# Patient Record
Sex: FEMALE | Race: BLACK OR AFRICAN AMERICAN | Employment: OTHER | URBAN - METROPOLITAN AREA
[De-identification: names, ages, dates, MRNs, and addresses within clinical notes are randomized per-mention and may not be internally consistent; named-entity substitution may affect disease eponyms.]

---

## 2017-02-18 PROBLEM — T78.2XXA ANAPHYLACTIC REACTION: Status: ACTIVE | Noted: 2017-02-18

## 2017-12-27 PROBLEM — R11.2 NAUSEA AND VOMITING: Status: ACTIVE | Noted: 2017-12-27

## 2018-03-26 ENCOUNTER — HOSPITAL ENCOUNTER (OUTPATIENT)
Age: 63
Discharge: HOME | End: 2018-03-28
Payer: COMMERCIAL

## 2018-03-26 PROCEDURE — 87088 URINE BACTERIA CULTURE: CPT

## 2018-03-29 LAB — URINE CULTURE, ROUTINE: NORMAL

## 2018-04-13 ENCOUNTER — HOSPITAL ENCOUNTER (OUTPATIENT)
Age: 63
Discharge: HOME | End: 2018-04-13
Payer: COMMERCIAL

## 2018-04-13 LAB
ALBUMIN SERPL-MCNC: 3.9 G/DL (ref 3.5–5.2)
ALP BLD-CCNC: 149 U/L (ref 35–104)
ALT SERPL-CCNC: 23 U/L (ref 0–32)
ANION GAP SERPL CALCULATED.3IONS-SCNC: 8 MMOL/L (ref 7–16)
AST SERPL-CCNC: 15 U/L (ref 0–31)
BILIRUB SERPL-MCNC: <0.2 MG/DL (ref 0–1.2)
BUN BLDV-MCNC: 22 MG/DL (ref 8–23)
CALCIUM SERPL-MCNC: 9.7 MG/DL (ref 8.6–10.2)
CHLORIDE BLD-SCNC: 102 MMOL/L (ref 98–107)
CO2: 28 MMOL/L (ref 22–29)
CORTISOL TOTAL: 10.37 MCG/DL (ref 2.68–18.4)
CREAT SERPL-MCNC: 0.7 MG/DL (ref 0.5–1)
GFR AFRICAN AMERICAN: >60
GFR NON-AFRICAN AMERICAN: >60 ML/MIN/1.73
GLUCOSE BLD-MCNC: 106 MG/DL (ref 74–109)
POTASSIUM SERPL-SCNC: 4 MMOL/L (ref 3.5–5)
SODIUM BLD-SCNC: 138 MMOL/L (ref 132–146)
TOTAL PROTEIN: 6.3 G/DL (ref 6.4–8.3)

## 2018-04-13 PROCEDURE — 82533 TOTAL CORTISOL: CPT

## 2018-04-13 PROCEDURE — 36415 COLL VENOUS BLD VENIPUNCTURE: CPT

## 2018-04-13 PROCEDURE — 80053 COMPREHEN METABOLIC PANEL: CPT

## 2018-04-17 ENCOUNTER — HOSPITAL ENCOUNTER (OUTPATIENT)
Dept: GENERAL RADIOLOGY | Age: 63
Discharge: HOME | End: 2018-04-19
Payer: COMMERCIAL

## 2018-04-17 ENCOUNTER — HOSPITAL ENCOUNTER (OUTPATIENT)
Age: 63
Discharge: HOME | End: 2018-04-17
Payer: COMMERCIAL

## 2018-04-17 DIAGNOSIS — Z12.31 VISIT FOR SCREENING MAMMOGRAM: ICD-10-CM

## 2018-04-17 PROCEDURE — 82024 ASSAY OF ACTH: CPT

## 2018-04-17 PROCEDURE — 77067 SCR MAMMO BI INCL CAD: CPT

## 2018-04-19 ENCOUNTER — APPOINTMENT (OUTPATIENT)
Dept: CT IMAGING | Age: 63
End: 2018-04-19
Payer: COMMERCIAL

## 2018-04-19 ENCOUNTER — APPOINTMENT (OUTPATIENT)
Dept: ULTRASOUND IMAGING | Age: 63
End: 2018-04-19
Payer: COMMERCIAL

## 2018-04-19 ENCOUNTER — HOSPITAL ENCOUNTER (EMERGENCY)
Age: 63
Discharge: HOME | End: 2018-04-19
Payer: COMMERCIAL

## 2018-04-19 VITALS
HEIGHT: 66 IN | RESPIRATION RATE: 16 BRPM | BODY MASS INDEX: 16.07 KG/M2 | HEART RATE: 84 BPM | WEIGHT: 100 LBS | SYSTOLIC BLOOD PRESSURE: 118 MMHG | OXYGEN SATURATION: 98 % | DIASTOLIC BLOOD PRESSURE: 63 MMHG | TEMPERATURE: 97.2 F

## 2018-04-19 DIAGNOSIS — N30.01 ACUTE CYSTITIS WITH HEMATURIA: Primary | ICD-10-CM

## 2018-04-19 DIAGNOSIS — R10.30 LOWER ABDOMINAL PAIN: ICD-10-CM

## 2018-04-19 DIAGNOSIS — N39.0 URINARY TRACT INFECTION WITHOUT HEMATURIA, SITE UNSPECIFIED: ICD-10-CM

## 2018-04-19 LAB
ADRENOCORTICOTROPIC HORMONE: 14 PG/ML (ref 6–58)
ALBUMIN SERPL-MCNC: 4.1 G/DL (ref 3.5–5.2)
ALP BLD-CCNC: 67 U/L (ref 35–104)
ALT SERPL-CCNC: 12 U/L (ref 0–32)
AMORPHOUS: PRESENT
ANION GAP SERPL CALCULATED.3IONS-SCNC: 12 MMOL/L (ref 7–16)
AST SERPL-CCNC: 14 U/L (ref 0–31)
BACTERIA: ABNORMAL /HPF
BILIRUB SERPL-MCNC: <0.2 MG/DL (ref 0–1.2)
BILIRUBIN URINE: NEGATIVE
BLOOD, URINE: ABNORMAL
BUN BLDV-MCNC: 37 MG/DL (ref 8–23)
CALCIUM SERPL-MCNC: 10.5 MG/DL (ref 8.6–10.2)
CHLORIDE BLD-SCNC: 105 MMOL/L (ref 98–107)
CLARITY: ABNORMAL
CO2: 26 MMOL/L (ref 22–29)
COLOR: YELLOW
CREAT SERPL-MCNC: 0.9 MG/DL (ref 0.5–1)
CRYSTALS, UA: ABNORMAL
GFR AFRICAN AMERICAN: >60
GFR NON-AFRICAN AMERICAN: >60 ML/MIN/1.73
GLUCOSE BLD-MCNC: 117 MG/DL (ref 74–109)
GLUCOSE URINE: NEGATIVE MG/DL
HCT VFR BLD CALC: 39 % (ref 34–48)
HEMOGLOBIN: 12 G/DL (ref 11.5–15.5)
KETONES, URINE: NEGATIVE MG/DL
LACTIC ACID: 1.5 MMOL/L (ref 0.5–2.2)
LEUKOCYTE ESTERASE, URINE: ABNORMAL
MCH RBC QN AUTO: 28.6 PG (ref 26–35)
MCHC RBC AUTO-ENTMCNC: 30.8 % (ref 32–34.5)
MCV RBC AUTO: 92.9 FL (ref 80–99.9)
MUCUS: PRESENT
NITRITE, URINE: NEGATIVE
PDW BLD-RTO: 15.1 FL (ref 11.5–15)
PH UA: >=9 (ref 5–9)
PLATELET # BLD: 252 E9/L (ref 130–450)
PMV BLD AUTO: 9.7 FL (ref 7–12)
POTASSIUM SERPL-SCNC: 4.4 MMOL/L (ref 3.5–5)
PROTEIN UA: >=300 MG/DL
RBC # BLD: 4.2 E12/L (ref 3.5–5.5)
RBC UA: ABNORMAL /HPF (ref 0–2)
SODIUM BLD-SCNC: 143 MMOL/L (ref 132–146)
SPECIFIC GRAVITY UA: 1.01 (ref 1–1.03)
TOTAL PROTEIN: 7 G/DL (ref 6.4–8.3)
UROBILINOGEN, URINE: 0.2 E.U./DL
WBC # BLD: 7.1 E9/L (ref 4.5–11.5)
WBC UA: ABNORMAL /HPF (ref 0–5)

## 2018-04-19 PROCEDURE — 87088 URINE BACTERIA CULTURE: CPT

## 2018-04-19 PROCEDURE — 96374 THER/PROPH/DIAG INJ IV PUSH: CPT

## 2018-04-19 PROCEDURE — 6360000002 HC RX W HCPCS: Performed by: PHYSICIAN ASSISTANT

## 2018-04-19 PROCEDURE — 99284 EMERGENCY DEPT VISIT MOD MDM: CPT

## 2018-04-19 PROCEDURE — 36415 COLL VENOUS BLD VENIPUNCTURE: CPT

## 2018-04-19 PROCEDURE — 2580000003 HC RX 258: Performed by: PHYSICIAN ASSISTANT

## 2018-04-19 PROCEDURE — 81001 URINALYSIS AUTO W/SCOPE: CPT

## 2018-04-19 PROCEDURE — 85027 COMPLETE CBC AUTOMATED: CPT

## 2018-04-19 PROCEDURE — 83605 ASSAY OF LACTIC ACID: CPT

## 2018-04-19 PROCEDURE — 80053 COMPREHEN METABOLIC PANEL: CPT

## 2018-04-19 PROCEDURE — 76830 TRANSVAGINAL US NON-OB: CPT

## 2018-04-19 PROCEDURE — 74176 CT ABD & PELVIS W/O CONTRAST: CPT

## 2018-04-19 RX ORDER — PHENAZOPYRIDINE HYDROCHLORIDE 100 MG/1
100 TABLET, FILM COATED ORAL 3 TIMES DAILY PRN
Qty: 9 TABLET | Refills: 0 | Status: SHIPPED | OUTPATIENT
Start: 2018-04-19 | End: 2018-04-22

## 2018-04-19 RX ORDER — ONDANSETRON 4 MG/1
4 TABLET, ORALLY DISINTEGRATING ORAL EVERY 8 HOURS PRN
Qty: 10 TABLET | Refills: 0 | Status: SHIPPED | OUTPATIENT
Start: 2018-04-19 | End: 2018-09-13 | Stop reason: ALTCHOICE

## 2018-04-19 RX ORDER — 0.9 % SODIUM CHLORIDE 0.9 %
1000 INTRAVENOUS SOLUTION INTRAVENOUS ONCE
Status: COMPLETED | OUTPATIENT
Start: 2018-04-19 | End: 2018-04-19

## 2018-04-19 RX ORDER — CEFDINIR 300 MG/1
300 CAPSULE ORAL 2 TIMES DAILY
Qty: 20 CAPSULE | Refills: 0 | Status: SHIPPED | OUTPATIENT
Start: 2018-04-19 | End: 2018-04-29

## 2018-04-19 RX ORDER — ONDANSETRON 2 MG/ML
4 INJECTION INTRAMUSCULAR; INTRAVENOUS ONCE
Status: COMPLETED | OUTPATIENT
Start: 2018-04-19 | End: 2018-04-19

## 2018-04-19 RX ADMIN — SODIUM CHLORIDE 1000 ML: 9 INJECTION, SOLUTION INTRAVENOUS at 14:13

## 2018-04-19 RX ADMIN — ONDANSETRON HYDROCHLORIDE 4 MG: 2 INJECTION, SOLUTION INTRAMUSCULAR; INTRAVENOUS at 14:12

## 2018-04-19 ASSESSMENT — PAIN DESCRIPTION - FREQUENCY: FREQUENCY: CONTINUOUS

## 2018-04-19 ASSESSMENT — PAIN SCALES - GENERAL
PAINLEVEL_OUTOF10: 8
PAINLEVEL_OUTOF10: 6

## 2018-04-19 ASSESSMENT — PAIN DESCRIPTION - LOCATION
LOCATION: PELVIS
LOCATION: PELVIS

## 2018-04-19 ASSESSMENT — PAIN DESCRIPTION - DESCRIPTORS
DESCRIPTORS: DISCOMFORT
DESCRIPTORS: DISCOMFORT

## 2018-04-19 ASSESSMENT — PAIN DESCRIPTION - PAIN TYPE: TYPE: ACUTE PAIN

## 2018-04-21 LAB — URINE CULTURE, ROUTINE: NORMAL

## 2018-05-13 ENCOUNTER — APPOINTMENT (OUTPATIENT)
Dept: CT IMAGING | Age: 63
DRG: 689 | End: 2018-05-13
Payer: COMMERCIAL

## 2018-05-13 ENCOUNTER — APPOINTMENT (OUTPATIENT)
Dept: GENERAL RADIOLOGY | Age: 63
DRG: 689 | End: 2018-05-13
Payer: COMMERCIAL

## 2018-05-13 ENCOUNTER — HOSPITAL ENCOUNTER (INPATIENT)
Age: 63
LOS: 5 days | Discharge: HOME HEALTH | DRG: 689 | End: 2018-05-18
Attending: EMERGENCY MEDICINE | Admitting: INTERNAL MEDICINE
Payer: COMMERCIAL

## 2018-05-13 DIAGNOSIS — N39.0 URINARY TRACT INFECTION WITHOUT HEMATURIA, SITE UNSPECIFIED: ICD-10-CM

## 2018-05-13 DIAGNOSIS — R41.82 ALTERED MENTAL STATUS, UNSPECIFIED ALTERED MENTAL STATUS TYPE: Primary | ICD-10-CM

## 2018-05-13 PROBLEM — G93.41 ACUTE METABOLIC ENCEPHALOPATHY: Status: ACTIVE | Noted: 2018-05-13

## 2018-05-13 PROBLEM — N30.01 ACUTE CYSTITIS WITH HEMATURIA: Status: ACTIVE | Noted: 2018-05-13

## 2018-05-13 LAB
ACETAMINOPHEN LEVEL: <5 MCG/ML (ref 10–30)
ALBUMIN SERPL-MCNC: 5 G/DL (ref 3.5–5.2)
ALP BLD-CCNC: 68 U/L (ref 35–104)
ALT SERPL-CCNC: 10 U/L (ref 0–32)
AMPHETAMINE SCREEN, URINE: NOT DETECTED
ANION GAP SERPL CALCULATED.3IONS-SCNC: 22 MMOL/L (ref 7–16)
AST SERPL-CCNC: 13 U/L (ref 0–31)
BACTERIA: ABNORMAL /HPF
BARBITURATE SCREEN URINE: NOT DETECTED
BASOPHILS ABSOLUTE: 0.02 E9/L (ref 0–0.2)
BASOPHILS RELATIVE PERCENT: 0.2 % (ref 0–2)
BENZODIAZEPINE SCREEN, URINE: NOT DETECTED
BILIRUB SERPL-MCNC: 0.3 MG/DL (ref 0–1.2)
BILIRUBIN URINE: NEGATIVE
BLOOD, URINE: ABNORMAL
BUN BLDV-MCNC: 37 MG/DL (ref 8–23)
CALCIUM SERPL-MCNC: 11.3 MG/DL (ref 8.6–10.2)
CANNABINOID SCREEN URINE: NOT DETECTED
CHLORIDE BLD-SCNC: 100 MMOL/L (ref 98–107)
CLARITY: CLEAR
CO2: 24 MMOL/L (ref 22–29)
COCAINE METABOLITE SCREEN URINE: NOT DETECTED
COLOR: YELLOW
CREAT SERPL-MCNC: 1.2 MG/DL (ref 0.5–1)
EKG ATRIAL RATE: 110 BPM
EKG P AXIS: 86 DEGREES
EKG P-R INTERVAL: 144 MS
EKG Q-T INTERVAL: 332 MS
EKG QRS DURATION: 64 MS
EKG QTC CALCULATION (BAZETT): 449 MS
EKG R AXIS: 66 DEGREES
EKG T AXIS: 86 DEGREES
EKG VENTRICULAR RATE: 110 BPM
EOSINOPHILS ABSOLUTE: 0.01 E9/L (ref 0.05–0.5)
EOSINOPHILS RELATIVE PERCENT: 0.1 % (ref 0–6)
EPITHELIAL CELLS, UA: ABNORMAL /HPF
ETHANOL: <10 MG/DL (ref 0–0.08)
GFR AFRICAN AMERICAN: 54
GFR NON-AFRICAN AMERICAN: 54 ML/MIN/1.73
GLUCOSE BLD-MCNC: 121 MG/DL (ref 74–109)
GLUCOSE URINE: NEGATIVE MG/DL
HCT VFR BLD CALC: 44.5 % (ref 34–48)
HEMOGLOBIN: 13.9 G/DL (ref 11.5–15.5)
IMMATURE GRANULOCYTES #: 0.03 E9/L
IMMATURE GRANULOCYTES %: 0.3 % (ref 0–5)
KETONES, URINE: ABNORMAL MG/DL
LACTIC ACID: 2.1 MMOL/L (ref 0.5–2.2)
LEUKOCYTE ESTERASE, URINE: ABNORMAL
LYMPHOCYTES ABSOLUTE: 0.84 E9/L (ref 1.5–4)
LYMPHOCYTES RELATIVE PERCENT: 9.5 % (ref 20–42)
MCH RBC QN AUTO: 28.7 PG (ref 26–35)
MCHC RBC AUTO-ENTMCNC: 31.2 % (ref 32–34.5)
MCV RBC AUTO: 91.9 FL (ref 80–99.9)
METHADONE SCREEN, URINE: NOT DETECTED
MONOCYTES ABSOLUTE: 0.44 E9/L (ref 0.1–0.95)
MONOCYTES RELATIVE PERCENT: 5 % (ref 2–12)
NEUTROPHILS ABSOLUTE: 7.51 E9/L (ref 1.8–7.3)
NEUTROPHILS RELATIVE PERCENT: 84.9 % (ref 43–80)
NITRITE, URINE: NEGATIVE
OPIATE SCREEN URINE: NOT DETECTED
PDW BLD-RTO: 13.8 FL (ref 11.5–15)
PH UA: 8.5 (ref 5–9)
PHENCYCLIDINE SCREEN URINE: NOT DETECTED
PLATELET # BLD: 278 E9/L (ref 130–450)
PMV BLD AUTO: 10 FL (ref 7–12)
POTASSIUM SERPL-SCNC: 5.1 MMOL/L (ref 3.5–5)
PROPOXYPHENE SCREEN: NOT DETECTED
PROTEIN UA: >=300 MG/DL
RBC # BLD: 4.84 E12/L (ref 3.5–5.5)
RBC UA: ABNORMAL /HPF (ref 0–2)
SALICYLATE, SERUM: <0.3 MG/DL (ref 0–30)
SODIUM BLD-SCNC: 146 MMOL/L (ref 132–146)
SPECIFIC GRAVITY UA: 1.01 (ref 1–1.03)
TOTAL PROTEIN: 8 G/DL (ref 6.4–8.3)
TRICYCLIC ANTIDEPRESSANTS SCREEN SERUM: NEGATIVE NG/ML
TROPONIN: <0.01 NG/ML (ref 0–0.03)
UROBILINOGEN, URINE: 0.2 E.U./DL
WBC # BLD: 8.9 E9/L (ref 4.5–11.5)
WBC UA: ABNORMAL /HPF (ref 0–5)

## 2018-05-13 PROCEDURE — 99285 EMERGENCY DEPT VISIT HI MDM: CPT

## 2018-05-13 PROCEDURE — 94760 N-INVAS EAR/PLS OXIMETRY 1: CPT

## 2018-05-13 PROCEDURE — 87040 BLOOD CULTURE FOR BACTERIA: CPT

## 2018-05-13 PROCEDURE — 6360000002 HC RX W HCPCS: Performed by: EMERGENCY MEDICINE

## 2018-05-13 PROCEDURE — 81001 URINALYSIS AUTO W/SCOPE: CPT

## 2018-05-13 PROCEDURE — 83605 ASSAY OF LACTIC ACID: CPT

## 2018-05-13 PROCEDURE — 96376 TX/PRO/DX INJ SAME DRUG ADON: CPT

## 2018-05-13 PROCEDURE — 71045 X-RAY EXAM CHEST 1 VIEW: CPT

## 2018-05-13 PROCEDURE — 2580000003 HC RX 258: Performed by: EMERGENCY MEDICINE

## 2018-05-13 PROCEDURE — 84484 ASSAY OF TROPONIN QUANT: CPT

## 2018-05-13 PROCEDURE — 36415 COLL VENOUS BLD VENIPUNCTURE: CPT

## 2018-05-13 PROCEDURE — 2060000000 HC ICU INTERMEDIATE R&B

## 2018-05-13 PROCEDURE — 80053 COMPREHEN METABOLIC PANEL: CPT

## 2018-05-13 PROCEDURE — 96374 THER/PROPH/DIAG INJ IV PUSH: CPT

## 2018-05-13 PROCEDURE — 80307 DRUG TEST PRSMV CHEM ANLYZR: CPT

## 2018-05-13 PROCEDURE — 85025 COMPLETE CBC W/AUTO DIFF WBC: CPT

## 2018-05-13 PROCEDURE — 70450 CT HEAD/BRAIN W/O DYE: CPT

## 2018-05-13 PROCEDURE — 93005 ELECTROCARDIOGRAM TRACING: CPT | Performed by: EMERGENCY MEDICINE

## 2018-05-13 RX ORDER — LORAZEPAM 2 MG/ML
2 INJECTION INTRAMUSCULAR ONCE
Status: COMPLETED | OUTPATIENT
Start: 2018-05-13 | End: 2018-05-13

## 2018-05-13 RX ORDER — 0.9 % SODIUM CHLORIDE 0.9 %
1000 INTRAVENOUS SOLUTION INTRAVENOUS ONCE
Status: COMPLETED | OUTPATIENT
Start: 2018-05-13 | End: 2018-05-13

## 2018-05-13 RX ORDER — LORAZEPAM 2 MG/ML
1 INJECTION INTRAMUSCULAR ONCE
Status: COMPLETED | OUTPATIENT
Start: 2018-05-13 | End: 2018-05-13

## 2018-05-13 RX ADMIN — CEFTRIAXONE SODIUM 1 G: 1 INJECTION, POWDER, FOR SOLUTION INTRAMUSCULAR; INTRAVENOUS at 21:39

## 2018-05-13 RX ADMIN — SODIUM CHLORIDE 1000 ML: 9 INJECTION, SOLUTION INTRAVENOUS at 21:39

## 2018-05-13 RX ADMIN — LORAZEPAM 1 MG: 2 INJECTION INTRAMUSCULAR; INTRAVENOUS at 21:39

## 2018-05-13 RX ADMIN — Medication 2 MG: at 19:55

## 2018-05-13 RX ADMIN — SODIUM CHLORIDE 1000 ML: 9 INJECTION, SOLUTION INTRAVENOUS at 18:35

## 2018-05-14 ENCOUNTER — APPOINTMENT (OUTPATIENT)
Dept: MRI IMAGING | Age: 63
DRG: 689 | End: 2018-05-14
Payer: COMMERCIAL

## 2018-05-14 PROBLEM — R41.82 ALTERED MENTAL STATE: Status: RESOLVED | Noted: 2018-05-13 | Resolved: 2018-05-14

## 2018-05-14 LAB
ANION GAP SERPL CALCULATED.3IONS-SCNC: 13 MMOL/L (ref 7–16)
BASOPHILS ABSOLUTE: 0.02 E9/L (ref 0–0.2)
BASOPHILS RELATIVE PERCENT: 0.3 % (ref 0–2)
BUN BLDV-MCNC: 30 MG/DL (ref 8–23)
CALCIUM IONIZED: 1.42 MMOL/L (ref 1.15–1.33)
CALCIUM SERPL-MCNC: 9.3 MG/DL (ref 8.6–10.2)
CHLORIDE BLD-SCNC: 108 MMOL/L (ref 98–107)
CO2: 22 MMOL/L (ref 22–29)
CREAT SERPL-MCNC: 0.7 MG/DL (ref 0.5–1)
EOSINOPHILS ABSOLUTE: 0.07 E9/L (ref 0.05–0.5)
EOSINOPHILS RELATIVE PERCENT: 1.1 % (ref 0–6)
GFR AFRICAN AMERICAN: >60
GFR NON-AFRICAN AMERICAN: >60 ML/MIN/1.73
GLUCOSE BLD-MCNC: 98 MG/DL (ref 74–109)
HCT VFR BLD CALC: 41 % (ref 34–48)
HEMOGLOBIN: 12.4 G/DL (ref 11.5–15.5)
IMMATURE GRANULOCYTES #: 0.01 E9/L
IMMATURE GRANULOCYTES %: 0.2 % (ref 0–5)
LYMPHOCYTES ABSOLUTE: 1.43 E9/L (ref 1.5–4)
LYMPHOCYTES RELATIVE PERCENT: 22.1 % (ref 20–42)
MCH RBC QN AUTO: 28.8 PG (ref 26–35)
MCHC RBC AUTO-ENTMCNC: 30.2 % (ref 32–34.5)
MCV RBC AUTO: 95.3 FL (ref 80–99.9)
MONOCYTES ABSOLUTE: 0.55 E9/L (ref 0.1–0.95)
MONOCYTES RELATIVE PERCENT: 8.5 % (ref 2–12)
NEUTROPHILS ABSOLUTE: 4.38 E9/L (ref 1.8–7.3)
NEUTROPHILS RELATIVE PERCENT: 67.8 % (ref 43–80)
PARATHYROID HORMONE INTACT: 64 PG/ML (ref 15–65)
PDW BLD-RTO: 13.9 FL (ref 11.5–15)
PLATELET # BLD: 232 E9/L (ref 130–450)
PMV BLD AUTO: 10.1 FL (ref 7–12)
POTASSIUM SERPL-SCNC: 4.1 MMOL/L (ref 3.5–5)
RBC # BLD: 4.3 E12/L (ref 3.5–5.5)
SODIUM BLD-SCNC: 143 MMOL/L (ref 132–146)
WBC # BLD: 6.5 E9/L (ref 4.5–11.5)

## 2018-05-14 PROCEDURE — 97535 SELF CARE MNGMENT TRAINING: CPT

## 2018-05-14 PROCEDURE — 6360000002 HC RX W HCPCS: Performed by: INTERNAL MEDICINE

## 2018-05-14 PROCEDURE — 36415 COLL VENOUS BLD VENIPUNCTURE: CPT

## 2018-05-14 PROCEDURE — 87088 URINE BACTERIA CULTURE: CPT

## 2018-05-14 PROCEDURE — 70551 MRI BRAIN STEM W/O DYE: CPT

## 2018-05-14 PROCEDURE — 82330 ASSAY OF CALCIUM: CPT

## 2018-05-14 PROCEDURE — 80048 BASIC METABOLIC PNL TOTAL CA: CPT

## 2018-05-14 PROCEDURE — G8988 SELF CARE GOAL STATUS: HCPCS

## 2018-05-14 PROCEDURE — 6370000000 HC RX 637 (ALT 250 FOR IP): Performed by: INTERNAL MEDICINE

## 2018-05-14 PROCEDURE — G8979 MOBILITY GOAL STATUS: HCPCS

## 2018-05-14 PROCEDURE — 97161 PT EVAL LOW COMPLEX 20 MIN: CPT

## 2018-05-14 PROCEDURE — 2580000003 HC RX 258: Performed by: INTERNAL MEDICINE

## 2018-05-14 PROCEDURE — G8987 SELF CARE CURRENT STATUS: HCPCS

## 2018-05-14 PROCEDURE — 83970 ASSAY OF PARATHORMONE: CPT

## 2018-05-14 PROCEDURE — 2060000000 HC ICU INTERMEDIATE R&B

## 2018-05-14 PROCEDURE — 85025 COMPLETE CBC W/AUTO DIFF WBC: CPT

## 2018-05-14 PROCEDURE — 97166 OT EVAL MOD COMPLEX 45 MIN: CPT

## 2018-05-14 PROCEDURE — 97110 THERAPEUTIC EXERCISES: CPT

## 2018-05-14 PROCEDURE — G8978 MOBILITY CURRENT STATUS: HCPCS

## 2018-05-14 RX ORDER — SODIUM CHLORIDE 0.9 % (FLUSH) 0.9 %
10 SYRINGE (ML) INJECTION PRN
Status: DISCONTINUED | OUTPATIENT
Start: 2018-05-14 | End: 2018-05-18 | Stop reason: HOSPADM

## 2018-05-14 RX ORDER — ONDANSETRON 2 MG/ML
4 INJECTION INTRAMUSCULAR; INTRAVENOUS EVERY 6 HOURS PRN
Status: DISCONTINUED | OUTPATIENT
Start: 2018-05-14 | End: 2018-05-18 | Stop reason: HOSPADM

## 2018-05-14 RX ORDER — ACETAMINOPHEN 325 MG/1
650 TABLET ORAL EVERY 4 HOURS PRN
Status: DISCONTINUED | OUTPATIENT
Start: 2018-05-14 | End: 2018-05-18 | Stop reason: HOSPADM

## 2018-05-14 RX ORDER — DOCUSATE SODIUM 100 MG/1
100 CAPSULE, LIQUID FILLED ORAL 2 TIMES DAILY PRN
Status: DISCONTINUED | OUTPATIENT
Start: 2018-05-14 | End: 2018-05-18 | Stop reason: HOSPADM

## 2018-05-14 RX ORDER — IPRATROPIUM BROMIDE AND ALBUTEROL SULFATE 2.5; .5 MG/3ML; MG/3ML
1 SOLUTION RESPIRATORY (INHALATION) EVERY 4 HOURS PRN
Status: DISCONTINUED | OUTPATIENT
Start: 2018-05-14 | End: 2018-05-18 | Stop reason: HOSPADM

## 2018-05-14 RX ORDER — SODIUM CHLORIDE 0.9 % (FLUSH) 0.9 %
10 SYRINGE (ML) INJECTION EVERY 12 HOURS SCHEDULED
Status: DISCONTINUED | OUTPATIENT
Start: 2018-05-14 | End: 2018-05-18 | Stop reason: HOSPADM

## 2018-05-14 RX ORDER — PETROLATUM 42 G/100G
OINTMENT TOPICAL 2 TIMES DAILY PRN
Status: DISCONTINUED | OUTPATIENT
Start: 2018-05-14 | End: 2018-05-18 | Stop reason: HOSPADM

## 2018-05-14 RX ORDER — FOLIC ACID 1 MG/1
1 TABLET ORAL DAILY
Status: DISCONTINUED | OUTPATIENT
Start: 2018-05-14 | End: 2018-05-18 | Stop reason: HOSPADM

## 2018-05-14 RX ORDER — LORAZEPAM 2 MG/ML
0.5 INJECTION INTRAMUSCULAR
Status: ACTIVE | OUTPATIENT
Start: 2018-05-14 | End: 2018-05-14

## 2018-05-14 RX ORDER — SODIUM CHLORIDE 9 MG/ML
INJECTION, SOLUTION INTRAVENOUS CONTINUOUS
Status: DISCONTINUED | OUTPATIENT
Start: 2018-05-14 | End: 2018-05-15

## 2018-05-14 RX ADMIN — FOLIC ACID 1 MG: 1 TABLET ORAL at 09:09

## 2018-05-14 RX ADMIN — Medication 10 ML: at 09:09

## 2018-05-14 RX ADMIN — WATER 1 G: 1 INJECTION INTRAMUSCULAR; INTRAVENOUS; SUBCUTANEOUS at 21:11

## 2018-05-14 RX ADMIN — SERTRALINE 50 MG: 50 TABLET, FILM COATED ORAL at 09:09

## 2018-05-14 RX ADMIN — ENOXAPARIN SODIUM 40 MG: 40 INJECTION SUBCUTANEOUS at 09:09

## 2018-05-14 RX ADMIN — SODIUM CHLORIDE: 9 INJECTION, SOLUTION INTRAVENOUS at 01:17

## 2018-05-14 RX ADMIN — Medication 10 ML: at 21:12

## 2018-05-14 RX ADMIN — ACETAMINOPHEN 650 MG: 325 TABLET, FILM COATED ORAL at 23:42

## 2018-05-14 ASSESSMENT — PAIN SCALES - GENERAL
PAINLEVEL_OUTOF10: 7
PAINLEVEL_OUTOF10: 0
PAINLEVEL_OUTOF10: 0

## 2018-05-15 LAB
ANION GAP SERPL CALCULATED.3IONS-SCNC: 12 MMOL/L (ref 7–16)
BASOPHILS ABSOLUTE: 0.02 E9/L (ref 0–0.2)
BASOPHILS RELATIVE PERCENT: 0.4 % (ref 0–2)
BUN BLDV-MCNC: 18 MG/DL (ref 8–23)
CALCIUM SERPL-MCNC: 8.4 MG/DL (ref 8.6–10.2)
CHLORIDE BLD-SCNC: 107 MMOL/L (ref 98–107)
CO2: 23 MMOL/L (ref 22–29)
CREAT SERPL-MCNC: 0.6 MG/DL (ref 0.5–1)
EOSINOPHILS ABSOLUTE: 0.12 E9/L (ref 0.05–0.5)
EOSINOPHILS RELATIVE PERCENT: 2.6 % (ref 0–6)
GFR AFRICAN AMERICAN: >60
GFR NON-AFRICAN AMERICAN: >60 ML/MIN/1.73
GLUCOSE BLD-MCNC: 98 MG/DL (ref 74–109)
HCT VFR BLD CALC: 30.8 % (ref 34–48)
HEMOGLOBIN: 9.8 G/DL (ref 11.5–15.5)
IMMATURE GRANULOCYTES #: 0.01 E9/L
IMMATURE GRANULOCYTES %: 0.2 % (ref 0–5)
LYMPHOCYTES ABSOLUTE: 1.77 E9/L (ref 1.5–4)
LYMPHOCYTES RELATIVE PERCENT: 38.3 % (ref 20–42)
MAGNESIUM: 1.8 MG/DL (ref 1.6–2.6)
MCH RBC QN AUTO: 29.3 PG (ref 26–35)
MCHC RBC AUTO-ENTMCNC: 31.8 % (ref 32–34.5)
MCV RBC AUTO: 91.9 FL (ref 80–99.9)
MONOCYTES ABSOLUTE: 0.38 E9/L (ref 0.1–0.95)
MONOCYTES RELATIVE PERCENT: 8.2 % (ref 2–12)
NEUTROPHILS ABSOLUTE: 2.32 E9/L (ref 1.8–7.3)
NEUTROPHILS RELATIVE PERCENT: 50.3 % (ref 43–80)
PDW BLD-RTO: 13.6 FL (ref 11.5–15)
PLATELET # BLD: 194 E9/L (ref 130–450)
PMV BLD AUTO: 10.3 FL (ref 7–12)
POTASSIUM SERPL-SCNC: 3.8 MMOL/L (ref 3.5–5)
RBC # BLD: 3.35 E12/L (ref 3.5–5.5)
SODIUM BLD-SCNC: 142 MMOL/L (ref 132–146)
WBC # BLD: 4.6 E9/L (ref 4.5–11.5)

## 2018-05-15 PROCEDURE — 80048 BASIC METABOLIC PNL TOTAL CA: CPT

## 2018-05-15 PROCEDURE — 6360000002 HC RX W HCPCS: Performed by: INTERNAL MEDICINE

## 2018-05-15 PROCEDURE — 94664 DEMO&/EVAL PT USE INHALER: CPT

## 2018-05-15 PROCEDURE — 2060000000 HC ICU INTERMEDIATE R&B

## 2018-05-15 PROCEDURE — 97110 THERAPEUTIC EXERCISES: CPT

## 2018-05-15 PROCEDURE — 6370000000 HC RX 637 (ALT 250 FOR IP): Performed by: INTERNAL MEDICINE

## 2018-05-15 PROCEDURE — 97530 THERAPEUTIC ACTIVITIES: CPT

## 2018-05-15 PROCEDURE — 36415 COLL VENOUS BLD VENIPUNCTURE: CPT

## 2018-05-15 PROCEDURE — 85025 COMPLETE CBC W/AUTO DIFF WBC: CPT

## 2018-05-15 PROCEDURE — 2580000003 HC RX 258: Performed by: INTERNAL MEDICINE

## 2018-05-15 PROCEDURE — 83735 ASSAY OF MAGNESIUM: CPT

## 2018-05-15 RX ADMIN — SERTRALINE 50 MG: 50 TABLET, FILM COATED ORAL at 08:37

## 2018-05-15 RX ADMIN — ACETAMINOPHEN 650 MG: 325 TABLET, FILM COATED ORAL at 08:37

## 2018-05-15 RX ADMIN — ENOXAPARIN SODIUM 40 MG: 40 INJECTION SUBCUTANEOUS at 08:37

## 2018-05-15 RX ADMIN — Medication 10 ML: at 22:38

## 2018-05-15 RX ADMIN — IPRATROPIUM BROMIDE AND ALBUTEROL SULFATE 1 AMPULE: 2.5; .5 SOLUTION RESPIRATORY (INHALATION) at 11:06

## 2018-05-15 RX ADMIN — FOLIC ACID 1 MG: 1 TABLET ORAL at 08:37

## 2018-05-15 RX ADMIN — WATER 1 G: 1 INJECTION INTRAMUSCULAR; INTRAVENOUS; SUBCUTANEOUS at 22:38

## 2018-05-15 RX ADMIN — ACETAMINOPHEN 650 MG: 325 TABLET, FILM COATED ORAL at 18:30

## 2018-05-15 RX ADMIN — Medication 10 ML: at 08:38

## 2018-05-15 ASSESSMENT — PAIN DESCRIPTION - ONSET
ONSET: GRADUAL
ONSET: ON-GOING
ONSET: GRADUAL

## 2018-05-15 ASSESSMENT — PAIN DESCRIPTION - FREQUENCY
FREQUENCY: CONTINUOUS
FREQUENCY: INTERMITTENT
FREQUENCY: INTERMITTENT

## 2018-05-15 ASSESSMENT — PAIN DESCRIPTION - LOCATION
LOCATION: BACK
LOCATION: ABDOMEN
LOCATION: ABDOMEN

## 2018-05-15 ASSESSMENT — PAIN DESCRIPTION - PAIN TYPE
TYPE: ACUTE PAIN

## 2018-05-15 ASSESSMENT — PAIN DESCRIPTION - DESCRIPTORS
DESCRIPTORS: ACHING;DISCOMFORT;SORE
DESCRIPTORS: ACHING;DISCOMFORT
DESCRIPTORS: CONSTANT;DISCOMFORT;SORE

## 2018-05-15 ASSESSMENT — PAIN SCALES - GENERAL
PAINLEVEL_OUTOF10: 0
PAINLEVEL_OUTOF10: 2
PAINLEVEL_OUTOF10: 8
PAINLEVEL_OUTOF10: 0
PAINLEVEL_OUTOF10: 4

## 2018-05-15 ASSESSMENT — PAIN DESCRIPTION - PROGRESSION
CLINICAL_PROGRESSION: NOT CHANGED
CLINICAL_PROGRESSION: GRADUALLY IMPROVING
CLINICAL_PROGRESSION: GRADUALLY IMPROVING

## 2018-05-15 ASSESSMENT — PAIN DESCRIPTION - ORIENTATION: ORIENTATION: LOWER

## 2018-05-16 LAB
ANION GAP SERPL CALCULATED.3IONS-SCNC: 9 MMOL/L (ref 7–16)
BASOPHILS ABSOLUTE: 0.02 E9/L (ref 0–0.2)
BASOPHILS RELATIVE PERCENT: 0.5 % (ref 0–2)
BUN BLDV-MCNC: 10 MG/DL (ref 8–23)
CALCIUM SERPL-MCNC: 8.9 MG/DL (ref 8.6–10.2)
CHLORIDE BLD-SCNC: 104 MMOL/L (ref 98–107)
CO2: 26 MMOL/L (ref 22–29)
CREAT SERPL-MCNC: 0.6 MG/DL (ref 0.5–1)
EOSINOPHILS ABSOLUTE: 0.15 E9/L (ref 0.05–0.5)
EOSINOPHILS RELATIVE PERCENT: 3.8 % (ref 0–6)
GFR AFRICAN AMERICAN: >60
GFR NON-AFRICAN AMERICAN: >60 ML/MIN/1.73
GLUCOSE BLD-MCNC: 88 MG/DL (ref 74–109)
HCT VFR BLD CALC: 32.7 % (ref 34–48)
HEMOGLOBIN: 10.3 G/DL (ref 11.5–15.5)
IMMATURE GRANULOCYTES #: 0.01 E9/L
IMMATURE GRANULOCYTES %: 0.3 % (ref 0–5)
LYMPHOCYTES ABSOLUTE: 1.14 E9/L (ref 1.5–4)
LYMPHOCYTES RELATIVE PERCENT: 28.8 % (ref 20–42)
MAGNESIUM: 1.9 MG/DL (ref 1.6–2.6)
MCH RBC QN AUTO: 28.8 PG (ref 26–35)
MCHC RBC AUTO-ENTMCNC: 31.5 % (ref 32–34.5)
MCV RBC AUTO: 91.3 FL (ref 80–99.9)
MONOCYTES ABSOLUTE: 0.29 E9/L (ref 0.1–0.95)
MONOCYTES RELATIVE PERCENT: 7.3 % (ref 2–12)
NEUTROPHILS ABSOLUTE: 2.35 E9/L (ref 1.8–7.3)
NEUTROPHILS RELATIVE PERCENT: 59.3 % (ref 43–80)
ORGANISM: ABNORMAL
PDW BLD-RTO: 13.3 FL (ref 11.5–15)
PLATELET # BLD: 192 E9/L (ref 130–450)
PMV BLD AUTO: 9.9 FL (ref 7–12)
POTASSIUM SERPL-SCNC: 4.1 MMOL/L (ref 3.5–5)
RBC # BLD: 3.58 E12/L (ref 3.5–5.5)
SODIUM BLD-SCNC: 139 MMOL/L (ref 132–146)
URINE CULTURE, ROUTINE: ABNORMAL
URINE CULTURE, ROUTINE: ABNORMAL
WBC # BLD: 4 E9/L (ref 4.5–11.5)

## 2018-05-16 PROCEDURE — 6370000000 HC RX 637 (ALT 250 FOR IP): Performed by: INTERNAL MEDICINE

## 2018-05-16 PROCEDURE — 94640 AIRWAY INHALATION TREATMENT: CPT

## 2018-05-16 PROCEDURE — 2060000000 HC ICU INTERMEDIATE R&B

## 2018-05-16 PROCEDURE — 2580000003 HC RX 258: Performed by: INTERNAL MEDICINE

## 2018-05-16 PROCEDURE — 6360000002 HC RX W HCPCS: Performed by: INTERNAL MEDICINE

## 2018-05-16 PROCEDURE — 85025 COMPLETE CBC W/AUTO DIFF WBC: CPT

## 2018-05-16 PROCEDURE — 80048 BASIC METABOLIC PNL TOTAL CA: CPT

## 2018-05-16 PROCEDURE — 83735 ASSAY OF MAGNESIUM: CPT

## 2018-05-16 PROCEDURE — 36415 COLL VENOUS BLD VENIPUNCTURE: CPT

## 2018-05-16 RX ORDER — DICYCLOMINE HYDROCHLORIDE 10 MG/1
10 CAPSULE ORAL 3 TIMES DAILY PRN
Status: DISCONTINUED | OUTPATIENT
Start: 2018-05-16 | End: 2018-05-18 | Stop reason: HOSPADM

## 2018-05-16 RX ADMIN — ACETAMINOPHEN 650 MG: 325 TABLET, FILM COATED ORAL at 10:40

## 2018-05-16 RX ADMIN — FOLIC ACID 1 MG: 1 TABLET ORAL at 08:12

## 2018-05-16 RX ADMIN — Medication 10 ML: at 08:12

## 2018-05-16 RX ADMIN — WATER 1 G: 1 INJECTION INTRAMUSCULAR; INTRAVENOUS; SUBCUTANEOUS at 20:43

## 2018-05-16 RX ADMIN — SERTRALINE 50 MG: 50 TABLET, FILM COATED ORAL at 08:12

## 2018-05-16 RX ADMIN — ENOXAPARIN SODIUM 40 MG: 40 INJECTION SUBCUTANEOUS at 08:12

## 2018-05-16 RX ADMIN — IPRATROPIUM BROMIDE AND ALBUTEROL SULFATE 1 AMPULE: 2.5; .5 SOLUTION RESPIRATORY (INHALATION) at 18:05

## 2018-05-16 RX ADMIN — Medication 10 ML: at 20:43

## 2018-05-16 RX ADMIN — DICYCLOMINE HYDROCHLORIDE 10 MG: 10 CAPSULE ORAL at 17:59

## 2018-05-16 ASSESSMENT — PAIN DESCRIPTION - PAIN TYPE: TYPE: ACUTE PAIN

## 2018-05-16 ASSESSMENT — PAIN DESCRIPTION - DESCRIPTORS: DESCRIPTORS: CRAMPING

## 2018-05-16 ASSESSMENT — PAIN SCALES - GENERAL
PAINLEVEL_OUTOF10: 0
PAINLEVEL_OUTOF10: 9
PAINLEVEL_OUTOF10: 0

## 2018-05-16 ASSESSMENT — PAIN DESCRIPTION - ORIENTATION: ORIENTATION: LOWER

## 2018-05-16 ASSESSMENT — PAIN DESCRIPTION - LOCATION: LOCATION: ABDOMEN

## 2018-05-16 ASSESSMENT — PAIN DESCRIPTION - FREQUENCY: FREQUENCY: CONTINUOUS

## 2018-05-17 PROCEDURE — 6360000002 HC RX W HCPCS: Performed by: INTERNAL MEDICINE

## 2018-05-17 PROCEDURE — 94640 AIRWAY INHALATION TREATMENT: CPT

## 2018-05-17 PROCEDURE — 6370000000 HC RX 637 (ALT 250 FOR IP): Performed by: INTERNAL MEDICINE

## 2018-05-17 PROCEDURE — 97535 SELF CARE MNGMENT TRAINING: CPT

## 2018-05-17 PROCEDURE — 2580000003 HC RX 258: Performed by: INTERNAL MEDICINE

## 2018-05-17 PROCEDURE — 97530 THERAPEUTIC ACTIVITIES: CPT

## 2018-05-17 PROCEDURE — 94760 N-INVAS EAR/PLS OXIMETRY 1: CPT

## 2018-05-17 PROCEDURE — 97112 NEUROMUSCULAR REEDUCATION: CPT

## 2018-05-17 PROCEDURE — 6370000000 HC RX 637 (ALT 250 FOR IP): Performed by: UROLOGY

## 2018-05-17 PROCEDURE — 2060000000 HC ICU INTERMEDIATE R&B

## 2018-05-17 PROCEDURE — 2700000000 HC OXYGEN THERAPY PER DAY

## 2018-05-17 RX ORDER — PHENAZOPYRIDINE HYDROCHLORIDE 100 MG/1
100 TABLET, FILM COATED ORAL
Status: DISCONTINUED | OUTPATIENT
Start: 2018-05-17 | End: 2018-05-18 | Stop reason: HOSPADM

## 2018-05-17 RX ADMIN — WATER 1 G: 1 INJECTION INTRAMUSCULAR; INTRAVENOUS; SUBCUTANEOUS at 22:15

## 2018-05-17 RX ADMIN — IPRATROPIUM BROMIDE AND ALBUTEROL SULFATE 1 AMPULE: 2.5; .5 SOLUTION RESPIRATORY (INHALATION) at 08:21

## 2018-05-17 RX ADMIN — Medication 10 ML: at 22:20

## 2018-05-17 RX ADMIN — DICYCLOMINE HYDROCHLORIDE 10 MG: 10 CAPSULE ORAL at 08:21

## 2018-05-17 RX ADMIN — ENOXAPARIN SODIUM 40 MG: 40 INJECTION SUBCUTANEOUS at 08:19

## 2018-05-17 RX ADMIN — Medication 10 ML: at 09:38

## 2018-05-17 RX ADMIN — FOLIC ACID 1 MG: 1 TABLET ORAL at 08:19

## 2018-05-17 RX ADMIN — IPRATROPIUM BROMIDE AND ALBUTEROL SULFATE 1 AMPULE: 2.5; .5 SOLUTION RESPIRATORY (INHALATION) at 21:00

## 2018-05-17 RX ADMIN — PHENAZOPYRIDINE HYDROCHLORIDE 100 MG: 100 TABLET ORAL at 17:49

## 2018-05-17 RX ADMIN — SERTRALINE 50 MG: 50 TABLET, FILM COATED ORAL at 08:19

## 2018-05-17 RX ADMIN — IPRATROPIUM BROMIDE AND ALBUTEROL SULFATE 1 AMPULE: 2.5; .5 SOLUTION RESPIRATORY (INHALATION) at 13:01

## 2018-05-17 ASSESSMENT — PAIN DESCRIPTION - FREQUENCY: FREQUENCY: INTERMITTENT

## 2018-05-17 ASSESSMENT — PAIN SCALES - GENERAL
PAINLEVEL_OUTOF10: 8
PAINLEVEL_OUTOF10: 0

## 2018-05-17 ASSESSMENT — PAIN DESCRIPTION - LOCATION: LOCATION: ABDOMEN

## 2018-05-17 ASSESSMENT — PAIN DESCRIPTION - PROGRESSION
CLINICAL_PROGRESSION: NOT CHANGED
CLINICAL_PROGRESSION: NOT CHANGED

## 2018-05-17 ASSESSMENT — PAIN DESCRIPTION - ORIENTATION: ORIENTATION: LOWER

## 2018-05-17 ASSESSMENT — PAIN DESCRIPTION - PAIN TYPE: TYPE: ACUTE PAIN

## 2018-05-17 ASSESSMENT — PAIN DESCRIPTION - DESCRIPTORS: DESCRIPTORS: CRAMPING

## 2018-05-17 ASSESSMENT — PAIN DESCRIPTION - ONSET: ONSET: ON-GOING

## 2018-05-18 VITALS
RESPIRATION RATE: 18 BRPM | HEART RATE: 76 BPM | DIASTOLIC BLOOD PRESSURE: 58 MMHG | HEIGHT: 66 IN | TEMPERATURE: 98.1 F | BODY MASS INDEX: 16.07 KG/M2 | WEIGHT: 100 LBS | OXYGEN SATURATION: 94 % | SYSTOLIC BLOOD PRESSURE: 122 MMHG

## 2018-05-18 PROCEDURE — 6370000000 HC RX 637 (ALT 250 FOR IP): Performed by: INTERNAL MEDICINE

## 2018-05-18 PROCEDURE — 6370000000 HC RX 637 (ALT 250 FOR IP): Performed by: UROLOGY

## 2018-05-18 PROCEDURE — 6360000002 HC RX W HCPCS: Performed by: INTERNAL MEDICINE

## 2018-05-18 PROCEDURE — 2580000003 HC RX 258: Performed by: INTERNAL MEDICINE

## 2018-05-18 PROCEDURE — 2700000000 HC OXYGEN THERAPY PER DAY

## 2018-05-18 RX ORDER — CEPHALEXIN 500 MG/1
500 CAPSULE ORAL 2 TIMES DAILY
Qty: 10 CAPSULE | Refills: 0 | Status: SHIPPED | OUTPATIENT
Start: 2018-05-18 | End: 2018-05-23

## 2018-05-18 RX ORDER — PHENAZOPYRIDINE HYDROCHLORIDE 100 MG/1
100 TABLET, FILM COATED ORAL 3 TIMES DAILY
Qty: 21 TABLET | Refills: 0 | Status: SHIPPED | OUTPATIENT
Start: 2018-05-18 | End: 2018-05-25

## 2018-05-18 RX ADMIN — PHENAZOPYRIDINE HYDROCHLORIDE 100 MG: 100 TABLET ORAL at 13:04

## 2018-05-18 RX ADMIN — PHENAZOPYRIDINE HYDROCHLORIDE 100 MG: 100 TABLET ORAL at 08:08

## 2018-05-18 RX ADMIN — SERTRALINE 50 MG: 50 TABLET, FILM COATED ORAL at 08:08

## 2018-05-18 RX ADMIN — ENOXAPARIN SODIUM 40 MG: 40 INJECTION SUBCUTANEOUS at 08:08

## 2018-05-18 RX ADMIN — FOLIC ACID 1 MG: 1 TABLET ORAL at 08:08

## 2018-05-18 RX ADMIN — Medication 10 ML: at 08:08

## 2018-05-18 ASSESSMENT — PAIN SCALES - GENERAL
PAINLEVEL_OUTOF10: 0
PAINLEVEL_OUTOF10: 0

## 2018-05-19 LAB
BLOOD CULTURE, ROUTINE: NORMAL
CULTURE, BLOOD 2: NORMAL

## 2018-06-12 ENCOUNTER — HOSPITAL ENCOUNTER (OUTPATIENT)
Dept: ULTRASOUND IMAGING | Age: 63
Discharge: HOME | End: 2018-06-14
Payer: COMMERCIAL

## 2018-06-12 DIAGNOSIS — C64.2 MALIGNANT NEOPLASM OF LEFT KIDNEY, EXCEPT RENAL PELVIS (HCC): ICD-10-CM

## 2018-06-12 PROCEDURE — 76770 US EXAM ABDO BACK WALL COMP: CPT

## 2018-09-05 ENCOUNTER — HOSPITAL ENCOUNTER (OUTPATIENT)
Age: 63
Discharge: HOME | End: 2018-09-05
Payer: COMMERCIAL

## 2018-09-05 LAB
ALBUMIN SERPL-MCNC: 4.3 G/DL (ref 3.5–5.2)
ALP BLD-CCNC: 66 U/L (ref 35–104)
ALT SERPL-CCNC: 12 U/L (ref 0–32)
ANION GAP SERPL CALCULATED.3IONS-SCNC: 13 MMOL/L (ref 7–16)
AST SERPL-CCNC: 16 U/L (ref 0–31)
BASOPHILS ABSOLUTE: 0.03 E9/L (ref 0–0.2)
BASOPHILS RELATIVE PERCENT: 0.5 % (ref 0–2)
BILIRUB SERPL-MCNC: <0.2 MG/DL (ref 0–1.2)
BUN BLDV-MCNC: 25 MG/DL (ref 8–23)
CALCIUM SERPL-MCNC: 10.2 MG/DL (ref 8.6–10.2)
CHLORIDE BLD-SCNC: 105 MMOL/L (ref 98–107)
CO2: 27 MMOL/L (ref 22–29)
CREAT SERPL-MCNC: 0.6 MG/DL (ref 0.5–1)
EOSINOPHILS ABSOLUTE: 0.19 E9/L (ref 0.05–0.5)
EOSINOPHILS RELATIVE PERCENT: 3.4 % (ref 0–6)
GFR AFRICAN AMERICAN: >60
GFR NON-AFRICAN AMERICAN: >60 ML/MIN/1.73
GLUCOSE BLD-MCNC: 79 MG/DL (ref 74–109)
HCT VFR BLD CALC: 35.1 % (ref 34–48)
HEMOGLOBIN: 10.8 G/DL (ref 11.5–15.5)
IMMATURE GRANULOCYTES #: 0 E9/L
IMMATURE GRANULOCYTES %: 0 % (ref 0–5)
LYMPHOCYTES ABSOLUTE: 1.41 E9/L (ref 1.5–4)
LYMPHOCYTES RELATIVE PERCENT: 25.5 % (ref 20–42)
MCH RBC QN AUTO: 28.4 PG (ref 26–35)
MCHC RBC AUTO-ENTMCNC: 30.8 % (ref 32–34.5)
MCV RBC AUTO: 92.4 FL (ref 80–99.9)
MONOCYTES ABSOLUTE: 0.37 E9/L (ref 0.1–0.95)
MONOCYTES RELATIVE PERCENT: 6.7 % (ref 2–12)
NEUTROPHILS ABSOLUTE: 3.54 E9/L (ref 1.8–7.3)
NEUTROPHILS RELATIVE PERCENT: 63.9 % (ref 43–80)
PDW BLD-RTO: 14.1 FL (ref 11.5–15)
PLATELET # BLD: 353 E9/L (ref 130–450)
PMV BLD AUTO: 8.8 FL (ref 7–12)
POTASSIUM SERPL-SCNC: 3.9 MMOL/L (ref 3.5–5)
RBC # BLD: 3.8 E12/L (ref 3.5–5.5)
SODIUM BLD-SCNC: 145 MMOL/L (ref 132–146)
TOTAL PROTEIN: 7 G/DL (ref 6.4–8.3)
WBC # BLD: 5.5 E9/L (ref 4.5–11.5)

## 2018-09-05 PROCEDURE — 85025 COMPLETE CBC W/AUTO DIFF WBC: CPT

## 2018-09-05 PROCEDURE — 80053 COMPREHEN METABOLIC PANEL: CPT

## 2018-09-05 PROCEDURE — 36415 COLL VENOUS BLD VENIPUNCTURE: CPT

## 2018-09-13 ENCOUNTER — HOSPITAL ENCOUNTER (INPATIENT)
Age: 63
LOS: 3 days | Discharge: HOME HEALTH | DRG: 690 | End: 2018-09-16
Attending: EMERGENCY MEDICINE | Admitting: INTERNAL MEDICINE
Payer: COMMERCIAL

## 2018-09-13 ENCOUNTER — APPOINTMENT (OUTPATIENT)
Dept: CT IMAGING | Age: 63
DRG: 690 | End: 2018-09-13
Payer: COMMERCIAL

## 2018-09-13 ENCOUNTER — APPOINTMENT (OUTPATIENT)
Dept: GENERAL RADIOLOGY | Age: 63
DRG: 690 | End: 2018-09-13
Payer: COMMERCIAL

## 2018-09-13 ENCOUNTER — ANESTHESIA EVENT (OUTPATIENT)
Dept: OPERATING ROOM | Age: 63
DRG: 690 | End: 2018-09-13
Payer: COMMERCIAL

## 2018-09-13 DIAGNOSIS — R31.0 GROSS HEMATURIA: Primary | ICD-10-CM

## 2018-09-13 DIAGNOSIS — R31.9 HEMATURIA, UNSPECIFIED TYPE: ICD-10-CM

## 2018-09-13 DIAGNOSIS — N31.9 NEUROGENIC BLADDER: Chronic | ICD-10-CM

## 2018-09-13 LAB
ABO/RH: NORMAL
ALBUMIN SERPL-MCNC: 3.9 G/DL (ref 3.5–5.2)
ALP BLD-CCNC: 68 U/L (ref 35–104)
ALT SERPL-CCNC: 12 U/L (ref 0–32)
ANION GAP SERPL CALCULATED.3IONS-SCNC: 12 MMOL/L (ref 7–16)
ANTIBODY SCREEN: NORMAL
AST SERPL-CCNC: 15 U/L (ref 0–31)
BACTERIA: ABNORMAL /HPF
BASOPHILS ABSOLUTE: 0.03 E9/L (ref 0–0.2)
BASOPHILS RELATIVE PERCENT: 0.4 % (ref 0–2)
BILIRUB SERPL-MCNC: <0.2 MG/DL (ref 0–1.2)
BILIRUBIN URINE: NEGATIVE
BLOOD, URINE: ABNORMAL
BUN BLDV-MCNC: 19 MG/DL (ref 8–23)
CALCIUM SERPL-MCNC: 10.3 MG/DL (ref 8.6–10.2)
CHLORIDE BLD-SCNC: 102 MMOL/L (ref 98–107)
CLARITY: ABNORMAL
CO2: 27 MMOL/L (ref 22–29)
COLOR: YELLOW
CREAT SERPL-MCNC: 0.6 MG/DL (ref 0.5–1)
EKG ATRIAL RATE: 86 BPM
EKG P AXIS: 84 DEGREES
EKG P-R INTERVAL: 174 MS
EKG Q-T INTERVAL: 378 MS
EKG QRS DURATION: 70 MS
EKG QTC CALCULATION (BAZETT): 452 MS
EKG R AXIS: 70 DEGREES
EKG T AXIS: 89 DEGREES
EKG VENTRICULAR RATE: 86 BPM
EOSINOPHILS ABSOLUTE: 0.06 E9/L (ref 0.05–0.5)
EOSINOPHILS RELATIVE PERCENT: 0.8 % (ref 0–6)
GFR AFRICAN AMERICAN: >60
GFR NON-AFRICAN AMERICAN: >60 ML/MIN/1.73
GLUCOSE BLD-MCNC: 111 MG/DL (ref 74–109)
GLUCOSE URINE: NEGATIVE MG/DL
HCT VFR BLD CALC: 26.3 % (ref 34–48)
HCT VFR BLD CALC: 29.1 % (ref 34–48)
HCT VFR BLD CALC: 33.5 % (ref 34–48)
HEMOGLOBIN: 10 G/DL (ref 11.5–15.5)
HEMOGLOBIN: 8.1 G/DL (ref 11.5–15.5)
HEMOGLOBIN: 8.9 G/DL (ref 11.5–15.5)
IMMATURE GRANULOCYTES #: 0.02 E9/L
IMMATURE GRANULOCYTES %: 0.3 % (ref 0–5)
KETONES, URINE: NEGATIVE MG/DL
LACTIC ACID: 1.5 MMOL/L (ref 0.5–2.2)
LEUKOCYTE ESTERASE, URINE: ABNORMAL
LYMPHOCYTES ABSOLUTE: 1.45 E9/L (ref 1.5–4)
LYMPHOCYTES RELATIVE PERCENT: 19.9 % (ref 20–42)
MCH RBC QN AUTO: 27.9 PG (ref 26–35)
MCHC RBC AUTO-ENTMCNC: 29.9 % (ref 32–34.5)
MCV RBC AUTO: 93.6 FL (ref 80–99.9)
MONOCYTES ABSOLUTE: 0.31 E9/L (ref 0.1–0.95)
MONOCYTES RELATIVE PERCENT: 4.2 % (ref 2–12)
NEUTROPHILS ABSOLUTE: 5.43 E9/L (ref 1.8–7.3)
NEUTROPHILS RELATIVE PERCENT: 74.4 % (ref 43–80)
NITRITE, URINE: NEGATIVE
PDW BLD-RTO: 13.8 FL (ref 11.5–15)
PH UA: 8.5 (ref 5–9)
PLATELET # BLD: 281 E9/L (ref 130–450)
PMV BLD AUTO: 9.4 FL (ref 7–12)
POTASSIUM SERPL-SCNC: 4.6 MMOL/L (ref 3.5–5)
PROTEIN UA: >=300 MG/DL
RBC # BLD: 3.58 E12/L (ref 3.5–5.5)
RBC UA: ABNORMAL /HPF (ref 0–2)
SODIUM BLD-SCNC: 141 MMOL/L (ref 132–146)
SPECIFIC GRAVITY UA: 1.02 (ref 1–1.03)
TOTAL PROTEIN: 6.7 G/DL (ref 6.4–8.3)
UROBILINOGEN, URINE: 0.2 E.U./DL
WBC # BLD: 7.3 E9/L (ref 4.5–11.5)
WBC UA: >20 /HPF (ref 0–5)

## 2018-09-13 PROCEDURE — 71045 X-RAY EXAM CHEST 1 VIEW: CPT

## 2018-09-13 PROCEDURE — 85018 HEMOGLOBIN: CPT

## 2018-09-13 PROCEDURE — 83605 ASSAY OF LACTIC ACID: CPT

## 2018-09-13 PROCEDURE — 85014 HEMATOCRIT: CPT

## 2018-09-13 PROCEDURE — 94640 AIRWAY INHALATION TREATMENT: CPT

## 2018-09-13 PROCEDURE — 2580000003 HC RX 258: Performed by: INTERNAL MEDICINE

## 2018-09-13 PROCEDURE — 85025 COMPLETE CBC W/AUTO DIFF WBC: CPT

## 2018-09-13 PROCEDURE — 1200000000 HC SEMI PRIVATE

## 2018-09-13 PROCEDURE — 6370000000 HC RX 637 (ALT 250 FOR IP): Performed by: INTERNAL MEDICINE

## 2018-09-13 PROCEDURE — 6360000002 HC RX W HCPCS: Performed by: UROLOGY

## 2018-09-13 PROCEDURE — 87186 SC STD MICRODIL/AGAR DIL: CPT

## 2018-09-13 PROCEDURE — 6360000002 HC RX W HCPCS: Performed by: INTERNAL MEDICINE

## 2018-09-13 PROCEDURE — 86850 RBC ANTIBODY SCREEN: CPT

## 2018-09-13 PROCEDURE — 99284 EMERGENCY DEPT VISIT MOD MDM: CPT

## 2018-09-13 PROCEDURE — 2580000003 HC RX 258: Performed by: NURSE PRACTITIONER

## 2018-09-13 PROCEDURE — 81001 URINALYSIS AUTO W/SCOPE: CPT

## 2018-09-13 PROCEDURE — 80053 COMPREHEN METABOLIC PANEL: CPT

## 2018-09-13 PROCEDURE — 87088 URINE BACTERIA CULTURE: CPT

## 2018-09-13 PROCEDURE — 96365 THER/PROPH/DIAG IV INF INIT: CPT

## 2018-09-13 PROCEDURE — 6360000002 HC RX W HCPCS

## 2018-09-13 PROCEDURE — 74176 CT ABD & PELVIS W/O CONTRAST: CPT

## 2018-09-13 PROCEDURE — 86900 BLOOD TYPING SEROLOGIC ABO: CPT

## 2018-09-13 PROCEDURE — 36415 COLL VENOUS BLD VENIPUNCTURE: CPT

## 2018-09-13 PROCEDURE — 96366 THER/PROPH/DIAG IV INF ADDON: CPT

## 2018-09-13 PROCEDURE — 87077 CULTURE AEROBIC IDENTIFY: CPT

## 2018-09-13 PROCEDURE — 94664 DEMO&/EVAL PT USE INHALER: CPT

## 2018-09-13 PROCEDURE — 86901 BLOOD TYPING SEROLOGIC RH(D): CPT

## 2018-09-13 PROCEDURE — 6360000002 HC RX W HCPCS: Performed by: NURSE PRACTITIONER

## 2018-09-13 PROCEDURE — 93005 ELECTROCARDIOGRAM TRACING: CPT | Performed by: EMERGENCY MEDICINE

## 2018-09-13 RX ORDER — SODIUM CHLORIDE 0.9 % (FLUSH) 0.9 %
10 SYRINGE (ML) INJECTION PRN
Status: DISCONTINUED | OUTPATIENT
Start: 2018-09-13 | End: 2018-09-14

## 2018-09-13 RX ORDER — ONDANSETRON 2 MG/ML
4 INJECTION INTRAMUSCULAR; INTRAVENOUS EVERY 6 HOURS PRN
Status: DISCONTINUED | OUTPATIENT
Start: 2018-09-13 | End: 2018-09-14

## 2018-09-13 RX ORDER — SODIUM CHLORIDE 9 MG/ML
INJECTION, SOLUTION INTRAVENOUS CONTINUOUS
Status: DISCONTINUED | OUTPATIENT
Start: 2018-09-13 | End: 2018-09-14

## 2018-09-13 RX ORDER — DIAZEPAM 5 MG/1
5 TABLET ORAL NIGHTLY PRN
Status: DISCONTINUED | OUTPATIENT
Start: 2018-09-13 | End: 2018-09-14

## 2018-09-13 RX ORDER — FENTANYL CITRATE 50 UG/ML
INJECTION, SOLUTION INTRAMUSCULAR; INTRAVENOUS
Status: COMPLETED
Start: 2018-09-13 | End: 2018-09-13

## 2018-09-13 RX ORDER — IPRATROPIUM BROMIDE AND ALBUTEROL SULFATE 2.5; .5 MG/3ML; MG/3ML
1 SOLUTION RESPIRATORY (INHALATION)
Status: DISCONTINUED | OUTPATIENT
Start: 2018-09-13 | End: 2018-09-14

## 2018-09-13 RX ORDER — ACETAMINOPHEN 325 MG/1
650 TABLET ORAL EVERY 4 HOURS PRN
Status: DISCONTINUED | OUTPATIENT
Start: 2018-09-13 | End: 2018-09-14

## 2018-09-13 RX ORDER — FENTANYL CITRATE 50 UG/ML
25 INJECTION, SOLUTION INTRAMUSCULAR; INTRAVENOUS ONCE
Status: COMPLETED | OUTPATIENT
Start: 2018-09-13 | End: 2018-09-13

## 2018-09-13 RX ORDER — FOLIC ACID 1 MG/1
1 TABLET ORAL DAILY
Status: DISCONTINUED | OUTPATIENT
Start: 2018-09-13 | End: 2018-09-14

## 2018-09-13 RX ORDER — 0.9 % SODIUM CHLORIDE 0.9 %
1000 INTRAVENOUS SOLUTION INTRAVENOUS ONCE
Status: COMPLETED | OUTPATIENT
Start: 2018-09-13 | End: 2018-09-13

## 2018-09-13 RX ORDER — SODIUM CHLORIDE 0.9 % (FLUSH) 0.9 %
10 SYRINGE (ML) INJECTION EVERY 12 HOURS SCHEDULED
Status: DISCONTINUED | OUTPATIENT
Start: 2018-09-13 | End: 2018-09-14

## 2018-09-13 RX ORDER — OLANZAPINE 5 MG/1
5 TABLET ORAL NIGHTLY
Status: DISCONTINUED | OUTPATIENT
Start: 2018-09-13 | End: 2018-09-14

## 2018-09-13 RX ORDER — DOCUSATE SODIUM 100 MG/1
100 CAPSULE, LIQUID FILLED ORAL 2 TIMES DAILY PRN
Status: DISCONTINUED | OUTPATIENT
Start: 2018-09-13 | End: 2018-09-14

## 2018-09-13 RX ORDER — DIAZEPAM 5 MG/1
5 TABLET ORAL NIGHTLY PRN
Status: ON HOLD | COMMUNITY
End: 2019-04-27 | Stop reason: HOSPADM

## 2018-09-13 RX ORDER — FORMOTEROL FUMARATE 20 UG/2ML
20 SOLUTION RESPIRATORY (INHALATION) EVERY 12 HOURS
Status: DISCONTINUED | OUTPATIENT
Start: 2018-09-13 | End: 2018-09-14

## 2018-09-13 RX ORDER — BUDESONIDE 0.5 MG/2ML
500 INHALANT ORAL 2 TIMES DAILY
Status: DISCONTINUED | OUTPATIENT
Start: 2018-09-13 | End: 2018-09-14

## 2018-09-13 RX ORDER — HYDROCODONE BITARTRATE AND ACETAMINOPHEN 5; 325 MG/1; MG/1
1 TABLET ORAL EVERY 4 HOURS PRN
Status: DISCONTINUED | OUTPATIENT
Start: 2018-09-13 | End: 2018-09-14

## 2018-09-13 RX ADMIN — OLANZAPINE 5 MG: 5 TABLET, FILM COATED ORAL at 20:41

## 2018-09-13 RX ADMIN — BUDESONIDE 500 MCG: 0.5 SUSPENSION RESPIRATORY (INHALATION) at 20:12

## 2018-09-13 RX ADMIN — IPRATROPIUM BROMIDE AND ALBUTEROL SULFATE 1 AMPULE: 2.5; .5 SOLUTION RESPIRATORY (INHALATION) at 14:36

## 2018-09-13 RX ADMIN — HYDROCODONE BITARTRATE AND ACETAMINOPHEN 1 TABLET: 5; 325 TABLET ORAL at 17:13

## 2018-09-13 RX ADMIN — CEFAZOLIN SODIUM 1 G: 1 SOLUTION INTRAVENOUS at 19:34

## 2018-09-13 RX ADMIN — FORMOTEROL FUMARATE DIHYDRATE 20 MCG: 20 SOLUTION RESPIRATORY (INHALATION) at 14:36

## 2018-09-13 RX ADMIN — SODIUM CHLORIDE: 9 INJECTION, SOLUTION INTRAVENOUS at 14:05

## 2018-09-13 RX ADMIN — FOLIC ACID 1 MG: 1 TABLET ORAL at 16:46

## 2018-09-13 RX ADMIN — CEFAZOLIN SODIUM 1 G: 1 SOLUTION INTRAVENOUS at 12:07

## 2018-09-13 RX ADMIN — SODIUM CHLORIDE: 9 INJECTION, SOLUTION INTRAVENOUS at 23:59

## 2018-09-13 RX ADMIN — SERTRALINE 50 MG: 50 TABLET, FILM COATED ORAL at 16:46

## 2018-09-13 RX ADMIN — SODIUM CHLORIDE 1000 ML: 9 INJECTION, SOLUTION INTRAVENOUS at 10:02

## 2018-09-13 RX ADMIN — FORMOTEROL FUMARATE DIHYDRATE 20 MCG: 20 SOLUTION RESPIRATORY (INHALATION) at 20:12

## 2018-09-13 RX ADMIN — FENTANYL CITRATE 25 MCG: 50 INJECTION INTRAMUSCULAR; INTRAVENOUS at 12:08

## 2018-09-13 RX ADMIN — BUDESONIDE 500 MCG: 0.5 SUSPENSION RESPIRATORY (INHALATION) at 14:35

## 2018-09-13 RX ADMIN — HYDROCODONE BITARTRATE AND ACETAMINOPHEN 1 TABLET: 5; 325 TABLET ORAL at 21:15

## 2018-09-13 RX ADMIN — FENTANYL CITRATE 25 MCG: 50 INJECTION, SOLUTION INTRAMUSCULAR; INTRAVENOUS at 12:08

## 2018-09-13 RX ADMIN — ONDANSETRON 4 MG: 2 SOLUTION INTRAMUSCULAR; INTRAVENOUS at 21:15

## 2018-09-13 ASSESSMENT — PAIN DESCRIPTION - PAIN TYPE
TYPE: ACUTE PAIN

## 2018-09-13 ASSESSMENT — PAIN DESCRIPTION - DESCRIPTORS
DESCRIPTORS: ACHING;BURNING;DISCOMFORT
DESCRIPTORS: ACHING;BURNING;STABBING
DESCRIPTORS: ACHING;DISCOMFORT;BURNING
DESCRIPTORS: SHARP

## 2018-09-13 ASSESSMENT — PAIN DESCRIPTION - LOCATION
LOCATION: PERINEUM
LOCATION: ABDOMEN

## 2018-09-13 ASSESSMENT — PAIN DESCRIPTION - ORIENTATION
ORIENTATION: MID;LOWER
ORIENTATION: MID;LOWER
ORIENTATION: LOWER
ORIENTATION: LOWER

## 2018-09-13 ASSESSMENT — PAIN DESCRIPTION - ONSET
ONSET: GRADUAL
ONSET: ON-GOING

## 2018-09-13 ASSESSMENT — PAIN SCALES - GENERAL
PAINLEVEL_OUTOF10: 8
PAINLEVEL_OUTOF10: 8
PAINLEVEL_OUTOF10: 10
PAINLEVEL_OUTOF10: 9
PAINLEVEL_OUTOF10: 9

## 2018-09-13 ASSESSMENT — PAIN DESCRIPTION - FREQUENCY
FREQUENCY: INTERMITTENT

## 2018-09-13 NOTE — CONSULTS
Inhale 1 puff into the lungs as needed (every 4-6 hours as needed)   alendronate (FOSAMAX) 70 MG tablet, Take 70 mg by mouth every 7 days. docusate sodium (COLACE) 100 MG capsule, Take 100 mg by mouth 2 times daily as needed for Constipation    Allergies:    Sulfa antibiotics    Social History:    reports that she has quit smoking. She smoked 0.00 packs per day. She has never used smokeless tobacco. She reports that she does not drink alcohol or use drugs. Lives alone  25  Sibs  15  Brothers and 9 sisters. 6 sibs     Family History:   Non-contributory to this Urological problem  family history is not on file. Review of Systems:  Respiratory: negative for cough and hemoptysis. Bronchospam.COPD  Cardiovascular: negative for chest pain and dyspnea  Gastrointestinal: negative for abdominal pain, diarrhea, nausea and vomiting  Derm: negative for rash and skin lesion(s)  Neurological: negative for seizures and tremors  Endocrine: negative for diabetic symptoms including polydipsia and polyuria  : As above in the HPI, otherwise negative  All other reviews are negative    Physical Exam:     Vitals:  BP (!) 112/52   Pulse 87   Temp 98.3 °F (36.8 °C) (Temporal)   Resp 16   Ht 5' 7\" (1.702 m)   Wt 102 lb (46.3 kg)   SpO2 95%   BMI 15.98 kg/m²     General:  Thin frail lookingHEENT:  Normocephalic, atraumatic. Pupils equal, round. No scleral icterus. No conjunctival injection. Normal lips, teeth, and gums. No nasal discharge. Neck:  Supple, no masses. Heart:  RRR  Lungs:  No audible wheezing. Respirations symmetric and non-labored. Increased A-P diameter  Abdomen:  soft, nontender, no masses, no organomegaly, no peritoneal signs  Extremities:  No clubbing, cyanosis, or edema  Skin:  Warm and dry, no open lesions or rashes  Neuro:  There are no motor or sensory deficits in the 4 quadrant extremities   Rectal: deferred  Genitalia:  Deferred to OR    Labs:     Recent Labs      18   0955   WBC  7.3

## 2018-09-13 NOTE — ANESTHESIA PRE PROCEDURE
Department of Anesthesiology  Preprocedure Note       Name:  Inda Hashimoto   Age:  70 y.o.  :  1955                                          MRN:  [id number]         Date:  2018      Surgeon: Clary Becker):  Glenda Sue,     Procedure: Procedure(s):  CYSTOSCOPY RETROGRADE PYELOGRAM, CLOT EVACATION , POSS. BLADDER BIOPSY ---DR Jackie Heard 2 :30    Medications prior to admission:   Prior to Admission medications    Medication Sig Start Date End Date Taking? Authorizing Provider   umeclidinium-vilanterol (ANORO ELLIPTA) 62.5-25 MCG/INH AEPB inhaler Inhale 1 puff into the lungs daily   Yes Historical Provider, MD   diazepam (VALIUM) 5 MG tablet Take 5 mg by mouth nightly as needed for Sleep. .   Yes Historical Provider, MD   vitamin D (ERGOCALCIFEROL) 56763 UNITS CAPS capsule Take 50,000 Units by mouth once a week Saturday   Yes Historical Provider, MD   Multiple Vitamins-Iron (DAILY-FEDERICO/IRON) TABS Take 1 tablet by mouth daily   Yes Historical Provider, MD   nicotine (NICODERM CQ) 21 MG/24HR Place 1 patch onto the skin every 24 hours   Yes Historical Provider, MD   diazepam (VALIUM) 10 MG tablet Take 10 mg by mouth daily. .   Yes Historical Provider, MD   folic acid (FOLVITE) 1 MG tablet Take 1 mg by mouth daily. Yes Historical Provider, MD   sertraline (ZOLOFT) 50 MG tablet Take 1 tablet by mouth daily. 14  Yes Kirill Padilla MD   OLANZapine (ZYPREXA) 5 MG tablet Take 1 tablet by mouth nightly. 14  Yes Kirill Padilla MD   albuterol (PROVENTIL HFA;VENTOLIN HFA) 108 (90 BASE) MCG/ACT inhaler Inhale 1 puff into the lungs as needed (every 4-6 hours as needed)    Yes Historical Provider, MD   alendronate (FOSAMAX) 70 MG tablet Take 70 mg by mouth every 7 days.      Yes Historical Provider, MD   docusate sodium (COLACE) 100 MG capsule Take 100 mg by mouth 2 times daily as needed for Constipation    Historical Provider, MD       Current medications:    Current Facility-Administered Medications 68 09/13/2018    AST 15 09/13/2018    ALT 12 09/13/2018       POC Tests: No results for input(s): POCGLU, POCNA, POCK, POCCL, POCBUN, POCHEMO, POCHCT in the last 72 hours. Coags:   Lab Results   Component Value Date    PROTIME 10.9 06/12/2014    INR 1.1 06/12/2014    APTT 33.0 06/12/2014       HCG (If Applicable): No results found for: PREGTESTUR, PREGSERUM, HCG, HCGQUANT     ABGs: No results found for: PHART, PO2ART, AYB4NOW, HAH6USO, BEART, B8NBVCCF     Type & Screen (If Applicable):  No results found for: LABABO, LABRH   5/13/18 kg  Ventricular Rate 110  BPM Final 05/13/2018  5:38 PM [facility]   Atrial Rate 110  BPM Final 05/13/2018  5:38 PM [facility]   P-R Interval 144  ms Final 05/13/2018  5:38 PM [facility]   QRS Duration 64  ms Final 05/13/2018  5:38 PM [facility]   Q-T Interval 332  ms Final 05/13/2018  5:38 PM [facility]   QTc Calculation (Bazett) 449  ms Final 05/13/2018  5:38 PM [facility]   P [place] 86  degrees Final 05/13/2018  5:38 PM [facility]   R [place] 66  degrees Final 05/13/2018  5:38 PM [facility]   T [place] 86  degrees Final 05/13/2018  5:38 PM [facility]   Testing Performed By     Lab - [address] Name Director Address Valid Date Range   360-[facility] MUSE Unknown Unknown 04/18/16 1121-Present   Narrative     Sinus tachycardia with premature supraventricular complexes  Biatrial enlargement  Abnormal ECG  When compared with ECG of 27-DEC-2017 11:53,  Significant changes have occurred  Confirmed by Alta Del Rio ([id number]) on 5/16/2018 1:06:53 PM     2/24/13 echo    Findings       Left Ventricle    Left ventricular size is grossly normal. Ejection fraction is visually    estimated at 55%. Questionable septal wall motion abnormality. Right Ventricle    The right ventricle was not clearly visualized. Normal right ventricular size    Right ventricular segmental wall motion is normal.       Left Atrium    Left atrium is of normal size. Right Atrium    Right Atrium is not clearly visualized.

## 2018-09-13 NOTE — ED PROVIDER NOTES
ED Attending  CC: No     Department of Emergency Medicine   ED  Provider Note  Admit Date/RoomTime: 9/13/2018  8:53 AM  ED Room: 19/19  Chief Complaint:   Hematuria (x 2 weeks)    History of Present Illness   Source of history provided by:  patient. History/Exam Limitations: none. Reggie Montes is a 70 y.o. old female who has a past medical history of:   Past Medical History:   Diagnosis Date    Asthma     COPD (chronic obstructive pulmonary disease) (HCC)     UTI (urinary tract infection)     presents to the emergency department by private vehicle, for in the lower abdomen abdominal pain, blood in urine, which occured 10 day(s) prior to arrival.  Since onset the symptoms have been worsening and moderate in severity. Symptoms are associated with anorexia. She has a history of recurrent UTI and hypotonic bladder and self catheterizes. She denies any fever or chills. No LMP recorded. Patient has had a hysterectomy. ROS   Pertinent positives and negatives are stated within HPI, all other systems reviewed and are negative. Past Surgical History:   Procedure Laterality Date    ECHO COMPL W DOP COLOR FLOW  2/24/2013         HYSTERECTOMY      KIDNEY SURGERY Left 06/16/2014    ABLATION PERFORMED UNDER CT SCAN   Social History:  reports that she has quit smoking. She smoked 0.00 packs per day. She has never used smokeless tobacco. She reports that she does not drink alcohol or use drugs. Family History: family history is not on file. Allergies: Sulfa antibiotics    Physical Exam           ED Triage Vitals [09/13/18 0851]   BP Temp Temp Source Pulse Resp SpO2 Height Weight   97/75 98.4 °F (36.9 °C) Temporal 112 17 95 % 5' 6\" (1.676 m) 102 lb (46.3 kg)      Oxygen Saturation Interpretation: Normal.    · Constitutional:  Alert, development consistent with age. · HEENT:  NC/NT. Airway patent. · Neck:  Normal ROM. Supple.   · Respiratory:  Lungs Clear to auscultation and breath sounds equal.  · CV: Regular rate and rhythm, normal heart sounds, without pathological murmurs, ectopy, gallops, or rubs. · GI:  General Appearance: normal.         Bowel sounds: normal bowel sounds. Distension:  None. Tenderness: mild tenderness is present in the lower abdomen. Liver: non-tender. Spleen:  non-tender. Pulsatile Mass: absent. Hernia:  no inguinal or femoral hernias noted. · : (chaperone present during examination). Mami Delgado RN        No blood in vagina. · Back: CVA Tenderness: No.  · Integument:  Normal turgor. Warm, dry, without visible rash, unless noted elsewhere. Lymphatics: No lymphangitis or adenopathy noted. · Neurological:  Oriented. Motor functions intact.     Lab / Imaging Results   (All laboratory and radiology results have been personally reviewed by myself)  Labs:  Results for orders placed or performed during the hospital encounter of 09/13/18   CBC Auto Differential   Result Value Ref Range    WBC 7.3 4.5 - 11.5 E9/L    RBC 3.58 3.50 - 5.50 E12/L    Hemoglobin 10.0 (L) 11.5 - 15.5 g/dL    Hematocrit 33.5 (L) 34.0 - 48.0 %    MCV 93.6 80.0 - 99.9 fL    MCH 27.9 26.0 - 35.0 pg    MCHC 29.9 (L) 32.0 - 34.5 %    RDW 13.8 11.5 - 15.0 fL    Platelets 945 883 - 374 E9/L    MPV 9.4 7.0 - 12.0 fL    Neutrophils % 74.4 43.0 - 80.0 %    Immature Granulocytes % 0.3 0.0 - 5.0 %    Lymphocytes % 19.9 (L) 20.0 - 42.0 %    Monocytes % 4.2 2.0 - 12.0 %    Eosinophils % 0.8 0.0 - 6.0 %    Basophils % 0.4 0.0 - 2.0 %    Neutrophils # 5.43 1.80 - 7.30 E9/L    Immature Granulocytes # 0.02 E9/L    Lymphocytes # 1.45 (L) 1.50 - 4.00 E9/L    Monocytes # 0.31 0.10 - 0.95 E9/L    Eosinophils # 0.06 0.05 - 0.50 E9/L    Basophils # 0.03 0.00 - 0.20 E9/L   Lactic Acid, Plasma   Result Value Ref Range    Lactic Acid 1.5 0.5 - 2.2 mmol/L   Urinalysis   Result Value Ref Range    Color, UA Yellow Straw/Yellow    Clarity, UA CLOUDY (A) Clear    Glucose, Ur Time: 1100 Patient's symptoms show no change. Consults:   IP CONSULT TO UROLOGY  IP CONSULT TO INTERNAL MEDICINE  IP CONSULT TO UROLOGY    Procedures:   none    Medical Decision Making: Patient is well appearing, afebrile. VS stable. Labs obtained, gross hematuria, otherwise reassuring. Ct abdomen pelvis obtained, indicative of massively distended bladder with questionable mass at the base, likely clots based on exam. Three-way Del Rio was inserted without difficulty and continuous bladder irrigation initiated. Patient was given 1 g of Ancef, case discussed with Dr. Desiree Kohli will provide consultation, and case discussed with Dr. Alexandra Staton who agrees to admit patient. Counseling: The emergency provider has spoken with the patient and discussed todays results, in addition to providing specific details for the plan of care and counseling regarding the diagnosis and prognosis. Questions are answered at this time and they are agreeable with the plan. Assessment      1. Gross hematuria      Plan   Disposition: Admit to med/surg floor  Patient condition is good    New Medications     New Prescriptions    No medications on file     Electronically signed by ANGELA Head CNP   DD: 9/13/18  **This report was transcribed using voice recognition software. Every effort was made to ensure accuracy; however, inadvertent computerized transcription errors may be present.   END OF ED PROVIDER NOTE      ANGELA Syed CNP  09/13/18 0496

## 2018-09-14 ENCOUNTER — ANESTHESIA (OUTPATIENT)
Dept: OPERATING ROOM | Age: 63
DRG: 690 | End: 2018-09-14
Payer: COMMERCIAL

## 2018-09-14 ENCOUNTER — APPOINTMENT (OUTPATIENT)
Dept: GENERAL RADIOLOGY | Age: 63
DRG: 690 | End: 2018-09-14
Payer: COMMERCIAL

## 2018-09-14 VITALS — OXYGEN SATURATION: 91 % | SYSTOLIC BLOOD PRESSURE: 100 MMHG | DIASTOLIC BLOOD PRESSURE: 61 MMHG

## 2018-09-14 LAB
BLOOD BANK DISPENSE STATUS: NORMAL
BLOOD BANK PRODUCT CODE: NORMAL
BPU ID: NORMAL
DESCRIPTION BLOOD BANK: NORMAL

## 2018-09-14 PROCEDURE — 6370000000 HC RX 637 (ALT 250 FOR IP): Performed by: INTERNAL MEDICINE

## 2018-09-14 PROCEDURE — 2580000003 HC RX 258: Performed by: INTERNAL MEDICINE

## 2018-09-14 PROCEDURE — 6360000002 HC RX W HCPCS: Performed by: UROLOGY

## 2018-09-14 PROCEDURE — 2709999900 HC NON-CHARGEABLE SUPPLY: Performed by: UROLOGY

## 2018-09-14 PROCEDURE — 3700000000 HC ANESTHESIA ATTENDED CARE: Performed by: UROLOGY

## 2018-09-14 PROCEDURE — 3600000002 HC SURGERY LEVEL 2 BASE: Performed by: UROLOGY

## 2018-09-14 PROCEDURE — 6360000002 HC RX W HCPCS: Performed by: INTERNAL MEDICINE

## 2018-09-14 PROCEDURE — 7100000001 HC PACU RECOVERY - ADDTL 15 MIN: Performed by: UROLOGY

## 2018-09-14 PROCEDURE — 3600000012 HC SURGERY LEVEL 2 ADDTL 15MIN: Performed by: UROLOGY

## 2018-09-14 PROCEDURE — 0TCB8ZZ EXTIRPATION OF MATTER FROM BLADDER, VIA NATURAL OR ARTIFICIAL OPENING ENDOSCOPIC: ICD-10-PCS | Performed by: UROLOGY

## 2018-09-14 PROCEDURE — 6360000002 HC RX W HCPCS: Performed by: NURSE ANESTHETIST, CERTIFIED REGISTERED

## 2018-09-14 PROCEDURE — 51700 IRRIGATION OF BLADDER: CPT

## 2018-09-14 PROCEDURE — P9016 RBC LEUKOCYTES REDUCED: HCPCS

## 2018-09-14 PROCEDURE — 7100000000 HC PACU RECOVERY - FIRST 15 MIN: Performed by: UROLOGY

## 2018-09-14 PROCEDURE — 3700000001 HC ADD 15 MINUTES (ANESTHESIA): Performed by: UROLOGY

## 2018-09-14 PROCEDURE — 36430 TRANSFUSION BLD/BLD COMPNT: CPT

## 2018-09-14 PROCEDURE — C1758 CATHETER, URETERAL: HCPCS | Performed by: UROLOGY

## 2018-09-14 PROCEDURE — 1200000000 HC SEMI PRIVATE

## 2018-09-14 PROCEDURE — 74420 UROGRAPHY RTRGR +-KUB: CPT

## 2018-09-14 PROCEDURE — 86923 COMPATIBILITY TEST ELECTRIC: CPT

## 2018-09-14 PROCEDURE — BT141ZZ FLUOROSCOPY OF KIDNEYS, URETERS AND BLADDER USING LOW OSMOLAR CONTRAST: ICD-10-PCS | Performed by: UROLOGY

## 2018-09-14 RX ORDER — ALBUTEROL SULFATE 90 UG/1
1 AEROSOL, METERED RESPIRATORY (INHALATION) PRN
Status: DISCONTINUED | OUTPATIENT
Start: 2018-09-14 | End: 2018-09-16 | Stop reason: HOSPADM

## 2018-09-14 RX ORDER — ACETAMINOPHEN 325 MG/1
650 TABLET ORAL EVERY 4 HOURS PRN
Status: DISCONTINUED | OUTPATIENT
Start: 2018-09-14 | End: 2018-09-16 | Stop reason: HOSPADM

## 2018-09-14 RX ORDER — MIDAZOLAM HYDROCHLORIDE 1 MG/ML
INJECTION INTRAMUSCULAR; INTRAVENOUS PRN
Status: DISCONTINUED | OUTPATIENT
Start: 2018-09-14 | End: 2018-09-14 | Stop reason: SDUPTHER

## 2018-09-14 RX ORDER — OLANZAPINE 5 MG/1
5 TABLET ORAL NIGHTLY
Status: DISCONTINUED | OUTPATIENT
Start: 2018-09-14 | End: 2018-09-16 | Stop reason: HOSPADM

## 2018-09-14 RX ORDER — NICOTINE 21 MG/24HR
1 PATCH, TRANSDERMAL 24 HOURS TRANSDERMAL EVERY 24 HOURS
Status: DISCONTINUED | OUTPATIENT
Start: 2018-09-14 | End: 2018-09-16 | Stop reason: HOSPADM

## 2018-09-14 RX ORDER — SODIUM CHLORIDE 9 MG/ML
INJECTION, SOLUTION INTRAVENOUS ONCE
Status: COMPLETED | OUTPATIENT
Start: 2018-09-14 | End: 2018-09-14

## 2018-09-14 RX ORDER — 0.9 % SODIUM CHLORIDE 0.9 %
250 INTRAVENOUS SOLUTION INTRAVENOUS ONCE
Status: COMPLETED | OUTPATIENT
Start: 2018-09-14 | End: 2018-09-14

## 2018-09-14 RX ORDER — HYDROCODONE BITARTRATE AND ACETAMINOPHEN 5; 325 MG/1; MG/1
1 TABLET ORAL EVERY 6 HOURS PRN
Status: DISCONTINUED | OUTPATIENT
Start: 2018-09-14 | End: 2018-09-16 | Stop reason: HOSPADM

## 2018-09-14 RX ORDER — MORPHINE SULFATE 2 MG/ML
1 INJECTION, SOLUTION INTRAMUSCULAR; INTRAVENOUS EVERY 4 HOURS PRN
Status: DISCONTINUED | OUTPATIENT
Start: 2018-09-14 | End: 2018-09-16 | Stop reason: RX

## 2018-09-14 RX ORDER — DIAZEPAM 5 MG/1
5 TABLET ORAL NIGHTLY PRN
Status: DISCONTINUED | OUTPATIENT
Start: 2018-09-14 | End: 2018-09-16 | Stop reason: HOSPADM

## 2018-09-14 RX ORDER — FENTANYL CITRATE 50 UG/ML
INJECTION, SOLUTION INTRAMUSCULAR; INTRAVENOUS PRN
Status: DISCONTINUED | OUTPATIENT
Start: 2018-09-14 | End: 2018-09-14 | Stop reason: SDUPTHER

## 2018-09-14 RX ORDER — ONDANSETRON 2 MG/ML
4 INJECTION INTRAMUSCULAR; INTRAVENOUS EVERY 6 HOURS PRN
Status: DISCONTINUED | OUTPATIENT
Start: 2018-09-14 | End: 2018-09-16 | Stop reason: HOSPADM

## 2018-09-14 RX ORDER — PROPOFOL 10 MG/ML
INJECTION, EMULSION INTRAVENOUS PRN
Status: DISCONTINUED | OUTPATIENT
Start: 2018-09-14 | End: 2018-09-14 | Stop reason: SDUPTHER

## 2018-09-14 RX ORDER — FOLIC ACID 1 MG/1
1 TABLET ORAL DAILY
Status: DISCONTINUED | OUTPATIENT
Start: 2018-09-14 | End: 2018-09-16 | Stop reason: HOSPADM

## 2018-09-14 RX ORDER — DOCUSATE SODIUM 100 MG/1
100 CAPSULE, LIQUID FILLED ORAL 2 TIMES DAILY PRN
Status: DISCONTINUED | OUTPATIENT
Start: 2018-09-14 | End: 2018-09-16 | Stop reason: HOSPADM

## 2018-09-14 RX ADMIN — SERTRALINE 50 MG: 50 TABLET, FILM COATED ORAL at 13:20

## 2018-09-14 RX ADMIN — HYDROCODONE BITARTRATE AND ACETAMINOPHEN 1 TABLET: 5; 325 TABLET ORAL at 14:42

## 2018-09-14 RX ADMIN — BUDESONIDE 500 MCG: 0.5 SUSPENSION RESPIRATORY (INHALATION) at 08:55

## 2018-09-14 RX ADMIN — FENTANYL CITRATE 50 MCG: 50 INJECTION, SOLUTION INTRAMUSCULAR; INTRAVENOUS at 07:06

## 2018-09-14 RX ADMIN — CEFAZOLIN SODIUM 1 G: 1 SOLUTION INTRAVENOUS at 04:23

## 2018-09-14 RX ADMIN — SODIUM CHLORIDE: 9 INJECTION, SOLUTION INTRAVENOUS at 00:27

## 2018-09-14 RX ADMIN — MIDAZOLAM HYDROCHLORIDE 2 MG: 1 INJECTION, SOLUTION INTRAMUSCULAR; INTRAVENOUS at 06:52

## 2018-09-14 RX ADMIN — PROPOFOL 70 MG: 10 INJECTION, EMULSION INTRAVENOUS at 06:57

## 2018-09-14 RX ADMIN — FENTANYL CITRATE 50 MCG: 50 INJECTION, SOLUTION INTRAMUSCULAR; INTRAVENOUS at 06:56

## 2018-09-14 RX ADMIN — IPRATROPIUM BROMIDE AND ALBUTEROL SULFATE 1 AMPULE: 2.5; .5 SOLUTION RESPIRATORY (INHALATION) at 08:56

## 2018-09-14 RX ADMIN — SODIUM CHLORIDE 250 ML: 9 INJECTION, SOLUTION INTRAVENOUS at 05:34

## 2018-09-14 RX ADMIN — FOLIC ACID 1 MG: 1 TABLET ORAL at 13:20

## 2018-09-14 RX ADMIN — CEFAZOLIN SODIUM 1 G: 1 SOLUTION INTRAVENOUS at 07:08

## 2018-09-14 RX ADMIN — ACETAMINOPHEN 650 MG: 325 TABLET, FILM COATED ORAL at 16:46

## 2018-09-14 RX ADMIN — OLANZAPINE 5 MG: 5 TABLET, FILM COATED ORAL at 20:22

## 2018-09-14 RX ADMIN — TIOTROPIUM BROMIDE 18 MCG: 18 CAPSULE ORAL; RESPIRATORY (INHALATION) at 14:44

## 2018-09-14 RX ADMIN — CEFEPIME HYDROCHLORIDE 1 G: 1 INJECTION, POWDER, FOR SOLUTION INTRAMUSCULAR; INTRAVENOUS at 19:52

## 2018-09-14 ASSESSMENT — PULMONARY FUNCTION TESTS
PIF_VALUE: 0
PIF_VALUE: 1
PIF_VALUE: 0

## 2018-09-14 ASSESSMENT — PAIN SCALES - GENERAL
PAINLEVEL_OUTOF10: 0
PAINLEVEL_OUTOF10: 3
PAINLEVEL_OUTOF10: 9
PAINLEVEL_OUTOF10: 0

## 2018-09-14 ASSESSMENT — PAIN DESCRIPTION - ONSET: ONSET: ON-GOING

## 2018-09-14 ASSESSMENT — PAIN DESCRIPTION - FREQUENCY: FREQUENCY: INTERMITTENT

## 2018-09-14 ASSESSMENT — PAIN DESCRIPTION - PAIN TYPE: TYPE: ACUTE PAIN

## 2018-09-14 ASSESSMENT — PAIN DESCRIPTION - ORIENTATION: ORIENTATION: MID;LOWER

## 2018-09-14 ASSESSMENT — PAIN DESCRIPTION - LOCATION: LOCATION: ABDOMEN

## 2018-09-14 ASSESSMENT — PAIN DESCRIPTION - PROGRESSION: CLINICAL_PROGRESSION: NOT CHANGED

## 2018-09-14 ASSESSMENT — PAIN DESCRIPTION - DESCRIPTORS: DESCRIPTORS: ACHING;CONSTANT;DISCOMFORT

## 2018-09-14 NOTE — ANESTHESIA POSTPROCEDURE EVALUATION
Department of Anesthesiology  Postprocedure Note    Patient: Corona Colon  MRN: [id number]  YOB: 1955  Date of evaluation: 9/14/2018  Time:  9:27 AM     Procedure Summary     Date:  09/14/18 Room / Location:  [facility] [place] OR 11 / [facility] OR    Anesthesia Start:  9649 Anesthesia Stop:  6814    Procedure:  CYSTOSCOPY RETROGRADE PYELOGRAM, CLOT EVACATION , POSS. BLADDER BIOPSY ---DR Humberto Persaud 2 :30 (N/A ) Diagnosis:  (.)    Surgeon:  Vaughn Sue DO Responsible Provider:  Phil Meadows DO    Anesthesia Type:  MAC ASA Status:  3          Anesthesia Type: MAC    Vielka Phase I: Vielka Score: 9    Vielka Phase II:      Last vitals: Reviewed and per EMR flowsheets.        Anesthesia Post Evaluation    Patient location during evaluation: PACU  Patient participation: complete - patient participated  Level of consciousness: awake and alert  Pain score: 2  Airway patency: patent  Nausea & Vomiting: no nausea and no vomiting  Complications: no  Cardiovascular status: blood pressure returned to baseline and hemodynamically stable  Respiratory status: acceptable  Hydration status: euvolemic

## 2018-09-14 NOTE — PROGRESS NOTES
Message left for Dr Vigil Courser perfect serve regarding bp still low. After bolus one hr bp=86/48. 2hrs after that it is now  Still only 87/42. Awaiting any further interventions. Pt is still sleeping easily aroused and without complaint.

## 2018-09-14 NOTE — PROGRESS NOTES
Orders fpr [previous perfect serve to Dr Danielle Nelson have come through. Will increase iv rate and give the one unit of blood when ATB is finished and blood is available. Surgery dept called to update, and will update Dr Mali Chan within the hour. Vs maintaining low.

## 2018-09-14 NOTE — PLAN OF CARE
Problem: Pain:  Goal: Pain level will decrease  Pain level will decrease   Outcome: Met This Shift    Goal: Control of acute pain  Control of acute pain   Outcome: Met This Shift      Problem: Falls - Risk of:  Goal: Will remain free from falls  Will remain free from falls   Outcome: Met This Shift

## 2018-09-14 NOTE — OP NOTE
position, and induced with monitored  anesthesia. Anesthesia monitored the head, neck, airway, IV access, and  vital signs throughout the course of the case. Status post induction, the  patient was placed in the dorsal lithotomy position. Underlying points of  pressure were padded. SCDs were applied. She was prepped and draped in  normal sterile fashion. The Del Rio catheter was removed. I started by  taking a 21-[place] Olympus scope with a 70-degree lens and inserted through  the meatus in atraumatic fashion. Clot evacuation occurred. I removed  about 250 mL of clot. Panendoscopic visualization of the bladder did not  demonstrate any masses, tumors, lesions, or defects. There was some  trabeculation. It did have a neurogenic appearance. I was able to  identify the right and left ureteral orifices. I did shoot bilateral  retrograde pyelograms under fluoroscopy. The distal, mid, and proximal  portions of both sides of ureters were devoid of any significant masses,  tumors, lesions, or defects. At this point, I did not see anything that  needed biopsy. I replaced a Simplastic three-way and placed approximately  30 mL in the balloon. Pelvic examination occurred, which did not  demonstrate any substantial deficits. At this point, the patient's bladder  was drained. She awoke from anesthesia without complication and brought to  PACU in stable condition. PLAN:  1. We are going to continue the Del Rio catheter. 2.  Transfusion per primary. 3.  No biopsy were taken. 4.  We will have to investigate the kidney surgery that she had in the  past.  5.  She does have a neurogenic bladder.         [address] Webb DO    D: 09/14/2018 7:33:44       T: 09/14/2018 9:30:56     [email or web address]  Job#: [id number]     Doc#: [id number]    CC:

## 2018-09-14 NOTE — PROGRESS NOTES
Dr Airam Willis served and informed of bp = 82/43, with NSS infusing now at 100/hr, and pt had norco last at 2115. Orders obtained. Reminded pt needs H/P for am OR at 0630.

## 2018-09-15 LAB
HCT VFR BLD CALC: 30.1 % (ref 34–48)
HCT VFR BLD CALC: 31.1 % (ref 34–48)
HCT VFR BLD CALC: 31.4 % (ref 34–48)
HEMOGLOBIN: 9.3 G/DL (ref 11.5–15.5)
HEMOGLOBIN: 9.4 G/DL (ref 11.5–15.5)
HEMOGLOBIN: 9.8 G/DL (ref 11.5–15.5)

## 2018-09-15 PROCEDURE — 1200000000 HC SEMI PRIVATE

## 2018-09-15 PROCEDURE — 2580000003 HC RX 258: Performed by: INTERNAL MEDICINE

## 2018-09-15 PROCEDURE — 51700 IRRIGATION OF BLADDER: CPT

## 2018-09-15 PROCEDURE — 6370000000 HC RX 637 (ALT 250 FOR IP): Performed by: INTERNAL MEDICINE

## 2018-09-15 PROCEDURE — 36415 COLL VENOUS BLD VENIPUNCTURE: CPT

## 2018-09-15 PROCEDURE — 85018 HEMOGLOBIN: CPT

## 2018-09-15 PROCEDURE — 85014 HEMATOCRIT: CPT

## 2018-09-15 PROCEDURE — 6360000002 HC RX W HCPCS: Performed by: INTERNAL MEDICINE

## 2018-09-15 PROCEDURE — 6370000000 HC RX 637 (ALT 250 FOR IP): Performed by: UROLOGY

## 2018-09-15 RX ORDER — CEPHALEXIN 500 MG/1
500 CAPSULE ORAL EVERY 8 HOURS SCHEDULED
Status: DISCONTINUED | OUTPATIENT
Start: 2018-09-15 | End: 2018-09-16 | Stop reason: HOSPADM

## 2018-09-15 RX ADMIN — CEPHALEXIN 500 MG: 500 CAPSULE ORAL at 21:12

## 2018-09-15 RX ADMIN — OLANZAPINE 5 MG: 5 TABLET, FILM COATED ORAL at 21:12

## 2018-09-15 RX ADMIN — TIOTROPIUM BROMIDE 18 MCG: 18 CAPSULE ORAL; RESPIRATORY (INHALATION) at 10:05

## 2018-09-15 RX ADMIN — SERTRALINE 50 MG: 50 TABLET, FILM COATED ORAL at 08:56

## 2018-09-15 RX ADMIN — CEPHALEXIN 500 MG: 500 CAPSULE ORAL at 14:23

## 2018-09-15 RX ADMIN — HYDROCODONE BITARTRATE AND ACETAMINOPHEN 1 TABLET: 5; 325 TABLET ORAL at 07:00

## 2018-09-15 RX ADMIN — FOLIC ACID 1 MG: 1 TABLET ORAL at 08:56

## 2018-09-15 RX ADMIN — HYDROCODONE BITARTRATE AND ACETAMINOPHEN 1 TABLET: 5; 325 TABLET ORAL at 01:00

## 2018-09-15 RX ADMIN — HYDROCODONE BITARTRATE AND ACETAMINOPHEN 1 TABLET: 5; 325 TABLET ORAL at 16:53

## 2018-09-15 RX ADMIN — CEFEPIME HYDROCHLORIDE 1 G: 1 INJECTION, POWDER, FOR SOLUTION INTRAMUSCULAR; INTRAVENOUS at 08:58

## 2018-09-15 ASSESSMENT — PAIN DESCRIPTION - FREQUENCY
FREQUENCY: INTERMITTENT
FREQUENCY: INTERMITTENT

## 2018-09-15 ASSESSMENT — PAIN DESCRIPTION - PAIN TYPE
TYPE: ACUTE PAIN
TYPE: ACUTE PAIN

## 2018-09-15 ASSESSMENT — PAIN DESCRIPTION - ORIENTATION
ORIENTATION: MID;LOWER
ORIENTATION: MID;LOWER

## 2018-09-15 ASSESSMENT — PAIN SCALES - GENERAL
PAINLEVEL_OUTOF10: 9
PAINLEVEL_OUTOF10: 8
PAINLEVEL_OUTOF10: 5
PAINLEVEL_OUTOF10: 6

## 2018-09-15 ASSESSMENT — PAIN DESCRIPTION - DESCRIPTORS
DESCRIPTORS: ACHING;DISCOMFORT
DESCRIPTORS: ACHING;DISCOMFORT;SORE

## 2018-09-15 ASSESSMENT — PAIN DESCRIPTION - ONSET: ONSET: ON-GOING

## 2018-09-15 ASSESSMENT — PAIN DESCRIPTION - LOCATION
LOCATION: ABDOMEN
LOCATION: ABDOMEN

## 2018-09-15 NOTE — PROGRESS NOTES
9/15/2018 10:52 AM  Service: Urology  Group: [facility] (Vik[facility])    Zahira Rossi  [id number]    Subjective:  Feels flat  Non ambulatory  No abd pain  afebrile    Review of Systems  Respiratory: negative  Cardiovascular: negative  Gastrointestinal: negative  Hematologic/lymphatic: negative  Musculoskeletal:negative  Neurological: negative  Endocrine: negative    Scheduled Meds:   cephALEXin  500 mg Oral 3 times per day    folic acid  1 mg Oral Daily    OLANZapine  5 mg Oral Nightly    sertraline  50 mg Oral Daily    nicotine  1 patch Transdermal Q24H    tiotropium  18 mcg Inhalation Daily    And    olodaterol  2 puff Inhalation Daily       Objective:  Vitals:    09/15/18 0915   BP: (!) 101/55   Pulse: 95   Resp: 16   Temp: 97.6 °F (36.4 °C)   SpO2: 99%         Allergies: Sulfa antibiotics    General Appearance: alert and oriented to person, place and time and in no acute distress  Skin: no rash or erythema  Head: normocephalic and atraumatic  Pulmonary/Chest: clear to auscultation bilaterally- no wheezes, rales or rhonchi, normal air movement, no respiratory distress and no chest wall tenderness  Abdomen: soft, non-tender, non-distended, normal bowel sounds, no masses or organomegaly and no inguinal adenopathy  Genitourinary: genitals normal without hernia or inguinal adenopathy  Extremities: no cyanosis, clubbing or edema and Marcio's sign negative bilaterally  Musculoskeletal: no swollen joints and no trigger point or muscular tenderness      Del Rio well positioned and draining clear urine.     Labs:     Recent Labs      09/13/18   1050   NA  141   K  4.6   CL  102   CO2  27   BUN  19   CREATININE  0.6   GLUCOSE  111*   CALCIUM  10.3*       Lab Results   Component Value Date    HGB 9.3 09/15/2018    HCT 30.1 09/15/2018         Assessment:  Ayesha Keane 70 y.o. female     Principal Problem:    Hematuria  Active Problems:    COPD (chronic obstructive pulmonary disease) (HCC)    MDD (major depressive disorder)    Neurogenic bladder  Resolved Problems:    * No resolved hospital problems.  *      Resolved clot retention  Most likely urinary tract infection or mechanical abrasion of the bladder as a cause of gross hematuria no sign of malignancy    Plan:    Stop the IV fluids and the CBI leave with a Del Rio  Oral antibiotics  Most likely discharge tomorrow and back to her intermittent self-catheterization 5 times a day which she has been doing compulsively    Sindy Pallas, MD   [facility]  Urology

## 2018-09-15 NOTE — PROGRESS NOTES
Subjective: The patient is awake and alert. No acute events through the night    Objective:    BP (!) 101/55   Pulse 95   Temp 97.6 °F (36.4 °C) (Temporal)   Resp 16   Ht 5' 7\" (1.702 m)   Wt 102 lb (46.3 kg)   SpO2 99%   BMI 15.98 kg/m²     In: 200 [P.O.:200]  Out: 3000     HEENT: NCAT,  PERRLA, No JVD  Heart:  RRR, no murmurs, gallops, or rubs. Lungs:  CTA bilaterally, no wheeze, rales or rhonchi  Abd: bowel sounds present, nontender, nondistended, no masses  Extrem:  No clubbing, cyanosis, or edema     Recent Labs      09/13/18   0955   09/13/18   2254  09/15/18   0028  09/15/18   0754   WBC  7.3   --    --    --    --    HGB  10.0*   < >  8.1*  9.4*  9.3*   HCT  33.5*   < >  26.3*  31.1*  30.1*   PLT  281   --    --    --    --     < > = values in this interval not displayed.        Recent Labs      09/13/18   1050   NA  141   K  4.6   CL  102   CO2  27   BUN  19   CREATININE  0.6   CALCIUM  10.3*       Assessment:    Patient Active Problem List   Diagnosis    COPD (chronic obstructive pulmonary disease) ([facility] [facility]    MDD (major depressive disorder)    Hematuria    Neurogenic bladder       Plan:    S/p Cysto/retrograde pyelograms/TURB on 9/14 am   H/o renal lesion ablation in 2014 - would give consideration to malignancy given weight loss   continuous irrigation stopped today   H/h Q 8 hrs   hgb stable at 9 without further drop - stop checks   H/o neurogenic bladder   No fevers chills or wbc elevation to consider sepsis or uti - on po cephlexin   urology following     Dc plan in am   DVT and GERD prophylaxis    All consultants notes reviewed    Radha Ron MD  12:20 PM  9/15/2018

## 2018-09-15 NOTE — PLAN OF CARE
Problem: Pain:  Goal: Control of acute pain  Control of acute pain   Outcome: Met This Shift      Problem: Falls - Risk of:  Goal: Will remain free from falls  Will remain free from falls   Outcome: Met This Shift    Goal: Absence of physical injury  Absence of physical injury   Outcome: Met This Shift      Comments: Pt denies any needs at this time. Instructed to utilize call light with any needs or concerns. Will continue to monitor.     Electronically signed by Cher South RN on 9/14/2018 at 9:10 PM

## 2018-09-16 ENCOUNTER — APPOINTMENT (OUTPATIENT)
Dept: GENERAL RADIOLOGY | Age: 63
DRG: 690 | End: 2018-09-16
Payer: COMMERCIAL

## 2018-09-16 VITALS
SYSTOLIC BLOOD PRESSURE: 95 MMHG | WEIGHT: 102 LBS | DIASTOLIC BLOOD PRESSURE: 54 MMHG | RESPIRATION RATE: 16 BRPM | TEMPERATURE: 97.7 F | HEIGHT: 67 IN | HEART RATE: 81 BPM | BODY MASS INDEX: 16.01 KG/M2 | OXYGEN SATURATION: 91 %

## 2018-09-16 PROCEDURE — 6370000000 HC RX 637 (ALT 250 FOR IP): Performed by: INTERNAL MEDICINE

## 2018-09-16 PROCEDURE — 97530 THERAPEUTIC ACTIVITIES: CPT

## 2018-09-16 PROCEDURE — 97165 OT EVAL LOW COMPLEX 30 MIN: CPT

## 2018-09-16 PROCEDURE — G8987 SELF CARE CURRENT STATUS: HCPCS

## 2018-09-16 PROCEDURE — 6370000000 HC RX 637 (ALT 250 FOR IP): Performed by: UROLOGY

## 2018-09-16 PROCEDURE — 2700000000 HC OXYGEN THERAPY PER DAY

## 2018-09-16 PROCEDURE — G8988 SELF CARE GOAL STATUS: HCPCS

## 2018-09-16 PROCEDURE — 6360000002 HC RX W HCPCS: Performed by: INTERNAL MEDICINE

## 2018-09-16 PROCEDURE — 74018 RADEX ABDOMEN 1 VIEW: CPT

## 2018-09-16 RX ORDER — HYDROCODONE BITARTRATE AND ACETAMINOPHEN 5; 325 MG/1; MG/1
1 TABLET ORAL EVERY 6 HOURS PRN
Qty: 30 TABLET | Refills: 0 | Status: SHIPPED | OUTPATIENT
Start: 2018-09-16 | End: 2018-09-23

## 2018-09-16 RX ORDER — CEPHALEXIN 500 MG/1
500 CAPSULE ORAL 3 TIMES DAILY
Qty: 30 CAPSULE | Refills: 0 | Status: SHIPPED | OUTPATIENT
Start: 2018-09-16 | End: 2018-09-26

## 2018-09-16 RX ORDER — MORPHINE SULFATE 2 MG/ML
2 INJECTION, SOLUTION INTRAMUSCULAR; INTRAVENOUS EVERY 4 HOURS PRN
Status: DISCONTINUED | OUTPATIENT
Start: 2018-09-16 | End: 2018-09-16

## 2018-09-16 RX ORDER — MORPHINE SULFATE 2 MG/ML
1 INJECTION, SOLUTION INTRAMUSCULAR; INTRAVENOUS EVERY 4 HOURS PRN
Status: DISCONTINUED | OUTPATIENT
Start: 2018-09-16 | End: 2018-09-16

## 2018-09-16 RX ADMIN — SERTRALINE 50 MG: 50 TABLET, FILM COATED ORAL at 08:53

## 2018-09-16 RX ADMIN — MORPHINE SULFATE 2 MG: 2 INJECTION, SOLUTION INTRAMUSCULAR; INTRAVENOUS at 13:50

## 2018-09-16 RX ADMIN — HYDROCODONE BITARTRATE AND ACETAMINOPHEN 1 TABLET: 5; 325 TABLET ORAL at 09:27

## 2018-09-16 RX ADMIN — CEPHALEXIN 500 MG: 500 CAPSULE ORAL at 13:47

## 2018-09-16 RX ADMIN — TIOTROPIUM BROMIDE 18 MCG: 18 CAPSULE ORAL; RESPIRATORY (INHALATION) at 08:54

## 2018-09-16 RX ADMIN — CEPHALEXIN 500 MG: 500 CAPSULE ORAL at 05:37

## 2018-09-16 RX ADMIN — FOLIC ACID 1 MG: 1 TABLET ORAL at 08:53

## 2018-09-16 RX ADMIN — DOCUSATE SODIUM 100 MG: 100 CAPSULE, LIQUID FILLED ORAL at 09:27

## 2018-09-16 ASSESSMENT — PAIN SCALES - GENERAL
PAINLEVEL_OUTOF10: 9
PAINLEVEL_OUTOF10: 9

## 2018-09-16 NOTE — PROGRESS NOTES
Left message with social work to speak to patient about discharge plans. Charge RN aware.   Will await call

## 2018-09-16 NOTE — PROGRESS NOTES
Stand by Assist    Scooting: Stand by Assist  Sit to Supine: NT    Functional Transfers Sit to stand: Minimal Assist   Stand pivot:  Minimal Assist        Functional Mobility Contact Guard Assist with FWW functional distance in hallway with steady gait    Sit balance: wfl  Stand balance:  Min A with no AD  Endurance/Activity tolerance:  fair                              Comments  OT evaluation completed. Upon arrival pt in bed. At end of session pt up in chair with all devices within reach, all lines and tubes intact. Call light and phone within reach. Treatment:  Pt educated on techniques to increase safety and independence with bed mobility, functional transfers and functional mobility. Functional mobility with FWW in hallway with fair dynamic standing balance. Pt encouraged to sit in the bedside chair to eat her lunch to increase her activity tolerance OOB.        Assessment of Current Deficits   Functional mobility [x]  ROM [] Strength [x]  Cognition []  ADLs [x]   IADLs [x] Safety Awareness [x] Endurance [x]  Fine Motor Coordination [] Balance [x] Vision/perception [] Sensation []   Gross Motor Coordination []     Eval Complexity: low    Treatment Frequency:  PRN  1-3 days/week    Plan of Care:   ADL retraining [x]   Equipment needs [x]   Neuromuscular re-education [x] Energy Conservation Techniques [x]  Functional Transfer training [x] Patient and/or Family Education [x]  Functional Mobility training [x]  Environmental Modifications [x]  Cognitive re-training []   Compensatory techniques for ADLs [x]  Splinting Needs []   Positioning to improve overall function [x]  Therapeutic strengthening  [x]  Therapeutic activities  [x]   Other: []      Rehab Potential: good for stated goals    Patient / Family Goal: go home    Short Term Goals:  Time Frame:  1 week  Goal 1) Pt will increase UB ADL to indep  Goal 2) Pt will increase LB ADL to mod indep with use of AE as needed  Goal 3) Pt will increase functional

## 2018-09-16 NOTE — PLAN OF CARE
Problem: Pain:  Goal: Control of acute pain  Control of acute pain   Outcome: Met This Shift      Problem: Falls - Risk of:  Goal: Will remain free from falls  Will remain free from falls   Outcome: Met This Shift      Problem: Risk for Impaired Skin Integrity  Goal: Tissue integrity - skin and mucous membranes  Structural intactness and normal physiological function of skin and  mucous membranes.    Outcome: Met This Shift

## 2018-09-16 NOTE — PROGRESS NOTES
9/16/2018 11:40 AM  Service: Urology  Group: [facility] ([facility])    Sreedhar Barrios  [id number]    Subjective: The patient says she can't see well today  She is very flat she is not ambulating  I don't think she can't do intermittent catheterization at this time  In fact that she may need placement for rehab before she could go back to living alone    Review of Systems  Respiratory: She has increased AP diameter history of COPD  Cardiovascular: negative  Gastrointestinal: negative  Hematologic/lymphatic: negative  Musculoskeletal:negative  Neurological: negative  Endocrine: negative    Scheduled Meds:   cephALEXin  500 mg Oral 3 times per day    folic acid  1 mg Oral Daily    OLANZapine  5 mg Oral Nightly    sertraline  50 mg Oral Daily    nicotine  1 patch Transdermal Q24H    tiotropium  18 mcg Inhalation Daily    And    olodaterol  2 puff Inhalation Daily       Objective:  Vitals:    09/16/18 0738   BP:    Pulse:    Resp:    Temp:    SpO2: 92%         Allergies: Sulfa antibiotics    General Appearance: alert and oriented to person, place and time and in no acute distress  Skin: no rash or erythema  Head: normocephalic and atraumatic  Pulmonary/Chest: clear to auscultation bilaterally- no wheezes, rales or rhonchi, normal air movement, no respiratory distress and no chest wall tenderness  Abdomen: soft, non-tender, non-distended, normal bowel sounds, no masses or organomegaly and no inguinal adenopathy  Genitourinary: genitals normal without hernia or inguinal adenopathy  Extremities: no cyanosis, clubbing or edema and Marcio's sign negative bilaterally  Musculoskeletal: no swollen joints and no trigger point or muscular tenderness      Del Rio well positioned and draining clear urine. Labs:     No results for input(s): NA, K, CL, CO2, BUN, CREATININE, GLUCOSE, CALCIUM in the last 72 hours.     Lab Results   Component Value Date    HGB 9.8 09/15/2018    HCT 31.4 09/15/2018         Assessment:  Ayesha

## 2018-09-17 LAB
ORGANISM: ABNORMAL
URINE CULTURE, ROUTINE: ABNORMAL
URINE CULTURE, ROUTINE: ABNORMAL

## 2018-12-01 NOTE — HOME CARE
[address] [facility] 9 received possible referral, awaiting therapy evals. Will need final final orders if needed. Spoke with patient and verified demographics. Will follow. Quentin Long LPN [address] [facility] 9.
within defined limits

## 2019-02-08 ENCOUNTER — APPOINTMENT (OUTPATIENT)
Dept: CT IMAGING | Age: 64
End: 2019-02-08
Payer: COMMERCIAL

## 2019-02-08 ENCOUNTER — APPOINTMENT (OUTPATIENT)
Dept: GENERAL RADIOLOGY | Age: 64
End: 2019-02-08
Payer: COMMERCIAL

## 2019-02-08 ENCOUNTER — HOSPITAL ENCOUNTER (EMERGENCY)
Age: 64
Discharge: HOME | End: 2019-02-08
Attending: EMERGENCY MEDICINE
Payer: COMMERCIAL

## 2019-02-08 VITALS
TEMPERATURE: 96.9 F | HEART RATE: 101 BPM | OXYGEN SATURATION: 98 % | BODY MASS INDEX: 16.01 KG/M2 | SYSTOLIC BLOOD PRESSURE: 109 MMHG | RESPIRATION RATE: 16 BRPM | DIASTOLIC BLOOD PRESSURE: 70 MMHG | HEIGHT: 67 IN | WEIGHT: 102 LBS

## 2019-02-08 DIAGNOSIS — N30.00 ACUTE CYSTITIS WITHOUT HEMATURIA: Primary | ICD-10-CM

## 2019-02-08 LAB
ALBUMIN SERPL-MCNC: 4.6 G/DL (ref 3.5–5.2)
ALP BLD-CCNC: 114 U/L (ref 35–104)
ALT SERPL-CCNC: 19 U/L (ref 0–32)
ANION GAP SERPL CALCULATED.3IONS-SCNC: 14 MMOL/L (ref 7–16)
AST SERPL-CCNC: 38 U/L (ref 0–31)
BACTERIA: ABNORMAL /HPF
BASOPHILS ABSOLUTE: 0.04 E9/L (ref 0–0.2)
BASOPHILS RELATIVE PERCENT: 0.4 % (ref 0–2)
BILIRUB SERPL-MCNC: 0.2 MG/DL (ref 0–1.2)
BILIRUBIN URINE: NEGATIVE
BLOOD, URINE: ABNORMAL
BUN BLDV-MCNC: 23 MG/DL (ref 8–23)
CALCIUM SERPL-MCNC: 10.6 MG/DL (ref 8.6–10.2)
CHLORIDE BLD-SCNC: 100 MMOL/L (ref 98–107)
CLARITY: ABNORMAL
CO2: 24 MMOL/L (ref 22–29)
COLOR: YELLOW
CREAT SERPL-MCNC: 0.8 MG/DL (ref 0.5–1)
EKG ATRIAL RATE: 102 BPM
EKG P AXIS: 90 DEGREES
EKG P-R INTERVAL: 158 MS
EKG Q-T INTERVAL: 372 MS
EKG QRS DURATION: 70 MS
EKG QTC CALCULATION (BAZETT): 484 MS
EKG R AXIS: 65 DEGREES
EKG T AXIS: 100 DEGREES
EKG VENTRICULAR RATE: 102 BPM
EOSINOPHILS ABSOLUTE: 0.05 E9/L (ref 0.05–0.5)
EOSINOPHILS RELATIVE PERCENT: 0.5 % (ref 0–6)
GFR AFRICAN AMERICAN: >60
GFR NON-AFRICAN AMERICAN: >60 ML/MIN/1.73
GLUCOSE BLD-MCNC: 151 MG/DL (ref 74–99)
GLUCOSE URINE: NEGATIVE MG/DL
HCT VFR BLD CALC: 35.7 % (ref 34–48)
HEMOGLOBIN: 10.6 G/DL (ref 11.5–15.5)
IMMATURE GRANULOCYTES #: 0.05 E9/L
IMMATURE GRANULOCYTES %: 0.5 % (ref 0–5)
INFLUENZA A BY PCR: NOT DETECTED
INFLUENZA B BY PCR: NOT DETECTED
KETONES, URINE: NEGATIVE MG/DL
LACTIC ACID: 2.1 MMOL/L (ref 0.5–2.2)
LEUKOCYTE ESTERASE, URINE: ABNORMAL
LYMPHOCYTES ABSOLUTE: 0.66 E9/L (ref 1.5–4)
LYMPHOCYTES RELATIVE PERCENT: 6.1 % (ref 20–42)
MCH RBC QN AUTO: 26.6 PG (ref 26–35)
MCHC RBC AUTO-ENTMCNC: 29.7 % (ref 32–34.5)
MCV RBC AUTO: 89.7 FL (ref 80–99.9)
MONOCYTES ABSOLUTE: 0.31 E9/L (ref 0.1–0.95)
MONOCYTES RELATIVE PERCENT: 2.9 % (ref 2–12)
NEUTROPHILS ABSOLUTE: 9.73 E9/L (ref 1.8–7.3)
NEUTROPHILS RELATIVE PERCENT: 89.6 % (ref 43–80)
NITRITE, URINE: NEGATIVE
PDW BLD-RTO: 13.5 FL (ref 11.5–15)
PH UA: 8.5 (ref 5–9)
PLATELET # BLD: 338 E9/L (ref 130–450)
PMV BLD AUTO: 9.6 FL (ref 7–12)
POTASSIUM SERPL-SCNC: 4.3 MMOL/L (ref 3.5–5)
PROTEIN UA: 100 MG/DL
RBC # BLD: 3.98 E12/L (ref 3.5–5.5)
RBC UA: ABNORMAL /HPF (ref 0–2)
RENAL EPITHELIAL, UA: ABNORMAL /HPF
SODIUM BLD-SCNC: 138 MMOL/L (ref 132–146)
SPECIFIC GRAVITY UA: 1.01 (ref 1–1.03)
TOTAL PROTEIN: 8 G/DL (ref 6.4–8.3)
TROPONIN: <0.01 NG/ML (ref 0–0.03)
UROBILINOGEN, URINE: 0.2 E.U./DL
WBC # BLD: 10.8 E9/L (ref 4.5–11.5)
WBC UA: >20 /HPF (ref 0–5)

## 2019-02-08 PROCEDURE — 71046 X-RAY EXAM CHEST 2 VIEWS: CPT

## 2019-02-08 PROCEDURE — 74178 CT ABD&PLV WO CNTR FLWD CNTR: CPT

## 2019-02-08 PROCEDURE — 83605 ASSAY OF LACTIC ACID: CPT

## 2019-02-08 PROCEDURE — 2580000003 HC RX 258: Performed by: EMERGENCY MEDICINE

## 2019-02-08 PROCEDURE — 80053 COMPREHEN METABOLIC PANEL: CPT

## 2019-02-08 PROCEDURE — 71275 CT ANGIOGRAPHY CHEST: CPT

## 2019-02-08 PROCEDURE — 96375 TX/PRO/DX INJ NEW DRUG ADDON: CPT

## 2019-02-08 PROCEDURE — 6360000004 HC RX CONTRAST MEDICATION: Performed by: RADIOLOGY

## 2019-02-08 PROCEDURE — 6360000002 HC RX W HCPCS: Performed by: EMERGENCY MEDICINE

## 2019-02-08 PROCEDURE — 85025 COMPLETE CBC W/AUTO DIFF WBC: CPT

## 2019-02-08 PROCEDURE — 93005 ELECTROCARDIOGRAM TRACING: CPT | Performed by: PHYSICIAN ASSISTANT

## 2019-02-08 PROCEDURE — 2580000003 HC RX 258: Performed by: RADIOLOGY

## 2019-02-08 PROCEDURE — 96366 THER/PROPH/DIAG IV INF ADDON: CPT

## 2019-02-08 PROCEDURE — 87502 INFLUENZA DNA AMP PROBE: CPT

## 2019-02-08 PROCEDURE — 99284 EMERGENCY DEPT VISIT MOD MDM: CPT

## 2019-02-08 PROCEDURE — 96365 THER/PROPH/DIAG IV INF INIT: CPT

## 2019-02-08 PROCEDURE — 81001 URINALYSIS AUTO W/SCOPE: CPT

## 2019-02-08 PROCEDURE — 36415 COLL VENOUS BLD VENIPUNCTURE: CPT

## 2019-02-08 PROCEDURE — 84484 ASSAY OF TROPONIN QUANT: CPT

## 2019-02-08 RX ORDER — DIAZEPAM 5 MG/1
5 TABLET ORAL
Status: ON HOLD | COMMUNITY
Start: 2016-08-01 | End: 2019-04-27 | Stop reason: HOSPADM

## 2019-02-08 RX ORDER — OLANZAPINE 5 MG/1
5 TABLET ORAL
Status: ON HOLD | COMMUNITY
End: 2019-04-27 | Stop reason: HOSPADM

## 2019-02-08 RX ORDER — 0.9 % SODIUM CHLORIDE 0.9 %
1000 INTRAVENOUS SOLUTION INTRAVENOUS ONCE
Status: COMPLETED | OUTPATIENT
Start: 2019-02-08 | End: 2019-02-08

## 2019-02-08 RX ORDER — CEFDINIR 300 MG/1
300 CAPSULE ORAL 2 TIMES DAILY
Qty: 20 CAPSULE | Refills: 0 | Status: SHIPPED | OUTPATIENT
Start: 2019-02-08 | End: 2019-02-18

## 2019-02-08 RX ORDER — FOLIC ACID 1 MG/1
1 TABLET ORAL
Status: ON HOLD | COMMUNITY
End: 2019-04-27 | Stop reason: HOSPADM

## 2019-02-08 RX ORDER — FENTANYL CITRATE 50 UG/ML
25 INJECTION, SOLUTION INTRAMUSCULAR; INTRAVENOUS ONCE
Status: COMPLETED | OUTPATIENT
Start: 2019-02-08 | End: 2019-02-08

## 2019-02-08 RX ORDER — SODIUM CHLORIDE 0.9 % (FLUSH) 0.9 %
10 SYRINGE (ML) INJECTION ONCE
Status: COMPLETED | OUTPATIENT
Start: 2019-02-08 | End: 2019-02-08

## 2019-02-08 RX ORDER — NITROFURANTOIN 25; 75 MG/1; MG/1
CAPSULE ORAL
Refills: 0 | COMMUNITY
Start: 2019-02-07 | End: 2019-05-08 | Stop reason: ALTCHOICE

## 2019-02-08 RX ORDER — NICOTINE 21 MG/24HR
1 PATCH, TRANSDERMAL 24 HOURS TRANSDERMAL
Status: ON HOLD | COMMUNITY
Start: 2016-08-01 | End: 2019-04-27 | Stop reason: HOSPADM

## 2019-02-08 RX ORDER — ALBUTEROL SULFATE 90 UG/1
1 AEROSOL, METERED RESPIRATORY (INHALATION)
Status: ON HOLD | COMMUNITY
End: 2019-04-27 | Stop reason: HOSPADM

## 2019-02-08 RX ADMIN — CEFTRIAXONE SODIUM 1 G: 1 INJECTION, POWDER, FOR SOLUTION INTRAMUSCULAR; INTRAVENOUS at 15:48

## 2019-02-08 RX ADMIN — Medication 10 ML: at 14:35

## 2019-02-08 RX ADMIN — SODIUM CHLORIDE 1000 ML: 9 INJECTION, SOLUTION INTRAVENOUS at 13:36

## 2019-02-08 RX ADMIN — FENTANYL CITRATE 25 MCG: 50 INJECTION, SOLUTION INTRAMUSCULAR; INTRAVENOUS at 18:10

## 2019-02-08 RX ADMIN — IOPAMIDOL 110 ML: 755 INJECTION, SOLUTION INTRAVENOUS at 14:35

## 2019-02-08 ASSESSMENT — PAIN SCALES - GENERAL: PAINLEVEL_OUTOF10: 10

## 2019-02-08 ASSESSMENT — PAIN DESCRIPTION - FREQUENCY: FREQUENCY: CONTINUOUS

## 2019-02-08 ASSESSMENT — PAIN DESCRIPTION - DESCRIPTORS: DESCRIPTORS: ACHING

## 2019-02-08 ASSESSMENT — PAIN DESCRIPTION - PROGRESSION: CLINICAL_PROGRESSION: GRADUALLY WORSENING

## 2019-02-08 ASSESSMENT — PAIN DESCRIPTION - ORIENTATION: ORIENTATION: LEFT

## 2019-02-08 ASSESSMENT — PAIN DESCRIPTION - LOCATION: LOCATION: FLANK

## 2019-03-31 ENCOUNTER — HOSPITAL ENCOUNTER (EMERGENCY)
Age: 64
Discharge: HOME | End: 2019-03-31
Payer: COMMERCIAL

## 2019-03-31 VITALS
SYSTOLIC BLOOD PRESSURE: 125 MMHG | TEMPERATURE: 97.4 F | DIASTOLIC BLOOD PRESSURE: 74 MMHG | BODY MASS INDEX: 16.01 KG/M2 | WEIGHT: 102 LBS | HEART RATE: 93 BPM | OXYGEN SATURATION: 98 % | HEIGHT: 67 IN

## 2019-03-31 DIAGNOSIS — T83.511A URINARY TRACT INFECTION ASSOCIATED WITH INDWELLING URETHRAL CATHETER, INITIAL ENCOUNTER (HCC): ICD-10-CM

## 2019-03-31 DIAGNOSIS — N39.0 URINARY TRACT INFECTION ASSOCIATED WITH INDWELLING URETHRAL CATHETER, INITIAL ENCOUNTER (HCC): ICD-10-CM

## 2019-03-31 DIAGNOSIS — T83.9XXA FOLEY CATHETER PROBLEM, INITIAL ENCOUNTER (HCC): Primary | ICD-10-CM

## 2019-03-31 LAB
AMORPHOUS: PRESENT
BACTERIA: ABNORMAL /HPF
BILIRUBIN URINE: NEGATIVE
BLOOD, URINE: ABNORMAL
CLARITY: ABNORMAL
COLOR: YELLOW
GLUCOSE URINE: NEGATIVE MG/DL
KETONES, URINE: NEGATIVE MG/DL
LEUKOCYTE ESTERASE, URINE: ABNORMAL
NITRITE, URINE: POSITIVE
PH UA: 8 (ref 5–9)
PROTEIN UA: ABNORMAL MG/DL
RBC UA: ABNORMAL /HPF (ref 0–2)
SPECIFIC GRAVITY UA: 1.01 (ref 1–1.03)
UROBILINOGEN, URINE: 0.2 E.U./DL
WBC UA: ABNORMAL /HPF (ref 0–5)

## 2019-03-31 PROCEDURE — 87088 URINE BACTERIA CULTURE: CPT

## 2019-03-31 PROCEDURE — 51702 INSERT TEMP BLADDER CATH: CPT

## 2019-03-31 PROCEDURE — 99283 EMERGENCY DEPT VISIT LOW MDM: CPT

## 2019-03-31 PROCEDURE — 81001 URINALYSIS AUTO W/SCOPE: CPT

## 2019-03-31 RX ORDER — CEFDINIR 300 MG/1
300 CAPSULE ORAL 2 TIMES DAILY
Qty: 20 CAPSULE | Refills: 0 | Status: SHIPPED | OUTPATIENT
Start: 2019-03-31 | End: 2019-04-10

## 2019-03-31 ASSESSMENT — PAIN DESCRIPTION - FREQUENCY: FREQUENCY: CONTINUOUS

## 2019-03-31 ASSESSMENT — PAIN SCALES - GENERAL: PAINLEVEL_OUTOF10: 10

## 2019-03-31 ASSESSMENT — PAIN DESCRIPTION - DESCRIPTORS: DESCRIPTORS: ACHING;BURNING

## 2019-03-31 ASSESSMENT — PAIN DESCRIPTION - PAIN TYPE: TYPE: ACUTE PAIN

## 2019-03-31 ASSESSMENT — PAIN DESCRIPTION - LOCATION: LOCATION: VAGINA

## 2019-03-31 NOTE — ED NOTES
#16 jarquin inserted without difficulty using sterile technique. Immediate return of 500 cc of straw colored urine. Specimen obtained and sent to lab.       Aarti Quiroz LPN  54/70/28 [id number]

## 2019-04-01 ENCOUNTER — HOSPITAL ENCOUNTER (OUTPATIENT)
Age: 64
Discharge: HOME | End: 2019-04-01
Payer: COMMERCIAL

## 2019-04-01 LAB
ALBUMIN SERPL-MCNC: 4.1 G/DL (ref 3.5–5.2)
ALP BLD-CCNC: 95 U/L (ref 35–104)
ALT SERPL-CCNC: 7 U/L (ref 0–32)
AMYLASE: 107 U/L (ref 20–100)
AST SERPL-CCNC: 14 U/L (ref 0–31)
BILIRUB SERPL-MCNC: <0.2 MG/DL (ref 0–1.2)
BILIRUBIN DIRECT: <0.2 MG/DL (ref 0–0.3)
BILIRUBIN, INDIRECT: NORMAL MG/DL (ref 0–1)
CEA: 6.1 NG/ML (ref 0–5.2)
LIPASE: 30 U/L (ref 13–60)
TOTAL PROTEIN: 7 G/DL (ref 6.4–8.3)

## 2019-04-01 PROCEDURE — 83690 ASSAY OF LIPASE: CPT

## 2019-04-01 PROCEDURE — 80076 HEPATIC FUNCTION PANEL: CPT

## 2019-04-01 PROCEDURE — 86301 IMMUNOASSAY TUMOR CA 19-9: CPT

## 2019-04-01 PROCEDURE — 36415 COLL VENOUS BLD VENIPUNCTURE: CPT

## 2019-04-01 PROCEDURE — 82378 CARCINOEMBRYONIC ANTIGEN: CPT

## 2019-04-01 PROCEDURE — 82150 ASSAY OF AMYLASE: CPT

## 2019-04-03 LAB — URINE CULTURE, ROUTINE: NORMAL

## 2019-04-03 NOTE — ED PROVIDER NOTES
Saturation Interpretation: Normal.    · Constitutional:  Alert, development consistent with age. · HEENT:  NC/NT. Airway patent. · Neck:  Normal ROM. Supple. · Respiratory:  Lungs Clear to auscultation and breath sounds equal.  · CV:  Regular rate and rhythm, normal heart sounds, without pathological murmurs, ectopy, gallops, or rubs. · GI:  General Appearance: normal.         Bowel sounds: normal bowel sounds. Distension:  None. Tenderness: No abdominal tenderness. Liver: non-tender. Spleen:  non-tender. Pulsatile Mass: absent. Hernia:  no inguinal or femoral hernias noted. · : (chaperone present during examination). not indicated / deferred. · Back: CVA Tenderness: No. Bilaterally. · Integument:  Normal turgor. Warm, dry, without visible rash, unless noted elsewhere. Lymphatics: No lymphangitis or adenopathy noted. · Neurological:  Oriented. Motor functions intact. Lab / Imaging Results   (All laboratory and radiology results have been personally reviewed by myself)  Labs:  Results for orders placed or performed during the hospital encounter of 03/31/19   Urine culture   Result Value Ref Range    Urine Culture, Routine       ,000 CFU/mL  Mixed jeevan isolated. Further workup and sensitivity testing  is not routinely indicated and will not be performed.   Mixed jeevan isolated includes:  Mixed gram positive organisms     Urinalysis with Microscopic   Result Value Ref Range    Color, UA Yellow Straw/Yellow    Clarity, UA SL CLOUDY Clear    Glucose, Ur Negative Negative mg/dL    Bilirubin Urine Negative Negative    Ketones, Urine Negative Negative mg/dL    Specific Gravity, UA 1.010 1.005 - 1.030    Blood, Urine TRACE-INTACT Negative    pH, UA 8.0 5.0 - 9.0    Protein, UA TRACE Negative mg/dL    Urobilinogen, Urine 0.2 <2.0 E.U./dL    Nitrite, Urine POSITIVE (A) Negative    Leukocyte Esterase, Urine LARGE (A) Negative WBC, UA 10-20 (A) 0 - 5 /HPF    RBC, UA 1-3 0 - 2 /HPF    Bacteria, UA MODERATE (A) /HPF    Amorphous, UA PRESENT        Imaging: All Radiology results interpreted by Radiologist unless otherwise noted. No orders to display     ED Course / Medical Decision Making   Medications - No data to display       Consults:   None    Procedures:   none    Medical Decision Making: Del Rio changed, UA obtained, positive nitrites, leukocytes and WBC, will treat for UTI, culture pending. Plan is for symptom control and outpatient follow up. Educated on signs and symptoms which require emergent evaluation. Counseling: The emergency provider has spoken with the patient and discussed todays results, in addition to providing specific details for the plan of care and counseling regarding the diagnosis and prognosis. Questions are answered at this time and they are agreeable with the plan. Assessment      1. Del Rio catheter problem, initial encounter ([facility]    2. Urinary tract infection associated with indwelling urethral catheter, initial encounter [facility])      Plan   Disposition: Discharge to home  Patient condition is good    New Medications     Discharge Medication List as of 3/31/2019  6:09 PM      START taking these medications    Details   cefdinir (OMNICEF) 300 MG capsule Take 1 capsule by mouth 2 times daily for 10 days, Disp-20 capsule, R-0Print           Electronically signed by ANGELA Rabago CNP   DD: 4/3/19  **This report was transcribed using voice recognition software. Every effort was made to ensure accuracy; however, inadvertent computerized transcription errors may be present.   END OF ED PROVIDER NOTE          ANGELA Cruz CNP  04/03/19 [id number]

## 2019-04-04 LAB — CA 19-9: 10 U/ML (ref 0–37)

## 2019-04-24 ENCOUNTER — APPOINTMENT (OUTPATIENT)
Dept: GENERAL RADIOLOGY | Age: 64
DRG: 189 | End: 2019-04-24
Payer: COMMERCIAL

## 2019-04-24 ENCOUNTER — APPOINTMENT (OUTPATIENT)
Dept: CT IMAGING | Age: 64
DRG: 189 | End: 2019-04-24
Payer: COMMERCIAL

## 2019-04-24 ENCOUNTER — HOSPITAL ENCOUNTER (INPATIENT)
Age: 64
LOS: 3 days | Discharge: HOME HEALTH | DRG: 189 | End: 2019-04-27
Attending: EMERGENCY MEDICINE | Admitting: INTERNAL MEDICINE
Payer: COMMERCIAL

## 2019-04-24 DIAGNOSIS — J96.22 ACUTE ON CHRONIC RESPIRATORY FAILURE WITH HYPOXIA AND HYPERCAPNIA (HCC): Primary | ICD-10-CM

## 2019-04-24 DIAGNOSIS — J96.21 ACUTE ON CHRONIC RESPIRATORY FAILURE WITH HYPOXIA AND HYPERCAPNIA (HCC): Primary | ICD-10-CM

## 2019-04-24 DIAGNOSIS — J44.1 COPD EXACERBATION (HCC): ICD-10-CM

## 2019-04-24 PROBLEM — J96.01 ACUTE RESPIRATORY FAILURE WITH HYPOXIA (HCC): Status: ACTIVE | Noted: 2019-04-24

## 2019-04-24 LAB
AADO2: 36.6 MMHG
ANION GAP SERPL CALCULATED.3IONS-SCNC: 11 MMOL/L (ref 7–16)
B.E.: -0.9 MMOL/L (ref -3–3)
BASOPHILS ABSOLUTE: 0.05 E9/L (ref 0–0.2)
BASOPHILS RELATIVE PERCENT: 0.5 % (ref 0–2)
BUN BLDV-MCNC: 12 MG/DL (ref 8–23)
CALCIUM SERPL-MCNC: 9.8 MG/DL (ref 8.6–10.2)
CHLORIDE BLD-SCNC: 95 MMOL/L (ref 98–107)
CO2: 28 MMOL/L (ref 22–29)
COHB: 0.4 % (ref 0–1.5)
CREAT SERPL-MCNC: 0.6 MG/DL (ref 0.5–1)
CRITICAL: ABNORMAL
DATE ANALYZED: ABNORMAL
DATE OF COLLECTION: ABNORMAL
EOSINOPHILS ABSOLUTE: 0.01 E9/L (ref 0.05–0.5)
EOSINOPHILS RELATIVE PERCENT: 0.1 % (ref 0–6)
FIO2: 60 %
GFR AFRICAN AMERICAN: >60
GFR NON-AFRICAN AMERICAN: >60 ML/MIN/1.73
GLUCOSE BLD-MCNC: 176 MG/DL (ref 74–99)
HCO3: 25.4 MMOL/L (ref 22–26)
HCT VFR BLD CALC: 34.1 % (ref 34–48)
HEMOGLOBIN: 10.3 G/DL (ref 11.5–15.5)
HHB: 0.2 % (ref 0–5)
IMMATURE GRANULOCYTES #: 0.06 E9/L
IMMATURE GRANULOCYTES %: 0.6 % (ref 0–5)
LAB: ABNORMAL
LACTIC ACID: 1.4 MMOL/L (ref 0.5–2.2)
LYMPHOCYTES ABSOLUTE: 1.1 E9/L (ref 1.5–4)
LYMPHOCYTES RELATIVE PERCENT: 10.7 % (ref 20–42)
Lab: ABNORMAL
MCH RBC QN AUTO: 25.3 PG (ref 26–35)
MCHC RBC AUTO-ENTMCNC: 30.2 % (ref 32–34.5)
MCV RBC AUTO: 83.8 FL (ref 80–99.9)
METHB: 0.4 % (ref 0–1.5)
MODE: ABNORMAL
MONOCYTES ABSOLUTE: 0.78 E9/L (ref 0.1–0.95)
MONOCYTES RELATIVE PERCENT: 7.6 % (ref 2–12)
NEUTROPHILS ABSOLUTE: 8.29 E9/L (ref 1.8–7.3)
NEUTROPHILS RELATIVE PERCENT: 80.5 % (ref 43–80)
O2 CONTENT: 16.6 ML/DL
O2 SATURATION: 99.8 % (ref 92–98.5)
O2HB: 99 % (ref 94–97)
OPERATOR ID: ABNORMAL
PATIENT TEMP: 37 C
PCO2: 49.2 MMHG (ref 35–45)
PDW BLD-RTO: 17 FL (ref 11.5–15)
PEEP/CPAP: 5 CMH2O
PFO2: 5.37 MMHG/%
PH BLOOD GAS: 7.33 (ref 7.35–7.45)
PLATELET # BLD: 287 E9/L (ref 130–450)
PMV BLD AUTO: 10.2 FL (ref 7–12)
PO2: 322.1 MMHG (ref 60–100)
POTASSIUM SERPL-SCNC: 4.2 MMOL/L (ref 3.5–5)
POTASSIUM SERPL-SCNC: 6 MMOL/L (ref 3.5–5)
PS: 15 CMH20
RBC # BLD: 4.07 E12/L (ref 3.5–5.5)
RI(T): 11 %
SODIUM BLD-SCNC: 134 MMOL/L (ref 132–146)
SOURCE, BLOOD GAS: ABNORMAL
THB: 11.3 G/DL (ref 11.5–16.5)
TIME ANALYZED: 1847
TROPONIN: <0.01 NG/ML (ref 0–0.03)
WBC # BLD: 10.3 E9/L (ref 4.5–11.5)

## 2019-04-24 PROCEDURE — 87040 BLOOD CULTURE FOR BACTERIA: CPT

## 2019-04-24 PROCEDURE — 94640 AIRWAY INHALATION TREATMENT: CPT

## 2019-04-24 PROCEDURE — 96375 TX/PRO/DX INJ NEW DRUG ADDON: CPT

## 2019-04-24 PROCEDURE — 36415 COLL VENOUS BLD VENIPUNCTURE: CPT

## 2019-04-24 PROCEDURE — 84484 ASSAY OF TROPONIN QUANT: CPT

## 2019-04-24 PROCEDURE — 93005 ELECTROCARDIOGRAM TRACING: CPT | Performed by: STUDENT IN AN ORGANIZED HEALTH CARE EDUCATION/TRAINING PROGRAM

## 2019-04-24 PROCEDURE — 71275 CT ANGIOGRAPHY CHEST: CPT

## 2019-04-24 PROCEDURE — 94660 CPAP INITIATION&MGMT: CPT

## 2019-04-24 PROCEDURE — 84132 ASSAY OF SERUM POTASSIUM: CPT

## 2019-04-24 PROCEDURE — 2500000003 HC RX 250 WO HCPCS: Performed by: STUDENT IN AN ORGANIZED HEALTH CARE EDUCATION/TRAINING PROGRAM

## 2019-04-24 PROCEDURE — 96368 THER/DIAG CONCURRENT INF: CPT

## 2019-04-24 PROCEDURE — 94761 N-INVAS EAR/PLS OXIMETRY MLT: CPT

## 2019-04-24 PROCEDURE — 96365 THER/PROPH/DIAG IV INF INIT: CPT

## 2019-04-24 PROCEDURE — 96372 THER/PROPH/DIAG INJ SC/IM: CPT

## 2019-04-24 PROCEDURE — 6360000004 HC RX CONTRAST MEDICATION: Performed by: RADIOLOGY

## 2019-04-24 PROCEDURE — 6370000000 HC RX 637 (ALT 250 FOR IP): Performed by: STUDENT IN AN ORGANIZED HEALTH CARE EDUCATION/TRAINING PROGRAM

## 2019-04-24 PROCEDURE — 80048 BASIC METABOLIC PNL TOTAL CA: CPT

## 2019-04-24 PROCEDURE — 83605 ASSAY OF LACTIC ACID: CPT

## 2019-04-24 PROCEDURE — 6360000002 HC RX W HCPCS: Performed by: STUDENT IN AN ORGANIZED HEALTH CARE EDUCATION/TRAINING PROGRAM

## 2019-04-24 PROCEDURE — 2580000003 HC RX 258: Performed by: STUDENT IN AN ORGANIZED HEALTH CARE EDUCATION/TRAINING PROGRAM

## 2019-04-24 PROCEDURE — 85025 COMPLETE CBC W/AUTO DIFF WBC: CPT

## 2019-04-24 PROCEDURE — 71045 X-RAY EXAM CHEST 1 VIEW: CPT

## 2019-04-24 PROCEDURE — 94664 DEMO&/EVAL PT USE INHALER: CPT

## 2019-04-24 PROCEDURE — 2060000000 HC ICU INTERMEDIATE R&B

## 2019-04-24 PROCEDURE — 2580000003 HC RX 258: Performed by: RADIOLOGY

## 2019-04-24 PROCEDURE — 96366 THER/PROPH/DIAG IV INF ADDON: CPT

## 2019-04-24 PROCEDURE — 82805 BLOOD GASES W/O2 SATURATION: CPT

## 2019-04-24 PROCEDURE — 87502 INFLUENZA DNA AMP PROBE: CPT

## 2019-04-24 PROCEDURE — 99285 EMERGENCY DEPT VISIT HI MDM: CPT

## 2019-04-24 RX ORDER — ALBUTEROL SULFATE 2.5 MG/3ML
7.5 SOLUTION RESPIRATORY (INHALATION) ONCE
Status: COMPLETED | OUTPATIENT
Start: 2019-04-24 | End: 2019-04-24

## 2019-04-24 RX ORDER — METHYLPREDNISOLONE SODIUM SUCCINATE 125 MG/2ML
125 INJECTION, POWDER, LYOPHILIZED, FOR SOLUTION INTRAMUSCULAR; INTRAVENOUS ONCE
Status: COMPLETED | OUTPATIENT
Start: 2019-04-24 | End: 2019-04-24

## 2019-04-24 RX ORDER — 0.9 % SODIUM CHLORIDE 0.9 %
1000 INTRAVENOUS SOLUTION INTRAVENOUS ONCE
Status: DISCONTINUED | OUTPATIENT
Start: 2019-04-24 | End: 2019-04-27 | Stop reason: HOSPADM

## 2019-04-24 RX ORDER — SODIUM CHLORIDE 0.9 % (FLUSH) 0.9 %
10 SYRINGE (ML) INJECTION
Status: COMPLETED | OUTPATIENT
Start: 2019-04-24 | End: 2019-04-24

## 2019-04-24 RX ORDER — 0.9 % SODIUM CHLORIDE 0.9 %
1000 INTRAVENOUS SOLUTION INTRAVENOUS ONCE
Status: COMPLETED | OUTPATIENT
Start: 2019-04-24 | End: 2019-04-25

## 2019-04-24 RX ORDER — IPRATROPIUM BROMIDE AND ALBUTEROL SULFATE 2.5; .5 MG/3ML; MG/3ML
3 SOLUTION RESPIRATORY (INHALATION) ONCE
Status: COMPLETED | OUTPATIENT
Start: 2019-04-24 | End: 2019-04-24

## 2019-04-24 RX ADMIN — CEFTRIAXONE SODIUM 1 G: 1 INJECTION, POWDER, FOR SOLUTION INTRAMUSCULAR; INTRAVENOUS at 19:43

## 2019-04-24 RX ADMIN — METHYLPREDNISOLONE SODIUM SUCCINATE 125 MG: 125 INJECTION, POWDER, FOR SOLUTION INTRAMUSCULAR; INTRAVENOUS at 18:41

## 2019-04-24 RX ADMIN — IPRATROPIUM BROMIDE AND ALBUTEROL SULFATE 3 AMPULE: .5; 3 SOLUTION RESPIRATORY (INHALATION) at 18:11

## 2019-04-24 RX ADMIN — ENOXAPARIN SODIUM 40 MG: 40 INJECTION SUBCUTANEOUS at 19:47

## 2019-04-24 RX ADMIN — IOPAMIDOL 60 ML: 755 INJECTION, SOLUTION INTRAVENOUS at 21:34

## 2019-04-24 RX ADMIN — SODIUM CHLORIDE 1000 ML: 9 INJECTION, SOLUTION INTRAVENOUS at 21:15

## 2019-04-24 RX ADMIN — Medication 10 ML: at 21:34

## 2019-04-24 RX ADMIN — ALBUTEROL SULFATE 7.5 MG: 2.5 SOLUTION RESPIRATORY (INHALATION) at 19:49

## 2019-04-24 RX ADMIN — DOXYCYCLINE 100 MG: 100 INJECTION, POWDER, LYOPHILIZED, FOR SOLUTION INTRAVENOUS at 21:15

## 2019-04-24 ASSESSMENT — ENCOUNTER SYMPTOMS
SORE THROAT: 0
VOMITING: 0
TROUBLE SWALLOWING: 0
ABDOMINAL DISTENTION: 0
PHOTOPHOBIA: 0
EYE REDNESS: 0
ABDOMINAL PAIN: 0
SHORTNESS OF BREATH: 0
NAUSEA: 0
SINUS PRESSURE: 0
CHEST TIGHTNESS: 1
DIARRHEA: 0
EYE PAIN: 0
CONSTIPATION: 0
COUGH: 0
RHINORRHEA: 0
BACK PAIN: 0
BLOOD IN STOOL: 0
WHEEZING: 0

## 2019-04-24 NOTE — ED PROVIDER NOTES
Patient is a 39-year-old female who presents to ED for evaluation of respiratory distress. On arrival to ED, patient SPO2 68% on room air-- history of non-oxygen dependent COPD, followed up patient with pulmonology, Dr. Selena Minor. Patient notes that she has used her MDI inhaler with no improvement. Patient complains of associated chest tightness to the parasternal region. Patient notes increased cough  productive of dark yellow sputum for the past 2 days. Review of Systems   Constitutional: Negative for chills, diaphoresis, fatigue and fever. HENT: Negative for congestion, ear pain, rhinorrhea, sinus pressure, sneezing, sore throat and trouble swallowing. Eyes: Negative for photophobia, pain and redness. Respiratory: Positive for chest tightness. Negative for cough, shortness of breath and wheezing. Cardiovascular: Positive for chest pain. Negative for palpitations. Gastrointestinal: Negative for abdominal distention, abdominal pain, blood in stool, constipation, diarrhea, nausea and vomiting. Endocrine: Negative for polydipsia and polyuria. Genitourinary: Negative for difficulty urinating, dysuria, flank pain, hematuria and urgency. Musculoskeletal: Negative for arthralgias, back pain, myalgias and neck pain. Skin: Negative for rash and wound. Neurological: Negative for weakness, light-headedness, numbness and headaches. Psychiatric/Behavioral: Negative for agitation and confusion. The patient is not nervous/anxious and is not hyperactive. Physical Exam   Constitutional: She is oriented to person, place, and time. She appears well-developed and well-nourished. No distress. HENT:   Head: Normocephalic and atraumatic. Right Ear: External ear normal.   Left Ear: External ear normal.   Mouth/Throat: Oropharynx is clear and moist. No oropharyngeal exudate. Eyes: Pupils are equal, round, and reactive to light.  Conjunctivae and EOM are normal. Right eye exhibits no discharge. Left eye exhibits no discharge. Neck: Normal range of motion. Neck supple. No thyromegaly present. Cardiovascular: Normal rate, regular rhythm and normal heart sounds. No murmur heard. Pulmonary/Chest: Tachypnea noted. She is in respiratory distress. She has decreased breath sounds. She has wheezes. She has no rales. She exhibits no tenderness. Abdominal: Soft. She exhibits no distension and no mass. There is no tenderness. There is no rebound and no guarding. Musculoskeletal: Normal range of motion. She exhibits no tenderness. Neurological: She is alert and oriented to person, place, and time. Skin: Skin is warm and dry. No rash noted. She is not diaphoretic. Psychiatric: She has a normal mood and affect. Her behavior is normal. Judgment and thought content normal.       Procedures    MDM  Number of Diagnoses or Management Options  Acute on chronic respiratory failure with hypoxia and hypercapnia ([facility]):   COPD exacerbation [facility]):   Diagnosis management comments: Patient is a 68-year-old female who presented to ED for evaluation of respiratory distress. Patient was reportedly 68% and triage on arrival. Patient was placed on BiPAP with improvement in SPO2. Initial exam with severely diminished lung sounds bilaterally, faint end expiratory wheeze. Patient was placed on breathing treatments with noted improvement. Patient remained tachycardic, CTA ordered. Labs reviewed-- no leukocytosis or lactic acidosis, troponin WNL. ABG hypercarbic 49.2-- taken after breathing treatment/BiPAP initiated. Decision was made to admit patient for further evaluation and management of acute on chronic hypoxic and hypercapnic respiratory failure secondary to COPD exacerbation. Well's Criteria for Pulmonary Embolism  Clinical Signs and Symptoms of DVT? no   PE Is #1 Diagnosis, or Equally Likely no   Heart Rate > 100? Yes (+1.5)   Immobilization at least 3 days, or Surgery in the previous 4 weeks?  no Previous, objectively diagnosed PE or DVT? no   Hemoptysis? no   Malignancy w/ Treatment within 6 mo, or palliative? no   Total  1.5     <2 points= low risk (3.4%)  2-6 points= moderate risk (27.8%)  >6 points= high risk (78.4%)    ED Course as of Apr 24 2344 Wed Apr 24, 2019 2341 Discussed with Dr. Mary Anne Weber who agreed to admit the patient at this time. [KS]      ED Course User Index  [KS] Nacho Boyer, DO       --------------------------------------------- PAST HISTORY ---------------------------------------------  Past Medical History:  has a past medical history of Asthma, COPD (chronic obstructive pulmonary disease) ([facility] and UTI (urinary tract infection). Past Surgical History:  has a past surgical history that includes Hysterectomy; ECHO Compl W Dop Color Flow (2/24/2013); Kidney surgery (Left, 06/16/2014); and pr cystourethroscopy,biopsies (N/A, 9/14/2018). Social History:  reports that she has quit smoking. She smoked 0.00 packs per day. She has never used smokeless tobacco. She reports that she does not drink alcohol or use drugs. Family History: family history is not on file. The patients home medications have been reviewed.     Allergies: Sulfa antibiotics    -------------------------------------------------- RESULTS -------------------------------------------------    LABS:  Results for orders placed or performed during the hospital encounter of 04/24/19   CBC auto differential   Result Value Ref Range    WBC 10.3 4.5 - 11.5 E9/L    RBC 4.07 3.50 - 5.50 E12/L    Hemoglobin 10.3 (L) 11.5 - 15.5 g/dL    Hematocrit 34.1 34.0 - 48.0 %    MCV 83.8 80.0 - 99.9 fL    MCH 25.3 (L) 26.0 - 35.0 pg    MCHC 30.2 (L) 32.0 - 34.5 %    RDW 17.0 (H) 11.5 - 15.0 fL    Platelets 669 561 - 047 E9/L    MPV 10.2 7.0 - 12.0 fL    Neutrophils % 80.5 (H) 43.0 - 80.0 %    Immature Granulocytes % 0.6 0.0 - 5.0 %    Lymphocytes % 10.7 (L) 20.0 - 42.0 %    Monocytes % 7.6 2.0 - 12.0 %    Eosinophils % 0.1 0.0 kidney. Further assessment is recommended by CT the abdomen   to exclude a developing renal malignancy. ALERT:  THIS IS AN ABNORMAL REPORT            XR CHEST PORTABLE   Final Result    IMPRESSION:      No airspace opacities or pleural effusion. EKG:  This EKG is signed and interpreted by me. Rate: 136  Rhythm: Sinus  Interpretation: sinus tachycardia  Comparison: stable as compared to patient's most recent EKG      ------------------------- NURSING NOTES AND VITALS REVIEWED ---------------------------  Date / Time Roomed:  4/24/2019  5:49 PM  ED Bed Assignment:  14A/14A-14    The nursing notes within the ED encounter and vital signs as below have been reviewed. Patient Vitals for the past 24 hrs:   BP Temp Temp src Pulse Resp SpO2   04/24/19 2121 122/70 -- -- 125 23 100 %   04/24/19 1944 129/79 98.1 °F (36.7 °C) Axillary 133 (!) 32 100 %   04/24/19 1847 118/66 -- -- 140 30 100 %   04/24/19 1818 -- -- -- -- (!) 37 --   04/24/19 1815 -- -- -- -- -- 100 %   04/24/19 1811 -- -- -- -- (!) 35 --   04/24/19 1800 -- 98.2 °F (36.8 °C) Axillary -- -- --   04/24/19 1756 (!) 120/91 -- -- 143 30 98 %   04/24/19 1749 -- -- -- -- 28 (!) 68 %       Oxygen Saturation Interpretation: Improved after treatment    ------------------------------------------ PROGRESS NOTES ------------------------------------------  Re-evaluation(s):  Time: 8:59 PM  Patients symptoms show no change  Repeat physical examination is not changed    Counseling:  I have spoken with the patient and discussed todays results, in addition to providing specific details for the plan of care and counseling regarding the diagnosis and prognosis. Their questions are answered at this time and they are agreeable with the plan of admission.    --------------------------------- ADDITIONAL PROVIDER NOTES ---------------------------------  Consultations:  Time: 2341. Spoke with Dr. Emmy Brumfield. Discussed case. They will admit the patient.   This patient's ED course included: a personal history and physicial examination, re-evaluation prior to disposition, multiple bedside re-evaluations, IV medications, cardiac monitoring and continuous pulse oximetry    This patient has remained hemodynamically stable during their ED course. Diagnosis:  1. Acute on chronic respiratory failure with hypoxia and hypercapnia (HCC)    2. COPD exacerbation ([facility]        Disposition:  Patient's disposition: Admit to telemetry  Patient's condition is stable.          Mega Juarez DO  Resident  04/24/19 0166

## 2019-04-24 NOTE — ED NOTES
Bed: 14A-14  Expected date:   Expected time:   Means of arrival:   Comments:  triage     Sharon Vargas RN  04/24/19 [phone]

## 2019-04-25 ENCOUNTER — APPOINTMENT (OUTPATIENT)
Dept: CT IMAGING | Age: 64
DRG: 189 | End: 2019-04-25
Payer: COMMERCIAL

## 2019-04-25 LAB
AMPHETAMINE SCREEN, URINE: NOT DETECTED
BARBITURATE SCREEN URINE: NOT DETECTED
BENZODIAZEPINE SCREEN, URINE: NOT DETECTED
CANNABINOID SCREEN URINE: NOT DETECTED
COCAINE METABOLITE SCREEN URINE: NOT DETECTED
INFLUENZA A BY PCR: NOT DETECTED
INFLUENZA B BY PCR: NOT DETECTED
METHADONE SCREEN, URINE: NOT DETECTED
OPIATE SCREEN URINE: NOT DETECTED
PHENCYCLIDINE SCREEN URINE: NOT DETECTED
PROPOXYPHENE SCREEN: NOT DETECTED

## 2019-04-25 PROCEDURE — 2500000003 HC RX 250 WO HCPCS: Performed by: INTERNAL MEDICINE

## 2019-04-25 PROCEDURE — 2580000003 HC RX 258: Performed by: INTERNAL MEDICINE

## 2019-04-25 PROCEDURE — 97530 THERAPEUTIC ACTIVITIES: CPT | Performed by: PHYSICAL THERAPIST

## 2019-04-25 PROCEDURE — 2580000003 HC RX 258: Performed by: RADIOLOGY

## 2019-04-25 PROCEDURE — 6370000000 HC RX 637 (ALT 250 FOR IP): Performed by: INTERNAL MEDICINE

## 2019-04-25 PROCEDURE — 94660 CPAP INITIATION&MGMT: CPT

## 2019-04-25 PROCEDURE — 80307 DRUG TEST PRSMV CHEM ANLYZR: CPT

## 2019-04-25 PROCEDURE — 6360000002 HC RX W HCPCS: Performed by: INTERNAL MEDICINE

## 2019-04-25 PROCEDURE — 97535 SELF CARE MNGMENT TRAINING: CPT

## 2019-04-25 PROCEDURE — 2580000003 HC RX 258: Performed by: NURSE PRACTITIONER

## 2019-04-25 PROCEDURE — 97161 PT EVAL LOW COMPLEX 20 MIN: CPT | Performed by: PHYSICAL THERAPIST

## 2019-04-25 PROCEDURE — 2060000000 HC ICU INTERMEDIATE R&B

## 2019-04-25 PROCEDURE — 2500000003 HC RX 250 WO HCPCS: Performed by: NURSE PRACTITIONER

## 2019-04-25 PROCEDURE — 6360000002 HC RX W HCPCS: Performed by: NURSE PRACTITIONER

## 2019-04-25 PROCEDURE — 94640 AIRWAY INHALATION TREATMENT: CPT

## 2019-04-25 PROCEDURE — 97166 OT EVAL MOD COMPLEX 45 MIN: CPT

## 2019-04-25 PROCEDURE — 74178 CT ABD&PLV WO CNTR FLWD CNTR: CPT

## 2019-04-25 PROCEDURE — 6360000004 HC RX CONTRAST MEDICATION: Performed by: RADIOLOGY

## 2019-04-25 RX ORDER — NICOTINE 21 MG/24HR
1 PATCH, TRANSDERMAL 24 HOURS TRANSDERMAL EVERY 24 HOURS
Status: DISCONTINUED | OUTPATIENT
Start: 2019-04-25 | End: 2019-04-27 | Stop reason: HOSPADM

## 2019-04-25 RX ORDER — IPRATROPIUM BROMIDE AND ALBUTEROL SULFATE 2.5; .5 MG/3ML; MG/3ML
1 SOLUTION RESPIRATORY (INHALATION)
Status: DISCONTINUED | OUTPATIENT
Start: 2019-04-25 | End: 2019-04-27 | Stop reason: HOSPADM

## 2019-04-25 RX ORDER — FORMOTEROL FUMARATE 20 UG/2ML
20 SOLUTION RESPIRATORY (INHALATION) 2 TIMES DAILY
Status: DISCONTINUED | OUTPATIENT
Start: 2019-04-25 | End: 2019-04-27 | Stop reason: HOSPADM

## 2019-04-25 RX ORDER — OLANZAPINE 5 MG/1
5 TABLET ORAL NIGHTLY
Status: DISCONTINUED | OUTPATIENT
Start: 2019-04-25 | End: 2019-04-27 | Stop reason: HOSPADM

## 2019-04-25 RX ORDER — M-VIT,TX,IRON,MINS/CALC/FOLIC 27MG-0.4MG
1 TABLET ORAL DAILY
Status: DISCONTINUED | OUTPATIENT
Start: 2019-04-25 | End: 2019-04-27 | Stop reason: HOSPADM

## 2019-04-25 RX ORDER — ONDANSETRON 2 MG/ML
4 INJECTION INTRAMUSCULAR; INTRAVENOUS EVERY 6 HOURS PRN
Status: DISCONTINUED | OUTPATIENT
Start: 2019-04-25 | End: 2019-04-27 | Stop reason: HOSPADM

## 2019-04-25 RX ORDER — METHYLPREDNISOLONE SODIUM SUCCINATE 125 MG/2ML
60 INJECTION, POWDER, LYOPHILIZED, FOR SOLUTION INTRAMUSCULAR; INTRAVENOUS EVERY 6 HOURS
Status: DISCONTINUED | OUTPATIENT
Start: 2019-04-25 | End: 2019-04-25

## 2019-04-25 RX ORDER — SODIUM CHLORIDE 0.9 % (FLUSH) 0.9 %
10 SYRINGE (ML) INJECTION PRN
Status: DISCONTINUED | OUTPATIENT
Start: 2019-04-25 | End: 2019-04-25 | Stop reason: SDUPTHER

## 2019-04-25 RX ORDER — ACETAMINOPHEN 325 MG/1
650 TABLET ORAL EVERY 4 HOURS PRN
Status: DISCONTINUED | OUTPATIENT
Start: 2019-04-25 | End: 2019-04-27 | Stop reason: HOSPADM

## 2019-04-25 RX ORDER — SODIUM CHLORIDE 0.9 % (FLUSH) 0.9 %
10 SYRINGE (ML) INJECTION EVERY 12 HOURS SCHEDULED
Status: DISCONTINUED | OUTPATIENT
Start: 2019-04-25 | End: 2019-04-25 | Stop reason: SDUPTHER

## 2019-04-25 RX ORDER — FOLIC ACID 1 MG/1
1 TABLET ORAL DAILY
Status: DISCONTINUED | OUTPATIENT
Start: 2019-04-25 | End: 2019-04-27 | Stop reason: HOSPADM

## 2019-04-25 RX ORDER — SODIUM CHLORIDE 0.9 % (FLUSH) 0.9 %
10 SYRINGE (ML) INJECTION PRN
Status: DISCONTINUED | OUTPATIENT
Start: 2019-04-25 | End: 2019-04-27 | Stop reason: HOSPADM

## 2019-04-25 RX ORDER — METHYLPREDNISOLONE SODIUM SUCCINATE 40 MG/ML
40 INJECTION, POWDER, LYOPHILIZED, FOR SOLUTION INTRAMUSCULAR; INTRAVENOUS EVERY 8 HOURS
Status: DISCONTINUED | OUTPATIENT
Start: 2019-04-25 | End: 2019-04-26

## 2019-04-25 RX ORDER — SODIUM CHLORIDE 0.9 % (FLUSH) 0.9 %
10 SYRINGE (ML) INJECTION EVERY 12 HOURS SCHEDULED
Status: DISCONTINUED | OUTPATIENT
Start: 2019-04-25 | End: 2019-04-27 | Stop reason: HOSPADM

## 2019-04-25 RX ORDER — DIAZEPAM 2 MG/1
5 TABLET ORAL NIGHTLY PRN
Status: DISCONTINUED | OUTPATIENT
Start: 2019-04-25 | End: 2019-04-27 | Stop reason: HOSPADM

## 2019-04-25 RX ORDER — SODIUM CHLORIDE 0.9 % (FLUSH) 0.9 %
10 SYRINGE (ML) INJECTION ONCE
Status: COMPLETED | OUTPATIENT
Start: 2019-04-25 | End: 2019-04-25

## 2019-04-25 RX ADMIN — DOXYCYCLINE 100 MG: 100 INJECTION, POWDER, LYOPHILIZED, FOR SOLUTION INTRAVENOUS at 08:43

## 2019-04-25 RX ADMIN — Medication 10 ML: at 18:04

## 2019-04-25 RX ADMIN — DOXYCYCLINE 100 MG: 100 INJECTION, POWDER, LYOPHILIZED, FOR SOLUTION INTRAVENOUS at 21:27

## 2019-04-25 RX ADMIN — FORMOTEROL FUMARATE DIHYDRATE 20 MCG: 20 SOLUTION RESPIRATORY (INHALATION) at 21:39

## 2019-04-25 RX ADMIN — IOPAMIDOL 110 ML: 755 INJECTION, SOLUTION INTRAVENOUS at 18:04

## 2019-04-25 RX ADMIN — Medication 10 ML: at 21:27

## 2019-04-25 RX ADMIN — CEFTRIAXONE SODIUM 1 G: 1 INJECTION, POWDER, FOR SOLUTION INTRAMUSCULAR; INTRAVENOUS at 15:59

## 2019-04-25 RX ADMIN — FOLIC ACID 1 MG: 1 TABLET ORAL at 10:07

## 2019-04-25 RX ADMIN — ACETAMINOPHEN 650 MG: 325 TABLET, FILM COATED ORAL at 22:21

## 2019-04-25 RX ADMIN — IPRATROPIUM BROMIDE AND ALBUTEROL SULFATE 1 AMPULE: .5; 3 SOLUTION RESPIRATORY (INHALATION) at 21:39

## 2019-04-25 RX ADMIN — IPRATROPIUM BROMIDE AND ALBUTEROL SULFATE 1 AMPULE: .5; 3 SOLUTION RESPIRATORY (INHALATION) at 16:46

## 2019-04-25 RX ADMIN — ACETAMINOPHEN 650 MG: 325 TABLET, FILM COATED ORAL at 06:55

## 2019-04-25 RX ADMIN — METHYLPREDNISOLONE SODIUM SUCCINATE 60 MG: 125 INJECTION, POWDER, FOR SOLUTION INTRAMUSCULAR; INTRAVENOUS at 08:43

## 2019-04-25 RX ADMIN — METHYLPREDNISOLONE SODIUM SUCCINATE 60 MG: 125 INJECTION, POWDER, FOR SOLUTION INTRAMUSCULAR; INTRAVENOUS at 01:56

## 2019-04-25 RX ADMIN — METHYLPREDNISOLONE SODIUM SUCCINATE 40 MG: 40 INJECTION, POWDER, FOR SOLUTION INTRAMUSCULAR; INTRAVENOUS at 15:59

## 2019-04-25 RX ADMIN — SERTRALINE 50 MG: 50 TABLET, FILM COATED ORAL at 08:43

## 2019-04-25 RX ADMIN — OLANZAPINE 5 MG: 5 TABLET, FILM COATED ORAL at 21:28

## 2019-04-25 RX ADMIN — Medication 10 ML: at 08:48

## 2019-04-25 RX ADMIN — IPRATROPIUM BROMIDE AND ALBUTEROL SULFATE 1 AMPULE: .5; 3 SOLUTION RESPIRATORY (INHALATION) at 12:30

## 2019-04-25 RX ADMIN — Medication 10 ML: at 01:56

## 2019-04-25 RX ADMIN — MULTIPLE VITAMINS W/ MINERALS TAB 1 TABLET: TAB at 13:58

## 2019-04-25 RX ADMIN — IPRATROPIUM BROMIDE AND ALBUTEROL SULFATE 1 AMPULE: .5; 3 SOLUTION RESPIRATORY (INHALATION) at 07:53

## 2019-04-25 ASSESSMENT — PAIN SCALES - GENERAL
PAINLEVEL_OUTOF10: 0
PAINLEVEL_OUTOF10: 3
PAINLEVEL_OUTOF10: 0
PAINLEVEL_OUTOF10: 3
PAINLEVEL_OUTOF10: 0
PAINLEVEL_OUTOF10: 0

## 2019-04-25 ASSESSMENT — PAIN DESCRIPTION - PROGRESSION: CLINICAL_PROGRESSION: NOT CHANGED

## 2019-04-25 ASSESSMENT — PAIN DESCRIPTION - PAIN TYPE: TYPE: ACUTE PAIN

## 2019-04-25 ASSESSMENT — PAIN DESCRIPTION - FREQUENCY: FREQUENCY: CONTINUOUS

## 2019-04-25 ASSESSMENT — PAIN DESCRIPTION - DESCRIPTORS: DESCRIPTORS: BURNING;THROBBING

## 2019-04-25 ASSESSMENT — PAIN DESCRIPTION - ORIENTATION: ORIENTATION: LEFT

## 2019-04-25 ASSESSMENT — PAIN DESCRIPTION - ONSET: ONSET: SUDDEN

## 2019-04-25 ASSESSMENT — PAIN - FUNCTIONAL ASSESSMENT: PAIN_FUNCTIONAL_ASSESSMENT: ACTIVITIES ARE NOT PREVENTED

## 2019-04-25 ASSESSMENT — PAIN DESCRIPTION - LOCATION: LOCATION: ARM

## 2019-04-25 NOTE — CARE COORDINATION
Met with pt and friend at bedside to discuss discharge planning, transition of care. Pt relayed from home alone, has oxygen through [facility], 2-3L nocturnal only, no nebulizer, has rollator, requesting toilet riser, declined DME list, chose [address], notified Mateusz Mauricio via [phone], will send script. Pt denies further DME needs, plan is home. I offered HHC, pt was agreeable, declined list, stated she has had [facility] and would like them again at discharge. Referral made via [phone] to [phone]. Will need HHC order at discharge for PT/OT, med compliance, and CPA. Pt's ride home is Mr. Darcie Hawkins phone [phone].

## 2019-04-25 NOTE — CONSULTS
[address]                  [address] [facility]  [facility]                                  CONSULTATION    PATIENT NAME: Neha Claire                    :        1955  MED REC NO:   [id number]                            ROOM:       7404  ACCOUNT NO:   [de-identified]                           ADMIT DATE: 2019  PROVIDER:     Ardean Lesch, MD    CONSULT DATE:  2019    PULMONARY CONSULTATION    REASON FOR CONSULTATION:  COPD exacerbation. IMPRESSION:  1. Acute hypercapnic respiratory failure, currently clinically better. 2.  COPD acute exacerbation, also currently seems to be getting better,  although still actively wheezing on exam.  For now, she is on  Solu-Medrol 40 IV every 8 hours, which I agree with, and DuoNeb every 4  hours while awake. We will add Perforomist to her regimen as she is  normally on Anoro. 3. In terms of her left renal lesion, this is being worked up with a CT  of the abdomen and pelvis, and likely Urology will be re-consulted. HISTORY:  History is obtained from reviewing old records and  interviewing the patient. She is a 68-year-old [facility] female  with severe COPD with an FEV1 of 25% of predicted on spirometry in my  office last August, who has been short of breath frequently with  exertion. The night before last, she gotten a little bit weak and  started seeing things. She got confused to where she felt like she  needed to go to the bathroom despite the fact she has an indwelling  Del Rio catheter. She became extremely short of breath, walking short  distances to the bathroom. She also had a cough productive of yellow  and white chunks of mucus with associated wheezing and chest pain.    There are also occasional knots in her chest.    She called her friend, who activated EMS and she was brought into the  emergency department where she was found to be in acute hypercapnic  respiratory failure. BiPAP 15/5 was initiated, and today she is feeling  somewhat better. She was a long-term very heavy smoker. She quit about  three years ago. PAST MEDICAL HISTORY:  Otherwise includes a kidney ablation for what  sounds like renal cell carcinoma, and really not a whole lot else  besides the COPD. PAST SURGICAL HISTORY:  Includes hysterectomy and kidney ablation. FAMILY HISTORY:  Her father had cancer. She thinks her mother had renal  failure. MEDICATIONS:  Currently are ceftriaxone 1 gm IV daily, doxycycline 100  mg IV every 12, neither of which I think she really needs; Valium 5 mg  p.o. nightly p.r.n.; Lovenox 40 subcutaneous daily; folic acid 1 mg  daily; DuoNeb every 4 while awake; Solu-Medrol 40 IV every 8; nicotine  patch one every 24 hours 21 mg per day; Zyprexa 5 mg nightly; Zofran 4  mg IV every 6 p.r.n.; Zoloft 50 p.o. daily; and multivitamin daily with  minerals. SOCIAL HISTORY:  Former heavy smoker and drinker. Worked as a home  health aide. REVIEW OF SYSTEMS:  Reveals an allergy to SULFA. She is always cold and  has dry skin. No rashes. No visual or hearing complaints. She has  chest heaviness and a knot in her chest.  She is short of breath. She  was nauseated and vomited yesterday and unable to keep anything down. She has a chronic indwelling Del Rio. She has arthritis. No blood clots. Review of systems is otherwise negative. PHYSICAL EXAMINATION:  GENERAL:  Thin, elderly woman looking older than her stated age, in no  acute distress. VITAL SIGNS:  Temp 36.8, pulse 101, respiratory rate is 18, blood  pressure 107/66, 3 liter pulse ox 96%. SKIN:  Had no rashes. HEENT:  Eyes had clear sclerae. Mouth is edentulous. Palate and gums  are normal.  NECK:  Without masses, adenopathy, or JVD. CHEST:  Symmetric. There was no accessory muscle use. HEART:  Regular, but slightly tachycardic.   LUNGS:  Had decreased breath sounds with scattered upper lobe wheezing  bilaterally. ABDOMEN:  Soft and nontender without masses or organomegaly. EXTREMITIES:  Positive clubbing. No cyanosis. No limb edema. NEUROLOGIC:  She was alert and appropriate. DATA:  CT angiogram of the chest was reviewed and showed emphysematous  changes. ABG was 7.33/49.2/322/99.8 on BiPAP. White count was 10.3,  hemoglobin was also 10.3, platelets of 853,206. Chemistries from today  show a repeat potassium of 4.2. Troponin was undetectable. Procalcitonin was apparently ordered, but not done.         Fred Valentine MD    D: 04/25/2019 13:03:57       T: 04/25/2019 13:51:16     [email or web address]  Job#: [id number]     Doc#: [id number]    CC:  MD Staci Vickers MD

## 2019-04-25 NOTE — H&P
[facility]  Dr. Maik Adler M.D. History and Physical      CHIEF COMPLAINT:  Shortness of breath, hypoxia    Reason for Admission:  Acute respiratory failure with hypoxia    History Obtained From:  patient    HISTORY OF PRESENT ILLNESS:      The patient is a 75 y.o. female of Ritika Hernandez MD with significant past medical history of asthma, COPD, tobacco abuse, who presents with increasing shortness of breath over the last few days. Patient states that she does have oxygen and wears 3 L at home as needed. Patient states that she was using her inhalers at home however did not have any improvement. At that time she called EMS who brought her to the emergency department. She was found to be 68% on room air. Patient states that she has also had an increased cough with production of yellow sputum over the last 2-3 days. Patient states that with the shortness of breath when she arrived to the emergency department she did have some chest pain that resolved with oxygen. Patient denies any fever, chills, night sweats, palpitations, abdominal pain, nausea or vomiting. Patient presented to the emergency Department were a work up was obtained which showed a sodium of 134, potassium 6.0 improved to 4.2 on repeat, BUN 12, creatinine 0.6, anion gap 11, GFR greater than 60, lactic acid 1.4, glucose 176, troponin less than 0.01, white blood cell count 10.3, hemoglobin 10.3, hematocrit 34.1, platelet count 693. Arterial blood gas showed a pH of 7.330, PCO2 49.2, PO2 322.1, HCO3 25.4. Influenza A and B not detected. Chest x-ray showed no acute pathology per radiology and CTA of the chest showed no pulmonary embolism or aortic dissection. Patient has been using BiPAP and is currently on nasal cannula oxygen. Patient states that she did become mildly dyspneic with eating this morning, felt better after finishing eating.    /66   Pulse 101   Temp 98.3 °F (36.8 °C) (Temporal)   Resp 18   Ht 5' 7\" (1.702 m) hours as needed)   alendronate (FOSAMAX) 70 MG tablet, Take 70 mg by mouth every 7 days. nicotine (NICODERM CQ) 21 MG/24HR, Place 1 patch onto the skin  folic acid (FOLVITE) 1 MG tablet, Take 1 mg by mouth    Allergies:  Sulfa antibiotics    Social History:   TOBACCO:   reports that she has quit smoking. She smoked 0.00 packs per day. She has never used smokeless tobacco.  ETOH:   reports that she does not drink alcohol. MARITAL STATUS:    OCCUPATION:      Family History:   History reviewed. No pertinent family history. REVIEW OF SYSTEMS:    General ROS: negative for - chills, fever or night sweats  Hematological and Lymphatic ROS: negative  Endocrine ROS: negative  Respiratory ROS: see HPI  Cardiovascular ROS: no chest pain at this time or dyspnea on exertion  Gastrointestinal ROS: no abdominal pain, change in bowel habits, or black or bloody stools  Genito-Urinary ROS: no dysuria, trouble voiding, or hematuria  Neurological ROS: no TIA or stroke symptoms      Vitals:  /66   Pulse 101   Temp 98.3 °F (36.8 °C) (Temporal)   Resp 18   Ht 5' 7\" (1.702 m)   Wt 112 lb (50.8 kg)   SpO2 96%   BMI 17.54 kg/m²     PHYSICAL EXAM:  General:  Awake, alert, oriented X 3. Well developed, well nourished, well groomed. No apparent distress. patient sitting up on cart. HEENT:  Normocephalic, atraumatic. Pupils equal, round, reactive to light. No scleral icterus. No conjunctival injection. Normal lips, teeth, and gums. No nasal discharge. patient handling secretions without discharge. Neck:  Supple, full range of motion. No meningeal signs. No lymphadenopathy. Heart:  RRR, no murmurs, gallops, rubs, carotid upstroke normal, no carotid bruits  Lungs:  Patient has moderate scattered expiratory wheezes noted throughout all lung fields anteriorly and posteriorly. Patient does have rales noted to the upper posterior lung fields bilaterally which does improve with coughing.  Patient has a harsh moist cough Antonio Keane MD

## 2019-04-25 NOTE — PROGRESS NOTES
[facility]  Physical Therapy Initial Evaluation    Name: Matthew Hamilton  : 1955  MRN: [id number]    Date of Service: 2019        Evaluating Therapist: Celeste Lucas PT, DPT       Equipment Recommendations: none - pt has rollator. Room #: 1899/9491-P  DIAGNOSIS: acute respiratory failure   PRECAUTIONS: fall risk, O2    Social:  Pt lives alone in a 2 floor plan 3 steps and 1 rails to enter. Bed and bath on second floor. Prior to admission pt walked with rollator PRN. Initial Evaluation  Date:  Treatment      Short Term/ Long Term   Goals   Was pt agreeable to Eval/treatment? yes     Does pt have pain? No c/o pain     Bed Mobility  Rolling: supervision  Supine to sit: SBA  Sit to supine: SBA  Scooting: NT  Modified independent   Transfers Sit to stand: SBa  Stand to sit: SBA  Stand pivot: CGA  Modified independent   Ambulation    125 feet with no device CGA. Pt occasionally reaches for chen rail. 200+ feet with AAD vs no device with modified independent   Stair negotiation: ascended and descended  Nt  4+ steps with 1 rail with modified independent   AM-PAC Raw Score 18/24       Pt is alert and Oriented x3   ROM:  L LE grossly WFL  R LE grossly WFL  Strength:   L LE grossly 4+/5  R LE grossly 4+/5  Balance: standing CGA   Sensation: no c/o numbness/tingling. Edema: none noted. Endurance: good -        ASSESSMENT  Pt displays functional ability as noted in the objective portion of this evaluation. Comments/Treatment:  Pt found laying in bed agreeable to evaluation. Pt instructed in mobility. Pt reports doesn't want to use the walker today and states she doesn't always use her rollator. Pt ambulates occasionally reaching for chen rail. Pt reports feels she is doing better than she felt previously. Pt left laying in bed with call button in reach end of evaluation. Patient education  Pt educated on mobility.     Patient response to education:   Pt verbalized understanding

## 2019-04-25 NOTE — PROGRESS NOTES
OCCUPATIONAL THERAPY INITIAL EVALUATION      Date:2019  Patient Name: Rubina Sotomayor  MRN: [id number]  : 1955  Room: [address] [place] [address]-A    [address], OTR/L [id number]    AM-PAC Daily Activity Raw Score:   Recommended Adaptive Equipment:  TBD     Diagnosis: Acute respiratory failure with hypoxia (HCC) [J96.01]       Pertinent Medical History: asthma, COPD, UTI    Precautions:  Falls, O2     Home Living: Pt lives alone in 2 floor home. 3 CHNATELLE, 1 handrail   Bathroom setup: tub/shower   Equipment owned: rollator, home O2 (PRN)    Prior Level of Function: independent with ADLs , shares IADLs (w/ family); ambulated independently w/ rollator PRN  Driving: no    Pain Level: Pt c/o no pain this session    Cognition: A&O: 3/4; Follows basic step directions   Memory:  fair +   Sequencing:  fair    Problem solving:  fair    Judgement/safety:  fair      Functional Assessment:   Initial Eval Status  Date: 19 Treatment Status  Date: Short Term Goals  Treatment frequency: PRN    Feeding Independent   -   Grooming Stand by Assist   Modified [place]    UB Dressing Stand by Assist   Modified [place]    LB Dressing Minimal Assist   Modified [place]    Bathing Minimal Assist  Modified [place]    Toileting Minimal Assist   Modified [place]    Bed Mobility  Supine to sit: Supervision   Sit to supine: Supervision   Supine to sit: Modified [place]   Sit to supine: Modified [place]    Functional Transfers SBA  Mod I   Functional Mobility Min A w/o AD  SBA w/ ww  Facilitated functional ambulation in room/bathrm  Refer to comments below for details.   Mod I   Balance Sitting: Supervision  Standing: Min A w/o AD  SBA w/ ww     Activity Tolerance Fair-  Fair+   Visual/  Perceptual WFL                Hand dominance: L   Strength ROM Additional Info:    RUE  4-/5  WFL good  and wfl FMC/dexterity noted during ADL tasks       LUE 4-/5  WFL good  and wfl FMC/dexterity noted during ADL tasks       Hearing: [facility]  Sensation:  No c/o numbness or tingling   Tone: WFL   Edema: none noted                            Comments/Treatment: Upon arrival, patient lying in bed. Therapist facilitated bed mobility, functional transfers (various surfaces-EOB, toilet. Education/cues for safety/hand placement), standing tolerance tasks (addressing posture, balance and activity tolerance while incorporating light functional reaching) and functional ambulation task (Initially faciltiated functional ambulation to bathroom w/o AD. Observed pt reaching for furniture, objects and required cues to attend to R side. Incorporated ww for remainder of mobility - safety and balance improved. Education/ cuing on posture, ww management and safety). Therapist facilitated self-care retraining: UB/LB self-care tasks (socks, gown), simulated toileting task and standing grooming task while educating pt on modified techniques, posture, safety and energy conservation techniques. Skilled monitoring of HR, O2 sats and pts response to treatment (Pt on 3.5L O2 via nasal cannula. O2 sat remained ^93% at rest and with activity. Pt c/o mild SOB following functional ambulation and self-care task. Reinforced rest and pursed lip breathing. Monitored until SOB subsided). At end of session, patient lying in bed (per pt request) with call light and phone within reach, all lines and tubes intact. Pt would benefit from continued skilled OT to increase functional independence and quality of life.     mod  Profile and History- med (extensive chart review)  Assessment of Occupational Performance and Identification of Deficits- med  Clinical Decision Making- med    Evaluation time includes thorough review of current medical information, gathering information on past medical history/social history and prior level of function, completion of standardized testing/informal observation of tasks, assessment of data, and development of

## 2019-04-26 LAB
ANION GAP SERPL CALCULATED.3IONS-SCNC: 6 MMOL/L (ref 7–16)
BASOPHILS ABSOLUTE: 0.02 E9/L (ref 0–0.2)
BASOPHILS RELATIVE PERCENT: 0.2 % (ref 0–2)
BUN BLDV-MCNC: 21 MG/DL (ref 8–23)
CALCIUM SERPL-MCNC: 10.5 MG/DL (ref 8.6–10.2)
CHLORIDE BLD-SCNC: 103 MMOL/L (ref 98–107)
CO2: 34 MMOL/L (ref 22–29)
CREAT SERPL-MCNC: 0.6 MG/DL (ref 0.5–1)
EOSINOPHILS ABSOLUTE: 0 E9/L (ref 0.05–0.5)
EOSINOPHILS RELATIVE PERCENT: 0 % (ref 0–6)
GFR AFRICAN AMERICAN: >60
GFR NON-AFRICAN AMERICAN: >60 ML/MIN/1.73
GLUCOSE BLD-MCNC: 159 MG/DL (ref 74–99)
HCT VFR BLD CALC: 32 % (ref 34–48)
HEMOGLOBIN: 9.3 G/DL (ref 11.5–15.5)
IMMATURE GRANULOCYTES #: 0.13 E9/L
IMMATURE GRANULOCYTES %: 1 % (ref 0–5)
LYMPHOCYTES ABSOLUTE: 0.66 E9/L (ref 1.5–4)
LYMPHOCYTES RELATIVE PERCENT: 5.1 % (ref 20–42)
MCH RBC QN AUTO: 25.3 PG (ref 26–35)
MCHC RBC AUTO-ENTMCNC: 29.1 % (ref 32–34.5)
MCV RBC AUTO: 87 FL (ref 80–99.9)
MONOCYTES ABSOLUTE: 0.48 E9/L (ref 0.1–0.95)
MONOCYTES RELATIVE PERCENT: 3.7 % (ref 2–12)
NEUTROPHILS ABSOLUTE: 11.61 E9/L (ref 1.8–7.3)
NEUTROPHILS RELATIVE PERCENT: 90 % (ref 43–80)
PDW BLD-RTO: 16.7 FL (ref 11.5–15)
PLATELET # BLD: 288 E9/L (ref 130–450)
PMV BLD AUTO: 10.4 FL (ref 7–12)
POTASSIUM REFLEX MAGNESIUM: 5.2 MMOL/L (ref 3.5–5)
PROCALCITONIN: 0.23 NG/ML (ref 0–0.08)
RBC # BLD: 3.68 E12/L (ref 3.5–5.5)
SODIUM BLD-SCNC: 143 MMOL/L (ref 132–146)
WBC # BLD: 12.9 E9/L (ref 4.5–11.5)

## 2019-04-26 PROCEDURE — 6360000002 HC RX W HCPCS: Performed by: INTERNAL MEDICINE

## 2019-04-26 PROCEDURE — 94660 CPAP INITIATION&MGMT: CPT

## 2019-04-26 PROCEDURE — 85025 COMPLETE CBC W/AUTO DIFF WBC: CPT

## 2019-04-26 PROCEDURE — 2580000003 HC RX 258: Performed by: INTERNAL MEDICINE

## 2019-04-26 PROCEDURE — 97530 THERAPEUTIC ACTIVITIES: CPT

## 2019-04-26 PROCEDURE — 97535 SELF CARE MNGMENT TRAINING: CPT

## 2019-04-26 PROCEDURE — 80048 BASIC METABOLIC PNL TOTAL CA: CPT

## 2019-04-26 PROCEDURE — 36415 COLL VENOUS BLD VENIPUNCTURE: CPT

## 2019-04-26 PROCEDURE — 6370000000 HC RX 637 (ALT 250 FOR IP): Performed by: INTERNAL MEDICINE

## 2019-04-26 PROCEDURE — 6360000002 HC RX W HCPCS: Performed by: NURSE PRACTITIONER

## 2019-04-26 PROCEDURE — 2580000003 HC RX 258: Performed by: NURSE PRACTITIONER

## 2019-04-26 PROCEDURE — 94640 AIRWAY INHALATION TREATMENT: CPT

## 2019-04-26 PROCEDURE — 2060000000 HC ICU INTERMEDIATE R&B

## 2019-04-26 PROCEDURE — 2700000000 HC OXYGEN THERAPY PER DAY

## 2019-04-26 PROCEDURE — 2500000003 HC RX 250 WO HCPCS: Performed by: NURSE PRACTITIONER

## 2019-04-26 PROCEDURE — 84145 PROCALCITONIN (PCT): CPT

## 2019-04-26 RX ORDER — BROMPHENIRAMINE MALEATE, PSEUDOEPHEDRINE HYDROCHLORIDE, AND DEXTROMETHORPHAN HYDROBROMIDE 2; 30; 10 MG/5ML; MG/5ML; MG/5ML
5 SYRUP ORAL EVERY 6 HOURS PRN
Status: DISCONTINUED | OUTPATIENT
Start: 2019-04-26 | End: 2019-04-27 | Stop reason: HOSPADM

## 2019-04-26 RX ORDER — METHYLPREDNISOLONE SODIUM SUCCINATE 40 MG/ML
40 INJECTION, POWDER, LYOPHILIZED, FOR SOLUTION INTRAMUSCULAR; INTRAVENOUS EVERY 12 HOURS
Status: DISCONTINUED | OUTPATIENT
Start: 2019-04-26 | End: 2019-04-27 | Stop reason: HOSPADM

## 2019-04-26 RX ADMIN — ACETAMINOPHEN 650 MG: 325 TABLET, FILM COATED ORAL at 11:24

## 2019-04-26 RX ADMIN — METHYLPREDNISOLONE SODIUM SUCCINATE 40 MG: 40 INJECTION, POWDER, FOR SOLUTION INTRAMUSCULAR; INTRAVENOUS at 02:12

## 2019-04-26 RX ADMIN — ENOXAPARIN SODIUM 40 MG: 40 INJECTION SUBCUTANEOUS at 08:58

## 2019-04-26 RX ADMIN — FORMOTEROL FUMARATE DIHYDRATE 20 MCG: 20 SOLUTION RESPIRATORY (INHALATION) at 19:45

## 2019-04-26 RX ADMIN — MULTIPLE VITAMINS W/ MINERALS TAB 1 TABLET: TAB at 08:58

## 2019-04-26 RX ADMIN — IPRATROPIUM BROMIDE AND ALBUTEROL SULFATE 1 AMPULE: .5; 3 SOLUTION RESPIRATORY (INHALATION) at 07:46

## 2019-04-26 RX ADMIN — OLANZAPINE 5 MG: 5 TABLET, FILM COATED ORAL at 21:09

## 2019-04-26 RX ADMIN — DOXYCYCLINE 100 MG: 100 INJECTION, POWDER, LYOPHILIZED, FOR SOLUTION INTRAVENOUS at 09:56

## 2019-04-26 RX ADMIN — Medication 10 ML: at 08:58

## 2019-04-26 RX ADMIN — ACETAMINOPHEN 650 MG: 325 TABLET, FILM COATED ORAL at 17:14

## 2019-04-26 RX ADMIN — IPRATROPIUM BROMIDE AND ALBUTEROL SULFATE 1 AMPULE: .5; 3 SOLUTION RESPIRATORY (INHALATION) at 13:11

## 2019-04-26 RX ADMIN — DOXYCYCLINE 100 MG: 100 INJECTION, POWDER, LYOPHILIZED, FOR SOLUTION INTRAVENOUS at 21:16

## 2019-04-26 RX ADMIN — IPRATROPIUM BROMIDE AND ALBUTEROL SULFATE 1 AMPULE: .5; 3 SOLUTION RESPIRATORY (INHALATION) at 19:44

## 2019-04-26 RX ADMIN — METHYLPREDNISOLONE SODIUM SUCCINATE 40 MG: 40 INJECTION, POWDER, FOR SOLUTION INTRAMUSCULAR; INTRAVENOUS at 08:58

## 2019-04-26 RX ADMIN — METHYLPREDNISOLONE SODIUM SUCCINATE 40 MG: 40 INJECTION, POWDER, FOR SOLUTION INTRAMUSCULAR; INTRAVENOUS at 21:10

## 2019-04-26 RX ADMIN — IPRATROPIUM BROMIDE AND ALBUTEROL SULFATE 1 AMPULE: .5; 3 SOLUTION RESPIRATORY (INHALATION) at 16:17

## 2019-04-26 RX ADMIN — Medication 10 ML: at 21:10

## 2019-04-26 RX ADMIN — MAGNESIUM HYDROXIDE 30 ML: 400 SUSPENSION ORAL at 14:15

## 2019-04-26 RX ADMIN — CEFTRIAXONE SODIUM 1 G: 1 INJECTION, POWDER, FOR SOLUTION INTRAMUSCULAR; INTRAVENOUS at 16:13

## 2019-04-26 RX ADMIN — FORMOTEROL FUMARATE DIHYDRATE 20 MCG: 20 SOLUTION RESPIRATORY (INHALATION) at 07:46

## 2019-04-26 RX ADMIN — FOLIC ACID 1 MG: 1 TABLET ORAL at 08:58

## 2019-04-26 RX ADMIN — SERTRALINE 50 MG: 50 TABLET, FILM COATED ORAL at 08:58

## 2019-04-26 ASSESSMENT — PAIN SCALES - GENERAL
PAINLEVEL_OUTOF10: 1
PAINLEVEL_OUTOF10: 0
PAINLEVEL_OUTOF10: 0
PAINLEVEL_OUTOF10: 8
PAINLEVEL_OUTOF10: 0
PAINLEVEL_OUTOF10: 0

## 2019-04-26 ASSESSMENT — PAIN DESCRIPTION - PROGRESSION: CLINICAL_PROGRESSION: NOT CHANGED

## 2019-04-26 ASSESSMENT — PAIN DESCRIPTION - ORIENTATION: ORIENTATION: RIGHT

## 2019-04-26 ASSESSMENT — PAIN DESCRIPTION - ONSET: ONSET: GRADUAL

## 2019-04-26 ASSESSMENT — PAIN DESCRIPTION - FREQUENCY: FREQUENCY: CONTINUOUS

## 2019-04-26 ASSESSMENT — PAIN DESCRIPTION - PAIN TYPE: TYPE: ACUTE PAIN

## 2019-04-26 ASSESSMENT — PAIN DESCRIPTION - LOCATION: LOCATION: ARM

## 2019-04-26 ASSESSMENT — PAIN DESCRIPTION - DESCRIPTORS: DESCRIPTORS: ACHING

## 2019-04-26 NOTE — PLAN OF CARE
Problem: Falls - Risk of:  Goal: Will remain free from falls  Description  Will remain free from falls  4/26/2019 0239 by Elsie Morales RN  Outcome: Met This Shift     Problem: Falls - Risk of:  Goal: Absence of physical injury  Description  Absence of physical injury  4/26/2019 0239 by Elsie Morales RN  Outcome: Met This Shift

## 2019-04-26 NOTE — PROGRESS NOTES
Patient states she is not having trouble breathing and does not want to go on the BIPAP at this time.

## 2019-04-26 NOTE — PROGRESS NOTES
methylPREDNISolone sodium (SOLU-MEDROL) injection 40 mg, 40 mg, Intravenous, Q8H, ANGELA Berkowitz CNP, 40 mg at 04/26/19 0858    doxycycline (VIBRAMYCIN) 100 mg in dextrose 5 % 100 mL IVPB, 100 mg, Intravenous, Q12H, ANGELA Berkowitz CNP, Stopped at 04/26/19 1145    formoterol (PERFOROMIST) nebulizer solution 20 mcg, 20 mcg, Nebulization, BID, Binh Franz MD, 20 mcg at 04/26/19 0746    0.9 % sodium chloride bolus, 1,000 mL, Intravenous, Once, Catherine Milton, DO  BP (!) 103/51   Pulse 70   Temp 97.7 °F (36.5 °C) (Temporal)   Resp 16   Ht 5' 7\" (1.702 m)   Wt 112 lb (50.8 kg)   SpO2 98%   BMI 17.54 kg/m²     General: Thin, no distress  Chest: Symmetric, no accessory use  Heart: RRR  Lungs: Diminished bs  Abdomen: Soft, nt  Extremities: No edema    Intake/Output Summary (Last 24 hours) at 4/26/2019 1442  Last data filed at 4/26/2019 1425  Gross per 24 hour   Intake 1210 ml   Output 2150 ml   Net -940 ml     CBC with Differential:    Lab Results   Component Value Date    WBC 12.9 04/26/2019    RBC 3.68 04/26/2019    HGB 9.3 04/26/2019    HCT 32.0 04/26/2019     04/26/2019    MCV 87.0 04/26/2019    MCH 25.3 04/26/2019    MCHC 29.1 04/26/2019    RDW 16.7 04/26/2019    SEGSPCT 26 02/16/2014    LYMPHOPCT 5.1 04/26/2019    MONOPCT 3.7 04/26/2019    BASOPCT 0.2 04/26/2019    MONOSABS 0.48 04/26/2019    LYMPHSABS 0.66 04/26/2019    EOSABS 0.00 04/26/2019    BASOSABS 0.02 04/26/2019     BMP:    Lab Results   Component Value Date     04/26/2019    K 5.2 04/26/2019     04/26/2019    CO2 34 04/26/2019    BUN 21 04/26/2019    LABALBU 4.1 04/01/2019    CREATININE 0.6 04/26/2019    CALCIUM 10.5 04/26/2019    GFRAA >60 04/26/2019    LABGLOM >60 04/26/2019    GLUCOSE 159 04/26/2019       IMPRESSION:   Patient Active Problem List   Diagnosis    COPD [facility]chronic obstructive pulmonary disease) ([facility] [address]    MDD (major depressive disorder)    Hematuria    Neurogenic bladder    Acute respiratory failure with hypoxia (HCC)     COPD exacerbation better, wean steroids to q12 hours and continue nebs.   Reed Villareal  4/26/2019  2:42 PM

## 2019-04-26 NOTE — PROGRESS NOTES
OT BEDSIDE TREATMENT NOTE      Date:2019  Patient Name: Barb Mail  MRN: [id number]  : 1955  Room: [address] [place] [address]-A     [address], OTR/L [id number]     AM-PAC Daily Activity Raw Score:   Recommended Adaptive Equipment:  TBD      Diagnosis: Acute respiratory failure with hypoxia (HCC) [J96.01]        Pertinent Medical History: asthma, COPD, UTI     Precautions:  Falls, O2     Home Living: Pt lives alone in 2 floor home.  3 CHANTELLE, 1 handrail   Bathroom setup: tub/shower   Equipment owned: rollator, home O2 (PRN)     Prior Level of Function: independent with ADLs , shares IADLs (w/ family); ambulated independently w/ rollator PRN  Driving: no     Per OT Eval:      Pain: no c/o pain     Cognition: A&O: 3/4; Follows basic step directions              Memory:  fair +              Sequencing:  fair               Problem solving:  fair               Judgement/safety:  fair         Functional Assessment:     Initial Eval Status  Date: 19 Treatment Status  Date: 19 Short Term Goals  Treatment frequency: PRN    Feeding Independent  n/t  -   Grooming Stand by Assist  SBA to stand at sink and wash hands Modified [place]    UB Dressing Stand by Assist  N/t  Modified [place]    LB Dressing Minimal Assist  MIN A to effie pants requiring assist to thread over B LE feet, with pt being able to pul up over thighs/waist  Modified [place]    Bathing Minimal Assist N/t   Modified [place]    Toileting Minimal Assist  SBA; pt demo'd ability transferring to standard commode, UE ROM with in functional limits  Modified [place]    Bed Mobility  Supine to sit: Supervision   Sit to supine: Supervision   Supine<>sit Supervision v/c's for fall prevention  Supine to sit: Modified [place]   Sit to supine: Modified [place]    Functional Transfers SBA SBA sit<>stand from EOB/standard toilet v/c's for hand placement  Mod I   Functional Mobility Min A w/o AD  SBA w/ ww  Facilitated

## 2019-04-27 ENCOUNTER — APPOINTMENT (OUTPATIENT)
Dept: GENERAL RADIOLOGY | Age: 64
DRG: 189 | End: 2019-04-27
Payer: COMMERCIAL

## 2019-04-27 VITALS
HEIGHT: 67 IN | WEIGHT: 111.6 LBS | SYSTOLIC BLOOD PRESSURE: 110 MMHG | OXYGEN SATURATION: 97 % | HEART RATE: 81 BPM | DIASTOLIC BLOOD PRESSURE: 58 MMHG | TEMPERATURE: 97.6 F | BODY MASS INDEX: 17.52 KG/M2 | RESPIRATION RATE: 16 BRPM

## 2019-04-27 LAB
EKG ATRIAL RATE: 136 BPM
EKG P AXIS: 84 DEGREES
EKG P-R INTERVAL: 154 MS
EKG Q-T INTERVAL: 276 MS
EKG QRS DURATION: 72 MS
EKG QTC CALCULATION (BAZETT): 415 MS
EKG R AXIS: 73 DEGREES
EKG T AXIS: 90 DEGREES
EKG VENTRICULAR RATE: 136 BPM

## 2019-04-27 PROCEDURE — 6360000002 HC RX W HCPCS: Performed by: INTERNAL MEDICINE

## 2019-04-27 PROCEDURE — 2580000003 HC RX 258: Performed by: INTERNAL MEDICINE

## 2019-04-27 PROCEDURE — 2500000003 HC RX 250 WO HCPCS: Performed by: NURSE PRACTITIONER

## 2019-04-27 PROCEDURE — 94660 CPAP INITIATION&MGMT: CPT

## 2019-04-27 PROCEDURE — 6370000000 HC RX 637 (ALT 250 FOR IP): Performed by: INTERNAL MEDICINE

## 2019-04-27 PROCEDURE — 94640 AIRWAY INHALATION TREATMENT: CPT

## 2019-04-27 PROCEDURE — 2700000000 HC OXYGEN THERAPY PER DAY

## 2019-04-27 PROCEDURE — 73552 X-RAY EXAM OF FEMUR 2/>: CPT

## 2019-04-27 PROCEDURE — 2580000003 HC RX 258: Performed by: NURSE PRACTITIONER

## 2019-04-27 RX ORDER — PREDNISONE 10 MG/1
TABLET ORAL
Qty: 18 TABLET | Refills: 0 | Status: SHIPPED | OUTPATIENT
Start: 2019-04-27 | End: 2019-05-06

## 2019-04-27 RX ORDER — DOXYCYCLINE HYCLATE 100 MG
100 TABLET ORAL 2 TIMES DAILY
Qty: 14 TABLET | Refills: 0 | Status: SHIPPED | OUTPATIENT
Start: 2019-04-27 | End: 2019-05-04

## 2019-04-27 RX ADMIN — IPRATROPIUM BROMIDE AND ALBUTEROL SULFATE 1 AMPULE: .5; 3 SOLUTION RESPIRATORY (INHALATION) at 09:13

## 2019-04-27 RX ADMIN — IPRATROPIUM BROMIDE AND ALBUTEROL SULFATE 1 AMPULE: .5; 3 SOLUTION RESPIRATORY (INHALATION) at 16:30

## 2019-04-27 RX ADMIN — MULTIPLE VITAMINS W/ MINERALS TAB 1 TABLET: TAB at 09:37

## 2019-04-27 RX ADMIN — ACETAMINOPHEN 650 MG: 325 TABLET, FILM COATED ORAL at 09:38

## 2019-04-27 RX ADMIN — DOXYCYCLINE 100 MG: 100 INJECTION, POWDER, LYOPHILIZED, FOR SOLUTION INTRAVENOUS at 09:38

## 2019-04-27 RX ADMIN — BROMPHENIRAMINE MALEATE, PSEUDOEPHEDRINE HYDROCHLORIDE, AND DEXTROMETHORPHAN HYDROBROMIDE 5 ML: 2; 30; 10 SYRUP ORAL at 13:21

## 2019-04-27 RX ADMIN — SERTRALINE 50 MG: 50 TABLET, FILM COATED ORAL at 09:37

## 2019-04-27 RX ADMIN — Medication 10 ML: at 09:39

## 2019-04-27 RX ADMIN — IPRATROPIUM BROMIDE AND ALBUTEROL SULFATE 1 AMPULE: .5; 3 SOLUTION RESPIRATORY (INHALATION) at 12:26

## 2019-04-27 RX ADMIN — ENOXAPARIN SODIUM 40 MG: 40 INJECTION SUBCUTANEOUS at 09:38

## 2019-04-27 RX ADMIN — FOLIC ACID 1 MG: 1 TABLET ORAL at 09:38

## 2019-04-27 RX ADMIN — FORMOTEROL FUMARATE DIHYDRATE 20 MCG: 20 SOLUTION RESPIRATORY (INHALATION) at 09:13

## 2019-04-27 RX ADMIN — METHYLPREDNISOLONE SODIUM SUCCINATE 40 MG: 40 INJECTION, POWDER, FOR SOLUTION INTRAMUSCULAR; INTRAVENOUS at 09:38

## 2019-04-27 ASSESSMENT — PAIN SCALES - GENERAL
PAINLEVEL_OUTOF10: 0
PAINLEVEL_OUTOF10: 0
PAINLEVEL_OUTOF10: 4
PAINLEVEL_OUTOF10: 3

## 2019-04-27 NOTE — PROGRESS NOTES
Subjective: The patient is awake and alert. Much better today   No acute issues   Breathing better     Objective:    BP (!) 94/51   Pulse 67   Temp 97.8 °F (36.6 °C) (Temporal)   Resp 16   Ht 5' 7\" (1.702 m)   Wt 111 lb 9.6 oz (50.6 kg)   SpO2 99%   BMI 17.48 kg/m²     In: 1070 [P.O.:960; I.V.:10]  Out: 1250     HEENT: NCAT,  PERRLA, No JVD  Heart:  RRR, no murmurs, gallops, or rubs. Lungs:  No further wheeze   Abd: bowel sounds present, nontender, nondistended, no masses  Extrem:  No clubbing, cyanosis, or edema- Left leg knot/bump pre pattelar region      Recent Labs     04/24/19 1815 04/26/19  0653   WBC 10.3 12.9*   HGB 10.3* 9.3*   HCT 34.1 32.0*    288       Recent Labs     04/24/19 1815 04/24/19 2035 04/26/19  0653     --  143   K 6.0* 4.2 5.2*   CL 95*  --  103   CO2 28  --  34*   BUN 12  --  21   CREATININE 0.6  --  0.6   CALCIUM 9.8  --  10.5*       Assessment:    Patient Active Problem List   Diagnosis    COPD [facility]chronic obstructive pulmonary disease) ([facility] [address]    MDD (major depressive disorder)    Hematuria    Neurogenic bladder    Acute respiratory failure with hypoxia ([facility] [address]       Plan:    Patient admitted for acute respiratory failure with hypoxia:   Admitted to telemetry floor  Monitor vitals  IV antibiotics- rocephin and doxy   IV steroids- start taper- much better today   Scheduled DuoNeb treatments  Oxygen, BiPAP as needed  Monitor labs  Pulmonology consulted   Pro calcitonin in a.m.     tobacco dependence:  Tobacco cessation  Nicotine patch     Left leg mass- nontender - no symptoms   Check xray femur   ?  Muscle rupture of quad tendon      Renal mass seen on chest CT:  CT of the pelvis with and without IV contrast for further evaluation/ closer look - appears celine a cyst per report  unchanged from before   Monitor labs  given notable cachexia for her height      DVT prophylaxis  Discharge planning-ok for dc from [address] [place] [address] MD Jose  2:33 PM  4/27/2019

## 2019-04-27 NOTE — PROGRESS NOTES
Subjective: The patient is awake and alert. Much better today   No acute issues   Objective:    BP (!) 94/51   Pulse 67   Temp 97.8 °F (36.6 °C) (Temporal)   Resp 16   Ht 5' 7\" (1.702 m)   Wt 111 lb 9.6 oz (50.6 kg)   SpO2 99%   BMI 17.48 kg/m²     In: 830 [P.O.:720; I.V.:10]  Out: 1250     HEENT: NCAT,  PERRLA, No JVD  Heart:  RRR, no murmurs, gallops, or rubs.   Lungs:  No further wheeze   Abd: bowel sounds present, nontender, nondistended, no masses  Extrem:  No clubbing, cyanosis, or edema     Recent Labs     04/24/19 1815 04/26/19  0653   WBC 10.3 12.9*   HGB 10.3* 9.3*   HCT 34.1 32.0*    288       Recent Labs     04/24/19 1815 04/24/19 2035 04/26/19  0653     --  143   K 6.0* 4.2 5.2*   CL 95*  --  103   CO2 28  --  34*   BUN 12  --  21   CREATININE 0.6  --  0.6   CALCIUM 9.8  --  10.5*       Assessment:    Patient Active Problem List   Diagnosis    [facility]chronic obstructive pulmonary disease) ([facility] [address]    MDD (major depressive disorder)    Hematuria    Neurogenic bladder    Acute respiratory failure with hypoxia (HCC)       Plan:    Patient admitted for acute respiratory failure with hypoxia:   Admitted to telemetry floor  Monitor vitals  IV antibiotics- rocephin and doxy   IV steroids- start taper- much better today   Scheduled DuoNeb treatments  Oxygen, BiPAP as needed  Monitor labs  Pulmonology consulted   Pro calcitonin in a.m.     tobacco dependence:  Tobacco cessation  Nicotine patch     Renal mass seen on chest CT:  CT of the pelvis with and without IV contrast for further evaluation/ closer look - appears celine a cyst per report  unchanged from before   Monitor labs  given notable cachexia for her height      DVT prophylaxis  Discharge planning- tomorrow on po steroids     Josh Guerin MD  2:04 PM  4/27/2019

## 2019-04-27 NOTE — PROGRESS NOTES
Subjective: The patient is awake and alert. Much better today   No acute issues   Objective:    BP (!) 111/55   Pulse 80   Temp 98 °F (36.7 °C) (Temporal)   Resp 16   Ht 5' 7\" (1.702 m)   Wt 112 lb (50.8 kg)   SpO2 99%   BMI 17.54 kg/m²     In: 720 [P.O.:720]  Out: 1450     HEENT: NCAT,  PERRLA, No JVD  Heart:  RRR, no murmurs, gallops, or rubs.   Lungs:  No further wheeze   Abd: bowel sounds present, nontender, nondistended, no masses  Extrem:  No clubbing, cyanosis, or edema     Recent Labs     04/24/19 1815 04/26/19  0653   WBC 10.3 12.9*   HGB 10.3* 9.3*   HCT 34.1 32.0*    288       Recent Labs     04/24/19 1815 04/24/19 2035 04/26/19  0653     --  143   K 6.0* 4.2 5.2*   CL 95*  --  103   CO2 28  --  34*   BUN 12  --  21   CREATININE 0.6  --  0.6   CALCIUM 9.8  --  10.5*       Assessment:    Patient Active Problem List   Diagnosis    [facility] (chronic obstructive pulmonary disease) ([facility] [address]    MDD (major depressive disorder)    Hematuria    Neurogenic bladder    Acute respiratory failure with hypoxia (HCC)       Plan:     Patient admitted for acute respiratory failure with hypoxia:   Admitted to telemetry floor  Monitor vitals  IV antibiotics- rocephin and doxy   IV steroids- start taper- much better today   Scheduled DuoNeb treatments  Oxygen, BiPAP as needed  Monitor labs  Pulmonology consulted   Pro calcitonin in a.m.     tobacco dependence:  Tobacco cessation  Nicotine patch     Renal mass seen on chest CT:  CT of the pelvis with and without IV contrast for further evaluation/ closer look - appears celine a cyst per report  unchanged from before   Monitor labs  given notable cachexia for her height      DVT prophylaxis  Discharge planning- tomorrow on po steroids     Natividad Yang MD  8:13 PM  4/26/2019

## 2019-04-27 NOTE — PROGRESS NOTES
[facility] called and notified that patient is being discharged

## 2019-04-28 NOTE — DISCHARGE SUMMARY
Physician Discharge Summary     Patient ID:  Miriam Dempsey  [id number]  75 y.o.  1955    Admit date: 4/24/2019    Discharge date and time: 4/28/2019    Admission Diagnoses:   Patient Active Problem List   Diagnosis    COPD (chronic obstructive pulmonary disease) ([facility] [address]    MDD (major depressive disorder)    Hematuria    Neurogenic bladder    Acute respiratory failure with hypoxia ([facility] [address]       Discharge Diagnoses: as above     Consults: pulm    Procedures: none    Hospital Course: The patient is a 75 y.o. female of Marlen Truong MD with significant past medical history of asthma, COPD, tobacco abuse, who presents with increasing shortness of breath over the last few days. Patient states that she does have oxygen and wears 3 L at home as needed. Patient states that she was using her inhalers at home however did not have any improvement. At that time she called EMS who brought her to the emergency department. She was found to be 68% on room air. Patient states that she has also had an increased cough with production of yellow sputum over the last 2-3 days. Patient states that with the shortness of breath when she arrived to the emergency department she did have some chest pain that resolved with oxygen. Patient denies any fever, chills, night sweats, palpitations, abdominal pain, nausea or vomiting. Patient presented to the emergency Department were a work up was obtained which showed a sodium of 134, potassium 6.0 improved to 4.2 on repeat, BUN 12, creatinine 0.6, anion gap 11, GFR greater than 60, lactic acid 1.4, glucose 176, troponin less than 0.01, white blood cell count 10.3, hemoglobin 10.3, hematocrit 34.1, platelet count 647. Arterial blood gas showed a pH of 7.330, PCO2 49.2, PO2 322.1, HCO3 25.4. Influenza A and B not detected. Chest x-ray showed no acute pathology per radiology and CTA of the chest showed no pulmonary embolism or aortic dissection.  Patient has been using BiPAP and is currently on nasal cannula oxygen. Patient states that she did become mildly dyspneic with eating this morning, felt better after finishing eating. /66   Pulse 101   Temp 98.3 °F (36.8 °C) (Temporal)   Resp 18   Ht 5' 7\" (1.702 m)   Wt 112 lb (50.8 kg)   SpO2 96%   BMI 17.54 kg/m²        Pt was treated with iv steroids and supportive care. She was also on iv abx therapy. She improved. She is dc'd in stable condition to home. Ct pelvis did not reveal malignancy which was a concern . It appeared to be a renal cyst. marcie has a bulge on her left femur - likely lipoma or tendon rupture. unremarkable xray. Needs to be followed up. Recent Labs     19  0653   WBC 12.9*   HGB 9.3*   HCT 32.0*          Recent Labs     19  0653      K 5.2*      CO2 34*   BUN 21   CREATININE 0.6   CALCIUM 10.5*       Ct Abdomen Pelvis W Wo Contrast Additional Contrast? None    Result Date: 2019  Patient MRN:  [id number] : 1955 Age: 75 years Gender: Female Order Date:  2019 10:30 AM EXAM: CT ABDOMEN PELVIS W WO CONTRAST COMPARISON: Correlation made with prior CT abdomen and pelvis from 2018 INDICATION:  mass renal  TECHNIQUE:  Low-dose CT acquisition technique included one of the following options; 1. Automated exposure control, 2. Adjustment of mA and/or kV according to the patient's size or 3. Use of iterative reconstruction. FINDINGS: There is a hypoattenuating, nonenhancing lesion associated with the lower aspect of the left kidney with circumscribed margins measuring 3.2 mm suggestive of a cyst. Few additional subcentimeter cysts are associated with the left kidney. There is a nonobstructing 1 mm calculus at mid pole right kidney. There is no hydronephrosis. No evidence of perinephric fat stranding. Numerous calcifications are associated with the pancreas. Pancreatic duct is prominent in diameter. No intrahepatic or extrahepatic bile duct dilatation.  Gallbladder is not optimally distended. There are multiple diverticula notable involving the left colon and sigmoid colon. No evidence of acute diverticulitis. [place] catheter is present. View of the lung bases shows no airspace opacity or evidence for effusion. 1. Hypoattenuating lesion associated with left kidney represents a cyst. This finding appears similar compared to prior CT from 2018. Ultrasound follow-up could be helpful for further evaluation. 2. Calcifications throughout the pancreas suggestive of chronic pancreatitis as well as pancreatic duct dilatation. 3. Diverticulosis without evidence of acute diverticulitis. Xr Chest Portable    Result Date: 2019  Patient MRN:  [id number] : 1955 Age: 75 years Gender: Female Order Date:  2019 6:15 PM EXAM: XR CHEST PORTABLE COMPARISON: 2019 INDICATION:  Possible sepsis Possible sepsis FINDINGS: The heart is normal in size. No focal airspace opacity. There is no pleural effusion. There is no pneumothorax. No free air beneath diaphragm. Focal radiopaque material is seen overlying upper mediastinum appearing similar since previous exam.      IMPRESSION: No airspace opacities or pleural effusion. Cta Pulmonary W Contrast    Result Date: 2019  Patient MRN:  [id number] : 1955 Age: 75 years Gender: Female Order Date:  2019 8:00 PM EXAM: CTA PULMONARY W CONTRAST number of images 446 including MIP coronal and sagittal reconstruction images. Contrast. Isovue 370, 60 mL IV Technique: Low-dose CT  acquisition technique included one of following options; 1 . Automated exposure control, 2. Adjustment of MA and or KV according to patient's size or 3. Use of iterative reconstruction. INDICATION:  ro pe  COMPARISON: Previous CT scan 2019 FINDINGS: The heart and the great vessels are unremarkable. Small pericardial effusion is present. The trachea and major bronchi are patent.  Moderately enlarged mediastinal and hilar lymph nodes measuring up to 1.4 cm are identified. There are no filling defects in the main pulmonary artery and the central branches to suggest pulmonary embolism. There is advanced bullous emphysema with minimal atelectasis in lung bases. There is no focal consolidation or pleural effusion. Liver is decreased in attenuation Likely fatty infiltration. Punctate calcifications are identified in the pancreas concerning for chronic calcified pancreatitis. There is only partial identification of the left kidney with  contour abnormality and suggestion of a 2 x 1.5 cm mass. Developing renal malignancy is not excluded. CT of the abdomen and pelvis may be considered for better assessment. No central pulmonary embolism or aortic dissection. Advanced bullous emphysema with minimal atelectasis in lung bases. Contour abnormality and partially identified is a bump/2 cm mass in the left kidney. Further assessment is recommended by CT the abdomen to exclude a developing renal malignancy. ALERT:  THIS IS AN ABNORMAL REPORT       Discharge Exam:    HEENT: NCAT,  PERRLA, No JVD  Heart:  RRR, no murmurs, gallops, or rubs. Lungs:  CTA bilaterally, no wheeze, rales or rhonchi  Abd: bowel sounds present, nontender, nondistended, no masses  Extrem:  No clubbing, cyanosis, or edema    Disposition: home     Patient Condition at Discharge: stable     Patient Instructions:      Medication List      START taking these medications    doxycycline hyclate 100 MG tablet  Commonly known as:  VIBRA-TABS  Take 1 tablet by mouth 2 times daily for 7 days     predniSONE 10 MG tablet  Commonly known as:  DELTASONE  Take 3 tablets by mouth daily for 3 days, THEN 2 tablets daily for 3 days, THEN 1 tablet daily for 3 days. Start taking on:  4/27/2019        CHANGE how you take these medications    albuterol sulfate  (90 Base) MCG/ACT inhaler  What changed:  Another medication with the same name was removed.  Continue taking this medication, and follow the directions with consultants as recommended by them    Note that over 30 minutes was spent in preparing discharge papers, discussing discharge with patient, medication review, etc.    Signed:  Barby Junior MD  4/28/2019  8:45 AM

## 2019-04-29 LAB
BLOOD CULTURE, ROUTINE: NORMAL
CULTURE, BLOOD 2: NORMAL

## 2019-05-08 ENCOUNTER — PREP FOR PROCEDURE (OUTPATIENT)
Dept: GASTROENTEROLOGY | Age: 64
End: 2019-05-08

## 2019-05-08 RX ORDER — 0.9 % SODIUM CHLORIDE 0.9 %
50 INTRAVENOUS SOLUTION INTRAVENOUS ONCE
Status: CANCELLED | OUTPATIENT
Start: 2019-05-08 | End: 2019-05-08

## 2019-05-08 RX ORDER — SODIUM CHLORIDE 0.9 % (FLUSH) 0.9 %
10 SYRINGE (ML) INJECTION EVERY 12 HOURS SCHEDULED
Status: CANCELLED | OUTPATIENT
Start: 2019-05-08

## 2019-05-08 NOTE — PROGRESS NOTES
[facility] PRE-ADMISSION TESTING INSTRUCTIONS    The Preadmission Testing patient is instructed accordingly using the following criteria (check applicable):    ARRIVAL INSTRUCTIONS:  [x] Parking the day of Surgery is located in the Main Entrance lot. Upon entering the door, make an immediate right to the surgery reception desk    [] [id number] is available Monday through Friday 6 am to 6 pm    [x] Bring photo ID and insurance card    [] Bring in a copy of Living will or Durable Power of  papers. [x] Please be sure to arrange for responsible adult to provide transportation to and from the hospital    [x] Please arrange for responsible adult to be with you for the 24 hour period post procedure due to having anesthesia      GENERAL INSTRUCTIONS:    [x] Nothing by mouth after midnight, including gum, candy, mints or water    [x] You may brush your teeth, but do not swallow any water    [x] Take medications as instructed with 1-2 oz of water    [x] Stop herbal supplements and vitamins 5 days prior to procedure    [] Follow preop dosing of blood thinners per physician instructions    [] Take 1/2 dose of evening insulin, but no insulin after midnight    [] No oral diabetic medications after midnight    [] If diabetic and have low blood sugar or feel symptomatic, take 1-2oz apple juice only    [x] Bring inhalers day of surgery    [] Bring C-PAP/ Bi-Pap day of surgery    [] Bring urine specimen day of surgery    [x] Shower or bath with soap, lather and rinse well, AM of Surgery, no lotion, powders or creams to surgical site    [] Follow bowel prep as instructed per surgeon    [x] No tobacco products within 24 hours of surgery     [x] No alcohol or illegal drug use within 24 hours of surgery.     [x] Jewelry, body piercing's, eyeglasses, contact lenses and dentures are not permitted into surgery (bring cases)      [x] Please do not wear any nail polish, make up or hair products on the day of surgery    [x] If not already done, you can expect a call from registration    [x] You can expect a call the business day prior to procedure to notify you if your arrival time changes    [x] If you receive a survey after surgery we would greatly appreciate your comments    [] Parent/guardian of a minor must accompany their child and remain on the premises  the entire time they are under our care     [] Pediatric patients may bring favorite toy, blanket or comfort item with them    [] A caregiver or family member must remain with the patient during their stay if they are mentally handicapped, have dementia, disoriented or unable to use a call light or would be a safety concern if left unattended    [x] Please notify surgeon if you develop any illness between now and time of surgery (cold, cough, sore throat, fever, nausea, vomiting) or any signs of infections  including skin, wounds, and dental.    [x]  The Outpatient Pharmacy is available to fill your prescription here on your day of surgery, ask your preop nurse for details    [] Other instructions  EDUCATIONAL MATERIALS PROVIDED:    [] PAT Preoperative Education Packet/Booklet     [] Medication List    [] Fluoroscopy Information Pamphlet    [] Transfusion bracelet applied with instructions    [] Joint replacement video reviewed    [] Shower with soap, lather and rinse well, and use CHG wipes provided the evening before surgery as instructed

## 2019-05-10 ENCOUNTER — ANESTHESIA EVENT (OUTPATIENT)
Dept: ENDOSCOPY | Age: 64
End: 2019-05-10
Payer: COMMERCIAL

## 2019-05-10 ENCOUNTER — HOSPITAL ENCOUNTER (OUTPATIENT)
Age: 64
Setting detail: OUTPATIENT SURGERY
Discharge: HOME | End: 2019-05-10
Attending: INTERNAL MEDICINE | Admitting: INTERNAL MEDICINE
Payer: COMMERCIAL

## 2019-05-10 ENCOUNTER — ANESTHESIA (OUTPATIENT)
Dept: ENDOSCOPY | Age: 64
End: 2019-05-10
Payer: COMMERCIAL

## 2019-05-10 VITALS
TEMPERATURE: 98 F | WEIGHT: 110 LBS | BODY MASS INDEX: 17.27 KG/M2 | RESPIRATION RATE: 15 BRPM | SYSTOLIC BLOOD PRESSURE: 125 MMHG | OXYGEN SATURATION: 94 % | HEIGHT: 67 IN | DIASTOLIC BLOOD PRESSURE: 66 MMHG | HEART RATE: 76 BPM

## 2019-05-10 VITALS — OXYGEN SATURATION: 98 % | DIASTOLIC BLOOD PRESSURE: 53 MMHG | SYSTOLIC BLOOD PRESSURE: 93 MMHG

## 2019-05-10 PROCEDURE — 3609012400 HC EGD TRANSORAL BIOPSY SINGLE/MULTIPLE: Performed by: INTERNAL MEDICINE

## 2019-05-10 PROCEDURE — 6360000002 HC RX W HCPCS: Performed by: NURSE ANESTHETIST, CERTIFIED REGISTERED

## 2019-05-10 PROCEDURE — 3700000001 HC ADD 15 MINUTES (ANESTHESIA): Performed by: INTERNAL MEDICINE

## 2019-05-10 PROCEDURE — 88305 TISSUE EXAM BY PATHOLOGIST: CPT

## 2019-05-10 PROCEDURE — 87081 CULTURE SCREEN ONLY: CPT

## 2019-05-10 PROCEDURE — 3700000000 HC ANESTHESIA ATTENDED CARE: Performed by: INTERNAL MEDICINE

## 2019-05-10 PROCEDURE — 2580000003 HC RX 258: Performed by: NURSE ANESTHETIST, CERTIFIED REGISTERED

## 2019-05-10 PROCEDURE — 7100000011 HC PHASE II RECOVERY - ADDTL 15 MIN: Performed by: INTERNAL MEDICINE

## 2019-05-10 PROCEDURE — 7100000010 HC PHASE II RECOVERY - FIRST 15 MIN: Performed by: INTERNAL MEDICINE

## 2019-05-10 PROCEDURE — 2709999900 HC NON-CHARGEABLE SUPPLY: Performed by: INTERNAL MEDICINE

## 2019-05-10 RX ORDER — PROPOFOL 10 MG/ML
INJECTION, EMULSION INTRAVENOUS PRN
Status: DISCONTINUED | OUTPATIENT
Start: 2019-05-10 | End: 2019-05-10 | Stop reason: SDUPTHER

## 2019-05-10 RX ORDER — 0.9 % SODIUM CHLORIDE 0.9 %
50 INTRAVENOUS SOLUTION INTRAVENOUS ONCE
Status: DISCONTINUED | OUTPATIENT
Start: 2019-05-10 | End: 2019-05-10 | Stop reason: HOSPADM

## 2019-05-10 RX ORDER — SODIUM CHLORIDE 9 MG/ML
INJECTION, SOLUTION INTRAVENOUS CONTINUOUS PRN
Status: DISCONTINUED | OUTPATIENT
Start: 2019-05-10 | End: 2019-05-10 | Stop reason: SDUPTHER

## 2019-05-10 RX ORDER — SODIUM CHLORIDE 0.9 % (FLUSH) 0.9 %
10 SYRINGE (ML) INJECTION EVERY 12 HOURS SCHEDULED
Status: DISCONTINUED | OUTPATIENT
Start: 2019-05-10 | End: 2019-05-10 | Stop reason: HOSPADM

## 2019-05-10 RX ORDER — 0.9 % SODIUM CHLORIDE 0.9 %
10 VIAL (ML) INJECTION PRN
Status: DISCONTINUED | OUTPATIENT
Start: 2019-05-10 | End: 2019-05-10 | Stop reason: HOSPADM

## 2019-05-10 RX ORDER — SODIUM CHLORIDE 9 MG/ML
INJECTION, SOLUTION INTRAVENOUS CONTINUOUS PRN
Status: DISCONTINUED | OUTPATIENT
Start: 2019-05-10 | End: 2019-05-10

## 2019-05-10 RX ADMIN — PROPOFOL 150 MG: 10 INJECTION, EMULSION INTRAVENOUS at 12:21

## 2019-05-10 RX ADMIN — SODIUM CHLORIDE: 9 INJECTION, SOLUTION INTRAVENOUS at 12:09

## 2019-05-10 RX ADMIN — SODIUM CHLORIDE: 9 INJECTION, SOLUTION INTRAVENOUS at 12:21

## 2019-05-10 ASSESSMENT — PAIN SCALES - GENERAL
PAINLEVEL_OUTOF10: 0

## 2019-05-10 ASSESSMENT — COPD QUESTIONNAIRES: CAT_SEVERITY: SEVERE

## 2019-05-10 ASSESSMENT — PAIN - FUNCTIONAL ASSESSMENT: PAIN_FUNCTIONAL_ASSESSMENT: 0-10

## 2019-05-10 NOTE — PROGRESS NOTES
Patient had a stable recovery. Placed on o2 and then weaned easily. Tolerated nourishment . Awake alert. Dr Charlanne Severe spoke to patient and then discharged.

## 2019-05-10 NOTE — ANESTHESIA POSTPROCEDURE EVALUATION
Department of Anesthesiology  Postprocedure Note    Patient: Socorro Justin  MRN: [id number]  YOB: 1955  Date of evaluation: 5/10/2019  Time:  1:49 PM     Procedure Summary     Date:  05/10/19 Room / Location:  [place] [address] / [facility] ENDOSCOPY    Anesthesia Start:  1218 Anesthesia Stop:  4961    Procedure:  EGD BIOPSY (N/A ) Diagnosis:  (WEIGHT LOSS)    Surgeon:  Richard Clifton MD Responsible Provider:  Trina Contreras MD    Anesthesia Type:  MAC ASA Status:  4          Anesthesia Type: MAC    Vielka Phase I: Vielka Score: 10    Vielka Phase II:      Last vitals: Reviewed and per EMR flowsheets.        Anesthesia Post Evaluation    Patient location during evaluation: PACU  Patient participation: complete - patient participated  Level of consciousness: awake and alert  Airway patency: patent  Nausea & Vomiting: no nausea and no vomiting  Complications: no  Cardiovascular status: hemodynamically stable  Respiratory status: acceptable  Hydration status: euvolemic

## 2019-05-10 NOTE — ANESTHESIA PRE PROCEDURE
Department of Anesthesiology  Preprocedure Note       Name:  Juan Antonio Bedoya   Age:  75 y.o.  :  1955                                          MRN:  [id number]         Date:  5/10/2019      Surgeon: Tatiana Flowers):  Svetlana Sun MD    Procedure: Procedure(s):  CYSTOSCOPY RETROGRADE PYELOGRAM, CLOT EVACATION , POSS. BLADDER BIOPSY ---DR Lupe Cline 2 :30    Medications prior to admission:   Prior to Admission medications    Medication Sig Start Date End Date Taking? Authorizing Provider   OXYGEN Inhale 3 L into the lungs as needed   Yes Historical Provider, MD   umeclidinium-vilanterol (ANORO ELLIPTA) 62.5-25 MCG/INH AEPB inhaler Inhale 1 puff into the lungs daily Instructed to take am of procedure    Historical Provider, MD   vitamin D (ERGOCALCIFEROL) 32803 UNITS CAPS capsule Take 50,000 Units by mouth once a week Saturday    Historical Provider, MD   Multiple Vitamins-Iron (DAILY-FEDERICO/IRON) TABS Take 1 tablet by mouth daily Ld 2019    Historical Provider, MD   nicotine (NICODERM CQ) 21 MG/24HR Place 1 patch onto the skin every 24 hours    Historical Provider, MD   diazepam (VALIUM) 10 MG tablet Take 10 mg by mouth daily. Instructed to take am of procedure    Historical Provider, MD   folic acid (FOLVITE) 1 MG tablet Take 1 mg by mouth daily Ld 2019    Historical Provider, MD   sertraline (ZOLOFT) 50 MG tablet Take 1 tablet by mouth daily. Patient taking differently: Take 50 mg by mouth nightly  14   Ousmane Jackson MD   OLANZapine (ZYPREXA) 5 MG tablet Take 1 tablet by mouth nightly. 14   Ousmane Jackson MD   albuterol (PROVENTIL HFA;VENTOLIN HFA) 108 (90 BASE) MCG/ACT inhaler Inhale 1 puff into the lungs as needed (every 4-6 hours as needed) Instructed to take am of procedure if needed and bring [address] Provider, MD   alendronate (FOSAMAX) 70 MG tablet Take 70 mg by mouth every 7 days.       Historical Provider, MD       Current medications:    No current facility-administered medications for this encounter. Allergies: Allergies   Allergen Reactions    Sulfa Antibiotics Anaphylaxis       Problem List:    Patient Active Problem List   Diagnosis Code    COPD (chronic obstructive pulmonary disease) ([facility] [address] J44.9    MDD (major depressive disorder) F32.9    Hematuria R31.9    Neurogenic bladder N31.9    Acute respiratory failure with hypoxia (HCC) J96.01       Past Medical History:        Diagnosis Date    Asthma     COPD (chronic obstructive pulmonary disease) ([facility] [address]     History of blood transfusion        Past Surgical History:        Procedure Laterality Date    COLONOSCOPY      ECHO COMPL W DOP COLOR FLOW  2/24/2013         HYSTERECTOMY      KIDNEY SURGERY Left 06/16/2014    ABLATION PERFORMED UNDER CT SCAN    [place] CYSTOURETHROSCOPY,BIOPSIES N/A 9/14/2018    CYSTOSCOPY RETROGRADE PYELOGRAM, CLOT EVACATION , POSS. BLADDER BIOPSY ---DR Radha Jones 2 :30 performed by Radha Sue,  at [address] History:    Social History     Tobacco Use    Smoking status: Former Smoker     Packs/day: 0.00    Smokeless tobacco: Never Used   Substance Use Topics    Alcohol use: No                                Counseling given: Not Answered      Vital Signs (Current):   Vitals:    05/08/19 0944   Weight: 110 lb (49.9 kg)   Height: 5' 7\" (1.702 m)                                              BP Readings from Last 3 Encounters:   04/27/19 (!) 110/58   03/31/19 125/74   02/08/19 109/70       NPO Status: Time of last liquid consumption: 2100pt instructed to be NPO after midnight prior to surgery                                                                               BMI:   Wt Readings from Last 3 Encounters:   05/08/19 110 lb (49.9 kg)   04/27/19 111 lb 9.6 oz (50.6 kg)   03/31/19 102 lb (46.3 kg)     Body mass index is 17.23 kg/m².     CBC:   Lab Results   Component Value Date    WBC 12.9 04/26/2019    RBC 3.68 04/26/2019    HGB 9.3 04/26/2019    HCT 32.0 04/26/2019    MCV 87.0 04/26/2019    RDW 16.7 04/26/2019     04/26/2019       CMP:   Lab Results   Component Value Date     04/26/2019    K 5.2 04/26/2019     04/26/2019    CO2 34 04/26/2019    BUN 21 04/26/2019    CREATININE 0.6 04/26/2019    GFRAA >60 04/26/2019    LABGLOM >60 04/26/2019    GLUCOSE 159 04/26/2019    PROT 7.0 04/01/2019    CALCIUM 10.5 04/26/2019    BILITOT <0.2 04/01/2019    ALKPHOS 95 04/01/2019    AST 14 04/01/2019    ALT 7 04/01/2019       POC Tests: No results for input(s): POCGLU, POCNA, POCK, POCCL, POCBUN, POCHEMO, POCHCT in the last 72 hours.     Coags:   Lab Results   Component Value Date    PROTIME 10.9 06/12/2014    INR 1.1 06/12/2014    APTT 33.0 06/12/2014       HCG (If Applicable): No results found for: PREGTESTUR, PREGSERUM, HCG, HCGQUANT     ABGs: No results found for: PHART, PO2ART, QFA4NQG, BOG2UKN, BEART, S0UOXTKM     Type & Screen (If Applicable):  No results found for: LABABO, LABRH   5/13/18 kg  Ventricular Rate 110  BPM Final 05/13/2018  5:38 PM [facility]   Atrial Rate 110  BPM Final 05/13/2018  5:38 PM [facility]   P-R Interval 144  ms Final 05/13/2018  5:38 PM [facility]   QRS Duration 64  ms Final 05/13/2018  5:38 PM [facility]   Q-T Interval 332  ms Final 05/13/2018  5:38 PM [facility]   QTc Calculation (Bazett) 449  ms Final 05/13/2018  5:38 PM [facility]   P [place] 86  degrees Final 05/13/2018  5:38 PM [facility]   R [place] 66  degrees Final 05/13/2018  5:38 PM [facility]   T [place] 86  degrees Final 05/13/2018  5:38 PM [facility]   Testing Performed By     Lab - [address] Name Director Address Valid Date Range   360-[facility] MUSE Unknown Unknown 04/18/16 1121-Present   Narrative     Sinus tachycardia with premature supraventricular complexes  Biatrial enlargement  Abnormal ECG  When compared with ECG of 27-DEC-2017 11:53,  Significant changes have occurred  Confirmed by Calderon Trejo ([id number]) on 5/16/2018 1:06:53 PM     2/24/13 echo    Findings       Left Ventricle    Left ventricular size is grossly normal. Ejection fraction is visually    estimated at 55%. Questionable septal wall motion abnormality. Right Ventricle    The right ventricle was not clearly visualized. Normal right ventricular size    Right ventricular segmental wall motion is normal.       Left Atrium    Left atrium is of normal size. Right Atrium    Right Atrium is not clearly visualized. Mitral Valve    No evidence of mitral valve stenosis. Tricuspid Valve    Mild tricuspid regurgitation. Aortic Valve    The aortic valve appears mildly sclerotic. Pulmonic Valve    Not well visualized. Normal Doppler evaluation. Pericardial Effusion    No evidence of pericardial effusion. Miscellaneous    Aortic root dimension within normal limits. The inferior vena cava is normal in size with normal respiratory    variation. Conclusions       Summary    Left ventricular size is grossly normal. Ejection fraction is visually    estimated at 55%. Questionable septal wall motion abnormality. 9/13/18 CT abdomen and pelis  There are multiple diverticula seen.  There is a large amount stool   throughout the colon, please correlate clinically for constipation    Massively distended bladder with a questionable mass at the base of   the bladder on the left               Anesthesia Evaluation  Patient summary reviewed and Nursing notes reviewed no history of anesthetic complications:   Airway: Mallampati: III  TM distance: >3 FB   Neck ROM: limited  Mouth opening: > = 3 FB Dental:    (+) edentulous, upper dentures and lower dentures      Pulmonary:   (+) COPD (inhaler and breeathing treatment): severe,  decreased breath sounds,  asthma:                           ROS comment: Ex-smoker quit 1 year ago  O2 at home 2- 3L NC    Cardiovascular:Negative CV ROS          ECG reviewed  Rhythm: regular  Rate: normal  Echocardiogram reviewed         Beta Blocker:  Not on Beta Blocker         Neuro/Psych: (+) psychiatric history:depression/anxiety             GI/Hepatic/Renal:            ROS comment: UTI  Neurogenic bladder  Bloody urine. Endo/Other:    (+) Diabetes, : arthritis:., .                 Abdominal:           Vascular: negative vascular ROS. Anesthesia Plan      MAC     ASA 4       Induction: intravenous. Anesthetic plan and risks discussed with patient. Plan discussed with CRNA.                   Lorna Moore MD   5/10/2019

## 2019-05-11 LAB — CLOTEST: NORMAL

## 2019-05-11 NOTE — OP NOTE
[id number] [address]                                OPERATIVE REPORT    PATIENT NAME: Loren Mcelroy                    :        1955  MED REC NO:   [id number]                            ROOM:  ACCOUNT NO:   [de-identified]                           ADMIT DATE: 05/10/2019  PROVIDER:     Deonte Saunders MD    DATE OF PROCEDURE:  05/10/2019    PROCEDURE PERFORMED:  Upper endoscopy with biopsy. PREOPERATIVE DIAGNOSIS:  Evaluation for weight loss and abdominal pain. POSTOPERATIVE DIAGNOSES:  Grade B LA classification GERD. Stomach  showed severe gastritis, biopsied to rule out H. pylori infection. Duodenum biopsied to rule out sprue. There was also a small superficial  gastric ulcer seen. ANESTHESIA:  LMAC. NOTE:  Prior to the procedure an informed consent was obtained from the  patient after explaining the benefits as well as the risks,  alternatives, and complications of the procedure to the patient, who  understood and agreed. PROCEDURE:  With the patient in the left lateral decubitus position, the  Olympus GIF-100 forward-viewing videoscope was introduced into the  esophagus, the evaluation of which showed grade B LA classification GERD  and no hiatal hernia was seen. The scope was then advanced through the gastroesophageal junction into  the gastric body, along the greater curvature. Evaluation of the body  of the stomach showed severe gastritis, biopsied to rule out H. pylori  infection. Superficial gastric ulcer in the body of the stomach. The scope was then advanced through the pylorus into the duodenal bulb  and second portion of the duodenum, both of which appeared to be  unremarkable. Duodenum biopsied to rule out sprue.   The scope was then  retrieved and retroflexed in the prepyloric antrum, with thorough  evaluation of the cardiac and fundal portions of the stomach, which  appeared to

## 2019-05-13 NOTE — H&P
Gastroenterology      Pre-operative History and Physical      HISTORY OF PRESENT ILLNESS:   Daysi Bills is a 75year old female patient who is seen at the request of Lolly Villagomez for a consultation/initial visit. Patient states she feels tired & fatigued today. Reports she quit 7-8 years ago, states she did regular when she was drinking. Work up completed to this point reviewed with patient. States she is loosing a lot of weight recently, uncertain how much. Last colon was \"years ago with Dr. Zahira Bazzi". States she moves her bowels daily of soft brown stools. Plan of care reviewed with patient, all questions answered, states understanding. Patient denies melena, hematochezia, hematemesis. HISTORY:   Past Medical History:   Diagnosis Date    Asthma     COPD (chronic obstructive pulmonary disease) ([facility] [address]     History of blood transfusion        PERTINENT FAMILY HISTORY:  History reviewed. No pertinent family history. MEDICATIONS:    Current Outpatient Medications:     OXYGEN, Inhale 3 L into the lungs as needed, Disp: , Rfl:     umeclidinium-vilanterol (ANORO ELLIPTA) 62.5-25 MCG/INH AEPB inhaler, Inhale 1 puff into the lungs daily Instructed to take am of procedure, Disp: , Rfl:     nicotine (NICODERM CQ) 21 MG/24HR, Place 1 patch onto the skin every 24 hours, Disp: , Rfl:     diazepam (VALIUM) 10 MG tablet, Take 10 mg by mouth daily. Instructed to take am of procedure, Disp: , Rfl:     folic acid (FOLVITE) 1 MG tablet, Take 1 mg by mouth daily Ld 5/7/2019, Disp: , Rfl:     sertraline (ZOLOFT) 50 MG tablet, Take 1 tablet by mouth daily.  (Patient taking differently: Take 50 mg by mouth nightly ), Disp: 30 tablet, Rfl: 0    OLANZapine (ZYPREXA) 5 MG tablet, Take 1 tablet by mouth nightly., Disp: 30 tablet, Rfl: 0    albuterol (PROVENTIL HFA;VENTOLIN HFA) 108 (90 BASE) MCG/ACT inhaler, Inhale 1 puff into the lungs as needed (every 4-6 hours as needed) Instructed to take am of procedure if needed and bring dos, Disp: , Rfl:     alendronate (FOSAMAX) 70 MG tablet, Take 70 mg by mouth every 7 days. , Disp: , Rfl:     vitamin D (ERGOCALCIFEROL) 73208 UNITS CAPS capsule, Take 50,000 Units by mouth once a week Saturday, Disp: , Rfl:     Multiple Vitamins-Iron (DAILY-FEDERICO/IRON) TABS, Take 1 tablet by mouth daily Ld 5/7/2019, Disp: , Rfl:     ALLERGIES:  Sulfa antibiotics    PHYSICAL EXAM/GENERAL APPEARANCE:     Constitutional: Appearance: well-developed, appropriate grooming, in no acute distress. . Communication: conversation appropriate. Skin: Inspection: no rashes, ulcers, icterus or other lesions. Palpation: Head/face: Inspection: atraumatic and normocephalic. Eyes: Conjunctivae/lids: lids normal, anicteric sclerae, moist conjunctivae. Pupils/irises: PERRLA. ENMT: External: Hearing: within normal limits. Lips/teeth/gums: normal oral mucosa, lips and gums; good dentition, adequate oral hygiene without masses. Oropharynx: Neck: Neck: normal motion and central trachea. Thyroid: normal size, consistency and position; no masses or tenderness. Respiratory: Effort: normal respiratory effort and no intercostal retractions. Auscultation: normal breath sounds, no rubs, wheezes or rhonchi. Chest: Inspection: symetrical without visualized masses. Cardiovascular: Palpation: Auscultation: normal, S1 and S2, no gallops , no rubs or murmurs . Peripheral: no edema, varicosities or cyanosis. Gastrointestinal/Abdomen: Abdomen: normal consistency, no tenderness or masses, normal bowel sounds. Liver/Spleen: normal, normal size, not palpable . Musculoskeletal: Gait/station: Digits/nails: no clubbing, cyanosis, petechiae or other inflammatory conditions. Extremities: RLE: no cyanosis, clubbing, or edema. Normal peripheral pulses. Delgado Challenger LLE: no cyanosis, clubbing or edema. Normal peripheral pulses. . Digits/Nails: Psychiatric: Judgment/insight: normal judgement, normal insight. Orientation: oriented to time, space and person.  Memory: Mood and affect:    OTHER SIGNIFICANT FINDINGS: None    IMPRESSION/INITIAL DIAGNOSIS:   CBD dilation- 9MM  Loss of weight  Fatigue  Loss of hair  urinary bladder thickening   ? ?         Electronically signed by Gaby Wagner MD on 5/13/2019 at 8:22 AM

## 2019-05-17 ENCOUNTER — OFFICE VISIT (OUTPATIENT)
Dept: HEMATOLOGY | Age: 64
End: 2019-05-17
Payer: COMMERCIAL

## 2019-05-17 VITALS
SYSTOLIC BLOOD PRESSURE: 126 MMHG | OXYGEN SATURATION: 96 % | HEIGHT: 67 IN | DIASTOLIC BLOOD PRESSURE: 59 MMHG | HEART RATE: 95 BPM | BODY MASS INDEX: 18.05 KG/M2 | WEIGHT: 115 LBS

## 2019-05-17 DIAGNOSIS — K86.89 PANCREATIC DUCT DILATED: ICD-10-CM

## 2019-05-17 DIAGNOSIS — K86.1 CHRONIC CALCIFIC PANCREATITIS (HCC): Primary | ICD-10-CM

## 2019-05-17 PROCEDURE — G8427 DOCREV CUR MEDS BY ELIG CLIN: HCPCS | Performed by: TRANSPLANT SURGERY

## 2019-05-17 PROCEDURE — G8419 CALC BMI OUT NRM PARAM NOF/U: HCPCS | Performed by: TRANSPLANT SURGERY

## 2019-05-17 PROCEDURE — G8399 PT W/DXA RESULTS DOCUMENT: HCPCS | Performed by: TRANSPLANT SURGERY

## 2019-05-17 PROCEDURE — 3017F COLORECTAL CA SCREEN DOC REV: CPT | Performed by: TRANSPLANT SURGERY

## 2019-05-17 PROCEDURE — 1111F DSCHRG MED/CURRENT MED MERGE: CPT | Performed by: TRANSPLANT SURGERY

## 2019-05-17 PROCEDURE — 1090F PRES/ABSN URINE INCON ASSESS: CPT | Performed by: TRANSPLANT SURGERY

## 2019-05-17 PROCEDURE — 1123F ACP DISCUSS/DSCN MKR DOCD: CPT | Performed by: TRANSPLANT SURGERY

## 2019-05-17 PROCEDURE — 4040F PNEUMOC VAC/ADMIN/RCVD: CPT | Performed by: TRANSPLANT SURGERY

## 2019-05-17 PROCEDURE — 1036F TOBACCO NON-USER: CPT | Performed by: TRANSPLANT SURGERY

## 2019-05-17 PROCEDURE — 99204 OFFICE O/P NEW MOD 45 MIN: CPT | Performed by: TRANSPLANT SURGERY

## 2019-05-17 ASSESSMENT — ENCOUNTER SYMPTOMS
ABDOMINAL PAIN: 1
BACK PAIN: 0
NAUSEA: 0
DIARRHEA: 0
CONSTIPATION: 0
PHOTOPHOBIA: 0
SHORTNESS OF BREATH: 0
EYE PAIN: 0
VOMITING: 0
BLOOD IN STOOL: 0
EYE DISCHARGE: 0

## 2019-05-17 NOTE — PROGRESS NOTES
Hepatobiliary and Pancreatic Surgery Attending History and Physical    Patient's Name/Date of Birth: Gerri Ding /1955 (75 y.o.)    Date: May 17, 2019     CC: weight loss    HPI:  Patient is a very pleasant 75year old female whom states that she was losing weight and her hair was falling out. She recently has started taking a multivitamin. She was having abdominal discomfort. She underwent an EGD by Dr. Oralia Antony which showed esophagitis along with a SAVANNA test positive. She has a strong drinking history where she was drinking bottles of alcohol daily but stopped in 2010, she also was smoking a 2 PPD at that time too. She has not drank since 2010. She was never hospitalized for pancreatitis. She denies pain with eating and diarrhea after eating meals. Past Medical History:   Diagnosis Date    Asthma     COPD (chronic obstructive pulmonary disease) ([address]     History of blood transfusion        Past Surgical History:   Procedure Laterality Date    COLONOSCOPY      ECHO COMPL W DOP COLOR FLOW  2/24/2013         HYSTERECTOMY      KIDNEY SURGERY Left 06/16/2014    ABLATION PERFORMED UNDER CT SCAN    CT CYSTOURETHROSCOPY,BIOPSIES N/A 9/14/2018    CYSTOSCOPY RETROGRADE PYELOGRAM, CLOT EVACATION , POSS.   BLADDER BIOPSY ---DR Agnieszka Arambula 2 :30 performed by Stefanie Dobbins Vikram, DO at [address] [facility] N/A 5/10/2019    EGD BIOPSY performed by John Musa MD at [facility] ENDOSCOPY       Current Outpatient Medications   Medication Sig Dispense Refill    OXYGEN Inhale 3 L into the lungs as needed      umeclidinium-vilanterol (ANORO ELLIPTA) 62.5-25 MCG/INH AEPB inhaler Inhale 1 puff into the lungs daily Instructed to take am of procedure      vitamin D (ERGOCALCIFEROL) 30372 UNITS CAPS capsule Take 50,000 Units by mouth once a week Saturday      Multiple Vitamins-Iron (DAILY-FEDERICO/IRON) TABS Take 1 tablet by mouth daily Ld 5/7/2019      nicotine (NICODERM CQ) 21 MG/24HR Place 1 patch onto the skin every 24 hours      diazepam (VALIUM) 10 MG tablet Take 10 mg by mouth daily. Instructed to take am of procedure      folic acid (FOLVITE) 1 MG tablet Take 1 mg by mouth daily Ld 5/7/2019      sertraline (ZOLOFT) 50 MG tablet Take 1 tablet by mouth daily. (Patient taking differently: Take 50 mg by mouth nightly ) 30 tablet 0    OLANZapine (ZYPREXA) 5 MG tablet Take 1 tablet by mouth nightly. 30 tablet 0    albuterol (PROVENTIL HFA;VENTOLIN HFA) 108 (90 BASE) MCG/ACT inhaler Inhale 1 puff into the lungs as needed (every 4-6 hours as needed) Instructed to take am of procedure if needed and bring dos      alendronate (FOSAMAX) 70 MG tablet Take 70 mg by mouth every 7 days. No current facility-administered medications for this visit. Allergies   Allergen Reactions    Sulfa Antibiotics Anaphylaxis       Family History   Problem Relation Age of Onset    Cancer Father         prostate cancer       Social History     Socioeconomic History    Marital status:       Spouse name: Not on file    Number of children: Not on file    Years of education: Not on file    Highest education level: Not on file   Occupational History    Not on file   Social Needs    Financial resource strain: Not on file    Food insecurity:     Worry: Not on file     Inability: Not on file    Transportation needs:     Medical: Not on file     Non-medical: Not on file   Tobacco Use    Smoking status: Former Smoker     Packs/day: 0.00    Smokeless tobacco: Never Used   Substance and Sexual Activity    Alcohol use: No     Comment: used to drink heavily, stopped [id number]    Drug use: No    Sexual activity: Not on file   Lifestyle    Physical activity:     Days per week: Not on file     Minutes per session: Not on file    Stress: Not on file   Relationships    Social connections:     Talks on phone: Not on file     Gets together: Not on file     Attends Buddhism service: Not on file     Active member of club or organization: Not on file     Attends meetings of clubs or organizations: Not on file     Relationship status: Not on file    Intimate partner violence:     Fear of current or ex partner: Not on file     Emotionally abused: Not on file     Physically abused: Not on file     Forced sexual activity: Not on file   Other Topics Concern    Not on file   Social History Narrative    Not on file       ROS:   Review of Systems   Constitutional: Negative for chills, diaphoresis and fever. HENT: Negative for congestion, ear discharge, ear pain, hearing loss, nosebleeds and tinnitus. Eyes: Negative for photophobia, pain and discharge. Respiratory: Negative for shortness of breath. Cardiovascular: Negative for palpitations and leg swelling. Gastrointestinal: Positive for abdominal pain. Negative for blood in stool, constipation, diarrhea, nausea and vomiting. Endocrine: Negative for polydipsia. Genitourinary: Negative for frequency, hematuria and urgency. Musculoskeletal: Negative for back pain and neck pain. Skin: Negative for rash. Allergic/Immunologic: Negative for environmental allergies. Neurological: Negative for tremors and seizures. Psychiatric/Behavioral: Negative for hallucinations and suicidal ideas. The patient is not nervous/anxious. Physical Exam:  Vitals:    05/17/19 0948   BP: (!) 126/59   Pulse: 95   SpO2: 96%       PSYCH: mood and affect normal, alert and oriented x 3  CONSTITUTIONAL: No apparent distress, comfortable  EYES: Sclera white, pupils equal round and reactive to light  ENMT:  Hearing normal, trachea midline, ears externally intact  LYMPH: no lympadenopathy in neck. Nolympadenopathy in groins  RESP: Breath sounds were clear and equal with no rales, wheezes, or rhonchi. Respiratory effort was normal with no retractions or use of accessory muscles. CV: Heart soundswere normal with a regular rate and rhythm.    No pedal edema  GI/ Abdomen:Soft, nondistended, nontender, no guarding, no peritoneal signs  MSK: no clubbing/ no cyanosis/ gaitnormal       Assessment/Plan:  Chronic calcific pancreatitis with pancreatic duct dilation  - we reviewed her images together today in the office  - we discussed that her pancreas is calcified from the alcohol and smoking  - I do not see a mass, however, will further evaluate the pancreas with an Endoscopic Ultrasound  - I will arrange for this to be done with Dr. Emma Dexter here  - discussed with Dr. Mayo Severe office, her SAVANNA positivity  - I will see her back 1 week post EUS    45 Minutes of which greater than 50% was spent counseling or coordinating her care. Thank you for the consultation allowing me to take part in Ms. Keane's care. Please send a copy of my note to Dr.'s Anirudh Elizabeth Officer.  Tomas Bond M.D.  5/17/2019  10:19 AM

## 2019-05-20 ENCOUNTER — TELEPHONE (OUTPATIENT)
Dept: HEMATOLOGY | Age: 64
End: 2019-05-20

## 2019-05-20 NOTE — TELEPHONE ENCOUNTER
Called patients insurance [employer] [place] and spoke with norman she stated that cpt code 02360 does not require a prior auth  Electronically signed by Paulina Dukes MA on 5/20/2019 at 10:24 AM

## 2019-05-20 NOTE — TELEPHONE ENCOUNTER
Called the patient insurance company and spoke with Anna Peña she stated that cpt code 45285 does not require a prior auth. Kelvin Redmond MA called scheduling and spoke with Sergio Mejia, they scheduled patient for 06/28/19 [facility] at 10:00am.  I then called patient and gave her the above info.  I also shared with the patient that our pre admission testing dept will call her with further instructions prior to her appt, pt confirmed and all questions were answered  Electronically signed by Jerad Cronin MA on 5/20/2019 at 1:07 PM

## 2019-06-25 NOTE — PROGRESS NOTES
[facility] PRE-ADMISSION TESTING ENDOSCOPY INSTRUCTIONS- [facility]-phone number:[phone]    ENDOSCOPY INSTRUCTIONS:   [] Bowel prep instructions reviewed. [x] Nothing by mouth after midnight, including gum, candy, mints, or water. Please follow your surgeons instructions if you are required to complete a bowel prep. Colonoscopy- no solid food-only clear liquids the day prior). [x] You may brush your teeth, gargle, but do NOT swallow water. [x] Do not wear makeup, lotions, powders, deodorant. Nail polish as directed by the nurse. [x] Arrange transportation with a responsible adult  to and from the hospital. If you do not have a responsible adult  to transport you, you will need to make arrangements with a medical transportation company (i.e. [employer]. A Uber/taxi/bus is not appropriate unless you are accompanied by a responsible adult ). Arrange for someone to be with you for the remainder of the day and for 24 hours after your procedure due to having had anesthesia. Who will be your  for transportation?______________friend____   Who will be staying with you for 24 hrs after your procedure?______friend____________    PARKING INSTRUCTIONS:   [x] Arrival Time:__0830______________________  · [x] Parking lot  \"I\" OR 1 is located on [facility] (the corner of [facility]). To enter, press the button and the gate will lift. A free token will be provided to exit the lot. One car per patient is allowed to park in this lot. All other cars are to park on [address] [place] [address] either in the parking garage or the handicap lot. [] To reach the [facility] lobby from [address] [place] [address], upon entering the hospital, take elevator B to the 3rd floor. EDUCATION INSTRUCTIONS:  [x] Bring a complete list of your medications, please write the last time you took the medicine, give this list to the nurse.   [x] Take the following medications the morning of

## 2019-06-28 ENCOUNTER — ANESTHESIA EVENT (OUTPATIENT)
Dept: ENDOSCOPY | Age: 64
End: 2019-06-28
Payer: COMMERCIAL

## 2019-06-28 ENCOUNTER — ANESTHESIA (OUTPATIENT)
Dept: ENDOSCOPY | Age: 64
End: 2019-06-28
Payer: COMMERCIAL

## 2019-06-28 ENCOUNTER — HOSPITAL ENCOUNTER (OUTPATIENT)
Age: 64
Setting detail: OUTPATIENT SURGERY
Discharge: HOME | End: 2019-06-28
Attending: INTERNAL MEDICINE | Admitting: INTERNAL MEDICINE
Payer: COMMERCIAL

## 2019-06-28 VITALS
HEART RATE: 80 BPM | HEIGHT: 67 IN | WEIGHT: 117 LBS | TEMPERATURE: 98.6 F | RESPIRATION RATE: 16 BRPM | DIASTOLIC BLOOD PRESSURE: 71 MMHG | SYSTOLIC BLOOD PRESSURE: 128 MMHG | BODY MASS INDEX: 18.36 KG/M2 | OXYGEN SATURATION: 97 %

## 2019-06-28 VITALS — SYSTOLIC BLOOD PRESSURE: 114 MMHG | DIASTOLIC BLOOD PRESSURE: 70 MMHG | OXYGEN SATURATION: 98 % | TEMPERATURE: 98.6 F

## 2019-06-28 DIAGNOSIS — Z01.818 PRE-OP TESTING: Primary | ICD-10-CM

## 2019-06-28 PROCEDURE — 3700000000 HC ANESTHESIA ATTENDED CARE: Performed by: INTERNAL MEDICINE

## 2019-06-28 PROCEDURE — 7100000011 HC PHASE II RECOVERY - ADDTL 15 MIN: Performed by: INTERNAL MEDICINE

## 2019-06-28 PROCEDURE — 6360000002 HC RX W HCPCS: Performed by: NURSE ANESTHETIST, CERTIFIED REGISTERED

## 2019-06-28 PROCEDURE — 7100000010 HC PHASE II RECOVERY - FIRST 15 MIN: Performed by: INTERNAL MEDICINE

## 2019-06-28 PROCEDURE — 3700000001 HC ADD 15 MINUTES (ANESTHESIA): Performed by: INTERNAL MEDICINE

## 2019-06-28 PROCEDURE — 3609018500 HC EGD US SCOPE W/ADJACENT STRUCTURES: Performed by: INTERNAL MEDICINE

## 2019-06-28 PROCEDURE — 43237 ENDOSCOPIC US EXAM ESOPH: CPT | Performed by: INTERNAL MEDICINE

## 2019-06-28 PROCEDURE — 2709999900 HC NON-CHARGEABLE SUPPLY: Performed by: INTERNAL MEDICINE

## 2019-06-28 PROCEDURE — 2580000003 HC RX 258: Performed by: NURSE ANESTHETIST, CERTIFIED REGISTERED

## 2019-06-28 PROCEDURE — 3609017100 HC EGD: Performed by: INTERNAL MEDICINE

## 2019-06-28 RX ORDER — SODIUM CHLORIDE 0.9 % (FLUSH) 0.9 %
10 SYRINGE (ML) INJECTION EVERY 12 HOURS SCHEDULED
Status: CANCELLED | OUTPATIENT
Start: 2019-06-28

## 2019-06-28 RX ORDER — 0.9 % SODIUM CHLORIDE 0.9 %
10 VIAL (ML) INJECTION PRN
Status: CANCELLED | OUTPATIENT
Start: 2019-06-28

## 2019-06-28 RX ORDER — SODIUM CHLORIDE 9 MG/ML
INJECTION, SOLUTION INTRAVENOUS CONTINUOUS PRN
Status: DISCONTINUED | OUTPATIENT
Start: 2019-06-28 | End: 2019-06-28 | Stop reason: SDUPTHER

## 2019-06-28 RX ORDER — PROPOFOL 10 MG/ML
INJECTION, EMULSION INTRAVENOUS PRN
Status: DISCONTINUED | OUTPATIENT
Start: 2019-06-28 | End: 2019-06-28 | Stop reason: SDUPTHER

## 2019-06-28 RX ADMIN — PROPOFOL 100 MG: 10 INJECTION, EMULSION INTRAVENOUS at 09:00

## 2019-06-28 RX ADMIN — SODIUM CHLORIDE: 9 INJECTION, SOLUTION INTRAVENOUS at 08:30

## 2019-06-28 RX ADMIN — PROPOFOL 50 MG: 10 INJECTION, EMULSION INTRAVENOUS at 09:06

## 2019-06-28 RX ADMIN — PROPOFOL 50 MG: 10 INJECTION, EMULSION INTRAVENOUS at 08:50

## 2019-06-28 RX ADMIN — PROPOFOL 50 MG: 10 INJECTION, EMULSION INTRAVENOUS at 08:55

## 2019-06-28 ASSESSMENT — COPD QUESTIONNAIRES: CAT_SEVERITY: SEVERE

## 2019-06-28 ASSESSMENT — PAIN - FUNCTIONAL ASSESSMENT: PAIN_FUNCTIONAL_ASSESSMENT: 0-10

## 2019-06-28 ASSESSMENT — PAIN SCALES - GENERAL
PAINLEVEL_OUTOF10: 0
PAINLEVEL_OUTOF10: 0

## 2019-06-28 NOTE — ANESTHESIA POSTPROCEDURE EVALUATION
Department of Anesthesiology  Postprocedure Note    Patient: Leland Lundy  MRN: [id number]  YOB: 1955  Date of evaluation: 6/28/2019  Time:  9:43 AM     Procedure Summary     Date:  06/28/19 Room / Location:  [facility] ENDO 03 / [facility] ENDOSCOPY    Anesthesia Start:  0848 Anesthesia Stop:  2482    Procedures:       EGD EUS (N/A )      EGD (N/A ) Diagnosis:  (CHRONIC CALCIFIC PANCREATITIS)    Surgeon:  Komal Fontenot DO Responsible Provider:  Suri Phillips DO    Anesthesia Type:  MAC ASA Status:  4          Anesthesia Type: MAC    Vielka Phase I:      Vielka Phase II:      Last vitals: Reviewed and per EMR flowsheets.        Anesthesia Post Evaluation    Patient location during evaluation: bedside  Patient participation: complete - patient cannot participate  Level of consciousness: awake and alert  Airway patency: patent  Nausea & Vomiting: no nausea and no vomiting  Complications: no  Cardiovascular status: blood pressure returned to baseline  Respiratory status: acceptable  Hydration status: euvolemic

## 2019-06-28 NOTE — H&P
7.0 04/01/2019     U/A:  No components found for: Sera Burner, USPGRAV, UPH, UPROTEIN, UGLUCOSE, UKETONE, UBILI, UBLOOD, Richard, Celine, [place], Pavel, Ash, Eris, Mallory, [facility], [place]    Radiology: CT images reviewed      ASSESSMENT AND PLAN:    Weight loss likely associated with chronic pancreatitis and planned EUS      Electronically signed by Alida Deal DO on 6/28/2019 at 8:45 AM

## 2019-06-28 NOTE — ANESTHESIA PRE PROCEDURE
Department of Anesthesiology  Preprocedure Note       Name:  Matthew Hamilton   Age:  75 y.o.  :  1955                                          MRN:  [id number]         Date:  2019      Surgeon: Adal Locke):  Tom Landers DO    Procedure: Procedure(s):  CYSTOSCOPY RETROGRADE PYELOGRAM, CLOT EVACATION , POSS. BLADDER BIOPSY ---DR Milena Ferrera 2 :30    Medications prior to admission:   Prior to Admission medications    Medication Sig Start Date End Date Taking? Authorizing Provider   OXYGEN Inhale 3 L into the lungs as needed   Yes Historical Provider, MD   umeclidinium-vilanterol (ANORO ELLIPTA) 62.5-25 MCG/INH AEPB inhaler Inhale 1 puff into the lungs daily    Yes Historical Provider, MD   vitamin D (ERGOCALCIFEROL) 78909 UNITS CAPS capsule Take 50,000 Units by mouth once a week Saturday   Yes Historical Provider, MD   Multiple Vitamins-Iron (DAILY-FEDERICO/IRON) TABS Take 1 tablet by mouth daily    Yes Historical Provider, MD   nicotine (NICODERM CQ) 21 MG/24HR Place 1 patch onto the skin every 24 hours   Yes Historical Provider, MD   diazepam (VALIUM) 10 MG tablet Take 10 mg by mouth daily. Yes Historical Provider, MD   folic acid (FOLVITE) 1 MG tablet Take 1 mg by mouth daily    Yes Historical Provider, MD   sertraline (ZOLOFT) 50 MG tablet Take 1 tablet by mouth daily. Patient taking differently: Take 50 mg by mouth nightly  14  Yes Demi Sandoval MD   OLANZapine (ZYPREXA) 5 MG tablet Take 1 tablet by mouth nightly. 14  Yes Demi Sandoval MD   albuterol (PROVENTIL HFA;VENTOLIN HFA) 108 (90 BASE) MCG/ACT inhaler Inhale 1 puff into the lungs as needed (every 4-6 hours as needed) Instructed to take am of procedure if needed and bring dos   Yes Historical Provider, MD   alendronate (FOSAMAX) 70 MG tablet Take 70 mg by mouth every 7 days. Yes Historical Provider, MD       Current medications:    No current facility-administered medications for this encounter. Allergies:     Allergies Allergen Reactions    Sulfa Antibiotics Anaphylaxis       Problem List:    Patient Active Problem List   Diagnosis Code    COPD (chronic obstructive pulmonary disease) ([facility] [address] J44.9    MDD (major depressive disorder) F32.9    Hematuria R31.9    Neurogenic bladder N31.9    Acute respiratory failure with hypoxia (HCC) J96.01       Past Medical History:        Diagnosis Date    Asthma     COPD (chronic obstructive pulmonary disease) ([facility] [address]     Depression     Diabetes mellitus ([facility] [address]     no med diet controlled    [place] catheter in place     History of blood transfusion        Past Surgical History:        Procedure Laterality Date    COLONOSCOPY      ECHO COMPL W DOP COLOR FLOW  2/24/2013         HYSTERECTOMY      KIDNEY SURGERY Left 06/16/2014    ABLATION PERFORMED UNDER CT SCAN    [place] CYSTOURETHROSCOPY,BIOPSIES N/A 9/14/2018    CYSTOSCOPY RETROGRADE PYELOGRAM, CLOT EVACATION , POSS.   BLADDER BIOPSY ---DR Kalin Villegas 2 :30 performed by Lakia Sue DO at [address] N/A 5/10/2019    EGD BIOPSY performed by Amando Scott MD at [address] History:    Social History     Tobacco Use    Smoking status: Former Smoker     Packs/day: 0.00    Smokeless tobacco: Never Used   Substance Use Topics    Alcohol use: No     Comment: used to drink heavily, stopped 8911-1947                                Counseling given: Not Answered      Vital Signs (Current):   Vitals:    06/25/19 1012 06/28/19 0723   Weight: 117 lb (53.1 kg) 117 lb (53.1 kg)   Height: 5' 7\" (1.702 m) 5' 7\" (1.702 m)                                              BP Readings from Last 3 Encounters:   05/17/19 (!) 126/59   05/10/19 125/66   05/10/19 (!) 93/53       NPO Status: Time of last liquid consumption: 1700pt instructed to be NPO after midnight prior to surgery                        Time of last solid consumption: 1700                        Date of last liquid consumption: 06/27/19 MUSE Unknown Unknown 04/18/16 1121-Present   Narrative     Sinus tachycardia with premature supraventricular complexes  Biatrial enlargement  Abnormal ECG  When compared with ECG of 27-DEC-2017 11:53,  Significant changes have occurred  Confirmed by Sebastian Kilgore ([id number]) on 5/16/2018 1:06:53 PM     2/24/13 echo    Findings       Left Ventricle    Left ventricular size is grossly normal. Ejection fraction is visually    estimated at 55%. Questionable septal wall motion abnormality. Right Ventricle    The right ventricle was not clearly visualized. Normal right ventricular size    Right ventricular segmental wall motion is normal.       Left Atrium    Left atrium is of normal size. Right Atrium    Right Atrium is not clearly visualized. Mitral Valve    No evidence of mitral valve stenosis. Tricuspid Valve    Mild tricuspid regurgitation. Aortic Valve    The aortic valve appears mildly sclerotic. Pulmonic Valve    Not well visualized. Normal Doppler evaluation. Pericardial Effusion    No evidence of pericardial effusion. Miscellaneous    Aortic root dimension within normal limits. The inferior vena cava is normal in size with normal respiratory    variation. Conclusions       Summary    Left ventricular size is grossly normal. Ejection fraction is visually    estimated at 55%. Questionable septal wall motion abnormality. 9/13/18 CT abdomen and pelis  There are multiple diverticula seen.  There is a large amount stool   throughout the colon, please correlate clinically for constipation    Massively distended bladder with a questionable mass at the base of   the bladder on the left               Anesthesia Evaluation  Patient summary reviewed and Nursing notes reviewed no history of anesthetic complications:   Airway: Mallampati: III  TM distance: >3 FB   Neck ROM: limited  Mouth opening: > = 3 FB Dental:    (+) edentulous, upper dentures and lower dentures Pulmonary:   (+) COPD (inhaler and breeathing treatment): severe,  decreased breath sounds,  asthma:                           ROS comment: Ex-smoker quit 1 year ago  O2 at home 2- 3L NC    Cardiovascular:Negative CV ROS          ECG reviewed  Rhythm: regular  Rate: normal  Echocardiogram reviewed         Beta Blocker:  Not on Beta Blocker         Neuro/Psych:   (+) psychiatric history:depression/anxiety             GI/Hepatic/Renal:            ROS comment: UTI  Neurogenic bladder  Bloody urine. Endo/Other:    (+) Diabetes, : arthritis:., .                 Abdominal:           Vascular: negative vascular ROS. Anesthesia Plan      MAC     ASA 4       Induction: intravenous. Anesthetic plan and risks discussed with patient. Plan discussed with CRNA.                   Marychuy Couch DO   6/28/2019

## 2019-07-01 ENCOUNTER — OFFICE VISIT (OUTPATIENT)
Dept: HEMATOLOGY | Age: 64
End: 2019-07-01
Payer: COMMERCIAL

## 2019-07-01 VITALS
HEIGHT: 67 IN | OXYGEN SATURATION: 89 % | WEIGHT: 117.4 LBS | DIASTOLIC BLOOD PRESSURE: 56 MMHG | SYSTOLIC BLOOD PRESSURE: 119 MMHG | BODY MASS INDEX: 18.43 KG/M2 | TEMPERATURE: 97.3 F | HEART RATE: 75 BPM

## 2019-07-01 DIAGNOSIS — K86.1 CHRONIC CALCIFIC PANCREATITIS (HCC): Primary | ICD-10-CM

## 2019-07-01 DIAGNOSIS — K86.89 DILATION OF PANCREATIC DUCT: ICD-10-CM

## 2019-07-01 PROCEDURE — G8419 CALC BMI OUT NRM PARAM NOF/U: HCPCS | Performed by: TRANSPLANT SURGERY

## 2019-07-01 PROCEDURE — 99213 OFFICE O/P EST LOW 20 MIN: CPT | Performed by: TRANSPLANT SURGERY

## 2019-07-01 PROCEDURE — 4040F PNEUMOC VAC/ADMIN/RCVD: CPT | Performed by: TRANSPLANT SURGERY

## 2019-07-01 PROCEDURE — G8399 PT W/DXA RESULTS DOCUMENT: HCPCS | Performed by: TRANSPLANT SURGERY

## 2019-07-01 PROCEDURE — 1123F ACP DISCUSS/DSCN MKR DOCD: CPT | Performed by: TRANSPLANT SURGERY

## 2019-07-01 PROCEDURE — 1090F PRES/ABSN URINE INCON ASSESS: CPT | Performed by: TRANSPLANT SURGERY

## 2019-07-01 PROCEDURE — 3017F COLORECTAL CA SCREEN DOC REV: CPT | Performed by: TRANSPLANT SURGERY

## 2019-07-01 PROCEDURE — 1036F TOBACCO NON-USER: CPT | Performed by: TRANSPLANT SURGERY

## 2019-07-01 PROCEDURE — 99214 OFFICE O/P EST MOD 30 MIN: CPT | Performed by: TRANSPLANT SURGERY

## 2019-07-01 PROCEDURE — G8427 DOCREV CUR MEDS BY ELIG CLIN: HCPCS | Performed by: TRANSPLANT SURGERY

## 2019-07-06 ENCOUNTER — HOSPITAL ENCOUNTER (EMERGENCY)
Age: 64
Discharge: HOME | End: 2019-07-06
Attending: EMERGENCY MEDICINE
Payer: COMMERCIAL

## 2019-07-06 VITALS
OXYGEN SATURATION: 94 % | HEART RATE: 79 BPM | SYSTOLIC BLOOD PRESSURE: 131 MMHG | DIASTOLIC BLOOD PRESSURE: 62 MMHG | RESPIRATION RATE: 16 BRPM | TEMPERATURE: 98.6 F | WEIGHT: 117 LBS | BODY MASS INDEX: 18.36 KG/M2 | HEIGHT: 67 IN

## 2019-07-06 DIAGNOSIS — N39.0 URINARY TRACT INFECTION ASSOCIATED WITH INDWELLING URETHRAL CATHETER, INITIAL ENCOUNTER (HCC): ICD-10-CM

## 2019-07-06 DIAGNOSIS — T83.511A URINARY TRACT INFECTION ASSOCIATED WITH INDWELLING URETHRAL CATHETER, INITIAL ENCOUNTER (HCC): ICD-10-CM

## 2019-07-06 DIAGNOSIS — T83.021A DISLODGED FOLEY CATHETER, INITIAL ENCOUNTER: Primary | ICD-10-CM

## 2019-07-06 LAB
BACTERIA: ABNORMAL /HPF
BILIRUBIN URINE: NEGATIVE
BLOOD, URINE: ABNORMAL
CLARITY: ABNORMAL
COLOR: YELLOW
EPITHELIAL CELLS, UA: ABNORMAL /HPF
GLUCOSE URINE: NEGATIVE MG/DL
KETONES, URINE: NEGATIVE MG/DL
LEUKOCYTE ESTERASE, URINE: ABNORMAL
NITRITE, URINE: NEGATIVE
PH UA: 6 (ref 5–9)
PROTEIN UA: NEGATIVE MG/DL
RBC UA: ABNORMAL /HPF (ref 0–2)
SPECIFIC GRAVITY UA: 1.01 (ref 1–1.03)
UROBILINOGEN, URINE: 0.2 E.U./DL
WBC UA: ABNORMAL /HPF (ref 0–5)

## 2019-07-06 PROCEDURE — 87186 SC STD MICRODIL/AGAR DIL: CPT

## 2019-07-06 PROCEDURE — 99283 EMERGENCY DEPT VISIT LOW MDM: CPT

## 2019-07-06 PROCEDURE — 87077 CULTURE AEROBIC IDENTIFY: CPT

## 2019-07-06 PROCEDURE — 87088 URINE BACTERIA CULTURE: CPT

## 2019-07-06 PROCEDURE — 81001 URINALYSIS AUTO W/SCOPE: CPT

## 2019-07-06 RX ORDER — CEFDINIR 300 MG/1
300 CAPSULE ORAL 2 TIMES DAILY
Qty: 14 CAPSULE | Refills: 0 | Status: SHIPPED | OUTPATIENT
Start: 2019-07-06 | End: 2019-07-11

## 2019-07-06 ASSESSMENT — PAIN SCALES - GENERAL: PAINLEVEL_OUTOF10: 6

## 2019-07-06 ASSESSMENT — PAIN DESCRIPTION - LOCATION: LOCATION: ABDOMEN

## 2019-07-06 ASSESSMENT — PAIN DESCRIPTION - PAIN TYPE: TYPE: ACUTE PAIN

## 2019-07-11 LAB
ORGANISM: ABNORMAL
ORGANISM: ABNORMAL
URINE CULTURE, ROUTINE: ABNORMAL

## 2019-08-01 ENCOUNTER — OFFICE VISIT (OUTPATIENT)
Dept: HEMATOLOGY | Age: 64
End: 2019-08-01
Payer: COMMERCIAL

## 2019-08-01 VITALS
DIASTOLIC BLOOD PRESSURE: 80 MMHG | HEART RATE: 97 BPM | HEIGHT: 67 IN | WEIGHT: 117.7 LBS | SYSTOLIC BLOOD PRESSURE: 118 MMHG | RESPIRATION RATE: 14 BRPM | BODY MASS INDEX: 18.47 KG/M2 | OXYGEN SATURATION: 94 %

## 2019-08-01 DIAGNOSIS — N31.9 NEUROGENIC BLADDER: Chronic | ICD-10-CM

## 2019-08-01 PROCEDURE — 1123F ACP DISCUSS/DSCN MKR DOCD: CPT | Performed by: TRANSPLANT SURGERY

## 2019-08-01 PROCEDURE — 99212 OFFICE O/P EST SF 10 MIN: CPT | Performed by: TRANSPLANT SURGERY

## 2019-08-01 PROCEDURE — G8427 DOCREV CUR MEDS BY ELIG CLIN: HCPCS | Performed by: TRANSPLANT SURGERY

## 2019-08-01 PROCEDURE — 3017F COLORECTAL CA SCREEN DOC REV: CPT | Performed by: TRANSPLANT SURGERY

## 2019-08-01 PROCEDURE — 99213 OFFICE O/P EST LOW 20 MIN: CPT | Performed by: TRANSPLANT SURGERY

## 2019-08-01 PROCEDURE — G8419 CALC BMI OUT NRM PARAM NOF/U: HCPCS | Performed by: TRANSPLANT SURGERY

## 2019-08-01 PROCEDURE — 1090F PRES/ABSN URINE INCON ASSESS: CPT | Performed by: TRANSPLANT SURGERY

## 2019-08-01 PROCEDURE — 1036F TOBACCO NON-USER: CPT | Performed by: TRANSPLANT SURGERY

## 2019-08-01 PROCEDURE — G8399 PT W/DXA RESULTS DOCUMENT: HCPCS | Performed by: TRANSPLANT SURGERY

## 2019-08-01 PROCEDURE — 4040F PNEUMOC VAC/ADMIN/RCVD: CPT | Performed by: TRANSPLANT SURGERY

## 2019-09-03 ENCOUNTER — PREP FOR PROCEDURE (OUTPATIENT)
Dept: GASTROENTEROLOGY | Age: 64
End: 2019-09-03

## 2019-09-03 RX ORDER — SODIUM CHLORIDE 0.9 % (FLUSH) 0.9 %
10 SYRINGE (ML) INJECTION EVERY 12 HOURS SCHEDULED
Status: CANCELLED | OUTPATIENT
Start: 2019-09-03

## 2019-09-03 RX ORDER — SODIUM CHLORIDE 9 MG/ML
INJECTION, SOLUTION INTRAVENOUS CONTINUOUS
Status: CANCELLED | OUTPATIENT
Start: 2019-09-03

## 2019-09-04 ENCOUNTER — ANESTHESIA EVENT (OUTPATIENT)
Dept: ENDOSCOPY | Age: 64
End: 2019-09-04
Payer: COMMERCIAL

## 2019-09-04 ENCOUNTER — HOSPITAL ENCOUNTER (OUTPATIENT)
Age: 64
Setting detail: OUTPATIENT SURGERY
Discharge: HOME | End: 2019-09-04
Attending: INTERNAL MEDICINE | Admitting: INTERNAL MEDICINE
Payer: COMMERCIAL

## 2019-09-04 ENCOUNTER — ANESTHESIA (OUTPATIENT)
Dept: ENDOSCOPY | Age: 64
End: 2019-09-04
Payer: COMMERCIAL

## 2019-09-04 VITALS
OXYGEN SATURATION: 100 % | SYSTOLIC BLOOD PRESSURE: 140 MMHG | TEMPERATURE: 96.8 F | HEIGHT: 67 IN | DIASTOLIC BLOOD PRESSURE: 61 MMHG | RESPIRATION RATE: 18 BRPM | HEART RATE: 68 BPM | BODY MASS INDEX: 18.36 KG/M2 | WEIGHT: 117 LBS

## 2019-09-04 VITALS — SYSTOLIC BLOOD PRESSURE: 106 MMHG | OXYGEN SATURATION: 100 % | DIASTOLIC BLOOD PRESSURE: 57 MMHG

## 2019-09-04 LAB — METER GLUCOSE: 100 MG/DL (ref 74–99)

## 2019-09-04 PROCEDURE — 7100000010 HC PHASE II RECOVERY - FIRST 15 MIN: Performed by: INTERNAL MEDICINE

## 2019-09-04 PROCEDURE — 6360000002 HC RX W HCPCS: Performed by: NURSE ANESTHETIST, CERTIFIED REGISTERED

## 2019-09-04 PROCEDURE — 7100000011 HC PHASE II RECOVERY - ADDTL 15 MIN: Performed by: INTERNAL MEDICINE

## 2019-09-04 PROCEDURE — 3700000001 HC ADD 15 MINUTES (ANESTHESIA): Performed by: INTERNAL MEDICINE

## 2019-09-04 PROCEDURE — 3700000000 HC ANESTHESIA ATTENDED CARE: Performed by: INTERNAL MEDICINE

## 2019-09-04 PROCEDURE — 2709999900 HC NON-CHARGEABLE SUPPLY: Performed by: INTERNAL MEDICINE

## 2019-09-04 PROCEDURE — 3609009500 HC COLONOSCOPY DIAGNOSTIC OR SCREENING: Performed by: INTERNAL MEDICINE

## 2019-09-04 PROCEDURE — 2580000003 HC RX 258: Performed by: INTERNAL MEDICINE

## 2019-09-04 PROCEDURE — 82962 GLUCOSE BLOOD TEST: CPT

## 2019-09-04 RX ORDER — PROPOFOL 10 MG/ML
INJECTION, EMULSION INTRAVENOUS PRN
Status: DISCONTINUED | OUTPATIENT
Start: 2019-09-04 | End: 2019-09-04 | Stop reason: SDUPTHER

## 2019-09-04 RX ORDER — SODIUM CHLORIDE 9 MG/ML
INJECTION, SOLUTION INTRAVENOUS CONTINUOUS
Status: DISCONTINUED | OUTPATIENT
Start: 2019-09-04 | End: 2019-09-04 | Stop reason: HOSPADM

## 2019-09-04 RX ORDER — SODIUM CHLORIDE 0.9 % (FLUSH) 0.9 %
10 SYRINGE (ML) INJECTION EVERY 12 HOURS SCHEDULED
Status: DISCONTINUED | OUTPATIENT
Start: 2019-09-04 | End: 2019-09-04 | Stop reason: HOSPADM

## 2019-09-04 RX ORDER — 0.9 % SODIUM CHLORIDE 0.9 %
10 VIAL (ML) INJECTION PRN
Status: DISCONTINUED | OUTPATIENT
Start: 2019-09-04 | End: 2019-09-04 | Stop reason: HOSPADM

## 2019-09-04 RX ADMIN — PROPOFOL 225 MG: 10 INJECTION, EMULSION INTRAVENOUS at 15:09

## 2019-09-04 RX ADMIN — SODIUM CHLORIDE: 9 INJECTION, SOLUTION INTRAVENOUS at 14:58

## 2019-09-04 ASSESSMENT — COPD QUESTIONNAIRES: CAT_SEVERITY: SEVERE

## 2019-09-04 ASSESSMENT — PAIN SCALES - GENERAL
PAINLEVEL_OUTOF10: 0
PAINLEVEL_OUTOF10: 0

## 2019-09-04 NOTE — ANESTHESIA PRE PROCEDURE
Department of Anesthesiology  Preprocedure Note       Name:  Checo Agosto   Age:  75 y.o.  :  1955                                          MRN:  [id number]         Date:  2019      Surgeon: Madonna Maxwell):  Lenny Howe MD    Procedure: COLONOSCOPY DIAGNOSTIC (N/A )    Medications prior to admission:   Prior to Admission medications    Medication Sig Start Date End Date Taking? Authorizing Provider   Fluticasone-Umeclidin-Vilant (Elisa Blower) 100-62.5-25 MCG/INH AEPB Inhale into the lungs daily Instructed to take am of procedure   Yes Historical Provider, MD   lipase-protease-amylase (CREON) 68588-29428 units delayed release capsule Take 1 capsule by mouth 3 times daily (with meals) 19  Yes Bernardino Marroquin III, MD   umeclidinium-vilanterol (ANORO ELLIPTA) 62.5-25 MCG/INH AEPB inhaler Inhale 1 puff into the lungs daily    Yes Historical Provider, MD   vitamin D (ERGOCALCIFEROL) 23522 UNITS CAPS capsule Take 50,000 Units by mouth once a week Saturday   Yes Historical Provider, MD   Multiple Vitamins-Iron (DAILY-FEDERICO/IRON) TABS Take 1 tablet by mouth daily    Yes Historical Provider, MD   nicotine (NICODERM CQ) 21 MG/24HR Place 1 patch onto the skin every 24 hours   Yes Historical Provider, MD   diazepam (VALIUM) 10 MG tablet Take 10 mg by mouth daily. Yes Historical Provider, MD   folic acid (FOLVITE) 1 MG tablet Take 1 mg by mouth daily    Yes Historical Provider, MD   sertraline (ZOLOFT) 50 MG tablet Take 1 tablet by mouth daily. Patient taking differently: Take 50 mg by mouth nightly  14  Yes Destini Odell MD   OLANZapine (ZYPREXA) 5 MG tablet Take 1 tablet by mouth nightly.  14  Yes Destini Odell MD   albuterol (PROVENTIL HFA;VENTOLIN HFA) 108 (90 BASE) MCG/ACT inhaler Inhale 1 puff into the lungs as needed (every 4-6 hours as needed) Instructed to take am of procedure if needed and bring dos   Yes Historical Provider, MD   alendronate (FOSAMAX) 70 MG tablet

## 2019-09-04 NOTE — OP NOTE
Brief Postoperative Note    Wisam Darling  YOB: 1955  [id number]    Procedure: Colonoscopy    Anesthesia: [facility]    Surgeon:  Yola Daly MD    Findings:severe diverticulosis . Hemorrhoids    Complications: None    Follow up colonoscopy in 10 years.       Florin Velazco MD

## 2019-09-04 NOTE — PROGRESS NOTES
Abd. Soft, nondistended.  Bowel sounds hypoactive
Patient verbalizes her blood sugar today was 116, she does take any medications for this, no oral, no insulin.
registration    [x] You can expect a call the business day prior to procedure to notify you if your arrival time changes    [x] If you receive a survey after surgery we would greatly appreciate your comments    [] Parent/guardian of a minor must accompany their child and remain on the premises  the entire time they are under our care     [] Pediatric patients may bring favorite toy, blanket or comfort item with them    [] A caregiver or family member must remain with the patient during their stay if they are mentally handicapped, have dementia, disoriented or unable to use a call light or would be a safety concern if left unattended    [x] Please notify surgeon if you develop any illness between now and time of surgery (cold, cough, sore throat, fever, nausea, vomiting) or any signs of infections  including skin, wounds, and dental.    [x]  The Outpatient Pharmacy is available to fill your prescription here on your day of surgery, ask your preop nurse for details    [x] Other instructions  EDUCATIONAL MATERIALS PROVIDED:    [] PAT Preoperative Education Packet/Booklet     [] Medication List    [] Fluoroscopy Information Pamphlet    [] Transfusion bracelet applied with instructions    [] Joint replacement video reviewed    [] Shower with soap, lather and rinse well, and use CHG wipes provided the evening before surgery as instructed

## 2019-09-21 ENCOUNTER — HOSPITAL ENCOUNTER (EMERGENCY)
Age: 64
Discharge: HOME | End: 2019-09-21
Payer: COMMERCIAL

## 2019-09-21 VITALS
DIASTOLIC BLOOD PRESSURE: 66 MMHG | TEMPERATURE: 97.8 F | WEIGHT: 118 LBS | SYSTOLIC BLOOD PRESSURE: 121 MMHG | HEIGHT: 67 IN | RESPIRATION RATE: 18 BRPM | BODY MASS INDEX: 18.52 KG/M2 | OXYGEN SATURATION: 97 % | HEART RATE: 89 BPM

## 2019-09-21 DIAGNOSIS — T83.9XXA PROBLEM WITH FOLEY CATHETER, INITIAL ENCOUNTER (HCC): Primary | ICD-10-CM

## 2019-09-21 DIAGNOSIS — N39.0 URINARY TRACT INFECTION ASSOCIATED WITH INDWELLING URETHRAL CATHETER, INITIAL ENCOUNTER (HCC): ICD-10-CM

## 2019-09-21 DIAGNOSIS — T83.511A URINARY TRACT INFECTION ASSOCIATED WITH INDWELLING URETHRAL CATHETER, INITIAL ENCOUNTER (HCC): ICD-10-CM

## 2019-09-21 LAB
AMORPHOUS: ABNORMAL
BACTERIA: ABNORMAL /HPF
BILIRUBIN URINE: NEGATIVE
BLOOD, URINE: NEGATIVE
CLARITY: CLEAR
COLOR: YELLOW
GLUCOSE URINE: NEGATIVE MG/DL
KETONES, URINE: NEGATIVE MG/DL
LEUKOCYTE ESTERASE, URINE: ABNORMAL
NITRITE, URINE: POSITIVE
PH UA: 7.5 (ref 5–9)
PROTEIN UA: NEGATIVE MG/DL
RBC UA: ABNORMAL /HPF (ref 0–2)
SPECIFIC GRAVITY UA: 1.01 (ref 1–1.03)
UROBILINOGEN, URINE: 0.2 E.U./DL
WBC UA: ABNORMAL /HPF (ref 0–5)

## 2019-09-21 PROCEDURE — 99282 EMERGENCY DEPT VISIT SF MDM: CPT

## 2019-09-21 PROCEDURE — 87088 URINE BACTERIA CULTURE: CPT

## 2019-09-21 PROCEDURE — 81001 URINALYSIS AUTO W/SCOPE: CPT

## 2019-09-21 RX ORDER — CEFDINIR 300 MG/1
300 CAPSULE ORAL 2 TIMES DAILY
Qty: 14 CAPSULE | Refills: 0 | Status: SHIPPED | OUTPATIENT
Start: 2019-09-21 | End: 2019-09-28

## 2019-09-21 NOTE — ED PROVIDER NOTES
[facility]     Department of Emergency Medicine   ED  Provider Note  Admit Date/RoomTime: 9/21/2019 11:25 AM  ED Room: 36/36   Chief Complaint   Other (jarquin catheter came out this morning while she was putting pants on)    History of Present Illness   Source of history provided by:  patient. History/Exam Limitations: none. David Rangel is a 75 y.o. old female with a past medical history of   Past Medical History:   Diagnosis Date    Anemia     Asthma     COPD (chronic obstructive pulmonary disease) ([facility] [address]     uses )2 prn daily and nightly / Dr. Henny Bhatti Depression     Diabetes mellitus ([facility] [address]     no med diet controlled    Jarquin catheter in place     History of blood transfusion     2014    , presents to the emergency department from home with support, for Jarquin Catheter evaluation due to Spontaneous dislodgement, which occured 1 hour(s) prior to arrival.  She has a temporary Jarquin Catheter. The patient has no associated symptoms. Patient stated that she has a history of a neurogenic bladder and has had her catheter for well over a year. She states she was getting out of the shower today and noticed she had urine running down her leg with no output in her Jarquin catheter leg bag. Upon arrival to emergency department patient's oxygen saturation was 89% due to the fact that she is supposed to continues to be on 4 L of oxygen at all times in which she did not have a portable oxygen tank with her at this time. She was placed on 4 L nasal cannula and within minutes her oxygen saturation did increase to 97%. She is denying any chest pain, shortness of breath, or fevers at this time. ROS    Pertinent positives and negatives are stated within HPI, all other systems reviewed and are negative. has a past surgical history that includes Hysterectomy; ECHO Compl W Dop Color Flow (2/24/2013); Kidney surgery (Left, 06/16/2014); pr cystourethroscopy,biopsies (N/A, 9/14/2018); Colonoscopy;  Upper gastrointestinal endoscopy (N/A, 5/10/2019); Upper gastrointestinal endoscopy (N/A, 6/28/2019); Upper gastrointestinal endoscopy (N/A, 6/28/2019); and Colonoscopy (N/A, 9/4/2019). Social History:  reports that she has quit smoking. She smoked 0.00 packs per day. She has never used smokeless tobacco. She reports that she does not drink alcohol or use drugs. Family History: family history includes Cancer in her father. Allergies: Sulfa antibiotics    Physical Exam          ED Triage Vitals   BP Temp Temp Source Pulse Resp SpO2 Height Weight   09/21/19 1124 09/21/19 1124 09/21/19 1124 09/21/19 1107 09/21/19 1124 09/21/19 1107 09/21/19 1124 09/21/19 1124   124/68 97.4 °F (36.3 °C) Temporal 103 17 (!) 89 % 5' 7\" (1.702 m) 118 lb (53.5 kg)     Oxygen Saturation Interpretation: Normal.    Constitutional:  Alert, development consistent with age. HEENT:  NC/NT. Airway patent. Neck:  Normal ROM. Supple. Respiratory:  Clear to auscultation and breath sounds equal.  No rales, rhonchi, or wheezes noted  CV: Regular rate and rhythm, normal heart sounds, without pathological murmurs, ectopy, gallops, or rubs. GI:  General Appearance: normal.       Bowel sounds: normal bowel sounds. Distension:  None. Tenderness: No abdominal tenderness. Liver: non-tender. Spleen:  non-tender. Pulsatile Mass: absent. Hernia:  no inguinal or femoral hernias noted. : Patient currently has a 15 Syriac catheter with dislodgment present. Urine was very malodorous but clear. Integument:  No rashes, erythema present. Lymphatics: No lymphangitis or adenopathy noted. Neurological:  Oriented. Motor functions intact.     Lab / Imaging Results   (All laboratory and radiology results have been personally reviewed by myself)  Labs:  Results for orders placed or performed during the hospital encounter of 09/21/19   Urinalysis, reflex to microscopic   Result Value Ref Range    Color, UA

## 2019-09-22 LAB — URINE CULTURE, ROUTINE: NORMAL

## 2019-11-01 ENCOUNTER — HOSPITAL ENCOUNTER (EMERGENCY)
Age: 64
Discharge: HOME | End: 2019-11-01
Attending: EMERGENCY MEDICINE
Payer: COMMERCIAL

## 2019-11-01 VITALS
OXYGEN SATURATION: 100 % | HEIGHT: 67 IN | BODY MASS INDEX: 18.52 KG/M2 | DIASTOLIC BLOOD PRESSURE: 66 MMHG | TEMPERATURE: 98.3 F | HEART RATE: 95 BPM | WEIGHT: 118 LBS | SYSTOLIC BLOOD PRESSURE: 134 MMHG | RESPIRATION RATE: 18 BRPM

## 2019-11-01 DIAGNOSIS — R33.9 RETENTION OF URINE: Primary | ICD-10-CM

## 2019-11-01 PROCEDURE — 51702 INSERT TEMP BLADDER CATH: CPT

## 2019-11-01 PROCEDURE — 99282 EMERGENCY DEPT VISIT SF MDM: CPT

## 2019-11-01 ASSESSMENT — PAIN DESCRIPTION - DESCRIPTORS: DESCRIPTORS: SHARP

## 2019-11-01 ASSESSMENT — PAIN DESCRIPTION - LOCATION: LOCATION: ABDOMEN

## 2019-11-01 ASSESSMENT — PAIN DESCRIPTION - PAIN TYPE: TYPE: ACUTE PAIN

## 2019-11-01 ASSESSMENT — PAIN SCALES - GENERAL: PAINLEVEL_OUTOF10: 8

## 2019-11-07 ENCOUNTER — APPOINTMENT (OUTPATIENT)
Dept: GENERAL RADIOLOGY | Age: 64
End: 2019-11-07
Payer: COMMERCIAL

## 2019-11-07 ENCOUNTER — OFFICE VISIT (OUTPATIENT)
Dept: HEMATOLOGY | Age: 64
End: 2019-11-07
Payer: COMMERCIAL

## 2019-11-07 ENCOUNTER — HOSPITAL ENCOUNTER (EMERGENCY)
Age: 64
Discharge: HOME | End: 2019-11-07
Attending: EMERGENCY MEDICINE
Payer: COMMERCIAL

## 2019-11-07 VITALS
DIASTOLIC BLOOD PRESSURE: 69 MMHG | BODY MASS INDEX: 18.66 KG/M2 | OXYGEN SATURATION: 92 % | TEMPERATURE: 97.4 F | HEIGHT: 67 IN | WEIGHT: 118.9 LBS | HEART RATE: 157 BPM | SYSTOLIC BLOOD PRESSURE: 114 MMHG

## 2019-11-07 VITALS
RESPIRATION RATE: 22 BRPM | OXYGEN SATURATION: 96 % | WEIGHT: 119 LBS | TEMPERATURE: 98.1 F | BODY MASS INDEX: 18.68 KG/M2 | HEART RATE: 96 BPM | SYSTOLIC BLOOD PRESSURE: 104 MMHG | HEIGHT: 67 IN | DIASTOLIC BLOOD PRESSURE: 59 MMHG

## 2019-11-07 DIAGNOSIS — R09.02 HYPOXIA: ICD-10-CM

## 2019-11-07 DIAGNOSIS — K86.1 CHRONIC CALCIFIC PANCREATITIS (HCC): Primary | ICD-10-CM

## 2019-11-07 DIAGNOSIS — R06.00 DYSPNEA, UNSPECIFIED TYPE: Primary | ICD-10-CM

## 2019-11-07 LAB
ALBUMIN SERPL-MCNC: 4.3 G/DL (ref 3.5–5.2)
ALP BLD-CCNC: 96 U/L (ref 35–104)
ALT SERPL-CCNC: 8 U/L (ref 0–32)
ANION GAP SERPL CALCULATED.3IONS-SCNC: 13 MMOL/L (ref 7–16)
AST SERPL-CCNC: 13 U/L (ref 0–31)
BASOPHILS ABSOLUTE: 0.04 E9/L (ref 0–0.2)
BASOPHILS RELATIVE PERCENT: 0.8 % (ref 0–2)
BILIRUB SERPL-MCNC: <0.2 MG/DL (ref 0–1.2)
BUN BLDV-MCNC: 10 MG/DL (ref 8–23)
CALCIUM SERPL-MCNC: 10.3 MG/DL (ref 8.6–10.2)
CHLORIDE BLD-SCNC: 98 MMOL/L (ref 98–107)
CO2: 23 MMOL/L (ref 22–29)
CREAT SERPL-MCNC: 0.7 MG/DL (ref 0.5–1)
EKG ATRIAL RATE: 76 BPM
EKG P AXIS: 119 DEGREES
EKG P-R INTERVAL: 178 MS
EKG Q-T INTERVAL: 398 MS
EKG QRS DURATION: 78 MS
EKG QTC CALCULATION (BAZETT): 447 MS
EKG R AXIS: 65 DEGREES
EKG T AXIS: 122 DEGREES
EKG VENTRICULAR RATE: 76 BPM
EOSINOPHILS ABSOLUTE: 0.06 E9/L (ref 0.05–0.5)
EOSINOPHILS RELATIVE PERCENT: 1.2 % (ref 0–6)
GFR AFRICAN AMERICAN: >60
GFR NON-AFRICAN AMERICAN: >60 ML/MIN/1.73
GLUCOSE BLD-MCNC: 139 MG/DL (ref 74–99)
HCT VFR BLD CALC: 34.2 % (ref 34–48)
HEMOGLOBIN: 10.2 G/DL (ref 11.5–15.5)
IMMATURE GRANULOCYTES #: 0.03 E9/L
IMMATURE GRANULOCYTES %: 0.6 % (ref 0–5)
LYMPHOCYTES ABSOLUTE: 1.48 E9/L (ref 1.5–4)
LYMPHOCYTES RELATIVE PERCENT: 29 % (ref 20–42)
MCH RBC QN AUTO: 25.8 PG (ref 26–35)
MCHC RBC AUTO-ENTMCNC: 29.8 % (ref 32–34.5)
MCV RBC AUTO: 86.4 FL (ref 80–99.9)
MONOCYTES ABSOLUTE: 0.34 E9/L (ref 0.1–0.95)
MONOCYTES RELATIVE PERCENT: 6.7 % (ref 2–12)
NEUTROPHILS ABSOLUTE: 3.16 E9/L (ref 1.8–7.3)
NEUTROPHILS RELATIVE PERCENT: 61.7 % (ref 43–80)
PDW BLD-RTO: 17.2 FL (ref 11.5–15)
PLATELET # BLD: 337 E9/L (ref 130–450)
PMV BLD AUTO: 9.9 FL (ref 7–12)
POTASSIUM SERPL-SCNC: 4 MMOL/L (ref 3.5–5)
PRO-BNP: 69 PG/ML (ref 0–125)
RBC # BLD: 3.96 E12/L (ref 3.5–5.5)
SODIUM BLD-SCNC: 134 MMOL/L (ref 132–146)
TOTAL PROTEIN: 7.2 G/DL (ref 6.4–8.3)
TROPONIN: <0.01 NG/ML (ref 0–0.03)
WBC # BLD: 5.1 E9/L (ref 4.5–11.5)

## 2019-11-07 PROCEDURE — 93005 ELECTROCARDIOGRAM TRACING: CPT | Performed by: EMERGENCY MEDICINE

## 2019-11-07 PROCEDURE — 36415 COLL VENOUS BLD VENIPUNCTURE: CPT

## 2019-11-07 PROCEDURE — 83880 ASSAY OF NATRIURETIC PEPTIDE: CPT

## 2019-11-07 PROCEDURE — 1036F TOBACCO NON-USER: CPT | Performed by: TRANSPLANT SURGERY

## 2019-11-07 PROCEDURE — 99285 EMERGENCY DEPT VISIT HI MDM: CPT

## 2019-11-07 PROCEDURE — 4040F PNEUMOC VAC/ADMIN/RCVD: CPT | Performed by: TRANSPLANT SURGERY

## 2019-11-07 PROCEDURE — 6370000000 HC RX 637 (ALT 250 FOR IP): Performed by: EMERGENCY MEDICINE

## 2019-11-07 PROCEDURE — G8484 FLU IMMUNIZE NO ADMIN: HCPCS | Performed by: TRANSPLANT SURGERY

## 2019-11-07 PROCEDURE — G8399 PT W/DXA RESULTS DOCUMENT: HCPCS | Performed by: TRANSPLANT SURGERY

## 2019-11-07 PROCEDURE — 71045 X-RAY EXAM CHEST 1 VIEW: CPT

## 2019-11-07 PROCEDURE — 99213 OFFICE O/P EST LOW 20 MIN: CPT | Performed by: TRANSPLANT SURGERY

## 2019-11-07 PROCEDURE — 80053 COMPREHEN METABOLIC PANEL: CPT

## 2019-11-07 PROCEDURE — 84484 ASSAY OF TROPONIN QUANT: CPT

## 2019-11-07 PROCEDURE — 96374 THER/PROPH/DIAG INJ IV PUSH: CPT

## 2019-11-07 PROCEDURE — 1090F PRES/ABSN URINE INCON ASSESS: CPT | Performed by: TRANSPLANT SURGERY

## 2019-11-07 PROCEDURE — G8427 DOCREV CUR MEDS BY ELIG CLIN: HCPCS | Performed by: TRANSPLANT SURGERY

## 2019-11-07 PROCEDURE — 93010 ELECTROCARDIOGRAM REPORT: CPT | Performed by: INTERNAL MEDICINE

## 2019-11-07 PROCEDURE — 1123F ACP DISCUSS/DSCN MKR DOCD: CPT | Performed by: TRANSPLANT SURGERY

## 2019-11-07 PROCEDURE — 94664 DEMO&/EVAL PT USE INHALER: CPT

## 2019-11-07 PROCEDURE — 6360000002 HC RX W HCPCS: Performed by: EMERGENCY MEDICINE

## 2019-11-07 PROCEDURE — G8420 CALC BMI NORM PARAMETERS: HCPCS | Performed by: TRANSPLANT SURGERY

## 2019-11-07 PROCEDURE — 3017F COLORECTAL CA SCREEN DOC REV: CPT | Performed by: TRANSPLANT SURGERY

## 2019-11-07 PROCEDURE — 85025 COMPLETE CBC W/AUTO DIFF WBC: CPT

## 2019-11-07 RX ORDER — IPRATROPIUM BROMIDE AND ALBUTEROL SULFATE 2.5; .5 MG/3ML; MG/3ML
1 SOLUTION RESPIRATORY (INHALATION) ONCE
Status: COMPLETED | OUTPATIENT
Start: 2019-11-07 | End: 2019-11-07

## 2019-11-07 RX ORDER — METHYLPREDNISOLONE SODIUM SUCCINATE 125 MG/2ML
125 INJECTION, POWDER, LYOPHILIZED, FOR SOLUTION INTRAMUSCULAR; INTRAVENOUS ONCE
Status: COMPLETED | OUTPATIENT
Start: 2019-11-07 | End: 2019-11-07

## 2019-11-07 RX ADMIN — IPRATROPIUM BROMIDE AND ALBUTEROL SULFATE 1 AMPULE: .5; 3 SOLUTION RESPIRATORY (INHALATION) at 15:25

## 2019-11-07 RX ADMIN — METHYLPREDNISOLONE SODIUM SUCCINATE 125 MG: 125 INJECTION, POWDER, FOR SOLUTION INTRAMUSCULAR; INTRAVENOUS at 13:44

## 2019-11-07 ASSESSMENT — ENCOUNTER SYMPTOMS
SHORTNESS OF BREATH: 1
COUGH: 1
BACK PAIN: 0
ABDOMINAL PAIN: 0

## 2019-11-29 ENCOUNTER — HOSPITAL ENCOUNTER (EMERGENCY)
Age: 64
Discharge: HOME | End: 2019-11-29
Attending: EMERGENCY MEDICINE
Payer: COMMERCIAL

## 2019-11-29 ENCOUNTER — APPOINTMENT (OUTPATIENT)
Dept: CT IMAGING | Age: 64
End: 2019-11-29
Payer: COMMERCIAL

## 2019-11-29 VITALS
DIASTOLIC BLOOD PRESSURE: 74 MMHG | TEMPERATURE: 98.2 F | SYSTOLIC BLOOD PRESSURE: 133 MMHG | HEART RATE: 90 BPM | BODY MASS INDEX: 18.68 KG/M2 | HEIGHT: 67 IN | RESPIRATION RATE: 16 BRPM | WEIGHT: 119 LBS | OXYGEN SATURATION: 99 %

## 2019-11-29 VITALS
DIASTOLIC BLOOD PRESSURE: 88 MMHG | WEIGHT: 119 LBS | RESPIRATION RATE: 16 BRPM | BODY MASS INDEX: 18.68 KG/M2 | OXYGEN SATURATION: 94 % | HEIGHT: 67 IN | SYSTOLIC BLOOD PRESSURE: 132 MMHG | TEMPERATURE: 98.3 F | HEART RATE: 88 BPM

## 2019-11-29 DIAGNOSIS — N39.0 URINARY TRACT INFECTION ASSOCIATED WITH INDWELLING URETHRAL CATHETER, INITIAL ENCOUNTER (HCC): Primary | ICD-10-CM

## 2019-11-29 DIAGNOSIS — T83.511A URINARY TRACT INFECTION ASSOCIATED WITH INDWELLING URETHRAL CATHETER, INITIAL ENCOUNTER (HCC): Primary | ICD-10-CM

## 2019-11-29 DIAGNOSIS — N39.0 URINARY TRACT INFECTION ASSOCIATED WITH INDWELLING URETHRAL CATHETER, INITIAL ENCOUNTER (HCC): ICD-10-CM

## 2019-11-29 DIAGNOSIS — T83.511A URINARY TRACT INFECTION ASSOCIATED WITH INDWELLING URETHRAL CATHETER, INITIAL ENCOUNTER (HCC): ICD-10-CM

## 2019-11-29 DIAGNOSIS — T83.9XXA FOLEY CATHETER PROBLEM, INITIAL ENCOUNTER (HCC): Primary | ICD-10-CM

## 2019-11-29 LAB
ALBUMIN SERPL-MCNC: 4.2 G/DL (ref 3.5–5.2)
ALP BLD-CCNC: 75 U/L (ref 35–104)
ALT SERPL-CCNC: 11 U/L (ref 0–32)
AMORPHOUS: ABNORMAL
ANION GAP SERPL CALCULATED.3IONS-SCNC: 4 MMOL/L (ref 7–16)
AST SERPL-CCNC: 29 U/L (ref 0–31)
BACTERIA: ABNORMAL /HPF
BACTERIA: ABNORMAL /HPF
BASOPHILS ABSOLUTE: 0.02 E9/L (ref 0–0.2)
BASOPHILS RELATIVE PERCENT: 0.3 % (ref 0–2)
BILIRUB SERPL-MCNC: <0.2 MG/DL (ref 0–1.2)
BILIRUBIN URINE: ABNORMAL
BILIRUBIN URINE: NEGATIVE
BLOOD, URINE: ABNORMAL
BLOOD, URINE: ABNORMAL
BUN BLDV-MCNC: 17 MG/DL (ref 8–23)
CALCIUM SERPL-MCNC: 10.5 MG/DL (ref 8.6–10.2)
CHLORIDE BLD-SCNC: 106 MMOL/L (ref 98–107)
CLARITY: ABNORMAL
CLARITY: ABNORMAL
CO2: 37 MMOL/L (ref 22–29)
COLOR: ABNORMAL
COLOR: YELLOW
CREAT SERPL-MCNC: 0.9 MG/DL (ref 0.5–1)
CRYSTALS, UA: ABNORMAL
EOSINOPHILS ABSOLUTE: 0.1 E9/L (ref 0.05–0.5)
EOSINOPHILS RELATIVE PERCENT: 1.4 % (ref 0–6)
GFR AFRICAN AMERICAN: >60
GFR NON-AFRICAN AMERICAN: >60 ML/MIN/1.73
GLUCOSE BLD-MCNC: 138 MG/DL (ref 74–99)
GLUCOSE URINE: NEGATIVE MG/DL
GLUCOSE URINE: NEGATIVE MG/DL
HCT VFR BLD CALC: 36.2 % (ref 34–48)
HEMOGLOBIN: 10.5 G/DL (ref 11.5–15.5)
IMMATURE GRANULOCYTES #: 0.01 E9/L
IMMATURE GRANULOCYTES %: 0.1 % (ref 0–5)
KETONES, URINE: ABNORMAL MG/DL
KETONES, URINE: NEGATIVE MG/DL
LACTIC ACID: 0.8 MMOL/L (ref 0.5–2.2)
LEUKOCYTE ESTERASE, URINE: ABNORMAL
LEUKOCYTE ESTERASE, URINE: ABNORMAL
LYMPHOCYTES ABSOLUTE: 1.08 E9/L (ref 1.5–4)
LYMPHOCYTES RELATIVE PERCENT: 15.1 % (ref 20–42)
MCH RBC QN AUTO: 25.7 PG (ref 26–35)
MCHC RBC AUTO-ENTMCNC: 29 % (ref 32–34.5)
MCV RBC AUTO: 88.7 FL (ref 80–99.9)
MONOCYTES ABSOLUTE: 0.3 E9/L (ref 0.1–0.95)
MONOCYTES RELATIVE PERCENT: 4.2 % (ref 2–12)
NEUTROPHILS ABSOLUTE: 5.66 E9/L (ref 1.8–7.3)
NEUTROPHILS RELATIVE PERCENT: 78.9 % (ref 43–80)
NITRITE, URINE: NEGATIVE
NITRITE, URINE: POSITIVE
PDW BLD-RTO: 15.9 FL (ref 11.5–15)
PH UA: 8.5 (ref 5–9)
PH UA: 8.5 (ref 5–9)
PLATELET # BLD: 231 E9/L (ref 130–450)
PMV BLD AUTO: 9.9 FL (ref 7–12)
POTASSIUM SERPL-SCNC: 5.3 MMOL/L (ref 3.5–5)
PROTEIN UA: 100 MG/DL
PROTEIN UA: >=300 MG/DL
RBC # BLD: 4.08 E12/L (ref 3.5–5.5)
RBC UA: ABNORMAL /HPF (ref 0–2)
RBC UA: ABNORMAL /HPF (ref 0–2)
SODIUM BLD-SCNC: 147 MMOL/L (ref 132–146)
SPECIFIC GRAVITY UA: 1.01 (ref 1–1.03)
SPECIFIC GRAVITY UA: <=1.005 (ref 1–1.03)
TOTAL PROTEIN: 7.3 G/DL (ref 6.4–8.3)
UROBILINOGEN, URINE: 0.2 E.U./DL
UROBILINOGEN, URINE: 0.2 E.U./DL
WBC # BLD: 7.2 E9/L (ref 4.5–11.5)
WBC UA: >20 /HPF (ref 0–5)
WBC UA: ABNORMAL /HPF (ref 0–5)

## 2019-11-29 PROCEDURE — 85025 COMPLETE CBC W/AUTO DIFF WBC: CPT

## 2019-11-29 PROCEDURE — 99285 EMERGENCY DEPT VISIT HI MDM: CPT

## 2019-11-29 PROCEDURE — 74176 CT ABD & PELVIS W/O CONTRAST: CPT

## 2019-11-29 PROCEDURE — 80053 COMPREHEN METABOLIC PANEL: CPT

## 2019-11-29 PROCEDURE — 87088 URINE BACTERIA CULTURE: CPT

## 2019-11-29 PROCEDURE — 6370000000 HC RX 637 (ALT 250 FOR IP): Performed by: NURSE PRACTITIONER

## 2019-11-29 PROCEDURE — 87040 BLOOD CULTURE FOR BACTERIA: CPT

## 2019-11-29 PROCEDURE — 99283 EMERGENCY DEPT VISIT LOW MDM: CPT

## 2019-11-29 PROCEDURE — 81001 URINALYSIS AUTO W/SCOPE: CPT

## 2019-11-29 PROCEDURE — 36415 COLL VENOUS BLD VENIPUNCTURE: CPT

## 2019-11-29 PROCEDURE — 83605 ASSAY OF LACTIC ACID: CPT

## 2019-11-29 PROCEDURE — 6360000002 HC RX W HCPCS: Performed by: EMERGENCY MEDICINE

## 2019-11-29 PROCEDURE — 96374 THER/PROPH/DIAG INJ IV PUSH: CPT

## 2019-11-29 RX ORDER — ACETAMINOPHEN 325 MG/1
650 TABLET ORAL ONCE
Status: COMPLETED | OUTPATIENT
Start: 2019-11-29 | End: 2019-11-29

## 2019-11-29 RX ORDER — CEFDINIR 300 MG/1
300 CAPSULE ORAL 2 TIMES DAILY
Qty: 14 CAPSULE | Refills: 0 | Status: SHIPPED | OUTPATIENT
Start: 2019-11-30 | End: 2019-12-07

## 2019-11-29 RX ORDER — CEFDINIR 300 MG/1
300 CAPSULE ORAL ONCE
Status: COMPLETED | OUTPATIENT
Start: 2019-11-29 | End: 2019-11-29

## 2019-11-29 RX ORDER — FENTANYL CITRATE 50 UG/ML
25 INJECTION, SOLUTION INTRAMUSCULAR; INTRAVENOUS ONCE
Status: COMPLETED | OUTPATIENT
Start: 2019-11-29 | End: 2019-11-29

## 2019-11-29 RX ADMIN — ACETAMINOPHEN 650 MG: 325 TABLET, FILM COATED ORAL at 14:35

## 2019-11-29 RX ADMIN — CEFDINIR 300 MG: 300 CAPSULE ORAL at 15:27

## 2019-11-29 RX ADMIN — FENTANYL CITRATE 25 MCG: 50 INJECTION, SOLUTION INTRAMUSCULAR; INTRAVENOUS at 19:07

## 2019-11-29 ASSESSMENT — PAIN DESCRIPTION - PAIN TYPE: TYPE: ACUTE PAIN

## 2019-11-29 ASSESSMENT — PAIN DESCRIPTION - LOCATION: LOCATION: ABDOMEN

## 2019-11-29 ASSESSMENT — PAIN SCALES - GENERAL
PAINLEVEL_OUTOF10: 9
PAINLEVEL_OUTOF10: 8
PAINLEVEL_OUTOF10: 8

## 2019-12-01 LAB — URINE CULTURE, ROUTINE: NORMAL

## 2019-12-04 LAB — BLOOD CULTURE, ROUTINE: NORMAL

## 2019-12-11 ENCOUNTER — HOSPITAL ENCOUNTER (EMERGENCY)
Age: 64
Discharge: HOME | End: 2019-12-11
Payer: COMMERCIAL

## 2019-12-11 VITALS
SYSTOLIC BLOOD PRESSURE: 135 MMHG | HEART RATE: 95 BPM | RESPIRATION RATE: 18 BRPM | WEIGHT: 119 LBS | DIASTOLIC BLOOD PRESSURE: 93 MMHG | TEMPERATURE: 98.2 F | HEIGHT: 67 IN | OXYGEN SATURATION: 94 % | BODY MASS INDEX: 18.68 KG/M2

## 2019-12-11 DIAGNOSIS — T83.9XXA FOLEY CATHETER PROBLEM, INITIAL ENCOUNTER (HCC): Primary | ICD-10-CM

## 2019-12-11 DIAGNOSIS — Z46.6 URINARY CATHETER (FOLEY) CHANGE REQUIRED: ICD-10-CM

## 2019-12-11 LAB
AMORPHOUS: ABNORMAL
BACTERIA: ABNORMAL /HPF
BILIRUBIN URINE: NEGATIVE
BLOOD, URINE: NEGATIVE
CLARITY: ABNORMAL
COLOR: YELLOW
CRYSTALS, UA: ABNORMAL
GLUCOSE URINE: NEGATIVE MG/DL
KETONES, URINE: NEGATIVE MG/DL
LEUKOCYTE ESTERASE, URINE: ABNORMAL
NITRITE, URINE: POSITIVE
PH UA: 8.5 (ref 5–9)
PROTEIN UA: ABNORMAL MG/DL
RBC UA: ABNORMAL /HPF (ref 0–2)
SPECIFIC GRAVITY UA: 1.01 (ref 1–1.03)
UROBILINOGEN, URINE: 0.2 E.U./DL
WBC UA: ABNORMAL /HPF (ref 0–5)

## 2019-12-11 PROCEDURE — 81001 URINALYSIS AUTO W/SCOPE: CPT

## 2019-12-11 PROCEDURE — 87088 URINE BACTERIA CULTURE: CPT

## 2019-12-11 PROCEDURE — 51702 INSERT TEMP BLADDER CATH: CPT

## 2019-12-11 PROCEDURE — 87186 SC STD MICRODIL/AGAR DIL: CPT

## 2019-12-11 PROCEDURE — 87077 CULTURE AEROBIC IDENTIFY: CPT

## 2019-12-11 PROCEDURE — 99283 EMERGENCY DEPT VISIT LOW MDM: CPT

## 2019-12-11 ASSESSMENT — PAIN DESCRIPTION - PAIN TYPE: TYPE: ACUTE PAIN

## 2019-12-11 ASSESSMENT — PAIN DESCRIPTION - LOCATION: LOCATION: ABDOMEN

## 2019-12-11 ASSESSMENT — PAIN SCALES - GENERAL: PAINLEVEL_OUTOF10: 10

## 2019-12-14 LAB
ORGANISM: ABNORMAL
URINE CULTURE, ROUTINE: ABNORMAL

## 2020-02-05 ENCOUNTER — HOSPITAL ENCOUNTER (EMERGENCY)
Age: 65
Discharge: HOME | End: 2020-02-05
Attending: EMERGENCY MEDICINE
Payer: COMMERCIAL

## 2020-02-05 VITALS
DIASTOLIC BLOOD PRESSURE: 78 MMHG | RESPIRATION RATE: 18 BRPM | OXYGEN SATURATION: 95 % | HEART RATE: 88 BPM | WEIGHT: 120 LBS | SYSTOLIC BLOOD PRESSURE: 150 MMHG | TEMPERATURE: 97.3 F | BODY MASS INDEX: 18.83 KG/M2 | HEIGHT: 67 IN

## 2020-02-05 LAB
AMORPHOUS: NORMAL
ANION GAP SERPL CALCULATED.3IONS-SCNC: 10 MMOL/L (ref 7–16)
ANISOCYTOSIS: ABNORMAL
BACTERIA: NORMAL /HPF
BASOPHILS ABSOLUTE: 0.04 E9/L (ref 0–0.2)
BASOPHILS RELATIVE PERCENT: 0.7 % (ref 0–2)
BILIRUBIN URINE: ABNORMAL
BLOOD, URINE: NEGATIVE
BUN BLDV-MCNC: 14 MG/DL (ref 8–23)
BURR CELLS: ABNORMAL
CALCIUM SERPL-MCNC: 10.1 MG/DL (ref 8.6–10.2)
CHLORIDE BLD-SCNC: 104 MMOL/L (ref 98–107)
CLARITY: ABNORMAL
CO2: 25 MMOL/L (ref 22–29)
COLOR: YELLOW
CREAT SERPL-MCNC: 0.8 MG/DL (ref 0.5–1)
CRYSTALS, UA: NORMAL
EOSINOPHILS ABSOLUTE: 0.06 E9/L (ref 0.05–0.5)
EOSINOPHILS RELATIVE PERCENT: 1 % (ref 0–6)
GFR AFRICAN AMERICAN: >60
GFR NON-AFRICAN AMERICAN: >60 ML/MIN/1.73
GLUCOSE BLD-MCNC: 105 MG/DL (ref 74–99)
GLUCOSE URINE: NEGATIVE MG/DL
HCT VFR BLD CALC: 31.2 % (ref 34–48)
HEMOGLOBIN: 8.8 G/DL (ref 11.5–15.5)
HYPOCHROMIA: ABNORMAL
IMMATURE GRANULOCYTES #: 0.02 E9/L
IMMATURE GRANULOCYTES %: 0.3 % (ref 0–5)
KETONES, URINE: NEGATIVE MG/DL
LACTIC ACID, SEPSIS: 1.5 MMOL/L (ref 0.5–1.9)
LEUKOCYTE ESTERASE, URINE: ABNORMAL
LYMPHOCYTES ABSOLUTE: 1.36 E9/L (ref 1.5–4)
LYMPHOCYTES RELATIVE PERCENT: 23.2 % (ref 20–42)
MCH RBC QN AUTO: 24.9 PG (ref 26–35)
MCHC RBC AUTO-ENTMCNC: 28.2 % (ref 32–34.5)
MCV RBC AUTO: 88.4 FL (ref 80–99.9)
MONOCYTES ABSOLUTE: 0.34 E9/L (ref 0.1–0.95)
MONOCYTES RELATIVE PERCENT: 5.8 % (ref 2–12)
NEUTROPHILS ABSOLUTE: 4.03 E9/L (ref 1.8–7.3)
NEUTROPHILS RELATIVE PERCENT: 69 % (ref 43–80)
NITRITE, URINE: NEGATIVE
OVALOCYTES: ABNORMAL
PDW BLD-RTO: 17.2 FL (ref 11.5–15)
PH UA: >=9 (ref 5–9)
PLATELET # BLD: 231 E9/L (ref 130–450)
PMV BLD AUTO: 9.6 FL (ref 7–12)
POIKILOCYTES: ABNORMAL
POLYCHROMASIA: ABNORMAL
POTASSIUM SERPL-SCNC: 4.6 MMOL/L (ref 3.5–5)
PROTEIN UA: >=300 MG/DL
RBC # BLD: 3.53 E12/L (ref 3.5–5.5)
RBC UA: NORMAL /HPF (ref 0–2)
SCHISTOCYTES: ABNORMAL
SODIUM BLD-SCNC: 139 MMOL/L (ref 132–146)
SPECIFIC GRAVITY UA: <=1.005 (ref 1–1.03)
TARGET CELLS: ABNORMAL
UROBILINOGEN, URINE: 0.2 E.U./DL
WBC # BLD: 5.9 E9/L (ref 4.5–11.5)
WBC UA: NORMAL /HPF (ref 0–5)

## 2020-02-05 PROCEDURE — 83605 ASSAY OF LACTIC ACID: CPT

## 2020-02-05 PROCEDURE — 36415 COLL VENOUS BLD VENIPUNCTURE: CPT

## 2020-02-05 PROCEDURE — 85025 COMPLETE CBC W/AUTO DIFF WBC: CPT

## 2020-02-05 PROCEDURE — 6370000000 HC RX 637 (ALT 250 FOR IP): Performed by: EMERGENCY MEDICINE

## 2020-02-05 PROCEDURE — 80048 BASIC METABOLIC PNL TOTAL CA: CPT

## 2020-02-05 PROCEDURE — 87088 URINE BACTERIA CULTURE: CPT

## 2020-02-05 PROCEDURE — 51702 INSERT TEMP BLADDER CATH: CPT

## 2020-02-05 PROCEDURE — 99283 EMERGENCY DEPT VISIT LOW MDM: CPT

## 2020-02-05 PROCEDURE — 81001 URINALYSIS AUTO W/SCOPE: CPT

## 2020-02-05 RX ORDER — PHENAZOPYRIDINE HYDROCHLORIDE 100 MG/1
100 TABLET, FILM COATED ORAL 3 TIMES DAILY PRN
Qty: 9 TABLET | Refills: 0 | Status: SHIPPED | OUTPATIENT
Start: 2020-02-05 | End: 2020-02-08

## 2020-02-05 RX ORDER — NITROFURANTOIN 25; 75 MG/1; MG/1
100 CAPSULE ORAL ONCE
Status: COMPLETED | OUTPATIENT
Start: 2020-02-05 | End: 2020-02-05

## 2020-02-05 RX ORDER — NITROFURANTOIN 25; 75 MG/1; MG/1
100 CAPSULE ORAL 2 TIMES DAILY
Qty: 20 CAPSULE | Refills: 0 | Status: SHIPPED | OUTPATIENT
Start: 2020-02-05 | End: 2020-02-15

## 2020-02-05 RX ADMIN — NITROFURANTOIN MONOHYDRATE/MACROCRYSTALLINE 100 MG: 25; 75 CAPSULE ORAL at 11:01

## 2020-02-05 ASSESSMENT — PAIN DESCRIPTION - DESCRIPTORS
DESCRIPTORS: ACHING;BURNING
DESCRIPTORS: STABBING

## 2020-02-05 ASSESSMENT — ENCOUNTER SYMPTOMS
VOMITING: 0
DIARRHEA: 0
WHEEZING: 0
SORE THROAT: 0
EYE DISCHARGE: 0
BACK PAIN: 0
NAUSEA: 0
EYE REDNESS: 0
SINUS PRESSURE: 0
EYE PAIN: 0
ABDOMINAL DISTENTION: 0
SHORTNESS OF BREATH: 0
COUGH: 0

## 2020-02-05 ASSESSMENT — PAIN DESCRIPTION - FREQUENCY
FREQUENCY: CONTINUOUS
FREQUENCY: CONTINUOUS

## 2020-02-05 ASSESSMENT — PAIN DESCRIPTION - LOCATION
LOCATION: ABDOMEN
LOCATION: VAGINA

## 2020-02-05 ASSESSMENT — PAIN DESCRIPTION - PAIN TYPE
TYPE: ACUTE PAIN
TYPE: ACUTE PAIN

## 2020-02-05 ASSESSMENT — PAIN DESCRIPTION - ORIENTATION: ORIENTATION: MID

## 2020-02-05 ASSESSMENT — PAIN DESCRIPTION - ONSET: ONSET: PROGRESSIVE

## 2020-02-05 ASSESSMENT — PAIN SCALES - GENERAL: PAINLEVEL_OUTOF10: 9

## 2020-02-05 NOTE — ED PROVIDER NOTES
are normal.      Palpations: Abdomen is soft. Tenderness: There is abdominal tenderness. There is no guarding or rebound. Comments: Suprapubic tenderness to palpation   Genitourinary:     Comments: Indwelling urinary catheter in place, evidence of protein in the tube, urine is cloudy  Skin:     General: Skin is warm and dry. Neurological:      Mental Status: She is alert and oriented to person, place, and time. Cranial Nerves: No cranial nerve deficit. Coordination: Coordination normal.          Procedures     MDM  Number of Diagnoses or Management Options  Acute cystitis without hematuria:   Obstruction of indwelling urinary catheter, initial encounter [facility]):   Diagnosis management comments: 57-year-old female with history of indwelling urinary catheter secondary to neurogenic bladder presenting to the emergency department primary complaint of suprapubic pain and tenderness with poor urinary output from catheter. Catheter was noted to be partially obstructed with protein, urine is cloudy. Suspect urinary tract infection secondary to poor urinary output from proteinaceous obstruction of indwelling urinary catheter tube. Indwelling urinary catheter was replaced, output of urine was much  after placement, and there was approximately 300 cc of output. Labs show no evidence of renal involvement. Sensitivities show patient would benefit from [facility] for treatment of urinary tract infection she was discharged with Macrobid and Pyridium and advised follow-up with her family physician.             --------------------------------------------- PAST HISTORY ---------------------------------------------  Past Medical History:  has a past medical history of Anemia, Asthma, COPD (chronic obstructive pulmonary disease) ([facility] [address] Depression, Diabetes mellitus ([facility] [address] [place] catheter in place, and History of blood transfusion.     Past Surgical History:  has a past surgical history that includes Hysterectomy; ECHO Compl W Dop Color Flow (2/24/2013); Kidney surgery (Left, 06/16/2014); pr cystourethroscopy,biopsies (N/A, 9/14/2018); Colonoscopy; Upper gastrointestinal endoscopy (N/A, 5/10/2019); Upper gastrointestinal endoscopy (N/A, 6/28/2019); Upper gastrointestinal endoscopy (N/A, 6/28/2019); and Colonoscopy (N/A, 9/4/2019). Social History:  reports that she has quit smoking. She smoked 0.00 packs per day. She has never used smokeless tobacco. She reports that she does not drink alcohol or use drugs. Family History: family history includes Cancer in her father; Diabetes in her brother; Kidney Disease in her brother. The patients home medications have been reviewed.     Allergies: Sulfa antibiotics    -------------------------------------------------- RESULTS -------------------------------------------------  Labs:  Results for orders placed or performed during the hospital encounter of 02/05/20   Urinalysis, reflex to microscopic   Result Value Ref Range    Color, UA Yellow Straw/Yellow    Clarity, UA TURBID (A) Clear    Glucose, Ur Negative Negative mg/dL    Bilirubin Urine SMALL (A) Negative    Ketones, Urine Negative Negative mg/dL    Specific Gravity, UA <=1.005 1.005 - 1.030    Blood, Urine Negative Negative    pH, UA >=9.0 5.0 - 9.0    Protein, UA >=300 (A) Negative mg/dL    Urobilinogen, Urine 0.2 <2.0 E.U./dL    Nitrite, Urine Negative Negative    Leukocyte Esterase, Urine MODERATE (A) Negative   Microscopic Urinalysis   Result Value Ref Range    WBC, UA 5-10 0 - 5 /HPF    RBC, UA 0-1 0 - 2 /HPF    Bacteria, UA NONE /HPF    Amorphous, UA MODERATE     Crystals Many    Basic Metabolic Panel   Result Value Ref Range    Sodium 139 132 - 146 mmol/L    Potassium 4.6 3.5 - 5.0 mmol/L    Chloride 104 98 - 107 mmol/L    CO2 25 22 - 29 mmol/L    Anion Gap 10 7 - 16 mmol/L    Glucose 105 (H) 74 - 99 mg/dL    BUN 14 8 - 23 mg/dL    CREATININE 0.8 0.5 - 1.0 mg/dL    GFR Non-African American >60 >=60

## 2020-02-06 LAB — URINE CULTURE, ROUTINE: NORMAL

## 2020-02-13 ENCOUNTER — OFFICE VISIT (OUTPATIENT)
Dept: HEMATOLOGY | Age: 65
End: 2020-02-13
Payer: COMMERCIAL

## 2020-02-13 VITALS
WEIGHT: 121.8 LBS | SYSTOLIC BLOOD PRESSURE: 118 MMHG | DIASTOLIC BLOOD PRESSURE: 62 MMHG | HEART RATE: 79 BPM | TEMPERATURE: 97.9 F | RESPIRATION RATE: 18 BRPM | OXYGEN SATURATION: 94 % | BODY MASS INDEX: 19.12 KG/M2 | HEIGHT: 67 IN

## 2020-02-13 PROCEDURE — 99212 OFFICE O/P EST SF 10 MIN: CPT | Performed by: TRANSPLANT SURGERY

## 2020-02-13 PROCEDURE — 99213 OFFICE O/P EST LOW 20 MIN: CPT | Performed by: TRANSPLANT SURGERY

## 2020-02-13 PROCEDURE — 1036F TOBACCO NON-USER: CPT | Performed by: TRANSPLANT SURGERY

## 2020-02-13 PROCEDURE — G8427 DOCREV CUR MEDS BY ELIG CLIN: HCPCS | Performed by: TRANSPLANT SURGERY

## 2020-02-13 PROCEDURE — 4040F PNEUMOC VAC/ADMIN/RCVD: CPT | Performed by: TRANSPLANT SURGERY

## 2020-02-13 PROCEDURE — 3017F COLORECTAL CA SCREEN DOC REV: CPT | Performed by: TRANSPLANT SURGERY

## 2020-02-13 PROCEDURE — G8399 PT W/DXA RESULTS DOCUMENT: HCPCS | Performed by: TRANSPLANT SURGERY

## 2020-02-13 PROCEDURE — G8484 FLU IMMUNIZE NO ADMIN: HCPCS | Performed by: TRANSPLANT SURGERY

## 2020-02-13 PROCEDURE — 1090F PRES/ABSN URINE INCON ASSESS: CPT | Performed by: TRANSPLANT SURGERY

## 2020-02-13 PROCEDURE — G8420 CALC BMI NORM PARAMETERS: HCPCS | Performed by: TRANSPLANT SURGERY

## 2020-02-13 PROCEDURE — 1123F ACP DISCUSS/DSCN MKR DOCD: CPT | Performed by: TRANSPLANT SURGERY

## 2020-03-10 ENCOUNTER — PREP FOR PROCEDURE (OUTPATIENT)
Dept: UROLOGY | Age: 65
End: 2020-03-10

## 2020-03-10 RX ORDER — SODIUM CHLORIDE 9 MG/ML
INJECTION, SOLUTION INTRAVENOUS CONTINUOUS
Status: CANCELLED | OUTPATIENT
Start: 2020-03-10

## 2020-03-30 ENCOUNTER — HOSPITAL ENCOUNTER (OUTPATIENT)
Age: 65
Discharge: HOME | End: 2020-04-01

## 2020-03-30 PROCEDURE — 82365 CALCULUS SPECTROSCOPY: CPT

## 2020-03-30 PROCEDURE — 88300 SURGICAL PATH GROSS: CPT

## 2020-03-30 PROCEDURE — 88305 TISSUE EXAM BY PATHOLOGIST: CPT

## 2020-04-02 LAB
CALCULI COMPOSITION: NORMAL
MASS: 797 MG
STONE DESCRIPTION: NORMAL
STONE NUMBER: NORMAL
STONE SIZE: NORMAL MM

## 2020-05-17 ENCOUNTER — APPOINTMENT (OUTPATIENT)
Dept: GENERAL RADIOLOGY | Age: 65
DRG: 698 | End: 2020-05-17
Payer: COMMERCIAL

## 2020-05-17 ENCOUNTER — HOSPITAL ENCOUNTER (INPATIENT)
Age: 65
LOS: 4 days | Discharge: NURSING, REHAB OR LONG-TERM CARE | DRG: 698 | End: 2020-05-21
Attending: EMERGENCY MEDICINE | Admitting: INTERNAL MEDICINE
Payer: COMMERCIAL

## 2020-05-17 PROBLEM — K92.2 GI BLEED: Status: ACTIVE | Noted: 2020-05-17

## 2020-05-17 PROBLEM — D64.9 ANEMIA: Status: ACTIVE | Noted: 2020-05-17

## 2020-05-17 PROBLEM — T83.091A OBSTRUCTED FOLEY CATHETER (HCC): Status: ACTIVE | Noted: 2020-05-17

## 2020-05-17 LAB
ABO/RH: NORMAL
ANION GAP SERPL CALCULATED.3IONS-SCNC: 13 MMOL/L (ref 7–16)
ANISOCYTOSIS: ABNORMAL
ANTIBODY SCREEN: NORMAL
BACTERIA: ABNORMAL /HPF
BASOPHILS ABSOLUTE: 0.04 E9/L (ref 0–0.2)
BASOPHILS RELATIVE PERCENT: 0.5 % (ref 0–2)
BILIRUBIN URINE: NEGATIVE
BLOOD BANK DISPENSE STATUS: NORMAL
BLOOD BANK PRODUCT CODE: NORMAL
BLOOD, URINE: ABNORMAL
BPU ID: NORMAL
BUN BLDV-MCNC: 17 MG/DL (ref 8–23)
CALCIUM SERPL-MCNC: 10.2 MG/DL (ref 8.6–10.2)
CHLORIDE BLD-SCNC: 100 MMOL/L (ref 98–107)
CLARITY: ABNORMAL
CO2: 24 MMOL/L (ref 22–29)
COLOR: ABNORMAL
CREAT SERPL-MCNC: 0.9 MG/DL (ref 0.5–1)
DESCRIPTION BLOOD BANK: NORMAL
EOSINOPHILS ABSOLUTE: 0.04 E9/L (ref 0.05–0.5)
EOSINOPHILS RELATIVE PERCENT: 0.5 % (ref 0–6)
GFR AFRICAN AMERICAN: >60
GFR NON-AFRICAN AMERICAN: >60 ML/MIN/1.73
GLUCOSE BLD-MCNC: 127 MG/DL (ref 74–99)
GLUCOSE URINE: 100 MG/DL
HCT VFR BLD CALC: 23.3 % (ref 34–48)
HEMOGLOBIN: 6.4 G/DL (ref 11.5–15.5)
HYPOCHROMIA: ABNORMAL
IMMATURE GRANULOCYTES #: 0.02 E9/L
IMMATURE GRANULOCYTES %: 0.3 % (ref 0–5)
KETONES, URINE: NEGATIVE MG/DL
LEUKOCYTE ESTERASE, URINE: ABNORMAL
LYMPHOCYTES ABSOLUTE: 0.94 E9/L (ref 1.5–4)
LYMPHOCYTES RELATIVE PERCENT: 12.2 % (ref 20–42)
MCH RBC QN AUTO: 21.7 PG (ref 26–35)
MCHC RBC AUTO-ENTMCNC: 27.5 % (ref 32–34.5)
MCV RBC AUTO: 79 FL (ref 80–99.9)
MONOCYTES ABSOLUTE: 0.51 E9/L (ref 0.1–0.95)
MONOCYTES RELATIVE PERCENT: 6.6 % (ref 2–12)
NEUTROPHILS ABSOLUTE: 6.18 E9/L (ref 1.8–7.3)
NEUTROPHILS RELATIVE PERCENT: 79.9 % (ref 43–80)
NITRITE, URINE: POSITIVE
OVALOCYTES: ABNORMAL
PDW BLD-RTO: 18.6 FL (ref 11.5–15)
PH UA: 8.5 (ref 5–9)
PLATELET # BLD: 453 E9/L (ref 130–450)
PMV BLD AUTO: 8.9 FL (ref 7–12)
POIKILOCYTES: ABNORMAL
POLYCHROMASIA: ABNORMAL
POTASSIUM REFLEX MAGNESIUM: 4.2 MMOL/L (ref 3.5–5)
PROCALCITONIN: 0.02 NG/ML (ref 0–0.08)
PROTEIN UA: 100 MG/DL
RBC # BLD: 2.95 E12/L (ref 3.5–5.5)
RBC UA: ABNORMAL /HPF (ref 0–2)
SCHISTOCYTES: ABNORMAL
SODIUM BLD-SCNC: 137 MMOL/L (ref 132–146)
SPECIFIC GRAVITY UA: 1.01 (ref 1–1.03)
STOMATOCYTES: ABNORMAL
TROPONIN: <0.01 NG/ML (ref 0–0.03)
UROBILINOGEN, URINE: 4 E.U./DL
WBC # BLD: 7.7 E9/L (ref 4.5–11.5)
WBC UA: ABNORMAL /HPF (ref 0–5)

## 2020-05-17 PROCEDURE — 93005 ELECTROCARDIOGRAM TRACING: CPT | Performed by: EMERGENCY MEDICINE

## 2020-05-17 PROCEDURE — 71045 X-RAY EXAM CHEST 1 VIEW: CPT

## 2020-05-17 PROCEDURE — 2580000003 HC RX 258: Performed by: EMERGENCY MEDICINE

## 2020-05-17 PROCEDURE — 36430 TRANSFUSION BLD/BLD COMPNT: CPT

## 2020-05-17 PROCEDURE — 86901 BLOOD TYPING SEROLOGIC RH(D): CPT

## 2020-05-17 PROCEDURE — 83550 IRON BINDING TEST: CPT

## 2020-05-17 PROCEDURE — 87186 SC STD MICRODIL/AGAR DIL: CPT

## 2020-05-17 PROCEDURE — 85025 COMPLETE CBC W/AUTO DIFF WBC: CPT

## 2020-05-17 PROCEDURE — 84145 PROCALCITONIN (PCT): CPT

## 2020-05-17 PROCEDURE — 86900 BLOOD TYPING SEROLOGIC ABO: CPT

## 2020-05-17 PROCEDURE — 87088 URINE BACTERIA CULTURE: CPT

## 2020-05-17 PROCEDURE — 84484 ASSAY OF TROPONIN QUANT: CPT

## 2020-05-17 PROCEDURE — 51702 INSERT TEMP BLADDER CATH: CPT

## 2020-05-17 PROCEDURE — 81001 URINALYSIS AUTO W/SCOPE: CPT

## 2020-05-17 PROCEDURE — 86923 COMPATIBILITY TEST ELECTRIC: CPT

## 2020-05-17 PROCEDURE — 80048 BASIC METABOLIC PNL TOTAL CA: CPT

## 2020-05-17 PROCEDURE — 83540 ASSAY OF IRON: CPT

## 2020-05-17 PROCEDURE — P9016 RBC LEUKOCYTES REDUCED: HCPCS

## 2020-05-17 PROCEDURE — 99284 EMERGENCY DEPT VISIT MOD MDM: CPT

## 2020-05-17 PROCEDURE — 82728 ASSAY OF FERRITIN: CPT

## 2020-05-17 PROCEDURE — 87077 CULTURE AEROBIC IDENTIFY: CPT

## 2020-05-17 PROCEDURE — 86850 RBC ANTIBODY SCREEN: CPT

## 2020-05-17 PROCEDURE — 2060000000 HC ICU INTERMEDIATE R&B

## 2020-05-17 RX ORDER — ACETAMINOPHEN 650 MG/1
650 SUPPOSITORY RECTAL EVERY 6 HOURS PRN
Status: DISCONTINUED | OUTPATIENT
Start: 2020-05-17 | End: 2020-05-21 | Stop reason: HOSPADM

## 2020-05-17 RX ORDER — 0.9 % SODIUM CHLORIDE 0.9 %
20 INTRAVENOUS SOLUTION INTRAVENOUS ONCE
Status: COMPLETED | OUTPATIENT
Start: 2020-05-17 | End: 2020-05-17

## 2020-05-17 RX ORDER — ACETAMINOPHEN 325 MG/1
650 TABLET ORAL EVERY 6 HOURS PRN
Status: DISCONTINUED | OUTPATIENT
Start: 2020-05-17 | End: 2020-05-21 | Stop reason: HOSPADM

## 2020-05-17 RX ORDER — DIAZEPAM 5 MG/1
10 TABLET ORAL DAILY
Status: DISCONTINUED | OUTPATIENT
Start: 2020-05-18 | End: 2020-05-21 | Stop reason: HOSPADM

## 2020-05-17 RX ORDER — SODIUM CHLORIDE 0.9 % (FLUSH) 0.9 %
10 SYRINGE (ML) INJECTION PRN
Status: DISCONTINUED | OUTPATIENT
Start: 2020-05-17 | End: 2020-05-21 | Stop reason: HOSPADM

## 2020-05-17 RX ORDER — MULTIVITAMIN WITH IRON
1 TABLET ORAL DAILY
Status: DISCONTINUED | OUTPATIENT
Start: 2020-05-18 | End: 2020-05-21 | Stop reason: HOSPADM

## 2020-05-17 RX ORDER — POLYETHYLENE GLYCOL 3350 17 G/17G
17 POWDER, FOR SOLUTION ORAL DAILY PRN
Status: DISCONTINUED | OUTPATIENT
Start: 2020-05-17 | End: 2020-05-21 | Stop reason: HOSPADM

## 2020-05-17 RX ORDER — PROMETHAZINE HYDROCHLORIDE 25 MG/1
12.5 TABLET ORAL EVERY 6 HOURS PRN
Status: DISCONTINUED | OUTPATIENT
Start: 2020-05-17 | End: 2020-05-21 | Stop reason: HOSPADM

## 2020-05-17 RX ORDER — IPRATROPIUM BROMIDE AND ALBUTEROL SULFATE 2.5; .5 MG/3ML; MG/3ML
1 SOLUTION RESPIRATORY (INHALATION)
Status: DISCONTINUED | OUTPATIENT
Start: 2020-05-18 | End: 2020-05-21 | Stop reason: HOSPADM

## 2020-05-17 RX ORDER — ARFORMOTEROL TARTRATE 15 UG/2ML
15 SOLUTION RESPIRATORY (INHALATION) 2 TIMES DAILY
Status: DISCONTINUED | OUTPATIENT
Start: 2020-05-18 | End: 2020-05-21 | Stop reason: HOSPADM

## 2020-05-17 RX ORDER — NICOTINE 21 MG/24HR
1 PATCH, TRANSDERMAL 24 HOURS TRANSDERMAL EVERY 24 HOURS
Status: DISCONTINUED | OUTPATIENT
Start: 2020-05-17 | End: 2020-05-21 | Stop reason: HOSPADM

## 2020-05-17 RX ORDER — PANTOPRAZOLE SODIUM 40 MG/10ML
80 INJECTION, POWDER, LYOPHILIZED, FOR SOLUTION INTRAVENOUS ONCE
Status: COMPLETED | OUTPATIENT
Start: 2020-05-17 | End: 2020-05-18

## 2020-05-17 RX ORDER — OLANZAPINE 5 MG/1
5 TABLET ORAL NIGHTLY
Status: DISCONTINUED | OUTPATIENT
Start: 2020-05-17 | End: 2020-05-21 | Stop reason: HOSPADM

## 2020-05-17 RX ORDER — BUDESONIDE 0.25 MG/2ML
0.25 INHALANT ORAL 2 TIMES DAILY
Status: DISCONTINUED | OUTPATIENT
Start: 2020-05-18 | End: 2020-05-21 | Stop reason: HOSPADM

## 2020-05-17 RX ORDER — FOLIC ACID 1 MG/1
1 TABLET ORAL DAILY
Status: DISCONTINUED | OUTPATIENT
Start: 2020-05-18 | End: 2020-05-21 | Stop reason: HOSPADM

## 2020-05-17 RX ORDER — SODIUM CHLORIDE 0.9 % (FLUSH) 0.9 %
10 SYRINGE (ML) INJECTION EVERY 12 HOURS SCHEDULED
Status: DISCONTINUED | OUTPATIENT
Start: 2020-05-17 | End: 2020-05-21 | Stop reason: HOSPADM

## 2020-05-17 RX ORDER — ONDANSETRON 2 MG/ML
4 INJECTION INTRAMUSCULAR; INTRAVENOUS EVERY 6 HOURS PRN
Status: DISCONTINUED | OUTPATIENT
Start: 2020-05-17 | End: 2020-05-21 | Stop reason: HOSPADM

## 2020-05-17 RX ORDER — ERGOCALCIFEROL 1.25 MG/1
50000 CAPSULE ORAL
Status: DISCONTINUED | OUTPATIENT
Start: 2020-05-23 | End: 2020-05-21 | Stop reason: HOSPADM

## 2020-05-17 RX ADMIN — SODIUM CHLORIDE 20 ML: 9 INJECTION, SOLUTION INTRAVENOUS at 20:28

## 2020-05-17 ASSESSMENT — PAIN SCALES - GENERAL: PAINLEVEL_OUTOF10: 9

## 2020-05-17 ASSESSMENT — PAIN DESCRIPTION - PROGRESSION: CLINICAL_PROGRESSION: NOT CHANGED

## 2020-05-17 ASSESSMENT — PAIN DESCRIPTION - PAIN TYPE: TYPE: ACUTE PAIN

## 2020-05-17 ASSESSMENT — PAIN DESCRIPTION - ORIENTATION: ORIENTATION: RIGHT

## 2020-05-17 ASSESSMENT — PAIN DESCRIPTION - DESCRIPTORS: DESCRIPTORS: ACHING;CONSTANT;DISCOMFORT;SHARP

## 2020-05-17 ASSESSMENT — PAIN DESCRIPTION - LOCATION: LOCATION: ARM

## 2020-05-17 ASSESSMENT — PAIN DESCRIPTION - ONSET: ONSET: ON-GOING

## 2020-05-17 ASSESSMENT — PAIN - FUNCTIONAL ASSESSMENT: PAIN_FUNCTIONAL_ASSESSMENT: PREVENTS OR INTERFERES SOME ACTIVE ACTIVITIES AND ADLS

## 2020-05-17 ASSESSMENT — PAIN DESCRIPTION - FREQUENCY: FREQUENCY: CONTINUOUS

## 2020-05-17 NOTE — ED PROVIDER NOTES
supple. Cardiovascular:      Rate and Rhythm: Normal rate and regular rhythm. Pulmonary:      Effort: Pulmonary effort is normal. No respiratory distress. Breath sounds: Normal breath sounds. No wheezing or rales. Abdominal:      General: Bowel sounds are normal.      Palpations: Abdomen is soft. Tenderness: There is abdominal tenderness. There is no guarding or rebound. Comments: Mild suprapubic tenderness, no rebound or guarding. Non surgical abdomen. Midline abdominal scar noted. Genitourinary:     Rectum: Guaiac result positive. Comments: Urinary catherter in place with no drainage or fluid in bag or tube. Bedside US with 500CC fluid. Rectal exam with no hemorrhoids noted. Guaiac positive, dark stool visualized  Skin:     General: Skin is warm and dry. Capillary Refill: Capillary refill takes less than 2 seconds. Neurological:      Mental Status: She is alert and oriented to person, place, and time. Cranial Nerves: No cranial nerve deficit. Coordination: Coordination normal.          Procedures   Venous Access Procedure Note  Indication: nursing staff unable to obtain access    Procedure: The patient was placed in the appropriate position and the skin over the puncture site was prepped with chlorhexidine. Intravenous access was obtained in the left antecubital vein and the site was secured appropriately. Procedure was preformed under live ultrasound for guidance. The patient tolerated the procedure well. Complications: None      Pre Void Bladder BEDSIDE ULTRASOUND     Risks, benefits and alternatives (for applicable procedures below) described. Performed By: Zhane Carrillo DO. Indication:  Urinary retention  Informed consent: The patient was counseled regarding the procedure, it's indications, risks, potential complications and alternatives and any questions were answered. Consent was obtained. .  Procedural Quadrants:     Bladder:  Del Rio cather balloon Hemoglobin and hematocrit, blood   Result Value Ref Range    Hemoglobin 7.2 (L) 11.5 - 15.5 g/dL    Hematocrit 26.4 (L) 34.0 - 48.0 %   CBC WITH AUTO DIFFERENTIAL   Result Value Ref Range    WBC 7.5 4.5 - 11.5 E9/L    RBC 3.20 (L) 3.50 - 5.50 E12/L    Hemoglobin 7.2 (L) 11.5 - 15.5 g/dL    Hematocrit 26.0 (L) 34.0 - 48.0 %    MCV 81.3 80.0 - 99.9 fL    MCH 22.5 (L) 26.0 - 35.0 pg    MCHC 27.7 (L) 32.0 - 34.5 %    RDW 18.0 (H) 11.5 - 15.0 fL    Platelets 249 889 - 139 E9/L    MPV 8.9 7.0 - 12.0 fL    Neutrophils % 80.9 (H) 43.0 - 80.0 %    Lymphocytes % 13.0 (L) 20.0 - 42.0 %    Monocytes % 3.5 2.0 - 12.0 %    Eosinophils % 1.5 0.0 - 6.0 %    Basophils % 0.9 0.0 - 2.0 %    Neutrophils Absolute 6.15 1.80 - 7.30 E9/L    Lymphocytes Absolute 0.98 (L) 1.50 - 4.00 E9/L    Monocytes Absolute 0.30 0.10 - 0.95 E9/L    Eosinophils Absolute 0.00 (L) 0.05 - 0.50 E9/L    Basophils Absolute 0.07 0.00 - 0.20 E9/L    Metamyelocytes Relative 0.9 0.0 - 1.0 %    Promyelocytes Percent 0.9 0 - 0 %    Anisocytosis 2+     Polychromasia 3+     Hypochromia 3+     Poikilocytes 2+     Ovalocytes 2+     Target Cells 2+     Basophilic Stippling 1+    Basic metabolic panel   Result Value Ref Range    Sodium 137 132 - 146 mmol/L    Potassium 4.4 3.5 - 5.0 mmol/L    Chloride 104 98 - 107 mmol/L    CO2 25 22 - 29 mmol/L    Anion Gap 8 7 - 16 mmol/L    Glucose 112 (H) 74 - 99 mg/dL    BUN 16 8 - 23 mg/dL    CREATININE 0.9 0.5 - 1.0 mg/dL    GFR Non-African American >60 >=60 mL/min/1.73    GFR African American >60     Calcium 9.9 8.6 - 10.2 mg/dL   Hepatic function panel   Result Value Ref Range    Total Protein 6.8 6.4 - 8.3 g/dL    Alb 4.0 3.5 - 5.2 g/dL    Alkaline Phosphatase 73 35 - 104 U/L    ALT 6 0 - 32 U/L    AST 12 0 - 31 U/L    Total Bilirubin <0.2 0.0 - 1.2 mg/dL    Bilirubin, Direct <0.2 0.0 - 0.3 mg/dL    Bilirubin, Indirect see below 0.0 - 1.0 mg/dL   Hemoglobin and Hematocrit, Blood   Result Value Ref Range    Hemoglobin 7.1 (L)

## 2020-05-18 ENCOUNTER — ANESTHESIA EVENT (OUTPATIENT)
Dept: ENDOSCOPY | Age: 65
DRG: 698 | End: 2020-05-18
Payer: COMMERCIAL

## 2020-05-18 ENCOUNTER — APPOINTMENT (OUTPATIENT)
Dept: GENERAL RADIOLOGY | Age: 65
DRG: 698 | End: 2020-05-18
Payer: COMMERCIAL

## 2020-05-18 LAB
ALBUMIN SERPL-MCNC: 4 G/DL (ref 3.5–5.2)
ALP BLD-CCNC: 73 U/L (ref 35–104)
ALT SERPL-CCNC: 6 U/L (ref 0–32)
ANION GAP SERPL CALCULATED.3IONS-SCNC: 8 MMOL/L (ref 7–16)
ANISOCYTOSIS: ABNORMAL
AST SERPL-CCNC: 12 U/L (ref 0–31)
BASOPHILIC STIPPLING: ABNORMAL
BASOPHILS ABSOLUTE: 0.07 E9/L (ref 0–0.2)
BASOPHILS RELATIVE PERCENT: 0.9 % (ref 0–2)
BILIRUB SERPL-MCNC: <0.2 MG/DL (ref 0–1.2)
BILIRUBIN DIRECT: <0.2 MG/DL (ref 0–0.3)
BILIRUBIN, INDIRECT: NORMAL MG/DL (ref 0–1)
BUN BLDV-MCNC: 16 MG/DL (ref 8–23)
CALCIUM SERPL-MCNC: 9.9 MG/DL (ref 8.6–10.2)
CHLORIDE BLD-SCNC: 104 MMOL/L (ref 98–107)
CO2: 25 MMOL/L (ref 22–29)
CREAT SERPL-MCNC: 0.9 MG/DL (ref 0.5–1)
EKG ATRIAL RATE: 79 BPM
EKG P AXIS: 76 DEGREES
EKG P-R INTERVAL: 178 MS
EKG Q-T INTERVAL: 410 MS
EKG QRS DURATION: 74 MS
EKG QTC CALCULATION (BAZETT): 470 MS
EKG R AXIS: 65 DEGREES
EKG T AXIS: 86 DEGREES
EKG VENTRICULAR RATE: 79 BPM
EOSINOPHILS ABSOLUTE: 0 E9/L (ref 0.05–0.5)
EOSINOPHILS RELATIVE PERCENT: 1.5 % (ref 0–6)
FERRITIN: 5 NG/ML
FOLATE: 13.7 NG/ML (ref 4.8–24.2)
GFR AFRICAN AMERICAN: >60
GFR NON-AFRICAN AMERICAN: >60 ML/MIN/1.73
GLUCOSE BLD-MCNC: 112 MG/DL (ref 74–99)
HCT VFR BLD CALC: 24.9 % (ref 34–48)
HCT VFR BLD CALC: 25.4 % (ref 34–48)
HCT VFR BLD CALC: 25.5 % (ref 34–48)
HCT VFR BLD CALC: 26 % (ref 34–48)
HCT VFR BLD CALC: 26.4 % (ref 34–48)
HEMOGLOBIN: 6.9 G/DL (ref 11.5–15.5)
HEMOGLOBIN: 7.1 G/DL (ref 11.5–15.5)
HEMOGLOBIN: 7.1 G/DL (ref 11.5–15.5)
HEMOGLOBIN: 7.2 G/DL (ref 11.5–15.5)
HEMOGLOBIN: 7.2 G/DL (ref 11.5–15.5)
HYPOCHROMIA: ABNORMAL
IRON SATURATION: 4 % (ref 15–50)
IRON: 21 MCG/DL (ref 37–145)
LYMPHOCYTES ABSOLUTE: 0.98 E9/L (ref 1.5–4)
LYMPHOCYTES RELATIVE PERCENT: 13 % (ref 20–42)
MCH RBC QN AUTO: 22.5 PG (ref 26–35)
MCHC RBC AUTO-ENTMCNC: 27.7 % (ref 32–34.5)
MCV RBC AUTO: 81.3 FL (ref 80–99.9)
METAMYELOCYTES RELATIVE PERCENT: 0.9 % (ref 0–1)
MONOCYTES ABSOLUTE: 0.3 E9/L (ref 0.1–0.95)
MONOCYTES RELATIVE PERCENT: 3.5 % (ref 2–12)
NEUTROPHILS ABSOLUTE: 6.15 E9/L (ref 1.8–7.3)
NEUTROPHILS RELATIVE PERCENT: 80.9 % (ref 43–80)
OVALOCYTES: ABNORMAL
PDW BLD-RTO: 18 FL (ref 11.5–15)
PLATELET # BLD: 405 E9/L (ref 130–450)
PMV BLD AUTO: 8.9 FL (ref 7–12)
POIKILOCYTES: ABNORMAL
POLYCHROMASIA: ABNORMAL
POTASSIUM SERPL-SCNC: 4.4 MMOL/L (ref 3.5–5)
PROMYELOCYTES PERCENT: 0.9 % (ref 0–0)
RBC # BLD: 3.2 E12/L (ref 3.5–5.5)
SODIUM BLD-SCNC: 137 MMOL/L (ref 132–146)
TARGET CELLS: ABNORMAL
TOTAL IRON BINDING CAPACITY: 476 MCG/DL (ref 250–450)
TOTAL PROTEIN: 6.8 G/DL (ref 6.4–8.3)
TROPONIN: <0.01 NG/ML (ref 0–0.03)
TROPONIN: <0.01 NG/ML (ref 0–0.03)
VITAMIN B-12: 565 PG/ML (ref 211–946)
WBC # BLD: 7.5 E9/L (ref 4.5–11.5)

## 2020-05-18 PROCEDURE — 6360000002 HC RX W HCPCS: Performed by: NURSE PRACTITIONER

## 2020-05-18 PROCEDURE — 85025 COMPLETE CBC W/AUTO DIFF WBC: CPT

## 2020-05-18 PROCEDURE — 93005 ELECTROCARDIOGRAM TRACING: CPT | Performed by: NURSE PRACTITIONER

## 2020-05-18 PROCEDURE — 71045 X-RAY EXAM CHEST 1 VIEW: CPT

## 2020-05-18 PROCEDURE — C9113 INJ PANTOPRAZOLE SODIUM, VIA: HCPCS | Performed by: STUDENT IN AN ORGANIZED HEALTH CARE EDUCATION/TRAINING PROGRAM

## 2020-05-18 PROCEDURE — 2060000000 HC ICU INTERMEDIATE R&B

## 2020-05-18 PROCEDURE — 82746 ASSAY OF FOLIC ACID SERUM: CPT

## 2020-05-18 PROCEDURE — 80076 HEPATIC FUNCTION PANEL: CPT

## 2020-05-18 PROCEDURE — 82607 VITAMIN B-12: CPT

## 2020-05-18 PROCEDURE — 6360000002 HC RX W HCPCS: Performed by: STUDENT IN AN ORGANIZED HEALTH CARE EDUCATION/TRAINING PROGRAM

## 2020-05-18 PROCEDURE — 93010 ELECTROCARDIOGRAM REPORT: CPT | Performed by: INTERNAL MEDICINE

## 2020-05-18 PROCEDURE — 36415 COLL VENOUS BLD VENIPUNCTURE: CPT

## 2020-05-18 PROCEDURE — 2580000003 HC RX 258: Performed by: NURSE PRACTITIONER

## 2020-05-18 PROCEDURE — 87040 BLOOD CULTURE FOR BACTERIA: CPT

## 2020-05-18 PROCEDURE — 2700000000 HC OXYGEN THERAPY PER DAY

## 2020-05-18 PROCEDURE — 80048 BASIC METABOLIC PNL TOTAL CA: CPT

## 2020-05-18 PROCEDURE — 85014 HEMATOCRIT: CPT

## 2020-05-18 PROCEDURE — C9113 INJ PANTOPRAZOLE SODIUM, VIA: HCPCS | Performed by: NURSE PRACTITIONER

## 2020-05-18 PROCEDURE — 85018 HEMOGLOBIN: CPT

## 2020-05-18 PROCEDURE — 6370000000 HC RX 637 (ALT 250 FOR IP): Performed by: NURSE PRACTITIONER

## 2020-05-18 PROCEDURE — 84484 ASSAY OF TROPONIN QUANT: CPT

## 2020-05-18 PROCEDURE — 94640 AIRWAY INHALATION TREATMENT: CPT

## 2020-05-18 RX ORDER — PANTOPRAZOLE SODIUM 40 MG/10ML
40 INJECTION, POWDER, LYOPHILIZED, FOR SOLUTION INTRAVENOUS DAILY
Status: DISCONTINUED | OUTPATIENT
Start: 2020-05-18 | End: 2020-05-19

## 2020-05-18 RX ADMIN — OLANZAPINE 5 MG: 5 TABLET, FILM COATED ORAL at 20:12

## 2020-05-18 RX ADMIN — BUDESONIDE 250 MCG: 0.25 SUSPENSION RESPIRATORY (INHALATION) at 07:58

## 2020-05-18 RX ADMIN — CEFEPIME 2 G: 2 INJECTION, POWDER, FOR SOLUTION INTRAVENOUS at 23:13

## 2020-05-18 RX ADMIN — CEFEPIME 2 G: 2 INJECTION, POWDER, FOR SOLUTION INTRAVENOUS at 00:19

## 2020-05-18 RX ADMIN — SODIUM CHLORIDE, PRESERVATIVE FREE 10 ML: 5 INJECTION INTRAVENOUS at 23:13

## 2020-05-18 RX ADMIN — PANCRELIPASE 24000 UNITS: 60000; 12000; 38000 CAPSULE, DELAYED RELEASE PELLETS ORAL at 13:04

## 2020-05-18 RX ADMIN — BUDESONIDE 250 MCG: 0.25 SUSPENSION RESPIRATORY (INHALATION) at 19:45

## 2020-05-18 RX ADMIN — PANCRELIPASE 24000 UNITS: 60000; 12000; 38000 CAPSULE, DELAYED RELEASE PELLETS ORAL at 09:55

## 2020-05-18 RX ADMIN — MULTIVITAMIN TABLET 1 TABLET: TABLET at 09:55

## 2020-05-18 RX ADMIN — SERTRALINE 50 MG: 50 TABLET, FILM COATED ORAL at 09:55

## 2020-05-18 RX ADMIN — PANTOPRAZOLE SODIUM 80 MG: 40 INJECTION, POWDER, FOR SOLUTION INTRAVENOUS at 00:28

## 2020-05-18 RX ADMIN — IPRATROPIUM BROMIDE AND ALBUTEROL SULFATE 1 AMPULE: 2.5; .5 SOLUTION RESPIRATORY (INHALATION) at 07:58

## 2020-05-18 RX ADMIN — ACETAMINOPHEN 650 MG: 325 TABLET, FILM COATED ORAL at 20:12

## 2020-05-18 RX ADMIN — SODIUM CHLORIDE, PRESERVATIVE FREE 10 ML: 5 INJECTION INTRAVENOUS at 11:58

## 2020-05-18 RX ADMIN — DIAZEPAM 10 MG: 5 TABLET ORAL at 09:56

## 2020-05-18 RX ADMIN — PANTOPRAZOLE SODIUM 40 MG: 40 INJECTION, POWDER, FOR SOLUTION INTRAVENOUS at 09:55

## 2020-05-18 RX ADMIN — FOLIC ACID 1 MG: 1 TABLET ORAL at 09:55

## 2020-05-18 RX ADMIN — VANCOMYCIN HYDROCHLORIDE 1000 MG: 1 INJECTION, POWDER, LYOPHILIZED, FOR SOLUTION INTRAVENOUS at 10:41

## 2020-05-18 RX ADMIN — ARFORMOTEROL TARTRATE 15 MCG: 15 SOLUTION RESPIRATORY (INHALATION) at 07:58

## 2020-05-18 RX ADMIN — IPRATROPIUM BROMIDE AND ALBUTEROL SULFATE 1 AMPULE: 2.5; .5 SOLUTION RESPIRATORY (INHALATION) at 15:32

## 2020-05-18 RX ADMIN — IPRATROPIUM BROMIDE AND ALBUTEROL SULFATE 1 AMPULE: 2.5; .5 SOLUTION RESPIRATORY (INHALATION) at 11:33

## 2020-05-18 RX ADMIN — IPRATROPIUM BROMIDE AND ALBUTEROL SULFATE 1 AMPULE: 2.5; .5 SOLUTION RESPIRATORY (INHALATION) at 19:45

## 2020-05-18 RX ADMIN — SODIUM CHLORIDE 25 ML: 9 INJECTION, SOLUTION INTRAVENOUS at 16:58

## 2020-05-18 RX ADMIN — CEFEPIME 2 G: 2 INJECTION, POWDER, FOR SOLUTION INTRAVENOUS at 11:57

## 2020-05-18 RX ADMIN — SODIUM CHLORIDE, PRESERVATIVE FREE 10 ML: 5 INJECTION INTRAVENOUS at 20:12

## 2020-05-18 RX ADMIN — PANCRELIPASE 24000 UNITS: 60000; 12000; 38000 CAPSULE, DELAYED RELEASE PELLETS ORAL at 18:05

## 2020-05-18 RX ADMIN — ACETAMINOPHEN 650 MG: 325 TABLET, FILM COATED ORAL at 11:57

## 2020-05-18 RX ADMIN — ARFORMOTEROL TARTRATE 15 MCG: 15 SOLUTION RESPIRATORY (INHALATION) at 19:45

## 2020-05-18 RX ADMIN — SODIUM CHLORIDE, PRESERVATIVE FREE 10 ML: 5 INJECTION INTRAVENOUS at 09:55

## 2020-05-18 RX ADMIN — OLANZAPINE 5 MG: 5 TABLET, FILM COATED ORAL at 00:24

## 2020-05-18 RX ADMIN — SODIUM CHLORIDE 8 MG/HR: 9 INJECTION, SOLUTION INTRAVENOUS at 02:42

## 2020-05-18 RX ADMIN — SODIUM CHLORIDE, PRESERVATIVE FREE 10 ML: 5 INJECTION INTRAVENOUS at 00:25

## 2020-05-18 ASSESSMENT — ENCOUNTER SYMPTOMS
BACK PAIN: 0
NAUSEA: 0
WHEEZING: 0
VOMITING: 0
ABDOMINAL PAIN: 1
SHORTNESS OF BREATH: 0
DIARRHEA: 0
SORE THROAT: 0
ABDOMINAL DISTENTION: 0
EYE DISCHARGE: 0
EYE REDNESS: 0
COUGH: 0
SINUS PRESSURE: 0
EYE PAIN: 0

## 2020-05-18 ASSESSMENT — PAIN SCALES - GENERAL
PAINLEVEL_OUTOF10: 0
PAINLEVEL_OUTOF10: 9
PAINLEVEL_OUTOF10: 5

## 2020-05-18 ASSESSMENT — PAIN DESCRIPTION - PAIN TYPE: TYPE: ACUTE PAIN

## 2020-05-18 ASSESSMENT — PAIN DESCRIPTION - ONSET: ONSET: SUDDEN

## 2020-05-18 ASSESSMENT — PAIN DESCRIPTION - FREQUENCY: FREQUENCY: INTERMITTENT

## 2020-05-18 ASSESSMENT — PAIN DESCRIPTION - DESCRIPTORS: DESCRIPTORS: HEADACHE

## 2020-05-18 ASSESSMENT — PAIN DESCRIPTION - LOCATION: LOCATION: HEAD

## 2020-05-18 ASSESSMENT — PAIN - FUNCTIONAL ASSESSMENT: PAIN_FUNCTIONAL_ASSESSMENT: ACTIVITIES ARE NOT PREVENTED

## 2020-05-18 ASSESSMENT — PAIN DESCRIPTION - PROGRESSION: CLINICAL_PROGRESSION: NOT CHANGED

## 2020-05-18 NOTE — CONSULTS
GENERAL SURGERY  CONSULT NOTE  5/18/2020    Physician Consulted: Dr. Eric Blackwood  Reason for Consult: Anemia, FOBT+  Referring Physician: Dr. Valentino Pro HPI  Lissa Hoff is a 77 y.o. female who presents for evaluation of anemia concerning for GIB. Patient has a history of urinary and clot retention and has a chronic Del Rio. She came to the emergency department due to decreasing Del Rio output. She endorses some fatigue, and dark stools. She denies any hematochezia, hematemesis, coffee-ground emesis, abdominal pain, or nausea/vomiting. She was found to be anemic to hemoglobin 6.4, baseline 10. She received 1 unit of PRBCs and hemoglobin came up to 7.2. She is not on [facility] [address]. She last underwent a colonoscopy on 9/4/2019 which demonstrated severe diverticulosis. Underwent an EGD on 5/10/2019 which demonstrated grade B LA classification GERD, severe gastritis, superficial gastric ulcer. She is not on PPI. She underwent an EUS on 6/28/2019 for her chronic calcific pancreatitis. She is on Creon and has continued to gain weight and sees HPB surgery. Past Medical History:   Diagnosis Date    Anemia     Asthma     COPD (chronic obstructive pulmonary disease) ([facility])     uses )2 prn daily and nightly / Dr. Mae Eason Depression     Del Rio catheter in place     History of blood transfusion     2014        Past Surgical History:   Procedure Laterality Date    COLONOSCOPY      COLONOSCOPY N/A 9/4/2019    COLONOSCOPY DIAGNOSTIC performed by Danielle Galan MD at [address] [facility] ECHO COMPL W DOP COLOR FLOW  2/24/2013         HYSTERECTOMY      KIDNEY SURGERY Left 06/16/2014    ABLATION PERFORMED UNDER CT SCAN    VT CYSTOURETHROSCOPY,BIOPSIES N/A 9/14/2018    CYSTOSCOPY RETROGRADE PYELOGRAM, CLOT EVACATION , POSS.   BLADDER BIOPSY ---DR Alfonso Cook 2 :30 performed by Braulio Sue,  at [address] [facility] N/A 5/10/2019    EGD BIOPSY performed by Danielle Galan MD at [address] [facility]

## 2020-05-18 NOTE — PROGRESS NOTES
Pharmacy Consultation Note  (Antibiotic Dosing and Monitoring)    Initial consult date: 2020  Consulting physician: Elaina Perkins  Drug: Vancomycin  Indication: Obstructed Del Rio    Age/  Gender Height Weight IBW Dosing weight  Allergy Information   65 y.o./female 5' 7\" (170.2 cm) 120 lb (54.4 kg)     Ideal body weight: 61.6 kg (135 lb 12.9 oz)  54.4  Sulfa antibiotics      Temp (24hrs), Av.1 °F (36.7 °C), Min:96.1 °F (35.6 °C), Max:98.8 °F (37.1 °C)          Date  WBC BUN SCr CrCl  (mL/min) Drug/Dose Time   Given Level(s)   (Time) Comments   5-18 7.7 17 0.9  52   Vancomycin 1000 mg IV  0000                                          No intake or output data in the 24 hours ending 20 [id number]    Historical Cultures:  Organism   Date Value Ref Range Status   2019 Enterococcus faecium (A)  Final     No results for input(s): BC in the last 72 hours. Cultures:  available culture and sensitivity results were reviewed in [facility]    Assessment:  · 77 y.o. female has been initiated Vancomycin.   · Estimated CrCl = 52 mL/min  · Goal trough level = 15-20 mcg/mL    Plan:  · Will initiate vancomycin at a dose of 500 mg Q12H  · Monitor renal function   · Clinical pharmacy to follow      CORNELL Gomez [facility] 2020 11:41 PM

## 2020-05-18 NOTE — ANESTHESIA PRE PROCEDURE
Department of Anesthesiology  Preprocedure Note       Name:  Mik Morales   Age:  77 y.o.  :  1955                                          MRN:  [id number]         Date:  2020      Surgeon: Sarkis Lawton):  Gale Kearney MD    Procedure: Procedure(s):  EGD BIOPSY    Medications prior to admission:   Prior to Admission medications    Medication Sig Start Date End Date Taking? Authorizing Provider   Fluticasone-Umeclidin-Vilant (Media Kirsten) 100-62.5-25 MCG/INH AEPB Inhale into the lungs daily Instructed to take am of procedure    Historical Provider, MD   lipase-protease-amylase (CREON) 98767-56828 units delayed release capsule Take 1 capsule by mouth 3 times daily (with meals)  Patient taking differently: Take 1 capsule by mouth 4 times daily (with meals and nightly)  19   Sybil Lynch III, MD   OXYGEN Inhale 3 L into the lungs as needed Uses 3L night and during day prn    Historical Provider, MD   umeclidinium-vilanterol (ANORO ELLIPTA) 62.5-25 MCG/INH AEPB inhaler Inhale 1 puff into the lungs daily     Historical Provider, MD   vitamin D (ERGOCALCIFEROL) 92467 UNITS CAPS capsule Take 50,000 Units by mouth once a week Saturday    Historical Provider, MD   Multiple Vitamins-Iron (DAILY-FEDERICO/IRON) TABS Take 1 tablet by mouth daily     Historical Provider, MD   nicotine (NICODERM CQ) 21 MG/24HR Place 1 patch onto the skin every 24 hours    Historical Provider, MD   diazepam (VALIUM) 10 MG tablet Take 10 mg by mouth daily. Historical Provider, MD   folic acid (FOLVITE) 1 MG tablet Take 1 mg by mouth daily     Historical Provider, MD   sertraline (ZOLOFT) 50 MG tablet Take 1 tablet by mouth daily. Patient taking differently: Take 50 mg by mouth nightly  14   Alison Love MD   OLANZapine (ZYPREXA) 5 MG tablet Take 1 tablet by mouth nightly.  14   Alison Love MD   albuterol (PROVENTIL HFA;VENTOLIN HFA) 108 (90 BASE) MCG/ACT inhaler Inhale 1 puff into the lungs as needed (every 4-6 hours as needed) Instructed to take am of procedure if needed and bring 7700 [address] Provider, MD   alendronate (FOSAMAX) 70 MG tablet Take 70 mg by mouth every 7 days Sunday    Historical Provider, MD       Current medications:    Current Facility-Administered Medications   Medication Dose Route Frequency Provider Last Rate Last Dose    pantoprazole (PROTONIX) injection 40 mg  40 mg Intravenous Daily Marco A Jean Baptiste MD   40 mg at 05/18/20 0955    cefepime (MAXIPIME) 2 g IVPB extended (mini-bag)  2 g Intravenous Q12H Jaci Menas, APRN - CNP   Stopped at 05/18/20 1659    ipratropium-albuterol (DUONEB) nebulizer solution 1 ampule  1 ampule Inhalation Q4H [place] Jaci Leivar s, APRN - CNP   1 ampule at 05/18/20 1532    diazePAM (VALIUM) tablet 10 mg  10 mg Oral Daily Meliacey Lair Masters, APRN - CNP   10 mg at 54/07/81 7176    folic acid (FOLVITE) tablet 1 mg  1 mg Oral Daily Nancey Lair Masters, APRN - CNP   1 mg at 05/18/20 0955    lipase-protease-amylase (CREON) delayed release capsule 24,000 Units  24,000 Units Oral TID  Meliacey Calir Masters, APRN - CNP   24,000 Units at 05/18/20 1805    multivitamin 1 tablet  1 tablet Oral Daily Meliacey Lair Masters, APRN - CNP   1 tablet at 05/18/20 0955    nicotine (NICODERM CQ) 21 MG/24HR 1 patch  1 patch Transdermal Q24H Meliacey Lair Masters, APRN - CNP   1 patch at 05/18/20 0022    OLANZapine (ZYPREXA) tablet 5 mg  5 mg Oral Nightly Nancey Lair Masters, APRN - CNP   5 mg at 05/18/20 0024    sertraline (ZOLOFT) tablet 50 mg  50 mg Oral Daily Nancey Lair Masters, APRN - CNP   50 mg at 05/18/20 0955    [START ON 5/23/2020] vitamin D (ERGOCALCIFEROL) capsule 50,000 Units  50,000 Units Oral Once per day on Sat Meliacey Lair Masters, APRN - CNP        sodium chloride flush 0.9 % injection 10 mL  10 mL Intravenous 2 times per day ANGELA Benítez CNP   10 mL at 05/18/20 0955    sodium chloride flush 0.9 % injection 10 mL 10 mL Intravenous PRN Dotty Bidding Masters, APRN - CNP   10 mL at 05/18/20 1158    acetaminophen (TYLENOL) tablet 650 mg  650 mg Oral Q6H PRN Dotty Bidding Masters, APRN - CNP   650 mg at 05/18/20 1157    Or    acetaminophen (TYLENOL) suppository 650 mg  650 mg Rectal Q6H PRN Dotty Bidding Masters, APRN - CNP        polyethylene glycol (GLYCOLAX) packet 17 g  17 g Oral Daily PRN Dotty Bidding Masters, APRN - CNP        promethazine (PHENERGAN) tablet 12.5 mg  12.5 mg Oral Q6H PRN Dotty Bidding Masters, APRN - CNP        Or    ondansetron (ZOFRAN) injection 4 mg  4 mg Intravenous Q6H PRN Dotty Bidding Masters, APRN - CNP        0.9 % sodium chloride infusion admixture  25 mL Intravenous Q12H Dotty Bidding Masters, APRN - CNP 12.5 mL/hr at 05/18/20 1658 25 mL at 05/18/20 1658    budesonide (PULMICORT) nebulizer suspension 250 mcg  0.25 mg Nebulization BID Dotty Bidding Masters, APRN - CNP   250 mcg at 05/18/20 8183    And    ipratropium (ATROVENT) 0.02 % nebulizer solution 0.5 mg  0.5 mg Nebulization 4x daily Dotty Bidding Masters, APRN - CNP        And   Alida Isaac Arformoterol Tartrate (BROVANA) nebulizer solution 15 mcg  15 mcg Nebulization BID Dotty Bidding Masters, APRN - CNP   15 mcg at 05/18/20 3161       Allergies:     Allergies   Allergen Reactions    Sulfa Antibiotics Anaphylaxis       Problem List:    Patient Active Problem List   Diagnosis Code    [facility] obstructive [facility] disease) ([facility] J44.9    MDD (major depressive disorder) F32.9    Hematuria R31.9    Neurogenic bladder N31.9    Acute respiratory failure with hypoxia (HCC) J96.01    Obstructed Del Rio catheter ([facility] T83.091A    Anemia D64.9    GI bleed K92.2       Past Medical History:        Diagnosis Date    Anemia     Asthma     COPD (chronic obstructive pulmonary disease) ([facility]     uses )2 prn daily and nightly / Dr. Iron Redman Depression     Del Rio catheter in place     History of blood transfusion     2014        Past Surgical History: WBC 7.5 05/18/2020    RBC 3.20 05/18/2020    HGB 6.9 05/18/2020    HCT 24.9 05/18/2020    MCV 81.3 05/18/2020    RDW 18.0 05/18/2020     05/18/2020       CMP:   Lab Results   Component Value Date     05/18/2020    K 4.4 05/18/2020    K 4.2 05/17/2020     05/18/2020    CO2 25 05/18/2020    BUN 16 05/18/2020    CREATININE 0.9 05/18/2020    GFRAA >60 05/18/2020    LABGLOM >60 05/18/2020    GLUCOSE 112 05/18/2020    PROT 6.8 05/18/2020    CALCIUM 9.9 05/18/2020    BILITOT <0.2 05/18/2020    ALKPHOS 73 05/18/2020    AST 12 05/18/2020    ALT 6 05/18/2020       POC Tests: No results for input(s): POCGLU, POCNA, POCK, POCCL, POCBUN, POCHEMO, POCHCT in the last 72 hours. Coags:   Lab Results   Component Value Date    PROTIME 10.9 06/12/2014    INR 1.1 06/12/2014    APTT 33.0 06/12/2014       HCG (If Applicable): No results found for: PREGTESTUR, PREGSERUM, HCG, HCGQUANT     ABGs: No results found for: PHART, PO2ART, TQZ5VRK, HWE5PLM, BEART, Z0EXJUOM     Type & Screen (If Applicable):  No results found for: LABABO, LABRH    Drug/Infectious Status (If Applicable):  No results found for: HIV, HEPCAB    COVID-19 Screening (If Applicable): No results found for: COVID19    EKG 5/17/2020  Narrative & Impression     Normal sinus rhythm  T wave abnormality, consider anterior ischemia  Prolonged QT  Abnormal ECG  When compared with ECG of 17-MAY-2020 20:49,  Significant changes have occurred         Anesthesia Evaluation  Patient summary reviewed no history of anesthetic complications:   Airway: Mallampati: IV  TM distance: <3 FB   Neck ROM: limited  Mouth opening: < 3 FB Dental:    (+) upper dentures and lower dentures      Pulmonary:   (+) COPD (On 3L NC):  decreased breath sounds,  asthma:           Patient did not smoke on day of surgery.                  Cardiovascular:          ECG reviewed  Rhythm: regular  Rate: normal           Beta Blocker:  Not on Beta Blocker         Neuro/Psych:   (+)

## 2020-05-18 NOTE — H&P
EGD EUS performed by Chloe Shi DO at [address] [place] [address] N/A 6/28/2019    EGD performed by Chloe Shi DO at [facility] ENDOSCOPY         Medications Prior to Admission:    Medications Prior to Admission: Fluticasone-Umeclidin-Vilant ([employer]) 100-62.5-25 MCG/INH AEPB, Inhale into the lungs daily Instructed to take am of procedure  lipase-protease-amylase (CREON) 11461-46021 units delayed release capsule, Take 1 capsule by mouth 3 times daily (with meals) (Patient taking differently: Take 1 capsule by mouth 4 times daily (with meals and nightly) )  OXYGEN, Inhale 3 L into the lungs as needed Uses 3L night and during day prn  umeclidinium-vilanterol (ANORO ELLIPTA) 62.5-25 MCG/INH AEPB inhaler, Inhale 1 puff into the lungs daily   vitamin D (ERGOCALCIFEROL) 88362 UNITS CAPS capsule, Take 50,000 Units by mouth once a week Saturday  Multiple Vitamins-Iron (DAILY-FEDERICO/IRON) TABS, Take 1 tablet by mouth daily   nicotine (NICODERM CQ) 21 MG/24HR, Place 1 patch onto the skin every 24 hours  diazepam (VALIUM) 10 MG tablet, Take 10 mg by mouth daily. folic acid (FOLVITE) 1 MG tablet, Take 1 mg by mouth daily   sertraline (ZOLOFT) 50 MG tablet, Take 1 tablet by mouth daily. (Patient taking differently: Take 50 mg by mouth nightly )  OLANZapine (ZYPREXA) 5 MG tablet, Take 1 tablet by mouth nightly. albuterol (PROVENTIL HFA;VENTOLIN HFA) 108 (90 BASE) MCG/ACT inhaler, Inhale 1 puff into the lungs as needed (every 4-6 hours as needed) Instructed to take am of procedure if needed and bring dos  alendronate (FOSAMAX) 70 MG tablet, Take 70 mg by mouth every 7 days Sunday    Allergies:  Sulfa antibiotics    Social History:   TOBACCO:   reports that she has quit smoking. She smoked 0.00 packs per day. She has never used smokeless tobacco.  ETOH:   reports no history of alcohol use.   MARITAL STATUS:    OCCUPATION:      Family History:       Problem Relation Age of Onset    Cancer Father

## 2020-05-18 NOTE — PROGRESS NOTES
Pharmacy Consultation Note  (Antibiotic Dosing and Monitoring)    Initial consult date: 5-  Consulting physician: SUKUMAR Menas APRN  Drug: Vancomycin  Indication: Obstructed Del Rio     Vancomycin has been discontinued   [address] Physician to re-consult pharmacy if future dosing is needed    Thank you for the consult,    Sebas Mike, PharmD, BCPS 5/18/2020 12:52 PM  Phone: [phone]

## 2020-05-19 ENCOUNTER — ANESTHESIA (OUTPATIENT)
Dept: ENDOSCOPY | Age: 65
DRG: 698 | End: 2020-05-19
Payer: COMMERCIAL

## 2020-05-19 VITALS — DIASTOLIC BLOOD PRESSURE: 45 MMHG | OXYGEN SATURATION: 93 % | SYSTOLIC BLOOD PRESSURE: 92 MMHG

## 2020-05-19 LAB
EKG ATRIAL RATE: 82 BPM
EKG P AXIS: 83 DEGREES
EKG P-R INTERVAL: 198 MS
EKG Q-T INTERVAL: 406 MS
EKG QRS DURATION: 74 MS
EKG QTC CALCULATION (BAZETT): 474 MS
EKG R AXIS: 66 DEGREES
EKG T AXIS: 82 DEGREES
EKG VENTRICULAR RATE: 82 BPM
HCT VFR BLD CALC: 25 % (ref 34–48)
HCT VFR BLD CALC: 25.7 % (ref 34–48)
HCT VFR BLD CALC: 26.1 % (ref 34–48)
HCT VFR BLD CALC: 26.8 % (ref 34–48)
HEMOGLOBIN: 6.9 G/DL (ref 11.5–15.5)
HEMOGLOBIN: 6.9 G/DL (ref 11.5–15.5)
HEMOGLOBIN: 7.2 G/DL (ref 11.5–15.5)
HEMOGLOBIN: 7.3 G/DL (ref 11.5–15.5)

## 2020-05-19 PROCEDURE — 85018 HEMOGLOBIN: CPT

## 2020-05-19 PROCEDURE — 6360000002 HC RX W HCPCS: Performed by: STUDENT IN AN ORGANIZED HEALTH CARE EDUCATION/TRAINING PROGRAM

## 2020-05-19 PROCEDURE — 36415 COLL VENOUS BLD VENIPUNCTURE: CPT

## 2020-05-19 PROCEDURE — 2709999900 HC NON-CHARGEABLE SUPPLY: Performed by: SURGERY

## 2020-05-19 PROCEDURE — 6360000002 HC RX W HCPCS: Performed by: NURSE PRACTITIONER

## 2020-05-19 PROCEDURE — 2580000003 HC RX 258: Performed by: ANESTHESIOLOGIST ASSISTANT

## 2020-05-19 PROCEDURE — 2580000003 HC RX 258: Performed by: STUDENT IN AN ORGANIZED HEALTH CARE EDUCATION/TRAINING PROGRAM

## 2020-05-19 PROCEDURE — 6370000000 HC RX 637 (ALT 250 FOR IP): Performed by: STUDENT IN AN ORGANIZED HEALTH CARE EDUCATION/TRAINING PROGRAM

## 2020-05-19 PROCEDURE — 85014 HEMATOCRIT: CPT

## 2020-05-19 PROCEDURE — 93010 ELECTROCARDIOGRAM REPORT: CPT | Performed by: INTERNAL MEDICINE

## 2020-05-19 PROCEDURE — 97165 OT EVAL LOW COMPLEX 30 MIN: CPT

## 2020-05-19 PROCEDURE — 3700000001 HC ADD 15 MINUTES (ANESTHESIA): Performed by: SURGERY

## 2020-05-19 PROCEDURE — 2700000000 HC OXYGEN THERAPY PER DAY

## 2020-05-19 PROCEDURE — C9113 INJ PANTOPRAZOLE SODIUM, VIA: HCPCS | Performed by: STUDENT IN AN ORGANIZED HEALTH CARE EDUCATION/TRAINING PROGRAM

## 2020-05-19 PROCEDURE — 0DJ08ZZ INSPECTION OF UPPER INTESTINAL TRACT, VIA NATURAL OR ARTIFICIAL OPENING ENDOSCOPIC: ICD-10-PCS | Performed by: SURGERY

## 2020-05-19 PROCEDURE — 3609017100 HC EGD: Performed by: SURGERY

## 2020-05-19 PROCEDURE — 2060000000 HC ICU INTERMEDIATE R&B

## 2020-05-19 PROCEDURE — 6360000002 HC RX W HCPCS: Performed by: ANESTHESIOLOGIST ASSISTANT

## 2020-05-19 PROCEDURE — 3700000000 HC ANESTHESIA ATTENDED CARE: Performed by: SURGERY

## 2020-05-19 PROCEDURE — 97161 PT EVAL LOW COMPLEX 20 MIN: CPT

## 2020-05-19 PROCEDURE — 2580000003 HC RX 258: Performed by: NURSE PRACTITIONER

## 2020-05-19 PROCEDURE — 6370000000 HC RX 637 (ALT 250 FOR IP): Performed by: NURSE PRACTITIONER

## 2020-05-19 PROCEDURE — 94640 AIRWAY INHALATION TREATMENT: CPT

## 2020-05-19 PROCEDURE — 7100000001 HC PACU RECOVERY - ADDTL 15 MIN: Performed by: SURGERY

## 2020-05-19 PROCEDURE — 7100000000 HC PACU RECOVERY - FIRST 15 MIN: Performed by: SURGERY

## 2020-05-19 RX ORDER — PANTOPRAZOLE SODIUM 40 MG/10ML
40 INJECTION, POWDER, LYOPHILIZED, FOR SOLUTION INTRAVENOUS 2 TIMES DAILY
Status: DISCONTINUED | OUTPATIENT
Start: 2020-05-19 | End: 2020-05-21 | Stop reason: HOSPADM

## 2020-05-19 RX ORDER — SODIUM CHLORIDE 9 MG/ML
INJECTION, SOLUTION INTRAVENOUS CONTINUOUS PRN
Status: DISCONTINUED | OUTPATIENT
Start: 2020-05-19 | End: 2020-05-19 | Stop reason: SDUPTHER

## 2020-05-19 RX ORDER — PROPOFOL 10 MG/ML
INJECTION, EMULSION INTRAVENOUS PRN
Status: DISCONTINUED | OUTPATIENT
Start: 2020-05-19 | End: 2020-05-19 | Stop reason: SDUPTHER

## 2020-05-19 RX ADMIN — ACETAMINOPHEN 650 MG: 325 TABLET, FILM COATED ORAL at 23:21

## 2020-05-19 RX ADMIN — PANTOPRAZOLE SODIUM 40 MG: 40 INJECTION, POWDER, FOR SOLUTION INTRAVENOUS at 20:19

## 2020-05-19 RX ADMIN — SODIUM CHLORIDE, PRESERVATIVE FREE 10 ML: 5 INJECTION INTRAVENOUS at 20:20

## 2020-05-19 RX ADMIN — ARFORMOTEROL TARTRATE 15 MCG: 15 SOLUTION RESPIRATORY (INHALATION) at 18:36

## 2020-05-19 RX ADMIN — SODIUM CHLORIDE 25 ML: 9 INJECTION, SOLUTION INTRAVENOUS at 14:30

## 2020-05-19 RX ADMIN — BUDESONIDE 250 MCG: 0.25 SUSPENSION RESPIRATORY (INHALATION) at 18:36

## 2020-05-19 RX ADMIN — SODIUM CHLORIDE, PRESERVATIVE FREE 10 ML: 5 INJECTION INTRAVENOUS at 07:57

## 2020-05-19 RX ADMIN — IPRATROPIUM BROMIDE AND ALBUTEROL SULFATE 1 AMPULE: 2.5; .5 SOLUTION RESPIRATORY (INHALATION) at 18:36

## 2020-05-19 RX ADMIN — PANTOPRAZOLE SODIUM 40 MG: 40 INJECTION, POWDER, FOR SOLUTION INTRAVENOUS at 07:06

## 2020-05-19 RX ADMIN — ARFORMOTEROL TARTRATE 15 MCG: 15 SOLUTION RESPIRATORY (INHALATION) at 08:31

## 2020-05-19 RX ADMIN — CEFEPIME 2 G: 2 INJECTION, POWDER, FOR SOLUTION INTRAVENOUS at 23:21

## 2020-05-19 RX ADMIN — PROPOFOL 50 MG: 10 INJECTION, EMULSION INTRAVENOUS at 13:34

## 2020-05-19 RX ADMIN — ACETAMINOPHEN 650 MG: 325 TABLET, FILM COATED ORAL at 16:05

## 2020-05-19 RX ADMIN — OLANZAPINE 5 MG: 5 TABLET, FILM COATED ORAL at 20:19

## 2020-05-19 RX ADMIN — SODIUM CHLORIDE, PRESERVATIVE FREE 10 ML: 5 INJECTION INTRAVENOUS at 23:21

## 2020-05-19 RX ADMIN — PANCRELIPASE 24000 UNITS: 60000; 12000; 38000 CAPSULE, DELAYED RELEASE PELLETS ORAL at 16:39

## 2020-05-19 RX ADMIN — IPRATROPIUM BROMIDE AND ALBUTEROL SULFATE 1 AMPULE: 2.5; .5 SOLUTION RESPIRATORY (INHALATION) at 08:32

## 2020-05-19 RX ADMIN — CEFEPIME 2 G: 2 INJECTION, POWDER, FOR SOLUTION INTRAVENOUS at 12:08

## 2020-05-19 RX ADMIN — BUDESONIDE 250 MCG: 0.25 SUSPENSION RESPIRATORY (INHALATION) at 08:31

## 2020-05-19 RX ADMIN — SODIUM CHLORIDE: 9 INJECTION, SOLUTION INTRAVENOUS at 13:30

## 2020-05-19 ASSESSMENT — PAIN DESCRIPTION - LOCATION
LOCATION: HEAD
LOCATION: THROAT
LOCATION: HEAD

## 2020-05-19 ASSESSMENT — PAIN SCALES - GENERAL
PAINLEVEL_OUTOF10: 0
PAINLEVEL_OUTOF10: 0
PAINLEVEL_OUTOF10: 3
PAINLEVEL_OUTOF10: 2
PAINLEVEL_OUTOF10: 9
PAINLEVEL_OUTOF10: 10

## 2020-05-19 ASSESSMENT — PAIN DESCRIPTION - DESCRIPTORS
DESCRIPTORS: HEADACHE
DESCRIPTORS: HEADACHE
DESCRIPTORS: SORE

## 2020-05-19 ASSESSMENT — PAIN - FUNCTIONAL ASSESSMENT
PAIN_FUNCTIONAL_ASSESSMENT: ACTIVITIES ARE NOT PREVENTED
PAIN_FUNCTIONAL_ASSESSMENT: ACTIVITIES ARE NOT PREVENTED

## 2020-05-19 ASSESSMENT — PAIN DESCRIPTION - PAIN TYPE
TYPE: ACUTE PAIN

## 2020-05-19 ASSESSMENT — PAIN DESCRIPTION - ONSET: ONSET: ON-GOING

## 2020-05-19 ASSESSMENT — PAIN DESCRIPTION - PROGRESSION: CLINICAL_PROGRESSION: NOT CHANGED

## 2020-05-19 ASSESSMENT — PAIN DESCRIPTION - FREQUENCY: FREQUENCY: CONTINUOUS

## 2020-05-19 NOTE — CARE COORDINATION
Patient AM/PAC 13 with PT/OT. Discussed MARK with patient, she understands if she remains weak she will need MARK at discharge. She would prefer to return to [place] [address]. Referral initiated. If accepted requested that facility start auth today. Patient for EGD. I can be reached at [phone] with questions. Patient accepted at [facility] and facility submitted for precert. HENS and ambulette on soft chart. The Plan for Transition of Care is related to the following treatment goals: discharge planning when stable     The Patient and/or patient representative Elvi Villasenor was provided with a choice of provider and agrees   with the discharge plan. [x] Yes [] No    Freedom of choice list was provided with basic dialogue that supports the patient's individualized plan of care/goals, treatment preferences and shares the quality data associated with the providers.  [x] Yes [] No

## 2020-05-19 NOTE — OP NOTE
withdrawn. The patient tolerated the procedure well.  Dr. Anai Venegas was present for the entire procedure         Electronically signed by Bree Weinstein MD on 5/19/2020 at 1:47 PM

## 2020-05-19 NOTE — ANESTHESIA POSTPROCEDURE EVALUATION
Department of Anesthesiology  Postprocedure Note    Patient: Mik Morales  MRN: [id number]  YOB: 1955  Date of evaluation: 5/19/2020  Time:  3:39 PM     Procedure Summary     Date:  05/19/20 Room / Location:  [address] [place] [address] / [facility]    Anesthesia Start:  1330 Anesthesia Stop:  0787    Procedure:  EGD DIAGNOSTIC ONLY (N/A ) Diagnosis:  (/)    Surgeon:  Gale Kearney MD Responsible Provider:  Deonte Anthony MD    Anesthesia Type:  MAC ASA Status:  3          Anesthesia Type: MAC    Vielka Phase I: Viekla Score: 9    Vielka Phase II:      Last vitals: Reviewed and per EMR flowsheets.        Anesthesia Post Evaluation    Patient location during evaluation: PACU  Patient participation: complete - patient participated  Level of consciousness: awake and alert  Airway patency: patent  Nausea & Vomiting: no nausea and no vomiting  Complications: no  Cardiovascular status: hemodynamically stable and blood pressure returned to baseline  Respiratory status: acceptable  Hydration status: euvolemic

## 2020-05-19 NOTE — PROGRESS NOTES
OCCUPATIONAL THERAPY INITIAL EVALUATION      Date:2020  Patient Name: Dominguez Josue  MRN: [id number]  : 1955  Room: [address] [place], [place] [address]    Referring Provider: Radha Grady MD    Evaluating OT: Yaakov Rocha OTR/L    AM-PAC Daily Activity Raw Score:   Recommended Adaptive Equipment: BSC     Diagnosis: COPD exacerbation   Pertinent Medical History: anemia, UTI  Precautions:  Falls, O2     Home Living: Pt lives alone, 2 story home, bed/bath 2nd floor, 3 steps to enter. 18 steps to 2nd floor    Bathroom setup: tub/shower with tub bench  Equipment owned: rolator, O2  Prior Level of Function: independent with ADLs, assistance with grocery shopping with IADLs; using rollator for ambulation. Driving: yes  Occupation: n/a    Pain Level: 9/10 HA  Cognition: A&O: 4/4; Follows 2 step directions   Memory: Fair   Sequencing: Fair   Problem solving: fair   Judgement/safety: fair     Functional Assessment:   Initial Eval Status  Date: 20 Treatment Status  Date: Short Term Goals  Treatment frequency: PRN     Feeding MIN A     SBA   Grooming MIN A    SBA   UB Dressing MOD A with hospital gown    SBA/setup   LB Dressing DEP with socks     SBA   Bathing MAX A    SBA   Toileting DEP with catheter    SBA/MIN A   Bed Mobility  Supine to sit: SBA  Sit to supine: SBA  Supine to sit: MOD I  Sit to supine: MOD I   Functional Transfers Sit to stand: MIN A  Stand to sit: MIN A  SBA   Functional Mobility MIN A without AD to side step to HOB    SBA   Balance Sitting:     Static:  SBA/MIN A    Dynamic:MIN A to sit EOB during ADL task x 3 min. Pt c/o dizziness.    Standing: MIN A  Good sitting balance  SBA with standing balance   Endurance/Activity Tolerance Poor due to c/o HA and dizziness  SBA   Visual/  Perceptual Glasses: n/a             UE ROM: RUE:   grossly WFL  LUE:   grossly WFL  Strength: RUE: 4/5    LUE:  4/5   Strength:  B WFL  Fine Motor Coordination:  WFL    Hearing: WFL  Sensation:   No c/o numbness or

## 2020-05-20 LAB
ANION GAP SERPL CALCULATED.3IONS-SCNC: 11 MMOL/L (ref 7–16)
ANISOCYTOSIS: ABNORMAL
BASOPHILIC STIPPLING: ABNORMAL
BASOPHILS ABSOLUTE: 0 E9/L (ref 0–0.2)
BASOPHILS RELATIVE PERCENT: 0.6 % (ref 0–2)
BLOOD BANK DISPENSE STATUS: NORMAL
BLOOD BANK PRODUCT CODE: NORMAL
BPU ID: NORMAL
BUN BLDV-MCNC: 15 MG/DL (ref 8–23)
CALCIUM SERPL-MCNC: 10.1 MG/DL (ref 8.6–10.2)
CHLORIDE BLD-SCNC: 101 MMOL/L (ref 98–107)
CO2: 29 MMOL/L (ref 22–29)
CREAT SERPL-MCNC: 0.8 MG/DL (ref 0.5–1)
DESCRIPTION BLOOD BANK: NORMAL
EOSINOPHILS ABSOLUTE: 0.06 E9/L (ref 0.05–0.5)
EOSINOPHILS RELATIVE PERCENT: 0.9 % (ref 0–6)
GFR AFRICAN AMERICAN: >60
GFR NON-AFRICAN AMERICAN: >60 ML/MIN/1.73
GLUCOSE BLD-MCNC: 119 MG/DL (ref 74–99)
HCT VFR BLD CALC: 25.9 % (ref 34–48)
HEMOGLOBIN: 7 G/DL (ref 11.5–15.5)
HYPOCHROMIA: ABNORMAL
LYMPHOCYTES ABSOLUTE: 0.63 E9/L (ref 1.5–4)
LYMPHOCYTES RELATIVE PERCENT: 10.4 % (ref 20–42)
MCH RBC QN AUTO: 22.2 PG (ref 26–35)
MCHC RBC AUTO-ENTMCNC: 27 % (ref 32–34.5)
MCV RBC AUTO: 82.2 FL (ref 80–99.9)
MONOCYTES ABSOLUTE: 0.5 E9/L (ref 0.1–0.95)
MONOCYTES RELATIVE PERCENT: 7.8 % (ref 2–12)
NEUTROPHILS ABSOLUTE: 5.1 E9/L (ref 1.8–7.3)
NEUTROPHILS RELATIVE PERCENT: 80.9 % (ref 43–80)
OVALOCYTES: ABNORMAL
PDW BLD-RTO: 18.1 FL (ref 11.5–15)
PLATELET # BLD: 339 E9/L (ref 130–450)
PMV BLD AUTO: 9.2 FL (ref 7–12)
POIKILOCYTES: ABNORMAL
POLYCHROMASIA: ABNORMAL
POTASSIUM SERPL-SCNC: 4.6 MMOL/L (ref 3.5–5)
RBC # BLD: 3.15 E12/L (ref 3.5–5.5)
SARS-COV-2, NAAT: NOT DETECTED
SCHISTOCYTES: ABNORMAL
SODIUM BLD-SCNC: 141 MMOL/L (ref 132–146)
STOMATOCYTES: ABNORMAL
TARGET CELLS: ABNORMAL
URINE CULTURE, ROUTINE: NORMAL
URINE CULTURE, ROUTINE: NORMAL
WBC # BLD: 6.3 E9/L (ref 4.5–11.5)

## 2020-05-20 PROCEDURE — 80048 BASIC METABOLIC PNL TOTAL CA: CPT

## 2020-05-20 PROCEDURE — 6370000000 HC RX 637 (ALT 250 FOR IP): Performed by: STUDENT IN AN ORGANIZED HEALTH CARE EDUCATION/TRAINING PROGRAM

## 2020-05-20 PROCEDURE — 2060000000 HC ICU INTERMEDIATE R&B

## 2020-05-20 PROCEDURE — 6360000002 HC RX W HCPCS: Performed by: STUDENT IN AN ORGANIZED HEALTH CARE EDUCATION/TRAINING PROGRAM

## 2020-05-20 PROCEDURE — 94640 AIRWAY INHALATION TREATMENT: CPT

## 2020-05-20 PROCEDURE — 2580000003 HC RX 258: Performed by: STUDENT IN AN ORGANIZED HEALTH CARE EDUCATION/TRAINING PROGRAM

## 2020-05-20 PROCEDURE — 2700000000 HC OXYGEN THERAPY PER DAY

## 2020-05-20 PROCEDURE — C9113 INJ PANTOPRAZOLE SODIUM, VIA: HCPCS | Performed by: STUDENT IN AN ORGANIZED HEALTH CARE EDUCATION/TRAINING PROGRAM

## 2020-05-20 PROCEDURE — 85025 COMPLETE CBC W/AUTO DIFF WBC: CPT

## 2020-05-20 PROCEDURE — 36430 TRANSFUSION BLD/BLD COMPNT: CPT

## 2020-05-20 PROCEDURE — 36415 COLL VENOUS BLD VENIPUNCTURE: CPT

## 2020-05-20 PROCEDURE — P9016 RBC LEUKOCYTES REDUCED: HCPCS

## 2020-05-20 PROCEDURE — U0002 COVID-19 LAB TEST NON-CDC: HCPCS

## 2020-05-20 RX ORDER — PANTOPRAZOLE SODIUM 40 MG/10ML
40 INJECTION, POWDER, LYOPHILIZED, FOR SOLUTION INTRAVENOUS 2 TIMES DAILY
Qty: 60 EACH | Refills: 3 | Status: SHIPPED | OUTPATIENT
Start: 2020-05-20 | End: 2020-05-20 | Stop reason: HOSPADM

## 2020-05-20 RX ORDER — 0.9 % SODIUM CHLORIDE 0.9 %
20 INTRAVENOUS SOLUTION INTRAVENOUS ONCE
Status: DISCONTINUED | OUTPATIENT
Start: 2020-05-20 | End: 2020-05-21 | Stop reason: HOSPADM

## 2020-05-20 RX ORDER — PANTOPRAZOLE SODIUM 20 MG/1
40 TABLET, DELAYED RELEASE ORAL 2 TIMES DAILY
Qty: 60 TABLET | Refills: 0 | Status: SHIPPED | OUTPATIENT
Start: 2020-05-20 | End: 2020-05-20 | Stop reason: HOSPADM

## 2020-05-20 RX ORDER — PANTOPRAZOLE SODIUM 40 MG/1
40 TABLET, DELAYED RELEASE ORAL
Qty: 180 TABLET | Refills: 1 | Status: SHIPPED | OUTPATIENT
Start: 2020-05-20 | End: 2021-10-21

## 2020-05-20 RX ADMIN — MULTIVITAMIN TABLET 1 TABLET: TABLET at 08:31

## 2020-05-20 RX ADMIN — PANCRELIPASE 24000 UNITS: 60000; 12000; 38000 CAPSULE, DELAYED RELEASE PELLETS ORAL at 12:36

## 2020-05-20 RX ADMIN — FOLIC ACID 1 MG: 1 TABLET ORAL at 08:31

## 2020-05-20 RX ADMIN — DIAZEPAM 10 MG: 5 TABLET ORAL at 08:31

## 2020-05-20 RX ADMIN — OLANZAPINE 5 MG: 5 TABLET, FILM COATED ORAL at 22:00

## 2020-05-20 RX ADMIN — PANCRELIPASE 24000 UNITS: 60000; 12000; 38000 CAPSULE, DELAYED RELEASE PELLETS ORAL at 08:31

## 2020-05-20 RX ADMIN — PANCRELIPASE 24000 UNITS: 60000; 12000; 38000 CAPSULE, DELAYED RELEASE PELLETS ORAL at 17:57

## 2020-05-20 RX ADMIN — SODIUM CHLORIDE, PRESERVATIVE FREE 10 ML: 5 INJECTION INTRAVENOUS at 08:30

## 2020-05-20 RX ADMIN — PANTOPRAZOLE SODIUM 40 MG: 40 INJECTION, POWDER, FOR SOLUTION INTRAVENOUS at 22:01

## 2020-05-20 RX ADMIN — IPRATROPIUM BROMIDE AND ALBUTEROL SULFATE 1 AMPULE: 2.5; .5 SOLUTION RESPIRATORY (INHALATION) at 08:01

## 2020-05-20 RX ADMIN — ARFORMOTEROL TARTRATE 15 MCG: 15 SOLUTION RESPIRATORY (INHALATION) at 08:01

## 2020-05-20 RX ADMIN — ARFORMOTEROL TARTRATE 15 MCG: 15 SOLUTION RESPIRATORY (INHALATION) at 19:19

## 2020-05-20 RX ADMIN — BUDESONIDE 250 MCG: 0.25 SUSPENSION RESPIRATORY (INHALATION) at 08:01

## 2020-05-20 RX ADMIN — ACETAMINOPHEN 650 MG: 325 TABLET, FILM COATED ORAL at 06:20

## 2020-05-20 RX ADMIN — SERTRALINE 50 MG: 50 TABLET, FILM COATED ORAL at 08:31

## 2020-05-20 RX ADMIN — IPRATROPIUM BROMIDE AND ALBUTEROL SULFATE 1 AMPULE: 2.5; .5 SOLUTION RESPIRATORY (INHALATION) at 16:00

## 2020-05-20 RX ADMIN — ACETAMINOPHEN 650 MG: 325 TABLET, FILM COATED ORAL at 13:01

## 2020-05-20 RX ADMIN — CEFEPIME 2 G: 2 INJECTION, POWDER, FOR SOLUTION INTRAVENOUS at 10:38

## 2020-05-20 RX ADMIN — IPRATROPIUM BROMIDE AND ALBUTEROL SULFATE 1 AMPULE: 2.5; .5 SOLUTION RESPIRATORY (INHALATION) at 19:19

## 2020-05-20 RX ADMIN — BUDESONIDE 250 MCG: 0.25 SUSPENSION RESPIRATORY (INHALATION) at 19:20

## 2020-05-20 RX ADMIN — PANTOPRAZOLE SODIUM 40 MG: 40 INJECTION, POWDER, FOR SOLUTION INTRAVENOUS at 08:30

## 2020-05-20 ASSESSMENT — PAIN DESCRIPTION - LOCATION
LOCATION: HEAD

## 2020-05-20 ASSESSMENT — PAIN SCALES - GENERAL
PAINLEVEL_OUTOF10: 10
PAINLEVEL_OUTOF10: 0
PAINLEVEL_OUTOF10: 9

## 2020-05-20 ASSESSMENT — PAIN - FUNCTIONAL ASSESSMENT: PAIN_FUNCTIONAL_ASSESSMENT: ACTIVITIES ARE NOT PREVENTED

## 2020-05-20 ASSESSMENT — PAIN DESCRIPTION - DESCRIPTORS
DESCRIPTORS: HEADACHE

## 2020-05-20 ASSESSMENT — PAIN DESCRIPTION - PAIN TYPE
TYPE: ACUTE PAIN

## 2020-05-20 ASSESSMENT — PAIN DESCRIPTION - ONSET: ONSET: ON-GOING

## 2020-05-20 ASSESSMENT — PAIN DESCRIPTION - PROGRESSION: CLINICAL_PROGRESSION: NOT CHANGED

## 2020-05-20 ASSESSMENT — PAIN DESCRIPTION - FREQUENCY: FREQUENCY: CONTINUOUS

## 2020-05-20 NOTE — PROGRESS NOTES
Nurse to nurse called to [facility]. AVS faxed to facility. Stat lab requested to draw post transfusion H/H at 17:54; repeat request 19:08. Results needed prior to discharge.

## 2020-05-20 NOTE — DISCHARGE INSTR - COC
Continuity of Care Form    Patient Name: Yanira Dawson   :  1955  MRN:  [id number]    Admit date:  2020  Discharge date:  2020      Code Status Order: Full Code   Advance Directives:   Advance Care Flowsheet Documentation     Date/Time Healthcare Directive Type of Healthcare Directive Copy in [address] Agent's Name Healthcare Agent's Phone Number    20 1212  No, patient does not have an advance directive for healthcare treatment -- -- -- -- --    20 2326  No, patient does not have an advance directive for healthcare treatment -- -- -- -- --          Admitting Physician:  Rudy Greco MD  PCP: Sona Peng MD    Discharging Nurse: Shayne Lamb, [address] [place] [address] Unit/Room#: 8145/3517-R  Discharging Unit Phone Number: [phone]    Emergency Contact:   Extended Emergency Contact Information  Primary Emergency Contact: Earnestine Bishop  Address: [address]  C/Sukhdeep Ruvalcaba [address] Phone: [phone]  Relation: Brother/Sister  Secondary Emergency Contact: Jeffery Pierre  Address: [address] [place] [address] Phone: [phone]  Work Phone: [phone]  Mobile Phone: [phone]  Relation: Brother/Sister  Preferred language: English   needed? No    Past Surgical History:  Past Surgical History:   Procedure Laterality Date    COLONOSCOPY      COLONOSCOPY N/A 2019    COLONOSCOPY DIAGNOSTIC performed by Romario Valenzuela MD at [facility] ENDOSCOPY    ECHO COMPL W DOP COLOR FLOW  2013         HYSTERECTOMY      KIDNEY SURGERY Left 2014    ABLATION PERFORMED UNDER CT SCAN    [place] CYSTOURETHROSCOPY,BIOPSIES N/A 2018    CYSTOSCOPY RETROGRADE PYELOGRAM, CLOT EVACATION , POSS.   BLADDER BIOPSY ---DR Murphy Promise 2 :30 performed by Yesika Sue DO at [address] [place] [address] N/A 5/10/2019    EGD BIOPSY performed by Romario Valenzuela MD at [address] [place] [address]

## 2020-05-20 NOTE — DISCHARGE SUMMARY
Physician Discharge Summary     Patient ID:  Natasha Alfaro  [id number]  77 y.o.  1955    Admit date: 2020    Discharge date and time: 2020    Admission Diagnoses:   Patient Active Problem List   Diagnosis    [facility]    MDD (major depressive disorder)    Hematuria    Neurogenic bladder    Acute respiratory failure with hypoxia (HCC)    Obstructed [place] catheter ([facility]    Anemia    GI bleed       Discharge Diagnoses: uti , dehydration , , GIB     Consults:urology , GI     Procedures:EGD   Hospital Course:  Admit to tele for eval of UTI in pt with indwelling chroncic urinary cathter   Probably colonization d/t indwelling cath   Iv fluids   Iv cefepime , stop vanc   URology eval to appreciated-patient known to them-apparently suprapubic catheter placement has been recommended to her in the past,-however she was not sure and still seems to be unsure about this- can follow-up for suprapubic catheter on an outpatient basis     Anemia   Monitor q 8 X3  Transfuse 1 unit PRBC today prior to discharge  Had c scope 1 years ago   Status post -superficial ulcerations check a.m. advance diet        COPD   monitor  No wheeze   No immediate need for steroids         DVT Prophylaxis   PT/OT  Discharge planning-okay for discharge today from medicine after 1 unit PRBC transfused          Recent Labs     20  1750  20  0348  20  1641 20  0459   WBC 7.7  --  7.5  --   --   --  6.3   HGB 6.4*   < > 7.2*   < > 6.9* 6.9* 7.0*   HCT 23.3*   < > 26.0*   < > 25.7* 25.0* 25.9*   *  --  405  --   --   --  339    < > = values in this interval not displayed.        Recent Labs     20  1750 20  0348 20  0459    137 141   K 4.2 4.4 4.6    104 101   CO2 24 25 29   BUN 17 16 15   CREATININE 0.9 0.9 0.8   CALCIUM 10.2 9.9 10.1       Xr Chest Portable    Result Date: 2020  Patient MRN:  [id number] : lipase-protease-amylase 79395-28046 units delayed release capsule  Commonly known as:  Creon  Take 1 capsule by mouth 3 times daily (with meals)  What changed:  when to take this     sertraline 50 MG tablet  Commonly known as:  ZOLOFT  Take 1 tablet by mouth daily. What changed:  when to take this        CONTINUE taking these medications    albuterol sulfate  (90 Base) MCG/ACT inhaler     alendronate 70 MG tablet  Commonly known as:  FOSAMAX     Anoro Ellipta 62.5-25 MCG/INH Aepb inhaler  Generic drug:  umeclidinium-vilanterol     Daily-Fritz/Iron Tabs     diazePAM 10 MG tablet  Commonly known as:  VALIUM     folic acid 1 MG tablet  Commonly known as:  FOLVITE     nicotine 21 MG/24HR  Commonly known as:  NICODERM CQ     OLANZapine 5 MG tablet  Commonly known as:  ZYPREXA  Take 1 tablet by mouth nightly. OXYGEN     Trelegy Ellipta 100-62.5-25 MCG/INH Aepb  Generic drug:  fluticasone-umeclidin-vilant     vitamin D 1.25 MG (25895 UT) Caps capsule  Commonly known as:  ERGOCALCIFEROL           Where to Get Your Medications      These medications were sent to [address]  [address] [address]    Phone:  [phone]   · pantoprazole 40 MG injection       Activity: activity as tolerated  Diet: regular diet    Pt has been advised to: Follow-up with Gianna Self MD in 1 week.   Follow-up with consultants as recommended by them    Note that over 30 minutes was spent in preparing discharge papers, discussing discharge with patient, medication review, etc.    Signed:  Gertrude Salas MD  5/20/2020  12:50 PM

## 2020-05-20 NOTE — PROGRESS NOTES
Subjective: The patient is resting comfortably no apparent acute distress  She had an EGD yesterday and hemoglobin has remained stable  Objective:    BP (!) 99/55   Pulse 75   Temp 98.7 °F (37.1 °C) (Temporal)   Resp 17   Ht 5' 7\" (1.702 m)   Wt 120 lb (54.4 kg)   SpO2 96%   BMI 18.79 kg/m²     In: 120 [P.O.:50; I.V.:20]  Out: 400     HEENT: NCAT,  PERRLA, No JVD  Heart:  RRR, no murmurs, gallops, or rubs. Lungs:  CTA bilaterally, no wheeze, rales or rhonchi  Abd: bowel sounds present, nontender, nondistended, no masses  Extrem:  No clubbing, cyanosis, or edema     Recent Labs     05/17/20  1750  05/18/20  0348  05/19/20  1641 05/19/20  2054 05/20/20  0459   WBC 7.7  --  7.5  --   --   --  6.3   HGB 6.4*   < > 7.2*   < > 6.9* 6.9* 7.0*   HCT 23.3*   < > 26.0*   < > 25.7* 25.0* 25.9*   *  --  405  --   --   --  339    < > = values in this interval not displayed.        Recent Labs     05/17/20  1750 05/18/20  0348 05/20/20  0459    137 141   K 4.2 4.4 4.6    104 101   CO2 24 25 29   BUN 17 16 15   CREATININE 0.9 0.9 0.8   CALCIUM 10.2 9.9 10.1       Assessment:    Patient Active Problem List   Diagnosis    COPD [facility]chronic obstructive [facility] disease) [facility]    MDD (major depressive disorder)    Hematuria    Neurogenic bladder    Acute respiratory failure with hypoxia (HCC)    Obstructed Del Rio catheter (HCC)    Anemia    GI bleed       Plan:    Admit to tele for eval of UTI in pt with indwelling chroncic urinary cathter   Probably colonization d/t indwelling cath   Iv fluids   Iv cefepime , stop vanc   URology eval to appreciated-patient known to them-apparently suprapubic catheter placement has been recommended to her in the past,-however she was not sure and still seems to be unsure about this- can follow-up for suprapubic catheter on an outpatient basis     Anemia   Monitor q 8 X3  Transfuse 1 unit PRBC today prior to discharge  Had c scope 1 years ago   Status post EGD today

## 2020-05-21 VITALS
WEIGHT: 120 LBS | RESPIRATION RATE: 16 BRPM | OXYGEN SATURATION: 92 % | TEMPERATURE: 99 F | DIASTOLIC BLOOD PRESSURE: 57 MMHG | HEIGHT: 67 IN | BODY MASS INDEX: 18.83 KG/M2 | SYSTOLIC BLOOD PRESSURE: 116 MMHG | HEART RATE: 100 BPM

## 2020-05-21 LAB
HCT VFR BLD CALC: 26.9 % (ref 34–48)
HEMOGLOBIN: 7.6 G/DL (ref 11.5–15.5)

## 2020-05-21 PROCEDURE — 85014 HEMATOCRIT: CPT

## 2020-05-21 PROCEDURE — 36415 COLL VENOUS BLD VENIPUNCTURE: CPT

## 2020-05-21 PROCEDURE — 85018 HEMOGLOBIN: CPT

## 2020-05-21 PROCEDURE — 2700000000 HC OXYGEN THERAPY PER DAY

## 2020-05-21 PROCEDURE — 6370000000 HC RX 637 (ALT 250 FOR IP): Performed by: STUDENT IN AN ORGANIZED HEALTH CARE EDUCATION/TRAINING PROGRAM

## 2020-05-21 PROCEDURE — 94640 AIRWAY INHALATION TREATMENT: CPT

## 2020-05-21 PROCEDURE — 6360000002 HC RX W HCPCS: Performed by: STUDENT IN AN ORGANIZED HEALTH CARE EDUCATION/TRAINING PROGRAM

## 2020-05-21 RX ADMIN — MULTIVITAMIN TABLET 1 TABLET: TABLET at 08:57

## 2020-05-21 RX ADMIN — FOLIC ACID 1 MG: 1 TABLET ORAL at 08:58

## 2020-05-21 RX ADMIN — PANCRELIPASE 24000 UNITS: 60000; 12000; 38000 CAPSULE, DELAYED RELEASE PELLETS ORAL at 08:57

## 2020-05-21 RX ADMIN — BUDESONIDE 250 MCG: 0.25 SUSPENSION RESPIRATORY (INHALATION) at 08:26

## 2020-05-21 RX ADMIN — PANCRELIPASE 24000 UNITS: 60000; 12000; 38000 CAPSULE, DELAYED RELEASE PELLETS ORAL at 12:04

## 2020-05-21 RX ADMIN — IPRATROPIUM BROMIDE AND ALBUTEROL SULFATE 1 AMPULE: 2.5; .5 SOLUTION RESPIRATORY (INHALATION) at 08:26

## 2020-05-21 RX ADMIN — DIAZEPAM 10 MG: 5 TABLET ORAL at 08:57

## 2020-05-21 RX ADMIN — ACETAMINOPHEN 650 MG: 325 TABLET, FILM COATED ORAL at 08:58

## 2020-05-21 RX ADMIN — ARFORMOTEROL TARTRATE 15 MCG: 15 SOLUTION RESPIRATORY (INHALATION) at 08:26

## 2020-05-21 RX ADMIN — SERTRALINE 50 MG: 50 TABLET, FILM COATED ORAL at 08:57

## 2020-05-21 ASSESSMENT — PAIN SCALES - GENERAL
PAINLEVEL_OUTOF10: 0
PAINLEVEL_OUTOF10: 0
PAINLEVEL_OUTOF10: 5

## 2020-05-21 NOTE — CARE COORDINATION
Discharge order remains in. Per Coby Harada from [facility], patient can discharge there today and she will set up transportation via [facility] and let me know the time. HENS and ambulette form in envelope in soft chart. Await transportation time from Coby Harada.   Shar Norris RN CM

## 2020-05-23 LAB
BLOOD CULTURE, ROUTINE: NORMAL
CULTURE, BLOOD 2: NORMAL

## 2020-08-24 ENCOUNTER — OFFICE VISIT (OUTPATIENT)
Dept: HEMATOLOGY | Age: 65
End: 2020-08-24
Payer: COMMERCIAL

## 2020-08-24 VITALS
TEMPERATURE: 97.3 F | WEIGHT: 124.7 LBS | RESPIRATION RATE: 16 BRPM | HEART RATE: 101 BPM | BODY MASS INDEX: 19.57 KG/M2 | OXYGEN SATURATION: 95 % | DIASTOLIC BLOOD PRESSURE: 48 MMHG | SYSTOLIC BLOOD PRESSURE: 102 MMHG | HEIGHT: 67 IN

## 2020-08-24 PROCEDURE — 1123F ACP DISCUSS/DSCN MKR DOCD: CPT | Performed by: TRANSPLANT SURGERY

## 2020-08-24 PROCEDURE — G8420 CALC BMI NORM PARAMETERS: HCPCS | Performed by: TRANSPLANT SURGERY

## 2020-08-24 PROCEDURE — G8428 CUR MEDS NOT DOCUMENT: HCPCS | Performed by: TRANSPLANT SURGERY

## 2020-08-24 PROCEDURE — 99213 OFFICE O/P EST LOW 20 MIN: CPT | Performed by: TRANSPLANT SURGERY

## 2020-08-24 PROCEDURE — 1090F PRES/ABSN URINE INCON ASSESS: CPT | Performed by: TRANSPLANT SURGERY

## 2020-08-24 PROCEDURE — 3017F COLORECTAL CA SCREEN DOC REV: CPT | Performed by: TRANSPLANT SURGERY

## 2020-08-24 PROCEDURE — 1036F TOBACCO NON-USER: CPT | Performed by: TRANSPLANT SURGERY

## 2020-08-24 PROCEDURE — 99212 OFFICE O/P EST SF 10 MIN: CPT | Performed by: TRANSPLANT SURGERY

## 2020-08-24 PROCEDURE — 4040F PNEUMOC VAC/ADMIN/RCVD: CPT | Performed by: TRANSPLANT SURGERY

## 2020-08-24 PROCEDURE — G8399 PT W/DXA RESULTS DOCUMENT: HCPCS | Performed by: TRANSPLANT SURGERY

## 2020-08-24 RX ORDER — FAMOTIDINE 40 MG/1
40 TABLET, FILM COATED ORAL EVERY EVENING
Qty: 30 TABLET | Refills: 3 | Status: SHIPPED
Start: 2020-08-24 | End: 2021-09-17

## 2020-08-24 NOTE — PROGRESS NOTES
Hepatobiliary and Pancreatic Surgery Progress Note    CC: follow up     Subjective: Patient states that she is no longer having bloating and she does not have diarrhea since increasing her Creon. She has gained almost 25 lbs since I first met her. She continues to bladder issues but more recently has lost 10 lbs. She is now on 3L of oxygen daily and follow with Dr. Rosemarie Patel. She is not having any diarrhea. She did have gastric ulcers on a recent scope and had a colonoscopy with Dr. Shweta Saldaña in October of 19    OBJECTIVE      Physical    BP (!) 102/48 (Site: Right Upper Arm, Position: Sitting, Cuff Size: Medium Adult)   Pulse 101   Temp 97.3 °F (36.3 °C) (Temporal)   Resp 16   Ht 5' 7\" (1.702 m)   Wt 124 lb 11.2 oz (56.6 kg)   SpO2 95% Comment: o2 3 liters  BMI 19.53 kg/m²       General appearance: appears in no acute distress  Lungs:CTABL  Heart: RRR  Abdomen: soft, nondistended, nontympanic, no guarding, no peritoneal signs, normoactive bowel sounds  Extremities:no peripheral edema    ASSESSMENT: Chronic calcific pancreatitis    PLAN:    - continue Creon at 48K units  - follow up with me in 6 months  - continue alcohol and smoking abstinence  - CT abdomen    15 Minutes of which greater than 50% was spent counseling or coordinating her care. Thank you for the consultation and allowing me to take part in Ms. Keane's care.     Please send a copy of my note to Dr. Barb Cardenas 8/24/2020 1:57 PM

## 2020-08-25 ENCOUNTER — TELEPHONE (OUTPATIENT)
Dept: HEMATOLOGY | Age: 65
End: 2020-08-25

## 2020-08-27 ENCOUNTER — HOSPITAL ENCOUNTER (OUTPATIENT)
Dept: CT IMAGING | Age: 65
Discharge: HOME | End: 2020-08-29
Payer: COMMERCIAL

## 2020-08-27 ENCOUNTER — HOSPITAL ENCOUNTER (OUTPATIENT)
Age: 65
Discharge: HOME | End: 2020-08-27
Payer: COMMERCIAL

## 2020-08-27 LAB
ANION GAP SERPL CALCULATED.3IONS-SCNC: 7 MMOL/L (ref 7–16)
BUN BLDV-MCNC: 15 MG/DL (ref 8–23)
CALCIUM SERPL-MCNC: 10.1 MG/DL (ref 8.6–10.2)
CHLORIDE BLD-SCNC: 102 MMOL/L (ref 98–107)
CO2: 33 MMOL/L (ref 22–29)
CREAT SERPL-MCNC: 0.7 MG/DL (ref 0.5–1)
GFR AFRICAN AMERICAN: >60
GFR NON-AFRICAN AMERICAN: >60 ML/MIN/1.73
GLUCOSE BLD-MCNC: 114 MG/DL (ref 74–99)
POTASSIUM SERPL-SCNC: 4.4 MMOL/L (ref 3.5–5)
SODIUM BLD-SCNC: 142 MMOL/L (ref 132–146)

## 2020-08-27 PROCEDURE — 6360000004 HC RX CONTRAST MEDICATION: Performed by: RADIOLOGY

## 2020-08-27 PROCEDURE — 2580000003 HC RX 258: Performed by: RADIOLOGY

## 2020-08-27 PROCEDURE — 74160 CT ABDOMEN W/CONTRAST: CPT

## 2020-08-27 PROCEDURE — 36415 COLL VENOUS BLD VENIPUNCTURE: CPT

## 2020-08-27 PROCEDURE — 80048 BASIC METABOLIC PNL TOTAL CA: CPT

## 2020-08-27 RX ORDER — SODIUM CHLORIDE 0.9 % (FLUSH) 0.9 %
10 SYRINGE (ML) INJECTION
Status: COMPLETED | OUTPATIENT
Start: 2020-08-27 | End: 2020-08-27

## 2020-08-27 RX ADMIN — IOPAMIDOL 90 ML: 755 INJECTION, SOLUTION INTRAVENOUS at 11:50

## 2020-08-27 RX ADMIN — Medication 10 ML: at 11:50

## 2020-09-03 ENCOUNTER — OFFICE VISIT (OUTPATIENT)
Dept: HEMATOLOGY | Age: 65
End: 2020-09-03
Payer: COMMERCIAL

## 2020-09-03 VITALS
RESPIRATION RATE: 18 BRPM | OXYGEN SATURATION: 90 % | SYSTOLIC BLOOD PRESSURE: 101 MMHG | DIASTOLIC BLOOD PRESSURE: 50 MMHG | HEART RATE: 98 BPM | BODY MASS INDEX: 19.53 KG/M2 | HEIGHT: 67 IN | TEMPERATURE: 97.3 F

## 2020-09-03 PROCEDURE — G8427 DOCREV CUR MEDS BY ELIG CLIN: HCPCS | Performed by: TRANSPLANT SURGERY

## 2020-09-03 PROCEDURE — 3017F COLORECTAL CA SCREEN DOC REV: CPT | Performed by: TRANSPLANT SURGERY

## 2020-09-03 PROCEDURE — 99212 OFFICE O/P EST SF 10 MIN: CPT | Performed by: TRANSPLANT SURGERY

## 2020-09-03 PROCEDURE — 99213 OFFICE O/P EST LOW 20 MIN: CPT | Performed by: TRANSPLANT SURGERY

## 2020-09-03 PROCEDURE — G8420 CALC BMI NORM PARAMETERS: HCPCS | Performed by: TRANSPLANT SURGERY

## 2020-09-03 PROCEDURE — 1036F TOBACCO NON-USER: CPT | Performed by: TRANSPLANT SURGERY

## 2020-09-03 PROCEDURE — G8399 PT W/DXA RESULTS DOCUMENT: HCPCS | Performed by: TRANSPLANT SURGERY

## 2020-09-03 PROCEDURE — 4040F PNEUMOC VAC/ADMIN/RCVD: CPT | Performed by: TRANSPLANT SURGERY

## 2020-09-03 PROCEDURE — 1123F ACP DISCUSS/DSCN MKR DOCD: CPT | Performed by: TRANSPLANT SURGERY

## 2020-09-03 PROCEDURE — 1090F PRES/ABSN URINE INCON ASSESS: CPT | Performed by: TRANSPLANT SURGERY

## 2020-09-03 NOTE — PROGRESS NOTES
Hepatobiliary and Pancreatic Surgery Progress Note    CC: follow up     Subjective: Patient states that she is no longer having bloating and she does not have diarrhea since increasing her Creon. She has gained almost 25 lbs since I first met her. She continues to bladder issues but more recently has lost 10 lbs. She is now on 3L of oxygen daily and follow with Dr. John Patel. She is not having any diarrhea. She did have gastric ulcers on a recent scope and had a colonoscopy with Dr. Whit Raymond in October of 19    OBJECTIVE      Physical    BP (!) 101/50 (Site: Right Upper Arm, Position: Sitting, Cuff Size: Medium Adult)   Pulse 98   Temp 97.3 °F (36.3 °C) (Temporal)   Resp 18   Ht 5' 7\" (1.702 m)   SpO2 90% Comment: 3 liters o2  BMI 19.53 kg/m²       General appearance: appears in no acute distress  Lungs:CTABL  Heart: RRR  Abdomen: soft, nondistended, nontympanic, no guarding, no peritoneal signs, normoactive bowel sounds  Extremities:no peripheral edema    ASSESSMENT: Chronic calcific pancreatitis with pancreatic duct hypertension    PLAN:    - continue Creon at 48K units  - follow up with me in 6 months  - continue alcohol and smoking abstinence  - unfortunately given her pulmonary status she is not a good surgical candidate. She may need increases in her creon to help with her symptoms. She would not do well with a [place] or [place] procedure    15 Minutes of which greater than 50% was spent counseling or coordinating her care. Thank you for the consultation and allowing me to take part in Ms. Keane's care.     Please send a copy of my note to Dr. Zeus Tripp 9/3/2020 10:20 AM

## 2021-02-01 ENCOUNTER — TELEPHONE (OUTPATIENT)
Dept: HEMATOLOGY | Age: 66
End: 2021-02-01

## 2021-02-09 ENCOUNTER — APPOINTMENT (OUTPATIENT)
Dept: GENERAL RADIOLOGY | Age: 66
End: 2021-02-09
Payer: COMMERCIAL

## 2021-02-09 ENCOUNTER — HOSPITAL ENCOUNTER (EMERGENCY)
Age: 66
Discharge: HOME | End: 2021-02-09
Attending: EMERGENCY MEDICINE
Payer: COMMERCIAL

## 2021-02-09 VITALS
SYSTOLIC BLOOD PRESSURE: 132 MMHG | TEMPERATURE: 96.9 F | DIASTOLIC BLOOD PRESSURE: 63 MMHG | RESPIRATION RATE: 18 BRPM | BODY MASS INDEX: 20.36 KG/M2 | HEART RATE: 99 BPM | WEIGHT: 130 LBS | OXYGEN SATURATION: 95 %

## 2021-02-09 DIAGNOSIS — J44.1 COPD EXACERBATION (HCC): Primary | ICD-10-CM

## 2021-02-09 LAB
ANION GAP SERPL CALCULATED.3IONS-SCNC: 6 MMOL/L (ref 7–16)
ANISOCYTOSIS: ABNORMAL
BASOPHILS ABSOLUTE: 0.02 E9/L (ref 0–0.2)
BASOPHILS RELATIVE PERCENT: 0.4 % (ref 0–2)
BUN BLDV-MCNC: 10 MG/DL (ref 8–23)
CALCIUM SERPL-MCNC: 10 MG/DL (ref 8.6–10.2)
CHLORIDE BLD-SCNC: 99 MMOL/L (ref 98–107)
CO2: 34 MMOL/L (ref 22–29)
CREAT SERPL-MCNC: 0.6 MG/DL (ref 0.5–1)
EOSINOPHILS ABSOLUTE: 0.07 E9/L (ref 0.05–0.5)
EOSINOPHILS RELATIVE PERCENT: 1.5 % (ref 0–6)
GFR AFRICAN AMERICAN: >60
GFR NON-AFRICAN AMERICAN: >60 ML/MIN/1.73
GLUCOSE BLD-MCNC: 89 MG/DL (ref 74–99)
HCT VFR BLD CALC: 32 % (ref 34–48)
HEMOGLOBIN: 8.5 G/DL (ref 11.5–15.5)
HYPOCHROMIA: ABNORMAL
IMMATURE GRANULOCYTES #: 0 E9/L
IMMATURE GRANULOCYTES %: 0 % (ref 0–5)
LYMPHOCYTES ABSOLUTE: 1.42 E9/L (ref 1.5–4)
LYMPHOCYTES RELATIVE PERCENT: 30.5 % (ref 20–42)
MCH RBC QN AUTO: 22.7 PG (ref 26–35)
MCHC RBC AUTO-ENTMCNC: 26.6 % (ref 32–34.5)
MCV RBC AUTO: 85.3 FL (ref 80–99.9)
MONOCYTES ABSOLUTE: 0.31 E9/L (ref 0.1–0.95)
MONOCYTES RELATIVE PERCENT: 6.7 % (ref 2–12)
NEUTROPHILS ABSOLUTE: 2.83 E9/L (ref 1.8–7.3)
NEUTROPHILS RELATIVE PERCENT: 60.9 % (ref 43–80)
OVALOCYTES: ABNORMAL
PDW BLD-RTO: 19.3 FL (ref 11.5–15)
PLATELET # BLD: 190 E9/L (ref 130–450)
PMV BLD AUTO: 9.7 FL (ref 7–12)
POIKILOCYTES: ABNORMAL
POLYCHROMASIA: ABNORMAL
POTASSIUM REFLEX MAGNESIUM: 4.4 MMOL/L (ref 3.5–5)
RBC # BLD: 3.75 E12/L (ref 3.5–5.5)
SODIUM BLD-SCNC: 139 MMOL/L (ref 132–146)
STOMATOCYTES: ABNORMAL
TARGET CELLS: ABNORMAL
TEAR DROP CELLS: ABNORMAL
TROPONIN: <0.01 NG/ML (ref 0–0.03)
WBC # BLD: 4.7 E9/L (ref 4.5–11.5)

## 2021-02-09 PROCEDURE — 6370000000 HC RX 637 (ALT 250 FOR IP): Performed by: EMERGENCY MEDICINE

## 2021-02-09 PROCEDURE — 85025 COMPLETE CBC W/AUTO DIFF WBC: CPT

## 2021-02-09 PROCEDURE — 80048 BASIC METABOLIC PNL TOTAL CA: CPT

## 2021-02-09 PROCEDURE — 94640 AIRWAY INHALATION TREATMENT: CPT

## 2021-02-09 PROCEDURE — 84484 ASSAY OF TROPONIN QUANT: CPT

## 2021-02-09 PROCEDURE — 6360000002 HC RX W HCPCS: Performed by: STUDENT IN AN ORGANIZED HEALTH CARE EDUCATION/TRAINING PROGRAM

## 2021-02-09 PROCEDURE — 71045 X-RAY EXAM CHEST 1 VIEW: CPT

## 2021-02-09 PROCEDURE — 93005 ELECTROCARDIOGRAM TRACING: CPT | Performed by: STUDENT IN AN ORGANIZED HEALTH CARE EDUCATION/TRAINING PROGRAM

## 2021-02-09 PROCEDURE — 99285 EMERGENCY DEPT VISIT HI MDM: CPT

## 2021-02-09 PROCEDURE — 96374 THER/PROPH/DIAG INJ IV PUSH: CPT

## 2021-02-09 RX ORDER — METHYLPREDNISOLONE SODIUM SUCCINATE 125 MG/2ML
125 INJECTION, POWDER, LYOPHILIZED, FOR SOLUTION INTRAMUSCULAR; INTRAVENOUS ONCE
Status: DISCONTINUED | OUTPATIENT
Start: 2021-02-09 | End: 2021-02-09

## 2021-02-09 RX ORDER — ALBUTEROL SULFATE 2.5 MG/3ML
7.5 SOLUTION RESPIRATORY (INHALATION) ONCE
Status: COMPLETED | OUTPATIENT
Start: 2021-02-09 | End: 2021-02-09

## 2021-02-09 RX ORDER — UMECLIDINIUM BROMIDE AND VILANTEROL TRIFENATATE 62.5; 25 UG/1; UG/1
1 POWDER RESPIRATORY (INHALATION) DAILY
Qty: 30 PUFF | Refills: 0 | Status: SHIPPED | OUTPATIENT
Start: 2021-02-09 | End: 2021-02-26

## 2021-02-09 RX ORDER — ALBUTEROL SULFATE 90 UG/1
1 AEROSOL, METERED RESPIRATORY (INHALATION) PRN
Qty: 1 INHALER | Refills: 0 | Status: SHIPPED | OUTPATIENT
Start: 2021-02-09 | End: 2021-03-03 | Stop reason: SDUPTHER

## 2021-02-09 RX ORDER — FLUTICASONE FUROATE, UMECLIDINIUM BROMIDE AND VILANTEROL TRIFENATATE 100; 62.5; 25 UG/1; UG/1; UG/1
1 POWDER RESPIRATORY (INHALATION) DAILY
Qty: 30 EACH | Refills: 0 | Status: SHIPPED | OUTPATIENT
Start: 2021-02-09 | End: 2021-03-03 | Stop reason: SDUPTHER

## 2021-02-09 RX ORDER — PREDNISONE 20 MG/1
60 TABLET ORAL ONCE
Status: COMPLETED | OUTPATIENT
Start: 2021-02-09 | End: 2021-02-09

## 2021-02-09 RX ADMIN — PREDNISONE 60 MG: 20 TABLET ORAL at 18:19

## 2021-02-09 RX ADMIN — ALBUTEROL SULFATE 7.5 MG: 2.5 SOLUTION RESPIRATORY (INHALATION) at 17:31

## 2021-02-09 ASSESSMENT — ENCOUNTER SYMPTOMS
VOMITING: 0
SINUS PRESSURE: 0
SORE THROAT: 0
WHEEZING: 1
COUGH: 0
BACK PAIN: 0
NAUSEA: 0
ABDOMINAL DISTENTION: 0
SHORTNESS OF BREATH: 1
EYE DISCHARGE: 0
EYE PAIN: 0
DIARRHEA: 0
EYE REDNESS: 0

## 2021-02-09 NOTE — ED PROVIDER NOTES
Chief Complaint   Patient presents with    Shortness of Breath     since last evening, denies CP, on 3L home O2; given 1 albuterol tx pta       Is a 25-year-old female with a history of asthma presents today for shortness of breath. Patient states she wears 3 L nasal cannula at baseline. Patient is out of her inhalers at home. Started having shortness of breath as of last night. She does endorse mild wheezing. Patient denies chest pain. She denies lightheadedness or dizziness. Patient is a former smoker. Patient denies nausea, vomiting, diarrhea. She denies fevers, chills, cough. The history is provided by the patient. No  was used. Review of Systems   Constitutional: Negative for chills and fever. HENT: Negative for ear pain, sinus pressure and sore throat. Eyes: Negative for pain, discharge and redness. Respiratory: Positive for shortness of breath and wheezing. Negative for cough. Cardiovascular: Negative for chest pain. Gastrointestinal: Negative for abdominal distention, diarrhea, nausea and vomiting. Genitourinary: Negative for dysuria and frequency. Musculoskeletal: Negative for arthralgias and back pain. Skin: Negative for rash and wound. Neurological: Negative for weakness and headaches. Hematological: Negative for adenopathy. All other systems reviewed and are negative. Physical Exam  Vitals signs and nursing note reviewed. Constitutional:       General: She is not in acute distress. Appearance: She is well-developed. She is not ill-appearing. HENT:      Head: Normocephalic and atraumatic. Eyes:      Pupils: Pupils are equal, round, and reactive to light. Neck:      Musculoskeletal: Normal range of motion and neck supple. Cardiovascular:      Rate and Rhythm: Normal rate and regular rhythm. Pulses: Normal pulses. Pulmonary:      Effort: Pulmonary effort is normal. No respiratory distress.       Breath sounds: Wheezing (bilateral) present. No rales. Abdominal:      General: Bowel sounds are normal.      Palpations: Abdomen is soft. Tenderness: There is no abdominal tenderness. There is no guarding or rebound. Skin:     General: Skin is warm and dry. Neurological:      Mental Status: She is alert and oriented to person, place, and time. Cranial Nerves: No cranial nerve deficit. Coordination: Coordination normal.          Procedures     Labs Reviewed   CBC WITH AUTO DIFFERENTIAL - Abnormal; Notable for the following components:       Result Value    Hemoglobin 8.5 (*)     Hematocrit 32.0 (*)     MCH 22.7 (*)     MCHC 26.6 (*)     RDW 19.3 (*)     Lymphocytes Absolute 1.42 (*)     All other components within normal limits   BASIC METABOLIC PANEL W/ REFLEX TO MG FOR LOW K - Abnormal; Notable for the following components:    CO2 34 (*)     Anion Gap 6 (*)     All other components within normal limits   TROPONIN     XR CHEST PORTABLE   Final Result   No acute cardiopulmonary process. EKG #1:  I personally interpreted this EKG  Time:  1420  Rate: 87  Rhythm: Sinus. Interpretation: Sinus rhythm, no ST elevation or T wave inversion. MDM  Number of Diagnoses or Management Options  COPD exacerbation ([facility] [address]  Diagnosis management comments: Patient is a 41-year-old female presents today for COPD exacerbation. Physical exam shows wheezing bilaterally. Patient is stable on her home nasal cannula. Lab and imaging obtained. Labs are within normal limits, troponin is elevated. EKG does not show ischemic changes. Patient has no chest pain. Chest x-ray shows no acute cardiopulmonary processes. Patient was given duo nebs and steroids in the department improvement of symptoms. Patient was asking for her home duo nebs and inhalers to be refilled as she has been out of these has not been able to get them refilled. Patient was given refill for home meds. Patient discharged home. Return precautions given. Amount and/or Complexity of Data Reviewed  Clinical lab tests: reviewed  Tests in the radiology section of CPT®: reviewed                --------------------------------------------- PAST HISTORY ---------------------------------------------  Past Medical History:  has a past medical history of Anemia, Asthma, COPD (chronic obstructive pulmonary disease) ([facility] [address] Depression, [place] catheter in place, and History of blood transfusion. Past Surgical History:  has a past surgical history that includes Hysterectomy; ECHO Compl W Dop Color Flow (2/24/2013); Kidney surgery (Left, 06/16/2014); pr cystourethroscopy,biopsies (N/A, 9/14/2018); Colonoscopy; Upper gastrointestinal endoscopy (N/A, 5/10/2019); Upper gastrointestinal endoscopy (N/A, 6/28/2019); Upper gastrointestinal endoscopy (N/A, 6/28/2019); Colonoscopy (N/A, 9/4/2019); and Upper gastrointestinal endoscopy (N/A, 5/19/2020). Social History:  reports that she has quit smoking. She smoked 0.00 packs per day. She has never used smokeless tobacco. She reports that she does not drink alcohol or use drugs. Family History: family history includes Cancer in her father; Diabetes in her brother; Kidney Disease in her brother. The patients home medications have been reviewed.     Allergies: Sulfa antibiotics    -------------------------------------------------- RESULTS -------------------------------------------------  Labs:  Results for orders placed or performed during the hospital encounter of 02/09/21   CBC Auto Differential   Result Value Ref Range    WBC 4.7 4.5 - 11.5 E9/L    RBC 3.75 3.50 - 5.50 E12/L    Hemoglobin 8.5 (L) 11.5 - 15.5 g/dL    Hematocrit 32.0 (L) 34.0 - 48.0 %    MCV 85.3 80.0 - 99.9 fL    MCH 22.7 (L) 26.0 - 35.0 pg    MCHC 26.6 (L) 32.0 - 34.5 %    RDW 19.3 (H) 11.5 - 15.0 fL    Platelets 142 354 - 243 E9/L    MPV 9.7 7.0 - 12.0 fL    Neutrophils % 60.9 43.0 - 80.0 %    Immature Granulocytes % 0.0 0.0 - 5.0 %    Lymphocytes % 30.5 20.0 - 42.0 %    Monocytes % 6.7 2.0 - 12.0 %    Eosinophils % 1.5 0.0 - 6.0 %    Basophils % 0.4 0.0 - 2.0 %    Neutrophils Absolute 2.83 1.80 - 7.30 E9/L    Immature Granulocytes # 0.00 E9/L    Lymphocytes Absolute 1.42 (L) 1.50 - 4.00 E9/L    Monocytes Absolute 0.31 0.10 - 0.95 E9/L    Eosinophils Absolute 0.07 0.05 - 0.50 E9/L    Basophils Absolute 0.02 0.00 - 0.20 E9/L    Anisocytosis 2+     Polychromasia 1+     Hypochromia 2+     Poikilocytes 1+     Ovalocytes 1+     Stomatocytes 1+     Target Cells 1+     Tear Drop Cells 1+    Basic Metabolic Panel w/ Reflex to MG   Result Value Ref Range    Sodium 139 132 - 146 mmol/L    Potassium reflex Magnesium 4.4 3.5 - 5.0 mmol/L    Chloride 99 98 - 107 mmol/L    CO2 34 (H) 22 - 29 mmol/L    Anion Gap 6 (L) 7 - 16 mmol/L    Glucose 89 74 - 99 mg/dL    BUN 10 8 - 23 mg/dL    CREATININE 0.6 0.5 - 1.0 mg/dL    GFR Non-African American >60 >=60 mL/min/1.73    GFR African American >60     Calcium 10.0 8.6 - 10.2 mg/dL   Troponin   Result Value Ref Range    Troponin <0.01 0.00 - 0.03 ng/mL   EKG 12 Lead   Result Value Ref Range    Ventricular Rate 87 BPM    Atrial Rate 87 BPM    P-R Interval 178 ms    QRS Duration 74 ms    Q-T Interval 372 ms    QTc Calculation (Bazett) 447 ms    P Axis 79 degrees    R Axis 75 degrees    T Axis 88 degrees       Radiology:  XR CHEST PORTABLE   Final Result   No acute cardiopulmonary process. ------------------------- NURSING NOTES AND VITALS REVIEWED ---------------------------  Date / Time Roomed:  2/9/2021  1:47 PM  ED Bed Assignment:  ES/ES    The nursing notes within the ED encounter and vital signs as below have been reviewed.    /63   Pulse 99   Temp 96.9 °F (36.1 °C)   Resp 18   Wt 130 lb (59 kg)   SpO2 95%   BMI 20.36 kg/m²   Oxygen Saturation Interpretation: Normal      ------------------------------------------ PROGRESS NOTES ------------------------------------------  I have spoken with the patient and discussed todays results, in addition to providing specific details for the plan of care and counseling regarding the diagnosis and prognosis. Their questions are answered at this time and they are agreeable with the plan. I discussed at length with them reasons for immediate return here for re evaluation. They will followup with primary care by calling their office tomorrow. --------------------------------- ADDITIONAL PROVIDER NOTES ---------------------------------  At this time the patient is without objective evidence of an acute process requiring hospitalization or inpatient management. They have remained hemodynamically stable throughout their entire ED visit and are stable for discharge with outpatient follow-up. The plan has been discussed in detail and they are aware of the specific conditions for emergent return, as well as the importance of follow-up. Discharge Medication List as of 2/9/2021  4:07 PM          Diagnosis:  1. COPD exacerbation ([facility] [address]        Disposition:  Patient's disposition: Discharge to home  Patient's condition is stable.            Taya Mabry DO  Resident  02/10/21 1027

## 2021-02-10 LAB
EKG ATRIAL RATE: 87 BPM
EKG P AXIS: 79 DEGREES
EKG P-R INTERVAL: 178 MS
EKG Q-T INTERVAL: 372 MS
EKG QRS DURATION: 74 MS
EKG QTC CALCULATION (BAZETT): 447 MS
EKG R AXIS: 75 DEGREES
EKG T AXIS: 88 DEGREES
EKG VENTRICULAR RATE: 87 BPM

## 2021-02-10 PROCEDURE — 93010 ELECTROCARDIOGRAM REPORT: CPT | Performed by: INTERNAL MEDICINE

## 2021-02-26 ENCOUNTER — APPOINTMENT (OUTPATIENT)
Dept: CT IMAGING | Age: 66
DRG: 439 | End: 2021-02-26
Payer: COMMERCIAL

## 2021-02-26 ENCOUNTER — APPOINTMENT (OUTPATIENT)
Dept: GENERAL RADIOLOGY | Age: 66
DRG: 439 | End: 2021-02-26
Payer: COMMERCIAL

## 2021-02-26 ENCOUNTER — HOSPITAL ENCOUNTER (INPATIENT)
Age: 66
LOS: 5 days | Discharge: NURSING, REHAB OR LONG-TERM CARE | DRG: 439 | End: 2021-03-03
Attending: EMERGENCY MEDICINE | Admitting: INTERNAL MEDICINE
Payer: COMMERCIAL

## 2021-02-26 DIAGNOSIS — K85.90 ACUTE PANCREATITIS, UNSPECIFIED COMPLICATION STATUS, UNSPECIFIED PANCREATITIS TYPE: Primary | ICD-10-CM

## 2021-02-26 DIAGNOSIS — F41.9 ANXIETY: ICD-10-CM

## 2021-02-26 PROBLEM — K86.1 ACUTE ON CHRONIC PANCREATITIS (HCC): Status: ACTIVE | Noted: 2021-02-26

## 2021-02-26 LAB
ALBUMIN SERPL-MCNC: 4.2 G/DL (ref 3.5–5.2)
ALP BLD-CCNC: 91 U/L (ref 35–104)
ALT SERPL-CCNC: 17 U/L (ref 0–32)
AMYLASE: 305 U/L (ref 20–100)
ANION GAP SERPL CALCULATED.3IONS-SCNC: 7 MMOL/L (ref 7–16)
AST SERPL-CCNC: 23 U/L (ref 0–31)
BILIRUB SERPL-MCNC: <0.2 MG/DL (ref 0–1.2)
BILIRUBIN DIRECT: <0.2 MG/DL (ref 0–0.3)
BILIRUBIN, INDIRECT: NORMAL MG/DL (ref 0–1)
BUN BLDV-MCNC: 13 MG/DL (ref 8–23)
CALCIUM SERPL-MCNC: 10.2 MG/DL (ref 8.6–10.2)
CHLORIDE BLD-SCNC: 102 MMOL/L (ref 98–107)
CK MB: 3.8 NG/ML (ref 0–4.3)
CO2: 32 MMOL/L (ref 22–29)
CREAT SERPL-MCNC: 0.7 MG/DL (ref 0.5–1)
D DIMER: <200 NG/ML DDU
GFR AFRICAN AMERICAN: >60
GFR NON-AFRICAN AMERICAN: >60 ML/MIN/1.73
GLUCOSE BLD-MCNC: 124 MG/DL (ref 74–99)
HCT VFR BLD CALC: 29.6 % (ref 34–48)
HEMOGLOBIN: 8 G/DL (ref 11.5–15.5)
INR BLD: 0.9
LIPASE: 924 U/L (ref 13–60)
MAGNESIUM: 2 MG/DL (ref 1.6–2.6)
MCH RBC QN AUTO: 23.1 PG (ref 26–35)
MCHC RBC AUTO-ENTMCNC: 27 % (ref 32–34.5)
MCV RBC AUTO: 85.5 FL (ref 80–99.9)
PDW BLD-RTO: 20.6 FL (ref 11.5–15)
PLATELET # BLD: 353 E9/L (ref 130–450)
PMV BLD AUTO: 9.2 FL (ref 7–12)
POTASSIUM SERPL-SCNC: 4.1 MMOL/L (ref 3.5–5)
PRO-BNP: 91 PG/ML (ref 0–125)
PROTHROMBIN TIME: 9.7 SEC (ref 9.3–12.4)
RBC # BLD: 3.46 E12/L (ref 3.5–5.5)
SARS-COV-2, NAAT: NOT DETECTED
SODIUM BLD-SCNC: 141 MMOL/L (ref 132–146)
TOTAL CK: 108 U/L (ref 20–180)
TOTAL PROTEIN: 7 G/DL (ref 6.4–8.3)
TROPONIN: <0.01 NG/ML (ref 0–0.03)
WBC # BLD: 6.4 E9/L (ref 4.5–11.5)

## 2021-02-26 PROCEDURE — 83605 ASSAY OF LACTIC ACID: CPT

## 2021-02-26 PROCEDURE — 80076 HEPATIC FUNCTION PANEL: CPT

## 2021-02-26 PROCEDURE — 6360000002 HC RX W HCPCS: Performed by: STUDENT IN AN ORGANIZED HEALTH CARE EDUCATION/TRAINING PROGRAM

## 2021-02-26 PROCEDURE — 83690 ASSAY OF LIPASE: CPT

## 2021-02-26 PROCEDURE — 74177 CT ABD & PELVIS W/CONTRAST: CPT

## 2021-02-26 PROCEDURE — 80048 BASIC METABOLIC PNL TOTAL CA: CPT

## 2021-02-26 PROCEDURE — 71045 X-RAY EXAM CHEST 1 VIEW: CPT

## 2021-02-26 PROCEDURE — 82150 ASSAY OF AMYLASE: CPT

## 2021-02-26 PROCEDURE — 1200000000 HC SEMI PRIVATE

## 2021-02-26 PROCEDURE — 6360000004 HC RX CONTRAST MEDICATION: Performed by: RADIOLOGY

## 2021-02-26 PROCEDURE — 96374 THER/PROPH/DIAG INJ IV PUSH: CPT

## 2021-02-26 PROCEDURE — 83735 ASSAY OF MAGNESIUM: CPT

## 2021-02-26 PROCEDURE — 85027 COMPLETE CBC AUTOMATED: CPT

## 2021-02-26 PROCEDURE — 85378 FIBRIN DEGRADE SEMIQUANT: CPT

## 2021-02-26 PROCEDURE — 83880 ASSAY OF NATRIURETIC PEPTIDE: CPT

## 2021-02-26 PROCEDURE — 84484 ASSAY OF TROPONIN QUANT: CPT

## 2021-02-26 PROCEDURE — 81001 URINALYSIS AUTO W/SCOPE: CPT

## 2021-02-26 PROCEDURE — 87635 SARS-COV-2 COVID-19 AMP PRB: CPT

## 2021-02-26 PROCEDURE — 96375 TX/PRO/DX INJ NEW DRUG ADDON: CPT

## 2021-02-26 PROCEDURE — 99284 EMERGENCY DEPT VISIT MOD MDM: CPT

## 2021-02-26 PROCEDURE — 82553 CREATINE MB FRACTION: CPT

## 2021-02-26 PROCEDURE — 93005 ELECTROCARDIOGRAM TRACING: CPT | Performed by: EMERGENCY MEDICINE

## 2021-02-26 PROCEDURE — 85610 PROTHROMBIN TIME: CPT

## 2021-02-26 PROCEDURE — 82550 ASSAY OF CK (CPK): CPT

## 2021-02-26 RX ORDER — MORPHINE SULFATE 2 MG/ML
2 INJECTION, SOLUTION INTRAMUSCULAR; INTRAVENOUS ONCE
Status: COMPLETED | OUTPATIENT
Start: 2021-02-26 | End: 2021-02-26

## 2021-02-26 RX ORDER — ONDANSETRON 2 MG/ML
4 INJECTION INTRAMUSCULAR; INTRAVENOUS EVERY 6 HOURS PRN
Status: DISCONTINUED | OUTPATIENT
Start: 2021-02-26 | End: 2021-02-27 | Stop reason: SDUPTHER

## 2021-02-26 RX ORDER — SODIUM CHLORIDE 0.9 % (FLUSH) 0.9 %
10 SYRINGE (ML) INJECTION PRN
Status: DISCONTINUED | OUTPATIENT
Start: 2021-02-26 | End: 2021-02-27 | Stop reason: SDUPTHER

## 2021-02-26 RX ADMIN — Medication 2 MG: at 21:09

## 2021-02-26 RX ADMIN — IOPAMIDOL 90 ML: 755 INJECTION, SOLUTION INTRAVENOUS at 23:10

## 2021-02-26 RX ADMIN — ONDANSETRON HYDROCHLORIDE 4 MG: 2 SOLUTION INTRAMUSCULAR; INTRAVENOUS at 21:09

## 2021-02-26 ASSESSMENT — ENCOUNTER SYMPTOMS
ABDOMINAL PAIN: 1
SHORTNESS OF BREATH: 1
CHEST TIGHTNESS: 1
ABDOMINAL DISTENTION: 1
NAUSEA: 1
COUGH: 1

## 2021-02-26 NOTE — ED PROVIDER NOTES
reports that she does not drink alcohol or use drugs. Family History: family history includes Cancer in her father; Diabetes in her brother; Kidney Disease in her brother. . Unless otherwise noted, family history is non contributory    The patients home medications have been reviewed. Allergies: Sulfa antibiotics    ---------------------------------------------------PHYSICAL EXAM--------------------------------------  Physical Exam  Constitutional:       General: She is in acute distress. Appearance: She is not diaphoretic. HENT:      Head: Normocephalic and atraumatic. Eyes:      General: No scleral icterus. Extraocular Movements: Extraocular movements intact. Cardiovascular:      Rate and Rhythm: Tachycardia present. Heart sounds: No murmur. No gallop. Pulmonary:      Effort: Pulmonary effort is normal.      Breath sounds: No decreased breath sounds, wheezing or rhonchi. Abdominal:      General: There is distension. Palpations: Abdomen is soft. Tenderness: There is abdominal tenderness. There is no guarding. Musculoskeletal:      Right lower leg: No edema. Left lower leg: No edema. Skin:     General: Skin is warm and dry. Neurological:      General: No focal deficit present. Mental Status: She is alert and oriented to person, place, and time. Psychiatric:         Mood and Affect: Mood normal.         Behavior: Behavior normal.         Judgment: Judgment normal.       ------------------------------------------------- RESULTS -------------------------------------------------  I have personally reviewed all laboratory and imaging results for this patient. Results are listed below.      LABS: (Lab results interpreted by me)  Results for orders placed or performed during the hospital encounter of 02/26/21   COVID-19, Rapid    Specimen: Nasopharyngeal Swab   Result Value Ref Range    SARS-CoV-2, NAAT Not Detected Not Detected   Troponin   Result Value Ref Range Troponin <0.01 0.00 - 0.03 ng/mL   CBC   Result Value Ref Range    WBC 6.4 4.5 - 11.5 E9/L    RBC 3.46 (L) 3.50 - 5.50 E12/L    Hemoglobin 8.0 (L) 11.5 - 15.5 g/dL    Hematocrit 29.6 (L) 34.0 - 48.0 %    MCV 85.5 80.0 - 99.9 fL    MCH 23.1 (L) 26.0 - 35.0 pg    MCHC 27.0 (L) 32.0 - 34.5 %    RDW 20.6 (H) 11.5 - 15.0 fL    Platelets 149 153 - 994 E9/L    MPV 9.2 7.0 - 12.0 fL   Basic Metabolic Panel   Result Value Ref Range    Sodium 141 132 - 146 mmol/L    Potassium 4.1 3.5 - 5.0 mmol/L    Chloride 102 98 - 107 mmol/L    CO2 32 (H) 22 - 29 mmol/L    Anion Gap 7 7 - 16 mmol/L    Glucose 124 (H) 74 - 99 mg/dL    BUN 13 8 - 23 mg/dL    CREATININE 0.7 0.5 - 1.0 mg/dL    GFR Non-African American >60 >=60 mL/min/1.73    GFR African American >60     Calcium 10.2 8.6 - 10.2 mg/dL   CK MB   Result Value Ref Range    CK-MB 3.8 0.0 - 4.3 ng/mL   CK   Result Value Ref Range    Total  20 - 180 U/L   D-Dimer, Quantitative   Result Value Ref Range    D-Dimer, Quant <200 ng/mL DDU   Hepatic Function Panel   Result Value Ref Range    Total Protein 7.0 6.4 - 8.3 g/dL    Albumin 4.2 3.5 - 5.2 g/dL    Alkaline Phosphatase 91 35 - 104 U/L    ALT 17 0 - 32 U/L    AST 23 0 - 31 U/L    Total Bilirubin <0.2 0.0 - 1.2 mg/dL    Bilirubin, Direct <0.2 0.0 - 0.3 mg/dL    Bilirubin, Indirect see below 0.0 - 1.0 mg/dL   Lipase   Result Value Ref Range    Lipase 924 (H) 13 - 60 U/L   Amylase   Result Value Ref Range    Amylase 305 (H) 20 - 100 U/L   Magnesium   Result Value Ref Range    Magnesium 2.0 1.6 - 2.6 mg/dL   Brain Natriuretic Peptide   Result Value Ref Range    Pro-BNP 91 0 - 125 pg/mL   Protime-INR   Result Value Ref Range    Protime 9.7 9.3 - 12.4 sec    INR 0.9    EKG 12 Lead   Result Value Ref Range    Ventricular Rate 96 BPM    Atrial Rate 96 BPM    P-R Interval 180 ms    QRS Duration 72 ms    Q-T Interval 362 ms    QTc Calculation (Bazett) 457 ms    P Axis 88 degrees    R Axis 78 degrees    T Axis 98 degrees   , RADIOLOGY:  Interpreted by Radiologist unless otherwise specified  XR CHEST 1 VIEW   Final Result   COPD. Increased lung markings bilaterally, likely related to mild pulmonary   vascular congestion versus bronchitis. CT ABDOMEN PELVIS W IV CONTRAST Additional Contrast? None    (Results Pending)         EKG Interpretation  Interpreted by emergency department physician    Date of EK21  Time:     Rhythm: normal sinus   Rate: normal  Axis: left  Conduction: normal  ST Segments: nonspecific changes  T Waves: non specific changes    Clinical Impression: No findings suggestive of acute ischemia or injury, no conduction abnormalities  Comparison to prior EKG: changes compared to previous EKG      ------------------------- NURSING NOTES AND VITALS REVIEWED ---------------------------   The nursing notes within the ED encounter and vital signs as below have been reviewed by myself  /71   Pulse 98   Temp 96.9 °F (36.1 °C) (Temporal)   Resp 16   Wt 107 lb (48.5 kg)   SpO2 100%   BMI 16.76 kg/m²     Oxygen Saturation Interpretation: Normal on 4 L O2    The patients available past medical records and past encounters were reviewed. ------------------------------ ED COURSE/MEDICAL DECISION MAKING----------------------  Medications   ondansetron (ZOFRAN) injection 4 mg (4 mg Intravenous Given 21)   morphine (PF) injection 2 mg (2 mg Intravenous Given 21)       Medical Decision Making:   Patient presented with SOB, abdominal pain. Decision to admit for acute pancreatitis. CT of abdomen with IV contrast ordered. Consultations:  Inpatient consult to PCP    Counseling: The emergency provider has spoken with the patient and discussed todays results, in addition to providing specific details for the plan of care and counseling regarding the diagnosis and prognosis. Questions are answered at this time and they are agreeable with the plan. --------------------------------- IMPRESSION AND DISPOSITION ---------------------------------    IMPRESSION  1. Acute pancreatitis, unspecified complication status, unspecified pancreatitis type        DISPOSITION  Disposition: Admit to telemetry  Patient condition is stable        NOTE: This report was transcribed using voice recognition software. Every effort was made to ensure accuracy; however, inadvertent computerized transcription errors may be present      Jose Robertson DO  Resident  02/26/21 2423    ATTENDING PROVIDER ATTESTATION:     I have personally performed and/or participated in the history, exam, medical decision making, and procedures and agree with all pertinent clinical information. I have also reviewed and agree with the past medical, family and social history unless otherwise noted. I have discussed this patient in detail with the resident, and provided the instruction and education regarding chest pain and abdominal pain. My findings/Plan: I was the primary provider for patient patient presenting here because of mainly chest pain or shortness of breath and also having abdominal pain. Patient reports chest pains ongoing for the past week. Patient reports history of COPD. Patient is on oxygen at home. Patient reporting no fever. Patient reporting pain in her upper abdomen for the past week. She reports no black or tarry stools. Patient reporting nausea. Patient reporting no fever. Patient awake alert oriented lungs clear anteriorly heart exam normal abdomen tender upper abdomen patient able to move all extremities. No significant edema. Labs and EKG noted reviewed as well as lipase. Patient medicated here for pain. Patient's vital signs stable. Pulse ox stable. CT abdomen pelvis was ordered and still pending. I did speak to on-call internal medicine. Patient will be admitted to monitored bed. Patient made aware of findings and plan.          Pina Stearns,

## 2021-02-26 NOTE — ED NOTES
Bed: 14B-14  Expected date: 2/26/21  Expected time:   Means of arrival: [facility]  Comments:  [address] [place] [address] 83, [address] [facility]  02/26/21 2626

## 2021-02-27 PROBLEM — N32.89 BLADDER WALL THICKENING: Chronic | Status: ACTIVE | Noted: 2021-02-27

## 2021-02-27 PROBLEM — K31.89 GASTRIC WALL THICKENING: Status: ACTIVE | Noted: 2021-02-27

## 2021-02-27 PROBLEM — N32.89 BLADDER WALL THICKENING: Status: ACTIVE | Noted: 2021-02-27

## 2021-02-27 PROBLEM — J96.11 CHRONIC RESPIRATORY FAILURE WITH HYPOXIA (HCC): Status: ACTIVE | Noted: 2019-04-24

## 2021-02-27 PROBLEM — K31.89 GASTRIC WALL THICKENING: Chronic | Status: ACTIVE | Noted: 2021-02-27

## 2021-02-27 LAB
ALBUMIN SERPL-MCNC: 3.5 G/DL (ref 3.5–5.2)
ALP BLD-CCNC: 66 U/L (ref 35–104)
ALT SERPL-CCNC: 12 U/L (ref 0–32)
AMORPHOUS: ABNORMAL
AMYLASE: 289 U/L (ref 20–100)
ANION GAP SERPL CALCULATED.3IONS-SCNC: 5 MMOL/L (ref 7–16)
ANISOCYTOSIS: ABNORMAL
AST SERPL-CCNC: 15 U/L (ref 0–31)
BACTERIA: ABNORMAL /HPF
BASOPHILS ABSOLUTE: 0.15 E9/L (ref 0–0.2)
BASOPHILS RELATIVE PERCENT: 2.6 % (ref 0–2)
BILIRUB SERPL-MCNC: <0.2 MG/DL (ref 0–1.2)
BILIRUBIN DIRECT: <0.2 MG/DL (ref 0–0.3)
BILIRUBIN URINE: NEGATIVE
BILIRUBIN, INDIRECT: ABNORMAL MG/DL (ref 0–1)
BLOOD, URINE: ABNORMAL
BUN BLDV-MCNC: 9 MG/DL (ref 8–23)
CALCIUM SERPL-MCNC: 8.9 MG/DL (ref 8.6–10.2)
CHLORIDE BLD-SCNC: 105 MMOL/L (ref 98–107)
CLARITY: ABNORMAL
CO2: 31 MMOL/L (ref 22–29)
COLOR: YELLOW
CREAT SERPL-MCNC: 0.6 MG/DL (ref 0.5–1)
EKG ATRIAL RATE: 96 BPM
EKG P AXIS: 88 DEGREES
EKG P-R INTERVAL: 180 MS
EKG Q-T INTERVAL: 362 MS
EKG QRS DURATION: 72 MS
EKG QTC CALCULATION (BAZETT): 457 MS
EKG R AXIS: 78 DEGREES
EKG T AXIS: 98 DEGREES
EKG VENTRICULAR RATE: 96 BPM
EOSINOPHILS ABSOLUTE: 0.3 E9/L (ref 0.05–0.5)
EOSINOPHILS RELATIVE PERCENT: 5.2 % (ref 0–6)
EPITHELIAL CELLS, UA: ABNORMAL /HPF
GFR AFRICAN AMERICAN: >60
GFR NON-AFRICAN AMERICAN: >60 ML/MIN/1.73
GLUCOSE BLD-MCNC: 97 MG/DL (ref 74–99)
GLUCOSE URINE: NEGATIVE MG/DL
HCT VFR BLD CALC: 26.7 % (ref 34–48)
HEMOGLOBIN: 7.2 G/DL (ref 11.5–15.5)
HYPOCHROMIA: ABNORMAL
KETONES, URINE: NEGATIVE MG/DL
LACTIC ACID: 0.8 MMOL/L (ref 0.5–2.2)
LEUKOCYTE ESTERASE, URINE: ABNORMAL
LIPASE: 511 U/L (ref 13–60)
LYMPHOCYTES ABSOLUTE: 0.99 E9/L (ref 1.5–4)
LYMPHOCYTES RELATIVE PERCENT: 17.4 % (ref 20–42)
MCH RBC QN AUTO: 23.2 PG (ref 26–35)
MCHC RBC AUTO-ENTMCNC: 27 % (ref 32–34.5)
MCV RBC AUTO: 85.9 FL (ref 80–99.9)
MONOCYTES ABSOLUTE: 0.17 E9/L (ref 0.1–0.95)
MONOCYTES RELATIVE PERCENT: 2.6 % (ref 2–12)
NEUTROPHILS ABSOLUTE: 4.18 E9/L (ref 1.8–7.3)
NEUTROPHILS RELATIVE PERCENT: 72.2 % (ref 43–80)
NITRITE, URINE: POSITIVE
OVALOCYTES: ABNORMAL
PDW BLD-RTO: 20.2 FL (ref 11.5–15)
PH UA: 6 (ref 5–9)
PLATELET # BLD: 318 E9/L (ref 130–450)
PMV BLD AUTO: 9.4 FL (ref 7–12)
POIKILOCYTES: ABNORMAL
POLYCHROMASIA: ABNORMAL
POTASSIUM REFLEX MAGNESIUM: 4.2 MMOL/L (ref 3.5–5)
PROTEIN UA: 100 MG/DL
RBC # BLD: 3.11 E12/L (ref 3.5–5.5)
RBC UA: ABNORMAL /HPF (ref 0–2)
SCHISTOCYTES: ABNORMAL
SODIUM BLD-SCNC: 141 MMOL/L (ref 132–146)
SPECIFIC GRAVITY UA: >=1.03 (ref 1–1.03)
TARGET CELLS: ABNORMAL
TOTAL PROTEIN: 5.9 G/DL (ref 6.4–8.3)
TRIGL SERPL-MCNC: 49 MG/DL (ref 0–149)
TROPONIN: <0.01 NG/ML (ref 0–0.03)
TROPONIN: <0.01 NG/ML (ref 0–0.03)
UROBILINOGEN, URINE: 0.2 E.U./DL
WBC # BLD: 5.8 E9/L (ref 4.5–11.5)
WBC UA: ABNORMAL /HPF (ref 0–5)

## 2021-02-27 PROCEDURE — 2580000003 HC RX 258: Performed by: PHYSICIAN ASSISTANT

## 2021-02-27 PROCEDURE — 6360000002 HC RX W HCPCS: Performed by: PHYSICIAN ASSISTANT

## 2021-02-27 PROCEDURE — 6370000000 HC RX 637 (ALT 250 FOR IP): Performed by: INTERNAL MEDICINE

## 2021-02-27 PROCEDURE — 84478 ASSAY OF TRIGLYCERIDES: CPT

## 2021-02-27 PROCEDURE — 6370000000 HC RX 637 (ALT 250 FOR IP): Performed by: PHYSICIAN ASSISTANT

## 2021-02-27 PROCEDURE — 2580000003 HC RX 258: Performed by: EMERGENCY MEDICINE

## 2021-02-27 PROCEDURE — 93010 ELECTROCARDIOGRAM REPORT: CPT | Performed by: INTERNAL MEDICINE

## 2021-02-27 PROCEDURE — 82150 ASSAY OF AMYLASE: CPT

## 2021-02-27 PROCEDURE — 84484 ASSAY OF TROPONIN QUANT: CPT

## 2021-02-27 PROCEDURE — 1200000000 HC SEMI PRIVATE

## 2021-02-27 PROCEDURE — 85025 COMPLETE CBC W/AUTO DIFF WBC: CPT

## 2021-02-27 PROCEDURE — 2700000000 HC OXYGEN THERAPY PER DAY

## 2021-02-27 PROCEDURE — 36415 COLL VENOUS BLD VENIPUNCTURE: CPT

## 2021-02-27 PROCEDURE — 6360000002 HC RX W HCPCS: Performed by: EMERGENCY MEDICINE

## 2021-02-27 PROCEDURE — 80076 HEPATIC FUNCTION PANEL: CPT

## 2021-02-27 PROCEDURE — 80048 BASIC METABOLIC PNL TOTAL CA: CPT

## 2021-02-27 PROCEDURE — 94664 DEMO&/EVAL PT USE INHALER: CPT

## 2021-02-27 PROCEDURE — 83690 ASSAY OF LIPASE: CPT

## 2021-02-27 PROCEDURE — 94640 AIRWAY INHALATION TREATMENT: CPT

## 2021-02-27 RX ORDER — FOLIC ACID 1 MG/1
1 TABLET ORAL DAILY
Status: DISCONTINUED | OUTPATIENT
Start: 2021-02-27 | End: 2021-03-03 | Stop reason: HOSPADM

## 2021-02-27 RX ORDER — ALBUTEROL SULFATE 2.5 MG/3ML
2.5 SOLUTION RESPIRATORY (INHALATION) EVERY 4 HOURS PRN
Status: DISCONTINUED | OUTPATIENT
Start: 2021-02-27 | End: 2021-03-03 | Stop reason: HOSPADM

## 2021-02-27 RX ORDER — SODIUM CHLORIDE 9 MG/ML
INJECTION, SOLUTION INTRAVENOUS CONTINUOUS
Status: DISCONTINUED | OUTPATIENT
Start: 2021-02-27 | End: 2021-03-01

## 2021-02-27 RX ORDER — SODIUM CHLORIDE 0.9 % (FLUSH) 0.9 %
10 SYRINGE (ML) INJECTION EVERY 12 HOURS SCHEDULED
Status: DISCONTINUED | OUTPATIENT
Start: 2021-02-27 | End: 2021-03-03 | Stop reason: HOSPADM

## 2021-02-27 RX ORDER — POTASSIUM CHLORIDE 7.45 MG/ML
10 INJECTION INTRAVENOUS PRN
Status: DISCONTINUED | OUTPATIENT
Start: 2021-02-27 | End: 2021-03-03 | Stop reason: HOSPADM

## 2021-02-27 RX ORDER — DIAZEPAM 5 MG/1
10 TABLET ORAL DAILY
Status: DISCONTINUED | OUTPATIENT
Start: 2021-02-27 | End: 2021-03-01

## 2021-02-27 RX ORDER — POTASSIUM CHLORIDE 20 MEQ/1
40 TABLET, EXTENDED RELEASE ORAL PRN
Status: DISCONTINUED | OUTPATIENT
Start: 2021-02-27 | End: 2021-03-03 | Stop reason: HOSPADM

## 2021-02-27 RX ORDER — PROMETHAZINE HYDROCHLORIDE 25 MG/1
12.5 TABLET ORAL EVERY 6 HOURS PRN
Status: DISCONTINUED | OUTPATIENT
Start: 2021-02-27 | End: 2021-03-03 | Stop reason: HOSPADM

## 2021-02-27 RX ORDER — M-VIT,TX,IRON,MINS/CALC/FOLIC 27MG-0.4MG
1 TABLET ORAL DAILY
Status: DISCONTINUED | OUTPATIENT
Start: 2021-02-27 | End: 2021-03-03 | Stop reason: HOSPADM

## 2021-02-27 RX ORDER — MORPHINE SULFATE 2 MG/ML
2 INJECTION, SOLUTION INTRAMUSCULAR; INTRAVENOUS EVERY 4 HOURS PRN
Status: DISCONTINUED | OUTPATIENT
Start: 2021-02-27 | End: 2021-03-02

## 2021-02-27 RX ORDER — PANTOPRAZOLE SODIUM 40 MG/1
40 TABLET, DELAYED RELEASE ORAL
Status: DISCONTINUED | OUTPATIENT
Start: 2021-02-27 | End: 2021-03-03 | Stop reason: HOSPADM

## 2021-02-27 RX ORDER — ONDANSETRON 2 MG/ML
4 INJECTION INTRAMUSCULAR; INTRAVENOUS EVERY 6 HOURS PRN
Status: DISCONTINUED | OUTPATIENT
Start: 2021-02-27 | End: 2021-03-03 | Stop reason: HOSPADM

## 2021-02-27 RX ORDER — ARFORMOTEROL TARTRATE 15 UG/2ML
15 SOLUTION RESPIRATORY (INHALATION) 2 TIMES DAILY
Status: DISCONTINUED | OUTPATIENT
Start: 2021-02-27 | End: 2021-03-03 | Stop reason: HOSPADM

## 2021-02-27 RX ORDER — SODIUM CHLORIDE 0.9 % (FLUSH) 0.9 %
10 SYRINGE (ML) INJECTION PRN
Status: DISCONTINUED | OUTPATIENT
Start: 2021-02-27 | End: 2021-03-03 | Stop reason: HOSPADM

## 2021-02-27 RX ORDER — ACETAMINOPHEN 325 MG/1
650 TABLET ORAL EVERY 4 HOURS PRN
Status: DISCONTINUED | OUTPATIENT
Start: 2021-02-27 | End: 2021-03-03 | Stop reason: HOSPADM

## 2021-02-27 RX ORDER — BUDESONIDE 0.25 MG/2ML
0.25 INHALANT ORAL 2 TIMES DAILY
Status: DISCONTINUED | OUTPATIENT
Start: 2021-02-27 | End: 2021-03-03 | Stop reason: HOSPADM

## 2021-02-27 RX ADMIN — PANTOPRAZOLE SODIUM 40 MG: 40 TABLET, DELAYED RELEASE ORAL at 16:37

## 2021-02-27 RX ADMIN — ARFORMOTEROL TARTRATE 15 MCG: 15 SOLUTION RESPIRATORY (INHALATION) at 09:35

## 2021-02-27 RX ADMIN — SERTRALINE 50 MG: 50 TABLET, FILM COATED ORAL at 08:23

## 2021-02-27 RX ADMIN — Medication 1 TABLET: at 10:48

## 2021-02-27 RX ADMIN — FOLIC ACID 1 MG: 1 TABLET ORAL at 10:48

## 2021-02-27 RX ADMIN — ACETAMINOPHEN 650 MG: 325 TABLET ORAL at 08:22

## 2021-02-27 RX ADMIN — ENOXAPARIN SODIUM 40 MG: 40 INJECTION SUBCUTANEOUS at 08:23

## 2021-02-27 RX ADMIN — BUDESONIDE 250 MCG: 0.25 SUSPENSION RESPIRATORY (INHALATION) at 19:44

## 2021-02-27 RX ADMIN — BUDESONIDE 250 MCG: 0.25 SUSPENSION RESPIRATORY (INHALATION) at 09:35

## 2021-02-27 RX ADMIN — DIAZEPAM 10 MG: 5 TABLET ORAL at 08:22

## 2021-02-27 RX ADMIN — PANCRELIPASE 24000 UNITS: 60000; 12000; 38000 CAPSULE, DELAYED RELEASE PELLETS ORAL at 21:04

## 2021-02-27 RX ADMIN — PANTOPRAZOLE SODIUM 40 MG: 40 TABLET, DELAYED RELEASE ORAL at 05:50

## 2021-02-27 RX ADMIN — SODIUM CHLORIDE: 9 INJECTION, SOLUTION INTRAVENOUS at 16:38

## 2021-02-27 RX ADMIN — SODIUM CHLORIDE: 9 INJECTION, SOLUTION INTRAVENOUS at 02:00

## 2021-02-27 RX ADMIN — PANCRELIPASE 24000 UNITS: 60000; 12000; 38000 CAPSULE, DELAYED RELEASE PELLETS ORAL at 16:37

## 2021-02-27 RX ADMIN — ARFORMOTEROL TARTRATE 15 MCG: 15 SOLUTION RESPIRATORY (INHALATION) at 19:44

## 2021-02-27 RX ADMIN — PANCRELIPASE 24000 UNITS: 60000; 12000; 38000 CAPSULE, DELAYED RELEASE PELLETS ORAL at 10:50

## 2021-02-27 RX ADMIN — IPRATROPIUM BROMIDE 0.5 MG: 0.5 SOLUTION RESPIRATORY (INHALATION) at 13:04

## 2021-02-27 RX ADMIN — ACETAMINOPHEN 650 MG: 325 TABLET ORAL at 15:55

## 2021-02-27 RX ADMIN — SODIUM CHLORIDE: 9 INJECTION, SOLUTION INTRAVENOUS at 10:49

## 2021-02-27 RX ADMIN — CEFTRIAXONE 1000 MG: 1 INJECTION, POWDER, FOR SOLUTION INTRAMUSCULAR; INTRAVENOUS at 00:55

## 2021-02-27 RX ADMIN — IPRATROPIUM BROMIDE 0.5 MG: 0.5 SOLUTION RESPIRATORY (INHALATION) at 15:52

## 2021-02-27 RX ADMIN — ACETAMINOPHEN 650 MG: 325 TABLET ORAL at 21:03

## 2021-02-27 RX ADMIN — Medication 10 ML: at 21:04

## 2021-02-27 RX ADMIN — IPRATROPIUM BROMIDE 0.5 MG: 0.5 SOLUTION RESPIRATORY (INHALATION) at 19:43

## 2021-02-27 RX ADMIN — IPRATROPIUM BROMIDE 0.5 MG: 0.5 SOLUTION RESPIRATORY (INHALATION) at 09:35

## 2021-02-27 ASSESSMENT — PAIN DESCRIPTION - ONSET
ONSET: ON-GOING
ONSET: ON-GOING

## 2021-02-27 ASSESSMENT — PAIN DESCRIPTION - PROGRESSION
CLINICAL_PROGRESSION: GRADUALLY WORSENING
CLINICAL_PROGRESSION: NOT CHANGED
CLINICAL_PROGRESSION: NOT CHANGED

## 2021-02-27 ASSESSMENT — PAIN DESCRIPTION - PAIN TYPE
TYPE: ACUTE PAIN
TYPE: ACUTE PAIN

## 2021-02-27 ASSESSMENT — PAIN DESCRIPTION - DESCRIPTORS
DESCRIPTORS: HEADACHE;DISCOMFORT;THROBBING
DESCRIPTORS: HEADACHE

## 2021-02-27 ASSESSMENT — PAIN DESCRIPTION - LOCATION
LOCATION: HEAD

## 2021-02-27 ASSESSMENT — PAIN DESCRIPTION - ORIENTATION
ORIENTATION: ANTERIOR

## 2021-02-27 ASSESSMENT — PAIN DESCRIPTION - FREQUENCY
FREQUENCY: INTERMITTENT

## 2021-02-27 ASSESSMENT — PAIN SCALES - GENERAL
PAINLEVEL_OUTOF10: 4
PAINLEVEL_OUTOF10: 7

## 2021-02-27 ASSESSMENT — PAIN - FUNCTIONAL ASSESSMENT: PAIN_FUNCTIONAL_ASSESSMENT: ACTIVITIES ARE NOT PREVENTED

## 2021-02-27 NOTE — H&P
[address] [facility]  History and Physical      CHIEF COMPLAINT:    Chief Complaint   Patient presents with    Shortness of Breath     WEARS HOME O2        Patient of Dayday Brownlee MD presents with:  Acute on chronic pancreatitis [facility])    History of Present Illness: The patient is a very challenging historian. Her chief complaint seems to be chronic dyspnea which has been going on for months or even years. She states that she gets short of breath even walking short distances. There is no acute exacerbation of this. I kept asking if this is chronic why she came to the ED, and she really could not say. When questioned further, she finally did endorse some epigastric abdominal pain for several days, worse with food intake, which is severe and nonradiating. However it was surprising that this took a lot of questioning to actually elicit. It was definitely not at the forefront of her complaints. She has prior history of alcohol abuse but states she has been abstinent for many years. In the ED her pulmonary work-up was unremarkable, but she was found to have a lipase over 900. She was admitted for pancreatitis. As incidental complaint, she states that her chronic Del Rio was recently exchanged at [facility], although she cannot say exactly when. She states that since it was exchanged, she has been having burning in the perineal/urethral region which is not typical for her. REVIEW OF SYSTEMS:  Pertinent negatives are above in HPI. 10 point ROS otherwise negative.       Past Medical History:   Diagnosis Date    Anemia     Asthma     COPD (chronic obstructive pulmonary disease) (HCC)     uses )2 prn daily and nightly / Dr. Ahmadi Bodily Depression     Del Rio catheter in place     History of blood transfusion     2014          Past Surgical History:   Procedure Laterality Date    COLONOSCOPY      COLONOSCOPY N/A 9/4/2019    COLONOSCOPY DIAGNOSTIC performed by Dasha Sutherland Nam Hebert MD at [address] [facility] ECHO COMPL W DOP COLOR FLOW  2/24/2013         HYSTERECTOMY      KIDNEY SURGERY Left 06/16/2014    ABLATION PERFORMED UNDER CT SCAN    PA CYSTOURETHROSCOPY,BIOPSIES N/A 9/14/2018    CYSTOSCOPY RETROGRADE PYELOGRAM, CLOT EVACATION , POSS. BLADDER BIOPSY ---DR Peng Quach 2 :30 performed [facility] Sue DO [facility] N/A 5/10/2019    EGD BIOPSY performed by Yanelis Miguel MD at [address] [facility] N/A 6/28/2019    EGD EUS performed by Anjali River DO at [address] [facility] 6/28/2019    EGD performed by Anjali River DO at [facility] 5/19/2020    EGD DIAGNOSTIC ONLY performed by Irene Hester MD at [address] [facility]       Medications Prior to Admission:    Medications Prior to Admission: fluticasone-umeclidin-vilant (Murriel Tu) 100-62.5-25 MCG/INH AEPB, Inhale 1 puff into the lungs daily Instructed to take am of procedure  albuterol sulfate  (90 Base) MCG/ACT inhaler, Inhale 1 puff into the lungs as needed (every 4-6 hours as needed) Instructed to take am of procedure if needed and bring dos  famotidine (PEPCID) 40 MG tablet, Take 1 tablet by mouth every evening  pantoprazole (PROTONIX) 40 MG tablet, Take 1 tablet by mouth 2 times daily (before meals)  lipase-protease-amylase (CREON) 44389-89139 units delayed release capsule, Take 1 capsule by mouth 3 times daily (with meals) (Patient taking differently: Take 1 capsule by mouth 4 times daily (with meals and nightly) )  OXYGEN, Inhale 3 L into the lungs as needed Uses 3L night and during day prn  vitamin D (ERGOCALCIFEROL) 63112 UNITS CAPS capsule, Take 50,000 Units by mouth once a week Saturday  Multiple Vitamins-Iron (DAILY-FEDERICO/IRON) TABS, Take 1 tablet by mouth daily   diazepam (VALIUM) 10 MG tablet, Take 10 mg by mouth daily.    folic acid (FOLVITE) 1 MG tablet, Take 1 mg by mouth daily   sertraline (ZOLOFT) 50 MG tablet, Take 1 tablet by mouth daily. alendronate (FOSAMAX) 70 MG tablet, Take 70 mg by mouth every 7 days Sunday    Note that the patient's home medications were reviewed and the above list is accurate to the best of my knowledge at the time of the exam.    Allergies:    Sulfa antibiotics    Social History:    reports that she has quit smoking. She smoked 0.00 packs per day. She has never used smokeless tobacco. She reports that she does not drink alcohol or use drugs. Family History:   family history includes Cancer in her father; Diabetes in her brother; Kidney Disease in her brother. PHYSICAL EXAM:    Vitals:  /61   Pulse 87   Temp 96.5 °F (35.8 °C) (Temporal)   Resp 16   Wt 107 lb (48.5 kg)   SpO2 97%   BMI 16.76 kg/m²     *Not finalized*  General appearance: NAD, conversant  Eyes: Sclerae anicteric, PERRLA  HEENT: AT/NC, MMM  Neck: FROM, supple, no thyromegaly  Lymph: No cervical / supraclavicular lymphadenopathy  Lungs: Clear to auscultation, WOB normal  CV: RRR, no MRGs, no lower extremity edema  Abdomen: Soft, non-tender; no masses or HSM, +BS  Extremities: FROM without synovitis. No clubbing or cyanosis of the hands. Skin: no rash, induration, lesions, or ulcers  Psych: Calm and cooperative. Normal judgement and insight. Normal mood and affect. Neuro: Alert and interactive, face symmetric, speech fluent. LABS:  All labs reviewed.   Of note:  CBC with Differential:    Lab Results   Component Value Date    WBC 6.4 02/26/2021    RBC 3.46 02/26/2021    HGB 8.0 02/26/2021    HCT 29.6 02/26/2021     02/26/2021    MCV 85.5 02/26/2021    MCH 23.1 02/26/2021    MCHC 27.0 02/26/2021    RDW 20.6 02/26/2021    SEGSPCT 26 02/16/2014    METASPCT 0.9 05/18/2020    LYMPHOPCT 30.5 02/09/2021    PROMYELOPCT 0.9 05/18/2020    MONOPCT 6.7 02/09/2021    BASOPCT 0.4 02/09/2021    MONOSABS 0.31 02/09/2021    LYMPHSABS 1.42 02/09/2021    EOSABS 0.07 02/09/2021    BASOSABS 0.02 02/09/2021     CMP:    Lab Results   Component Value Date     02/26/2021    K 4.1 02/26/2021    K 4.4 02/09/2021     02/26/2021    CO2 32 02/26/2021    BUN 13 02/26/2021    CREATININE 0.7 02/26/2021    GFRAA >60 02/26/2021    LABGLOM >60 02/26/2021    GLUCOSE 124 02/26/2021    PROT 7.0 02/26/2021    LABALBU 4.2 02/26/2021    CALCIUM 10.2 02/26/2021    BILITOT <0.2 02/26/2021    ALKPHOS 91 02/26/2021    AST 23 02/26/2021    ALT 17 02/26/2021       Imaging:  I've personally reviewed the patient's CXR:  Mildly prominent bibasilar markings, slightly more than Feb 9    I've personally reviewed the patient's CT AP  Agree w/rads report. Of note, there are no bibasilar infiltrates or edema to correlate with the CXR. Extensive urinary bladder thickening.  This may be partially due to   underdistention.  Cystitis or a neoplastic process is not excluded. Thickening of the gastric antrum.  This could also be exaggerated by   underdistention.  Infectious/inflammatory versus neoplastic process should be   considered.  Correlation with endoscopy suggested. Mild biliary ductal prominence of uncertain etiology or significance. Recommend correlation with clinical and laboratory assessment. Findings are consistent with chronic pancreatitis similar to previous. Pancreatic ductal dilatation is similar to previous.  No evidence of acute   pancreatitis. EKG:  I've personally reviewed the patient's EKG:  NSR no acute ischemic changes       ASSESSMENT/PLAN:  Principal Problem:    Acute on chronic pancreatitis (HCC)  Active Problems:    COPD (chronic obstructive pulmonary disease) (HCC)    Neurogenic bladder    Chronic respiratory failure with hypoxia (HCC)    Gastric wall thickening    Bladder wall thickening  Resolved Problems:    * No resolved hospital problems. *      IV fluids, analgesics. Continue Creon    Continue O2    Chronic dyspnea may not improve much.   FEV1 25% predicted in 2019, per review of last Pulm note. Continue bronchodilators    Multiple incidental findings needs outpatient surveillance/workup - gastric wall thickening and bladder wall thickening. Explained the gastric findings to the patient. She did have EGD in May 2020 showing superficial ulcerations. GI has already been consulted to defer to them whether she needs repeat EGD at some point. Will ask Urology to weigh in about the bladder, especially since she has some acute dysuria/discomfort but U/A doesn't really look infected. Defer to Urology re: bladder thickening, whether she needs cysto?     Code status: Full  Requires inpatient level of care  Roly Ambreen    6:47 AM  2/27/2021

## 2021-02-27 NOTE — PROGRESS NOTES
Patient made statement, \"Do you think people commit suicide because they get tired of being sick? \" Pt states she has a history of suicidal thoughts, but currently denies SI. She also reports a hx of depression, but states that her current medications help. Therapeutic presence and emotional support provided in all interactions. Pt denies any needs at this time. Left with call light in reach.     Electronically signed by Elzbieta Guevara RN on 2/27/2021 at 1:38 PM

## 2021-02-27 NOTE — PROGRESS NOTES
Left a message with answering service for Dr. Gita Quiroga re: new consult for pancreatitis.    -------    Answering service called back, they state Dr. Gita Quiroga does not come to this facility, only [facility].     Electronically signed by Mona Ambrocio RN on 2/27/2021 at 7:28 AM

## 2021-02-28 LAB
ANION GAP SERPL CALCULATED.3IONS-SCNC: 6 MMOL/L (ref 7–16)
BUN BLDV-MCNC: 5 MG/DL (ref 8–23)
CALCIUM SERPL-MCNC: 9 MG/DL (ref 8.6–10.2)
CHLORIDE BLD-SCNC: 105 MMOL/L (ref 98–107)
CO2: 30 MMOL/L (ref 22–29)
CREAT SERPL-MCNC: 0.6 MG/DL (ref 0.5–1)
GFR AFRICAN AMERICAN: >60
GFR NON-AFRICAN AMERICAN: >60 ML/MIN/1.73
GLUCOSE BLD-MCNC: 106 MG/DL (ref 74–99)
LIPASE: 469 U/L (ref 13–60)
POTASSIUM REFLEX MAGNESIUM: 4.1 MMOL/L (ref 3.5–5)
SODIUM BLD-SCNC: 141 MMOL/L (ref 132–146)

## 2021-02-28 PROCEDURE — 6370000000 HC RX 637 (ALT 250 FOR IP): Performed by: INTERNAL MEDICINE

## 2021-02-28 PROCEDURE — 80048 BASIC METABOLIC PNL TOTAL CA: CPT

## 2021-02-28 PROCEDURE — 36415 COLL VENOUS BLD VENIPUNCTURE: CPT

## 2021-02-28 PROCEDURE — 6360000002 HC RX W HCPCS: Performed by: PHYSICIAN ASSISTANT

## 2021-02-28 PROCEDURE — 2580000003 HC RX 258: Performed by: PHYSICIAN ASSISTANT

## 2021-02-28 PROCEDURE — 94640 AIRWAY INHALATION TREATMENT: CPT

## 2021-02-28 PROCEDURE — 94760 N-INVAS EAR/PLS OXIMETRY 1: CPT

## 2021-02-28 PROCEDURE — 83690 ASSAY OF LIPASE: CPT

## 2021-02-28 PROCEDURE — 2700000000 HC OXYGEN THERAPY PER DAY

## 2021-02-28 PROCEDURE — 6370000000 HC RX 637 (ALT 250 FOR IP): Performed by: PHYSICIAN ASSISTANT

## 2021-02-28 PROCEDURE — 1200000000 HC SEMI PRIVATE

## 2021-02-28 RX ORDER — PHENAZOPYRIDINE HYDROCHLORIDE 100 MG/1
100 TABLET, FILM COATED ORAL 3 TIMES DAILY PRN
Status: DISCONTINUED | OUTPATIENT
Start: 2021-02-28 | End: 2021-03-03 | Stop reason: HOSPADM

## 2021-02-28 RX ADMIN — DIAZEPAM 10 MG: 5 TABLET ORAL at 09:30

## 2021-02-28 RX ADMIN — IPRATROPIUM BROMIDE 0.5 MG: 0.5 SOLUTION RESPIRATORY (INHALATION) at 12:20

## 2021-02-28 RX ADMIN — ARFORMOTEROL TARTRATE 15 MCG: 15 SOLUTION RESPIRATORY (INHALATION) at 07:48

## 2021-02-28 RX ADMIN — BUDESONIDE 250 MCG: 0.25 SUSPENSION RESPIRATORY (INHALATION) at 07:55

## 2021-02-28 RX ADMIN — IPRATROPIUM BROMIDE 0.5 MG: 0.5 SOLUTION RESPIRATORY (INHALATION) at 07:47

## 2021-02-28 RX ADMIN — PANCRELIPASE 36000 UNITS: 60000; 12000; 38000 CAPSULE, DELAYED RELEASE PELLETS ORAL at 16:48

## 2021-02-28 RX ADMIN — PANTOPRAZOLE SODIUM 40 MG: 40 TABLET, DELAYED RELEASE ORAL at 05:30

## 2021-02-28 RX ADMIN — Medication 1 TABLET: at 09:30

## 2021-02-28 RX ADMIN — ACETAMINOPHEN 650 MG: 325 TABLET ORAL at 13:22

## 2021-02-28 RX ADMIN — ONDANSETRON 4 MG: 2 INJECTION INTRAMUSCULAR; INTRAVENOUS at 17:21

## 2021-02-28 RX ADMIN — PANTOPRAZOLE SODIUM 40 MG: 40 TABLET, DELAYED RELEASE ORAL at 16:48

## 2021-02-28 RX ADMIN — PANCRELIPASE 24000 UNITS: 60000; 12000; 38000 CAPSULE, DELAYED RELEASE PELLETS ORAL at 09:30

## 2021-02-28 RX ADMIN — Medication 10 ML: at 09:31

## 2021-02-28 RX ADMIN — ENOXAPARIN SODIUM 40 MG: 40 INJECTION SUBCUTANEOUS at 09:30

## 2021-02-28 RX ADMIN — SERTRALINE 50 MG: 50 TABLET, FILM COATED ORAL at 09:30

## 2021-02-28 RX ADMIN — ACETAMINOPHEN 650 MG: 325 TABLET ORAL at 05:34

## 2021-02-28 RX ADMIN — ACETAMINOPHEN 650 MG: 325 TABLET ORAL at 22:16

## 2021-02-28 RX ADMIN — PANCRELIPASE 24000 UNITS: 60000; 12000; 38000 CAPSULE, DELAYED RELEASE PELLETS ORAL at 13:22

## 2021-02-28 RX ADMIN — Medication 10 ML: at 22:14

## 2021-02-28 RX ADMIN — IPRATROPIUM BROMIDE 0.5 MG: 0.5 SOLUTION RESPIRATORY (INHALATION) at 16:15

## 2021-02-28 RX ADMIN — IPRATROPIUM BROMIDE 0.5 MG: 0.5 SOLUTION RESPIRATORY (INHALATION) at 20:01

## 2021-02-28 RX ADMIN — SODIUM CHLORIDE: 9 INJECTION, SOLUTION INTRAVENOUS at 09:41

## 2021-02-28 RX ADMIN — BUDESONIDE 250 MCG: 0.25 SUSPENSION RESPIRATORY (INHALATION) at 20:02

## 2021-02-28 RX ADMIN — ARFORMOTEROL TARTRATE 15 MCG: 15 SOLUTION RESPIRATORY (INHALATION) at 20:01

## 2021-02-28 RX ADMIN — FOLIC ACID 1 MG: 1 TABLET ORAL at 09:30

## 2021-02-28 ASSESSMENT — PAIN SCALES - GENERAL
PAINLEVEL_OUTOF10: 0
PAINLEVEL_OUTOF10: 0
PAINLEVEL_OUTOF10: 8

## 2021-02-28 NOTE — PLAN OF CARE
Problem: Falls - Risk of:  Goal: Will remain free from falls  Description: Will remain free from falls  2/27/2021 2359 by Julio Ortiz RN  Outcome: Met This Shift  2/27/2021 2359 by Julio Ortiz RN  Outcome: Met This Shift  2/27/2021 1647 by Marce Calvert RN  Outcome: Met This Shift  2/27/2021 1029 by Marce Calvert, RN  Outcome: Met This Shift  Goal: Absence of physical injury  Description: Absence of physical injury  2/27/2021 2359 by Julio Ortiz RN  Outcome: Met This Shift  2/27/2021 2359 by Julio Ortiz RN  Outcome: Met This Shift  2/27/2021 1647 by Marce Calvert, RN  Outcome: Met This Shift  2/27/2021 1029 by Marce Calvert RN  Outcome: Met This Shift     Problem: Pain:  Goal: Pain level will decrease  Description: Pain level will decrease  2/27/2021 1647 by Marce Calvert, RN  Outcome: Met This Shift  2/27/2021 1029 by Marce Calvert RN  Outcome: Met This Shift

## 2021-02-28 NOTE — CONSULTS
2/28/2021 11:31 AM  Service: Urology  Group: [facility] (Vikram/Gary/Lorraine)    Araceli Dutton  [id number]     Chief Complaint:    Chronic Jarquin    History of Present Illness: The patient is a 74 y.o. female patient who presented to the hospital 2 days ago with complaints of shortness of breath and was admitted for further evaluation. We were asked to evaluate her for chronic Jarquin catheter. Has a history of neurogenic bladder and used to perform CIC. She now has a chronic Jarquin catheter. She was a patient of Dr. Kevin Salvador but now follows with [facility]. She did undergo cystolitholapaxy in April 2020. She has a history of 2.8cm left renal mass s/p RFA with Dr. Daryl Whyte in June 2014. She states her jarquin catheter was just changed about 3 days ago. Past Medical History:   Diagnosis Date    Anemia     Asthma     COPD (chronic obstructive pulmonary disease) (HCC)     uses )2 prn daily and nightly / Dr. Tavo Canela Depression     Jarquin catheter in place     History of blood transfusion     2014          Past Surgical History:   Procedure Laterality Date    COLONOSCOPY      COLONOSCOPY N/A 9/4/2019    COLONOSCOPY DIAGNOSTIC performed by Brad Prader, MD at [address] [facility] ECHO COMPL W DOP COLOR FLOW  2/24/2013         HYSTERECTOMY      KIDNEY SURGERY Left 06/16/2014    ABLATION PERFORMED UNDER CT SCAN    MS CYSTOURETHROSCOPY,BIOPSIES N/A 9/14/2018    CYSTOSCOPY RETROGRADE PYELOGRAM, CLOT EVACATION , POSS.   BLADDER BIOPSY ---DR Sumit Hernandez 2 :30 performed by Shun Sue DO at [address] N/A 5/10/2019    EGD BIOPSY performed by Brad Prader, MD at [facility] 19 6/28/2019    [facility] 6/28/2019    EGD performed by José Miguel Nelson DO at [facility] 19 5/19/2020    EGD DIAGNOSTIC ONLY performed by Elliott Vides MD at [address] [place] [address]       Medications Prior to Admission:    Medications Prior to Admission: fluticasone-umeclidin-vilant (Cleatis Barriga) 100-62.5-25 MCG/INH AEPB, Inhale 1 puff into the lungs daily Instructed to take am of procedure  albuterol sulfate  (90 Base) MCG/ACT inhaler, Inhale 1 puff into the lungs as needed (every 4-6 hours as needed) Instructed to take am of procedure if needed and bring dos  famotidine (PEPCID) 40 MG tablet, Take 1 tablet by mouth every evening  pantoprazole (PROTONIX) 40 MG tablet, Take 1 tablet by mouth 2 times daily (before meals)  lipase-protease-amylase (CREON) 83404-69933 units delayed release capsule, Take 1 capsule by mouth 3 times daily (with meals) (Patient taking differently: Take 1 capsule by mouth 4 times daily (with meals and nightly) )  OXYGEN, Inhale 3 L into the lungs as needed Uses 3L night and during day prn  vitamin D (ERGOCALCIFEROL) 06999 UNITS CAPS capsule, Take 50,000 Units by mouth once a week Saturday  Multiple Vitamins-Iron (DAILY-FEDERICO/IRON) TABS, Take 1 tablet by mouth daily   diazepam (VALIUM) 10 MG tablet, Take 10 mg by mouth daily. folic acid (FOLVITE) 1 MG tablet, Take 1 mg by mouth daily   sertraline (ZOLOFT) 50 MG tablet, Take 1 tablet by mouth daily. alendronate (FOSAMAX) 70 MG tablet, Take 70 mg by mouth every 7 days Sunday    Allergies:    Sulfa antibiotics    Social History:    reports that she has quit smoking. She smoked 0.00 packs per day. She has never used smokeless tobacco. She reports that she does not drink alcohol or use drugs. Family History:   Non-contributory to this Urological problem  family history includes Cancer in her father; Diabetes in her brother; Kidney Disease in her brother.     Review of Systems:  Constitutional: No fever or chills   Respiratory: negative for cough and hemoptysis  Cardiovascular: negative for chest pain and dyspnea  Gastrointestinal: negative for abdominal pain, diarrhea, nausea and vomiting   Derm: negative for rash and skin lesion(s)  Neurological: negative for seizures and tremors  Musculoskeletal: Negative    Psychiatric: Negative   : As above in the HPI, otherwise negative  All other reviews are negative    Physical Exam:     Vitals:  BP (!) 109/51   Pulse 89   Temp 97.8 °F (36.6 °C) (Temporal)   Resp 16   Ht 5' 7\" (1.702 m)   Wt 107 lb (48.5 kg)   SpO2 100%   BMI 16.76 kg/m²     General:  Awake, alert, oriented X 3. No apparent distress. HEENT:  Normocephalic, atraumatic. Lungs:  Respirations symmetric and non-labored. Abdomen:  soft, nontender, no masses  Extremities:  No clubbing, cyanosis, or edema  Skin:  Warm and dry, no open lesions or rashes  Neuro: There are no motor or sensory deficits in the 4 quadrant extremities   Rectal: deferred  Genitourinary: Del Rio draining yellow urine     Labs:     Recent Labs     02/26/21  1831 02/27/21  0640   WBC 6.4 5.8   RBC 3.46* 3.11*   HGB 8.0* 7.2*   HCT 29.6* 26.7*   MCV 85.5 85.9   MCH 23.1* 23.2*   MCHC 27.0* 27.0*   RDW 20.6* 20.2*    318   MPV 9.2 9.4         Recent Labs     02/26/21  1831 02/27/21  0640 02/28/21  0705   CREATININE 0.7 0.6 0.6       Imaging:   Narrative   EXAMINATION:   CT OF THE ABDOMEN AND PELVIS WITH CONTRAST 2/26/2021 10:56 pm   TECHNIQUE:   CT of the abdomen and pelvis was performed with the administration of   intravenous contrast. Multiplanar reformatted images are provided for review. Dose modulation, iterative reconstruction, and/or weight based adjustment of   the mA/kV was utilized to reduce the radiation dose to as low as reasonably   achievable.    COMPARISON:   08/27/2020   HISTORY:   ORDERING SYSTEM PROVIDED HISTORY: pancreatitis   TECHNOLOGIST PROVIDED HISTORY:   Additional Contrast?->None   Reason for exam:->pancreatitis   Decision Support Exception->Emergency Medical Condition (MA)   What reading provider will be dictating this exam?->CRC FINDINGS:   Mild intrahepatic and extrahepatic biliary ductal dilatation is slightly more   conspicuous than previous.  Common duct measures 7-8 mm, previously 5 mm. Liver is homogeneous.  Gallbladder is unremarkable.  Spleen is normal in   size.  Extensive pancreatic calcifications consistent with chronic   pancreatitis. Joan Spoon is also diffuse pancreatic ductal dilatation which is   similar to previous.  No obvious findings of acute pancreatitis.  Thickening   of the adrenal glands, likely due to hyperplasia.  No hydronephrosis. Low-attenuation renal lesions, some of which are too small for definitive   characterization appear unchanged and likely represent cysts. No bowel obstruction.  The appendix is not identified with certainty and may   have been surgically removed.  Moderate stool burden.  Moderate   diverticulosis without evidence of acute diverticulitis.  There is thickening   of the gastric antrum.  Small fat containing paraumbilical hernia.  Extensive   circumferential thickening of the urinary bladder, which is partially   decompressed with a Del Rio catheter.  Calcifications in the pelvis are most   consistent with phleboliths.  A calcification near the right side of the   urinary bladder measuring 6 mm was present on 11/29/2019 and may represent a   bladder stone or adjacent phlebolith.  Given absence of hydroureter, ureteral   stone thought extremely unlikely.  Tiny fat containing bilateral inguinal   hernias.  Uterus is absent. Scattered areas of scarring and/or atelectasis in the lung bases.  Prominent   emphysematous changes.  Degenerative changes are present in the spine and   pelvis.  Diffuse osteopenia.       Impression   Extensive urinary bladder thickening.  This may be partially due to   underdistention.  Cystitis or a neoplastic process is not excluded.    Thickening of the gastric antrum.  This could also be exaggerated by   underdistention.  Infectious/inflammatory versus neoplastic process should be   considered.  Correlation with endoscopy suggested. Mild biliary ductal prominence of uncertain etiology or significance. Recommend correlation with clinical and laboratory assessment. Findings are consistent with chronic pancreatitis similar to previous. Pancreatic ductal dilatation is similar to previous.  No evidence of acute   pancreatitis.                           Assessment/plan:  Hx 2.8cm left renal mass s/p RFA with Dr. Hubert Ray in June 2014   Hx of bladder calculi s/p cystolitholapaxy April 2020  Neurogenic bladder with chronic jarquin catheter   Left Renal cyst     Creatinine stable  UA reviewed  Urine culture pending  Antibiotics per primary  CTAP reviewed, no hydronephrosis noted  Continue the Jarquin catheter  This is due to  Be changed, nursing will change   Pyridium PRN dysuria   This will be left indwelling  This needs to be changed every 4 weeks  She will cont to follow with [facility]   She has tried CIC, now with chronic jarquin changed every 4 weeks   Please call with further questions or concerns                      Electronically signed by ANGELA Yates CNP on 2/28/2021 at 11:31 AM     I agree with the nurse practitioner assessment and plan. I personally evaluated the patient and made any changes to reflect my impression and plan. Change jarquin. Pyridium. Call with question.      Grace Canavan, MD

## 2021-03-01 ENCOUNTER — APPOINTMENT (OUTPATIENT)
Dept: GENERAL RADIOLOGY | Age: 66
DRG: 439 | End: 2021-03-01
Payer: COMMERCIAL

## 2021-03-01 PROBLEM — I47.1 ATRIAL TACHYCARDIA (HCC): Status: ACTIVE | Noted: 2021-03-01

## 2021-03-01 PROBLEM — I47.19 ATRIAL TACHYCARDIA: Status: ACTIVE | Noted: 2021-03-01

## 2021-03-01 LAB
ANION GAP SERPL CALCULATED.3IONS-SCNC: 2 MMOL/L (ref 7–16)
ANISOCYTOSIS: ABNORMAL
BASOPHILS ABSOLUTE: 0.03 E9/L (ref 0–0.2)
BASOPHILS RELATIVE PERCENT: 0.5 % (ref 0–2)
BUN BLDV-MCNC: 7 MG/DL (ref 8–23)
CALCIUM SERPL-MCNC: 8.9 MG/DL (ref 8.6–10.2)
CHLORIDE BLD-SCNC: 103 MMOL/L (ref 98–107)
CO2: 33 MMOL/L (ref 22–29)
CREAT SERPL-MCNC: 0.6 MG/DL (ref 0.5–1)
EOSINOPHILS ABSOLUTE: 0.02 E9/L (ref 0.05–0.5)
EOSINOPHILS RELATIVE PERCENT: 0.3 % (ref 0–6)
GFR AFRICAN AMERICAN: >60
GFR NON-AFRICAN AMERICAN: >60 ML/MIN/1.73
GLUCOSE BLD-MCNC: 119 MG/DL (ref 74–99)
HCT VFR BLD CALC: 25.5 % (ref 34–48)
HEMOGLOBIN: 7 G/DL (ref 11.5–15.5)
HYPOCHROMIA: ABNORMAL
IMMATURE GRANULOCYTES #: 0.01 E9/L
IMMATURE GRANULOCYTES %: 0.2 % (ref 0–5)
LIPASE: 515 U/L (ref 13–60)
LYMPHOCYTES ABSOLUTE: 0.66 E9/L (ref 1.5–4)
LYMPHOCYTES RELATIVE PERCENT: 11.2 % (ref 20–42)
MAGNESIUM: 1.9 MG/DL (ref 1.6–2.6)
MCH RBC QN AUTO: 23.6 PG (ref 26–35)
MCHC RBC AUTO-ENTMCNC: 27.5 % (ref 32–34.5)
MCV RBC AUTO: 86.1 FL (ref 80–99.9)
MONOCYTES ABSOLUTE: 0.36 E9/L (ref 0.1–0.95)
MONOCYTES RELATIVE PERCENT: 6.1 % (ref 2–12)
NEUTROPHILS ABSOLUTE: 4.8 E9/L (ref 1.8–7.3)
NEUTROPHILS RELATIVE PERCENT: 81.7 % (ref 43–80)
OVALOCYTES: ABNORMAL
PDW BLD-RTO: 18.9 FL (ref 11.5–15)
PLATELET # BLD: 257 E9/L (ref 130–450)
PMV BLD AUTO: 9.2 FL (ref 7–12)
POIKILOCYTES: ABNORMAL
POLYCHROMASIA: ABNORMAL
POTASSIUM REFLEX MAGNESIUM: 4.2 MMOL/L (ref 3.5–5)
PRO-BNP: 816 PG/ML (ref 0–125)
RBC # BLD: 2.96 E12/L (ref 3.5–5.5)
SODIUM BLD-SCNC: 138 MMOL/L (ref 132–146)
WBC # BLD: 5.9 E9/L (ref 4.5–11.5)

## 2021-03-01 PROCEDURE — 80048 BASIC METABOLIC PNL TOTAL CA: CPT

## 2021-03-01 PROCEDURE — 97535 SELF CARE MNGMENT TRAINING: CPT

## 2021-03-01 PROCEDURE — 1200000000 HC SEMI PRIVATE

## 2021-03-01 PROCEDURE — 97161 PT EVAL LOW COMPLEX 20 MIN: CPT

## 2021-03-01 PROCEDURE — 83880 ASSAY OF NATRIURETIC PEPTIDE: CPT

## 2021-03-01 PROCEDURE — 97530 THERAPEUTIC ACTIVITIES: CPT

## 2021-03-01 PROCEDURE — 6370000000 HC RX 637 (ALT 250 FOR IP): Performed by: INTERNAL MEDICINE

## 2021-03-01 PROCEDURE — 83690 ASSAY OF LIPASE: CPT

## 2021-03-01 PROCEDURE — 97166 OT EVAL MOD COMPLEX 45 MIN: CPT

## 2021-03-01 PROCEDURE — 94640 AIRWAY INHALATION TREATMENT: CPT

## 2021-03-01 PROCEDURE — 6360000002 HC RX W HCPCS: Performed by: PHYSICIAN ASSISTANT

## 2021-03-01 PROCEDURE — 36415 COLL VENOUS BLD VENIPUNCTURE: CPT

## 2021-03-01 PROCEDURE — 2580000003 HC RX 258: Performed by: PHYSICIAN ASSISTANT

## 2021-03-01 PROCEDURE — 6370000000 HC RX 637 (ALT 250 FOR IP): Performed by: NURSE PRACTITIONER

## 2021-03-01 PROCEDURE — 83735 ASSAY OF MAGNESIUM: CPT

## 2021-03-01 PROCEDURE — 6370000000 HC RX 637 (ALT 250 FOR IP): Performed by: PHYSICIAN ASSISTANT

## 2021-03-01 PROCEDURE — 2700000000 HC OXYGEN THERAPY PER DAY

## 2021-03-01 PROCEDURE — 71046 X-RAY EXAM CHEST 2 VIEWS: CPT

## 2021-03-01 PROCEDURE — 85025 COMPLETE CBC W/AUTO DIFF WBC: CPT

## 2021-03-01 RX ORDER — DIAZEPAM 2 MG/1
2 TABLET ORAL 2 TIMES DAILY
Status: DISCONTINUED | OUTPATIENT
Start: 2021-03-01 | End: 2021-03-02

## 2021-03-01 RX ORDER — METOPROLOL SUCCINATE 25 MG/1
25 TABLET, EXTENDED RELEASE ORAL DAILY
Status: DISCONTINUED | OUTPATIENT
Start: 2021-03-01 | End: 2021-03-03 | Stop reason: HOSPADM

## 2021-03-01 RX ADMIN — PANCRELIPASE 36000 UNITS: 60000; 12000; 38000 CAPSULE, DELAYED RELEASE PELLETS ORAL at 12:26

## 2021-03-01 RX ADMIN — PANTOPRAZOLE SODIUM 40 MG: 40 TABLET, DELAYED RELEASE ORAL at 06:19

## 2021-03-01 RX ADMIN — FOLIC ACID 1 MG: 1 TABLET ORAL at 09:21

## 2021-03-01 RX ADMIN — IPRATROPIUM BROMIDE 0.5 MG: 0.5 SOLUTION RESPIRATORY (INHALATION) at 20:38

## 2021-03-01 RX ADMIN — MORPHINE SULFATE 2 MG: 2 INJECTION, SOLUTION INTRAMUSCULAR; INTRAVENOUS at 04:47

## 2021-03-01 RX ADMIN — SERTRALINE 50 MG: 50 TABLET, FILM COATED ORAL at 09:22

## 2021-03-01 RX ADMIN — DIAZEPAM 10 MG: 5 TABLET ORAL at 09:21

## 2021-03-01 RX ADMIN — ENOXAPARIN SODIUM 40 MG: 40 INJECTION SUBCUTANEOUS at 09:21

## 2021-03-01 RX ADMIN — IPRATROPIUM BROMIDE 0.5 MG: 0.5 SOLUTION RESPIRATORY (INHALATION) at 11:13

## 2021-03-01 RX ADMIN — BUDESONIDE 250 MCG: 0.25 SUSPENSION RESPIRATORY (INHALATION) at 20:38

## 2021-03-01 RX ADMIN — Medication 10 ML: at 21:18

## 2021-03-01 RX ADMIN — ACETAMINOPHEN 650 MG: 325 TABLET ORAL at 06:54

## 2021-03-01 RX ADMIN — PANCRELIPASE 36000 UNITS: 60000; 12000; 38000 CAPSULE, DELAYED RELEASE PELLETS ORAL at 18:11

## 2021-03-01 RX ADMIN — METOPROLOL SUCCINATE 25 MG: 25 TABLET, EXTENDED RELEASE ORAL at 11:26

## 2021-03-01 RX ADMIN — PANTOPRAZOLE SODIUM 40 MG: 40 TABLET, DELAYED RELEASE ORAL at 18:11

## 2021-03-01 RX ADMIN — ONDANSETRON 4 MG: 2 INJECTION INTRAMUSCULAR; INTRAVENOUS at 09:20

## 2021-03-01 RX ADMIN — BUDESONIDE 250 MCG: 0.25 SUSPENSION RESPIRATORY (INHALATION) at 11:12

## 2021-03-01 RX ADMIN — ARFORMOTEROL TARTRATE 15 MCG: 15 SOLUTION RESPIRATORY (INHALATION) at 20:38

## 2021-03-01 RX ADMIN — ARFORMOTEROL TARTRATE 15 MCG: 15 SOLUTION RESPIRATORY (INHALATION) at 11:11

## 2021-03-01 RX ADMIN — PHENAZOPYRIDINE HYDROCHLORIDE 100 MG: 100 TABLET ORAL at 09:22

## 2021-03-01 RX ADMIN — PANCRELIPASE 36000 UNITS: 60000; 12000; 38000 CAPSULE, DELAYED RELEASE PELLETS ORAL at 09:21

## 2021-03-01 RX ADMIN — Medication 1 TABLET: at 09:22

## 2021-03-01 RX ADMIN — DIAZEPAM 2 MG: 2 TABLET ORAL at 21:18

## 2021-03-01 ASSESSMENT — PAIN SCALES - GENERAL
PAINLEVEL_OUTOF10: 0
PAINLEVEL_OUTOF10: 0

## 2021-03-01 NOTE — PROGRESS NOTES
Physical Therapy  Physical Therapy Initial Assessment     Name: Mark Castrejon  : 1955  MRN: [id number]    Referring Provider:  EDILMA Gorman    Date of Service: 3/1/2021    Evaluating PT:  Alex Heller PT, DPT. [id number]    Room #:  9-G  Diagnosis:  Acute on chronic pancreatitis   Reason for admission:  SOB  Precautions:  Falls, O2  Procedures: none  Equipment Recommendations:  Return to use of rollator    SUBJECTIVE:  Pt lives alone (states brother is in and out) and has an aide 5 days a week in a 2 story home with 3 stair(s) to enter and 1 rail(s). Bed is on 2nd floor and bath is on 2nd floor with 14+ steps up and 1 rail. Pt ambulated with rollator PTA. OBJECTIVE:   Initial Evaluation  Date: 3/1 Treatment   Short Term/ Long Term   Goals   AM-PAC 6 Clicks      Was pt agreeable to Eval/treatment? Yes      Does pt have pain? 7/10 chest      Bed Mobility  Rolling: NT  Supine to sit: NT  Sit to supine: Daryl  Scooting: Daryl  Independent    Transfers Sit to stand: SBA  Stand to sit: SBA  Stand pivot: Daryl HHA  Independent    Ambulation    50 feet with [employer] Daryl    >150 feet with [employer] Mod I   Stair negotiation: ascended and descended  NT  >10 steps with 1 rail Mod I    ROM BUE:  See OT eval   BLE:  WFL     Strength BUE:  See OT eval   BLE:  knee ext 4/5  Ankle DF 4/5  Increase by 1/3 MMT grade    Balance Sitting EOB:  SBA  Dynamic Standing:  Daryl [employer]  Sitting EOB:  indep  Dynamic Standing:   Mod I [employer]     -Pt is A & O x 3  -Sensation:  unremarkable   -Edema:  unremarkable     Therapeutic Exercises:  functional activity     Patient education  Pt educated on safety, sequencing of transfers, and role of PT    Patient response to education:   Pt verbalized understanding Pt demonstrated skill Pt requires further education in this area   Yes  Partial  Yes      ASSESSMENT:    Comments:  Pt received sitting in chair and agreeable to PT session   Pt able to stand without assistance but did have observable difficulty and needed extended time. Ambulation completed with use of walker. Pt with slow gait speed and having difficulty with walker management, bumping into objects and needing physical aid to steer walker. Seated in chair to end session. Returned later to assist pt back to bed. SPT completed with HHA and returned to bed. Pt with all needs met and call light in reach. Pt would benefit from continued PT POC to address functional deficits described above. Treatment:  Patient practiced and was instructed in the following treatment:     Patient education provided continuously throughout session for sequencing, safety maintenance, and improving any deficits found during the evaluation.  Bed mobility training - pt given verbal and tactile cues to facilitate proper sequencing and safety during rolling and sit>supine as well as provided with physical assistance to complete task     STS and pivot transfer training - pt educated on proper hand and foot placement, safety and sequencing, and use of proper technique to safely complete sit<>stand and pivot transfers with hands on assistance to complete task safely     Gait training- pt was given verbal and tactile cues to facilitate improved gait speed and proper walker management during ambulation as well as provided with physical assistance to complete task. Pt's/ family goals   1. Return home     Patient and or family understand(s) diagnosis, prognosis, and plan of care. yes    PLAN:    Current Treatment Recommendations   [x] Strengthening     [] ROM   [x] Balance Training   [x] Endurance Training   [x] Transfer Training   [x] Gait Training   [x] Stair Training   [] Positioning   [x] Safety and Education Training   [] Patient/Caregiver Education   [] HEP  [] Other     Frequency of treatments: 2-5x/week x 1-2 weeks.     Time in  0830  Time out  0847    Total Treatment Time 10 minutes     Evaluation Time includes thorough review of current medical information, gathering information on past medical history/social history and prior level of function, completion of standardized testing/informal observation of tasks, assessment of data and education on plan of care and goals.     CPT codes:  [x] Low Complexity PT evaluation 34390  [] Moderate Complexity PT evaluation 84328  [] High Complexity PT evaluation 99944  [] PT Re-evaluation 70078  [] Gait training 46972 - minutes  [] Manual therapy 34558 - minutes  [x] Therapeutic activities 23680 10 minutes  [] Therapeutic exercises 88589 - minutes  [] Neuromuscular reeducation 14082 - minutes     Earlene Conrad, PT, DPT  [id number]

## 2021-03-01 NOTE — CONSULTS
Pulmonary Consultation    Admit Date: 2/26/2021    Requesting Physician: Bruce Teran MD    CC:     HPI:  This is a 74 y.o. female with neurogenic bladder with chronic indwelling Del Rio with frequent UTIs and has had cystoscopies, chronic hypoxic respiratory failure on 3 L of oxygen, asthma  Patient last seen 2/11/2021 for COPD exacerbation was put on prednisone  Patient has been admitted with pancreatitis. Has been receiving IV fluids. Patient complains of shortness of breath when she lays down. She is coughing very little. Denies any chest pain. She has an oxygen concentrator for home. PMH:    Past Medical History:   Diagnosis Date    Anemia     Asthma     COPD (chronic obstructive pulmonary disease) (HCC)     uses )2 prn daily and nightly / Dr. Caitlyn Otto Del Rio catheter in place     History of blood transfusion     2014      Emphysema  Chronic hypoxic respiratory failure  History of cystitis without hematuria  Low BMI  Osteopenia  HTN        PSH:   Past Surgical History:   Procedure Laterality Date    COLONOSCOPY      COLONOSCOPY N/A 9/4/2019    COLONOSCOPY DIAGNOSTIC performed by Tatianna Avila MD at [address] ECHO COMPL W DOP COLOR FLOW  2/24/2013         HYSTERECTOMY      KIDNEY SURGERY Left 06/16/2014    ABLATION PERFORMED UNDER CT SCAN    NE CYSTOURETHROSCOPY,BIOPSIES N/A 9/14/2018    CYSTOSCOPY RETROGRADE PYELOGRAM, CLOT EVACATION , POSS.   BLADDER BIOPSY ---DR Kaela Edwards 2 :30 performed by Alissa Sue DO at [facility] N/A 5/10/2019    EGD BIOPSY performed by Tatianna Avila MD at [address] 6/28/2019    EGD EUS performed by Marline Lopez DO at [address] 6/28/2019    EGD performed by Marline Lopez DO at [address] 5/19/2020    EGD DIAGNOSTIC ONLY performed by Antoine Mcdowell MD at [facility]          Medications:     sodium chloride 75 mL/hr at 03/01/21 0550      lipase-protease-amylase  36,000 Units Oral TID WC    diazePAM  10 mg Oral Daily    pantoprazole  40 mg Oral BID AC    sertraline  50 mg Oral Daily    sodium chloride flush  10 mL Intravenous 2 times per day    enoxaparin  40 mg Subcutaneous Daily    budesonide  0.25 mg Nebulization BID    And    ipratropium  0.5 mg Nebulization 4x daily    And    Arformoterol Tartrate  15 mcg Nebulization BID    therapeutic multivitamin-minerals  1 tablet Oral Daily    folic acid  1 mg Oral Daily       Allergies: Allergies   Allergen Reactions    Sulfa Antibiotics Anaphylaxis       Social History:  Smoking since age 23 packs a day until about 4 years ago now 1 cigarette a day   Was drinking 2 bottles of alcohol daily stopped 2010  She lives by herself. Her  passed away from drug overdose. She does not have any kids. She used to work as a home health aide    Family history: No history of lung disease  Father had stated cancer   mother had renal failure    Respiratory ROS: a complete review of systems is undertaken and is negative. Allergies to sulfa  Has chronic indwelling Del Rio for neurogenic bladder    Physical Examination    Vitals:  VITALS:  /62   Pulse 104   Temp 98.4 °F (36.9 °C) (Oral)   Resp 16   Ht 5' 7\" (1.702 m)   Wt 107 lb (48.5 kg)   SpO2 97%   BMI 16.76 kg/m²   24HR INTAKE/OUTPUT:      Intake/Output Summary (Last 24 hours) at 3/1/2021 0940  Last data filed at 3/1/2021 0550  Gross per 24 hour   Intake 2576 ml   Output 2850 ml   Net -274 ml       Saturating 100% on 3 L  General: No distress. Alert. Eyes: PERRL. No sclera icterus. ENT: No discharge. Pharynx clear. Nasal septum midline   Neck: Trachea midline. Normal thyroid. no JVD  Resp: No accessory muscle use. Rales on exam  No wheezing. No rhonchi. Symmetrical exansion  CV: PMI non displaced. Regular rate. Regular rhythm. No mumur or rub. ABD: Non-tender. Non-distended. No organmegaly. Normal bowel sounds. Skin: Warm and dry. No rash on exposed extremities. Lymph: No cervical LAD. No supraclavicular LAD. Ext: No joint deformity. No clubbing. No cyanosis. No edema  Neuro: Awake. Follows commands. No focal deficits. Moves all ext     Lab Results:    CBC:   Recent Labs     02/26/21  1831 02/27/21  0640 03/01/21  0609   WBC 6.4 5.8 5.9   HGB 8.0* 7.2* 7.0*   HCT 29.6* 26.7* 25.5*   MCV 85.5 85.9 86.1    318 257     BMP:   Recent Labs     02/27/21  0640 02/28/21  0705 03/01/21  0609    141 138   K 4.2 4.1 4.2    105 103   CO2 31* 30* 33*   BUN 9 5* 7*   CREATININE 0.6 0.6 0.6      ALB:3,BILIDIR:3,BILITOT:3,ALKPHOS:3)@  PT/INR:   Recent Labs     02/26/21  1831   PROTIME 9.7   INR 0.9       Cultures:  -    Films:  CXR on admission clear    CT abd results as below      Assessment/Plan:      78-year-old female with history of severe COPD FEV1 25% predicted    2/26 Covid negative  Chest x-ray increased markings bilaterally  2/27 CT abdomen pelvis extensive urinary bladder thickening chronic pancreatitis        1. Acute on chronic pancreatitis elevated lipase  2. History of previous alcohol abuse with chronic calcific pancreatitis on Creon  3. Anemia  4. UTI  5. COPD FEV1 25%  6. Chronic hypoxic respiratory failure on oxygen 24 hours a day at 3 L.  7. Emphysema  8. BMI is 16.7  9. Nicotine addiction  10. Echo 2/24/2019 EF 55%  11. Kidney ablation for 2.8 cm  for renal cell carcinoma 6/20/2014  12. History of depression/anxiety  13. Chronic indwelling Del Rio for neurogenic bladder   14. With h/o bladder stones s/p cystolitholapaxy in April 2020  15. history of frequent UTIs  16. Osteoporosis  17. 5/10/2019 EGD esophagitis CLOtest positive  18.  Previous admissions  7/26/2016-8/2/2016 for COPD exacerbation  4/24- 4/28//2019 history of acute hypercapnic respiratory failure with COPD exacerbation      Check chest x-ray  Check proBNP  Has been receiving IV fluids at 75 cc an hour  May have fluid overload  Will decrease the dose of Valium -patient is quite sedated  Patient is not wheezing do not think she has exacerbation      Thanks for letting us see this patient in consultation. Please contact us with any questions.       Electronically signed by Yanira Dickson MD on 3/1/2021 at 9:28 AM

## 2021-03-01 NOTE — PROGRESS NOTES
OCCUPATIONAL THERAPY INITIAL EVALUATION      Date:3/1/2021  Patient Name: Nabil Renee  MRN: [id number]  : 1955  Room: [address] [place] [address], OTR/L #[id number]    AM-PAC Daily Activity Raw Score:   Recommended Adaptive Equipment: TBD     Diagnosis: Acute on chronic pancreatitis ([facility] [address] [K85.90, K86.1]     Referring physician: Charmayne Brooms, PA    Pertinent Medical History: anemia, asthma, COPD (home O2), depression    Precautions:  Falls, O2, chronic jarquin, bed/chair alarm     Home Living: Pt lives alone in 2 floor home. 3 CHANTELLE + 3 CHANTELLE, 1+1 handrail  Bath and bedroom on 2nd floor - flight of stairs, 1 handrail   Bathroom setup: tub/shower with shower chair and grab bars   Equipment owned: rollator, home O2    Prior Level of Function: assistance PRN with ADLs , assistance with IADLs; ambulated w/ rollator  Driving: yes  Home aides 5 days/wk, 5 hrs/day assist w/ ADL's/IADL's PRN    Pain Level: Pt c/o 7-8/10 chest discomfort this session ; decreased w/ activity.  Per pt, RN aware    Cognition: A&O: 3/4; Follows 1 step directions   Memory:  fair +   Sequencing:  fair    Problem solving:  fair    Judgement/safety:  fair      Functional Assessment:   Initial Eval Status  Date: 3/1/21 Treatment Status  Date: Short Term Goals/LTG  Treatment frequency: 1-4x/wk   Feeding Supervision   Modified Branch    Grooming Minimal Assist   Standing position  Modified Branch    UB Dressing Stand by Assist   Donned/doffed gown  Modified Branch    LB Dressing Moderate Assist   Simulated pants    Improved w/ B socks  Supervision    Bathing Minimal Assist  Simulated  Supervision    Toileting Minimal Assist   Chronic jarquin  Bowel management  Supervision    Bed Mobility  Supine to sit: Minimal Assist   Sit to supine: NT  Rolling: Independent   Supine to sit: Modified Branch   Sit to supine: Modified Branch    Functional Transfers Sit>stand: Min A>SBA  Stand>sit: Min A  Various surfaces    Mod I   Functional Mobility Min A w/ w/w  In room to prep for bathrm mobility  Mod I   Balance Sitting: SBA  Standing: Min A w/ w/w (dynamic)     Activity Tolerance Fair-  Fair+   Visual/  Perceptual Glasses: no  WFL                Hand dominance: R   Strength ROM Additional Info:    RUE  3+/5  WFL   good  and wfl FMC/dexterity noted during ADL tasks       LUE 3+/5  WFL   good  and wfl FMC/dexterity noted during ADL tasks       Hearing: WFL  Sensation:  No c/o numbness or tingling  Tone: WFL   Edema: none noted                   Comments: Upon arrival patient lying in bed. Pt agreeable to OT session this date. At end of session, patient seated in chair ( with call light and phone within reach, all lines and tubes intact. Overall patient demonstrated decreased independence and safety during completion of ADL/functional transfer/mobility tasks. Pt would benefit from continued skilled OT to increase safety and independence with completion of ADL/IADL tasks for functional independence and quality of life. Treatment: OT treatment provided this date includes:  Facilitation of bed mobility (cues for body mechanics), unsupported sitting balance (addressing posture, weight shifting, dynamic reaching to prep for ADL's), functional transfers (various surfaces-EOB, chair w/ education/cues for safety/hand placement), standing tolerance tasks (addressing posture, balance and activity tolerance while incorporating light functional reaching impacting ADLs) and light functional ambulation tasks with w/w (in preparation to/from bathroom w/ education/cuing on posture, w/w management and safety). Therapist facilitated self-care retraining: UB/LB self-care tasks (socks, gown, simulated pants), toileting task and standing/seated grooming tasks while educating pt on modified techniques, posture, safety and energy conservation techniques.  Skilled monitoring of HR, O2 sats and pts response to treatment (Pt on O2 via nasal cannula. O2 sat=^92% at rest and w/ activity. Noted mild SOB w/ minimal activity - reinforced pursed lip breathing and rest breaks). mod  Profile and History- med (extensive chart review)  Assessment of Occupational Performance and Identification of Deficits- med  Clinical Decision Making- med    Evaluation Time includes thorough review of current medical information, gathering information on past medical history/social history and prior level of function, completion of standardized testing/informal observation of tasks, assessment of data and education on plan of care and goals.     Assessment of current deficits  Functional mobility [x]  ADLs [x] Strength [x]  Cognition []  Functional transfers  [x] IADLs [x] Safety Awareness [x]  Endurance [x]  Fine Motor Coordination [] Balance [x] Vision/perception [] Sensation []   Gross Motor Coordination [] ROM [] Delirium []                  Motor Control []    Plan of Care: 1-3 days/week for 1-2 weeks PRN   Instruction/training on adapted ADL techniques and AE recommendations to increase functional independence within precautions  Training on energy conservation strategies/techniques to improve independence/tolerance for self-care routine  Functional transfer/mobility training/DME recommendations for increased independence, safety, and fall prevention  Patient/Family education to increase follow through with safety techniques and functional independence  Recommendation of environmental modifications for increased safety with functional transfers/mobility and ADLs  Therapeutic exercise to improve motor endurance, ROM, and functional strength for ADLs/functional transfers  Therapeutic activities to facilitate/challenge dynamic balance, stand tolerance, fine motor dexterity/in-hand manipulation for increased independence with ADLs  Positioning to improve functional independence    Rehab Potential: Good for established goals    Patient / Family Goal: Not stated Patient and/or family were instructed on diagnosis, prognosis/goals and plan of care. Demonstrated fair understanding. [] Malnutrition indicators have been identified and nursing has been notified to ensure a dietitian consult is ordered.        Mod Evaluation completed +              Time In: 07:50  Time Out: 08:25  Total Treatment Time: 24 minutes   Min Units   OT Eval Low 10986     OT Eval Medium 97166 X 1   OT Eval High 76159     OT Re-Eval [id number]     Therapeutic Ex 44463     Therapeutic Activities 43029 9 1   ADL/Self Care 57661 15 1   Orthotic Management 25321     Neuro Re-Ed [facility] [address], OTR/L [address][id number]

## 2021-03-01 NOTE — PROGRESS NOTES
Chief Complaint:  Chief Complaint   Patient presents with    Shortness of Breath     WEARS HOME O2     Acute on chronic pancreatitis ([address]     Subjective:    She is feeling better, however, says she does not feel ready for d/c today. She states she is too tired. She reports that her breathing is a bit better and her abdominal pain is improved. Objective:    /62   Pulse 100   Temp 98.4 °F (36.9 °C) (Oral)   Resp 16   Ht 5' 7\" (1.702 m)   Wt 107 lb (48.5 kg)   SpO2 97%   BMI 16.76 kg/m²     Current medications that patient is taking have been reviewed.     General appearance: NAD, conversant  HEENT: AT/NC, MMM  Neck: FROM, supple  Lungs: Clear to auscultation, WOB normal  CV: RRR, no MRGs  Abdomen: Soft, non-tender; no masses or HSM, +BS  Extremities: No peripheral edema or digital cyanosis  Skin: no rash, lesions or ulcers  Psych: Calm and cooperative  Neuro: Awake and interactive    Labs:  CBC with Differential:    Lab Results   Component Value Date    WBC 5.9 03/01/2021    RBC 2.96 03/01/2021    HGB 7.0 03/01/2021    HCT 25.5 03/01/2021     03/01/2021    MCV 86.1 03/01/2021    MCH 23.6 03/01/2021    MCHC 27.5 03/01/2021    RDW 18.9 03/01/2021    SEGSPCT 26 02/16/2014    METASPCT 0.9 05/18/2020    LYMPHOPCT 11.2 03/01/2021    PROMYELOPCT 0.9 05/18/2020    MONOPCT 6.1 03/01/2021    BASOPCT 0.5 03/01/2021    MONOSABS 0.36 03/01/2021    LYMPHSABS 0.66 03/01/2021    EOSABS 0.02 03/01/2021    BASOSABS 0.03 03/01/2021     CMP:    Lab Results   Component Value Date     03/01/2021    K 4.2 03/01/2021     03/01/2021    CO2 33 03/01/2021    BUN 7 03/01/2021    CREATININE 0.6 03/01/2021    GFRAA >60 03/01/2021    LABGLOM >60 03/01/2021    GLUCOSE 119 03/01/2021    PROT 5.9 02/27/2021    LABALBU 3.5 02/27/2021    CALCIUM 8.9 03/01/2021    BILITOT <0.2 02/27/2021    ALKPHOS 66 02/27/2021    AST 15 02/27/2021    ALT 12 02/27/2021          Assessment/Plan:  Principal Problem:    Acute on chronic pancreatitis (HCC)  Active Problems:    COPD (chronic obstructive pulmonary disease) (HCC)    Neurogenic bladder    Chronic respiratory failure with hypoxia (HCC)    Gastric wall thickening    Bladder wall thickening    Atrial tachycardia (HCC)  Resolved Problems:    * No resolved hospital problems. *       Doing well, lipase downtrending, clinically abdomen is soft and minimally tender. Stop IV fluids    Doing well on low fat diet. COPD stable, unfortunately with FEV1 25% in 2019, I expect she will always be very dyspneic. No sign of acute exacerbation. Will request Pulm consult since chronic dyspnea seems to be one of her chief complaints and prognosis seems very guarded. Probably needs more of a goals of care / prognosis discussion which maybe Pulm could help with.     Requires continued inpatient level of care     Laurie Lunch    5:53 PM  3/1/2021

## 2021-03-01 NOTE — CARE COORDINATION
Social Work Discharge/Planning:    SW attempted to meet with patient, patient currently off th floor. SW/CM to follow up for general assessment and discharge plan. Patient's PT score today was 17/24 and OT 17/24.      Karen Heard, EUGENE  [phone]

## 2021-03-01 NOTE — PROGRESS NOTES
Chief Complaint:  Chief Complaint   Patient presents with    Shortness of Breath     WEARS HOME O2     Acute on chronic pancreatitis ([address]     Subjective:    She is feeling better. Only very mild abdominal pain. Dyspnea is stable. She is depressed about her medical problems, namely her COPD. She had expressed some prior passive SI. However, she told me clearly she has no SI at this time. She is just sad about her medical issues. Objective:    /61   Pulse 96   Temp 98.8 °F (37.1 °C) (Temporal)   Resp 16   Ht 5' 7\" (1.702 m)   Wt 107 lb (48.5 kg)   SpO2 100%   BMI 16.76 kg/m²     Current medications that patient is taking have been reviewed.     General appearance: NAD, conversant  HEENT: AT/NC, MMM  Neck: FROM, supple  Lungs: Clear to auscultation, WOB normal  CV: RRR, no MRGs  Abdomen: Soft, minimally tender; no masses or HSM, +BS  Extremities: No peripheral edema or digital cyanosis  Skin: no rash, lesions or ulcers  Psych: Calm and cooperative  Neuro: Mildly drowsy today, face symmetric, moving all extremities, speech fluent    Labs:  CBC with Differential:    Lab Results   Component Value Date    WBC 5.8 02/27/2021    RBC 3.11 02/27/2021    HGB 7.2 02/27/2021    HCT 26.7 02/27/2021     02/27/2021    MCV 85.9 02/27/2021    MCH 23.2 02/27/2021    MCHC 27.0 02/27/2021    RDW 20.2 02/27/2021    SEGSPCT 26 02/16/2014    METASPCT 0.9 05/18/2020    LYMPHOPCT 17.4 02/27/2021    PROMYELOPCT 0.9 05/18/2020    MONOPCT 2.6 02/27/2021    BASOPCT 2.6 02/27/2021    MONOSABS 0.17 02/27/2021    LYMPHSABS 0.99 02/27/2021    EOSABS 0.30 02/27/2021    BASOSABS 0.15 02/27/2021     CMP:    Lab Results   Component Value Date     02/28/2021    K 4.1 02/28/2021     02/28/2021    CO2 30 02/28/2021    BUN 5 02/28/2021    CREATININE 0.6 02/28/2021    GFRAA >60 02/28/2021    LABGLOM >60 02/28/2021    GLUCOSE 106 02/28/2021    PROT 5.9 02/27/2021    LABALBU 3.5 02/27/2021    CALCIUM 9.0 02/28/2021 BILITOT <0.2 02/27/2021    ALKPHOS 66 02/27/2021    AST 15 02/27/2021    ALT 12 02/27/2021          Assessment/Plan:  Principal Problem:    Acute on chronic pancreatitis (HCC)  Active Problems:    COPD (chronic obstructive pulmonary disease) (HCC)    Neurogenic bladder    Chronic respiratory failure with hypoxia (HCC)    Gastric wall thickening    Bladder wall thickening  Resolved Problems:    * No resolved hospital problems. *       Doing well, lipase downtrending, clinically abdomen is soft and minimally tender. Reduce IV fluids    Advance diet    COPD stable, unfortunately with FEV1 25% in 2019, I expect she will always be very dyspneic. No sign of acute exacerbation. Worthwhile to have her see Palliative in future but defer to her pulmonologist as outpatient. Drowsy today - maybe she had just received her AM valium dose? Will monitor.     Requires continued inpatient level of care     Anders Castle    8:21 PM  2/28/2021

## 2021-03-01 NOTE — CONSULTS
[address]                  [address] [facility], [address] [facility]                                  CONSULTATION    PATIENT NAME: Michaeleen Goltz                  :        1955  MED REC NO:   [id number]                            ROOM:       8415  ACCOUNT NO:   [de-identified]                           ADMIT DATE: 2021  PROVIDER:     Antony Coyle MD    CONSULT DATE:  2021    REASON FOR CONSULTATION:  Chronic pancreatitis with an acute elevation  in serum lipase. INDICATIONS:  74years of age. She was admitted to the hospital after  presenting to the emergency room on the 2021 which seems mostly  because of complaints of shortness of breath in the setting of the  oxygen requiring COPD. She has been a patient followed over time by Dr. Ranjit Steward and Dr. Barbie Ennis. She has a well-established diagnosis of  chronic pancreatitis with calcifications and ductal dilatation  attributed to a prior history of alcohol use from which she has  apparently been abstinent for years. She has had endoscopic ultrasound  and numerous CAT scans which can reveal severe pancreatic calcifications  and chronic ductal dilatation and questionable pancreatic ductal stones. She has been evaluated endoscopically by Dr. Barbie Ennis via colonoscopy and  Dr. Barbie Ennis and Dr. Fleet Apgar from above. Most superficial ulcerations have  been identified in the antrum. Pantoprazole is listed as a medication  in the hospital as well as at home taken twice a day. Pancreatic  enzymes are prescribed at a dose of 48,000 units daily at home. She has  seen Dr. Ranjit Steward on multiple occasions keeping her appointments at  6-month intervals where it is reported she is generally done well  without diarrhea or pain.     It seems somewhat of an afterthought that she complained of nausea and  perhaps some vomiting, not so much on admission, but to the admitting advance and I have no problems with that. It will be withdrawn if not  tolerated. We will escalate her dose of Creon closer to baseline.   EGD and colonoscopy 2019 and don't merit repeating      Harika Levine MD    D: 02/28/2021 15:09:42       T: 02/28/2021 15:14:43     [email or web address]  Job#: [id number]     Doc#: [id number]    CC:

## 2021-03-01 NOTE — PLAN OF CARE
Problem: Falls - Risk of:  Goal: Will remain free from falls  Description: Will remain free from falls  3/1/2021 1239 by Clarita Sanchez RN  Outcome: Met This Shift  3/1/2021 0000 by Odell Callaway RN  Outcome: Met This Shift  Goal: Absence of physical injury  Description: Absence of physical injury  3/1/2021 1239 by Clarita Sanchez RN  Outcome: Met This Shift  3/1/2021 0000 by Odell Callaway RN  Outcome: Met This Shift     Problem: Pain:  Goal: Pain level will decrease  Description: Pain level will decrease  3/1/2021 1239 by Clarita Sanchez RN  Outcome: Met This Shift  3/1/2021 0000 by Odell Callaway RN  Outcome: Met This Shift  Goal: Control of acute pain  Description: Control of acute pain  3/1/2021 1239 by Clarita Sanchez RN  Outcome: Met This Shift  3/1/2021 0000 by Odell Callaway RN  Outcome: Met This Shift  Goal: Control of chronic pain  Description: Control of chronic pain  3/1/2021 1239 by Clarita Sanchez RN  Outcome: Met This Shift  3/1/2021 0000 by Odell Callaway RN  Outcome: Met This Shift

## 2021-03-01 NOTE — PLAN OF CARE
Problem: Falls - Risk of:  Goal: Will remain free from falls  Description: Will remain free from falls  3/1/2021 0000 by Evita Martinez RN  Outcome: Met This Shift  2/28/2021 1059 by Marcos Angeles RN  Outcome: Met This Shift  Goal: Absence of physical injury  Description: Absence of physical injury  3/1/2021 0000 by Evita Martinez RN  Outcome: Met This Shift  2/28/2021 1059 by Marcos Angeles RN  Outcome: Met This Shift

## 2021-03-02 LAB
ABO/RH: NORMAL
AMMONIA: 40 UMOL/L (ref 11–51)
ANION GAP SERPL CALCULATED.3IONS-SCNC: 3 MMOL/L (ref 7–16)
ANTIBODY SCREEN: NORMAL
BLOOD BANK DISPENSE STATUS: NORMAL
BLOOD BANK PRODUCT CODE: NORMAL
BPU ID: NORMAL
BUN BLDV-MCNC: 10 MG/DL (ref 8–23)
CALCIUM SERPL-MCNC: 9.5 MG/DL (ref 8.6–10.2)
CHLORIDE BLD-SCNC: 98 MMOL/L (ref 98–107)
CO2: 35 MMOL/L (ref 22–29)
CREAT SERPL-MCNC: 0.5 MG/DL (ref 0.5–1)
DESCRIPTION BLOOD BANK: NORMAL
FERRITIN: 18 NG/ML
GFR AFRICAN AMERICAN: >60
GFR NON-AFRICAN AMERICAN: >60 ML/MIN/1.73
GLUCOSE BLD-MCNC: 118 MG/DL (ref 74–99)
HCT VFR BLD CALC: 31.2 % (ref 34–48)
HEMOGLOBIN: 9.1 G/DL (ref 11.5–15.5)
IRON SATURATION: 6 % (ref 15–50)
IRON: 20 MCG/DL (ref 37–145)
LIPASE: 416 U/L (ref 13–60)
POTASSIUM REFLEX MAGNESIUM: 4.3 MMOL/L (ref 3.5–5)
SODIUM BLD-SCNC: 136 MMOL/L (ref 132–146)
TOTAL IRON BINDING CAPACITY: 344 MCG/DL (ref 250–450)

## 2021-03-02 PROCEDURE — 6370000000 HC RX 637 (ALT 250 FOR IP): Performed by: PHYSICIAN ASSISTANT

## 2021-03-02 PROCEDURE — 83550 IRON BINDING TEST: CPT

## 2021-03-02 PROCEDURE — 85018 HEMOGLOBIN: CPT

## 2021-03-02 PROCEDURE — 1200000000 HC SEMI PRIVATE

## 2021-03-02 PROCEDURE — 80048 BASIC METABOLIC PNL TOTAL CA: CPT

## 2021-03-02 PROCEDURE — 2580000003 HC RX 258: Performed by: PHYSICIAN ASSISTANT

## 2021-03-02 PROCEDURE — 82728 ASSAY OF FERRITIN: CPT

## 2021-03-02 PROCEDURE — 83540 ASSAY OF IRON: CPT

## 2021-03-02 PROCEDURE — 86850 RBC ANTIBODY SCREEN: CPT

## 2021-03-02 PROCEDURE — 36430 TRANSFUSION BLD/BLD COMPNT: CPT

## 2021-03-02 PROCEDURE — 85014 HEMATOCRIT: CPT

## 2021-03-02 PROCEDURE — 83690 ASSAY OF LIPASE: CPT

## 2021-03-02 PROCEDURE — 2580000003 HC RX 258: Performed by: INTERNAL MEDICINE

## 2021-03-02 PROCEDURE — 36415 COLL VENOUS BLD VENIPUNCTURE: CPT

## 2021-03-02 PROCEDURE — 82140 ASSAY OF AMMONIA: CPT

## 2021-03-02 PROCEDURE — 6360000002 HC RX W HCPCS: Performed by: PHYSICIAN ASSISTANT

## 2021-03-02 PROCEDURE — 86923 COMPATIBILITY TEST ELECTRIC: CPT

## 2021-03-02 PROCEDURE — 6360000002 HC RX W HCPCS: Performed by: INTERNAL MEDICINE

## 2021-03-02 PROCEDURE — 86901 BLOOD TYPING SEROLOGIC RH(D): CPT

## 2021-03-02 PROCEDURE — 2700000000 HC OXYGEN THERAPY PER DAY

## 2021-03-02 PROCEDURE — 6370000000 HC RX 637 (ALT 250 FOR IP): Performed by: INTERNAL MEDICINE

## 2021-03-02 PROCEDURE — 94640 AIRWAY INHALATION TREATMENT: CPT

## 2021-03-02 PROCEDURE — P9016 RBC LEUKOCYTES REDUCED: HCPCS

## 2021-03-02 PROCEDURE — 86900 BLOOD TYPING SEROLOGIC ABO: CPT

## 2021-03-02 RX ORDER — DIAZEPAM 2 MG/1
1 TABLET ORAL NIGHTLY
Status: DISCONTINUED | OUTPATIENT
Start: 2021-03-03 | End: 2021-03-03 | Stop reason: HOSPADM

## 2021-03-02 RX ORDER — FUROSEMIDE 10 MG/ML
20 INJECTION INTRAMUSCULAR; INTRAVENOUS ONCE
Status: COMPLETED | OUTPATIENT
Start: 2021-03-02 | End: 2021-03-02

## 2021-03-02 RX ORDER — SODIUM CHLORIDE 9 MG/ML
INJECTION, SOLUTION INTRAVENOUS PRN
Status: DISCONTINUED | OUTPATIENT
Start: 2021-03-02 | End: 2021-03-03 | Stop reason: HOSPADM

## 2021-03-02 RX ADMIN — PANCRELIPASE 36000 UNITS: 60000; 12000; 38000 CAPSULE, DELAYED RELEASE PELLETS ORAL at 08:55

## 2021-03-02 RX ADMIN — IPRATROPIUM BROMIDE 0.5 MG: 0.5 SOLUTION RESPIRATORY (INHALATION) at 20:02

## 2021-03-02 RX ADMIN — Medication 1 TABLET: at 08:54

## 2021-03-02 RX ADMIN — SODIUM CHLORIDE 100 MG: 9 INJECTION, SOLUTION INTRAVENOUS at 17:22

## 2021-03-02 RX ADMIN — SODIUM CHLORIDE 25 MG: 9 INJECTION, SOLUTION INTRAVENOUS at 15:22

## 2021-03-02 RX ADMIN — IPRATROPIUM BROMIDE 0.5 MG: 0.5 SOLUTION RESPIRATORY (INHALATION) at 09:01

## 2021-03-02 RX ADMIN — PANTOPRAZOLE SODIUM 40 MG: 40 TABLET, DELAYED RELEASE ORAL at 05:26

## 2021-03-02 RX ADMIN — BUDESONIDE 250 MCG: 0.25 SUSPENSION RESPIRATORY (INHALATION) at 20:02

## 2021-03-02 RX ADMIN — IPRATROPIUM BROMIDE 0.5 MG: 0.5 SOLUTION RESPIRATORY (INHALATION) at 16:14

## 2021-03-02 RX ADMIN — DIAZEPAM 2 MG: 2 TABLET ORAL at 08:55

## 2021-03-02 RX ADMIN — ENOXAPARIN SODIUM 40 MG: 40 INJECTION SUBCUTANEOUS at 08:56

## 2021-03-02 RX ADMIN — BUDESONIDE 250 MCG: 0.25 SUSPENSION RESPIRATORY (INHALATION) at 09:02

## 2021-03-02 RX ADMIN — ARFORMOTEROL TARTRATE 15 MCG: 15 SOLUTION RESPIRATORY (INHALATION) at 09:00

## 2021-03-02 RX ADMIN — Medication 10 ML: at 21:21

## 2021-03-02 RX ADMIN — FUROSEMIDE 20 MG: 10 INJECTION, SOLUTION INTRAMUSCULAR; INTRAVENOUS at 11:03

## 2021-03-02 RX ADMIN — IPRATROPIUM BROMIDE 0.5 MG: 0.5 SOLUTION RESPIRATORY (INHALATION) at 13:48

## 2021-03-02 RX ADMIN — SERTRALINE 50 MG: 50 TABLET, FILM COATED ORAL at 08:54

## 2021-03-02 RX ADMIN — Medication 10 ML: at 08:56

## 2021-03-02 RX ADMIN — PANCRELIPASE 36000 UNITS: 60000; 12000; 38000 CAPSULE, DELAYED RELEASE PELLETS ORAL at 12:22

## 2021-03-02 RX ADMIN — PANTOPRAZOLE SODIUM 40 MG: 40 TABLET, DELAYED RELEASE ORAL at 16:51

## 2021-03-02 RX ADMIN — FOLIC ACID 1 MG: 1 TABLET ORAL at 08:56

## 2021-03-02 RX ADMIN — METOPROLOL SUCCINATE 25 MG: 25 TABLET, EXTENDED RELEASE ORAL at 08:54

## 2021-03-02 RX ADMIN — PANCRELIPASE 36000 UNITS: 60000; 12000; 38000 CAPSULE, DELAYED RELEASE PELLETS ORAL at 16:51

## 2021-03-02 RX ADMIN — ARFORMOTEROL TARTRATE 15 MCG: 15 SOLUTION RESPIRATORY (INHALATION) at 20:02

## 2021-03-02 ASSESSMENT — PAIN SCALES - GENERAL: PAINLEVEL_OUTOF10: 0

## 2021-03-02 NOTE — CARE COORDINATION
Social Work Discharge/Planning:    SW met with patient to explain role and discuss transition of care. Patient reports being independent but unable to drive prior to admission. Patient reports her neighbor or friend transports her to destinations. Patient lives alone in a 2 story home that has 3 steps to enter with a handrail. Bed and bath are located on 2nd floor. Patient has a rollator, shower chair, and grab bars within her shower. Patient has oxygen that is supplied through. Patient has SNF hx with [facility]. Patient has [address] hx with [facility]. Patient's PCP is Dr. Alpa Hernandez. Patient follows with a Pulmonologist but can not recall who. Patient request SW call sister Julia Chapa to include her on discharge plans. Patient would like to discharge to SNF. Patient reports she would like to discharge to [facility] but wanted sisters input. SW spoke with sister Julia Chapa [phone]) who is agreeable to patient discharging to SNF. SW called liaison Tommy Wilson with [facility] to make patient referral. Patient will need updated PT and OT tomorrow for precert. SW/CM to follow.     EUGENE Hendrickson  [phone]

## 2021-03-02 NOTE — PROGRESS NOTES
Chief Complaint:  Chief Complaint   Patient presents with    Shortness of Breath     WEARS HOME O2     Acute on chronic pancreatitis ([address]     Subjective:    She denies any further abdominal pain. She continues to complain of feeling very tired. Her answers to most of my questions are vague. She is not sure if she has had any rectal bleeding, however her RN tells me she has not to her knowledge    Objective:    BP (!) 106/55   Pulse 110   Temp 97 °F (36.1 °C) (Temporal)   Resp 20   Ht 5' 7\" (1.702 m)   Wt 107 lb (48.5 kg)   SpO2 100%   BMI 16.76 kg/m²     Current medications that patient is taking have been reviewed.     General appearance: NAD, conversant, tired appearing  HEENT: AT/NC, MMM  Neck: FROM, supple  Lungs: Clear to auscultation, WOB normal  CV: RRR, no MRGs  Abdomen: Soft, non-tender; no masses or HSM, +BS  Extremities: No peripheral edema or digital cyanosis  Skin: no rash, lesions or ulcers  Psych: Calm and cooperative, restricted affect  Neuro: Awake and interactive    Labs:  CBC with Differential:    Lab Results   Component Value Date    WBC 5.9 03/01/2021    RBC 2.96 03/01/2021    HGB 7.0 03/01/2021    HCT 25.5 03/01/2021     03/01/2021    MCV 86.1 03/01/2021    MCH 23.6 03/01/2021    MCHC 27.5 03/01/2021    RDW 18.9 03/01/2021    SEGSPCT 26 02/16/2014    METASPCT 0.9 05/18/2020    LYMPHOPCT 11.2 03/01/2021    PROMYELOPCT 0.9 05/18/2020    MONOPCT 6.1 03/01/2021    BASOPCT 0.5 03/01/2021    MONOSABS 0.36 03/01/2021    LYMPHSABS 0.66 03/01/2021    EOSABS 0.02 03/01/2021    BASOSABS 0.03 03/01/2021     CMP:    Lab Results   Component Value Date     03/02/2021    K 4.3 03/02/2021    CL 98 03/02/2021    CO2 35 03/02/2021    BUN 10 03/02/2021    CREATININE 0.5 03/02/2021    GFRAA >60 03/02/2021    LABGLOM >60 03/02/2021    GLUCOSE 118 03/02/2021    PROT 5.9 02/27/2021    LABALBU 3.5 02/27/2021    CALCIUM 9.5 03/02/2021    BILITOT <0.2 02/27/2021    ALKPHOS 66 02/27/2021    AST 15 02/27/2021    ALT 12 02/27/2021          Assessment/Plan:  Principal Problem:    Acute on chronic pancreatitis ([address]  Active Problems:    COPD (chronic obstructive pulmonary disease) (HCC)    Neurogenic bladder    Chronic respiratory failure with hypoxia (HCC)    Gastric wall thickening    Bladder wall thickening    Atrial tachycardia ([address]  Resolved Problems:    * No resolved hospital problems. *       Discussed with pulmonary. Honestly I am not sure CHF is playing a role here. I think her COPD is just very severe and progressing. However I think reasonable to try a dose of Lasix. She seems very hypoactive and depressed (no active SI). Agree with reducing the Valium dose. Additionally her anemia may be contributing to her poor functional status and dyspnea. Ferritin remains low, with low iron saturation. With her GI issues, she may have difficulty absorbing oral iron. We will give IV iron and 1 unit PRBCs today.     Requires continued inpatient level of care     Loki Mansfield    2:33 PM  3/2/2021

## 2021-03-02 NOTE — PLAN OF CARE
Problem: Falls - Risk of:  Goal: Will remain free from falls  Description: Will remain free from falls  3/2/2021 0150 by Jeff Bailey RN  Outcome: Met This Shift  3/1/2021 1239 by Elaina Veliz RN  Outcome: Met This Shift  Goal: Absence of physical injury  Description: Absence of physical injury  3/2/2021 0150 by Jeff Bailey RN  Outcome: Met This Shift  3/1/2021 1239 by Elaina Veliz RN  Outcome: Met This Shift     Problem: Pain:  Goal: Pain level will decrease  Description: Pain level will decrease  3/2/2021 0150 by Jeff Bailey RN  Outcome: Met This Shift  3/1/2021 1239 by Elaina Veliz RN  Outcome: Met This Shift  Goal: Control of acute pain  Description: Control of acute pain  3/2/2021 0150 by Jeff Bailey RN  Outcome: Met This Shift  3/1/2021 1239 by Elaina Veliz RN  Outcome: Met This Shift  Goal: Control of chronic pain  Description: Control of chronic pain  3/2/2021 0150 by Jeff Bailey RN  Outcome: Met This Shift  3/1/2021 1239 by Elaina Veliz RN  Outcome: Met This Shift

## 2021-03-02 NOTE — PLAN OF CARE
Problem: Falls - Risk of:  Goal: Will remain free from falls  Description: Will remain free from falls  3/2/2021 0958 by Sukhi Minor RN  Outcome: Met This Shift  3/2/2021 0150 by Angela Still RN  Outcome: Met This Shift  Goal: Absence of physical injury  Description: Absence of physical injury  3/2/2021 0958 by Sukhi Minor RN  Outcome: Met This Shift  3/2/2021 0150 by Angela Still RN  Outcome: Met This Shift
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
## 2021-03-03 VITALS
OXYGEN SATURATION: 95 % | RESPIRATION RATE: 20 BRPM | BODY MASS INDEX: 16.79 KG/M2 | SYSTOLIC BLOOD PRESSURE: 110 MMHG | TEMPERATURE: 98.7 F | HEIGHT: 67 IN | DIASTOLIC BLOOD PRESSURE: 56 MMHG | WEIGHT: 107 LBS | HEART RATE: 94 BPM

## 2021-03-03 LAB
ANION GAP SERPL CALCULATED.3IONS-SCNC: 5 MMOL/L (ref 7–16)
ANISOCYTOSIS: ABNORMAL
BASOPHILS ABSOLUTE: 0.03 E9/L (ref 0–0.2)
BASOPHILS RELATIVE PERCENT: 0.4 % (ref 0–2)
BUN BLDV-MCNC: 12 MG/DL (ref 8–23)
CALCIUM SERPL-MCNC: 9.4 MG/DL (ref 8.6–10.2)
CHLORIDE BLD-SCNC: 97 MMOL/L (ref 98–107)
CO2: 40 MMOL/L (ref 22–29)
CREAT SERPL-MCNC: 0.6 MG/DL (ref 0.5–1)
EOSINOPHILS ABSOLUTE: 0.05 E9/L (ref 0.05–0.5)
EOSINOPHILS RELATIVE PERCENT: 0.7 % (ref 0–6)
GFR AFRICAN AMERICAN: >60
GFR NON-AFRICAN AMERICAN: >60 ML/MIN/1.73
GLUCOSE BLD-MCNC: 97 MG/DL (ref 74–99)
HCT VFR BLD CALC: 30.1 % (ref 34–48)
HEMOGLOBIN: 8.6 G/DL (ref 11.5–15.5)
HYPOCHROMIA: ABNORMAL
IMMATURE GRANULOCYTES #: 0.02 E9/L
IMMATURE GRANULOCYTES %: 0.3 % (ref 0–5)
LYMPHOCYTES ABSOLUTE: 0.78 E9/L (ref 1.5–4)
LYMPHOCYTES RELATIVE PERCENT: 11.6 % (ref 20–42)
MCH RBC QN AUTO: 24.3 PG (ref 26–35)
MCHC RBC AUTO-ENTMCNC: 28.6 % (ref 32–34.5)
MCV RBC AUTO: 85 FL (ref 80–99.9)
MONOCYTES ABSOLUTE: 0.62 E9/L (ref 0.1–0.95)
MONOCYTES RELATIVE PERCENT: 9.2 % (ref 2–12)
NEUTROPHILS ABSOLUTE: 5.22 E9/L (ref 1.8–7.3)
NEUTROPHILS RELATIVE PERCENT: 77.8 % (ref 43–80)
OVALOCYTES: ABNORMAL
PDW BLD-RTO: 17.8 FL (ref 11.5–15)
PLATELET # BLD: 229 E9/L (ref 130–450)
PMV BLD AUTO: 9.6 FL (ref 7–12)
POIKILOCYTES: ABNORMAL
POLYCHROMASIA: ABNORMAL
POTASSIUM REFLEX MAGNESIUM: 3.7 MMOL/L (ref 3.5–5)
RBC # BLD: 3.54 E12/L (ref 3.5–5.5)
SODIUM BLD-SCNC: 142 MMOL/L (ref 132–146)
STOMATOCYTES: ABNORMAL
WBC # BLD: 6.7 E9/L (ref 4.5–11.5)

## 2021-03-03 PROCEDURE — 6370000000 HC RX 637 (ALT 250 FOR IP): Performed by: INTERNAL MEDICINE

## 2021-03-03 PROCEDURE — 2700000000 HC OXYGEN THERAPY PER DAY

## 2021-03-03 PROCEDURE — 2580000003 HC RX 258: Performed by: INTERNAL MEDICINE

## 2021-03-03 PROCEDURE — 6370000000 HC RX 637 (ALT 250 FOR IP): Performed by: PHYSICIAN ASSISTANT

## 2021-03-03 PROCEDURE — 2580000003 HC RX 258: Performed by: PHYSICIAN ASSISTANT

## 2021-03-03 PROCEDURE — 85025 COMPLETE CBC W/AUTO DIFF WBC: CPT

## 2021-03-03 PROCEDURE — 97535 SELF CARE MNGMENT TRAINING: CPT

## 2021-03-03 PROCEDURE — 6360000002 HC RX W HCPCS: Performed by: INTERNAL MEDICINE

## 2021-03-03 PROCEDURE — 80048 BASIC METABOLIC PNL TOTAL CA: CPT

## 2021-03-03 PROCEDURE — 97530 THERAPEUTIC ACTIVITIES: CPT

## 2021-03-03 PROCEDURE — 94640 AIRWAY INHALATION TREATMENT: CPT

## 2021-03-03 PROCEDURE — 36415 COLL VENOUS BLD VENIPUNCTURE: CPT

## 2021-03-03 PROCEDURE — 6360000002 HC RX W HCPCS: Performed by: PHYSICIAN ASSISTANT

## 2021-03-03 RX ORDER — ALBUTEROL SULFATE 90 UG/1
1 AEROSOL, METERED RESPIRATORY (INHALATION) EVERY 4 HOURS PRN
Qty: 1 INHALER | Refills: 5 | Status: SHIPPED | OUTPATIENT
Start: 2021-03-03

## 2021-03-03 RX ORDER — METOPROLOL SUCCINATE 25 MG/1
25 TABLET, EXTENDED RELEASE ORAL DAILY
Qty: 30 TABLET | Refills: 3 | DISCHARGE
Start: 2021-03-03 | End: 2021-10-21

## 2021-03-03 RX ORDER — DIAZEPAM 5 MG/1
2.5 TABLET ORAL NIGHTLY
Qty: 5 TABLET | Refills: 0 | Status: SHIPPED | OUTPATIENT
Start: 2021-03-03 | End: 2021-03-13

## 2021-03-03 RX ORDER — FERROUS SULFATE 325(65) MG
325 TABLET ORAL 2 TIMES DAILY
Qty: 180 TABLET | Refills: 1 | DISCHARGE
Start: 2021-03-03 | End: 2021-09-17

## 2021-03-03 RX ORDER — ALBUTEROL SULFATE 90 UG/1
1 AEROSOL, METERED RESPIRATORY (INHALATION) PRN
Qty: 1 INHALER | Refills: 5 | Status: SHIPPED | OUTPATIENT
Start: 2021-03-03 | End: 2021-03-03

## 2021-03-03 RX ORDER — FLUTICASONE FUROATE, UMECLIDINIUM BROMIDE AND VILANTEROL TRIFENATATE 100; 62.5; 25 UG/1; UG/1; UG/1
1 POWDER RESPIRATORY (INHALATION) DAILY
Qty: 30 EACH | Refills: 3 | Status: SHIPPED | OUTPATIENT
Start: 2021-03-03 | End: 2021-04-02

## 2021-03-03 RX ORDER — FLUTICASONE FUROATE, UMECLIDINIUM BROMIDE AND VILANTEROL TRIFENATATE 100; 62.5; 25 UG/1; UG/1; UG/1
1 POWDER RESPIRATORY (INHALATION) DAILY
Qty: 30 EACH | Refills: 3 | Status: SHIPPED | OUTPATIENT
Start: 2021-03-03 | End: 2021-03-03

## 2021-03-03 RX ADMIN — SERTRALINE 50 MG: 50 TABLET, FILM COATED ORAL at 09:48

## 2021-03-03 RX ADMIN — ONDANSETRON 4 MG: 2 INJECTION INTRAMUSCULAR; INTRAVENOUS at 01:09

## 2021-03-03 RX ADMIN — BUDESONIDE 250 MCG: 0.25 SUSPENSION RESPIRATORY (INHALATION) at 11:13

## 2021-03-03 RX ADMIN — PANTOPRAZOLE SODIUM 40 MG: 40 TABLET, DELAYED RELEASE ORAL at 06:12

## 2021-03-03 RX ADMIN — IPRATROPIUM BROMIDE 0.5 MG: 0.5 SOLUTION RESPIRATORY (INHALATION) at 16:10

## 2021-03-03 RX ADMIN — Medication 10 ML: at 09:49

## 2021-03-03 RX ADMIN — IPRATROPIUM BROMIDE 0.5 MG: 0.5 SOLUTION RESPIRATORY (INHALATION) at 11:12

## 2021-03-03 RX ADMIN — ARFORMOTEROL TARTRATE 15 MCG: 15 SOLUTION RESPIRATORY (INHALATION) at 11:11

## 2021-03-03 RX ADMIN — METOPROLOL SUCCINATE 25 MG: 25 TABLET, EXTENDED RELEASE ORAL at 09:47

## 2021-03-03 RX ADMIN — Medication 1 TABLET: at 09:47

## 2021-03-03 RX ADMIN — SODIUM CHLORIDE 125 MG: 9 INJECTION, SOLUTION INTRAVENOUS at 14:14

## 2021-03-03 RX ADMIN — PANCRELIPASE 36000 UNITS: 60000; 12000; 38000 CAPSULE, DELAYED RELEASE PELLETS ORAL at 08:48

## 2021-03-03 RX ADMIN — PANCRELIPASE 36000 UNITS: 60000; 12000; 38000 CAPSULE, DELAYED RELEASE PELLETS ORAL at 12:55

## 2021-03-03 RX ADMIN — FOLIC ACID 1 MG: 1 TABLET ORAL at 09:47

## 2021-03-03 RX ADMIN — ENOXAPARIN SODIUM 40 MG: 40 INJECTION SUBCUTANEOUS at 08:49

## 2021-03-03 NOTE — PLAN OF CARE
Problem: Falls - Risk of:  Goal: Will remain free from falls  Description: Will remain free from falls  3/3/2021 1713 by Zoltan Del Valle RN  Outcome: Completed  3/3/2021 1106 by Zoltan Del Valle RN  Outcome: Met This Shift  Goal: Absence of physical injury  Description: Absence of physical injury  3/3/2021 1713 by Zoltan Del Valle RN  Outcome: Completed  3/3/2021 1106 by Zoltan Del Valle RN  Outcome: Met This Shift

## 2021-03-03 NOTE — PLAN OF CARE
Problem: Falls - Risk of:  Goal: Will remain free from falls  Description: Will remain free from falls  3/3/2021 1106 by Akilah Patricio RN  Outcome: Met This Shift  3/3/2021 0049 by Irene Timmons RN  Outcome: Met This Shift  Goal: Absence of physical injury  Description: Absence of physical injury  3/3/2021 1106 by Akilah Patricio RN  Outcome: Met This Shift  3/3/2021 0049 by Irene Timmons RN  Outcome: Met This Shift

## 2021-03-03 NOTE — PROGRESS NOTES
Patient discharged to [facility]. Report called to [facility]. Paperwork faxed to [phone]. All belongings sent with patient.

## 2021-03-03 NOTE — PROGRESS NOTES
Physical Therapy  Physical Therapy Initial Assessment     Name: Nabil Renee  : 1955  MRN: [id number]    Referring Provider:  Charmayne Brooms, PA    Date of Service: 3/3/2021    Evaluating PT:  Hakeem Cruz PT, DPT. [id number]    Room #:  1000/3706-C  Diagnosis:  Acute on chronic pancreatitis   Reason for admission:  SOB  Precautions:  Falls, O2  Procedures: none  Equipment Recommendations:  Return to use of rollator    SUBJECTIVE:  Pt lives alone (states brother is in and out) and has an aide 5 days a week in a 2 story home with 3 stair(s) to enter and 1 rail(s). Bed is on 2nd floor and bath is on 2nd floor with 14+ steps up and 1 rail. Pt ambulated with rollator PTA. OBJECTIVE:   Initial Evaluation  Date: 3/1 Treatment  3/3 Short Term/ Long Term   Goals   AM-PAC 6 Clicks 99/90 27/48    Was pt agreeable to Eval/treatment? Yes  Yes     Does pt have pain? 7/10 chest  No report    Bed Mobility  Rolling: NT  Supine to sit: NT  Sit to supine: Daryl  Scooting: Daryl Rolling: NT  Supine to sit: Daryl  Sit to supine: Daryl  Scooting: Daryl Independent    Transfers Sit to stand: SBA  Stand to sit: SBA  Stand pivot: Daryl HHA Sit to stand: SBA  Stand to sit: SBA  Stand pivot: NT Independent    Ambulation    50 feet with [employer] Daryl   40 ft with [employer], completed x2 reps >150 feet with [employer] Mod I   Stair negotiation: ascended and descended  NT NT >10 steps with 1 rail Mod I    ROM BUE:  See OT eval   BLE:  WFL     Strength BUE:  See OT eval   BLE:  knee ext 4/5  Ankle DF 4/5  Increase by 1/3 MMT grade    Balance Sitting EOB:  SBA  Dynamic Standing:  Daryl [employer] Sitting EOB:  SBA  Dynamic Standing:  Daryl [employer] Sitting EOB:  indep  Dynamic Standing:   Mod I [employer]     -Pt is A & O x 3  -Sensation:  unremarkable   -Edema:  unremarkable     Therapeutic Exercises:  functional activity     Patient education  Pt educated on safety, sequencing of transfers, and role of PT    Patient response to education:   Pt verbalized understanding Pt demonstrated skill Pt requires further education in this area   Yes  Partial  Yes      ASSESSMENT:    Comments:  Pt received supine in bed and agreeable to PT session   Pt assistance levels largely the same as previous session, however, pt requires more time and is more quickly fatigued. Observably has more difficulty completing transfers and appears weaker than last session. Moving very slowly needing assist to maintain balance during ambulation and to steer walker around objects in environment. Pt resting in standing leaning on elbows on walker after short distance. Takes multiple minutes to complete short distance ambulation. Did ambulate x2 reps. Returned to bed to end session. Discussed with SW regarding performance, deconditioning, recommendations, etc.   Pt with all needs met and call light in reach. Pt would benefit from continued PT POC to address functional deficits described above. Treatment:  Patient practiced and was instructed in the following treatment:     Patient education provided continuously throughout session for sequencing, safety maintenance, and improving any deficits found during the evaluation.  Bed mobility training - pt given verbal and tactile cues to facilitate proper sequencing and safety during rolling and sit>supine as well as provided with physical assistance to complete task     STS and pivot transfer training - pt educated on proper hand and foot placement, safety and sequencing, and use of proper technique to safely complete sit<>stand and pivot transfers with hands on assistance to complete task safely     Gait training- pt was given verbal and tactile cues to facilitate improved gait speed and proper walker management during ambulation as well as provided with physical assistance to complete task. Pt's/ family goals   1. Return home     Patient and or family understand(s) diagnosis, prognosis, and plan of care.   yes    PLAN:    Fair progress, continue PT POC    Time in  0945  Time out  1010    Total Treatment Time 25 minutes     Evaluation Time includes thorough review of current medical information, gathering information on past medical history/social history and prior level of function, completion of standardized testing/informal observation of tasks, assessment of data and education on plan of care and goals.     CPT codes:  [] Low Complexity PT evaluation 54072  [] Moderate Complexity PT evaluation 73911  [] High Complexity PT evaluation 92278  [] PT Re-evaluation 13238  [] Gait training 10247 - minutes  [] Manual therapy 43874 - minutes  [x] Therapeutic activities 17771 25 minutes  [] Therapeutic exercises 54140 - minutes  [] Neuromuscular reeducation 86459 - minutes     Pantera Walker, PT, DPT  [id number]

## 2021-03-03 NOTE — DISCHARGE SUMMARY
Physician Discharge Summary     Patient ID:  Clarissa Mitchell  [id number]  74 y.o.  1955    Admit date: 2/26/2021    Discharge date and time:  3/3/2021     Admission Diagnoses:   Chief Complaint   Patient presents with    Shortness of Breath     WEARS HOME O2      Acute on chronic pancreatitis [facility])     Discharge Diagnoses:   Principal Problem:    Acute on chronic pancreatitis ([facility]  Active Problems:    COPD (chronic obstructive pulmonary disease) ([facility]    Neurogenic bladder    Chronic respiratory failure with hypoxia (HCC)    Gastric wall thickening    Bladder wall thickening    Atrial tachycardia ([facility]  Resolved Problems:    * No resolved hospital problems. *       Consults: Pulm, GI, Urology    Procedures: None    Hospital Course:   Patient presented with chronic dyspnea. She really did not indicate that it was any worse acutely, but just gradually worsening to the point where she thought she needed to come to the hospital.  She has COPD and her FEV1 was 25% predicted around 2018 or 2019, and I do not think she has had PFTs since then. There are absolutely no signs of acute exacerbation, I suspect her COPD is just getting worse. She is chronically anemic and has had EGD and colonoscopy relatively recently. GI was consulted and is not recommending further endoscopy at this time. She did not have any rectal bleeding during this admission. She remains iron deficient. I gave her IV iron as well as 1 unit PRBCs, but this did not make her feel any better either. She had an incidentally elevated lipase level. She does have history of chronic pancreatitis due to alcohol abuse. She really did not volunteer abdominal pain as one of her complaints, but question, she did mention maybe some abdominal discomfort that was very vague. She was given IV fluids and her abdominal discomfort resolved.   The pancreatitis seems incidental at best.    I consulted pulmonary to help discuss goals of care with her, since she is well-known to them as an outpatient. However, they declined to really deal with this as an inpatient so I think goals of care discussions can be deferred to the outpatient setting. Throughout this admission she has been hypoactive, seemingly depressed, mostly just lays in bed in the dark. She denies active suicidal ideation, but did tell me that sometimes she wishes she was dead to escape her chronic illness. She is a very guarded individual and I was not able to have many meaningful conversations with her about her goals of care. She mostly lays in bed with her eyes closed and only answers questions very simply or not at all. As a incidental issue, during this admission she was noted to have some short runs of an ectopic atrial tachycardia. He does not appear to be atrial fibrillation. It is regular, narrow complex, and usually runs for just a few beats. I started her on a low-dose of metoprolol. Discharge Exam:  Vitals:    03/02/21 2015 03/03/21 0100 03/03/21 0800 03/03/21 0947   BP: 107/60 (!) 107/48 (!) 110/56 (!) 110/56   Pulse: 91 101 94 94   Resp: 16 20 16    Temp: 99 °F (37.2 °C) 99.2 °F (37.3 °C) 98.7 °F (37.1 °C)    TempSrc: Temporal Temporal Temporal    SpO2: 99% 98% 100%    Weight:       Height:            General: Nontoxic but chronically ill-appearing female in no acute distress  Cardiac: Regular rate and rhythm without murmurs  Lungs: Clear bilaterally  Abdomen: Present bowel sounds soft nontender nondistended without rebound or guarding  Psych: Calm and cooperative. Flat affect. Seems somewhat anhedonic.        Condition:  Stable    Disposition: SNF    Patient Instructions:   Current Discharge Medication List      START taking these medications    Details   metoprolol succinate (TOPROL XL) 25 MG extended release tablet Take 1 tablet by mouth daily  Qty: 30 tablet, Refills: 3      ferrous sulfate (IRON 325) 325 (65 Fe) MG tablet Take 1 tablet by mouth 2 times daily  Qty: 180 tablet, Refills: 1         CONTINUE these medications which have CHANGED    Details   diazePAM (VALIUM) 5 MG tablet Take 0.5 tablets by mouth nightly for 10 days. Qty: 5 tablet, Refills: 0    Associated Diagnoses: Anxiety         CONTINUE these medications which have NOT CHANGED    Details   fluticasone-umeclidin-vilant (TRELEGY ELLIPTA) 100-62.5-25 MCG/INH AEPB Inhale 1 puff into the lungs daily Instructed to take am of procedure  Qty: 30 each, Refills: 0      albuterol sulfate  (90 Base) MCG/ACT inhaler Inhale 1 puff into the lungs as needed (every 4-6 hours as needed) Instructed to take am of procedure if needed and bring dos  Qty: 1 Inhaler, Refills: 0      famotidine (PEPCID) 40 MG tablet Take 1 tablet by mouth every evening  Qty: 30 tablet, Refills: 3      pantoprazole (PROTONIX) 40 MG tablet Take 1 tablet by mouth 2 times daily (before meals)  Qty: 180 tablet, Refills: 1      lipase-protease-amylase (CREON) 54292-41526 units delayed release capsule Take 1 capsule by mouth 3 times daily (with meals)  Qty: 360 capsule, Refills: 5      OXYGEN Inhale 3 L into the lungs as needed Uses 3L night and during day prn      vitamin D (ERGOCALCIFEROL) 40996 UNITS CAPS capsule Take 50,000 Units by mouth once a week Saturday      Multiple Vitamins-Iron (DAILY-FEDERICO/IRON) TABS Take 1 tablet by mouth daily       folic acid (FOLVITE) 1 MG tablet Take 1 mg by mouth daily       sertraline (ZOLOFT) 50 MG tablet Take 1 tablet by mouth daily.   Qty: 30 tablet, Refills: 0      alendronate (FOSAMAX) 70 MG tablet Take 70 mg by mouth every 7 days Sunday         STOP taking these medications       umeclidinium-vilanterol (ANORO ELLIPTA) 62.5-25 MCG/INH AEPB inhaler Comments:   Reason for Stopping:         nicotine (NICODERM CQ) 21 MG/24HR Comments:   Reason for Stopping:         OLANZapine (ZYPREXA) 5 MG tablet Comments:   Reason for Stopping:             Activity: activity as tolerated  Diet: regular diet    Follow-up with PCP in 1 week.     Note that over 30 minutes was spent in preparing discharge papers, discussing discharge with patient, medication review, etc.    Signed:  Cristela Leija    3/3/2021  12:10 PM
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
## 2021-03-03 NOTE — PROGRESS NOTES
OT BEDSIDE TREATMENT NOTE      Date:3/3/2021  Patient Name: Nabil Renee  MRN: [id number]  : 1955  Room: [facility]     Per OT Eval:    Evaluating [facility], OTR/L #[id number]     AM-PAC Daily Activity Raw Score:   Recommended Adaptive Equipment: TBD      Diagnosis: Acute on chronic pancreatitis ([facility] [address] [K85.90, K86.1]     Referring physician: Charmayne Brooms, PA     Pertinent Medical History: anemia, asthma, COPD (home O2), depression     Precautions:  Falls, O2, chronic jarquin, bed/chair alarm     Home Living: Pt lives alone in 2 floor home.  3 CHANTELLE + 3 CHANTELLE, 1+1 handrail  Bath and bedroom on 2nd floor - flight of stairs, 1 handrail   Bathroom setup: tub/shower with shower chair and grab bars   Equipment owned: rollator, home O2     Prior Level of Function: assistance PRN with ADLs , assistance with IADLs; ambulated w/ rollator  Driving: yes  Home aides 5 days/wk, 5 hrs/day assist w/ ADL's/IADL's PRN     Pain Level: Pt did not complain of pain this session, stated of feeling nausea, nursing aware     Cognition: A&O: 3/4; Follows 1 step directions              Memory:  fair +              Sequencing:  fair               Problem solving:  fair               Judgement/safety:  fair                 Functional Assessment:    Initial Eval Status  Date: 3/1/21 Treatment Status  Date: 3/3/21 Short Term Goals/LTG  Treatment frequency: 1-4x/wk   Feeding Supervision   DNT  Supervision per previous level Modified [place]    Grooming Minimal Assist   Standing position  SBA  Pt washed face, applied deodorant seated EOB Modified [place]    UB Dressing Stand by Assist   Donned/doffed gown  Juvenal  Albert/doff hospital gown seated EOB Modified Winston    LB Dressing Moderate Assist   Simulated pants     Improved w/ B socks  SBA  Albert/doff hospital socks using cross over technique seated EOB    DNT pants this date Supervision    Bathing Minimal Assist  Simulated  Juvenal  Pt completed sponge bathing task seated/standing EOB, with pt able to wash of UB, using cross over technique to wash of LB, with assistance to stand and wash of buttocks/kaden area for safety Supervision    Toileting Minimal Assist   Chronic jarquin  Bowel management  DNT  Juvenal per previous level      Pt having of jarquin catheter Supervision    Bed Mobility  Supine to sit: Minimal Assist   Sit to supine: NT   Rolling: Independent   Supine to sit: Modified [place]   Sit to supine: Modified [place]    Functional Transfers Sit>stand: Min A>SBA  Stand>sit: Min A  Various surfaces    CGA/Juvenal  Sit to Stand  Stand to Sit    Stand Pivot Transfer EOB<>Chair with HHA    Cueing for hand placement Mod I   Functional Mobility Min A w/ w/w  In room to prep for bathrm mobility  Juvenal  Pt took of short steps during SPT with HHA Mod I   Balance Sitting: SBA  Standing: Min A w/ w/w (dynamic)  Sitting EOB:  SBA    Standing:  CGA/Juvenal     Activity Tolerance Fair- Fair  Fair+   Visual/  Perceptual Glasses: no  WFL                       Hand dominance: R    Strength ROM Additional Info:    RUE  3+/5  WFL    good  and wfl FMC/dexterity noted during ADL tasks         LUE 3+/5  WFL    good  and wfl FMC/dexterity noted during ADL tasks         Hearing: WFL  Sensation:  No c/o numbness or tingling  Tone: WFL   Edema: none noted                     Education:  Pt was educated through out treatment regarding proper technique & safety with bed mobility, functional transfers & mobility, techniques to assist with LB dressing tasks to improve safety & prevent falls and allow pt to return home safely. Comments: Upon arrival pt supine in bed, agreeable to therapy. Pt completed of bed mobility, functional mobility of short steps, transfers and ADL tasks this date. At end of session, pt seated upright in chair, stating of feeling nausea but wanting to stay in chair to try and eat lunch meal, all lines and tubes intact, call light within reach, nursing aware. · Pt has made fair progress towards set goals.    · Continue with current plan of care focusing on increasing of independency with transfers and ADL tasks      Treatment Time In: 11:35am           Treatment Time Out: 11:53am           Treatment Charges: Mins Units   Ther Ex  70724     Manual Therapy 56833     Thera Activities 70724     ADL/Home Mgt 29046 18 1   Neuro Re-ed 15423     Group Therapy      Orthotic manage/training  43146     Non-Billable Time     Total Timed Treatment 18 1        Janice Herrera MELGAR/L [id number]

## 2021-03-03 NOTE — DISCHARGE INSTR - COC
Continuity of Care Form    Patient Name: Clare Cruz   :  1955  MRN:  [id number]    Admit date:  2021  Discharge date:  3/3/21    Code Status Order: Full Code   Advance Directives:   Advance Care Flowsheet Documentation       Date/Time Healthcare Directive Type of Healthcare Directive Copy in [address] Agent's Name Healthcare Agent's Phone Number    21 0131  No, patient does not have an advance directive for healthcare treatment -- -- -- -- --            Admitting Physician:  Dasha Rosales MD  PCP: Bill Bateman MD    Discharging Nurse: Danny King [facility] Unit/Room#: [address] [facility],6Th Floor Unit Phone Number: [phone]    Emergency Contact:   Extended Emergency Contact Information  Primary Emergency Contact: Earnestine Bishop  Address: [facility] [place] [address] Phone: [phone]  Relation: Brother/Sister  Secondary Emergency Contact: [facility]  Address: [facility] [address] [facility], [address] Phone: [phone]  Work Phone: [phone]  Mobile Phone: [phone]  Relation: Brother/Sister  Preferred language: English   needed? No    Past Surgical History:  Past Surgical History:   Procedure Laterality Date    COLONOSCOPY      COLONOSCOPY N/A 2019    COLONOSCOPY DIAGNOSTIC performed by Cristina August MD at [facility] ENDOSCOPY    ECHO COMPL W DOP COLOR FLOW  2013         HYSTERECTOMY      KIDNEY SURGERY Left 2014    ABLATION PERFORMED UNDER CT SCAN    TN CYSTOURETHROSCOPY,BIOPSIES N/A 2018    CYSTOSCOPY RETROGRADE PYELOGRAM, CLOT EVACATION , POSS.   BLADDER BIOPSY ---DR Ruth Power 2 :30 performed by Timothy Sue DO at [address] N/A 5/10/2019    EGD BIOPSY performed by Cristina August MD at [address] [facility] 2019    EGD EUS performed by Itzel Matias DO at [address] [facility] GASTROINTESTINAL ENDOSCOPY N/A 6/28/2019    EGD performed by Itzel Matias DO at [address] [facility] N/A 5/19/2020    EGD DIAGNOSTIC ONLY performed by Mckay Rangel MD at [address] [facility]       Immunization History: There is no immunization history for the selected administration types on file for this patient.     Active Problems:  Patient Active Problem List   Diagnosis Code    [facility] pulmonary [facility]) [facility] J44.9    MDD (major depressive disorder) F32.9    Hematuria R31.9    Neurogenic bladder N31.9    Chronic respiratory failure with hypoxia ([facility]) J96.11    Obstructed Del Rio catheter ([facility]) T83.091A    Anemia D64.9    GI bleed K92.2    Acute on chronic pancreatitis ([facility]) K85.90, K86.1    Gastric wall thickening K31.89    Bladder wall thickening N32.89    Atrial tachycardia ([facility]) I47.1       Isolation/Infection:   Isolation            No Isolation          Patient Infection Status       Infection Onset Added Last Indicated Last Indicated By Review Planned Expiration Resolved Resolved By    None active    Resolved    COVID-19 Rule Out 02/26/21 02/26/21 02/26/21 COVID-19, Rapid (Ordered)   02/26/21 Rule-Out Test Resulted    COVID-19 Rule Out 05/20/20 05/20/20 05/20/20 COVID-19 (Ordered)   05/20/20 Rule-Out Test Resulted    ESBL (Extended Spectrum Beta Lactamase)  08/14/14 08/14/14 Julisa Kilgore, RN   02/20/17 Toby Ormond, RN    E coli esbl in urine 8/12/14            Nurse Assessment:  Last Vital Signs: BP (!) 110/56   Pulse 94   Temp 98.7 °F (37.1 °C) (Temporal)   Resp 16   Ht 5' 7\" (1.702 m)   Wt 107 lb (48.5 kg)   SpO2 100%   BMI 16.76 kg/m²     Last documented pain score (0-10 scale): Pain Level: 0  Last Weight:   Wt Readings from Last 1 Encounters:   02/26/21 107 lb (48.5 kg)     Mental Status:  oriented    IV Access:  - None    Nursing Mobility/ADLs:  Walking   Assisted  Transfer  Assisted  Bathing  Assisted  Dressing Assisted  Toileting  Assisted  Feeding  Independent  Med Admin  Assisted  Med Delivery   whole    Wound Care Documentation and Therapy:        Elimination:  Continence:   · Bowel: Yes  · Bladder: Yes  Urinary Catheter: Indication for Use of Catheter: Urology/Urologist seeing this patient or inserted indwelling catheter   Colostomy/Ileostomy/Ileal Conduit: No       Date of Last BM: 3/1/21    Intake/Output Summary (Last 24 hours) at 3/3/2021 1308  Last data filed at 3/3/2021 0611  Gross per 24 hour   Intake 489.58 ml   Output 2500 ml   Net -2010.42 ml     I/O last 3 completed shifts: In: 489.6 [Blood:489.6]  Out: 2500 [Urine:2500]    Safety Concerns: At Risk for Falls    Impairments/Disabilities:      None    Nutrition Therapy:  Current Nutrition Therapy:   - Oral Diet:  Low Fat    Routes of Feeding: Oral  Liquids: Thin Liquids  Daily Fluid Restriction: no  Last Modified Barium Swallow with Video (Video Swallowing Test): not done    Treatments at the Time of Hospital Discharge:   Respiratory Treatments: none  Oxygen Therapy:  is on oxygen at 3 L/min per nasal cannula.   Ventilator:    - No ventilator support    Rehab Therapies: Physical Therapy and Occupational Therapy  Weight Bearing Status/Restrictions: No weight bearing restirctions  Other Medical Equipment (for information only, NOT a DME order):  none  Other Treatments: none    Patient's personal belongings (please select all that are sent with patient):  None    RN SIGNATURE:  Electronically signed by Garrison Freire RN on 3/3/21 at 3:50 PM EST    CASE MANAGEMENT/SOCIAL WORK SECTION    Inpatient Status Date: ***    Readmission Risk Assessment Score:  Readmission Risk              Risk of Unplanned Readmission:        16           Discharging to Facility/ Agency   · Name: [facility]  · [place], [address]  · Phone: [phone]  · Fax: [phone]    Dialysis Facility (if applicable) · Name:  · Address:  · Dialysis Schedule:  · Phone:  · Fax:    / signature: EUGENE Barber      PHYSICIAN SECTION    Prognosis: Fair    Condition at Discharge: Stable    Rehab Potential (if transferring to Rehab): Fair    Recommended Labs or Other Treatments After Discharge: CBC, BMP in 1 week    Physician Certification: I certify the above information and transfer of Christina Porter  is necessary for the continuing treatment of the diagnosis listed and that she requires [place] for less than 30 days.      Update Admission H&P: No change in H&P    PHYSICIAN SIGNATURE:  Electronically signed by Raimundo Angelo MD on 3/3/21 at 1:36 PM EST

## 2021-05-17 ENCOUNTER — TELEPHONE (OUTPATIENT)
Dept: HEMATOLOGY | Age: 66
End: 2021-05-17

## 2021-05-17 NOTE — TELEPHONE ENCOUNTER
Patient called in to reschedule her 6 month follow up since she was in a NH at time of last appt. I made her an appt on 5/27/21 at 1:45pm at [facility] at [facility]. She confirmed this appt.     Electronically signed by Cathi Barrett RN on 5/17/2021 at 10:17 AM

## 2021-05-27 ENCOUNTER — OFFICE VISIT (OUTPATIENT)
Dept: HEMATOLOGY | Age: 66
End: 2021-05-27
Payer: COMMERCIAL

## 2021-05-27 VITALS
DIASTOLIC BLOOD PRESSURE: 63 MMHG | HEIGHT: 67 IN | WEIGHT: 133 LBS | HEART RATE: 94 BPM | OXYGEN SATURATION: 95 % | TEMPERATURE: 97.8 F | SYSTOLIC BLOOD PRESSURE: 162 MMHG | BODY MASS INDEX: 20.88 KG/M2

## 2021-05-27 DIAGNOSIS — K86.1 ACUTE ON CHRONIC PANCREATITIS (HCC): ICD-10-CM

## 2021-05-27 DIAGNOSIS — K86.1 PANCREATIC DUCT HYPERTENSION WITH CHRONIC PANCREATITIS (HCC): Primary | ICD-10-CM

## 2021-05-27 DIAGNOSIS — K85.90 ACUTE ON CHRONIC PANCREATITIS (HCC): ICD-10-CM

## 2021-05-27 PROCEDURE — 1090F PRES/ABSN URINE INCON ASSESS: CPT | Performed by: TRANSPLANT SURGERY

## 2021-05-27 PROCEDURE — 3017F COLORECTAL CA SCREEN DOC REV: CPT | Performed by: TRANSPLANT SURGERY

## 2021-05-27 PROCEDURE — 99213 OFFICE O/P EST LOW 20 MIN: CPT | Performed by: TRANSPLANT SURGERY

## 2021-05-27 PROCEDURE — G8427 DOCREV CUR MEDS BY ELIG CLIN: HCPCS | Performed by: TRANSPLANT SURGERY

## 2021-05-27 PROCEDURE — G8420 CALC BMI NORM PARAMETERS: HCPCS | Performed by: TRANSPLANT SURGERY

## 2021-05-27 PROCEDURE — 99212 OFFICE O/P EST SF 10 MIN: CPT | Performed by: TRANSPLANT SURGERY

## 2021-05-27 PROCEDURE — 1036F TOBACCO NON-USER: CPT | Performed by: TRANSPLANT SURGERY

## 2021-05-27 PROCEDURE — 4040F PNEUMOC VAC/ADMIN/RCVD: CPT | Performed by: TRANSPLANT SURGERY

## 2021-05-27 PROCEDURE — 1123F ACP DISCUSS/DSCN MKR DOCD: CPT | Performed by: TRANSPLANT SURGERY

## 2021-05-27 PROCEDURE — G8399 PT W/DXA RESULTS DOCUMENT: HCPCS | Performed by: TRANSPLANT SURGERY

## 2021-05-27 NOTE — PROGRESS NOTES
Hepatobiliary and Pancreatic Surgery Progress Note    CC: follow up     Subjective: Patient states that she is not having having bloating nor diarrhea. She is maintaining her weight. She continues to bladder issues. She is still on 3L of oxygen daily and follow with Dr. James Morales. She saw Dr. Scottie Martinez a few months ago. OBJECTIVE      Physical    BP (!) 162/63   Pulse 94   Temp 97.8 °F (36.6 °C)   Ht 5' 7\" (1.702 m)   Wt 133 lb (60.3 kg)   SpO2 95%   BMI 20.83 kg/m²       General appearance: appears in no acute distress  Lungs:CTABL  Heart: RRR  Abdomen: soft, nondistended, nontympanic, no guarding, no peritoneal signs, normoactive bowel sounds  Extremities:no peripheral edema    ASSESSMENT: Chronic calcific pancreatitis with pancreatic duct hypertension    PLAN:    - continue Creon  - follow up with me in 6 months  - continue alcohol and smoking abstinence    20 Minutes of which greater than 50% was spent counseling or coordinating her care. Thank you for the consultation and allowing me to take part in Ms. Keane's care.     Please send a copy of my note to Dr. Fern Wall    Electronically signed by Liane Grimes MD on 5/28/2021 at 7:32 AM

## 2021-07-29 ENCOUNTER — TELEPHONE (OUTPATIENT)
Dept: HEMATOLOGY | Age: 66
End: 2021-07-29

## 2021-08-26 ENCOUNTER — OFFICE VISIT (OUTPATIENT)
Dept: HEMATOLOGY | Age: 66
End: 2021-08-26
Payer: COMMERCIAL

## 2021-08-26 VITALS
DIASTOLIC BLOOD PRESSURE: 53 MMHG | OXYGEN SATURATION: 93 % | HEIGHT: 67 IN | HEART RATE: 89 BPM | BODY MASS INDEX: 23.07 KG/M2 | WEIGHT: 147 LBS | RESPIRATION RATE: 20 BRPM | TEMPERATURE: 97.2 F | SYSTOLIC BLOOD PRESSURE: 137 MMHG

## 2021-08-26 DIAGNOSIS — K86.1 CHRONIC CALCIFIC PANCREATITIS (HCC): Primary | ICD-10-CM

## 2021-08-26 DIAGNOSIS — N64.4 BREAST PAIN IN FEMALE: ICD-10-CM

## 2021-08-26 DIAGNOSIS — R14.0 ABDOMINAL DISTENTION: ICD-10-CM

## 2021-08-26 DIAGNOSIS — R10.84 GENERALIZED ABDOMINAL PAIN: ICD-10-CM

## 2021-08-26 PROCEDURE — 99212 OFFICE O/P EST SF 10 MIN: CPT | Performed by: TRANSPLANT SURGERY

## 2021-08-26 PROCEDURE — 1090F PRES/ABSN URINE INCON ASSESS: CPT | Performed by: TRANSPLANT SURGERY

## 2021-08-26 PROCEDURE — 99213 OFFICE O/P EST LOW 20 MIN: CPT | Performed by: TRANSPLANT SURGERY

## 2021-08-26 PROCEDURE — G8399 PT W/DXA RESULTS DOCUMENT: HCPCS | Performed by: TRANSPLANT SURGERY

## 2021-08-26 PROCEDURE — 3017F COLORECTAL CA SCREEN DOC REV: CPT | Performed by: TRANSPLANT SURGERY

## 2021-08-26 PROCEDURE — 4040F PNEUMOC VAC/ADMIN/RCVD: CPT | Performed by: TRANSPLANT SURGERY

## 2021-08-26 PROCEDURE — G8427 DOCREV CUR MEDS BY ELIG CLIN: HCPCS | Performed by: TRANSPLANT SURGERY

## 2021-08-26 PROCEDURE — G8420 CALC BMI NORM PARAMETERS: HCPCS | Performed by: TRANSPLANT SURGERY

## 2021-08-26 PROCEDURE — 1123F ACP DISCUSS/DSCN MKR DOCD: CPT | Performed by: TRANSPLANT SURGERY

## 2021-08-26 PROCEDURE — 1036F TOBACCO NON-USER: CPT | Performed by: TRANSPLANT SURGERY

## 2021-08-26 NOTE — PROGRESS NOTES
Hepatobiliary and Pancreatic Surgery Progress Note    CC: follow up     Subjective: Patient states that she is having a lot of swelling. She is on oxygen and is having bleeding. She is having abdominal pain. She is moving her bowels daily. She has an indwelling catheter in place. OBJECTIVE      Physical    BP (!) 137/53 (Site: Left Upper Arm, Position: Sitting, Cuff Size: Medium Adult)   Pulse 89   Temp 97.2 °F (36.2 °C) (Temporal)   Resp 20   Ht 5' 7\" (1.702 m)   Wt 147 lb (66.7 kg)   SpO2 93%   BMI 23.02 kg/m²       General appearance: appears in no acute distress  Lungs:CTABL  Heart: RRR  Abdomen: soft, very distended and hard, nontympanic, no guarding, no peritoneal signs, normoactive bowel sounds  Extremities:no peripheral edema    ASSESSMENT: Chronic calcific pancreatitis with pancreatic duct hypertension    PLAN:    - continue Creon  - repeat CT scan of her abdomen secondary to large abdominal swelling.  - tiffanie pierce for breast swelling and need for mamogram and workup    20 Minutes of which greater than 50% was spent counseling or coordinating her care. Thank you for the consultation and allowing me to take part in Ms. Keane's care.     Please send a copy of my note to Dr. Sukh Solano    Electronically signed by Celi Orr MD on 8/26/2021 at 1:58 PM

## 2021-08-30 ENCOUNTER — TELEPHONE (OUTPATIENT)
Dept: HEMATOLOGY | Age: 66
End: 2021-08-30

## 2021-08-30 NOTE — TELEPHONE ENCOUNTER
Patients prior auth for cpt code 54750 was done online and scanned into the patients chart. The patient is scheduled for her ct scan on 09/16/2021 [facility]  ARRIVE 12:00pm  SCAN 1:00PM  NPO 4 hours prior    Pt was made aware to hold her metformin on 09/16,09/17/ and 09/18. She was also made aware to come in earlier the day of her scan so she can have her lab work drawn. Directions were given to the patient to go to registration on the first floor off of [facility] entrance. While on the phone I gave the patient her follow up appt info on 09/23/2021 at 2:00pm [facility].  I had the patient repeat all of the above back to me, the patient verbalized understanding  Electronically signed by Kaylyn Schwab, MA on 8/30/2021 at 10:22 AM

## 2021-09-08 DIAGNOSIS — N64.59 OTHER SIGNS AND SYMPTOMS IN BREAST: ICD-10-CM

## 2021-09-08 DIAGNOSIS — N63.0 BREAST SWELLING: Primary | ICD-10-CM

## 2021-09-08 DIAGNOSIS — N64.89 OTHER SPECIFIED DISORDERS OF BREAST: ICD-10-CM

## 2021-09-15 NOTE — PROGRESS NOTES
Subjective:      Patient ID: Hayden Mcclure is a 74 y.o. female. HPI     History and Physical    Patient's Name/Date of Birth: Hayden Mcclure / 1955    Date: 2021        Hayden Mcclure presents for evaluation of left breast inflammation and pain. PCP: Bernard Penny MD. Gynecologist: marta    Ayesha is a pleasant 74 y.o. 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 She has a history of tobacco abuse, COPD with chronic hypoxic respiratory failure on O2 at 3 L per nasal cannula. Also a history of anemia, GERD, and pancreatitis. She presents today with complaints of chronic, intermittent, inflammation of the left breast which she reports started approximately 4 months ago. Inflammation will resolve spontaneously. She denies fever or chills, reports her left breast is larger than the right, denies nipple discharge. Her breast cancer risks include age and gender alone. No family history of breast, colon, or pancreatic cancer. Her father had prostate cancer at age [de-identified]. Ashkenazi Yarsanism Ancestry: No.    OBSTETRIC RELATED HISTORY:  Age of menarche was 13. Age of menopause was 50. Hysterectomy (patient unsure partial or total)  Patient denies hormonal therapy. Patient is . CANCER SURVEILLANCE HISTORY:  Mammograms: Yes   Breast MRI's: No   Breast Biopsies: Patient unsure  Colonoscopy: Yes   GI Polyps: Yes - benign   EGD: Patient unsure  Pelvic Exam: Yes 4 years ago per patient  Pap Smear: yes - 4 years ago per patient  Dermatology: Yes   Lung screening: Patient unsure        Estimated body mass index is 23.02 kg/m² as calculated from the following:    Height as of 21: 5' 7\" (1.702 m). Weight as of 21: 147 lb (66.7 kg).   Bra Size: Patient unsure    Because violence is so common, we ask all our patients: are you in a relationship or do you live with a person who threatens, hurts, or controls you:  Patient denies    Patient drinks moderate caffeinated beverages. Patient does not smoke cigarettes (former heavy smoker). Patient does not use recreational drugs. Past Medical History:   Diagnosis Date    Anemia     Asthma     COPD (chronic obstructive pulmonary disease) (HCC)     uses )2 prn daily and nightly / Dr. Roxy Tomlinson     Del Rio catheter in place     History of blood transfusion     2014        Past Surgical History:   Procedure Laterality Date    COLONOSCOPY      COLONOSCOPY N/A 9/4/2019    COLONOSCOPY DIAGNOSTIC performed by Farida Aceves MD at [address]  ECHO COMPL W DOP COLOR FLOW  2/24/2013         HYSTERECTOMY      KIDNEY SURGERY Left 06/16/2014    ABLATION PERFORMED UNDER CT SCAN    [place] CYSTOURETHROSCOPY,BIOPSIES N/A 9/14/2018    CYSTOSCOPY RETROGRADE PYELOGRAM, CLOT EVACATION , POSS.   BLADDER BIOPSY ---DR Escobar Cote 2 :30 performed by Arnel Sue DO at [address] [facility] N/A 5/10/2019    EGD BIOPSY performed by Farida Aceves MD at [address] [place] [address] 6/28/2019    EGD EUS performed by Jf Horowitz DO at [address] [place] [address] 6/28/2019    EGD performed by Jf Horowitz DO at [address] [place] [address] 5/19/2020    EGD DIAGNOSTIC ONLY performed by Livia Ling MD at [facility]OS[facility]       Current Outpatient Medications   Medication Sig Dispense Refill    metoprolol succinate (TOPROL XL) 25 MG extended release tablet Take 1 tablet by mouth daily (Patient not taking: Reported on 8/26/2021) 30 tablet 3    ferrous sulfate (IRON 325) 325 (65 Fe) MG tablet Take 1 tablet by mouth 2 times daily 180 tablet 1    albuterol sulfate  (90 Base) MCG/ACT inhaler Inhale 1 puff into the lungs every 4 hours as needed (every 4-6 hours as needed) Instructed to take am of procedure if needed and bring dos 1 Inhaler 5    famotidine (PEPCID) 40 MG tablet Take 1 tablet by mouth every evening (Patient not taking: Reported on 8/26/2021) 30 tablet 3    pantoprazole (PROTONIX) 40 MG tablet Take 1 tablet by mouth 2 times daily (before meals) 180 tablet 1    lipase-protease-amylase (CREON) 02362-69721 units delayed release capsule Take 1 capsule by mouth 3 times daily (with meals) (Patient taking differently: Take 2 capsules by mouth 4 times daily (with meals and nightly) ) 360 capsule 5    OXYGEN Inhale 3 L into the lungs as needed Uses 3L night and during day prn      vitamin D (ERGOCALCIFEROL) 37601 UNITS CAPS capsule Take 50,000 Units by mouth once a week Saturday      Multiple Vitamins-Iron (DAILY-FEDERICO/IRON) TABS Take 1 tablet by mouth daily       folic acid (FOLVITE) 1 MG tablet Take 1 mg by mouth daily       sertraline (ZOLOFT) 50 MG tablet Take 1 tablet by mouth daily. 30 tablet 0    alendronate (FOSAMAX) 70 MG tablet Take 70 mg by mouth every 7 days Sunday       No current facility-administered medications for this visit. Allergies   Allergen Reactions    Sulfa Antibiotics Anaphylaxis       Family History   Problem Relation Age of Onset    Cancer Father         prostate cancer    Diabetes Brother     Kidney Disease Brother        Social History     Socioeconomic History    Marital status:       Spouse name: Not on file    Number of children: Not on file    Years of education: Not on file    Highest education level: Not on file   Occupational History    Not on file   Tobacco Use    Smoking status: Former Smoker     Packs/day: 0.00    Smokeless tobacco: Never Used   Vaping Use    Vaping Use: Never used   Substance and Sexual Activity    Alcohol use: No     Comment: used to drink heavily, stopped [phone]    Drug use: No    Sexual activity: Not on file   Other Topics Concern    Not on file   Social History Narrative    Not on file     Social Determinants of Health     Financial Resource Strain:     Difficulty of Paying Living Expenses:    Food Insecurity:     Worried About Running Out of Food in the Last Year:     [address] in the Last Year:    Transportation Needs:     Lack of Transportation (Medical):  Lack of Transportation (Non-Medical):    Physical Activity:     Days of Exercise per Week:     Minutes of Exercise per Session:    Stress:     Feeling of Stress :    Social Connections:     Frequency of Communication with Friends and Family:     Frequency of Social Gatherings with Friends and Family:     Attends Sabianism Services:     Active Member of Clubs or Organizations:     Attends Club or Organization Meetings:     Marital Status:    Intimate Partner Violence:     Fear of Current or Ex-Partner:     Emotionally Abused:     Physically Abused:     Sexually Abused:        Occupation: Retired - social security    Review of Systems   Constitutional: Negative for activity change, appetite change, chills, fatigue, fever and unexpected weight change. She reports she has been feeling better after discharge from the hospital for pancreatitis. Still complains of occasional malaise and fatigue. Complaints of left breast discomfort on this visit. HENT: Negative for congestion, postnasal drip, rhinorrhea, sinus pressure, sinus pain, sore throat, trouble swallowing and voice change. Eyes: Negative for discharge, itching and visual disturbance. Respiratory: Positive for cough and shortness of breath (Shortness of breath on exertion is stable on O2 at 3 L per nasal cannula. ). Negative for choking, chest tightness and wheezing. Cardiovascular: Negative for chest pain, palpitations and leg swelling. Gastrointestinal: Negative for abdominal distention, abdominal pain, blood in stool, constipation, diarrhea, nausea and vomiting. Endocrine: Negative for cold intolerance and heat intolerance.    Genitourinary:        She has neurogenic bladder and requires Del Rio placement; Del Rio exchange monthly at [facility]. Musculoskeletal: Negative for arthralgias, back pain, gait problem, joint swelling, myalgias, neck pain and neck stiffness. Allergic/Immunologic: Negative for environmental allergies and food allergies. Neurological: Negative for dizziness, seizures, syncope, speech difficulty, weakness, light-headedness and headaches. Hematological: Negative for adenopathy. Does not bruise/bleed easily. Psychiatric/Behavioral: Negative for agitation, confusion and decreased concentration. The patient is not nervous/anxious. CONSTITUTIONAL: No fever, chills. Good appetite and energy level. ENMT: Eyes: No diplopia; Nose: No epistaxis. Mouth: No sore throat. RESPIRATORY: No hemoptysis, shortness of breath, cough. CARDIOVASCULAR: No chest pain, pressure, or palpitations. GASTROINTESTINAL: No nausea/vomiting, abdominal pain, diarrhea/constipation. No blood in the stools. GENITOURINARY: No dysuria, urinary frequency, hematuria. NEURO: No syncope, presyncope, headache. Remainder:  ROS NEGATIVE    Patient denies previous history of DVT/PE. Objective:   Physical Exam  Vitals and nursing note reviewed. Constitutional:       General: She is not in acute distress. Appearance: She is well-developed. She is not diaphoretic. Comments: ECOG 2; she did drive here on her own today. Requires O2 supplementation 24/7. HENT:      Head: Normocephalic and atraumatic. Mouth/Throat:      Pharynx: No oropharyngeal exudate. Eyes:      General: No scleral icterus. Right eye: No discharge. Left eye: No discharge. Conjunctiva/sclera: Conjunctivae normal.   Neck:      Thyroid: No thyromegaly. Vascular: No JVD. Trachea: No tracheal deviation. Cardiovascular:      Rate and Rhythm: Normal rate and regular rhythm. Heart sounds: No murmur heard. No friction rub. No gallop.     Pulmonary:      Effort: Pulmonary effort is normal. Prolonged expiration present. No respiratory distress or retractions. Breath sounds: No stridor. Decreased breath sounds present. No wheezing or rales. Comments: Breath sounds diminished in bilateral bases with expiratory phase significantly prolonged. No wheeze appreciated. Chest:      Chest wall: No mass, lacerations, deformity, swelling, tenderness or edema. Breasts: Breasts are asymmetrical (Left breast 1/2 cup size larger than right.). Right: No inverted nipple, mass, nipple discharge, skin change or tenderness. Left: No inverted nipple, mass, nipple discharge, skin change or tenderness. Comments: Right breast exam is unremarkable. Abdominal:      General: There is no distension. Palpations: Abdomen is soft. Tenderness: There is no abdominal tenderness. There is no guarding or rebound. Genitourinary:     Comments: Del Rio catheter draining to right leg bag. Musculoskeletal:         General: No tenderness or deformity. Normal range of motion. Right shoulder: Normal.      Left shoulder: Normal.      Cervical back: Normal range of motion and neck supple. Lymphadenopathy:      Cervical: No cervical adenopathy. Right cervical: No superficial, deep or posterior cervical adenopathy. Left cervical: No superficial, deep or posterior cervical adenopathy. Upper Body:      Right upper body: No pectoral adenopathy. Left upper body: No pectoral adenopathy. Skin:     General: Skin is warm and dry. Coloration: Skin is not pale. Findings: No erythema or rash. Neurological:      Mental Status: She is alert and oriented to person, place, and time. Coordination: Coordination normal.   Psychiatric:         Behavior: Behavior normal.         Thought Content: Thought content normal.         Judgment: Judgment normal.        Assessment:      74 y.o. extremely pleasant female without unusual risks for breast cancer. She has a PMH of COPD, Neurogenic bladder with purple bag syndrome (follows with Urology @ [facility]) and  chronic pancreatitis. She presents with complaints of intermittent inflammation and edema of the left breast over the past 4 months. She reports the last episode was approximately 1 week ago. It appears to resolve spontaneously. She does have a history of a benign left breast biopsy that she does not recall when or where it was done however, she does have a left periareolar scar.    -09/17/2021: B/L DIAGNOSTIC MAMMOGRAM AND LEFT breast US@ [facility]:  Negative, BI-RADS 1.  -09/17/2021 clinical follow-up is without evidence of malignancy. She has tenderness of the left breast without clinically suspicious findings. Likely her intermittent edema of the left breast is related to lack of wearing a good supportive bra (reports she does not own one) as well as tobacco abuse. There may be an underlying component of mild duct ectasia which was not evident on imaging or clinical exam.  We had a long discussion on options moving forward. We will plan conservative management to include warm compress, topical analgesics as needed, prescription for clindamycin was given on this visit, importance of wearing a good supportive bra (for which she was provided two bras at no charge on this visit). She was advised to call us in the event her symptoms worsen or new symptoms develop. Otherwise, will plan to see on an as needed basis. We reviewed NCCN guidelines for breast cancer follow-up in women of average risk to include repeat mammogram annually. Plan:      1. Continue monthly breast self examination; detailed instructions reviewed today. Bring any changes to your physician's attention. 2. Continue healthy diet and exercise routinely as tolerated. 3. Avoid alcohol. 4. Limit caffeine intake. 5. Repeat mammogram annually-not ordered.   6. Continue follow up with [facility], Primary Care, urology, pulmonology, and all specialties as directed. 7. At this time, we will plan to have her return to the clinic as needed for new or worsening symptoms. During today's visit, face-to-face time 30 minutes, greater than 50% in counseling education and coordination of care. All questions were answered to her apparent satisfaction, and she is agreeable to the plan as outlined above. This document is generated, in part, by voice recognition software and thus syntax and grammatical errors are possible. James Zuniga) Bradford Lara, RN, MSN, APRN-CNP, [address]  Advanced Oncology Certified Nurse Practitioner  Department of Breast Surgery  [facility]/  [place] [facility] in collaboration with Dr. Ata Roblero.  Dheeraj/Dr. Brandie Trimble          September 17, 2021

## 2021-09-15 NOTE — PATIENT INSTRUCTIONS

## 2021-09-16 ENCOUNTER — HOSPITAL ENCOUNTER (OUTPATIENT)
Age: 66
Discharge: HOME | End: 2021-09-16
Payer: COMMERCIAL

## 2021-09-16 ENCOUNTER — HOSPITAL ENCOUNTER (OUTPATIENT)
Dept: CT IMAGING | Age: 66
Discharge: HOME | End: 2021-09-18
Payer: COMMERCIAL

## 2021-09-16 DIAGNOSIS — K86.1 CHRONIC CALCIFIC PANCREATITIS (HCC): ICD-10-CM

## 2021-09-16 DIAGNOSIS — R10.84 GENERALIZED ABDOMINAL PAIN: ICD-10-CM

## 2021-09-16 DIAGNOSIS — R14.0 ABDOMINAL DISTENTION: ICD-10-CM

## 2021-09-16 LAB
ANION GAP SERPL CALCULATED.3IONS-SCNC: 6 MMOL/L (ref 7–16)
BUN BLDV-MCNC: 14 MG/DL (ref 6–23)
CALCIUM SERPL-MCNC: 10.1 MG/DL (ref 8.6–10.2)
CHLORIDE BLD-SCNC: 100 MMOL/L (ref 98–107)
CO2: 35 MMOL/L (ref 22–29)
CREAT SERPL-MCNC: 0.6 MG/DL (ref 0.5–1)
GFR AFRICAN AMERICAN: >60
GFR NON-AFRICAN AMERICAN: >60 ML/MIN/1.73
GLUCOSE BLD-MCNC: 105 MG/DL (ref 74–99)
POTASSIUM SERPL-SCNC: 4.6 MMOL/L (ref 3.5–5)
SODIUM BLD-SCNC: 141 MMOL/L (ref 132–146)

## 2021-09-16 PROCEDURE — 74177 CT ABD & PELVIS W/CONTRAST: CPT

## 2021-09-16 PROCEDURE — 80048 BASIC METABOLIC PNL TOTAL CA: CPT

## 2021-09-16 PROCEDURE — 36415 COLL VENOUS BLD VENIPUNCTURE: CPT

## 2021-09-16 PROCEDURE — 6360000004 HC RX CONTRAST MEDICATION: Performed by: RADIOLOGY

## 2021-09-16 RX ORDER — SODIUM CHLORIDE 0.9 % (FLUSH) 0.9 %
10 SYRINGE (ML) INJECTION ONCE
Status: DISCONTINUED | OUTPATIENT
Start: 2021-09-16 | End: 2021-09-19 | Stop reason: HOSPADM

## 2021-09-16 RX ADMIN — IOPAMIDOL 90 ML: 755 INJECTION, SOLUTION INTRAVENOUS at 14:24

## 2021-09-17 ENCOUNTER — HOSPITAL ENCOUNTER (OUTPATIENT)
Dept: GENERAL RADIOLOGY | Age: 66
Discharge: HOME | End: 2021-09-19
Payer: COMMERCIAL

## 2021-09-17 ENCOUNTER — OFFICE VISIT (OUTPATIENT)
Dept: BREAST CENTER | Age: 66
End: 2021-09-17
Payer: COMMERCIAL

## 2021-09-17 VITALS
BODY MASS INDEX: 23.23 KG/M2 | OXYGEN SATURATION: 97 % | SYSTOLIC BLOOD PRESSURE: 132 MMHG | DIASTOLIC BLOOD PRESSURE: 64 MMHG | TEMPERATURE: 97.5 F | WEIGHT: 148 LBS | HEIGHT: 67 IN | HEART RATE: 71 BPM | RESPIRATION RATE: 20 BRPM

## 2021-09-17 DIAGNOSIS — N63.0 BREAST SWELLING: ICD-10-CM

## 2021-09-17 DIAGNOSIS — N64.89 OTHER SPECIFIED DISORDERS OF BREAST: ICD-10-CM

## 2021-09-17 DIAGNOSIS — N64.4 BREAST PAIN: ICD-10-CM

## 2021-09-17 DIAGNOSIS — N64.59 OTHER SIGNS AND SYMPTOMS IN BREAST: ICD-10-CM

## 2021-09-17 PROBLEM — I47.19 ATRIAL TACHYCARDIA: Status: RESOLVED | Noted: 2021-03-01 | Resolved: 2021-09-17

## 2021-09-17 PROBLEM — T83.091A OBSTRUCTED FOLEY CATHETER (HCC): Status: RESOLVED | Noted: 2020-05-17 | Resolved: 2021-09-17

## 2021-09-17 PROBLEM — K25.6: Status: ACTIVE | Noted: 2021-03-03

## 2021-09-17 PROBLEM — I47.1 ATRIAL TACHYCARDIA (HCC): Status: RESOLVED | Noted: 2021-03-01 | Resolved: 2021-09-17

## 2021-09-17 PROBLEM — Z85.528 PERSONAL HISTORY OF OTHER MALIGNANT NEOPLASM OF KIDNEY: Status: ACTIVE | Noted: 2020-05-21

## 2021-09-17 PROBLEM — J45.41 MODERATE PERSISTENT ASTHMA WITH (ACUTE) EXACERBATION: Status: ACTIVE | Noted: 2021-03-03

## 2021-09-17 PROBLEM — Z74.1 NEED FOR ASSISTANCE WITH PERSONAL CARE: Status: ACTIVE | Noted: 2020-05-21

## 2021-09-17 PROBLEM — N31.9 NEUROMUSCULAR DYSFUNCTION OF BLADDER, UNSPECIFIED: Status: ACTIVE | Noted: 2020-05-21

## 2021-09-17 PROBLEM — M62.81 MUSCLE WEAKNESS (GENERALIZED): Status: ACTIVE | Noted: 2020-05-21

## 2021-09-17 PROBLEM — R26.2 DIFFICULTY IN WALKING, NOT ELSEWHERE CLASSIFIED: Status: ACTIVE | Noted: 2020-05-21

## 2021-09-17 PROCEDURE — G0279 TOMOSYNTHESIS, MAMMO: HCPCS

## 2021-09-17 PROCEDURE — 99203 OFFICE O/P NEW LOW 30 MIN: CPT | Performed by: NURSE PRACTITIONER

## 2021-09-17 PROCEDURE — G8420 CALC BMI NORM PARAMETERS: HCPCS | Performed by: NURSE PRACTITIONER

## 2021-09-17 PROCEDURE — G8399 PT W/DXA RESULTS DOCUMENT: HCPCS | Performed by: NURSE PRACTITIONER

## 2021-09-17 PROCEDURE — 76642 ULTRASOUND BREAST LIMITED: CPT

## 2021-09-17 PROCEDURE — 3017F COLORECTAL CA SCREEN DOC REV: CPT | Performed by: NURSE PRACTITIONER

## 2021-09-17 PROCEDURE — 1036F TOBACCO NON-USER: CPT | Performed by: NURSE PRACTITIONER

## 2021-09-17 PROCEDURE — 1090F PRES/ABSN URINE INCON ASSESS: CPT | Performed by: NURSE PRACTITIONER

## 2021-09-17 PROCEDURE — 4040F PNEUMOC VAC/ADMIN/RCVD: CPT | Performed by: NURSE PRACTITIONER

## 2021-09-17 PROCEDURE — 1123F ACP DISCUSS/DSCN MKR DOCD: CPT | Performed by: NURSE PRACTITIONER

## 2021-09-17 PROCEDURE — G8427 DOCREV CUR MEDS BY ELIG CLIN: HCPCS | Performed by: NURSE PRACTITIONER

## 2021-09-17 RX ORDER — FLUTICASONE FUROATE, UMECLIDINIUM BROMIDE AND VILANTEROL TRIFENATATE 100; 62.5; 25 UG/1; UG/1; UG/1
1 POWDER RESPIRATORY (INHALATION) DAILY
Status: ON HOLD | COMMUNITY
End: 2022-10-19 | Stop reason: HOSPADM

## 2021-09-17 RX ORDER — CLINDAMYCIN HYDROCHLORIDE 150 MG/1
300 CAPSULE ORAL 3 TIMES DAILY
Qty: 60 CAPSULE | Refills: 0 | Status: SHIPPED | OUTPATIENT
Start: 2021-09-17 | End: 2021-09-27

## 2021-09-17 ASSESSMENT — ENCOUNTER SYMPTOMS
CHOKING: 0
SINUS PAIN: 0
EYE DISCHARGE: 0
VOMITING: 0
COUGH: 1
NAUSEA: 0
ABDOMINAL PAIN: 0
CHEST TIGHTNESS: 0
SORE THROAT: 0
SHORTNESS OF BREATH: 1
ABDOMINAL DISTENTION: 0
BACK PAIN: 0
CONSTIPATION: 0
WHEEZING: 0
DIARRHEA: 0
EYE ITCHING: 0
VOICE CHANGE: 0
TROUBLE SWALLOWING: 0
SINUS PRESSURE: 0
RHINORRHEA: 0
BLOOD IN STOOL: 0

## 2021-09-17 NOTE — LETTER
[address] [facility] [address] [address]  Phone: [phone]  Fax: 15 Ivan PritchettANGELA CNP      September 17, 2021     Patient: Wolf Madrid   MR Number: [id number]   YOB: 1955   Date of Visit: 9/17/2021       Dear Dr. Guillermo Crump: Thank you for referring Jewell Ward to to the office of Dr. Richie Mcmullen, Dr. Anna Bennett, and Nurse Practitioner Ramon Guzmán. Below are the relevant portions of my assessment and plan of care. Ayesha is a 74 y.o. extremely pleasant female without unusual risks for breast cancer. She has a PMH of COPD, Neurogenic bladder with purple bag syndrome (follows with Urology @ [facility]) and  chronic pancreatitis. She presents with complaints of intermittent inflammation and edema of the left breast over the past 4 months. She reports the last episode was approximately 1 week ago. It appears to resolve spontaneously. She does have a history of a benign left breast biopsy that she does not recall when or where it was done however, she does have a left periareolar scar.    -09/17/2021: B/L DIAGNOSTIC MAMMOGRAM AND LEFT breast US@ [facility]:  Negative, BI-RADS 1.    -09/17/2021 clinical follow-up is without evidence of malignancy. She has tenderness of the left breast without clinically suspicious findings. Likely her intermittent edema of the left breast is related to lack of wearing a good supportive bra (reports she does not own one) as well as tobacco abuse. There may be an underlying component of mild duct ectasia which was not evident on imaging or clinical exam.  We had a long discussion on options moving forward. We will plan conservative management to include warm compress, topical analgesics as needed, prescription for clindamycin was given on this visit, importance of wearing a good supportive bra (for which she was provided two bras at no charge on this visit).   She was advised to call us in the event her symptoms worsen or new symptoms develop. Otherwise, will plan to see her on and as needed basis. We did review NCCN guidelines for breast cancer follow-up in women of average risk to include repeat mammogram annually. 1. Continue monthly breast self examination; detailed instructions reviewed today. Bring any changes to your physician's attention. 2. Continue healthy diet and exercise routinely as tolerated. 3. Avoid alcohol. 4. Limit caffeine intake. 5. Repeat mammogram annually-not ordered. 6. Continue follow up with [facility], Primary Care, urology, pulmonology, and all specialties as directed. 7. At this time, we will plan to have her return to the clinic as needed for new or worsening symptoms. This document is generated, in part, by voice recognition software and thus syntax and grammatical errors are possible. If you have questions, please do not hesitate to call me. I look forward to following Ayesha along with you. Sincerely,  Wolf Wan (Adrian Lamb) Ovi Mills, RN, MSN, APRN-CNP, [address] [place]  Advanced Oncology Certified Nurse Practitioner  Department of Breast Surgery  [facility] [place]/  Care in collaboration with Dr. Edith Nunes.  Dheeraj/Dr. Lennox Marie, APRN - CNP    CC providers:  Dr. Sheng Meier

## 2021-09-24 ENCOUNTER — OFFICE VISIT (OUTPATIENT)
Dept: HEMATOLOGY | Age: 66
End: 2021-09-24
Payer: COMMERCIAL

## 2021-09-24 VITALS
SYSTOLIC BLOOD PRESSURE: 148 MMHG | HEIGHT: 67 IN | WEIGHT: 150.9 LBS | DIASTOLIC BLOOD PRESSURE: 69 MMHG | BODY MASS INDEX: 23.69 KG/M2 | TEMPERATURE: 97.4 F

## 2021-09-24 DIAGNOSIS — K86.89 PANCREATIC INSUFFICIENCY: ICD-10-CM

## 2021-09-24 DIAGNOSIS — K86.1 PANCREATIC DUCT HYPERTENSION WITH CHRONIC PANCREATITIS (HCC): ICD-10-CM

## 2021-09-24 DIAGNOSIS — R13.14 PHARYNGOESOPHAGEAL DYSPHAGIA: ICD-10-CM

## 2021-09-24 DIAGNOSIS — K86.1 CHRONIC CALCIFIC PANCREATITIS (HCC): Primary | ICD-10-CM

## 2021-09-24 PROCEDURE — G8427 DOCREV CUR MEDS BY ELIG CLIN: HCPCS | Performed by: TRANSPLANT SURGERY

## 2021-09-24 PROCEDURE — G8399 PT W/DXA RESULTS DOCUMENT: HCPCS | Performed by: TRANSPLANT SURGERY

## 2021-09-24 PROCEDURE — 1036F TOBACCO NON-USER: CPT | Performed by: TRANSPLANT SURGERY

## 2021-09-24 PROCEDURE — 99213 OFFICE O/P EST LOW 20 MIN: CPT | Performed by: TRANSPLANT SURGERY

## 2021-09-24 PROCEDURE — G8420 CALC BMI NORM PARAMETERS: HCPCS | Performed by: TRANSPLANT SURGERY

## 2021-09-24 PROCEDURE — 3017F COLORECTAL CA SCREEN DOC REV: CPT | Performed by: TRANSPLANT SURGERY

## 2021-09-24 PROCEDURE — 1123F ACP DISCUSS/DSCN MKR DOCD: CPT | Performed by: TRANSPLANT SURGERY

## 2021-09-24 PROCEDURE — 1090F PRES/ABSN URINE INCON ASSESS: CPT | Performed by: TRANSPLANT SURGERY

## 2021-09-24 PROCEDURE — 4040F PNEUMOC VAC/ADMIN/RCVD: CPT | Performed by: TRANSPLANT SURGERY

## 2021-09-24 NOTE — PROGRESS NOTES
Hepatobiliary and Pancreatic Surgery Progress Note    CC: follow up     Subjective: Patient states that her breasts are stil lswollen. She did undergo mammography at [facility] which was normal.  She states that food feels like it is getting stuck in her throat. She is having at least 2 bowel movements a day. In May 2020 she had an EGD with Dr. Дмитрий Lentz whic showed ulcers. She had a CT scan and is seeing me in follow up. OBJECTIVE      Physical    BP (!) 148/69   Temp 97.4 °F (36.3 °C)   Ht 5' 7\" (1.702 m)   Wt 150 lb 14.4 oz (68.4 kg)   BMI 23.63 kg/m²       General appearance: appears in no acute distress  Lungs:CTABL  Heart: RRR  Abdomen: soft, very distended and hard, nontympanic, no guarding, no peritoneal signs, normoactive bowel sounds  Extremities:no peripheral edema    ASSESSMENT: Chronic calcific pancreatitis with pancreatic duct hypertension, exocrine pancreatic insufficiency    PLAN:    - I reviewed their images prior to our office visit and we also reviewed them together today. - continue Creon at current dose  - check fecal fat  - referral to Dr. Дмитрий Lentz - feels like food is getting stuck. 20 Minutes of which greater than 50% was spent counseling or coordinating her care. Thank you for the consultation and allowing me to take part in Ms. Keane's care.     Please send a copy of my note to Dr. Ivette Perry    Electronically signed by Freda Aguirre MD on 9/24/2021 at 10:53 AM

## 2021-10-21 NOTE — PROGRESS NOTES
[facility] PRE-ADMISSION TESTING INSTRUCTIONS    The Preadmission Testing patient is instructed accordingly using the following criteria (check applicable):    ARRIVAL INSTRUCTIONS:  [x] Parking the day of Surgery is located in the Main Entrance lot. Upon entering the door, make an immediate right to the surgery reception desk    [x] Bring photo ID and insurance card    [] Bring in a copy of Living will or Durable Power of  papers. [x] Please be sure to arrange for responsible adult to provide transportation to and from the hospital    [] Please arrange for responsible adult to be with you for the 24 hour period post procedure due to having anesthesia      GENERAL INSTRUCTIONS:    [x] Nothing by mouth after midnight, including gum, candy, mints or water    [x] You may brush your teeth, but do not swallow any water    [x] Take medications as instructed with 1-2 oz of water    [x] Stop herbal supplements and vitamins 5 days prior to procedure    [] Follow preop dosing of blood thinners per physician instructions    [] Take 1/2 dose of evening insulin, but no insulin after midnight    [x] No oral diabetic medications after midnight    [x] If diabetic and have low blood sugar or feel symptomatic, take 1-2oz apple juice only    [x] Bring inhalers day of surgery    [x] Bring C-PAP/ Bi-Pap day of surgery    [] Bring urine specimen day of surgery    [x] Shower or bath with soap, lather and rinse well, AM of Surgery, no lotion, powders or creams to surgical site    [] Follow bowel prep as instructed per surgeon    [x] No tobacco products within 24 hours of surgery     [x] No alcohol or illegal drug use within 24 hours of surgery.     [x] Jewelry, body piercing's, eyeglasses, contact lenses and dentures are not permitted into surgery (bring cases)      [x] Please do not wear any nail polish, make up or hair products on the day of surgery    [x] You can expect a call the business day prior to procedure to notify you if your arrival time changes    [x] If you receive a survey after surgery we would greatly appreciate your comments    [] Parent/guardian of a minor must accompany their child and remain on the premises  the entire time they are under our care     [] Pediatric patients may bring favorite toy, blanket or comfort item with them    [x] A caregiver or family member must remain with the patient during their stay if they are mentally handicapped, have dementia, disoriented or unable to use a call light or would be a safety concern if left unattended    [x] Please notify surgeon if you develop any illness between now and time of surgery (cold, cough, sore throat, fever, nausea, vomiting) or any signs of infections  including skin, wounds, and dental.    [x]  The Outpatient Pharmacy is available to fill your prescription here on your day of surgery, ask your preop nurse for details    [] Other instructions    EDUCATIONAL MATERIALS PROVIDED:    [] PAT Preoperative Education Packet/Booklet     [] Medication List    [] Transfusion bracelet applied with instructions    [] Shower with soap, lather and rinse well, and use CHG wipes provided the evening before surgery as instructed    [] Incentive spirometer with instructions        Have you been tested for COVID  Yes           Have you been told you were positive for COVID No  Have you had any known exposure to someone that is positive for COVID No  Do you have a cough                   No              Do you have shortness of breath No                 Do you have a sore throat            No                Are you having chills                    No                Are you having muscle aches. No                    Please come to the hospital wearing a mask and have your significant other wear a mask as well. Both of you should check your temperature before leaving to come here,  if it is 100 or higher please call [phone] for instruction.

## 2021-10-25 ENCOUNTER — HOSPITAL ENCOUNTER (OUTPATIENT)
Age: 66
Setting detail: OUTPATIENT SURGERY
Discharge: HOME | End: 2021-10-25
Attending: SURGERY | Admitting: SURGERY
Payer: COMMERCIAL

## 2021-10-25 ENCOUNTER — ANESTHESIA EVENT (OUTPATIENT)
Dept: ENDOSCOPY | Age: 66
End: 2021-10-25
Payer: COMMERCIAL

## 2021-10-25 ENCOUNTER — ANESTHESIA (OUTPATIENT)
Dept: ENDOSCOPY | Age: 66
End: 2021-10-25
Payer: COMMERCIAL

## 2021-10-25 VITALS
WEIGHT: 152 LBS | TEMPERATURE: 97.3 F | OXYGEN SATURATION: 100 % | HEART RATE: 92 BPM | HEIGHT: 67 IN | SYSTOLIC BLOOD PRESSURE: 158 MMHG | RESPIRATION RATE: 18 BRPM | BODY MASS INDEX: 23.86 KG/M2 | DIASTOLIC BLOOD PRESSURE: 74 MMHG

## 2021-10-25 VITALS — DIASTOLIC BLOOD PRESSURE: 79 MMHG | OXYGEN SATURATION: 90 % | SYSTOLIC BLOOD PRESSURE: 143 MMHG

## 2021-10-25 LAB — METER GLUCOSE: 115 MG/DL (ref 74–99)

## 2021-10-25 PROCEDURE — 7100000010 HC PHASE II RECOVERY - FIRST 15 MIN: Performed by: SURGERY

## 2021-10-25 PROCEDURE — 2709999900 HC NON-CHARGEABLE SUPPLY: Performed by: SURGERY

## 2021-10-25 PROCEDURE — 3609012400 HC EGD TRANSORAL BIOPSY SINGLE/MULTIPLE: Performed by: SURGERY

## 2021-10-25 PROCEDURE — 88342 IMHCHEM/IMCYTCHM 1ST ANTB: CPT

## 2021-10-25 PROCEDURE — 3700000000 HC ANESTHESIA ATTENDED CARE: Performed by: SURGERY

## 2021-10-25 PROCEDURE — 2580000003 HC RX 258: Performed by: NURSE ANESTHETIST, CERTIFIED REGISTERED

## 2021-10-25 PROCEDURE — 82962 GLUCOSE BLOOD TEST: CPT

## 2021-10-25 PROCEDURE — 7100000011 HC PHASE II RECOVERY - ADDTL 15 MIN: Performed by: SURGERY

## 2021-10-25 PROCEDURE — 88305 TISSUE EXAM BY PATHOLOGIST: CPT

## 2021-10-25 PROCEDURE — 3700000001 HC ADD 15 MINUTES (ANESTHESIA): Performed by: SURGERY

## 2021-10-25 PROCEDURE — 6360000002 HC RX W HCPCS: Performed by: NURSE ANESTHETIST, CERTIFIED REGISTERED

## 2021-10-25 PROCEDURE — 2500000003 HC RX 250 WO HCPCS: Performed by: NURSE ANESTHETIST, CERTIFIED REGISTERED

## 2021-10-25 RX ORDER — PANTOPRAZOLE SODIUM 40 MG/1
40 TABLET, DELAYED RELEASE ORAL
Qty: 60 TABLET | Refills: 0 | Status: SHIPPED | OUTPATIENT
Start: 2021-10-25 | End: 2021-12-03

## 2021-10-25 RX ORDER — SODIUM CHLORIDE 9 MG/ML
INJECTION, SOLUTION INTRAVENOUS CONTINUOUS
Status: DISCONTINUED | OUTPATIENT
Start: 2021-10-25 | End: 2021-10-25 | Stop reason: HOSPADM

## 2021-10-25 RX ORDER — PROPOFOL 10 MG/ML
INJECTION, EMULSION INTRAVENOUS PRN
Status: DISCONTINUED | OUTPATIENT
Start: 2021-10-25 | End: 2021-10-25 | Stop reason: SDUPTHER

## 2021-10-25 RX ORDER — LIDOCAINE HYDROCHLORIDE 20 MG/ML
INJECTION, SOLUTION EPIDURAL; INFILTRATION; INTRACAUDAL; PERINEURAL PRN
Status: DISCONTINUED | OUTPATIENT
Start: 2021-10-25 | End: 2021-10-25 | Stop reason: SDUPTHER

## 2021-10-25 RX ORDER — SODIUM CHLORIDE 9 MG/ML
INJECTION, SOLUTION INTRAVENOUS CONTINUOUS PRN
Status: DISCONTINUED | OUTPATIENT
Start: 2021-10-25 | End: 2021-10-25 | Stop reason: SDUPTHER

## 2021-10-25 RX ORDER — GLYCOPYRROLATE 1 MG/5 ML
SYRINGE (ML) INTRAVENOUS PRN
Status: DISCONTINUED | OUTPATIENT
Start: 2021-10-25 | End: 2021-10-25 | Stop reason: SDUPTHER

## 2021-10-25 RX ADMIN — SODIUM CHLORIDE: 9 INJECTION, SOLUTION INTRAVENOUS at 09:08

## 2021-10-25 RX ADMIN — LIDOCAINE HYDROCHLORIDE 60 MG: 20 INJECTION, SOLUTION EPIDURAL; INFILTRATION; INTRACAUDAL; PERINEURAL at 09:11

## 2021-10-25 RX ADMIN — Medication 0.1 MG: at 09:11

## 2021-10-25 RX ADMIN — PROPOFOL 150 MG: 10 INJECTION, EMULSION INTRAVENOUS at 09:11

## 2021-10-25 ASSESSMENT — PAIN SCALES - GENERAL
PAINLEVEL_OUTOF10: 0
PAINLEVEL_OUTOF10: 0

## 2021-10-25 ASSESSMENT — LIFESTYLE VARIABLES: SMOKING_STATUS: 0

## 2021-10-25 ASSESSMENT — ENCOUNTER SYMPTOMS: SHORTNESS OF BREATH: 0

## 2021-10-25 ASSESSMENT — PAIN DESCRIPTION - PAIN TYPE
TYPE: SURGICAL PAIN
TYPE: SURGICAL PAIN

## 2021-10-25 ASSESSMENT — PAIN - FUNCTIONAL ASSESSMENT: PAIN_FUNCTIONAL_ASSESSMENT: 0-10

## 2021-10-25 NOTE — ANESTHESIA PRE PROCEDURE
Department of Anesthesiology  Preprocedure Note       Name:  Ayla Trujillo   Age:  74 y.o.  :  1955                                          MRN:  [id number]         Date:  10/25/2021      Surgeon: Toyin Magdaleno):  Natividad Cockayne, MD    Procedure: Procedure(s):  EGD BIOPSY    Medications prior to admission:   Prior to Admission medications    Medication Sig Start Date End Date Taking? Authorizing Provider   vitamin D (CHOLECALCIFEROL) 70632 UNIT CAPS Take 50,000 Units by mouth once a week    Historical Provider, MD   fluticasone-umeclidin-vilant (TRELEGY ELLIPTA) 100-62.5-25 MCG/INH AEPB Inhale 1 puff into the lungs daily    Historical Provider, MD   albuterol sulfate  (90 Base) MCG/ACT inhaler Inhale 1 puff into the lungs every 4 hours as needed (every 4-6 hours as needed) Instructed to take am of procedure if needed and bring dos 3/3/21   Elvira Haque MD   lipase-protease-amylase (CREON) 66920-57517 units delayed release capsule Take 1 capsule by mouth 3 times daily (with meals)  Patient taking differently: Take 2 capsules by mouth 4 times daily (with meals and nightly)  19   Emilia Sanchez III, MD   OXYGEN Inhale 3 L into the lungs as needed Uses 3L night and during day prn    Historical Provider, MD   vitamin D (ERGOCALCIFEROL) 03393 UNITS CAPS capsule Take 50,000 Units by mouth once a week Saturday    Historical Provider, MD   Multiple Vitamins-Iron (DAILY-FEDERICO/IRON) TABS Take 1 tablet by mouth daily     Historical Provider, MD   folic acid (FOLVITE) 1 MG tablet Take 1 mg by mouth daily     Historical Provider, MD   sertraline (ZOLOFT) 50 MG tablet Take 1 tablet by mouth daily. 14   Theo Gleason MD   alendronate (FOSAMAX) 70 MG tablet Take 70 mg by mouth every 7 days     Historical Provider, MD       Current medications:    No current outpatient medications on file. No current facility-administered medications for this visit. Allergies:     Allergies Allergen Reactions    Sulfa Antibiotics Anaphylaxis       Problem List:    Patient Active Problem List   Diagnosis Code    COPD (chronic obstructive pulmonary disease) ([facility] [address] J44.9    MDD (major depressive disorder) F32.9    Hematuria R31.9    Neurogenic bladder N31.9    Chronic respiratory failure with hypoxia ([facility]) J96.11    Anemia D64.9    GI bleed K92.2    Acute on chronic pancreatitis ([facility]) K85.90, K86.1    Gastric wall thickening K31.89    Bladder wall thickening N32.89    Chronic or unspecified gastric ulcer with both hemorrhage and perforation ([facility] [address] K25.6    Difficulty in walking, not elsewhere classified R26.2    Moderate persistent asthma with (acute) exacerbation J45.41    Muscle weakness (generalized) M62.81    Need for assistance with personal care Z74.1    Neuromuscular dysfunction of bladder, unspecified N31.9    Personal history of other malignant neoplasm of kidney Z85.528       Past Medical History:        Diagnosis Date    Asthma     COPD (chronic obstructive pulmonary disease) ([facility])     uses )2 prn daily and nightly / Dr. Tony Noe Depression     Dysphagia 2021    [place] catheter in place     continuous since 2013    Oxygen dependent 2017       Past Surgical History:        Procedure Laterality Date    COLONOSCOPY      COLONOSCOPY N/A 9/4/2019    COLONOSCOPY DIAGNOSTIC performed by Fahad Correia MD at [address] Left 06/16/2014    ABLATION PERFORMED UNDER CT SCAN    MO CYSTOURETHROSCOPY,BIOPSIES N/A 9/14/2018    CYSTOSCOPY RETROGRADE PYELOGRAM, CLOT EVACATION , POSS.   BLADDER BIOPSY ---DR Jeet Muniz 2 :30 performed by Kandi Sue DO at [address] N/A 5/10/2019    EGD BIOPSY performed by Fahad Correia MD at [address] [facility] 6/28/2019    EGD EUS performed by Reginald Monroy DO at [address] [facility] 6/28/2019    EGD performed by Brittany Levy DO at [address] [place] [address] N/A 2020    EGD DIAGNOSTIC ONLY performed by Arthur Bran MD at [facility] History:    Social History     Tobacco Use    Smoking status: Former Smoker     Packs/day: 0.00     Quit date: 10/21/2019     Years since quittin.0    Smokeless tobacco: Never Used   Substance Use Topics    Alcohol use: No     Comment: used to drink heavily, stopped [id number]                                Counseling given: Not Answered      Vital Signs (Current): There were no vitals filed for this visit. BP Readings from Last 3 Encounters:   21 (!) 148/69   21 132/64   21 (!) 137/53       NPO Status:                           Pt instructed nothing to eat or drink after 0000 2020. BMI:   Wt Readings from Last 3 Encounters:   10/21/21 152 lb (68.9 kg)   21 150 lb 14.4 oz (68.4 kg)   21 148 lb (67.1 kg)     There is no height or weight on file to calculate BMI.    CBC:   Lab Results   Component Value Date    WBC 6.7 2021    RBC 3.54 2021    HGB 8.6 2021    HCT 30.1 2021    MCV 85.0 2021    RDW 17.8 2021     2021       CMP:   Lab Results   Component Value Date     2021    K 4.6 2021    K 3.7 2021     2021    CO2 35 2021    BUN 14 2021    CREATININE 0.6 2021    GFRAA >60 2021    LABGLOM >60 2021    GLUCOSE 105 2021    PROT 5.9 2021    CALCIUM 10.1 2021    BILITOT <0.2 2021    ALKPHOS 66 2021    AST 15 2021    ALT 12 2021       POC Tests: No results for input(s): POCGLU, POCNA, POCK, POCCL, POCBUN, POCHEMO, POCHCT in the last 72 hours.     Coags:   Lab Results   Component Value Date    PROTIME 9.7 2021    INR 0.9 2021    APTT 33.0 06/12/2014       HCG (If Applicable): No results found for: PREGTESTUR, PREGSERUM, HCG, HCGQUANT     ABGs: No results found for: PHART, PO2ART, OJQ0TSN, ONF7NCK, BEART, Q0AXVQWQ     Type & Screen (If Applicable):  No results found for: LABABO, LABRH    Drug/Infectious Status (If Applicable):  No results found for: HIV, HEPCAB    COVID-19 Screening (If Applicable):   Lab Results   Component Value Date    COVID19 Not Detected 02/26/2021       EKG 5/17/2020  Narrative & Impression     Normal sinus rhythm  T wave abnormality, consider anterior ischemia  Prolonged QT  Abnormal ECG  When compared with ECG of 17-MAY-2020 20:49,  Significant changes have occurred         Anesthesia Evaluation  Patient summary reviewed and Nursing notes reviewed no history of anesthetic complications:   Airway: Mallampati: III  TM distance: <3 FB   Neck ROM: limited  Mouth opening: < 3 FB Dental:    (+) upper dentures and lower dentures      Pulmonary:   (+) COPD (On 3L NC):  decreased breath sounds,  asthma:     (-) shortness of breath and not a current smoker          Patient did not smoke on day of surgery. ROS comment: O2 HOME   Cardiovascular:  Exercise tolerance: poor (<4 METS),         ECG reviewed  Rhythm: regular  Rate: normal           Beta Blocker:  Not on Beta Blocker         Neuro/Psych:   (+) depression/anxiety             GI/Hepatic/Renal:   (+) PUD,          ROS comment: DYSPHAGIA. Endo/Other:    (+) blood dyscrasia: anemia:., .                  ROS comment: Neurogenic bladder Abdominal:             Vascular: negative vascular ROS. Other Findings:               Anesthesia Plan      MAC     ASA 3       Induction: intravenous. Anesthetic plan and risks discussed with patient. Plan discussed with CRNA. Maureen Keane MD   10/25/2021        DOS STAFF ADDENDUM:    Patient seen and chart reviewed. Physical exam and history updated as indicated.   NPO status confirmed. Anesthesia options and plan discussed including risks benefits with patient/legal guardian and family as available. Concerns and questions addressed. Consent verbalized to proceed.   Anesthesia plan, options and intraoperative/postoperative concerns discussed with care team.    Aster Eckert MD, MD  10/25/2021  7:43 AM

## 2021-10-25 NOTE — H&P
Name: Bety Mckinney           : 1955 Sex: F  Age: 74 yrs  Acct#:  [id number]          Patient was referred by Jordyn Bass MD.  Patient's primary care provider is Jeanine Barbosa MD.  CC: Patient presents for follow up of. (Follow Up) dysphagia and bloating    HPI: Follow-up. 59-year-old female presents for dysphagia and bloating. Patient states that she feels like things are getting stuck down low and she feels bloated all the time. She just followed up with [facility] and upper endoscopy was recommended. No hematemesis, nausea, vomiting. No change in bowel habits. She states she has bad breathing problems and sees a pulmonologist.    Ruby Daubs Prior to Visit:  Fluticasone Propionate        Allergies:  Sulfa Antibiotics    PMH:  Health Maintenance:  Mammogram - (2021)  Medical Problems:  HX Transfusions  (Surgeries)  Reviewed and updated. FH:  Father:  . (Hx)  Mother:  . (Hx)  Reviewed, no changes. SH:  Personal Habits:  Tobacco Use: Patient is a former smoker. Alcohol: Denies alcohol use. Drug Use: Denies Drug Use. Daily Caffeine: Does Not Consume Caffeine. Reviewed and updated. ROS:  Const: Denies anorexia, anxiety, fatigue, night sweats, weight gain and weight loss. Eyes: Denies eye symptoms. ENMT: Denies ear symptoms. Denies nasal symptoms. Denies mouth or throat symptoms. CV: Denies hypertension and other cardiovascular symptoms. Resp: Reports asthma and COPD. GI: Denies hepatitis, liver disease and other gastrointestinal symptoms. Musculo: Denies musculoskeletal symptoms. Skin: Denies skin, hair and nail symptoms. Breast: Denies breast problems. Neuro: Denies neurologic symptoms. Psych: Reports depression, but denies substance abuse. Endocrine: Denies diabetes, kidney disease and thyroid disease. Hema/Lymph: Denies anemia, blood disease, cancer and past transfusion. Allergy/Immuno: Denies immunosuppression. Reviewed, no changes.         Exam:  Const: Appears healthy and well developed. No signs of apparent distress present. Head/Face: Atraumatic, normocephalic on inspection. Eyes: Conjunctivae pink. Pupils equal, round and reactive to light. Sclerae clear and anicteric. ENMT: External ears WNL. External nose WNL. Lips: Appear normal and healthy. Neck: Normal to inspection. Normal to palpation. No masses appreciated. Trachea midline. Thyroid is normal in size. No jugular venous distention. Resp: Respiration rate is normal, on oxygen No wheezing. No rales or rhonchi appreciated over the lungs bilaterally. CV: Rate is regular. Rhythm is regular. S1 is normal. S2 is normal. No heart murmur appreciated. Extremities: No clubbing or cyanosis. No edema of the lower limbs bilaterally. Abdomen: No visible herniations. No abdominal scars. Positive bowel sounds in all quadrants. Abdomen is soft, nontender, and nondistended without guarding, rigidity or rebound tenderness. No palpable abdominal aneurysms present. No palpable hepatosplenomegaly. Lymph: No palpable lymphadenopathy in the cervical, supraclavicular, axillary and inguinal region(s). Musculo: Walks with a normal gait. Upper Extremities: Normal to inspection. ROM: Full ROM bilaterally. Lower Extremities: Normal to inspection. ROM: Full ROM bilaterally. Skin: Skin warm and dry with no evidence of unusual rashes or suspicious lesions. Neuro: Alert and oriented x3. Mood is normal.  Cranial Nerves: Cranial nerves II-XII grossly intact. Psych: Cognition: Judgment and insight are grossly intact. Assessment #1: Hx R13.10 Dysphagia, unspecified   Assessment #2: Hx R14.0 Abdominal distension (gaseous)     Care Plan:              Comments       :  Recommend EGD for further evaluation. Risks, benefits, alternatives of the procedure were discussed with the patient. All questions were answered. She understands and agrees to proceed.

## 2021-10-25 NOTE — OP NOTE
Operative Note      Patient: Delia Chambers  YOB: 1955  MRN: [id number]    Date of Procedure: 10/25/2021    Pre-Op Diagnosis: DYSPHAGIA, BLOATING    Post-Op Diagnosis: mild gastritis, small sliding hiatal hernia and nonbleeding superficial gastric ulcers       Procedure(s):  EGD ESOPHAGOGASTRODUODENOSCOPY WITH BIOPSY    Surgeon(s):  Migdalia Carreno MD    Assistant:   * No surgical staff found *    Anesthesia: Monitor Anesthesia Care    Estimated Blood Loss (mL): 0    Complications: none    Specimens:   ID Type Source Tests Collected by Time Destination   A : GASTRIC BX R/O H PYLORI Tissue Stomach SURGICAL PATHOLOGY Migdalia Carreno MD 10/25/2021 0913        Implants:  * No implants in log *      Drains:   Urethral Catheter Non-latex 16 fr (Active)   $ Urethral catheter insertion $ Not inserted for procedure 10/25/21 0830   Catheter Indications Prolonged immobilization (e.g. unstable thoracic or lumbar spine, multiple traumatic injuries such as pelvic fractures) 10/25/21 0830         BRIEF HISTORY:  This is a 74 y.o. female who presents with the complaint of dysphagia and bloating. It was recommended the patient undergo an esophagogastrduodenoscopy for further evaluation. The risks/benefits/alternatives/expected outcomes were explained to the patient which include but are not limited to, bleeding, perforation and aspiration. The patient understands and agrees to proceed. PROCEDURE:  The patient was brought into the endoscopy suite and placed in the left lateral decubitus position. A bite block was placed in the patients mouth. After the initiation of LMAC anesthesia, an endoscope was inserted into the patient's mouth and passed into the esophagus and into the stomach. The scope was advanced through the pylorus into the first and second portion of the duodenum making the note of grossly normal mucosa. The scope was then withdrawn back into the stomach where it was retroflexed.   There was a small sliding hiatal hernia noted. The scope was then straightened and stomach was visualized, mild gastritis and nonbleeding superficial gastric ulcers were identified. The stomach was desufflated. The scope was then withdrawn the entire length of the esophagus, making the note of a normal esophagus without masses, ulcerations, or lesions noted. The scope was withdrawn entirely. The patient tolerated the procedure well and there were no complications. The patient was taken to PACU in stable condition.     Jaiden Dobbins MD  10/25/2021

## 2021-10-25 NOTE — ANESTHESIA POSTPROCEDURE EVALUATION
Department of Anesthesiology  Postprocedure Note    Patient: Delia Chambers  MRN: [id number]  YOB: 1955  Date of evaluation: 10/26/2021  Time:  1:24 AM     Procedure Summary     Date: 10/25/21 Room / Location: [facility] / [facility]    Anesthesia Start: 0295 Anesthesia Stop:     Procedure: EGD BIOPSY (N/A ) Diagnosis: (DYSPHAGIA)    Surgeons: Migdalia Carreno MD Responsible Provider: Kevin Sagastume MD    Anesthesia Type: MAC ASA Status: 3          Anesthesia Type: MAC    Vielka Phase I: Vielka Score: 10    Vielka Phase II: Vielka Score: 9    Last vitals: Reviewed and per EMR flowsheets.        Anesthesia Post Evaluation    Patient location during evaluation: PACU  Patient participation: complete - patient participated  Level of consciousness: awake  Airway patency: patent  Nausea & Vomiting: no vomiting and no nausea  Complications: no  Cardiovascular status: hemodynamically stable  Respiratory status: acceptable  Hydration status: stable

## 2021-12-03 RX ORDER — QUETIAPINE FUMARATE 50 MG/1
50 TABLET, EXTENDED RELEASE ORAL NIGHTLY
COMMUNITY

## 2021-12-03 NOTE — PROGRESS NOTES
[facility] PRE-ADMISSION TESTING INSTRUCTIONS    The Preadmission Testing patient is instructed accordingly using the following criteria (check applicable):    ARRIVAL INSTRUCTIONS:  [x] Parking the day of Surgery is located in the Main Entrance lot. Upon entering the door, make an immediate right to the surgery reception desk    [x] Bring photo ID and insurance card    [] Bring in a copy of Living will or Durable Power of  papers. [x] Please be sure to arrange for responsible adult to provide transportation to and from the hospital    [x] Please arrange for responsible adult to be with you for the 24 hour period post procedure due to having anesthesia      GENERAL INSTRUCTIONS:    [x] Nothing by mouth after midnight, including gum, candy, mints or water    [x] You may brush your teeth, but do not swallow any water    [x] Take medications as instructed with 1-2 oz of water    [x] Stop herbal supplements and vitamins 5 days prior to procedure    [] Follow preop dosing of blood thinners per physician instructions    [] Take 1/2 dose of evening insulin, but no insulin after midnight    [] No oral diabetic medications after midnight    [] If diabetic and have low blood sugar or feel symptomatic, take 1-2oz apple juice only    [x] Bring inhalers day of surgery    [] Bring C-PAP/ Bi-Pap day of surgery    [] Bring urine specimen day of surgery    [x] Shower or bath with soap, lather and rinse well, AM of Surgery, no lotion, powders or creams to surgical site    [x] Follow bowel prep as instructed per surgeon    [x] No tobacco products within 24 hours of surgery     [x] No alcohol or illegal drug use within 24 hours of surgery.     [x] Jewelry, body piercing's, eyeglasses, contact lenses and dentures are not permitted into surgery (bring cases)      [x] Please do not wear any nail polish, make up or hair products on the day of surgery    [x] You can expect a call the business day prior to procedure to notify you if your arrival time changes    [x] If you receive a survey after surgery we would greatly appreciate your comments    [] Parent/guardian of a minor must accompany their child and remain on the premises  the entire time they are under our care     [] Pediatric patients may bring favorite toy, blanket or comfort item with them    [] A caregiver or family member must remain with the patient during their stay if they are mentally handicapped, have dementia, disoriented or unable to use a call light or would be a safety concern if left unattended    [x] Please notify surgeon if you develop any illness between now and time of surgery (cold, cough, sore throat, fever, nausea, vomiting) or any signs of infections  including skin, wounds, and dental.    []  The Outpatient Pharmacy is available to fill your prescription here on your day of surgery, ask your preop nurse for details    [] Other instructions    EDUCATIONAL MATERIALS PROVIDED:    [] PAT Preoperative Education Packet/Booklet     [] Medication List    [] Transfusion bracelet applied with instructions    [] Shower with soap, lather and rinse well, and use CHG wipes provided the evening before surgery as instructed    [] Incentive spirometer with instructions        Have you been tested for COVID  No           Have you been told you were positive for COVID No  Have you had any known exposure to someone that is positive for COVID No  Do you have a cough                   No              Do you have shortness of breath No                 Do you have a sore throat            No                Are you having chills                    No                Are you having muscle aches. No                    Please come to the hospital wearing a mask and have your significant other wear a mask as well. Both of you should check your temperature before leaving to come here,  if it is 100 or higher please call [phone] for instruction.

## 2021-12-06 ENCOUNTER — ANESTHESIA (OUTPATIENT)
Dept: ENDOSCOPY | Age: 66
End: 2021-12-06
Payer: COMMERCIAL

## 2021-12-06 ENCOUNTER — HOSPITAL ENCOUNTER (OUTPATIENT)
Age: 66
Setting detail: OUTPATIENT SURGERY
Discharge: HOME | End: 2021-12-06
Attending: SURGERY | Admitting: SURGERY
Payer: COMMERCIAL

## 2021-12-06 ENCOUNTER — ANESTHESIA EVENT (OUTPATIENT)
Dept: ENDOSCOPY | Age: 66
End: 2021-12-06
Payer: COMMERCIAL

## 2021-12-06 VITALS
SYSTOLIC BLOOD PRESSURE: 164 MMHG | RESPIRATION RATE: 32 BRPM | DIASTOLIC BLOOD PRESSURE: 85 MMHG | OXYGEN SATURATION: 97 %

## 2021-12-06 VITALS
BODY MASS INDEX: 23.86 KG/M2 | WEIGHT: 152 LBS | RESPIRATION RATE: 24 BRPM | TEMPERATURE: 97.7 F | HEIGHT: 67 IN | HEART RATE: 86 BPM | OXYGEN SATURATION: 100 % | DIASTOLIC BLOOD PRESSURE: 67 MMHG | SYSTOLIC BLOOD PRESSURE: 151 MMHG

## 2021-12-06 PROCEDURE — 7100000010 HC PHASE II RECOVERY - FIRST 15 MIN: Performed by: SURGERY

## 2021-12-06 PROCEDURE — 3700000000 HC ANESTHESIA ATTENDED CARE: Performed by: SURGERY

## 2021-12-06 PROCEDURE — 88305 TISSUE EXAM BY PATHOLOGIST: CPT

## 2021-12-06 PROCEDURE — 2709999900 HC NON-CHARGEABLE SUPPLY: Performed by: SURGERY

## 2021-12-06 PROCEDURE — 6360000002 HC RX W HCPCS: Performed by: ANESTHESIOLOGY

## 2021-12-06 PROCEDURE — 3609012400 HC EGD TRANSORAL BIOPSY SINGLE/MULTIPLE: Performed by: SURGERY

## 2021-12-06 PROCEDURE — 7100000011 HC PHASE II RECOVERY - ADDTL 15 MIN: Performed by: SURGERY

## 2021-12-06 PROCEDURE — 3700000001 HC ADD 15 MINUTES (ANESTHESIA): Performed by: SURGERY

## 2021-12-06 PROCEDURE — 88342 IMHCHEM/IMCYTCHM 1ST ANTB: CPT

## 2021-12-06 PROCEDURE — 2580000003 HC RX 258: Performed by: NURSE ANESTHETIST, CERTIFIED REGISTERED

## 2021-12-06 PROCEDURE — 94664 DEMO&/EVAL PT USE INHALER: CPT

## 2021-12-06 PROCEDURE — 6360000002 HC RX W HCPCS: Performed by: NURSE ANESTHETIST, CERTIFIED REGISTERED

## 2021-12-06 RX ORDER — ALBUTEROL SULFATE 2.5 MG/3ML
2.5 SOLUTION RESPIRATORY (INHALATION) ONCE
Status: COMPLETED | OUTPATIENT
Start: 2021-12-06 | End: 2021-12-06

## 2021-12-06 RX ORDER — PROPOFOL 10 MG/ML
INJECTION, EMULSION INTRAVENOUS PRN
Status: DISCONTINUED | OUTPATIENT
Start: 2021-12-06 | End: 2021-12-06 | Stop reason: SDUPTHER

## 2021-12-06 RX ORDER — SODIUM CHLORIDE 9 MG/ML
INJECTION, SOLUTION INTRAVENOUS CONTINUOUS
Status: DISCONTINUED | OUTPATIENT
Start: 2021-12-06 | End: 2021-12-06 | Stop reason: HOSPADM

## 2021-12-06 RX ORDER — SODIUM CHLORIDE 9 MG/ML
INJECTION, SOLUTION INTRAVENOUS CONTINUOUS PRN
Status: DISCONTINUED | OUTPATIENT
Start: 2021-12-06 | End: 2021-12-06 | Stop reason: SDUPTHER

## 2021-12-06 RX ADMIN — PROPOFOL 110 MG: 10 INJECTION, EMULSION INTRAVENOUS at 09:26

## 2021-12-06 RX ADMIN — ALBUTEROL SULFATE 2.5 MG: 2.5 SOLUTION RESPIRATORY (INHALATION) at 08:56

## 2021-12-06 RX ADMIN — SODIUM CHLORIDE: 9 INJECTION, SOLUTION INTRAVENOUS at 09:25

## 2021-12-06 ASSESSMENT — PAIN - FUNCTIONAL ASSESSMENT: PAIN_FUNCTIONAL_ASSESSMENT: 0-10

## 2021-12-06 ASSESSMENT — LIFESTYLE VARIABLES: SMOKING_STATUS: 0

## 2021-12-06 ASSESSMENT — PAIN DESCRIPTION - PAIN TYPE
TYPE: ACUTE PAIN
TYPE: ACUTE PAIN

## 2021-12-06 ASSESSMENT — PAIN SCALES - GENERAL
PAINLEVEL_OUTOF10: 1
PAINLEVEL_OUTOF10: 1
PAINLEVEL_OUTOF10: 0

## 2021-12-06 ASSESSMENT — PAIN DESCRIPTION - ORIENTATION: ORIENTATION: INNER

## 2021-12-06 ASSESSMENT — PAIN DESCRIPTION - LOCATION
LOCATION: THROAT
LOCATION: ABDOMEN
LOCATION: THROAT

## 2021-12-06 ASSESSMENT — ENCOUNTER SYMPTOMS: SHORTNESS OF BREATH: 0

## 2021-12-06 NOTE — ANESTHESIA PRE PROCEDURE
Department of Anesthesiology  Preprocedure Note       Name:  Danilo Conteh   Age:  74 y.o.  :  1955                                          MRN:  [id number]         Date:  2021      Surgeon: Hemanth Tomas):  Daryl Delcid MD    Procedure: Procedure(s):  EGD BIOPSY    Medications prior to admission:   Prior to Admission medications    Medication Sig Start Date End Date Taking? Authorizing Provider   QUEtiapine (SEROQUEL XR) 50 MG extended release tablet Take 50 mg by mouth nightly    Historical Provider, MD   fluticasone-umeclidin-vilant (TRELEGY ELLIPTA) 100-62.5-25 MCG/INH AEPB Inhale 1 puff into the lungs daily    Historical Provider, MD   albuterol sulfate  (90 Base) MCG/ACT inhaler Inhale 1 puff into the lungs every 4 hours as needed (every 4-6 hours as needed) Instructed to take am of procedure if needed and bring dos 3/3/21   Edith Brito MD   lipase-protease-amylase (CREON) 65341-56372 units delayed release capsule Take 1 capsule by mouth 3 times daily (with meals)  Patient taking differently: Take 2 capsules by mouth 4 times daily (with meals and nightly)  19   Ari Gallego III, MD   OXYGEN Inhale 3 L into the lungs as needed Uses 3L night and during day prn    Historical Provider, MD   vitamin D (ERGOCALCIFEROL) 68361 UNITS CAPS capsule Take 50,000 Units by mouth once a week Saturday    Historical Provider, MD   Multiple Vitamins-Iron (DAILY-FEDERICO/IRON) TABS Take 1 tablet by mouth daily     Historical Provider, MD   folic acid (FOLVITE) 1 MG tablet Take 1 mg by mouth daily     Historical Provider, MD   sertraline (ZOLOFT) 50 MG tablet Take 1 tablet by mouth daily. Patient taking differently: Take 50 mg by mouth nightly  14   China Hughes MD   alendronate (FOSAMAX) 70 MG tablet Take 70 mg by mouth every 7 days     Historical Provider, MD       Current medications:    No current facility-administered medications for this visit.      No current outpatient medications on file. Facility-Administered Medications Ordered in Other Visits   Medication Dose Route Frequency Provider Last Rate Last Admin    0.9 % sodium chloride infusion   IntraVENous Continuous Arelis Tilley MD           Allergies: Allergies   Allergen Reactions    Sulfa Antibiotics Anaphylaxis       Problem List:    Patient Active Problem List   Diagnosis Code    COPD (chronic obstructive pulmonary disease) ([facility] [address] J44.9    MDD (major depressive disorder) F32.9    Hematuria R31.9    Neurogenic bladder N31.9    Chronic respiratory failure with hypoxia ([facility]) J96.11    Anemia D64.9    GI bleed K92.2    Acute on chronic pancreatitis ([facility]) K85.90, K86.1    Gastric wall thickening K31.89    Bladder wall thickening N32.89    Chronic or unspecified gastric ulcer with both hemorrhage and perforation ([facility] [address] K25.6    Difficulty in walking, not elsewhere classified R26.2    Moderate persistent asthma with (acute) exacerbation J45.41    Muscle weakness (generalized) M62.81    Need for assistance with personal care Z74.1    Neuromuscular dysfunction of bladder, unspecified N31.9    Personal history of other malignant neoplasm of kidney Z85.528       Past Medical History:        Diagnosis Date    Asthma     COPD (chronic obstructive pulmonary disease) ([facility])     uses )3 prn daily and nightly / Dr. Tammie Sinclair F/U monthly    Depression     Dysphagia 2021    Del Rio catheter in place     continuous since 2013, changed monthly at [address] [place] [address] dependent 2017       Past Surgical History:        Procedure Laterality Date    COLONOSCOPY      COLONOSCOPY N/A 9/4/2019    COLONOSCOPY DIAGNOSTIC performed by Fina Leos MD at [facility] 120 Left 06/16/2014    ABLATION PERFORMED UNDER CT SCAN    DE CYSTOURETHROSCOPY,BIOPSIES N/A 9/14/2018    CYSTOSCOPY RETROGRADE PYELOGRAM, CLOT EVACATION , POSS.   BLADDER BIOPSY ---DR Paulina Molina 2 :30 performed by Magdalene Lopez Memo, DO at [address] N/A 5/10/2019    EGD BIOPSY performed by Jason Garcia MD at [address] [place] [address] 2019    EGD EUS performed by Tien Caro DO at [address] [place] [address] 2019    EGD performed by Tien Caro DO at [address] [place] [address] 2020    EGD DIAGNOSTIC ONLY performed by Jerrald Kawasaki, MD at [address] [place] [address] N/A 10/25/2021    EGD BIOPSY performed by Jerrald Kawasaki, MD at [address] [place] [address] History:    Social History     Tobacco Use    Smoking status: Former Smoker     Packs/day: 0.00     Quit date: 10/21/2019     Years since quittin.1    Smokeless tobacco: Never Used   Substance Use Topics    Alcohol use: No     Comment: used to drink heavily, stopped [id number]                                Counseling given: Not Answered      Vital Signs (Current): There were no vitals filed for this visit. BP Readings from Last 3 Encounters:   10/25/21 (!) 158/74   10/25/21 (!) 143/79   21 (!) 148/69       NPO Status:                           Pt instructed nothing to eat or drink after 0000 2020.                                                       BMI:   Wt Readings from Last 3 Encounters:   21 152 lb (68.9 kg)   10/25/21 152 lb (68.9 kg)   21 150 lb 14.4 oz (68.4 kg)     There is no height or weight on file to calculate BMI.    CBC:   Lab Results   Component Value Date    WBC 6.7 2021    RBC 3.54 2021    HGB 8.6 2021    HCT 30.1 2021    MCV 85.0 2021    RDW 17.8 2021     2021       CMP:   Lab Results   Component Value Date     2021    K 4.6 2021    K 3.7 2021     2021    CO2 35 2021    BUN 14 2021    CREATININE 0.6 2021    GFRAA >60 2021 LABGLOM >60 09/16/2021    GLUCOSE 105 09/16/2021    PROT 5.9 02/27/2021    CALCIUM 10.1 09/16/2021    BILITOT <0.2 02/27/2021    ALKPHOS 66 02/27/2021    AST 15 02/27/2021    ALT 12 02/27/2021       POC Tests: No results for input(s): POCGLU, POCNA, POCK, POCCL, POCBUN, POCHEMO, POCHCT in the last 72 hours. Coags:   Lab Results   Component Value Date    PROTIME 9.7 02/26/2021    INR 0.9 02/26/2021    APTT 33.0 06/12/2014       HCG (If Applicable): No results found for: PREGTESTUR, PREGSERUM, HCG, HCGQUANT     ABGs: No results found for: PHART, PO2ART, UWU0GHC, KJX9OOM, BEART, M4VVVDHR     Type & Screen (If Applicable):  No results found for: LABABO, LABRH    Drug/Infectious Status (If Applicable):  No results found for: HIV, HEPCAB    COVID-19 Screening (If Applicable):   Lab Results   Component Value Date    COVID19 Not Detected 02/26/2021       EKG 5/17/2020  Narrative & Impression     Normal sinus rhythm  T wave abnormality, consider anterior ischemia  Prolonged QT  Abnormal ECG  When compared with ECG of 17-MAY-2020 20:49,  Significant changes have occurred         Anesthesia Evaluation  Patient summary reviewed and Nursing notes reviewed no history of anesthetic complications:   Airway: Mallampati: III  TM distance: <3 FB   Neck ROM: limited  Mouth opening: < 3 FB Dental:    (+) upper dentures and lower dentures      Pulmonary:   (+) COPD (On 3L NC):  decreased breath sounds,  asthma:     (-) shortness of breath and not a current smoker          Patient did not smoke on day of surgery. ROS comment: O2 HOME   Cardiovascular:  Exercise tolerance: poor (<4 METS),         ECG reviewed  Rhythm: regular  Rate: normal           Beta Blocker:  Not on Beta Blocker         Neuro/Psych:   (+) depression/anxiety             GI/Hepatic/Renal:   (+) PUD,          ROS comment: DYSPHAGIA.    Endo/Other:    (+) blood dyscrasia: anemia:., .                  ROS comment: Neurogenic bladder Abdominal: Vascular: negative vascular ROS. Other Findings:               Anesthesia Plan      MAC     ASA 3       Induction: intravenous. Anesthetic plan and risks discussed with patient. Ian Garcia MD   12/6/2021        DOS STAFF ADDENDUM:    Patient seen and chart reviewed. Physical exam and history updated as indicated. NPO status confirmed. Anesthesia options and plan discussed including risks benefits with patient/legal guardian and family as available. Concerns and questions addressed. Consent verbalized to proceed.   Anesthesia plan, options and intraoperative/postoperative concerns discussed with care team.    Ian Garcia MD, MD  12/6/2021  8:31 AM  Chart review spoke with pt agree above

## 2021-12-06 NOTE — OP NOTE
Operative Note      Patient: Ayla Trujillo  YOB: 1955  MRN: [id number]    Date of Procedure: 12/6/2021    Pre-Op Diagnosis: nausea, bloating, history of h. Pylori infection    Post-Op Diagnosis: mild gastritis       Procedure(s):  EGD BIOPSY    Surgeon(s):  Natividad Cockayne, MD    Assistant:   * No surgical staff found *    Anesthesia: Monitor Anesthesia Care    Estimated Blood Loss (mL): 1    Complications: none    Specimens:   ID Type Source Tests Collected by Time Destination   A : Antral Bxs R/O H Pylori Tissue Stomach SURGICAL PATHOLOGY Natividad Cockayne, MD 12/6/2021 0932        Implants:  * No implants in log *      Drains:   Urethral Catheter Non-latex 16 fr (Active)       BRIEF HISTORY:  This is a 74 y.o. female who presents with the complaint of nausea and bloating with history of h. Pylori infection. It was recommended the patient undergo an esophagogastrduodenoscopy for further evaluation. The risks/benefits/alternatives/expected outcomes were explained to the patient which include but are not limited to, bleeding, perforation and aspiration. The patient understands and agrees to proceed. PROCEDURE:  The patient was brought into the endoscopy suite and placed in the left lateral decubitus position. A bite block was placed in the patients mouth. After the initiation of LMAC anesthesia, an endoscope was inserted into the patient's mouth and passed into the esophagus and into the stomach. The scope was advanced through the pylorus into the first and second portion of the duodenum making the note of grossly normal mucosa. The scope was then withdrawn back into the stomach where it was retroflexed. There was no hiatal hernia noted. The scope was then straightened and mild gastritis was visualized. Cold forceps were used to obtain antral biopsies to rule out h. Pylori. Hemostasis was excellent. The stomach was desufflated.  The scope was then withdrawn the entire length of the esophagus, making the note of a normal esophagus without masses, ulcerations, or lesions noted. The scope was withdrawn entirely. The patient tolerated the procedure well and there were no complications. The patient was taken to PACU in stable condition.     Jesus Lazaro MD  12/6/2021

## 2021-12-06 NOTE — H&P
General Surgery H&P    Patient's Name/Date of Birth: Idania Samson / 1955    Date: December 6, 2021     Consulting Surgeon: Michael Pacheco M.D.    PCP: Gurdeep Beaulieu MD     Chief Complaint: Nausea and bloating    HPI:   Idania Samson is a 74 y.o. female who presents for evaluation of nausea and bloating. Prior EGD showed biopsies positive for H. Pylori. She was not able to tolerate all the antibiotics. No hematemesis, fevers, chills, chest pain, SOB or cough. Past Medical History:   Diagnosis Date    Asthma     COPD (chronic obstructive pulmonary disease) ([facility] [address]     uses )3 prn daily and nightly / Dr. Karina Hunt F/U monthly    Depression     Dysphagia 2021    Del Rio catheter in place     continuous since 2013, changed monthly at [address] [place] [address] dependent 2017       Past Surgical History:   Procedure Laterality Date    COLONOSCOPY      COLONOSCOPY N/A 9/4/2019    COLONOSCOPY DIAGNOSTIC performed by Betina Armas MD at [facility] 120 Left 06/16/2014    ABLATION PERFORMED UNDER CT SCAN    SD CYSTOURETHROSCOPY,BIOPSIES N/A 9/14/2018    CYSTOSCOPY RETROGRADE PYELOGRAM, CLOT EVACATION , POSS.   BLADDER BIOPSY ---DR Carmona Shows 2 :30 performed by Kylah Sue DO at [address] N/A 5/10/2019    EGD BIOPSY performed by Betina Armas MD at [address] [facility] [facility] 6/28/2019    EGD EUS performed by Gordon Moss DO at [facility] [address] [facility] [facility] 6/28/2019    EGD performed by Gordon Moss DO at [facility] [address] [facility] 5/19/2020    EGD DIAGNOSTIC ONLY performed by Jose Antonio Horta MD at [address] [facility] 10/25/2021    EGD BIOPSY performed by Jose Antonio Horta MD at [place] [address] Facility-Administered Medications   Medication Dose Route Frequency Provider Last Rate Last Admin    0.9 % sodium chloride infusion   IntraVENous Continuous Migdalia Carreno MD           Allergies   Allergen Reactions    Sulfa Antibiotics Anaphylaxis       Family History   Problem Relation Age of Onset    Cancer Father [de-identified]        prostate cancer    Diabetes Brother     Kidney Disease Brother        Social History     Socioeconomic History    Marital status:      Spouse name: Not on file    Number of children: Not on file    Years of education: Not on file    Highest education level: Not on file   Occupational History    Not on file   Tobacco Use    Smoking status: Former Smoker     Packs/day: 0.00     Quit date: 10/21/2019     Years since quittin.1    Smokeless tobacco: Never Used   Vaping Use    Vaping Use: Never used   Substance and Sexual Activity    Alcohol use: No     Comment: used to drink heavily, stopped [id number]    Drug use: No    Sexual activity: Not on file   Other Topics Concern    Not on file   Social History Narrative    Not on file     Social Determinants of Health     Financial Resource Strain:     Difficulty of Paying Living Expenses: Not on file   Food Insecurity:     Worried About [address] in the Last Year: Not on file    Marty of Food in the Last Year: Not on file   Transportation Needs:     Lack of Transportation (Medical): Not on file    Lack of Transportation (Non-Medical):  Not on file   Physical Activity:     Days of Exercise per Week: Not on file    Minutes of Exercise per Session: Not on file   Stress:     Feeling of Stress : Not on file   Social Connections:     Frequency of Communication with Friends and Family: Not on file    Frequency of Social Gatherings with Friends and Family: Not on file    Attends Yarsanism Services: Not on file    Active Member of Clubs or Organizations: Not on file    Attends Club or Organization Meetings: Not on file    Marital Status: Not on file   Intimate Partner Violence:     Fear of Current or Ex-Partner: Not on file    Emotionally Abused: Not on file    Physically Abused: Not on file    Sexually Abused: Not on file   Housing Stability:     Unable to Pay for Housing in the Last Year: Not on file    Number of [place] in the Last Year: Not on file    Unstable Housing in the Last Year: Not on file           Review of Systems  Negative times ten except what was stated in HPI and PMH    Physical exam:   BP (!) 156/72   Pulse 93   Temp 96.7 °F (35.9 °C) (Temporal)   Resp 16   Ht 5' 7\" (1.702 m)   Wt 152 lb (68.9 kg)   LMP 10/21/1985   SpO2 99%   BMI 23.81 kg/m²   General appearance: AAOx3, NAD  Head: NCAT, PERRLA, EOMI, red conjunctiva  Neck: supple, no masses  Lungs: CTAB, equal chest rise bilateral  Heart: Reg rate  Abdomen: soft, nontender, no guarding/rebound, nondistended, no palpable hernias or defects  Skin: no lesions  Gu: no cva tenderness  Extremities: extremities normal, atraumatic, no cyanosis or edema      Assessment:  74 y.o. female with nausea, bloating and H. Pylori infection    Plan:  Repeat EGD with biopsy to confirm eradication of h. Pylori. Risks, benefits, alteratives (and risks of alternatives) of the procedure were discussed with patient at bedside, all questions answered. She understands and agrees to proceed.       Rashel Cannon MD  12/6/2021  9:34 AM

## 2021-12-06 NOTE — PROGRESS NOTES
PT RECEIVED IN PACU 2 FROM ENDO REPORT RECEIVED ASSESSMENT AND V S OBTAINED LIQUIDS GIVEN  1020 DISCHARGE INSTRUCTIONS GIVEN TO PT AT THIS TIME AND PT VERBALIZED UNDERSTANDING OF INSTRUCTIONS PAMPHLETS GIVEN , PT ALREADY SPOKE WITH DR SZYMANSKI AFTER PROCEDURE AND DR SZYMANSKI CALLED AND SPOKE WITH PT SISTER OYLA AFTER PROCEDURE AS WELL, PT JOSETTE CALLED

## 2021-12-16 ASSESSMENT — ENCOUNTER SYMPTOMS
VOICE CHANGE: 0
COUGH: 1
EYE DISCHARGE: 0
CHOKING: 0
VOMITING: 0
SINUS PAIN: 0
SINUS PRESSURE: 0
ABDOMINAL PAIN: 0
NAUSEA: 0
EYE ITCHING: 0
RHINORRHEA: 0
WHEEZING: 0
BACK PAIN: 0
SORE THROAT: 0
CONSTIPATION: 0
TROUBLE SWALLOWING: 0
BLOOD IN STOOL: 0
CHEST TIGHTNESS: 0
DIARRHEA: 0

## 2021-12-16 NOTE — PROGRESS NOTES
Subjective:      Patient ID: Liam Geller is a 74 y.o. female. HPI   This note was copied forward from the last encounter. Essential components for this patient record were reviewed and verified on this visit including:  recent hospitalizations, recent imaging, PMH, PSH, FH, SOC HX, Allergies, and Medications were reviewed and updated as appropriate. In addition, the assessment and plan were copied from prior office note and updated accordingly. History and Physical    Patient's Name/Date of Birth: Liam Geller / 1955      Ayesha Johnson presents for evaluation of left breast/chest wall inflammation and pain. PCP: Vidal Castaneda MD. Gynecologist: marta    Ayesha is a pleasant 74 y.o. 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 She has a history of tobacco abuse, COPD with chronic hypoxic respiratory failure on O2 at 3 L per nasal cannula. Also a history of anemia, GERD, and pancreatitis. She presents today with complaints of chronic, intermittent, inflammation and pain of the of the left breast which she reports started approximately 7 months ago. Inflammation will resolve spontaneously. She denies fever or chills, reports her left breast is larger than the right, denies nipple discharge. Her breast cancer risks include age and gender alone. No family history of breast, colon, or pancreatic cancer. Her father had prostate cancer at age [de-identified]. Ashkenazi Episcopal Ancestry: No.    OBSTETRIC RELATED HISTORY:  Age of menarche was 13. Age of menopause was 50. Hysterectomy (patient unsure partial or total)  Patient denies hormonal therapy. Patient is .      CANCER SURVEILLANCE HISTORY:  Mammograms: Yes   Breast MRI's: No   Breast Biopsies: Patient unsure  Colonoscopy: Yes   GI Polyps: Yes - benign   EGD: Patient unsure  Pelvic Exam: Yes 4 years ago per patient  Pap Smear: yes - 4 years ago per patient  Dermatology: Yes   Lung screening: Patient unsure        Estimated body mass index is 24.9 kg/m² as calculated from the following:    Height as of this encounter: 5' 7\" (1.702 m). Weight as of this encounter: 159 lb (72.1 kg). Bra Size: Patient unsure    Because violence is so common, we ask all our patients: are you in a relationship or do you live with a person who threatens, hurts, or controls you:  Patient denies    Patient drinks moderate caffeinated beverages. Patient does not smoke cigarettes (former heavy smoker). Patient does not use recreational drugs. Past Medical History:   Diagnosis Date    Asthma     [facility]     uses )3 prn daily and nightly / Dr. Bryson Ireland F/U monthly    Depression     Dysphagia 2021    Del Rio catheter in place     continuous since 2013, changed monthly at [facility] dependent 2017       Past Surgical History:   Procedure Laterality Date    COLONOSCOPY      COLONOSCOPY N/A 9/4/2019    COLONOSCOPY DIAGNOSTIC performed by Monica Moura MD at [place] [address] Left 06/16/2014    ABLATION PERFORMED UNDER CT SCAN    DC CYSTOURETHROSCOPY,BIOPSIES N/A 9/14/2018    CYSTOSCOPY RETROGRADE PYELOGRAM, CLOT EVACATION , POSS.   BLADDER BIOPSY ---DR Iris Monroy 2 :30 performed by La Sue DO at [facility] N/A 5/10/2019    EGD BIOPSY performed by Monica Moura MD at [address] [facility] 6/28/2019    EGD EUS performed by Erna Gann DO at [facility] 6/28/2019    EGD performed by Erna Gann DO at [address] [place] [address] 5/19/2020    EGD DIAGNOSTIC ONLY performed by Lorenzo Mg MD at [address] [facility] 10/25/2021    EGD BIOPSY performed by Lorenzo Mg MD at [address] [place] [address] UPPER GASTROINTESTINAL ENDOSCOPY N/A 12/6/2021    EGD BIOPSY performed by Jerrald Kawasaki, MD at [facility] ENDOSCOPY       Current Outpatient Medications   Medication Sig Dispense Refill    amoxicillin (AMOXIL) 500 MG capsule Take 500 mg by mouth 2 times daily      clarithromycin (BIAXIN) 500 MG tablet Take 500 mg by mouth 2 times daily      gabapentin (NEURONTIN) 300 MG capsule Take 1 capsule by mouth nightly for 30 days. Intended supply: 30 days 30 capsule 0    diclofenac sodium (VOLTAREN) 1 % GEL Apply topically 4 times daily as needed for Pain 100 g 2    QUEtiapine (SEROQUEL XR) 50 MG extended release tablet Take 50 mg by mouth nightly      fluticasone-umeclidin-vilant (TRELEGY ELLIPTA) 100-62.5-25 MCG/INH AEPB Inhale 1 puff into the lungs daily      albuterol sulfate  (90 Base) MCG/ACT inhaler Inhale 1 puff into the lungs every 4 hours as needed (every 4-6 hours as needed) Instructed to take am of procedure if needed and bring dos 1 Inhaler 5    lipase-protease-amylase (CREON) 09776-11731 units delayed release capsule Take 1 capsule by mouth 3 times daily (with meals) (Patient taking differently: Take 2 capsules by mouth 4 times daily (with meals and nightly) ) 360 capsule 5    OXYGEN Inhale 3 L into the lungs as needed Uses 3L night and during day prn      vitamin D (ERGOCALCIFEROL) 33059 UNITS CAPS capsule Take 50,000 Units by mouth once a week Saturday      Multiple Vitamins-Iron (DAILY-FEDERICO/IRON) TABS Take 1 tablet by mouth daily       folic acid (FOLVITE) 1 MG tablet Take 1 mg by mouth daily       sertraline (ZOLOFT) 50 MG tablet Take 1 tablet by mouth daily. (Patient taking differently: Take 50 mg by mouth nightly ) 30 tablet 0    alendronate (FOSAMAX) 70 MG tablet Take 70 mg by mouth every 7 days Sunday       No current facility-administered medications for this visit.        Allergies   Allergen Reactions    Sulfa Antibiotics Anaphylaxis       Family History   Problem Relation Age of Onset    Cancer Father [de-identified]        prostate cancer    Diabetes Brother     Kidney Disease Brother        Social History     Socioeconomic History    Marital status:      Spouse name: Not on file    Number of children: Not on file    Years of education: Not on file    Highest education level: Not on file   Occupational History    Not on file   Tobacco Use    Smoking status: Former Smoker     Packs/day: 0.00     Quit date: 10/21/2019     Years since quittin.1    Smokeless tobacco: Never Used   Vaping Use    Vaping Use: Never used   Substance and Sexual Activity    Alcohol use: No     Comment: used to drink heavily, stopped 7900-3121    Drug use: No    Sexual activity: Not on file   Other Topics Concern    Not on file   Social History Narrative    Not on file     Social Determinants of Health     Financial Resource Strain:     Difficulty of Paying Living Expenses: Not on file   Food Insecurity:     Worried About [address] in the Last Year: Not on file    Marty of Food in the Last Year: Not on file   Transportation Needs:     Lack of Transportation (Medical): Not on file    Lack of Transportation (Non-Medical):  Not on file   Physical Activity:     Days of Exercise per Week: Not on file    Minutes of Exercise per Session: Not on file   Stress:     Feeling of Stress : Not on file   Social Connections:     Frequency of Communication with Friends and Family: Not on file    Frequency of Social Gatherings with Friends and Family: Not on file    Attends Latter day Services: Not on file    Active Member of Clubs or Organizations: Not on file    Attends Club or Organization Meetings: Not on file    Marital Status: Not on file   Intimate Partner Violence:     Fear of Current or Ex-Partner: Not on file    Emotionally Abused: Not on file    Physically Abused: Not on file    Sexually Abused: Not on file   Housing Stability:     Unable to Pay for Housing in the Last Year: Not on file    Number of Places Lived in the Last Year: Not on file    Unstable Housing in the Last Year: Not on file       Occupation: Retired - social security    Review of Systems   Constitutional: Negative for activity change, appetite change, chills, fatigue, fever and unexpected weight change. She reports she has been feeling better after discharge from the hospital for pancreatitis. Still complains of occasional malaise and fatigue. Complaints of left breast discomfort on this visit. HENT: Negative for congestion, postnasal drip, rhinorrhea, sinus pressure, sinus pain, sore throat, trouble swallowing and voice change. Eyes: Negative for discharge, itching and visual disturbance. Respiratory: Positive for cough and shortness of breath (Chronic; shortness of breath on exertion is stable at 95% on O2 at 3 L per nasal cannula. ). Negative for choking, chest tightness and wheezing. Cardiovascular: Negative for chest pain, palpitations and leg swelling. Gastrointestinal: Positive for abdominal distention. Negative for abdominal pain, blood in stool, constipation, diarrhea, nausea and vomiting. Endocrine: Negative for cold intolerance and heat intolerance. Genitourinary:        She has neurogenic bladder and requires Del Rio placement; Del Rio exchange monthly at [facility]. Musculoskeletal: Negative for arthralgias, back pain, gait problem, joint swelling, myalgias, neck pain and neck stiffness. Allergic/Immunologic: Negative for environmental allergies and food allergies. Neurological: Negative for dizziness, seizures, syncope, speech difficulty, weakness, light-headedness and headaches. Hematological: Negative for adenopathy. Does not bruise/bleed easily. Psychiatric/Behavioral: Negative for agitation, confusion and decreased concentration. The patient is not nervous/anxious. Alert and oriented X 3. Pleasant and conversant. Able to talk in complete sentences. Patient denies previous history of DVT/PE. Objective:   Physical Exam  Vitals and nursing note reviewed. Constitutional:       General: She is not in acute distress. Appearance: She is well-developed. She is not diaphoretic. Comments: ECOG 2 due to underlying co-morbidities. She did have an aide who came to the house for assistance however, the aide recently quit and she has not been assigned a new assistant. She came today via transport Van. Requires O2 supplementation 24/7. HENT:      Head: Normocephalic and atraumatic. Mouth/Throat:      Pharynx: No oropharyngeal exudate. Eyes:      General: No scleral icterus. Right eye: No discharge. Left eye: No discharge. Conjunctiva/sclera: Conjunctivae normal.   Neck:      Thyroid: No thyromegaly. Vascular: No JVD. Trachea: No tracheal deviation. Cardiovascular:      Rate and Rhythm: Normal rate and regular rhythm. Heart sounds: No murmur heard. No friction rub. No gallop. Pulmonary:      Effort: Pulmonary effort is normal. Prolonged expiration present. No respiratory distress or retractions. Breath sounds: No stridor. Decreased breath sounds present. No wheezing or rales. Comments: Breath sounds diminished in bilateral bases with expiratory phase significantly prolonged. No wheeze appreciated. Chest:      Chest wall: No mass, lacerations, deformity, swelling, tenderness or edema. Breasts:      Breasts are asymmetrical (Left breast 1/2 cup size larger than right.). Right: No inverted nipple, mass, nipple discharge, skin change or tenderness. Left: No inverted nipple, mass, nipple discharge, skin change or tenderness. Comments: Right breast exam is unremarkable. Abdominal:      General: There is no distension. Palpations: Abdomen is soft. Tenderness: There is no abdominal tenderness. There is no guarding or rebound.    Genitourinary:     Comments: Del Rio catheter draining to right leg bag. Musculoskeletal:         General: No tenderness or deformity. Normal range of motion. Right shoulder: Normal.      Left shoulder: Normal.      Cervical back: Normal range of motion and neck supple. Lymphadenopathy:      Cervical: No cervical adenopathy. Right cervical: No superficial, deep or posterior cervical adenopathy. Left cervical: No superficial, deep or posterior cervical adenopathy. Upper Body:      Right upper body: No pectoral adenopathy. Left upper body: No pectoral adenopathy. Skin:     General: Skin is warm and dry. Coloration: Skin is not pale. Findings: No erythema or rash. Neurological:      Mental Status: She is alert and oriented to person, place, and time. Coordination: Coordination normal.   Psychiatric:         Behavior: Behavior normal.         Thought Content: Thought content normal.         Judgment: Judgment normal.        Assessment:      74 y.o. extremely pleasant female without unusual risks for breast cancer. She has a PMH of COPD, Neurogenic bladder with purple bag syndrome (follows with [facility] @ [facility]) and  chronic pancreatitis. She presents today with complaints of persistent left breast pain; she reports the discomfort is most prominent in the inframammary fold; describes it as a burning sensation and is worse at night. She reports she will occasionally develop intermittent swelling of the left breast which resolved spontaneously. She does have a history of a benign left breast biopsy that she does not recall when or where it was done however, she does have a left periareolar scar.    -09/17/2021: B/L DIAGNOSTIC MAMMOGRAM AND LEFT breast US@ [facility]:  Negative, BI-RADS 1.  -09/17/2021 clinical follow-up is without evidence of malignancy. She has tenderness of the left breast without clinically suspicious findings.    -12/29/2021 clinical follow-up for persistent left breast discomfort is without clinically suspicious findings. She reports the pain is a \"burning sensation\" most notable of the left inframammary fold and extending to the chest wall wall on the left. She reports it is most notable at night. Etiology of the left breast/chest wall discomfort is not fully understood; imaging and clinical exam to date have been negative. Consider pleural etiology versus neuropathic/postherpetic pain from sub-clinical shingles. Will repeat left breast ultrasound today, will also check chest x-ray (PA and lateral). Prescription given for Diclofenac gel; also will trial Gabapentin 300 mg at HS. continue symptomatic management with good supportive bra, warm compress as needed, and over-the-counter analgesics. She was advised to contact our office immediately in the event she develops erythema, edema, or worsening symptoms so that we may evaluate her promptly during a reported \"flare\". Plan:      1. Continue monthly breast self examination; detailed instructions reviewed today. Bring any changes to your physician's attention. 2. Continue healthy diet and exercise routinely as tolerated. 3. Avoid alcohol. 4. Limit caffeine intake. 5. Repeat mammogram annually-not ordered. 6. Continue follow up with [facility], Primary Care, urology, pulmonology, and all specialties as directed. 7. At this time, we will plan to have her return to the clinic as needed for new or worsening symptoms. During today's visit, face-to-face time 25 minutes, greater than 50% in counseling education and coordination of care. All questions were answered to her apparent satisfaction, and she is agreeable to the plan as outlined above. This document is generated, in part, by voice recognition software and thus syntax and grammatical errors are possible.   Marielena Kaur (Brendan Sarabia) Parker Alonzo, RN, MSN, APRN-CNP, [address] [place]  Advanced Oncology Certified Nurse Practitioner  Department of Breast Surgery  [facility]/  Care in collaboration with Dr. Roberto Tomlin.  Dheeraj/Dr. Bonnie Chilel

## 2021-12-29 ENCOUNTER — OFFICE VISIT (OUTPATIENT)
Dept: BREAST CENTER | Age: 66
End: 2021-12-29
Payer: COMMERCIAL

## 2021-12-29 ENCOUNTER — HOSPITAL ENCOUNTER (OUTPATIENT)
Dept: GENERAL RADIOLOGY | Age: 66
Discharge: HOME | End: 2021-12-31
Payer: COMMERCIAL

## 2021-12-29 VITALS
SYSTOLIC BLOOD PRESSURE: 118 MMHG | TEMPERATURE: 97.6 F | OXYGEN SATURATION: 95 % | HEIGHT: 67 IN | WEIGHT: 159 LBS | RESPIRATION RATE: 22 BRPM | DIASTOLIC BLOOD PRESSURE: 62 MMHG | BODY MASS INDEX: 24.96 KG/M2 | HEART RATE: 83 BPM

## 2021-12-29 DIAGNOSIS — N64.4 BREAST PAIN: ICD-10-CM

## 2021-12-29 DIAGNOSIS — R05.9 COUGH: ICD-10-CM

## 2021-12-29 DIAGNOSIS — R05.9 COUGH: Primary | ICD-10-CM

## 2021-12-29 PROBLEM — K25.6: Status: RESOLVED | Noted: 2021-03-03 | Resolved: 2021-12-29

## 2021-12-29 PROBLEM — K86.1 ACUTE ON CHRONIC PANCREATITIS (HCC): Status: RESOLVED | Noted: 2021-02-26 | Resolved: 2021-12-29

## 2021-12-29 PROBLEM — K85.90 ACUTE ON CHRONIC PANCREATITIS (HCC): Status: RESOLVED | Noted: 2021-02-26 | Resolved: 2021-12-29

## 2021-12-29 PROBLEM — K92.2 GI BLEED: Status: RESOLVED | Noted: 2020-05-17 | Resolved: 2021-12-29

## 2021-12-29 PROCEDURE — 1090F PRES/ABSN URINE INCON ASSESS: CPT | Performed by: NURSE PRACTITIONER

## 2021-12-29 PROCEDURE — G8420 CALC BMI NORM PARAMETERS: HCPCS | Performed by: NURSE PRACTITIONER

## 2021-12-29 PROCEDURE — 4040F PNEUMOC VAC/ADMIN/RCVD: CPT | Performed by: NURSE PRACTITIONER

## 2021-12-29 PROCEDURE — 1123F ACP DISCUSS/DSCN MKR DOCD: CPT | Performed by: NURSE PRACTITIONER

## 2021-12-29 PROCEDURE — 3017F COLORECTAL CA SCREEN DOC REV: CPT | Performed by: NURSE PRACTITIONER

## 2021-12-29 PROCEDURE — 76642 ULTRASOUND BREAST LIMITED: CPT

## 2021-12-29 PROCEDURE — G8399 PT W/DXA RESULTS DOCUMENT: HCPCS | Performed by: NURSE PRACTITIONER

## 2021-12-29 PROCEDURE — 1036F TOBACCO NON-USER: CPT | Performed by: NURSE PRACTITIONER

## 2021-12-29 PROCEDURE — G8427 DOCREV CUR MEDS BY ELIG CLIN: HCPCS | Performed by: NURSE PRACTITIONER

## 2021-12-29 PROCEDURE — 99213 OFFICE O/P EST LOW 20 MIN: CPT | Performed by: NURSE PRACTITIONER

## 2021-12-29 PROCEDURE — 71046 X-RAY EXAM CHEST 2 VIEWS: CPT

## 2021-12-29 PROCEDURE — G8484 FLU IMMUNIZE NO ADMIN: HCPCS | Performed by: NURSE PRACTITIONER

## 2021-12-29 RX ORDER — AMOXICILLIN 500 MG/1
500 CAPSULE ORAL 2 TIMES DAILY
Status: ON HOLD | COMMUNITY
End: 2022-10-19 | Stop reason: HOSPADM

## 2021-12-29 RX ORDER — GABAPENTIN 300 MG/1
300 CAPSULE ORAL NIGHTLY
Qty: 30 CAPSULE | Refills: 0 | Status: SHIPPED | OUTPATIENT
Start: 2021-12-29 | End: 2022-01-28

## 2021-12-29 RX ORDER — CLARITHROMYCIN 500 MG/1
500 TABLET, COATED ORAL 2 TIMES DAILY
Status: ON HOLD | COMMUNITY
End: 2022-10-19 | Stop reason: HOSPADM

## 2021-12-29 ASSESSMENT — ENCOUNTER SYMPTOMS
ABDOMINAL DISTENTION: 1
SHORTNESS OF BREATH: 1

## 2021-12-29 NOTE — PATIENT INSTRUCTIONS
RELEASE OF RESULTS AND RECORDS  As of October 1, 2020, all results (x-ray reports, labwork, pathology reports) will be released through [address] in real time per federal law. Once your test results are final, they will be automatically released to your electronic medical record [employer] where you will be able to see those results and any clinical notes associated with that result. In some cases, you may see those results and notes before your provider or the staff have had a chance to review. We will make every effort to contact you, especially about any abnormal or confusing results. If you view a result before we have contacted you, please wait up to one business day for us to reach you before calling with questions. If anything in your notes seems inaccurate, please message us through [employer] with potential corrections. If you see a term or language in your clinical note that doesn't make sense, please use the online health library reference tool in your MyChart (under the Health tab)  to help you interpret the note.  Gabapentin at pharmacy and take one tablet at night.  Voltaren Gel at the pharmcy. Call in 2 weeks [phone] to let us know if the medication helps.

## 2022-02-16 ENCOUNTER — PREP FOR PROCEDURE (OUTPATIENT)
Dept: SURGERY | Age: 67
End: 2022-02-16

## 2022-02-16 RX ORDER — SODIUM CHLORIDE 9 MG/ML
INJECTION, SOLUTION INTRAVENOUS CONTINUOUS
Status: CANCELLED | OUTPATIENT
Start: 2022-02-16

## 2022-03-04 ENCOUNTER — HOSPITAL ENCOUNTER (OUTPATIENT)
Dept: CT IMAGING | Age: 67
Discharge: HOME | End: 2022-03-06
Payer: COMMERCIAL

## 2022-03-04 DIAGNOSIS — J43.2 CENTRIACINAR EMPHYSEMA (HCC): ICD-10-CM

## 2022-03-04 PROCEDURE — 71250 CT THORAX DX C-: CPT

## 2022-03-04 PROCEDURE — 74176 CT ABD & PELVIS W/O CONTRAST: CPT

## 2022-03-04 PROCEDURE — 6360000004 HC RX CONTRAST MEDICATION: Performed by: RADIOLOGY

## 2022-03-04 RX ADMIN — IOHEXOL 50 ML: 240 INJECTION, SOLUTION INTRATHECAL; INTRAVASCULAR; INTRAVENOUS; ORAL at 11:07

## 2022-05-12 ENCOUNTER — HOSPITAL ENCOUNTER (OUTPATIENT)
Age: 67
Discharge: HOME | End: 2022-05-12
Payer: COMMERCIAL

## 2022-05-12 LAB
BASOPHILS ABSOLUTE: 0.03 E9/L (ref 0–0.2)
BASOPHILS RELATIVE PERCENT: 0.7 % (ref 0–2)
EOSINOPHILS ABSOLUTE: 0.06 E9/L (ref 0.05–0.5)
EOSINOPHILS RELATIVE PERCENT: 1.3 % (ref 0–6)
HCT VFR BLD CALC: 29.8 % (ref 34–48)
HEMOGLOBIN: 8.7 G/DL (ref 11.5–15.5)
IMMATURE GRANULOCYTES #: 0.01 E9/L
IMMATURE GRANULOCYTES %: 0.2 % (ref 0–5)
LYMPHOCYTES ABSOLUTE: 1.33 E9/L (ref 1.5–4)
LYMPHOCYTES RELATIVE PERCENT: 29.8 % (ref 20–42)
MCH RBC QN AUTO: 25.3 PG (ref 26–35)
MCHC RBC AUTO-ENTMCNC: 29.2 % (ref 32–34.5)
MCV RBC AUTO: 86.6 FL (ref 80–99.9)
MONOCYTES ABSOLUTE: 0.29 E9/L (ref 0.1–0.95)
MONOCYTES RELATIVE PERCENT: 6.5 % (ref 2–12)
NEUTROPHILS ABSOLUTE: 2.75 E9/L (ref 1.8–7.3)
NEUTROPHILS RELATIVE PERCENT: 61.5 % (ref 43–80)
PDW BLD-RTO: 14.6 FL (ref 11.5–15)
PLATELET # BLD: 272 E9/L (ref 130–450)
PMV BLD AUTO: 10 FL (ref 7–12)
RBC # BLD: 3.44 E12/L (ref 3.5–5.5)
WBC # BLD: 4.5 E9/L (ref 4.5–11.5)

## 2022-05-12 PROCEDURE — 36415 COLL VENOUS BLD VENIPUNCTURE: CPT

## 2022-05-12 PROCEDURE — 85025 COMPLETE CBC W/AUTO DIFF WBC: CPT

## 2022-05-29 ENCOUNTER — HOSPITAL ENCOUNTER (EMERGENCY)
Age: 67
Discharge: HOME | End: 2022-05-30
Attending: EMERGENCY MEDICINE
Payer: COMMERCIAL

## 2022-05-29 ENCOUNTER — APPOINTMENT (OUTPATIENT)
Dept: CT IMAGING | Age: 67
End: 2022-05-29
Payer: COMMERCIAL

## 2022-05-29 ENCOUNTER — APPOINTMENT (OUTPATIENT)
Dept: ULTRASOUND IMAGING | Age: 67
End: 2022-05-29
Payer: COMMERCIAL

## 2022-05-29 ENCOUNTER — APPOINTMENT (OUTPATIENT)
Dept: GENERAL RADIOLOGY | Age: 67
End: 2022-05-29
Payer: COMMERCIAL

## 2022-05-29 VITALS
OXYGEN SATURATION: 98 % | HEART RATE: 80 BPM | BODY MASS INDEX: 24.96 KG/M2 | TEMPERATURE: 98.2 F | DIASTOLIC BLOOD PRESSURE: 67 MMHG | HEIGHT: 67 IN | SYSTOLIC BLOOD PRESSURE: 125 MMHG | WEIGHT: 159 LBS | RESPIRATION RATE: 17 BRPM

## 2022-05-29 DIAGNOSIS — R07.81 RIB PAIN ON RIGHT SIDE: Primary | ICD-10-CM

## 2022-05-29 DIAGNOSIS — K86.1 CHRONIC PANCREATITIS, UNSPECIFIED PANCREATITIS TYPE (HCC): ICD-10-CM

## 2022-05-29 DIAGNOSIS — D64.9 CHRONIC ANEMIA: ICD-10-CM

## 2022-05-29 LAB
ALBUMIN SERPL-MCNC: 4.5 G/DL (ref 3.5–5.2)
ALP BLD-CCNC: 96 U/L (ref 35–104)
ALT SERPL-CCNC: 9 U/L (ref 0–32)
ANION GAP SERPL CALCULATED.3IONS-SCNC: 7 MMOL/L (ref 7–16)
AST SERPL-CCNC: 12 U/L (ref 0–31)
BASOPHILS ABSOLUTE: 0.03 E9/L (ref 0–0.2)
BASOPHILS RELATIVE PERCENT: 0.5 % (ref 0–2)
BILIRUB SERPL-MCNC: <0.2 MG/DL (ref 0–1.2)
BUN BLDV-MCNC: 10 MG/DL (ref 6–23)
CALCIUM SERPL-MCNC: 9.7 MG/DL (ref 8.6–10.2)
CHLORIDE BLD-SCNC: 97 MMOL/L (ref 98–107)
CO2: 35 MMOL/L (ref 22–29)
CREAT SERPL-MCNC: 0.6 MG/DL (ref 0.5–1)
EOSINOPHILS ABSOLUTE: 0.05 E9/L (ref 0.05–0.5)
EOSINOPHILS RELATIVE PERCENT: 0.8 % (ref 0–6)
GFR AFRICAN AMERICAN: >60
GFR NON-AFRICAN AMERICAN: >60 ML/MIN/1.73
GLUCOSE BLD-MCNC: 113 MG/DL (ref 74–99)
HCT VFR BLD CALC: 28.1 % (ref 34–48)
HEMOGLOBIN: 8.2 G/DL (ref 11.5–15.5)
IMMATURE GRANULOCYTES #: 0.02 E9/L
IMMATURE GRANULOCYTES %: 0.3 % (ref 0–5)
LACTIC ACID: 0.7 MMOL/L (ref 0.5–2.2)
LIPASE: 130 U/L (ref 13–60)
LYMPHOCYTES ABSOLUTE: 0.96 E9/L (ref 1.5–4)
LYMPHOCYTES RELATIVE PERCENT: 15.4 % (ref 20–42)
MCH RBC QN AUTO: 25.5 PG (ref 26–35)
MCHC RBC AUTO-ENTMCNC: 29.2 % (ref 32–34.5)
MCV RBC AUTO: 87.3 FL (ref 80–99.9)
MONOCYTES ABSOLUTE: 0.41 E9/L (ref 0.1–0.95)
MONOCYTES RELATIVE PERCENT: 6.6 % (ref 2–12)
NEUTROPHILS ABSOLUTE: 4.75 E9/L (ref 1.8–7.3)
NEUTROPHILS RELATIVE PERCENT: 76.4 % (ref 43–80)
PDW BLD-RTO: 15.1 FL (ref 11.5–15)
PLATELET # BLD: 311 E9/L (ref 130–450)
PMV BLD AUTO: 9.5 FL (ref 7–12)
POTASSIUM REFLEX MAGNESIUM: 4.5 MMOL/L (ref 3.5–5)
RBC # BLD: 3.22 E12/L (ref 3.5–5.5)
SODIUM BLD-SCNC: 139 MMOL/L (ref 132–146)
TOTAL PROTEIN: 7.1 G/DL (ref 6.4–8.3)
TROPONIN, HIGH SENSITIVITY: 10 NG/L (ref 0–9)
TROPONIN, HIGH SENSITIVITY: 12 NG/L (ref 0–9)
WBC # BLD: 6.2 E9/L (ref 4.5–11.5)

## 2022-05-29 PROCEDURE — 96375 TX/PRO/DX INJ NEW DRUG ADDON: CPT

## 2022-05-29 PROCEDURE — 96361 HYDRATE IV INFUSION ADD-ON: CPT

## 2022-05-29 PROCEDURE — 93005 ELECTROCARDIOGRAM TRACING: CPT | Performed by: STUDENT IN AN ORGANIZED HEALTH CARE EDUCATION/TRAINING PROGRAM

## 2022-05-29 PROCEDURE — 80053 COMPREHEN METABOLIC PANEL: CPT

## 2022-05-29 PROCEDURE — 99285 EMERGENCY DEPT VISIT HI MDM: CPT

## 2022-05-29 PROCEDURE — 85025 COMPLETE CBC W/AUTO DIFF WBC: CPT

## 2022-05-29 PROCEDURE — 2580000003 HC RX 258: Performed by: STUDENT IN AN ORGANIZED HEALTH CARE EDUCATION/TRAINING PROGRAM

## 2022-05-29 PROCEDURE — 83605 ASSAY OF LACTIC ACID: CPT

## 2022-05-29 PROCEDURE — 84484 ASSAY OF TROPONIN QUANT: CPT

## 2022-05-29 PROCEDURE — 83690 ASSAY OF LIPASE: CPT

## 2022-05-29 PROCEDURE — 6360000002 HC RX W HCPCS: Performed by: STUDENT IN AN ORGANIZED HEALTH CARE EDUCATION/TRAINING PROGRAM

## 2022-05-29 PROCEDURE — 71045 X-RAY EXAM CHEST 1 VIEW: CPT

## 2022-05-29 PROCEDURE — 74177 CT ABD & PELVIS W/CONTRAST: CPT

## 2022-05-29 PROCEDURE — 6360000004 HC RX CONTRAST MEDICATION: Performed by: RADIOLOGY

## 2022-05-29 PROCEDURE — 76705 ECHO EXAM OF ABDOMEN: CPT

## 2022-05-29 PROCEDURE — 96374 THER/PROPH/DIAG INJ IV PUSH: CPT

## 2022-05-29 RX ORDER — SODIUM CHLORIDE 0.9 % (FLUSH) 0.9 %
10 SYRINGE (ML) INJECTION PRN
Status: DISCONTINUED | OUTPATIENT
Start: 2022-05-29 | End: 2022-05-30 | Stop reason: HOSPADM

## 2022-05-29 RX ORDER — FENTANYL CITRATE 50 UG/ML
50 INJECTION, SOLUTION INTRAMUSCULAR; INTRAVENOUS ONCE
Status: COMPLETED | OUTPATIENT
Start: 2022-05-29 | End: 2022-05-29

## 2022-05-29 RX ORDER — 0.9 % SODIUM CHLORIDE 0.9 %
1000 INTRAVENOUS SOLUTION INTRAVENOUS ONCE
Status: COMPLETED | OUTPATIENT
Start: 2022-05-29 | End: 2022-05-30

## 2022-05-29 RX ORDER — KETOROLAC TROMETHAMINE 30 MG/ML
15 INJECTION, SOLUTION INTRAMUSCULAR; INTRAVENOUS ONCE
Status: COMPLETED | OUTPATIENT
Start: 2022-05-29 | End: 2022-05-29

## 2022-05-29 RX ADMIN — KETOROLAC TROMETHAMINE 15 MG: 30 INJECTION, SOLUTION INTRAMUSCULAR at 23:53

## 2022-05-29 RX ADMIN — IOPAMIDOL 90 ML: 755 INJECTION, SOLUTION INTRAVENOUS at 21:50

## 2022-05-29 RX ADMIN — SODIUM CHLORIDE 1000 ML: 9 INJECTION, SOLUTION INTRAVENOUS at 23:47

## 2022-05-29 RX ADMIN — FENTANYL CITRATE 50 MCG: 50 INJECTION, SOLUTION INTRAMUSCULAR; INTRAVENOUS at 23:53

## 2022-05-29 ASSESSMENT — ENCOUNTER SYMPTOMS
VOMITING: 0
COUGH: 0
BACK PAIN: 0
DIARRHEA: 0
NAUSEA: 0
ABDOMINAL PAIN: 1
BLOOD IN STOOL: 0
COLOR CHANGE: 0
CONSTIPATION: 0
SORE THROAT: 0
CHEST TIGHTNESS: 0
ABDOMINAL DISTENTION: 0
SHORTNESS OF BREATH: 0
RHINORRHEA: 0
PHOTOPHOBIA: 0
EYE PAIN: 0

## 2022-05-29 ASSESSMENT — PAIN SCALES - GENERAL: PAINLEVEL_OUTOF10: 6

## 2022-05-30 LAB
BACTERIA: ABNORMAL /HPF
BILIRUBIN URINE: NEGATIVE
BLOOD, URINE: NEGATIVE
CLARITY: ABNORMAL
COLOR: YELLOW
CRYSTALS, UA: ABNORMAL /HPF
GLUCOSE URINE: NEGATIVE MG/DL
KETONES, URINE: NEGATIVE MG/DL
LEUKOCYTE ESTERASE, URINE: NEGATIVE
NITRITE, URINE: POSITIVE
PH UA: 8.5 (ref 5–9)
PROTEIN UA: NEGATIVE MG/DL
RBC UA: ABNORMAL /HPF (ref 0–2)
SPECIFIC GRAVITY UA: 1.01 (ref 1–1.03)
UROBILINOGEN, URINE: 0.2 E.U./DL
WBC UA: ABNORMAL /HPF (ref 0–5)

## 2022-05-30 PROCEDURE — 87186 SC STD MICRODIL/AGAR DIL: CPT

## 2022-05-30 PROCEDURE — 81001 URINALYSIS AUTO W/SCOPE: CPT

## 2022-05-30 PROCEDURE — 87088 URINE BACTERIA CULTURE: CPT

## 2022-05-30 RX ORDER — CEFDINIR 300 MG/1
300 CAPSULE ORAL 2 TIMES DAILY
Qty: 14 CAPSULE | Refills: 0 | Status: SHIPPED | OUTPATIENT
Start: 2022-05-30 | End: 2022-06-06

## 2022-05-30 NOTE — ED PROVIDER NOTES
Name: Mark Alvarez   MRN: [id number]     --------------------------------------------- History of Present Illness ---------------------------------------------  5/29/22, Time: 11:08 PM EDT   Chief Complaint   Patient presents with    Chest Pain     pain under L breast x1 hour, denies cardiac hx      HPI    Mark Alvarez is a 69 y.o. female, with hx of COPD, who presents to the ED today for right lower rib pain, which began 2 hours ago. The patient describes this as intermittent and moderate in severity. Worse with movement, no relieving factors. The pt denies any associated recent illness, fever, lightheadedness, dizziness, HA, n/v, shortness of breath, GI or  complaints. Allg: Sulfa antibiotics   PCP: Stacy Hull MD.    Meds:   Current Facility-Administered Medications:     sodium chloride flush 0.9 % injection 10 mL, 10 mL, IntraVENous, PRN, Anupama Jackson, DO    Current Outpatient Medications:     amoxicillin (AMOXIL) 500 MG capsule, Take 500 mg by mouth 2 times daily, Disp: , Rfl:     clarithromycin (BIAXIN) 500 MG tablet, Take 500 mg by mouth 2 times daily, Disp: , Rfl:     gabapentin (NEURONTIN) 300 MG capsule, Take 1 capsule by mouth nightly for 30 days.  Intended supply: 30 days, Disp: 30 capsule, Rfl: 0    diclofenac sodium (VOLTAREN) 1 % GEL, Apply topically 4 times daily as needed for Pain, Disp: 100 g, Rfl: 2    QUEtiapine (SEROQUEL XR) 50 MG extended release tablet, Take 50 mg by mouth nightly, Disp: , Rfl:     fluticasone-umeclidin-vilant (TRELEGY ELLIPTA) 100-62.5-25 MCG/INH AEPB, Inhale 1 puff into the lungs daily, Disp: , Rfl:     albuterol sulfate  (90 Base) MCG/ACT inhaler, Inhale 1 puff into the lungs every 4 hours as needed (every 4-6 hours as needed) Instructed to take am of procedure if needed and bring dos, Disp: 1 Inhaler, Rfl: 5    lipase-protease-amylase (CREON) 80684-03289 units delayed release capsule, Take 1 capsule by mouth 3 times daily (with meals) (Patient taking differently: Take 2 capsules by mouth 4 times daily (with meals and nightly) ), Disp: 360 capsule, Rfl: 5    OXYGEN, Inhale 3 L into the lungs as needed Uses 3L night and during day prn, Disp: , Rfl:     vitamin D (ERGOCALCIFEROL) 40858 UNITS CAPS capsule, Take 50,000 Units by mouth once a week Saturday, Disp: , Rfl:     Multiple Vitamins-Iron (DAILY-FEDERICO/IRON) TABS, Take 1 tablet by mouth daily , Disp: , Rfl:     folic acid (FOLVITE) 1 MG tablet, Take 1 mg by mouth daily , Disp: , Rfl:     sertraline (ZOLOFT) 50 MG tablet, Take 1 tablet by mouth daily. (Patient taking differently: Take 50 mg by mouth nightly ), Disp: 30 tablet, Rfl: 0    alendronate (FOSAMAX) 70 MG tablet, Take 70 mg by mouth every 7 days Sunday, Disp: , Rfl:      Review of Systems   Constitutional: Negative for chills, fatigue and fever. HENT: Negative for congestion, rhinorrhea and sore throat. Eyes: Negative for photophobia, pain and visual disturbance. Respiratory: Negative for cough, chest tightness and shortness of breath. Cardiovascular: Negative for chest pain, palpitations and leg swelling. Gastrointestinal: Positive for abdominal pain (RUQ, lower right ribs). Negative for abdominal distention, blood in stool, constipation, diarrhea, nausea and vomiting. Genitourinary: Negative for difficulty urinating, dysuria, hematuria, vaginal bleeding and vaginal discharge. Musculoskeletal: Negative for back pain, neck pain and neck stiffness. Skin: Negative for color change, rash and wound. Neurological: Negative for dizziness, syncope, light-headedness and headaches. Psychiatric/Behavioral: Negative for agitation, behavioral problems and confusion. Physical Exam  Constitutional:       General: She is not in acute distress. Appearance: Normal appearance. She is normal weight. She is not ill-appearing, toxic-appearing or diaphoretic. HENT:      Head: Normocephalic and atraumatic.       Right Ear: External ear normal.      Left Ear: External ear normal.      Nose: Nose normal. No rhinorrhea. Mouth/Throat:      Pharynx: Oropharynx is clear. Eyes:      General: No scleral icterus. Right eye: No discharge. Left eye: No discharge. Extraocular Movements: Extraocular movements intact. Conjunctiva/sclera: Conjunctivae normal.      Pupils: Pupils are equal, round, and reactive to light. Cardiovascular:      Rate and Rhythm: Normal rate and regular rhythm. Pulses: Normal pulses. Pulmonary:      Effort: Pulmonary effort is normal. No respiratory distress. Breath sounds: Normal breath sounds. No stridor. Abdominal:      General: Abdomen is flat. There is no distension. Palpations: Abdomen is soft. Tenderness: There is abdominal tenderness (epigastric, RUQ). There is no guarding. Musculoskeletal:         General: No swelling or tenderness. Normal range of motion. Cervical back: Normal range of motion. Right lower leg: No edema. Left lower leg: No edema. Skin:     General: Skin is warm and dry. Capillary Refill: Capillary refill takes less than 2 seconds. Coloration: Skin is not jaundiced. Findings: No erythema or rash. Neurological:      General: No focal deficit present. Mental Status: She is alert and oriented to person, place, and time. Psychiatric:         Mood and Affect: Mood normal.         Behavior: Behavior normal.          Procedures     MDM  Number of Diagnoses or Management Options  Chronic anemia  Chronic pancreatitis, unspecified pancreatitis type ([address]  Rib pain on right side  Diagnosis management comments: Patient presents with epigastric abdominal pain. It was initially triaged as chest pain, but on examination, her pain is in the right upper quadrant and epigastric area. Labs and imaging reviewed. Lipase is mildly elevated. On reevaluation, she is resting comfortably.   Pain is controlled, all symptoms have resolved. She is tolerating p.o. She would like to go home. Cardiac evaluation reassuring. She has a nonacute abdomen. Given this, along with his findings, did not feel that further emergent evaluation was indicated. Patient was discharged. Patient is to follow-up with the PCP in 1 to 2 days. Patient is to have a repeat abdominal examination on the emergent basis if new or worsening symptoms arise. \         Amount and/or Complexity of Data Reviewed  Decide to obtain previous medical records or to obtain history from someone other than the patient: yes       EKG Interpretation  Interpreted by emergency department physician.    5/29/22  Time: 1924    Rate: 83  Axis: normal  MN: 196  QRS: 72  Qtc: 451  Rhythm: regular  Clinical Impression: NSR  Comparison to old EKG: Similar to prev 2/26/21        ED Course as of 05/30/22 0148   Sun May 29, 2022   2114 Troponin, High Sensitivity(!): 12  Second ordered. [PW]   2114 Lipase(!): 130 [PW]   2114 Hemoglobin Quant(!): 8.2  Chronic anemia, slightly lower than previous studies. [PW]   7769 CT ABDOMEN PELVIS W IV CONTRAST Additional Contrast? None  IMPRESSION:  Findings suggestive of chronic pancreatitis, without evidence of superimposed  acute pancreatitis.     Marked bladder wall thickening, with perivesical fat stranding, stable. This  may suggest a chronic process. However, correlation with urinalysis is  recommended to exclude an acute urinary tract infection. [PW]   Mon May 30, 2022   0136 Nitrite, Urine(!): POSITIVE [PW]      ED Course User Index  [PW] Birgit Carrion DO          --------------------------------------------- PAST HISTORY ---------------------------------------------  Past Medical History:  has a past medical history of Asthma, COPD (chronic obstructive pulmonary disease) ([facility] [address] Depression, Dysphagia, [place] catheter in place, and Oxygen dependent.     Past Surgical History:  has a past surgical history that includes Hysterectomy; Kidney surgery (Left, 06/16/2014); pr cystourethroscopy,biopsies (N/A, 9/14/2018); Colonoscopy; Upper gastrointestinal endoscopy (N/A, 5/10/2019); Upper gastrointestinal endoscopy (N/A, 6/28/2019); Upper gastrointestinal endoscopy (N/A, 6/28/2019); Colonoscopy (N/A, 9/4/2019); Upper gastrointestinal endoscopy (N/A, 5/19/2020); Upper gastrointestinal endoscopy (N/A, 10/25/2021); and Upper gastrointestinal endoscopy (N/A, 12/6/2021). Social History:  reports that she quit smoking about 2 years ago. She smoked 0.00 packs per day. She has never used smokeless tobacco. She reports that she does not drink alcohol and does not use drugs. Family History: family history includes Cancer (age of onset: [de-identified]) in her father; Diabetes in her brother; Kidney Disease in her brother. The patients home medications have been reviewed.     Allergies: Sulfa antibiotics    -------------------------------------------------- RESULTS -------------------------------------------------  Labs:  Results for orders placed or performed during the hospital encounter of 05/29/22   CBC with Auto Differential   Result Value Ref Range    WBC 6.2 4.5 - 11.5 E9/L    RBC 3.22 (L) 3.50 - 5.50 E12/L    Hemoglobin 8.2 (L) 11.5 - 15.5 g/dL    Hematocrit 28.1 (L) 34.0 - 48.0 %    MCV 87.3 80.0 - 99.9 fL    MCH 25.5 (L) 26.0 - 35.0 pg    MCHC 29.2 (L) 32.0 - 34.5 %    RDW 15.1 (H) 11.5 - 15.0 fL    Platelets 481 367 - 009 E9/L    MPV 9.5 7.0 - 12.0 fL    Neutrophils % 76.4 43.0 - 80.0 %    Immature Granulocytes % 0.3 0.0 - 5.0 %    Lymphocytes % 15.4 (L) 20.0 - 42.0 %    Monocytes % 6.6 2.0 - 12.0 %    Eosinophils % 0.8 0.0 - 6.0 %    Basophils % 0.5 0.0 - 2.0 %    Neutrophils Absolute 4.75 1.80 - 7.30 E9/L    Immature Granulocytes # 0.02 E9/L    Lymphocytes Absolute 0.96 (L) 1.50 - 4.00 E9/L    Monocytes Absolute 0.41 0.10 - 0.95 E9/L    Eosinophils Absolute 0.05 0.05 - 0.50 E9/L    Basophils Absolute 0.03 0.00 - 0.20 E9/L   Comprehensive Metabolic Panel w/ Reflex to MG   Result Value Ref Range    Sodium 139 132 - 146 mmol/L    Potassium reflex Magnesium 4.5 3.5 - 5.0 mmol/L    Chloride 97 (L) 98 - 107 mmol/L    CO2 35 (H) 22 - 29 mmol/L    Anion Gap 7 7 - 16 mmol/L    Glucose 113 (H) 74 - 99 mg/dL    BUN 10 6 - 23 mg/dL    CREATININE 0.6 0.5 - 1.0 mg/dL    GFR Non-African American >60 >=60 mL/min/1.73    GFR African American >60     Calcium 9.7 8.6 - 10.2 mg/dL    Total Protein 7.1 6.4 - 8.3 g/dL    Albumin 4.5 3.5 - 5.2 g/dL    Total Bilirubin <0.2 0.0 - 1.2 mg/dL    Alkaline Phosphatase 96 35 - 104 U/L    ALT 9 0 - 32 U/L    AST 12 0 - 31 U/L   Lipase   Result Value Ref Range    Lipase 130 (H) 13 - 60 U/L   Troponin   Result Value Ref Range    Troponin, High Sensitivity 12 (H) 0 - 9 ng/L   Lactic Acid   Result Value Ref Range    Lactic Acid 0.7 0.5 - 2.2 mmol/L   Troponin   Result Value Ref Range    Troponin, High Sensitivity 10 (H) 0 - 9 ng/L   Urinalysis   Result Value Ref Range    Color, UA Yellow Straw/Yellow    Clarity, UA SL CLOUDY Clear    Glucose, Ur Negative Negative mg/dL    Bilirubin Urine Negative Negative    Ketones, Urine Negative Negative mg/dL    Specific Gravity, UA 1.010 1.005 - 1.030    Blood, Urine Negative Negative    pH, UA 8.5 5.0 - 9.0    Protein, UA Negative Negative mg/dL    Urobilinogen, Urine 0.2 <2.0 E.U./dL    Nitrite, Urine POSITIVE (A) Negative    Leukocyte Esterase, Urine Negative Negative   Microscopic Urinalysis   Result Value Ref Range    WBC, UA 1-3 0 - 5 /HPF    RBC, UA 0-1 0 - 2 /HPF    Bacteria, UA MANY (A) None Seen /HPF    Crystals, UA Few (A) None Seen /HPF   EKG 12 Lead   Result Value Ref Range    Ventricular Rate 83 BPM    Atrial Rate 83 BPM    P-R Interval 196 ms    QRS Duration 72 ms    Q-T Interval 384 ms    QTc Calculation (Bazett) 451 ms    P Axis 82 degrees    R Axis 80 degrees    T Axis 72 degrees       Radiology:  CT ABDOMEN PELVIS W IV CONTRAST Additional Contrast? None   Final Result   Findings suggestive of chronic pancreatitis, without evidence of superimposed   acute pancreatitis. Marked bladder wall thickening, with perivesical fat stranding, stable. This   may suggest a chronic process. However, correlation with urinalysis is   recommended to exclude an acute urinary tract infection. Simple cyst within the left mid kidney. No further follow-up is required. Colonic diverticulosis, without diverticulitis. US GALLBLADDER RUQ   Final Result   Sonographically normal common bile duct and gallbladder. RECOMMENDATIONS:   Unavailable         XR CHEST 1 VIEW   Final Result   No acute pulmonary cardiac abnormalities. Grossly normal ribs. Rib series recommended if pain persists. ------------------------- NURSING NOTES AND VITALS REVIEWED ---------------------------  Date / Time Roomed:  5/29/2022  6:07 PM  ED Bed Assignment:  YUNI RAE/MIKE    The nursing notes within the ED encounter and vital signs as below have been reviewed. Patient Vitals for the past 8 hrs:   BP Temp Pulse Resp SpO2 Height Weight   05/29/22 2250 125/67 -- 80 17 98 % -- --   05/29/22 1758 (!) 145/76 98.2 °F (36.8 °C) 79 15 100 % 5' 7\" (1.702 m) 159 lb (72.1 kg)       Oxygen Saturation Interpretation: Normal    ------------------------------------------ED COURSE & MEDS GIVEN ------------------------------------------  ED Course as of 05/30/22 0148   Sun May 29, 2022   2114 Troponin, High Sensitivity(!): 12  Second ordered. [PW]   2114 Lipase(!): 130 [PW]   2114 Hemoglobin Quant(!): 8.2  Chronic anemia, slightly lower than previous studies. [PW]   4611 CT ABDOMEN PELVIS W IV CONTRAST Additional Contrast? None  IMPRESSION:  Findings suggestive of chronic pancreatitis, without evidence of superimposed  acute pancreatitis.     Marked bladder wall thickening, with perivesical fat stranding, stable. This  may suggest a chronic process.   However, correlation with urinalysis is  recommended to exclude an acute urinary tract infection. [PW]   Mon May 30, 2022   0136 Nitrite, Urine(!): POSITIVE [PW]      ED Course User Index  [PW] Birgit Carrion, DO        Medications   sodium chloride flush 0.9 % injection 10 mL (has no administration in time range)   iopamidol (ISOVUE-370) 76 % injection 90 mL (90 mLs IntraVENous Given 5/29/22 2150)   0.9 % sodium chloride bolus (0 mLs IntraVENous Stopped 5/30/22 0115)   ketorolac (TORADOL) injection 15 mg (15 mg IntraVENous Given 5/29/22 2353)   fentaNYL (SUBLIMAZE) injection 50 mcg (50 mcg IntraVENous Given 5/29/22 2353)       ------------------------------------------ PROGRESS NOTES ------------------------------------------  1:45 AM EDT  I have spoken with the patient and discussed todays results, in addition to providing specific details for the plan of care and counseling regarding the diagnosis and prognosis. Their questions are answered at this time and they are agreeable with the plan. I discussed at length with them reasons for immediate return here for re evaluation. They will followup with their PCP by calling their office tomorrow and were instructed to return to the ED for any new or worsening symptoms. --------------------------------- ADDITIONAL PROVIDER NOTES ---------------------------------  At this time the patient is without objective evidence of an acute process requiring hospitalization or inpatient management. They have remained hemodynamically stable throughout their entire ED visit and are stable for discharge with outpatient follow-up. The plan has been discussed in detail and they are aware of the specific conditions for emergent return, as well as the importance of follow-up. New Prescriptions    No medications on file       Diagnosis:  1. Rib pain on right side    2. Chronic pancreatitis, unspecified pancreatitis type ([facility] [address]    3. Chronic anemia        Disposition:  Patient's disposition: Discharge to home.   Patient's condition is stable. Milena Silva DO       *NOTE: This report was transcribed using voice recognition software. Every effort was made to ensure accuracy; however, inadvertent computerized transcription errors may be present. Milena Silva DO  Resident  05/30/22 0148    ATTENDING PROVIDER ATTESTATION:     Krishna Santiago presented to the emergency department for evaluation of Chest Pain (pain under L breast x1 hour, denies cardiac hx)   and was initially evaluated by the Medical Resident. See Original ED Note for H&P and ED course above. I have reviewed and discussed the case, including pertinent history (medical, surgical, family and social) and exam findings with the Medical Resident assigned to Krishna Santiago. I have personally performed and/or participated in the history, exam, medical decision making, and procedures and agree with all pertinent clinical information. I have reviewed my findings and recommendations with the assigned Medical Resident, Krishna Santiago and members of family present at the time of disposition. My findings/plan: The primary encounter diagnosis was Rib pain on right side. Diagnoses of Chronic pancreatitis, unspecified pancreatitis type ([address] and Chronic anemia were also pertinent to this visit.   Discharge Medication List as of 5/30/2022  1:47 AM        MD Raghu Goodwin MD  06/01/22 2051

## 2022-05-31 ENCOUNTER — TELEPHONE (OUTPATIENT)
Dept: HEMATOLOGY | Age: 67
End: 2022-05-31

## 2022-05-31 LAB
EKG ATRIAL RATE: 83 BPM
EKG P AXIS: 82 DEGREES
EKG P-R INTERVAL: 196 MS
EKG Q-T INTERVAL: 384 MS
EKG QRS DURATION: 72 MS
EKG QTC CALCULATION (BAZETT): 451 MS
EKG R AXIS: 80 DEGREES
EKG T AXIS: 72 DEGREES
EKG VENTRICULAR RATE: 83 BPM

## 2022-05-31 NOTE — TELEPHONE ENCOUNTER
I tried calling the patient on both her house phone and cell phone and no answer.  Dr Luke Hamilton wants patient to follow up in our office for dilated pancreatic duct/pancreatitis  Electronically signed by Yessy Varner MA on 5/31/2022 at 1:56 PM

## 2022-06-02 LAB
ORGANISM: ABNORMAL
URINE CULTURE, ROUTINE: ABNORMAL
URINE CULTURE, ROUTINE: ABNORMAL

## 2022-06-06 ENCOUNTER — TELEPHONE (OUTPATIENT)
Dept: HEMATOLOGY | Age: 67
End: 2022-06-06

## 2022-06-06 NOTE — TELEPHONE ENCOUNTER
Left message for patient to call and make follow up appt per Dr Kelely Spikes for follow up dilated pancreatic duct and pancreatitis  Electronically signed by Cristian Rushing MA on 6/6/2022 at 10:02 AM

## 2022-06-16 ENCOUNTER — OFFICE VISIT (OUTPATIENT)
Dept: HEMATOLOGY | Age: 67
End: 2022-06-16
Payer: COMMERCIAL

## 2022-06-16 VITALS
SYSTOLIC BLOOD PRESSURE: 140 MMHG | HEART RATE: 93 BPM | RESPIRATION RATE: 18 BRPM | OXYGEN SATURATION: 91 % | BODY MASS INDEX: 24.9 KG/M2 | HEIGHT: 67 IN | TEMPERATURE: 97.2 F | DIASTOLIC BLOOD PRESSURE: 71 MMHG

## 2022-06-16 DIAGNOSIS — K86.89 PANCREATIC DUCT DILATED: ICD-10-CM

## 2022-06-16 DIAGNOSIS — K86.1 CHRONIC CALCIFIC PANCREATITIS (HCC): Primary | ICD-10-CM

## 2022-06-16 PROCEDURE — G8427 DOCREV CUR MEDS BY ELIG CLIN: HCPCS | Performed by: TRANSPLANT SURGERY

## 2022-06-16 PROCEDURE — 99212 OFFICE O/P EST SF 10 MIN: CPT

## 2022-06-16 PROCEDURE — 1123F ACP DISCUSS/DSCN MKR DOCD: CPT | Performed by: TRANSPLANT SURGERY

## 2022-06-16 PROCEDURE — 1036F TOBACCO NON-USER: CPT | Performed by: TRANSPLANT SURGERY

## 2022-06-16 PROCEDURE — 99212 OFFICE O/P EST SF 10 MIN: CPT | Performed by: TRANSPLANT SURGERY

## 2022-06-16 PROCEDURE — 99213 OFFICE O/P EST LOW 20 MIN: CPT | Performed by: TRANSPLANT SURGERY

## 2022-06-16 PROCEDURE — G8420 CALC BMI NORM PARAMETERS: HCPCS | Performed by: TRANSPLANT SURGERY

## 2022-06-16 PROCEDURE — G8399 PT W/DXA RESULTS DOCUMENT: HCPCS | Performed by: TRANSPLANT SURGERY

## 2022-06-16 PROCEDURE — 3017F COLORECTAL CA SCREEN DOC REV: CPT | Performed by: TRANSPLANT SURGERY

## 2022-06-16 PROCEDURE — 1090F PRES/ABSN URINE INCON ASSESS: CPT | Performed by: TRANSPLANT SURGERY

## 2022-09-28 ENCOUNTER — HOSPITAL ENCOUNTER (OUTPATIENT)
Dept: GENERAL RADIOLOGY | Age: 67
Discharge: HOME | End: 2022-09-30
Payer: COMMERCIAL

## 2022-09-28 DIAGNOSIS — N64.4 MASTODYNIA: ICD-10-CM

## 2022-09-28 PROCEDURE — G0279 TOMOSYNTHESIS, MAMMO: HCPCS

## 2022-09-29 ENCOUNTER — TELEPHONE (OUTPATIENT)
Dept: BREAST CENTER | Age: 67
End: 2022-09-29

## 2022-09-29 NOTE — TELEPHONE ENCOUNTER
RN attempt to contact patient to schedule referral appointment from [facility]. RN reached patient sister Janie Cervantes and left detailed message of why was calling and office number for patient to call office back and set up appt.           Electronically signed by Perez Simon RN on 9/29/22 at 2:35 PM EDT

## 2022-10-10 ENCOUNTER — APPOINTMENT (OUTPATIENT)
Dept: GENERAL RADIOLOGY | Age: 67
DRG: 698 | End: 2022-10-10
Payer: COMMERCIAL

## 2022-10-10 ENCOUNTER — HOSPITAL ENCOUNTER (INPATIENT)
Age: 67
LOS: 10 days | Discharge: HOME HEALTH | DRG: 698 | End: 2022-10-20
Attending: EMERGENCY MEDICINE | Admitting: INTERNAL MEDICINE
Payer: COMMERCIAL

## 2022-10-10 DIAGNOSIS — J44.1 COPD EXACERBATION (HCC): Primary | ICD-10-CM

## 2022-10-10 DIAGNOSIS — R40.2423 GLASGOW COMA SCALE TOTAL SCORE 9-12, AT HOSPITAL ADMISSION: ICD-10-CM

## 2022-10-10 DIAGNOSIS — Z99.81 SUPPLEMENTAL OXYGEN DEPENDENT: ICD-10-CM

## 2022-10-10 DIAGNOSIS — J96.22 ACUTE ON CHRONIC RESPIRATORY FAILURE WITH HYPERCAPNIA (HCC): ICD-10-CM

## 2022-10-10 PROBLEM — I50.9 NEW ONSET OF CONGESTIVE HEART FAILURE (HCC): Status: ACTIVE | Noted: 2022-10-10

## 2022-10-10 PROBLEM — R77.8 ELEVATED TROPONIN: Status: ACTIVE | Noted: 2022-10-10

## 2022-10-10 PROBLEM — R79.89 ELEVATED TROPONIN: Status: ACTIVE | Noted: 2022-10-10

## 2022-10-10 PROBLEM — R94.31 PROLONGED Q-T INTERVAL ON ECG: Status: ACTIVE | Noted: 2022-10-10

## 2022-10-10 LAB
ALBUMIN SERPL-MCNC: 4.3 G/DL (ref 3.5–5.2)
ALP BLD-CCNC: 82 U/L (ref 35–104)
ALT SERPL-CCNC: 16 U/L (ref 0–32)
ANION GAP SERPL CALCULATED.3IONS-SCNC: 13 MMOL/L (ref 7–16)
ANISOCYTOSIS: ABNORMAL
AST SERPL-CCNC: 21 U/L (ref 0–31)
BACTERIA: ABNORMAL /HPF
BASOPHILS ABSOLUTE: 0.02 E9/L (ref 0–0.2)
BASOPHILS RELATIVE PERCENT: 0.2 % (ref 0–2)
BILIRUB SERPL-MCNC: 0.2 MG/DL (ref 0–1.2)
BILIRUBIN URINE: ABNORMAL
BLOOD, URINE: NEGATIVE
BUN BLDV-MCNC: 35 MG/DL (ref 6–23)
CALCIUM SERPL-MCNC: 10 MG/DL (ref 8.6–10.2)
CHLORIDE BLD-SCNC: 90 MMOL/L (ref 98–107)
CLARITY: ABNORMAL
CO2: 30 MMOL/L (ref 22–29)
COLOR: YELLOW
CREAT SERPL-MCNC: 0.9 MG/DL (ref 0.5–1)
CRYSTALS, UA: ABNORMAL /HPF
EOSINOPHILS ABSOLUTE: 0 E9/L (ref 0.05–0.5)
EOSINOPHILS RELATIVE PERCENT: 0 % (ref 0–6)
GFR AFRICAN AMERICAN: >60
GFR NON-AFRICAN AMERICAN: >60 ML/MIN/1.73
GLUCOSE BLD-MCNC: 171 MG/DL (ref 74–99)
GLUCOSE URINE: NEGATIVE MG/DL
HCT VFR BLD CALC: 29.8 % (ref 34–48)
HEMOGLOBIN: 8.5 G/DL (ref 11.5–15.5)
HYPOCHROMIA: ABNORMAL
IMMATURE GRANULOCYTES #: 0.06 E9/L
IMMATURE GRANULOCYTES %: 0.6 % (ref 0–5)
KETONES, URINE: NEGATIVE MG/DL
LEUKOCYTE ESTERASE, URINE: ABNORMAL
LYMPHOCYTES ABSOLUTE: 0.81 E9/L (ref 1.5–4)
LYMPHOCYTES RELATIVE PERCENT: 8.7 % (ref 20–42)
MCH RBC QN AUTO: 23.5 PG (ref 26–35)
MCHC RBC AUTO-ENTMCNC: 28.5 % (ref 32–34.5)
MCV RBC AUTO: 82.5 FL (ref 80–99.9)
MONOCYTES ABSOLUTE: 0.57 E9/L (ref 0.1–0.95)
MONOCYTES RELATIVE PERCENT: 6.1 % (ref 2–12)
NEUTROPHILS ABSOLUTE: 7.82 E9/L (ref 1.8–7.3)
NEUTROPHILS RELATIVE PERCENT: 84.4 % (ref 43–80)
NITRITE, URINE: NEGATIVE
OVALOCYTES: ABNORMAL
PDW BLD-RTO: 14.6 FL (ref 11.5–15)
PH UA: 8.5 (ref 5–9)
PLATELET # BLD: 361 E9/L (ref 130–450)
PMV BLD AUTO: 9.7 FL (ref 7–12)
POIKILOCYTES: ABNORMAL
POLYCHROMASIA: ABNORMAL
POTASSIUM SERPL-SCNC: 4.3 MMOL/L (ref 3.5–5)
PRO-BNP: 8856 PG/ML (ref 0–125)
PROTEIN UA: 100 MG/DL
RBC # BLD: 3.61 E12/L (ref 3.5–5.5)
RBC UA: ABNORMAL /HPF (ref 0–2)
SARS-COV-2, NAAT: NOT DETECTED
SCHISTOCYTES: ABNORMAL
SODIUM BLD-SCNC: 133 MMOL/L (ref 132–146)
SPECIFIC GRAVITY UA: 1.01 (ref 1–1.03)
STOMATOCYTES: ABNORMAL
TEAR DROP CELLS: ABNORMAL
TOTAL PROTEIN: 7.9 G/DL (ref 6.4–8.3)
TROPONIN, HIGH SENSITIVITY: 39 NG/L (ref 0–9)
TROPONIN, HIGH SENSITIVITY: 42 NG/L (ref 0–9)
UROBILINOGEN, URINE: 0.2 E.U./DL
WBC # BLD: 9.3 E9/L (ref 4.5–11.5)
WBC UA: ABNORMAL /HPF (ref 0–5)

## 2022-10-10 PROCEDURE — 83880 ASSAY OF NATRIURETIC PEPTIDE: CPT

## 2022-10-10 PROCEDURE — 6360000002 HC RX W HCPCS: Performed by: EMERGENCY MEDICINE

## 2022-10-10 PROCEDURE — 87635 SARS-COV-2 COVID-19 AMP PRB: CPT

## 2022-10-10 PROCEDURE — 36415 COLL VENOUS BLD VENIPUNCTURE: CPT

## 2022-10-10 PROCEDURE — 94664 DEMO&/EVAL PT USE INHALER: CPT

## 2022-10-10 PROCEDURE — 51702 INSERT TEMP BLADDER CATH: CPT

## 2022-10-10 PROCEDURE — 87186 SC STD MICRODIL/AGAR DIL: CPT

## 2022-10-10 PROCEDURE — 84484 ASSAY OF TROPONIN QUANT: CPT

## 2022-10-10 PROCEDURE — 87088 URINE BACTERIA CULTURE: CPT

## 2022-10-10 PROCEDURE — 81001 URINALYSIS AUTO W/SCOPE: CPT

## 2022-10-10 PROCEDURE — 71045 X-RAY EXAM CHEST 1 VIEW: CPT

## 2022-10-10 PROCEDURE — 2140000000 HC CCU INTERMEDIATE R&B

## 2022-10-10 PROCEDURE — 80053 COMPREHEN METABOLIC PANEL: CPT

## 2022-10-10 PROCEDURE — 99285 EMERGENCY DEPT VISIT HI MDM: CPT

## 2022-10-10 PROCEDURE — 87077 CULTURE AEROBIC IDENTIFY: CPT

## 2022-10-10 PROCEDURE — 96374 THER/PROPH/DIAG INJ IV PUSH: CPT

## 2022-10-10 PROCEDURE — 6370000000 HC RX 637 (ALT 250 FOR IP): Performed by: EMERGENCY MEDICINE

## 2022-10-10 PROCEDURE — 85025 COMPLETE CBC W/AUTO DIFF WBC: CPT

## 2022-10-10 PROCEDURE — 93005 ELECTROCARDIOGRAM TRACING: CPT | Performed by: EMERGENCY MEDICINE

## 2022-10-10 RX ORDER — GABAPENTIN 300 MG/1
300 CAPSULE ORAL NIGHTLY
Status: DISCONTINUED | OUTPATIENT
Start: 2022-10-10 | End: 2022-10-20 | Stop reason: HOSPADM

## 2022-10-10 RX ORDER — SODIUM CHLORIDE 9 MG/ML
INJECTION, SOLUTION INTRAVENOUS PRN
Status: DISCONTINUED | OUTPATIENT
Start: 2022-10-10 | End: 2022-10-11 | Stop reason: SDUPTHER

## 2022-10-10 RX ORDER — IPRATROPIUM BROMIDE AND ALBUTEROL SULFATE 2.5; .5 MG/3ML; MG/3ML
1 SOLUTION RESPIRATORY (INHALATION)
Status: DISCONTINUED | OUTPATIENT
Start: 2022-10-11 | End: 2022-10-20 | Stop reason: HOSPADM

## 2022-10-10 RX ORDER — METHYLPREDNISOLONE SODIUM SUCCINATE 125 MG/2ML
125 INJECTION, POWDER, LYOPHILIZED, FOR SOLUTION INTRAMUSCULAR; INTRAVENOUS ONCE
Status: COMPLETED | OUTPATIENT
Start: 2022-10-10 | End: 2022-10-10

## 2022-10-10 RX ORDER — METHYLPREDNISOLONE SODIUM SUCCINATE 40 MG/ML
40 INJECTION, POWDER, LYOPHILIZED, FOR SOLUTION INTRAMUSCULAR; INTRAVENOUS EVERY 6 HOURS
Status: DISCONTINUED | OUTPATIENT
Start: 2022-10-10 | End: 2022-10-12

## 2022-10-10 RX ORDER — SODIUM CHLORIDE 0.9 % (FLUSH) 0.9 %
5-40 SYRINGE (ML) INJECTION PRN
Status: DISCONTINUED | OUTPATIENT
Start: 2022-10-10 | End: 2022-10-11 | Stop reason: SDUPTHER

## 2022-10-10 RX ORDER — ENOXAPARIN SODIUM 100 MG/ML
40 INJECTION SUBCUTANEOUS DAILY
Status: DISCONTINUED | OUTPATIENT
Start: 2022-10-11 | End: 2022-10-20 | Stop reason: HOSPADM

## 2022-10-10 RX ORDER — MULTIVITAMIN WITH IRON
1 TABLET ORAL DAILY
Status: DISCONTINUED | OUTPATIENT
Start: 2022-10-11 | End: 2022-10-20 | Stop reason: HOSPADM

## 2022-10-10 RX ORDER — SODIUM CHLORIDE 0.9 % (FLUSH) 0.9 %
5-40 SYRINGE (ML) INJECTION EVERY 12 HOURS SCHEDULED
Status: DISCONTINUED | OUTPATIENT
Start: 2022-10-10 | End: 2022-10-11 | Stop reason: SDUPTHER

## 2022-10-10 RX ORDER — ARFORMOTEROL TARTRATE 15 UG/2ML
15 SOLUTION RESPIRATORY (INHALATION) 2 TIMES DAILY
Status: DISCONTINUED | OUTPATIENT
Start: 2022-10-11 | End: 2022-10-20 | Stop reason: HOSPADM

## 2022-10-10 RX ORDER — ACETAMINOPHEN 650 MG/1
650 SUPPOSITORY RECTAL EVERY 6 HOURS PRN
Status: DISCONTINUED | OUTPATIENT
Start: 2022-10-10 | End: 2022-10-11 | Stop reason: SDUPTHER

## 2022-10-10 RX ORDER — SENNA PLUS 8.6 MG/1
1 TABLET ORAL DAILY PRN
Status: DISCONTINUED | OUTPATIENT
Start: 2022-10-10 | End: 2022-10-20 | Stop reason: HOSPADM

## 2022-10-10 RX ORDER — PREDNISONE 20 MG/1
40 TABLET ORAL DAILY
Status: DISCONTINUED | OUTPATIENT
Start: 2022-10-13 | End: 2022-10-12

## 2022-10-10 RX ORDER — BUDESONIDE 0.5 MG/2ML
0.5 INHALANT ORAL 2 TIMES DAILY
Status: DISCONTINUED | OUTPATIENT
Start: 2022-10-11 | End: 2022-10-20 | Stop reason: HOSPADM

## 2022-10-10 RX ORDER — IPRATROPIUM BROMIDE AND ALBUTEROL SULFATE 2.5; .5 MG/3ML; MG/3ML
3 SOLUTION RESPIRATORY (INHALATION) ONCE
Status: COMPLETED | OUTPATIENT
Start: 2022-10-10 | End: 2022-10-10

## 2022-10-10 RX ORDER — ACETAMINOPHEN 325 MG/1
650 TABLET ORAL EVERY 6 HOURS PRN
Status: DISCONTINUED | OUTPATIENT
Start: 2022-10-10 | End: 2022-10-11 | Stop reason: SDUPTHER

## 2022-10-10 RX ORDER — ALBUTEROL SULFATE 2.5 MG/3ML
2.5 SOLUTION RESPIRATORY (INHALATION) EVERY 6 HOURS PRN
Status: DISCONTINUED | OUTPATIENT
Start: 2022-10-10 | End: 2022-10-20 | Stop reason: HOSPADM

## 2022-10-10 RX ORDER — QUETIAPINE FUMARATE 50 MG/1
50 TABLET, EXTENDED RELEASE ORAL NIGHTLY
Status: DISCONTINUED | OUTPATIENT
Start: 2022-10-10 | End: 2022-10-20 | Stop reason: HOSPADM

## 2022-10-10 RX ORDER — FOLIC ACID 1 MG/1
1 TABLET ORAL DAILY
Status: DISCONTINUED | OUTPATIENT
Start: 2022-10-11 | End: 2022-10-20 | Stop reason: HOSPADM

## 2022-10-10 RX ADMIN — METHYLPREDNISOLONE SODIUM SUCCINATE 125 MG: 125 INJECTION, POWDER, FOR SOLUTION INTRAMUSCULAR; INTRAVENOUS at 19:58

## 2022-10-10 RX ADMIN — IPRATROPIUM BROMIDE AND ALBUTEROL SULFATE 3 AMPULE: .5; 3 SOLUTION RESPIRATORY (INHALATION) at 20:25

## 2022-10-10 ASSESSMENT — LIFESTYLE VARIABLES
HOW OFTEN DO YOU HAVE A DRINK CONTAINING ALCOHOL: NEVER
HOW MANY STANDARD DRINKS CONTAINING ALCOHOL DO YOU HAVE ON A TYPICAL DAY: PATIENT DOES NOT DRINK

## 2022-10-10 ASSESSMENT — PAIN SCALES - GENERAL: PAINLEVEL_OUTOF10: 7

## 2022-10-10 ASSESSMENT — PAIN - FUNCTIONAL ASSESSMENT: PAIN_FUNCTIONAL_ASSESSMENT: 0-10

## 2022-10-10 NOTE — ED NOTES
To ED via EMS from home, pt c/o body aches x 5 days, 1 episode of vomiting last night and states her baseline SOB has been worsening over the past 3-5 days. Pt does wear 3L O2 via NC at home. She is A&Ox4. Neuro intact.       Kalia Arreola, JUN  10/10/22 0811

## 2022-10-11 ENCOUNTER — APPOINTMENT (OUTPATIENT)
Dept: GENERAL RADIOLOGY | Age: 67
DRG: 698 | End: 2022-10-11
Payer: COMMERCIAL

## 2022-10-11 LAB
AADO2: 116.5 MMHG
AADO2: 92.4 MMHG
AADO2: 93.5 MMHG
AADO2: 94.8 MMHG
ACETAMINOPHEN LEVEL: <5 MCG/ML (ref 10–30)
ALBUMIN SERPL-MCNC: 4.2 G/DL (ref 3.5–5.2)
ALP BLD-CCNC: 85 U/L (ref 35–104)
ALT SERPL-CCNC: 16 U/L (ref 0–32)
AMPHETAMINE SCREEN, URINE: NOT DETECTED
ANION GAP SERPL CALCULATED.3IONS-SCNC: 10 MMOL/L (ref 7–16)
ANION GAP SERPL CALCULATED.3IONS-SCNC: 12 MMOL/L (ref 7–16)
ANISOCYTOSIS: ABNORMAL
ANISOCYTOSIS: ABNORMAL
AST SERPL-CCNC: 17 U/L (ref 0–31)
ATYPICAL LYMPHOCYTE RELATIVE PERCENT: 0.9 % (ref 0–4)
B.E.: 2.1 MMOL/L (ref -3–3)
B.E.: 2.4 MMOL/L (ref -3–3)
B.E.: 4.7 MMOL/L (ref -3–3)
B.E.: 4.7 MMOL/L (ref -3–3)
B.E.: 6.6 MMOL/L (ref -3–3)
B.E.: 7.3 MMOL/L (ref -3–3)
BACTERIA: ABNORMAL /HPF
BARBITURATE SCREEN URINE: NOT DETECTED
BASOPHILS ABSOLUTE: 0 E9/L (ref 0–0.2)
BASOPHILS ABSOLUTE: 0 E9/L (ref 0–0.2)
BASOPHILS RELATIVE PERCENT: 0.4 % (ref 0–2)
BASOPHILS RELATIVE PERCENT: 0.4 % (ref 0–2)
BENZODIAZEPINE SCREEN, URINE: NOT DETECTED
BILIRUB SERPL-MCNC: 0.2 MG/DL (ref 0–1.2)
BILIRUBIN DIRECT: <0.2 MG/DL (ref 0–0.3)
BILIRUBIN URINE: NEGATIVE
BILIRUBIN, INDIRECT: NORMAL MG/DL (ref 0–1)
BLOOD, URINE: ABNORMAL
BUN BLDV-MCNC: 31 MG/DL (ref 6–23)
BUN BLDV-MCNC: 31 MG/DL (ref 6–23)
CALCIUM SERPL-MCNC: 10.1 MG/DL (ref 8.6–10.2)
CALCIUM SERPL-MCNC: 10.2 MG/DL (ref 8.6–10.2)
CANNABINOID SCREEN URINE: NOT DETECTED
CHLORIDE BLD-SCNC: 91 MMOL/L (ref 98–107)
CHLORIDE BLD-SCNC: 92 MMOL/L (ref 98–107)
CLARITY: ABNORMAL
CO2: 31 MMOL/L (ref 22–29)
CO2: 32 MMOL/L (ref 22–29)
COCAINE METABOLITE SCREEN URINE: NOT DETECTED
COHB: 0.6 % (ref 0–1.5)
COHB: 0.7 % (ref 0–1.5)
COHB: 0.8 % (ref 0–1.5)
COHB: 0.9 % (ref 0–1.5)
COHB: 1.3 % (ref 0–1.5)
COLOR: YELLOW
COMMENT: ABNORMAL
CORTISOL TOTAL: 19.97 MCG/DL (ref 2.68–18.4)
CREAT SERPL-MCNC: 0.7 MG/DL (ref 0.5–1)
CREAT SERPL-MCNC: 0.7 MG/DL (ref 0.5–1)
CRITICAL: ABNORMAL
DATE ANALYZED: ABNORMAL
DATE OF COLLECTION: ABNORMAL
EKG ATRIAL RATE: 106 BPM
EKG P AXIS: 78 DEGREES
EKG P-R INTERVAL: 136 MS
EKG Q-T INTERVAL: 382 MS
EKG QRS DURATION: 72 MS
EKG QTC CALCULATION (BAZETT): 507 MS
EKG R AXIS: 71 DEGREES
EKG T AXIS: 5 DEGREES
EKG VENTRICULAR RATE: 106 BPM
EOSINOPHILS ABSOLUTE: 0 E9/L (ref 0.05–0.5)
EOSINOPHILS ABSOLUTE: 0 E9/L (ref 0.05–0.5)
EOSINOPHILS RELATIVE PERCENT: 0 % (ref 0–6)
EOSINOPHILS RELATIVE PERCENT: 0 % (ref 0–6)
EPITHELIAL CELLS, UA: ABNORMAL /HPF
ETHANOL: <10 MG/DL (ref 0–0.08)
FENTANYL SCREEN, URINE: NOT DETECTED
FIO2: 40 %
FOLATE: 12.1 NG/ML (ref 4.8–24.2)
GFR AFRICAN AMERICAN: >60
GFR AFRICAN AMERICAN: >60
GFR NON-AFRICAN AMERICAN: >60 ML/MIN/1.73
GFR NON-AFRICAN AMERICAN: >60 ML/MIN/1.73
GLUCOSE BLD-MCNC: 157 MG/DL (ref 74–99)
GLUCOSE BLD-MCNC: 193 MG/DL (ref 74–99)
GLUCOSE URINE: NEGATIVE MG/DL
HCO3: 31.1 MMOL/L (ref 22–26)
HCO3: 32.9 MMOL/L (ref 22–26)
HCO3: 33.3 MMOL/L (ref 22–26)
HCO3: 34.3 MMOL/L (ref 22–26)
HCO3: 35.3 MMOL/L (ref 22–26)
HCO3: 37.3 MMOL/L (ref 22–26)
HCT VFR BLD CALC: 30.9 % (ref 34–48)
HCT VFR BLD CALC: 33.2 % (ref 34–48)
HEMOGLOBIN: 8.6 G/DL (ref 11.5–15.5)
HEMOGLOBIN: 9.2 G/DL (ref 11.5–15.5)
HHB: 15.1 % (ref 0–5)
HHB: 3.1 % (ref 0–5)
HHB: 3.7 % (ref 0–5)
HHB: 4.4 % (ref 0–5)
HHB: 4.6 % (ref 0–5)
HYPOCHROMIA: ABNORMAL
HYPOCHROMIA: ABNORMAL
KETONES, URINE: NEGATIVE MG/DL
LAB: ABNORMAL
LACTIC ACID: 1.4 MMOL/L (ref 0.5–2.2)
LEUKOCYTE ESTERASE, URINE: NEGATIVE
LYMPHOCYTES ABSOLUTE: 0.15 E9/L (ref 1.5–4)
LYMPHOCYTES ABSOLUTE: 0.4 E9/L (ref 1.5–4)
LYMPHOCYTES RELATIVE PERCENT: 1.7 % (ref 20–42)
LYMPHOCYTES RELATIVE PERCENT: 4.3 % (ref 20–42)
Lab: ABNORMAL
Lab: NORMAL
MAGNESIUM: 2.6 MG/DL (ref 1.6–2.6)
MAGNESIUM: 2.6 MG/DL (ref 1.6–2.6)
MCH RBC QN AUTO: 23.2 PG (ref 26–35)
MCH RBC QN AUTO: 23.6 PG (ref 26–35)
MCHC RBC AUTO-ENTMCNC: 27.7 % (ref 32–34.5)
MCHC RBC AUTO-ENTMCNC: 27.8 % (ref 32–34.5)
MCV RBC AUTO: 83.5 FL (ref 80–99.9)
MCV RBC AUTO: 85.1 FL (ref 80–99.9)
METAMYELOCYTES RELATIVE PERCENT: 2.6 % (ref 0–1)
METER GLUCOSE: 175 MG/DL (ref 74–99)
METER GLUCOSE: 180 MG/DL (ref 74–99)
METHADONE SCREEN, URINE: NOT DETECTED
METHB: 0.3 % (ref 0–1.5)
METHB: 0.3 % (ref 0–1.5)
METHB: 0.4 % (ref 0–1.5)
METHB: 0.5 % (ref 0–1.5)
METHB: 0.5 % (ref 0–1.5)
MODE: ABNORMAL
MONOCYTES ABSOLUTE: 0.23 E9/L (ref 0.1–0.95)
MONOCYTES ABSOLUTE: 0.24 E9/L (ref 0.1–0.95)
MONOCYTES RELATIVE PERCENT: 2.6 % (ref 2–12)
MONOCYTES RELATIVE PERCENT: 2.6 % (ref 2–12)
NEUTROPHILS ABSOLUTE: 7.3 E9/L (ref 1.8–7.3)
NEUTROPHILS ABSOLUTE: 7.36 E9/L (ref 1.8–7.3)
NEUTROPHILS RELATIVE PERCENT: 89.6 % (ref 43–80)
NEUTROPHILS RELATIVE PERCENT: 95.7 % (ref 43–80)
NITRITE, URINE: POSITIVE
NUCLEATED RED BLOOD CELLS: 2.6 /100 WBC
NUCLEATED RED BLOOD CELLS: 4.3 /100 WBC
O2 CONTENT: 12.6 ML/DL
O2 CONTENT: 12.9 ML/DL
O2 SATURATION: 82.5 % (ref 92–98.5)
O2 SATURATION: 93.7 % (ref 92–98.5)
O2 SATURATION: 95.3 % (ref 92–98.5)
O2 SATURATION: 95.5 % (ref 92–98.5)
O2 SATURATION: 95.8 % (ref 92–98.5)
O2 SATURATION: 96.3 % (ref 92–98.5)
O2HB: 83.3 % (ref 94–97)
O2HB: 94.3 % (ref 94–97)
O2HB: 94.4 % (ref 94–97)
O2HB: 95.1 % (ref 94–97)
O2HB: 95.7 % (ref 94–97)
OPERATOR ID: 1210
OPERATOR ID: 2067
OPERATOR ID: 2067
OPERATOR ID: ABNORMAL
OPIATE SCREEN URINE: NOT DETECTED
OVALOCYTES: ABNORMAL
OXYCODONE URINE: NOT DETECTED
PATIENT TEMP: 37 C
PCO2: 100.1 MMHG (ref 35–45)
PCO2: 74 MMHG (ref 35–45)
PCO2: 76.2 MMHG (ref 35–45)
PCO2: 76.7 MMHG (ref 35–45)
PCO2: 85.7 MMHG (ref 35–45)
PCO2: 87.5 MMHG (ref 35–45)
PDW BLD-RTO: 14.6 FL (ref 11.5–15)
PDW BLD-RTO: 14.6 FL (ref 11.5–15)
PEEP/CPAP: 5 CMH2O
PFO2: 1.45 MMHG/%
PFO2: 2.05 MMHG/%
PFO2: 2.18 MMHG/%
PFO2: 2.37 MMHG/%
PFO2: 2.41 MMHG/%
PH BLOOD GAS: 7.13 (ref 7.35–7.45)
PH BLOOD GAS: 7.21 (ref 7.35–7.45)
PH BLOOD GAS: 7.23 (ref 7.35–7.45)
PH BLOOD GAS: 7.26 (ref 7.35–7.45)
PH BLOOD GAS: 7.26 (ref 7.35–7.45)
PH BLOOD GAS: 7.3 (ref 7.35–7.45)
PH UA: 6 (ref 5–9)
PHENCYCLIDINE SCREEN URINE: NOT DETECTED
PHOSPHORUS: 3.8 MG/DL (ref 2.5–4.5)
PLATELET # BLD: 339 E9/L (ref 130–450)
PLATELET # BLD: 350 E9/L (ref 130–450)
PMV BLD AUTO: 9.2 FL (ref 7–12)
PMV BLD AUTO: 9.9 FL (ref 7–12)
PO2: 58.1 MMHG (ref 75–100)
PO2: 81.8 MMHG (ref 75–100)
PO2: 87.3 MMHG (ref 75–100)
PO2: 94.6 MMHG (ref 75–100)
PO2: 96.6 MMHG (ref 75–100)
PO2: 99 MMHG (ref 75–100)
POIKILOCYTES: ABNORMAL
POIKILOCYTES: ABNORMAL
POLYCHROMASIA: ABNORMAL
POLYCHROMASIA: ABNORMAL
POTASSIUM SERPL-SCNC: 5 MMOL/L (ref 3.5–5)
POTASSIUM SERPL-SCNC: 5 MMOL/L (ref 3.5–5)
PRO-BNP: 4385 PG/ML (ref 0–125)
PROCALCITONIN: 0.21 NG/ML (ref 0–0.08)
PROCALCITONIN: 0.29 NG/ML (ref 0–0.08)
PROCALCITONIN: 0.3 NG/ML (ref 0–0.08)
PROTEIN UA: 100 MG/DL
RBC # BLD: 3.7 E12/L (ref 3.5–5.5)
RBC # BLD: 3.9 E12/L (ref 3.5–5.5)
RBC UA: ABNORMAL /HPF (ref 0–2)
RI(T): 0.97
RI(T): 0.98
RI(T): 1.16
RI(T): 2
RR MECHANICAL: 18 B/MIN
RR MECHANICAL: 22 B/MIN
RR MECHANICAL: 22 B/MIN
SALICYLATE, SERUM: <0.3 MG/DL (ref 0–30)
SODIUM BLD-SCNC: 133 MMOL/L (ref 132–146)
SODIUM BLD-SCNC: 135 MMOL/L (ref 132–146)
SOURCE, BLOOD GAS: ABNORMAL
SPECIFIC GRAVITY UA: 1.02 (ref 1–1.03)
STOMATOCYTES: ABNORMAL
STOMATOCYTES: ABNORMAL
T4 FREE: 1.26 NG/DL (ref 0.93–1.7)
TEAR DROP CELLS: ABNORMAL
THB: 9.1 G/DL (ref 11.5–16.5)
THB: 9.4 G/DL (ref 11.5–16.5)
THB: 9.5 G/DL (ref 11.5–16.5)
THB: 9.5 G/DL (ref 11.5–16.5)
THB: 9.6 G/DL (ref 11.5–16.5)
THB: ABNORMAL G/DL (ref 11.5–16.5)
TIME ANALYZED: 1209
TIME ANALYZED: 1229
TIME ANALYZED: 1402
TIME ANALYZED: 1540
TIME ANALYZED: 838
TIME ANALYZED: 945
TOTAL PROTEIN: 8 G/DL (ref 6.4–8.3)
TOXIC GRANULATION: ABNORMAL
TRICYCLIC ANTIDEPRESSANTS SCREEN SERUM: NEGATIVE NG/ML
TSH SERPL DL<=0.05 MIU/L-ACNC: 0.14 UIU/ML (ref 0.27–4.2)
UROBILINOGEN, URINE: 0.2 E.U./DL
VACUOLATED NEUTROPHILS: ABNORMAL
VITAMIN B-12: 605 PG/ML (ref 211–946)
VT MECHANICAL: 450 ML
WBC # BLD: 7.6 E9/L (ref 4.5–11.5)
WBC # BLD: 8 E9/L (ref 4.5–11.5)
WBC UA: ABNORMAL /HPF (ref 0–5)

## 2022-10-11 PROCEDURE — 6370000000 HC RX 637 (ALT 250 FOR IP): Performed by: NURSE PRACTITIONER

## 2022-10-11 PROCEDURE — 36600 WITHDRAWAL OF ARTERIAL BLOOD: CPT

## 2022-10-11 PROCEDURE — 2500000003 HC RX 250 WO HCPCS: Performed by: FAMILY MEDICINE

## 2022-10-11 PROCEDURE — 85025 COMPLETE CBC W/AUTO DIFF WBC: CPT

## 2022-10-11 PROCEDURE — 87449 NOS EACH ORGANISM AG IA: CPT

## 2022-10-11 PROCEDURE — 2500000003 HC RX 250 WO HCPCS

## 2022-10-11 PROCEDURE — 83735 ASSAY OF MAGNESIUM: CPT

## 2022-10-11 PROCEDURE — 82533 TOTAL CORTISOL: CPT

## 2022-10-11 PROCEDURE — 82077 ASSAY SPEC XCP UR&BREATH IA: CPT

## 2022-10-11 PROCEDURE — 6360000002 HC RX W HCPCS: Performed by: NURSE PRACTITIONER

## 2022-10-11 PROCEDURE — 82607 VITAMIN B-12: CPT

## 2022-10-11 PROCEDURE — 2580000003 HC RX 258: Performed by: INTERNAL MEDICINE

## 2022-10-11 PROCEDURE — 6360000002 HC RX W HCPCS

## 2022-10-11 PROCEDURE — 2580000003 HC RX 258: Performed by: NURSE PRACTITIONER

## 2022-10-11 PROCEDURE — 94660 CPAP INITIATION&MGMT: CPT

## 2022-10-11 PROCEDURE — 80143 DRUG ASSAY ACETAMINOPHEN: CPT

## 2022-10-11 PROCEDURE — 82803 BLOOD GASES ANY COMBINATION: CPT

## 2022-10-11 PROCEDURE — 80179 DRUG ASSAY SALICYLATE: CPT

## 2022-10-11 PROCEDURE — 84443 ASSAY THYROID STIM HORMONE: CPT

## 2022-10-11 PROCEDURE — 2000000000 HC ICU R&B

## 2022-10-11 PROCEDURE — 2700000000 HC OXYGEN THERAPY PER DAY

## 2022-10-11 PROCEDURE — 71045 X-RAY EXAM CHEST 1 VIEW: CPT

## 2022-10-11 PROCEDURE — 36415 COLL VENOUS BLD VENIPUNCTURE: CPT

## 2022-10-11 PROCEDURE — 84145 PROCALCITONIN (PCT): CPT

## 2022-10-11 PROCEDURE — 84100 ASSAY OF PHOSPHORUS: CPT

## 2022-10-11 PROCEDURE — 82746 ASSAY OF FOLIC ACID SERUM: CPT

## 2022-10-11 PROCEDURE — 84439 ASSAY OF FREE THYROXINE: CPT

## 2022-10-11 PROCEDURE — 82805 BLOOD GASES W/O2 SATURATION: CPT

## 2022-10-11 PROCEDURE — 83880 ASSAY OF NATRIURETIC PEPTIDE: CPT

## 2022-10-11 PROCEDURE — 6360000002 HC RX W HCPCS: Performed by: INTERNAL MEDICINE

## 2022-10-11 PROCEDURE — 80048 BASIC METABOLIC PNL TOTAL CA: CPT

## 2022-10-11 PROCEDURE — 80307 DRUG TEST PRSMV CHEM ANLYZR: CPT

## 2022-10-11 PROCEDURE — 94640 AIRWAY INHALATION TREATMENT: CPT

## 2022-10-11 PROCEDURE — 82962 GLUCOSE BLOOD TEST: CPT

## 2022-10-11 PROCEDURE — 81001 URINALYSIS AUTO W/SCOPE: CPT

## 2022-10-11 PROCEDURE — 83605 ASSAY OF LACTIC ACID: CPT

## 2022-10-11 PROCEDURE — 80076 HEPATIC FUNCTION PANEL: CPT

## 2022-10-11 RX ORDER — ACETAMINOPHEN 325 MG/1
650 TABLET ORAL EVERY 6 HOURS PRN
Status: DISCONTINUED | OUTPATIENT
Start: 2022-10-11 | End: 2022-10-20 | Stop reason: HOSPADM

## 2022-10-11 RX ORDER — ONDANSETRON 2 MG/ML
4 INJECTION INTRAMUSCULAR; INTRAVENOUS EVERY 6 HOURS PRN
Status: DISCONTINUED | OUTPATIENT
Start: 2022-10-11 | End: 2022-10-12

## 2022-10-11 RX ORDER — BUMETANIDE 0.25 MG/ML
1 INJECTION, SOLUTION INTRAMUSCULAR; INTRAVENOUS 2 TIMES DAILY
Status: DISCONTINUED | OUTPATIENT
Start: 2022-10-11 | End: 2022-10-11

## 2022-10-11 RX ORDER — SODIUM CHLORIDE 0.9 % (FLUSH) 0.9 %
5-40 SYRINGE (ML) INJECTION PRN
Status: DISCONTINUED | OUTPATIENT
Start: 2022-10-11 | End: 2022-10-20 | Stop reason: HOSPADM

## 2022-10-11 RX ORDER — SODIUM CHLORIDE 9 MG/ML
INJECTION, SOLUTION INTRAVENOUS PRN
Status: DISCONTINUED | OUTPATIENT
Start: 2022-10-11 | End: 2022-10-20 | Stop reason: HOSPADM

## 2022-10-11 RX ORDER — BUMETANIDE 0.25 MG/ML
1 INJECTION, SOLUTION INTRAMUSCULAR; INTRAVENOUS ONCE
Status: COMPLETED | OUTPATIENT
Start: 2022-10-11 | End: 2022-10-11

## 2022-10-11 RX ORDER — POLYETHYLENE GLYCOL 3350 17 G/17G
17 POWDER, FOR SOLUTION ORAL DAILY PRN
Status: DISCONTINUED | OUTPATIENT
Start: 2022-10-11 | End: 2022-10-20 | Stop reason: HOSPADM

## 2022-10-11 RX ORDER — SODIUM CHLORIDE 0.9 % (FLUSH) 0.9 %
5-40 SYRINGE (ML) INJECTION EVERY 12 HOURS SCHEDULED
Status: DISCONTINUED | OUTPATIENT
Start: 2022-10-11 | End: 2022-10-20 | Stop reason: HOSPADM

## 2022-10-11 RX ORDER — MAGNESIUM SULFATE 1 G/100ML
1000 INJECTION INTRAVENOUS ONCE
Status: COMPLETED | OUTPATIENT
Start: 2022-10-11 | End: 2022-10-11

## 2022-10-11 RX ORDER — ONDANSETRON 4 MG/1
4 TABLET, ORALLY DISINTEGRATING ORAL EVERY 8 HOURS PRN
Status: DISCONTINUED | OUTPATIENT
Start: 2022-10-11 | End: 2022-10-12

## 2022-10-11 RX ORDER — ACETAMINOPHEN 650 MG/1
650 SUPPOSITORY RECTAL EVERY 6 HOURS PRN
Status: DISCONTINUED | OUTPATIENT
Start: 2022-10-11 | End: 2022-10-20 | Stop reason: HOSPADM

## 2022-10-11 RX ADMIN — METHYLPREDNISOLONE SODIUM SUCCINATE 40 MG: 40 INJECTION, POWDER, FOR SOLUTION INTRAMUSCULAR; INTRAVENOUS at 12:33

## 2022-10-11 RX ADMIN — METHYLPREDNISOLONE SODIUM SUCCINATE 40 MG: 40 INJECTION, POWDER, FOR SOLUTION INTRAMUSCULAR; INTRAVENOUS at 17:16

## 2022-10-11 RX ADMIN — QUETIAPINE FUMARATE 50 MG: 50 TABLET, EXTENDED RELEASE ORAL at 21:21

## 2022-10-11 RX ADMIN — ARFORMOTEROL TARTRATE 15 MCG: 15 SOLUTION RESPIRATORY (INHALATION) at 19:39

## 2022-10-11 RX ADMIN — IPRATROPIUM BROMIDE AND ALBUTEROL SULFATE 1 AMPULE: .5; 2.5 SOLUTION RESPIRATORY (INHALATION) at 19:39

## 2022-10-11 RX ADMIN — MAGNESIUM SULFATE HEPTAHYDRATE 1000 MG: 1 INJECTION, SOLUTION INTRAVENOUS at 16:35

## 2022-10-11 RX ADMIN — IPRATROPIUM BROMIDE AND ALBUTEROL SULFATE 1 AMPULE: .5; 2.5 SOLUTION RESPIRATORY (INHALATION) at 15:48

## 2022-10-11 RX ADMIN — METHYLPREDNISOLONE SODIUM SUCCINATE 40 MG: 40 INJECTION, POWDER, FOR SOLUTION INTRAMUSCULAR; INTRAVENOUS at 04:21

## 2022-10-11 RX ADMIN — BUMETANIDE 1 MG: 0.25 INJECTION, SOLUTION INTRAMUSCULAR; INTRAVENOUS at 10:27

## 2022-10-11 RX ADMIN — BUMETANIDE 1 MG: 0.25 INJECTION, SOLUTION INTRAMUSCULAR; INTRAVENOUS at 18:45

## 2022-10-11 RX ADMIN — Medication 10 ML: at 12:34

## 2022-10-11 RX ADMIN — BUDESONIDE INHALATION SUSPENSION 500 MCG: 0.5 SUSPENSION RESPIRATORY (INHALATION) at 19:39

## 2022-10-11 RX ADMIN — SENNOSIDES 8.6 MG: 8.6 TABLET, FILM COATED ORAL at 21:04

## 2022-10-11 RX ADMIN — IPRATROPIUM BROMIDE AND ALBUTEROL SULFATE 1 AMPULE: .5; 2.5 SOLUTION RESPIRATORY (INHALATION) at 12:14

## 2022-10-11 RX ADMIN — BUDESONIDE INHALATION SUSPENSION 500 MCG: 0.5 SUSPENSION RESPIRATORY (INHALATION) at 08:03

## 2022-10-11 RX ADMIN — CEFTRIAXONE 1000 MG: 1 INJECTION, POWDER, FOR SOLUTION INTRAMUSCULAR; INTRAVENOUS at 17:16

## 2022-10-11 RX ADMIN — ARFORMOTEROL TARTRATE 15 MCG: 15 SOLUTION RESPIRATORY (INHALATION) at 08:03

## 2022-10-11 RX ADMIN — SERTRALINE HYDROCHLORIDE 50 MG: 50 TABLET ORAL at 21:04

## 2022-10-11 RX ADMIN — IPRATROPIUM BROMIDE AND ALBUTEROL SULFATE 1 AMPULE: .5; 2.5 SOLUTION RESPIRATORY (INHALATION) at 08:03

## 2022-10-11 RX ADMIN — Medication 10 ML: at 21:04

## 2022-10-11 RX ADMIN — GABAPENTIN 300 MG: 300 CAPSULE ORAL at 21:04

## 2022-10-11 ASSESSMENT — ENCOUNTER SYMPTOMS
DIARRHEA: 0
NAUSEA: 1
SHORTNESS OF BREATH: 1
SINUS PRESSURE: 0
COUGH: 0
BACK PAIN: 0
WHEEZING: 0
SORE THROAT: 0
ABDOMINAL DISTENTION: 0
EYE PAIN: 0
EYE DISCHARGE: 0
EYE REDNESS: 0
VOMITING: 1

## 2022-10-11 ASSESSMENT — PAIN - FUNCTIONAL ASSESSMENT: PAIN_FUNCTIONAL_ASSESSMENT: NONE - DENIES PAIN
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
## 2022-10-11 NOTE — ED PROVIDER NOTES
[facility]  Department of Emergency Medicine     Written by: Janette Rangel DO  Patient Name: Louis Ng Date: 10/10/2022  5:59 PM  MRN: [id number]                   : 1955        Chief Complaint   Patient presents with    Generalized Body Aches     Patient c/o bpdy wide pain x5 days with n/v beginning last night.    - Chief complaint    Patient is a 20-year-old female past medical history of COPD as well as depression. Patient noted to complain of generalized body aches as well as nausea, vomiting and mild shortness of breath. Patient stated symptoms began 5 days ago. Patient notes that she has had gradually worsening generalized body aches as well as nausea and vomiting. Patient describes the pain as a dull aching sensations currently rates pain 7 out of 10. Patient also notes that she has had mild shortness of breath. Stated symptoms are worsened with exertion she denies any relieving factors. Patient denies any similar episodes in the past.  Patient denies any fevers, chills, chest pain, abdominal pain, constipation or diarrhea. The history is provided by the patient. No  was used. Review of Systems   Constitutional:  Positive for fatigue. Negative for chills and fever. HENT:  Negative for ear pain, sinus pressure and sore throat. Eyes:  Negative for pain, discharge and redness. Respiratory:  Positive for shortness of breath. Negative for cough and wheezing. Cardiovascular:  Negative for chest pain. Gastrointestinal:  Positive for nausea and vomiting. Negative for abdominal distention and diarrhea. Genitourinary:  Negative for dysuria and frequency. Musculoskeletal:  Positive for arthralgias and myalgias. Negative for back pain. Skin:  Negative for rash and wound. Neurological:  Negative for weakness and headaches. Hematological:  Negative for adenopathy. All other systems reviewed and are negative. Physical Exam  Vitals and nursing note reviewed. Constitutional:       General: She is not in acute distress. Appearance: Normal appearance. HENT:      Head: Normocephalic and atraumatic. Nose: No congestion or rhinorrhea. Mouth/Throat:      Mouth: Mucous membranes are moist.      Pharynx: Oropharynx is clear. Eyes:      Extraocular Movements: Extraocular movements intact. Pupils: Pupils are equal, round, and reactive to light. Cardiovascular:      Rate and Rhythm: Normal rate and regular rhythm. Heart sounds: No murmur heard. No gallop. Pulmonary:      Effort: Pulmonary effort is normal. No respiratory distress. Breath sounds: Wheezing present. No rhonchi or rales. Abdominal:      General: Abdomen is flat. Palpations: Abdomen is soft. There is no mass. Tenderness: There is no abdominal tenderness. There is no guarding. Hernia: No hernia is present. Musculoskeletal:         General: No swelling, tenderness or signs of injury. Normal range of motion. Cervical back: Normal range of motion. No rigidity. No muscular tenderness. Skin:     General: Skin is warm and dry. Capillary Refill: Capillary refill takes less than 2 seconds. Neurological:      General: No focal deficit present. Mental Status: She is alert and oriented to person, place, and time. Mental status is at baseline. Psychiatric:         Mood and Affect: Mood normal.         Behavior: Behavior normal.        Procedures       MDM  Number of Diagnoses or Management Options  COPD exacerbation ([facility] [address]  Diagnosis management comments: Patient is a 64-year-old female past medical history COPD as well as depression. Patient presents with chief complaint of antibiotics as well as nausea, vomiting and shortness of breath. Vital signs stable presentations of for mild tachycardia.   Physical exam heart mildly tachycardic regular rhythm, lungs with wheezing bilaterally, abdomen soft nontender no rebound or guarding. EKG obtained demonstrated no acute ischemic changes. Laboratory work obtained CBC demonstrated mild anemia hemoglobin 8.5, CMP obtained demonstrated no acute abnormalities, troponin was obtained initially 42 on repeat 39.  proBNP elevated 8856, COVID-19 test was obtained and was negative. Chest x-ray obtained demonstrated mild bibasilar increased markings concerning for atelectasis. Patient given duo nebs as well as Solu-Medrol on reevaluation patient notes significant provement symptoms. Findings consistent with his body aches as well as nausea and vomiting setting of possible COPD exacerbation with elevated troponin and proBNP. Decision made to admit patient for cardiac evaluation as well as treatment of COPD exacerbation. Case discussed with ALVARADO who agreed to admit patient. Plan of care discussed with patient including admission, all questions were answered, patient was agreement plan of care and admitted to the hospital in stable condition. Amount and/or Complexity of Data Reviewed  Clinical lab tests: ordered and reviewed  Tests in the radiology section of CPT®: ordered and reviewed  Decide to obtain previous medical records or to obtain history from someone other than the patient: yes    Risk of Complications, Morbidity, and/or Mortality  Presenting problems: moderate  Diagnostic procedures: moderate  Management options: moderate    Patient Progress  Patient progress: stable         --------------------------------------------- PAST HISTORY ---------------------------------------------  Past Medical History:  has a past medical history of Asthma, COPD (chronic obstructive pulmonary disease) ([facility] [address] Depression, Dysphagia, [place] catheter in place, and Oxygen dependent. Past Surgical History:  has a past surgical history that includes Hysterectomy; Kidney surgery (Left, 06/16/2014); pr cystourethroscopy,biopsies (N/A, 9/14/2018); Colonoscopy;  Upper gastrointestinal endoscopy (N/A, 5/10/2019); Upper gastrointestinal endoscopy (N/A, 6/28/2019); Upper gastrointestinal endoscopy (N/A, 6/28/2019); Colonoscopy (N/A, 9/4/2019); Upper gastrointestinal endoscopy (N/A, 5/19/2020); Upper gastrointestinal endoscopy (N/A, 10/25/2021); and Upper gastrointestinal endoscopy (N/A, 12/6/2021). Social History:  reports that she quit smoking about 2 years ago. She has never used smokeless tobacco. She reports that she does not drink alcohol and does not use drugs. Family History: family history includes Cancer (age of onset: [de-identified]) in her father; Diabetes in her brother; Kidney Disease in her brother. The patients home medications have been reviewed.     Allergies: Sulfa antibiotics    -------------------------------------------------- RESULTS -------------------------------------------------    LABS:  Results for orders placed or performed during the hospital encounter of 10/10/22   COVID-19, Rapid    Specimen: Nasopharyngeal Swab   Result Value Ref Range    SARS-CoV-2, NAAT Not Detected Not Detected   CBC with Auto Differential   Result Value Ref Range    WBC 9.3 4.5 - 11.5 E9/L    RBC 3.61 3.50 - 5.50 E12/L    Hemoglobin 8.5 (L) 11.5 - 15.5 g/dL    Hematocrit 29.8 (L) 34.0 - 48.0 %    MCV 82.5 80.0 - 99.9 fL    MCH 23.5 (L) 26.0 - 35.0 pg    MCHC 28.5 (L) 32.0 - 34.5 %    RDW 14.6 11.5 - 15.0 fL    Platelets 910 482 - 766 E9/L    MPV 9.7 7.0 - 12.0 fL    Neutrophils % 84.4 (H) 43.0 - 80.0 %    Immature Granulocytes % 0.6 0.0 - 5.0 %    Lymphocytes % 8.7 (L) 20.0 - 42.0 %    Monocytes % 6.1 2.0 - 12.0 %    Eosinophils % 0.0 0.0 - 6.0 %    Basophils % 0.2 0.0 - 2.0 %    Neutrophils Absolute 7.82 (H) 1.80 - 7.30 E9/L    Immature Granulocytes # 0.06 E9/L    Lymphocytes Absolute 0.81 (L) 1.50 - 4.00 E9/L    Monocytes Absolute 0.57 0.10 - 0.95 E9/L    Eosinophils Absolute 0.00 (L) 0.05 - 0.50 E9/L    Basophils Absolute 0.02 0.00 - 0.20 E9/L    Anisocytosis 1+ Polychromasia 2+     Hypochromia 2+     Poikilocytes 2+     Schistocytes 1+     Ovalocytes 1+     Stomatocytes 2+     Tear Drop Cells 1+    Comprehensive Metabolic Panel   Result Value Ref Range    Sodium 133 132 - 146 mmol/L    Potassium 4.3 3.5 - 5.0 mmol/L    Chloride 90 (L) 98 - 107 mmol/L    CO2 30 (H) 22 - 29 mmol/L    Anion Gap 13 7 - 16 mmol/L    Glucose 171 (H) 74 - 99 mg/dL    BUN 35 (H) 6 - 23 mg/dL    Creatinine 0.9 0.5 - 1.0 mg/dL    GFR Non-African American >60 >=60 mL/min/1.73    GFR African American >60     Calcium 10.0 8.6 - 10.2 mg/dL    Total Protein 7.9 6.4 - 8.3 g/dL    Albumin 4.3 3.5 - 5.2 g/dL    Total Bilirubin 0.2 0.0 - 1.2 mg/dL    Alkaline Phosphatase 82 35 - 104 U/L    ALT 16 0 - 32 U/L    AST 21 0 - 31 U/L   Troponin   Result Value Ref Range    Troponin, High Sensitivity 42 (H) 0 - 9 ng/L   Brain Natriuretic Peptide   Result Value Ref Range    Pro-BNP 8,856 (H) 0 - 125 pg/mL   Troponin   Result Value Ref Range    Troponin, High Sensitivity 39 (H) 0 - 9 ng/L   Urinalysis with Microscopic   Result Value Ref Range    Color, UA Yellow Straw/Yellow    Clarity, UA TURBID (A) Clear    Glucose, Ur Negative Negative mg/dL    Bilirubin Urine SMALL (A) Negative    Ketones, Urine Negative Negative mg/dL    Specific Gravity, UA 1.015 1.005 - 1.030    Blood, Urine Negative Negative    pH, UA 8.5 5.0 - 9.0    Protein,  (A) Negative mg/dL    Urobilinogen, Urine 0.2 <2.0 E.U./dL    Nitrite, Urine Negative Negative    Leukocyte Esterase, Urine TRACE (A) Negative    WBC, UA 1-3 0 - 5 /HPF    RBC, UA NONE 0 - 2 /HPF    Bacteria, UA FEW (A) None Seen /HPF    Crystals, UA Moderate (A) None Seen /HPF   EKG 12 Lead   Result Value Ref Range    Ventricular Rate 106 BPM    Atrial Rate 106 BPM    P-R Interval 136 ms    QRS Duration 72 ms    Q-T Interval 382 ms    QTc Calculation (Bazett) 507 ms    P Axis 78 degrees    R Axis 71 degrees    T Axis 5 degrees       RADIOLOGY:  XR CHEST PORTABLE   Final Result Mild bibasilar increased markings consistent with subsegmental atelectasis. EKG:  This EKG is signed and interpreted by me. Rate: 106  Rhythm: Sinus  Interpretation: EKG obtained demonstrates sinus tachycardia, rate 106, normal axis, no acute ST segment changes. ,  Stable as compared to previous EKG  Comparison: stable as compared to patient's most recent EKG and no previous EKG available      ------------------------- NURSING NOTES AND VITALS REVIEWED ---------------------------  Date / Time Roomed:  10/10/2022  5:59 PM  ED Bed Assignment:  07/07    The nursing notes within the ED encounter and vital signs as below have been reviewed. Patient Vitals for the past 24 hrs:   BP Temp Pulse Resp SpO2 Height Weight   10/10/22 2330 (!) 156/83 -- 96 23 98 % -- --   10/10/22 2100 (!) 159/78 -- 100 23 99 % -- --   10/10/22 1810 139/69 -- (!) 106 20 95 % -- --   10/10/22 1800 (!) 140/78 97.6 °F (36.4 °C) (!) 108 18 95 % 5' 7\" (1.702 m) 165 lb (74.8 kg)       Oxygen Saturation Interpretation: Normal    ------------------------------------------ PROGRESS NOTES ------------------------------------------  Re-evaluation(s):  Time: 2230  Patients symptoms show no change  Repeat physical examination is not changed    Counseling:  I have spoken with the patient and discussed todays results, in addition to providing specific details for the plan of care and counseling regarding the diagnosis and prognosis. Their questions are answered at this time and they are agreeable with the plan of admission.    --------------------------------- ADDITIONAL PROVIDER NOTES ---------------------------------  Consultations:  Time: 2245. Spoke with ALVARADO. Discussed case. They will admit the patient.   This patient's ED course included: a personal history and physicial examination, re-evaluation prior to disposition, and multiple bedside re-evaluations    This patient has remained hemodynamically stable during their ED course. Diagnosis:  1. COPD exacerbation ([facility] [address]        Disposition:  Patient's disposition: Admit to telemetry  Patient's condition is stable. Patient was seen and evaluated by myself and my attending Tayo Bustos MD. Assessment and Plan discussed with attending provider, please see attestation for final plan of care.      Cindi Lynch, DO Brunilda Alpers, DO  Resident  10/11/22 [id number]

## 2022-10-11 NOTE — SIGNIFICANT EVENT
Rapid Response Team Note  Date of event: 10/11/2022   Time of event: 12:20 PM  Ayesha Keane 69y.o. year old female   YOB: 1955   Admit date:  10/10/2022   Location: 8729/7347-[id number]   Witnessed? : [x]Yes  [] No  Monitored? : [x]Yes  [] No  Code status: [x] Full  [] DNR-CCA  []DNR-CC  ______________________________________________________________________  Reason for RRT:    [] RR < 8     [] RR > 28   [] SpO2 <90%   [] HR < 40 bpm   [] HR > 130 bpm  [] SBP < 90 mmHg    [] SpO2 <90%   [] LOC   [] Seizures    [] Significant Bleeding Event    [x] Other: Abnormal ABG    Subjective:   CTSP regarding the above event, patient is a 55-year-old female with past medical history of asthma, COPD, depression, oxygen dependency. Patient presented to the ED last night complaining of generalized body aches as well as nausea and vomiting with mild shortness of breath. Patient's initial ABG with pH 7.135, PCO2 100 and patient was given duo nebs and Solu-Medrol  mg with significant improvement. Patient was transferred to the floor for further work-up and presented to the floor around 9 AM this morning. Upon arrival to the room, patient was laying in bed on AVAPS. Nurses were trying to obtain a repeat ABG. Patient does not have any accessory muscle use. Responds to pain but does not open eyes to verbal stimuli. Patient moving extremities spontaneously, but not following commands. Per the nursing staff, RRT was called due to the abnormal ABG that was likely venous, with CO2 87. Per nursing staff, mentation has been the same as when she arrived on the floor. Patient has significant wheezing bilaterally, has not received repeat steroid dose since 4 AM this morning.      Objective:   Vital signs: Temp: 98.1/BP: 126/65/RR: 18 /HR: 80  Initial Condition:  Conscious   [x] Yes  [] No     Breathing [x] Yes  [] No     Pulse  [x] Yes  [] No    Airway:   [x] Open/ Clear     Intervention: [x] None  [] Pooled secretions     [] Suctioned  [] Stridor      [] Intubation    Lungs:   [x] Symmetrical chest rise/ CTABL Intervention: [] None  [] Use of accessory muscles    [x] NIV (AVAPS)  [] Cyanosis      [] Nasal Oxygen/Mask  [x] Wheezing       [x] ABG             [] CXR  [] Other:     Circulation:   Rhythm:  [] Sinus [] Other:   Intervention: [] None            [] IV Access  [] Peripheral              [] Central            [] EKG            [] Cardioversion            [] Defibrillation     Capillary Refill:  [] > 2 seconds [] < 2 seconds    Neurologic:   [] NIHSS      [x] Pupillary Response:  Equal and responsive to light bilaterally   Response to pain:   [x] Yes  [] No  Follow commands:  [] Yes  [x] No  Facial asymmetry:  [] Yes  [x] No  Motor strength:  [x] Equal  [] Focal deficit:   Diagnostic Test:  Blood Sugar:  75 mg/dL  EKG:      ABG:      Lab Results   Component Value Date/Time    PH 7.256 10/11/2022 12:29 PM    PCO2 76.7 10/11/2022 12:29 PM    PO2 94.6 10/11/2022 12:29 PM    HCO3 33.3 10/11/2022 12:29 PM    O2SAT 95.8 10/11/2022 12:29 PM     Medication(s) Given:  []  Narcan (Naloxone)   []  Romazicon (Flumazenil)    [x]  Breathing Treatment    [x]  Steroids (Prednisone, Solu-Medrol)  []  Adenosine  [] Cardiac Medicines:     Infusion(s):   [] Normal Saline    Amount:  cc/hr      [] Lactate Ringers      [] Other:     Imaging:   [] CXR:   [] Normal         [] Pneumothorax         [] Pulmonary Edema  [] Infiltrate          [] CT Head  [] Normal          [] ICB          [] SAH          [] Other:     Laboratory Tests:   ABG:    Lab Results   Component Value Date/Time    PH 7.256 10/11/2022 12:29 PM    PCO2 76.7 10/11/2022 12:29 PM    PO2 94.6 10/11/2022 12:29 PM    HCO3 33.3 10/11/2022 12:29 PM    BE 4.7 10/11/2022 12:29 PM    O2SAT 95.8 10/11/2022 12:29 PM         Teams Assessment and Plan:  COPD exacerbation, with CO2 retention-chest x-ray unremarkable compared to prior. No baseline CO2 on file.   Mentation the same and not worsen. No fever or leukocytosis. Diffuse wheezing bilaterally. Received loading dose of Solu-Medrol (125 mg IV) at 2000 last night and Solu-Medrol 40 mg IV at 4 AM this morning. Patient received 1 dose of Bumex 1 mg IV at 1030 this morning. Received Pulmicort and Brovana 8 AM this morning. Patient due for another dose of Solu-Medrol and DuoNeb, receiving right now. Patient saturating well at 97%. Repeat ABG correlating more to arterial and has same CO2 level as earlier this morning, with pH 7.256, PCO2 76.7, PO2 94, bicarb 33, O2 sat 95, P/F 237 on AVAPS 450 tidal volume, 5 PEEP, respiratory rate 18. Mentation is the same compared to this morning. Plan to increase respiratory rate to 22 and repeat ABG in 1 hour. Plan discussed with nurse and respiratory therapy. Viral molecular panel ordered. Continue Brovana, Pulmicort, DuoNeb, Solu-Medrol. Acute encephalopathy, likely secondary to #1  ? ?  ?   Disposition:  [x] No transfer   [] Transfer to monitor floor  [] Transfer to: [] MICU [] NICU [] CCU [] SICU    Patients family updated: [x] Yes  [] No   Discussed with:  [] Critical Care Intensivist: Dr. Thao Chand      [x] Primary Care Provider: Dr. Dale Maldonado      [] Other: ?    Joselyn Malcolm MD PGY-3  10/11/2022 1:00 PM  Attending Physician: Dr Thao Chand
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
## 2022-10-11 NOTE — ED PROVIDER NOTES
10/11/22  8:50 AM EDT      Patient presently admitted and boarded in the department pending bed availability. Intervention required by emergency physician. Interval HPI: Called to bedside, patient admitted pending bed availability. Nursing reports they had spoke with the patient before, she was off her oxygen and not on the monitor approximately 30 minutes ago, was awake alert talking, on reassessment 30 minutes later she significantly altered and obtunded. Glucose 180, blood gas ordered shows PCO2 of 100. BiPAP ordered    Interval physical exam:     Constitutional/General: Somnolent but arousable to painful stimulation  Head: Normocephalic and atraumatic  Eyes: PERRL, EOMI, sclera non icteric  Mouth: Oropharynx clear, handling secretions,   Neck: Supple, full ROM, no stridor, no meningeal signs  Respiratory: Lungs diminished throughout not in respiratory distress    Cardiovascular:  Regular rate. Regular rhythm. 2+ distal pulses. Equal extremity pulses. GI:  Abdomen Soft, Non tender, Non distended. No rebound, guarding, or rigidity. Musculoskeletal: Moves all extremities x 4. Warm and well perfused,  Capillary refill <3 seconds  Integument: skin warm and dry. No rashes.    Neurologic: Glascow Coma Scale  Best Eye Response 2 - Opens eyes with pain   Best Verbal Response 2 - Moans, makes unintelligible sounds   Best Motor Response 4 - Moves part of body but does not remove noxious stimulus   Total 8         Medications   folic acid (FOLVITE) tablet 1 mg (has no administration in time range)   gabapentin (NEURONTIN) capsule 300 mg (300 mg Oral Not Given 10/11/22 0417)   lipase-protease-amylase (CREON) delayed release capsule 48,000 Units (48,000 Units Oral Not Given 10/11/22 0418)   Daily-Fritz/Iron TABS 1 tablet (has no administration in time range)   QUEtiapine (SEROQUEL XR) extended release tablet 50 mg (50 mg Oral Not Given 10/11/22 0418)   sertraline (ZOLOFT) tablet 50 mg (50 mg Oral Not Given 10/11/22 0418)   sodium chloride flush 0.9 % injection 5-40 mL (has no administration in time range)   sodium chloride flush 0.9 % injection 5-40 mL (has no administration in time range)   0.9 % sodium chloride infusion (has no administration in time range)   enoxaparin (LOVENOX) injection 40 mg (has no administration in time range)   acetaminophen (TYLENOL) tablet 650 mg (has no administration in time range)     Or   acetaminophen (TYLENOL) suppository 650 mg (has no administration in time range)   senna (SENOKOT) tablet 8.6 mg (has no administration in time range)   ipratropium-albuterol (DUONEB) nebulizer solution 1 ampule (1 ampule Inhalation Given 10/11/22 0803)   methylPREDNISolone sodium (SOLU-MEDROL) injection 40 mg (40 mg IntraVENous Given 10/11/22 0421)     Followed by   predniSONE (DELTASONE) tablet 40 mg (has no administration in time range)   perflutren lipid microspheres (DEFINITY) injection 1.65 mg (has no administration in time range)   albuterol (PROVENTIL) nebulizer solution 2.5 mg (has no administration in time range)   budesonide (PULMICORT) nebulizer suspension 500 mcg (500 mcg Nebulization Given 10/11/22 0803)   Arformoterol Tartrate (BROVANA) nebulizer solution 15 mcg (15 mcg Nebulization Given 10/11/22 0803)   ipratropium-albuterol (DUONEB) nebulizer solution 3 ampule (3 ampules Inhalation Given 10/10/22 2025)   methylPREDNISolone sodium (SOLU-MEDROL) injection 125 mg (125 mg IntraVENous Given 10/10/22 1958)       Vitals:    10/11/22 0938   BP:    Pulse:    Resp: 18   Temp:    SpO2:      @ 0838 - Arterial Blood Gas result:  pO2 99; pCO2 101; pH 7.135;  HCO3 32.9, %O2 Sat 95.    @ 0945 Arterial Blood Gas result:  pO2 87; pCO2 76.2; pH 7.229;  HCO3 31.1, %O2 Sat 95. Oxygen Saturation Interpretation: Abnormal - but at baseline    The cardiac monitor revealed NSR with a heart rate in the 90s as interpreted by me.  The cardiac monitor was ordered secondary to the patient's heart rate and to monitor the patient for dysrhythmia. [facility] [id number]    Time: 0950  Re-evaluation. Patients symptoms are improving  Repeat physical examination is improved Glascow Coma Scale  Best Eye Response 4 - Opens eyes on own   Best Verbal Response 4 - Seems confused, disoriented   Best Motor Response 6 - Follows simple motor commands   Total 14            MDM: Called to bedside for altered mental status, blood gas shows hypercapnic respiratory failure with PCO2 of 100, placed on BiPAP eventually switched to AVAPS, repeat blood gas shows significant provement with PCO2 of 70s, her mentation is greatly improved. We will continue to monitor pending telemetry bed availability    Please note that the withdrawal or failure to initiate urgent interventions for this patient would likely result in a life threatening deterioration or permanent disability. Accordingly this patient received 32 minutes of critical care time, excluding separately billable procedures. --------------------------------- IMPRESSION AND DISPOSITION ---------------------------------    IMPRESSION  1. COPD exacerbation ([address]    2. Supplemental oxygen dependent    3. Acute on chronic respiratory failure with hypercapnia (HCC)    4.  [place] coma scale total score 9-12, at hospital admission        DISPOSITION  Disposition: Admit to telemetry  Patient condition is stable         Christin Santana DO  10/11/22 7594

## 2022-10-11 NOTE — ED NOTES
Pt responding to painful stimuli, poc and abg completed provider and rt at bedside, placed on bipap at this time, provider states to monitor 1 hr repeat abg     Layla Olvera RN  10/11/22 8711

## 2022-10-11 NOTE — PROGRESS NOTES
Pt is known to Dr Larnell Riedel. Will switch pulmonary consult.   Electronically signed by ANGELA Wilson CNP on 10/11/2022 at 12:07 PM

## 2022-10-11 NOTE — H&P
[facility]  History and Physical      CHIEF COMPLAINT:    Chief Complaint   Patient presents with    Generalized Body Aches     Patient c/o bpdy wide pain x5 days with n/v beginning last night. Patient of Bouchra Ruiz MD presents with:  COPD exacerbation [facility])    History of Present Illness:   Patient is a 78-year-old female with past medical history of asthma, COPD, depression, dysphagia, and O2 dependency. Patient presented to the ED with complaints of generalized body aches as well as nausea vomiting and mild shortness of breath. Patient's symptoms began approximately 5 days ago and has gradually worsened. Patient is alert on exam and on continuous BiPAP. Patient denied any chest pain. ER work-up revealed elevated BNP 8,856, troponin 42>39, BUN 35, ABG pH 7.135, PCO2 100, patient was given duo nebs as well as Solu-Medrol with significant improvement. Patient was admitted to telemetry unit for further testing and treatment. REVIEW OF SYSTEMS:  Pertinent negatives are above in HPI. 10 point ROS otherwise negative. Past Medical History:   Diagnosis Date    Asthma     COPD (chronic obstructive pulmonary disease) ([address]     uses )3 prn daily and nightly / Dr. Allan Basurto F/U monthly    Depression     Dysphagia 2021    Del Rio catheter in place     continuous since 2013, changed monthly at [facility] dependent 2017         Past Surgical History:   Procedure Laterality Date    COLONOSCOPY      COLONOSCOPY N/A 9/4/2019    COLONOSCOPY DIAGNOSTIC performed by Maryan Kwan MD at [facility] (CERVIX STATUS UNKNOWN)      KIDNEY SURGERY Left 06/16/2014    ABLATION PERFORMED UNDER CT SCAN    RI CYSTOURETHROSCOPY,BIOPSIES N/A 9/14/2018    CYSTOSCOPY RETROGRADE PYELOGRAM, CLOT EVACATION , POSS.   BLADDER BIOPSY ---DR Lana Silva 2 :30 performed by Sin Ortega Memo,  at [address] [place] [address] N/A 5/10/2019    EGD BIOPSY performed by Maryan Kwan MD at [facility] ENDOSCOPY    UPPER GASTROINTESTINAL ENDOSCOPY N/A 6/28/2019    EGD EUS performed by Suzy Reina DO at [place] [address] N/A 6/28/2019    EGD performed by Suzy Reina DO at [address] [place] [address] 5/19/2020    EGD DIAGNOSTIC ONLY performed by Samantha Tavarez MD at [place] [address] N/A 10/25/2021    EGD BIOPSY performed by Samantha Tavarez MD at [place] [address] N/A 12/6/2021    EGD BIOPSY performed by Samantha Tavarez MD at [facility] ENDOSCOPY       Medications Prior to Admission:    Not in a hospital admission. Note that the patient's home medications were reviewed and the above list is accurate to the best of my knowledge at the time of the exam.    Allergies:    Sulfa antibiotics    Social History:    reports that she quit smoking about 2 years ago. She has never used smokeless tobacco. She reports that she does not drink alcohol and does not use drugs. Family History:   family history includes Cancer (age of onset: [de-identified]) in her father; Diabetes in her brother; Kidney Disease in her brother. PHYSICAL EXAM:    Vitals:  BP (!) 111/57   Pulse 96   Temp 97.6 °F (36.4 °C)   Resp 18   Ht 5' 7\" (1.702 m)   Wt 165 lb (74.8 kg)   LMP 10/21/1985   SpO2 96%   BMI 25.84 kg/m²       General appearance: NAD, ill appearing  Eyes: Sclerae anicteric, PERRLA  HEENT: AT/NC, MMM  Neck: FROM, supple, no thyromegaly  Lymph: No cervical / supraclavicular lymphadenopathy  Lungs: Diminished throughout  CV: RRR, no MRGs, no lower extremity edema  Abdomen: Soft, non-tender; no masses or HSM, +BS  Extremities: FROM without synovitis. No clubbing or cyanosis of the hands. Skin: no rash, induration, lesions, or ulcers  Psych: Calm and cooperative. Normal judgement and insight. Normal mood and affect. Neuro: Alert and interactive, slow to respond    LABS:  All labs reviewed.   Of note:  CBC with Differential:    Lab Results   Component Value Date/Time    WBC 8.0 10/11/2022 05:41 AM    RBC 3.70 10/11/2022 05:41 AM    HGB 8.6 10/11/2022 05:41 AM    HCT 30.9 10/11/2022 05:41 AM     10/11/2022 05:41 AM    MCV 83.5 10/11/2022 05:41 AM    MCH 23.2 10/11/2022 05:41 AM    MCHC 27.8 10/11/2022 05:41 AM    RDW 14.6 10/11/2022 05:41 AM    NRBC 2.6 10/11/2022 05:41 AM    SEGSPCT 26 02/16/2014 06:30 PM    METASPCT 2.6 10/11/2022 05:41 AM    LYMPHOPCT 4.3 10/11/2022 05:41 AM    PROMYELOPCT 0.9 05/18/2020 03:48 AM    MONOPCT 2.6 10/11/2022 05:41 AM    BASOPCT 0.4 10/11/2022 05:41 AM    MONOSABS 0.24 10/11/2022 05:41 AM    LYMPHSABS 0.40 10/11/2022 05:41 AM    EOSABS 0.00 10/11/2022 05:41 AM    BASOSABS 0.00 10/11/2022 05:41 AM     CMP:    Lab Results   Component Value Date/Time     10/11/2022 05:41 AM    K 5.0 10/11/2022 05:41 AM    K 4.5 05/29/2022 07:25 PM    CL 92 10/11/2022 05:41 AM    CO2 31 10/11/2022 05:41 AM    BUN 31 10/11/2022 05:41 AM    CREATININE 0.7 10/11/2022 05:41 AM    GFRAA >60 10/11/2022 05:41 AM    LABGLOM >60 10/11/2022 05:41 AM    GLUCOSE 193 10/11/2022 05:41 AM    PROT 8.0 10/11/2022 05:41 AM    LABALBU 4.2 10/11/2022 05:41 AM    CALCIUM 10.2 10/11/2022 05:41 AM    BILITOT 0.2 10/11/2022 05:41 AM    ALKPHOS 85 10/11/2022 05:41 AM    AST 17 10/11/2022 05:41 AM    ALT 16 10/11/2022 05:41 AM       Imaging:  CXR: Mild bibasilar increased markings consistent with subsegmental atelectasis. EKG:  I've personally reviewed the patient's EKG:  Sinus tach with PVCs    Telemetry:  I've personally reviewed the patient's telemetry:      ASSESSMENT/PLAN:  Principal Problem:    COPD exacerbation ([address]  Active Problems:    New onset of congestive heart failure (HCC)    Prolonged Q-T interval on ECG    Elevated troponin  Resolved Problems:    * No resolved hospital problems.  *      20-year-old female with a history of COPD who is oxygen dependent presents to the ED with complaints of generalized weakness and shortness of breath and is admitted to telemetry unit with    COPD exacerbation  -Supplement O2 demands keeping oxygen saturation greater than 92%. Currently utilizing 40% FiO2 for saturation of 96% on BiPAP  -DuoNebs and Mucomyst, chest physiotherapy  -IV steroids with prednisone taper  -Swallow study questionable aspiration pneumonia  -Strep/Legionella    CHF  -Diurese cautiously so as to avoid renal's insufficiency  -Bumex 1 mg bid  -BUN/Creat: 31/0.7  -BNP  - 8,856 > 4,385  -Daily weights strict I's and O's  -Low-sodium diet  -Echo - pending    Medication for other comorbidities continue as appropriate dose adjustment as necessary. DVT prophylaxis  PT OT  Discharge planning  Case discussed with attending and agreed upon plan of care. Code status: Full  Requires inpatient level of care  ANGELA Bear CNP    10:01 AM  10/11/2022     Above note edited to reflect my thoughts   Continue to taper steroids as able  Monitor closely secondary to chronic COPD year with propensity for decompensation    I personally saw, examined and provided care for the patient. Radiographs, labs and medication list were reviewed by me independently. The case was discussed in detail and plans for care were established. Review of Kendra MOTTA CNP, documentation was conducted and revisions were made as appropriate directly by me. I agree with the above documented exam, problem list, and plan of care.      Corrine Davis MD  10/11/2022

## 2022-10-12 ENCOUNTER — APPOINTMENT (OUTPATIENT)
Dept: GENERAL RADIOLOGY | Age: 67
DRG: 698 | End: 2022-10-12
Payer: COMMERCIAL

## 2022-10-12 LAB
AADO2: 79.7 MMHG
ANION GAP SERPL CALCULATED.3IONS-SCNC: 9 MMOL/L (ref 7–16)
ANISOCYTOSIS: ABNORMAL
B.E.: 13.2 MMOL/L (ref -3–3)
BASOPHILS ABSOLUTE: 0 E9/L (ref 0–0.2)
BASOPHILS RELATIVE PERCENT: 0.1 % (ref 0–2)
BUN BLDV-MCNC: 38 MG/DL (ref 6–23)
CALCIUM SERPL-MCNC: 9.4 MG/DL (ref 8.6–10.2)
CHLORIDE BLD-SCNC: 93 MMOL/L (ref 98–107)
CO2: 37 MMOL/L (ref 22–29)
COHB: 0.4 % (ref 0–1.5)
CREAT SERPL-MCNC: 0.7 MG/DL (ref 0.5–1)
CRITICAL: ABNORMAL
DATE ANALYZED: ABNORMAL
DATE OF COLLECTION: ABNORMAL
EOSINOPHILS ABSOLUTE: 0 E9/L (ref 0.05–0.5)
EOSINOPHILS RELATIVE PERCENT: 0 % (ref 0–6)
FIO2: 40 %
GFR AFRICAN AMERICAN: >60
GFR NON-AFRICAN AMERICAN: >60 ML/MIN/1.73
GLUCOSE BLD-MCNC: 176 MG/DL (ref 74–99)
HCO3: 41.4 MMOL/L (ref 22–26)
HCT VFR BLD CALC: 27.7 % (ref 34–48)
HEMOGLOBIN: 7.9 G/DL (ref 11.5–15.5)
HHB: 2.6 % (ref 0–5)
HYPOCHROMIA: ABNORMAL
L. PNEUMOPHILA SEROGP 1 UR AG: NORMAL
LAB: ABNORMAL
LIPASE: 42 U/L (ref 13–60)
LV EF: 58 %
LVEF MODALITY: NORMAL
LYMPHOCYTES ABSOLUTE: 0.3 E9/L (ref 1.5–4)
LYMPHOCYTES RELATIVE PERCENT: 3.5 % (ref 20–42)
Lab: ABNORMAL
MCH RBC QN AUTO: 23.6 PG (ref 26–35)
MCHC RBC AUTO-ENTMCNC: 28.5 % (ref 32–34.5)
MCV RBC AUTO: 82.7 FL (ref 80–99.9)
METHB: 0.3 % (ref 0–1.5)
MODE: ABNORMAL
MONOCYTES ABSOLUTE: 0.3 E9/L (ref 0.1–0.95)
MONOCYTES RELATIVE PERCENT: 3.5 % (ref 2–12)
NEUTROPHILS ABSOLUTE: 7.07 E9/L (ref 1.8–7.3)
NEUTROPHILS RELATIVE PERCENT: 93 % (ref 43–80)
NUCLEATED RED BLOOD CELLS: 9.6 /100 WBC
O2 SATURATION: 97.2 % (ref 92–98.5)
O2HB: 96.7 % (ref 94–97)
OPERATOR ID: 359
OVALOCYTES: ABNORMAL
PATIENT TEMP: 37 C
PCO2: 78.5 MMHG (ref 35–45)
PDW BLD-RTO: 14.6 FL (ref 11.5–15)
PEEP/CPAP: 5 CMH2O
PFO2: 2.63 MMHG/%
PH BLOOD GAS: 7.34 (ref 7.35–7.45)
PLATELET # BLD: 265 E9/L (ref 130–450)
PMV BLD AUTO: 9.4 FL (ref 7–12)
PO2: 105.2 MMHG (ref 75–100)
POIKILOCYTES: ABNORMAL
POLYCHROMASIA: ABNORMAL
POTASSIUM SERPL-SCNC: 4.67 MMOL/L (ref 3.5–5)
POTASSIUM SERPL-SCNC: 5.1 MMOL/L (ref 3.5–5)
RBC # BLD: 3.35 E12/L (ref 3.5–5.5)
RI(T): 0.76
RR MECHANICAL: 22 B/MIN
SODIUM BLD-SCNC: 139 MMOL/L (ref 132–146)
SOURCE, BLOOD GAS: ABNORMAL
STOMATOCYTES: ABNORMAL
STREP PNEUMONIAE ANTIGEN, URINE: NORMAL
TARGET CELLS: ABNORMAL
THB: 9.7 G/DL (ref 11.5–16.5)
TIME ANALYZED: 818
VACUOLATED NEUTROPHILS: ABNORMAL
VT MECHANICAL: 450 ML
WBC # BLD: 7.6 E9/L (ref 4.5–11.5)

## 2022-10-12 PROCEDURE — 84132 ASSAY OF SERUM POTASSIUM: CPT

## 2022-10-12 PROCEDURE — 6360000002 HC RX W HCPCS: Performed by: INTERNAL MEDICINE

## 2022-10-12 PROCEDURE — 6370000000 HC RX 637 (ALT 250 FOR IP): Performed by: NURSE PRACTITIONER

## 2022-10-12 PROCEDURE — 2580000003 HC RX 258: Performed by: INTERNAL MEDICINE

## 2022-10-12 PROCEDURE — 83690 ASSAY OF LIPASE: CPT

## 2022-10-12 PROCEDURE — 71045 X-RAY EXAM CHEST 1 VIEW: CPT

## 2022-10-12 PROCEDURE — 80048 BASIC METABOLIC PNL TOTAL CA: CPT

## 2022-10-12 PROCEDURE — 6370000000 HC RX 637 (ALT 250 FOR IP): Performed by: INTERNAL MEDICINE

## 2022-10-12 PROCEDURE — 6360000002 HC RX W HCPCS: Performed by: NURSE PRACTITIONER

## 2022-10-12 PROCEDURE — 94660 CPAP INITIATION&MGMT: CPT

## 2022-10-12 PROCEDURE — 85025 COMPLETE CBC W/AUTO DIFF WBC: CPT

## 2022-10-12 PROCEDURE — 2060000000 HC ICU INTERMEDIATE R&B

## 2022-10-12 PROCEDURE — 94640 AIRWAY INHALATION TREATMENT: CPT

## 2022-10-12 PROCEDURE — 82805 BLOOD GASES W/O2 SATURATION: CPT

## 2022-10-12 PROCEDURE — 2700000000 HC OXYGEN THERAPY PER DAY

## 2022-10-12 PROCEDURE — 93306 TTE W/DOPPLER COMPLETE: CPT

## 2022-10-12 RX ORDER — DIMETHICONE, OXYBENZONE, AND PADIMATE O 2; 2.5; 6.6 G/100G; G/100G; G/100G
STICK TOPICAL PRN
Status: DISCONTINUED | OUTPATIENT
Start: 2022-10-12 | End: 2022-10-20 | Stop reason: HOSPADM

## 2022-10-12 RX ORDER — METHYLPREDNISOLONE SODIUM SUCCINATE 40 MG/ML
40 INJECTION, POWDER, LYOPHILIZED, FOR SOLUTION INTRAMUSCULAR; INTRAVENOUS EVERY 8 HOURS
Status: DISCONTINUED | OUTPATIENT
Start: 2022-10-12 | End: 2022-10-12

## 2022-10-12 RX ORDER — METHYLPREDNISOLONE SODIUM SUCCINATE 40 MG/ML
40 INJECTION, POWDER, LYOPHILIZED, FOR SOLUTION INTRAMUSCULAR; INTRAVENOUS EVERY 12 HOURS
Status: DISCONTINUED | OUTPATIENT
Start: 2022-10-12 | End: 2022-10-13

## 2022-10-12 RX ADMIN — SERTRALINE HYDROCHLORIDE 50 MG: 50 TABLET ORAL at 20:35

## 2022-10-12 RX ADMIN — IPRATROPIUM BROMIDE AND ALBUTEROL SULFATE 1 AMPULE: .5; 2.5 SOLUTION RESPIRATORY (INHALATION) at 09:31

## 2022-10-12 RX ADMIN — GABAPENTIN 300 MG: 300 CAPSULE ORAL at 20:35

## 2022-10-12 RX ADMIN — CEFTRIAXONE 1000 MG: 1 INJECTION, POWDER, FOR SOLUTION INTRAMUSCULAR; INTRAVENOUS at 18:20

## 2022-10-12 RX ADMIN — BUDESONIDE INHALATION SUSPENSION 500 MCG: 0.5 SUSPENSION RESPIRATORY (INHALATION) at 09:31

## 2022-10-12 RX ADMIN — ARFORMOTEROL TARTRATE 15 MCG: 15 SOLUTION RESPIRATORY (INHALATION) at 21:03

## 2022-10-12 RX ADMIN — METHYLPREDNISOLONE SODIUM SUCCINATE 40 MG: 40 INJECTION, POWDER, FOR SOLUTION INTRAMUSCULAR; INTRAVENOUS at 20:35

## 2022-10-12 RX ADMIN — QUETIAPINE FUMARATE 50 MG: 50 TABLET, EXTENDED RELEASE ORAL at 20:35

## 2022-10-12 RX ADMIN — IPRATROPIUM BROMIDE AND ALBUTEROL SULFATE 1 AMPULE: .5; 2.5 SOLUTION RESPIRATORY (INHALATION) at 16:14

## 2022-10-12 RX ADMIN — ARFORMOTEROL TARTRATE 15 MCG: 15 SOLUTION RESPIRATORY (INHALATION) at 09:31

## 2022-10-12 RX ADMIN — IPRATROPIUM BROMIDE AND ALBUTEROL SULFATE 1 AMPULE: .5; 2.5 SOLUTION RESPIRATORY (INHALATION) at 13:48

## 2022-10-12 RX ADMIN — Medication: at 10:02

## 2022-10-12 RX ADMIN — IPRATROPIUM BROMIDE AND ALBUTEROL SULFATE 1 AMPULE: .5; 2.5 SOLUTION RESPIRATORY (INHALATION) at 21:03

## 2022-10-12 RX ADMIN — METHYLPREDNISOLONE SODIUM SUCCINATE 40 MG: 40 INJECTION, POWDER, FOR SOLUTION INTRAMUSCULAR; INTRAVENOUS at 09:58

## 2022-10-12 RX ADMIN — Medication 10 ML: at 20:35

## 2022-10-12 RX ADMIN — MULTIVITAMIN TABLET 1 TABLET: TABLET at 09:57

## 2022-10-12 RX ADMIN — FOLIC ACID 1 MG: 1 TABLET ORAL at 09:57

## 2022-10-12 RX ADMIN — Medication 10 ML: at 09:00

## 2022-10-12 RX ADMIN — BUDESONIDE INHALATION SUSPENSION 500 MCG: 0.5 SUSPENSION RESPIRATORY (INHALATION) at 21:03

## 2022-10-12 RX ADMIN — METHYLPREDNISOLONE SODIUM SUCCINATE 40 MG: 40 INJECTION, POWDER, FOR SOLUTION INTRAMUSCULAR; INTRAVENOUS at 00:46

## 2022-10-12 RX ADMIN — METHYLPREDNISOLONE SODIUM SUCCINATE 40 MG: 40 INJECTION, POWDER, FOR SOLUTION INTRAMUSCULAR; INTRAVENOUS at 05:29

## 2022-10-12 NOTE — PROGRESS NOTES
Physical Therapy    PT consult to evaluate/treat received and appreciated. Pt chart reviewed and evaluation attempted. Pt is currently on hold in AM d/t respiratory status. Will check back as able. Thank you.         Harshal Bustamante, PT, DPT   [id number]

## 2022-10-12 NOTE — PROGRESS NOTES
OT consult received and appreciated. Chart reviewed. Will hold evaluation this AM per nursing d/t respiratory status. Will evaluate at a later time. Thank you.  Mackenzie Zayas, OTR/L # [id number]

## 2022-10-12 NOTE — PROGRESS NOTES
Pulmonary Progress Note  10/12/2022 3:27 PM  Subjective:   Admit Date: 10/10/2022  PCP: Loki Reddy MD  Interval History: Resting in bed. Denies any chest pain. Says she is breathing okay. Diet: ADULT DIET;  Regular  SOB is: Mild  Cough: None  Wheezing: None  chest pain: None    Data:   Scheduled Meds:    methylPREDNISolone  40 mg IntraVENous Q12H    sodium chloride flush  5-40 mL IntraVENous 2 times per day    cefTRIAXone (ROCEPHIN) IV  1,000 mg IntraVENous D09I    folic acid  1 mg Oral Daily    gabapentin  300 mg Oral Nightly    multivitamin  1 tablet Oral Daily    QUEtiapine  50 mg Oral Nightly    sertraline  50 mg Oral Nightly    [Held by provider] enoxaparin  40 mg SubCUTAneous Daily    ipratropium-albuterol  1 ampule Inhalation Q4H WA    budesonide  0.5 mg Nebulization BID    Arformoterol Tartrate  15 mcg Nebulization BID       Continuous Infusions:    sodium chloride         PRN Meds: medicated lip balm, sodium chloride flush, sodium chloride, polyethylene glycol, acetaminophen **OR** acetaminophen, senna, perflutren lipid microspheres, albuterol    I/O last 3 completed shifts:  In: -   Out: 1350 [REDG]    I/O this shift:  In: -   Out: 350 [Urine:350]      Intake/Output Summary (Last 24 hours) at 10/12/2022 1527  Last data filed at 10/12/2022 1400  Gross per 24 hour   Intake --   Output 1550 ml   Net -1550 ml       Patient Vitals for the past 96 hrs (Last 3 readings):   Weight   10/10/22 1800 165 lb (74.8 kg)         Recent Labs     10/11/22  0541 10/11/22  1428 10/12/22  0456   WBC 8.0 7.6 7.6   HGB 8.6* 9.2* 7.9*   HCT 30.9* 33.2* 27.7*   MCV 83.5 85.1 82.7    350 265     Recent Labs     10/11/22  0541 10/11/22  1428 10/12/22  0456 10/12/22  0818    135 139  --    K 5.0 5.0 5.1* 4.67   CL 92* 91* 93*  --    CO2 31* 32* 37*  --    BUN 31* 31* 38*  --    CREATININE 0.7 0.7 0.7  --    PHOS  --  3.8  --   --      Recent Labs     10/10/22  1908 10/11/22  0541 10/12/22  1353   PROT 7.9 8.0 --    ALKPHOS 82 85  --    ALT 16 16  --    AST 21 17  --    BILITOT 0.2 0.2  --    LIPASE  --   --  42       CPK:  Lab Results   Component Value Date    CKTOTAL 108 2021            -----------------------------------------------------------------  RAD:   Results for orders placed during the hospital encounter of 10/10/22    XR CHEST PORTABLE    Narrative  EXAMINATION:  ONE XRAY VIEW OF THE CHEST    10/12/2022 8:17 am    COMPARISON:  2022    HISTORY:  ORDERING SYSTEM PROVIDED HISTORY: Respiratory distress  TECHNOLOGIST PROVIDED HISTORY:  Reason for exam:->Respiratory distress  What reading provider will be dictating this exam?->CRC    FINDINGS:  Single AP semi upright portable chest demonstrates bibasilar increased  markings consistent with subsegmental atelectasis. There is no evidence of a  pneumothorax. There are atherosclerotic changes of the aorta without  cardiomegaly. Impression  Developing bibasilar subsegmental atelectasis. Micro:  No results for input(s): BC in the last 72 hours.   Recent Labs     10/10/22  2031   ORG Gram negative mark*       Recent Labs     10/10/22  2031   ORG Gram negative mark*       Recent Labs     10/10/22  2031   LABURIN >100,000 CFU/ml  Identification and sensitivity to follow            Additional Respiratory Assessments  Heart Rate: 90  Resp: 15  SpO2: 97 %  Swallow: Normal - able to swallow solids    Objective:   Vitals:   Vitals:    10/12/22 1400   BP: 136/64   Pulse: 90   Resp: 15   Temp:    SpO2: 97%      TEMP:Current: Temp: 97.6 °F (36.4 °C)  Max: Temp  Av.1 °F (36.7 °C)  Min: 97.6 °F (36.4 °C)  Max: 98.5 °F (36.9 °C)    BP Range: Systolic (09RQQ), XEL:962 , Min:97 , SRO:560     Diastolic (14TTN), XTH:33, Min:48, Max:83      General appearance: alert, appears stated age and cooperative  In no acute distress  Skin: No rashes or lesions  HEENT: mucous membranes are moist  Neck: No JVD  Lungs: symmetrical expansion, Rales at bases bilaterally  to auscultation, no use of accessory muscles  Heart: S1S2 no murmurs,  Abdomen: soft, non tender,   Extremities: no peripheral edema  Neurologic: Alert, oriented times 3,  Affect: pleasant      Assessment:   Patient Active Problem List:  69 YOF with severe COPD, depression, chronic calcified pancreatitis, on home oxygen at 3 L on brovana and budesonide  BID with albuterol PRN,   10/10 c/o n/v and body aches x 5 days was on the phone with a family member when the phone dropped and they called EMS. ABG with ph7.1/100, placed on AVAPS   Repeat ABG 7.25/76. COVID-negative, urinary strep antigen and Legionella negative  Negative urine drug screen  proBNP 4385  10/11 RRT called transfer to ICU  Chest x-ray with atelectasis right base  10/12 on AVAPS overnight ABG 7.3 4/79/105/41  Chest x-ray with developing atelectasis    Acute encephalopathy  Urosepsis on ceftriaxone  Acute on chronic hypercapnic respiratory failure  COPD exacerbation plan history of asthma/COPD, advanced emphysema seen on CT chest 3/4/2022  Chronic hypoxic respiratory failure on 3 L  Anemia  History of depression on Zoloft  History of chronic calcified pancreatitis on Creon  History of chronic [place] use  Anemia  Muscle weakness  Echo 10/12 EF 55%, previous 3/23/2022 stress test EF 75% no ischemia  History of kidney stones  History of recurrent UTIs  History of dysphagia EGD showing gastritis  DVT prophylaxis with Lovenox               Plan:   Continue Brovana budesonide  On Solu-Medrol every 12  On Seroquel and Zoloft nightly watch QTc interval  Would benefit from having a noninvasive ventilator at home.   Wheezing is improving can start weaning down the steroids    Ronda Velazco MD,[facility]

## 2022-10-12 NOTE — PROGRESS NOTES
[facility]                                Progress note    Subjective:    Patient is currently in ICU setting-transferred by pulmonology yesterday evening  No acute complaints, answering questions appropriately    Objective:    BP (!) 141/72   Pulse 83   Temp 97.6 °F (36.4 °C) (Temporal)   Resp 24   Ht 5' 7\" (1.702 m)   Wt 165 lb (74.8 kg)   LMP 10/21/1985   SpO2 100%   BMI 25.84 kg/m²     In: -   Out: 1450   In: -   Out: 1450 [Urine:1450]    General appearance: NAD, conversant but not very forthcoming with information  HEENT: AT/NC, MMM  Neck: FROM, supple  Lungs: Clear to auscultation-no evidence of wheeze or rhonchi on my eval today  CV: RRR, no MRGs  Vasc: Radial pulses 2+  Abdomen: Soft, non-tender; no masses or HSM  Extremities: No peripheral edema or digital cyanosis  Skin: no rash, lesions or ulcers  Psych: Alert and oriented to person, place and time not able to fully assess  Neuro: Alert and interactive     Recent Labs     10/11/22  0541 10/11/22  1428 10/12/22  0456   WBC 8.0 7.6 7.6   HGB 8.6* 9.2* 7.9*   HCT 30.9* 33.2* 27.7*    350 265       Recent Labs     10/11/22  0541 10/11/22  1428 10/12/22  0456 10/12/22  0818    135 139  --    K 5.0 5.0 5.1* 4.67   CL 92* 91* 93*  --    CO2 31* 32* 37*  --    BUN 31* 31* 38*  --    CREATININE 0.7 0.7 0.7  --    CALCIUM 10.2 10.1 9.4  --        Assessment:    Principal Problem:    COPD exacerbation (HCC)  Active Problems:    New onset of congestive heart failure (HCC)    Prolonged Q-T interval on ECG    Elevated troponin  Resolved Problems:    * No resolved hospital problems.  *      Plan:  64-year-old female with a history of COPD who is oxygen dependent presents to the ED with complaints of generalized weakness and shortness of breath and is admitted to telemetry unit with hypercapnic respiratory failure and acute on chronic CHF  10/11:  -Co2 on ABG's 100.1, followed by RRT d/t minimally responsive, transferred to ICU after being continuously on AVAPS  -IV Bumex 1mg BID  -Echo   -Pulmonary following     10/12:  -Co2 on ABGs 78.5 today-continue noninvasive ventilation as needed, likely chronic CO2 retention  -Diuretics discontinued, improvement in BNP from 8K to 4K  -Echo > 55-60% EF  -IV solumedrol 40 BID-continue to taper, transition to p.o. at discretion of pulmonology  -Duo nebs, ICS, anticholinergics/LABA/Elsa  -UTI IV rocephin, await cultures       Code status: Full  Consultants: Pulm and critical care   DVT Prophylaxis Lovenox   PT/OT  Discharge planning     Jaqcuie Angeles MD

## 2022-10-12 NOTE — PLAN OF CARE
Problem: Chronic Conditions and Co-morbidities  Goal: Patient's chronic conditions and co-morbidity symptoms are monitored and maintained or improved  10/12/2022 0914 by Shira Servin RN  Outcome: Progressing  Flowsheets (Taken 10/12/2022 0800)  Care Plan - Patient's Chronic Conditions and Co-Morbidity Symptoms are Monitored and Maintained or Improved: Monitor and assess patient's chronic conditions and comorbid symptoms for stability, deterioration, or improvement  10/12/2022 0633 by Susanne De Dios RN  Outcome: Progressing  Flowsheets (Taken 10/11/2022 2000)  Care Plan - Patient's Chronic Conditions and Co-Morbidity Symptoms are Monitored and Maintained or Improved:   Monitor and assess patient's chronic conditions and comorbid symptoms for stability, deterioration, or improvement   Update acute care plan with appropriate goals if chronic or comorbid symptoms are exacerbated and prevent overall improvement and discharge   Collaborate with multidisciplinary team to address chronic and comorbid conditions and prevent exacerbation or deterioration     Problem: Discharge Planning  Goal: Discharge to home or other facility with appropriate resources  10/12/2022 0914 by Shira Servin RN  Outcome: Progressing  Flowsheets (Taken 10/12/2022 0800)  Discharge to home or other facility with appropriate resources: Identify barriers to discharge with patient and caregiver  10/12/2022 0633 by Susanne De Dios RN  Outcome: Progressing  Flowsheets (Taken 10/11/2022 2000)  Discharge to home or other facility with appropriate resources: Arrange for needed discharge resources and transportation as appropriate     Problem: Skin/Tissue Integrity  Goal: Absence of new skin breakdown  Description: 1. Monitor for areas of redness and/or skin breakdown  2. Assess vascular access sites hourly  3. Every 4-6 hours minimum:  Change oxygen saturation probe site  4.   Every 4-6 hours:  If on nasal continuous positive airway pressure, respiratory therapy assess nares and determine need for appliance change or resting period.   10/12/2022 0914 by Samara Pathak RN  Outcome: Progressing  10/12/2022 0633 by Velma Vega RN  Outcome: Progressing

## 2022-10-12 NOTE — CARE COORDINATION
10/12: Transition of care:  Pt presented to the ER for generalized weakness,nausea, vomiting & SOB. RRT on 10/11 for AMS. Pt was transferred to MICU. Pt is on BiPAP at 100% & Fio2 40%. Pt is on Iv Solumedrol & Iv Rocephin til 10/18  CM spoke with sister Isaura Roblero at [phone]. PT's PCP is Dr. Johana Hernandez & her sister believes it is [facility] on . Pt lives alone in a house with 1-2 steps to enter. The bath is on the 1st floor & the bedroom on the 2nd floor with apprx 6-8 steps. PTA pt was independent with 02 - 3L/NC continuously via [employer]. Pt has a hx of SNF with [address]. Pending PT/OT eval.  If pt is dc home her sister said someone maybe able to pick her up in the family if not transportation can be arrange via [address] [facility] transport [phone]. Pt will need to have her 02 to transport home. Needs are unclear. Sw/ROSALINA will continue to follow for dc planning.  Electronically signed by Jack Shelby RN on 10/12/2022 at 3:28 PM

## 2022-10-12 NOTE — ACP (ADVANCE CARE PLANNING)
Advance Care Planning   Healthcare Decision Maker:    Primary Decision Maker: Nelly mock - Brother/Sister - [phone]    Secondary Decision Maker: Sergio Alegria - Brother/Sister - [phone]    CM spoke with pt's sister. Pt doesn't have [facility] paperwork. CM offered to have pt obtain prior to dc. Pt is not , has no adult children, parents are   Pt's siblings are listed.   Electronically signed by Anjelica Evans RN on 10/12/2022 at 3:32 PM

## 2022-10-12 NOTE — PROGRESS NOTES
[address] [facility]  Department of Internal Medicine   Internal Medicine Residency   MICU Progress Note    Patient:  Harshal Miguel 69 y.o. female  MRN: [id number]     Date of Service: 10/12/2022    Allergy: Sulfa antibiotics    Subjective     Awake, alert, and oriented x3 this morning. Denies chest pain or shortness of breath. Describes feeling confused yesterday and not remembering yesterday's events. Asking for something to eat.     24h change: On AVAPs overnight. No acute events overnight. Improvement in mentation and ABG this morning. Objective     VS: BP (!) 141/72   Pulse 77   Temp 97.6 °F (36.4 °C) (Temporal)   Resp 22   Ht 5' 7\" (1.702 m)   Wt 165 lb (74.8 kg)   LMP 10/21/1985   SpO2 100%   BMI 25.84 kg/m²           I & O - 24hr:   Intake/Output Summary (Last 24 hours) at 10/12/2022 1246  Last data filed at 10/12/2022 0900  Gross per 24 hour   Intake --   Output 1450 ml   Net -1450 ml       Physical Exam:  General Appearance: alert, appears stated age, and cooperative  Lung:  Mild wheezing bilaterally with L>R  Heart: regular rate and rhythm, S1, S2 normal, no murmur, click, rub or gallop  Abdomen: soft, non-tender; bowel sounds normal; no masses,  no organomegaly  Extremities:  extremities normal, atraumatic, no cyanosis or edema  Musculoskeletal: No joint swelling, no muscle tenderness. ROM normal in all joints of extremities.    Neurologic: Mental status: Alert, oriented, thought content appropriate    Lines     site day    Art line   None    TLC None    PICC None    Hemoaccess None    Oxygen:    nasal canula at 6L    ABG:     Lab Results   Component Value Date/Time    PH 7.340 10/12/2022 08:18 AM    PCO2 78.5 10/12/2022 08:18 AM    PO2 105.2 10/12/2022 08:18 AM    HCO3 41.4 10/12/2022 08:18 AM    BE 13.2 10/12/2022 08:18 AM    THB 9.7 10/12/2022 08:18 AM    O2SAT 97.2 10/12/2022 08:18 AM        Medications     Infusions: (Fluid, Sedation, Vasopressors)  None     Nutrition:   Regular diet     ATB:   Antibiotics  Days   Ceftriaxone  2             Skin issues: none  Patient currently has   Urinary cath    Labs   CBC:   Lab Results   Component Value Date/Time    WBC 7.6 10/12/2022 04:56 AM    RBC 3.35 10/12/2022 04:56 AM    HGB 7.9 10/12/2022 04:56 AM    HCT 27.7 10/12/2022 04:56 AM    MCV 82.7 10/12/2022 04:56 AM    MCH 23.6 10/12/2022 04:56 AM    MCHC 28.5 10/12/2022 04:56 AM    RDW 14.6 10/12/2022 04:56 AM     10/12/2022 04:56 AM    MPV 9.4 10/12/2022 04:56 AM     BMP:    Lab Results   Component Value Date/Time     10/12/2022 04:56 AM    K 4.67 10/12/2022 08:18 AM    K 4.5 05/29/2022 07:25 PM    CL 93 10/12/2022 04:56 AM    CO2 37 10/12/2022 04:56 AM    BUN 38 10/12/2022 04:56 AM    LABALBU 4.2 10/11/2022 05:41 AM    CREATININE 0.7 10/12/2022 04:56 AM    CALCIUM 9.4 10/12/2022 04:56 AM    GFRAA >60 10/12/2022 04:56 AM    LABGLOM >60 10/12/2022 04:56 AM    GLUCOSE 176 10/12/2022 04:56 AM     TSH:    Lab Results   Component Value Date/Time    TSH 0.140 10/11/2022 02:28 PM       Imaging Studies:  CXR 10/12/2022:   Developing bibasilar subsegmental atelectasis.     Resident's Assessment and Plan     Neurology:   Acute encephalopathy 2/2 hypercapnia in the setting of COPD exacerbation,  resolved   Improved mentation, now A&Ox3, on NC   pH and CO2 improved today   Monitor ABG   Continue treatment for COPD exacerbation as below   Monitor mental status   Hx of depression   Continue home seroquel and zoloft   Cardiology:   Qtc prolongation   507  Avoid medications that prolong qtc   Elevated troponin  Downtrending 42 >39  EKG without ischemic abnormality   Pro-BNP  8856 > 4385  Not clinically fluid overloaded   Bumex 1 mg daily   Echo, follow   Pulmonary:   Acute hypoxic hypercapnic respiratory failure likely 2/2 COPD exacerbation   Hypercapnic on ABG, some improvement seen with steroids and AVAP  On AVAP overnight, improvement of ABG, now on 6L  NC  Solumedrol 40 q12 hours  Monitor respiratory status   Proventil, brovana, pulmicort, duoneb   Hx of COPD, on 3L at baseline    Gastroenterology:   Chronic calcified pancreatitis   Continue home Creon   Genitourinary:   Hx of urinary retention   Chronic jarquin use, changed monthly at [place]   Infectious Disease:   UTI  Chronic jarquin use   Jarquin changed on admission   Concern for UTI on UA   Urine culture with gram neg rods  Continue ceftriaxone   Follow sensitivities   Endocrine:   Low TSH   TSH low 0.140  T4 WNL   Heme/onc: Anemia  Hb of 7.9 with drop from 9.2  No signs of bleeding   Anticoagulation held   Continue to monitor for signs of bleeding     Next of Kin/ POA:   Sister, Kellee Downy   Code Status:   Full       PT/OT: Eval ordered   DVT ppx: Held   GI ppx: Normal diet       Ted Radford MD, PGY-1   Attending physician: Dr. Erna Monge       I personally saw, examined and provided care for the patient. Radiographs, labs and medication list were reviewed by me independently. Review of Residents documentation was conducted and revisions were made as appropriate. I agree with the above documented exam, problem list and plan of care. Mentation improved. CO2 improved. Possible UTI. Has chronic Jarquin catheter has monthly exchange at [facility]. Is close to do being that switched. We will continue antibiotics pending culture. Continue NIV nightly and as needed.     CCT excluding procedures 34 minutes    Matilde Mealing, DO

## 2022-10-12 NOTE — PROGRESS NOTES
Spoke to patient about indwelling catheter. She states, \" I have had this for a long time at home. The doctor wants me to keep it in because I can't pee. \"

## 2022-10-13 LAB
ANION GAP SERPL CALCULATED.3IONS-SCNC: 7 MMOL/L (ref 7–16)
ANISOCYTOSIS: ABNORMAL
BASOPHILS ABSOLUTE: 0 E9/L (ref 0–0.2)
BASOPHILS RELATIVE PERCENT: 0.1 % (ref 0–2)
BUN BLDV-MCNC: 37 MG/DL (ref 6–23)
CALCIUM SERPL-MCNC: 9.9 MG/DL (ref 8.6–10.2)
CHLORIDE BLD-SCNC: 96 MMOL/L (ref 98–107)
CO2: 39 MMOL/L (ref 22–29)
CREAT SERPL-MCNC: 0.7 MG/DL (ref 0.5–1)
EOSINOPHILS ABSOLUTE: 0 E9/L (ref 0.05–0.5)
EOSINOPHILS RELATIVE PERCENT: 0 % (ref 0–6)
GFR AFRICAN AMERICAN: >60
GFR NON-AFRICAN AMERICAN: >60 ML/MIN/1.73
GLUCOSE BLD-MCNC: 180 MG/DL (ref 74–99)
HCT VFR BLD CALC: 28.5 % (ref 34–48)
HEMOGLOBIN: 7.8 G/DL (ref 11.5–15.5)
HYPOCHROMIA: ABNORMAL
LYMPHOCYTES ABSOLUTE: 0.3 E9/L (ref 1.5–4)
LYMPHOCYTES RELATIVE PERCENT: 3.5 % (ref 20–42)
MCH RBC QN AUTO: 23.1 PG (ref 26–35)
MCHC RBC AUTO-ENTMCNC: 27.4 % (ref 32–34.5)
MCV RBC AUTO: 84.3 FL (ref 80–99.9)
METER GLUCOSE: 234 MG/DL (ref 74–99)
METER GLUCOSE: 281 MG/DL (ref 74–99)
METER GLUCOSE: 69 MG/DL (ref 74–99)
METER GLUCOSE: 92 MG/DL (ref 74–99)
MONOCYTES ABSOLUTE: 0.3 E9/L (ref 0.1–0.95)
MONOCYTES RELATIVE PERCENT: 4.4 % (ref 2–12)
MYELOCYTE PERCENT: 1.7 % (ref 0–0)
NEUTROPHILS ABSOLUTE: 6.81 E9/L (ref 1.8–7.3)
NEUTROPHILS RELATIVE PERCENT: 90.4 % (ref 43–80)
NUCLEATED RED BLOOD CELLS: 4.3 /100 WBC
OVALOCYTES: ABNORMAL
PDW BLD-RTO: 14.4 FL (ref 11.5–15)
PLATELET # BLD: 297 E9/L (ref 130–450)
PMV BLD AUTO: 9.2 FL (ref 7–12)
POIKILOCYTES: ABNORMAL
POLYCHROMASIA: ABNORMAL
POTASSIUM SERPL-SCNC: 5.2 MMOL/L (ref 3.5–5)
RBC # BLD: 3.38 E12/L (ref 3.5–5.5)
SODIUM BLD-SCNC: 142 MMOL/L (ref 132–146)
TARGET CELLS: ABNORMAL
WBC # BLD: 7.4 E9/L (ref 4.5–11.5)

## 2022-10-13 PROCEDURE — 2580000003 HC RX 258: Performed by: INTERNAL MEDICINE

## 2022-10-13 PROCEDURE — 82962 GLUCOSE BLOOD TEST: CPT

## 2022-10-13 PROCEDURE — 94640 AIRWAY INHALATION TREATMENT: CPT

## 2022-10-13 PROCEDURE — 92610 EVALUATE SWALLOWING FUNCTION: CPT

## 2022-10-13 PROCEDURE — 85025 COMPLETE CBC W/AUTO DIFF WBC: CPT

## 2022-10-13 PROCEDURE — 6370000000 HC RX 637 (ALT 250 FOR IP): Performed by: INTERNAL MEDICINE

## 2022-10-13 PROCEDURE — 80048 BASIC METABOLIC PNL TOTAL CA: CPT

## 2022-10-13 PROCEDURE — 6360000002 HC RX W HCPCS: Performed by: INTERNAL MEDICINE

## 2022-10-13 PROCEDURE — 36415 COLL VENOUS BLD VENIPUNCTURE: CPT

## 2022-10-13 PROCEDURE — 94660 CPAP INITIATION&MGMT: CPT

## 2022-10-13 PROCEDURE — 2060000000 HC ICU INTERMEDIATE R&B

## 2022-10-13 PROCEDURE — 2700000000 HC OXYGEN THERAPY PER DAY

## 2022-10-13 PROCEDURE — 2500000003 HC RX 250 WO HCPCS: Performed by: INTERNAL MEDICINE

## 2022-10-13 RX ORDER — PREDNISONE 20 MG/1
40 TABLET ORAL DAILY
Status: DISCONTINUED | OUTPATIENT
Start: 2022-10-13 | End: 2022-10-15

## 2022-10-13 RX ORDER — INSULIN LISPRO 100 [IU]/ML
0-4 INJECTION, SOLUTION INTRAVENOUS; SUBCUTANEOUS NIGHTLY
Status: DISCONTINUED | OUTPATIENT
Start: 2022-10-13 | End: 2022-10-20 | Stop reason: HOSPADM

## 2022-10-13 RX ORDER — INSULIN LISPRO 100 [IU]/ML
0-4 INJECTION, SOLUTION INTRAVENOUS; SUBCUTANEOUS
Status: DISCONTINUED | OUTPATIENT
Start: 2022-10-13 | End: 2022-10-20 | Stop reason: HOSPADM

## 2022-10-13 RX ORDER — DEXTROSE MONOHYDRATE 25 G/50ML
25 INJECTION, SOLUTION INTRAVENOUS PRN
Status: DISCONTINUED | OUTPATIENT
Start: 2022-10-13 | End: 2022-10-20 | Stop reason: HOSPADM

## 2022-10-13 RX ORDER — DEXTROSE MONOHYDRATE 25 G/50ML
25 INJECTION, SOLUTION INTRAVENOUS ONCE
Status: COMPLETED | OUTPATIENT
Start: 2022-10-13 | End: 2022-10-13

## 2022-10-13 RX ORDER — DEXTROSE MONOHYDRATE 100 MG/ML
INJECTION, SOLUTION INTRAVENOUS CONTINUOUS PRN
Status: DISCONTINUED | OUTPATIENT
Start: 2022-10-13 | End: 2022-10-20 | Stop reason: HOSPADM

## 2022-10-13 RX ADMIN — IPRATROPIUM BROMIDE AND ALBUTEROL SULFATE 1 AMPULE: .5; 2.5 SOLUTION RESPIRATORY (INHALATION) at 20:42

## 2022-10-13 RX ADMIN — BUDESONIDE INHALATION SUSPENSION 500 MCG: 0.5 SUSPENSION RESPIRATORY (INHALATION) at 20:42

## 2022-10-13 RX ADMIN — Medication 10 ML: at 12:51

## 2022-10-13 RX ADMIN — FOLIC ACID 1 MG: 1 TABLET ORAL at 10:02

## 2022-10-13 RX ADMIN — MULTIVITAMIN TABLET 1 TABLET: TABLET at 10:02

## 2022-10-13 RX ADMIN — Medication 10 UNITS: at 12:47

## 2022-10-13 RX ADMIN — SERTRALINE HYDROCHLORIDE 50 MG: 50 TABLET ORAL at 20:02

## 2022-10-13 RX ADMIN — GABAPENTIN 300 MG: 300 CAPSULE ORAL at 20:02

## 2022-10-13 RX ADMIN — ARFORMOTEROL TARTRATE 15 MCG: 15 SOLUTION RESPIRATORY (INHALATION) at 09:29

## 2022-10-13 RX ADMIN — IPRATROPIUM BROMIDE AND ALBUTEROL SULFATE 1 AMPULE: .5; 2.5 SOLUTION RESPIRATORY (INHALATION) at 12:31

## 2022-10-13 RX ADMIN — METHYLPREDNISOLONE SODIUM SUCCINATE 40 MG: 40 INJECTION, POWDER, FOR SOLUTION INTRAMUSCULAR; INTRAVENOUS at 10:02

## 2022-10-13 RX ADMIN — DEXTROSE MONOHYDRATE 25 G: 25 INJECTION, SOLUTION INTRAVENOUS at 12:47

## 2022-10-13 RX ADMIN — IPRATROPIUM BROMIDE AND ALBUTEROL SULFATE 1 AMPULE: .5; 2.5 SOLUTION RESPIRATORY (INHALATION) at 09:28

## 2022-10-13 RX ADMIN — INSULIN LISPRO 2 UNITS: 100 INJECTION, SOLUTION INTRAVENOUS; SUBCUTANEOUS at 13:53

## 2022-10-13 RX ADMIN — PREDNISONE 40 MG: 20 TABLET ORAL at 20:02

## 2022-10-13 RX ADMIN — QUETIAPINE FUMARATE 50 MG: 50 TABLET, EXTENDED RELEASE ORAL at 20:02

## 2022-10-13 RX ADMIN — ARFORMOTEROL TARTRATE 15 MCG: 15 SOLUTION RESPIRATORY (INHALATION) at 20:42

## 2022-10-13 RX ADMIN — BUDESONIDE INHALATION SUSPENSION 500 MCG: 0.5 SUSPENSION RESPIRATORY (INHALATION) at 09:29

## 2022-10-13 RX ADMIN — Medication 10 ML: at 20:02

## 2022-10-13 NOTE — CARE COORDINATION
10/13:  Update CM Note:  Pt presented to the ER for generalized weakness,nausea, vomiting & SOB. RRT on 10/11 for AMS. Pt was transferred to MICU. Pt is on 4 L/Nc at 99%. CM spoke with sister Judd Pierce at [phone]. Pt's PCP is Dr. Hernandez Mejia & her sister believes it is [facility] on [address]. Pt lives alone in a house with 1-2 steps to enter. The bath is on the 1st floor & the bedroom on the 2nd floor with apprx 6-8 steps. PTA pt was independent with 02 - 3L/NC continuously via [employer]. Pt has a hx of SNF with [address]. Judd Pierce states she would be open to [facility] if needed. CM sent referral to [facility]. Pending PT/OT eval.  If pt is dc home her sister said someone maybe able to pick her up in the family if not transportation can be arrange via [address] [facility] transport [phone]. Pt will need to have her 02 to transport home. Needs are unclear. Sw/ROSALINA will continue to follow for dc planning. Electronically signed by Shawn Chavez RN on 10/13/2022 at 1:55 PM    The Plan for Transition of Care is related to the following treatment goals: SNF    The Patient and/or patient representative  was provided with a choice of provider and agrees   with the discharge plan. [x] Yes [] No    Freedom of choice list was provided with basic dialogue that supports the patient's individualized plan of care/goals, treatment preferences and shares the quality data associated with the providers.  [x] Yes [] No

## 2022-10-13 NOTE — PROGRESS NOTES
Noticed pt had weak cough when drinking liquids and gulping harshly rather than sipping despite educating. Pt has a history of dysphagia as well. New consult placed to SLP for swallow eval tomorrow. Informed CC resident. No

## 2022-10-13 NOTE — CONSULTS
Department of Internal Medicine  Infectious Diseases   Consult Note      Reason for Consult: UTI (?)      Requesting Physician:  Dr Ada Levichure:             This is a 69 yrs old female with xh of COPD, Asthma, Dysphagia, chronic indwelling Del Rio catheter presented to the ER with SOB, nausea , vomiting, Denied abdomen pain , Denied fever or chills . Found to be in hypercapnic respiratory failure , PCo2 was 100 . WBC was 8.3 K, procalcitonin 0.21, lactic acid 1.4, UA - with pyuria - started on IV rocephin . Past Medical History:    Past Medical History:   Diagnosis Date    Asthma     COPD (chronic obstructive pulmonary disease) ([address]     uses )3 prn daily and nightly / Dr. Annie Church F/U monthly    Depression     Dysphagia 2021    Del Rio catheter in place     continuous since 2013, changed monthly at [facility] dependent 2017         Past Surgical History:      Past Surgical History:   Procedure Laterality Date    COLONOSCOPY      COLONOSCOPY N/A 9/4/2019    COLONOSCOPY DIAGNOSTIC performed by Charity Wynn MD at [address] [place] [address] (CERVIX STATUS UNKNOWN)      KIDNEY SURGERY Left 06/16/2014    ABLATION PERFORMED UNDER CT SCAN    IN CYSTOURETHROSCOPY,BIOPSIES N/A 9/14/2018    CYSTOSCOPY RETROGRADE PYELOGRAM, CLOT EVACATION , POSS.   BLADDER BIOPSY ---DR Efe Cameron 2 :30 performed by Claudia Sue DO at [address] N/A 5/10/2019    EGD BIOPSY performed by Charity Wynn MD at [address] [facility] 6/28/2019    EGD EUS performed by Luba Roy DO at [facility] N/A 6/28/2019    EGD performed by Luba Roy DO at [address] [facility] 5/19/2020    EGD DIAGNOSTIC ONLY performed by Hallie Nunez MD at [address] N/A 10/25/2021    EGD BIOPSY performed by Hallie Nunez MD at [address] UPPER GASTROINTESTINAL ENDOSCOPY N/A 12/6/2021    EGD BIOPSY performed by Beulah Oden MD at [facility] ENDOSCOPY       Current Medications:      Current Facility-Administered Medications   Medication Dose Route Frequency Provider Last Rate Last Admin    dextrose 50 % IV solution  25 g IntraVENous PRN Viky Dalal MD        glucose chewable tablet 16 g  4 tablet Oral PRN Viky Dalal MD        dextrose bolus 10% 125 mL  125 mL IntraVENous PRN Viky Dalal MD        Or    dextrose bolus 10% 250 mL  250 mL IntraVENous PRN Viky Dalal MD        glucagon (rDNA) injection 1 mg  1 mg SubCUTAneous PRN Viky Dalal MD        dextrose 10 % infusion   IntraVENous Continuous PRN Viky Dalal MD        predniSONE (DELTASONE) tablet 40 mg  40 mg Oral Daily Viky Dalal MD        insulin lispro (HUMALOG) injection vial 0-4 Units  0-4 Units SubCUTAneous TID WC Viky Dalal MD        insulin lispro (HUMALOG) injection vial 0-4 Units  0-4 Units SubCUTAneous Nightly Viky Dalal MD        medicated lip balm (BLISTEX/CARMEX) stick   Topical PRN Viky Dalal MD   Given at 10/12/22 1002    sodium chloride flush 0.9 % injection 5-40 mL  5-40 mL IntraVENous 2 times per day Viky Dalal MD   10 mL at 10/13/22 1251    sodium chloride flush 0.9 % injection 5-40 mL  5-40 mL IntraVENous PRN Viky Dalal MD        0.9 % sodium chloride infusion   IntraVENous PRN Viky Dalal MD        polyethylene glycol (GLYCOLAX) packet 17 g  17 g Oral Daily PRN Viky Dalal MD        acetaminophen (TYLENOL) tablet 650 mg  650 mg Oral Q6H PRN Viky Dalal MD        Or    acetaminophen (TYLENOL) suppository 650 mg  650 mg Rectal Q6H PRN Viky Dalal MD        cefTRIAXone (ROCEPHIN) 1,000 mg in sterile water 10 mL IV syringe  1,000 mg IntraVENous Q24H Viky Dalal MD   1,000 mg at 73/55/64 3824    folic acid (FOLVITE) tablet 1 mg  1 mg Oral Daily Viky Dalal MD   1 mg at 10/13/22 1002    gabapentin (NEURONTIN) capsule 300 mg  300 mg Oral Nightly Viky Dalal MD   300 mg at 10/12/22 2035 multivitamin 1 tablet  1 tablet Oral Daily Bev Seen, MD   1 tablet at 10/13/22 1002    QUEtiapine (SEROQUEL XR) extended release tablet 50 mg  50 mg Oral Nightly Marzetta Seen, MD   50 mg at 10/12/22 2035    sertraline (ZOLOFT) tablet 50 mg  50 mg Oral Nightly Marzetta Seen, MD   50 mg at 10/12/22 2035    enoxaparin (LOVENOX) injection 40 mg  40 mg SubCUTAneous Daily Marzettcheyenne Seen, MD [facility]) tablet 8.6 mg  1 tablet Oral Daily PRN Marzetta Seen, MD   8.6 mg at 10/11/22 210    ipratropium-albuterol (DUONEB) nebulizer solution 1 ampule  1 ampule Inhalation Q4H WA Bve Seen, MD   1 ampule at 10/13/22 1231    perflutren lipid microspheres (DEFINITY) injection 1.65 mg  1.5 mL IntraVENous ONCE PRN Marzetta Seen, MD        albuterol (PROVENTIL) nebulizer solution 2.5 mg  2.5 mg Nebulization Q6H PRN Marzetta Seen, MD        budesonide (PULMICORT) nebulizer suspension 500 mcg  0.5 mg Nebulization BID Marzetta Seen, MD   500 mcg at 10/13/22 3332    Arformoterol Tartrate (BROVANA) nebulizer solution 15 mcg  15 mcg Nebulization BID Marzetta Seen, MD   15 mcg at 10/13/22 0928       Allergies:  Sulfa antibiotics    Social History:      Social History     Socioeconomic History    Marital status:       Spouse name: Not on file    Number of children: Not on file    Years of education: Not on file    Highest education level: Not on file   Occupational History    Not on file   Tobacco Use    Smoking status: Former     Packs/day: 0.00     Types: Cigarettes     Quit date: 10/21/2019     Years since quittin.9    Smokeless tobacco: Never   Vaping Use    Vaping Use: Never used   Substance and Sexual Activity    Alcohol use: No     Comment: used to drink heavily, stopped [id number]    Drug use: No    Sexual activity: Not on file   Other Topics Concern    Not on file   Social History Narrative    Not on file     Social Determinants of Health     Financial Resource Strain: Not on file   Food Insecurity: Not on file   Transportation Needs: Not on file   Physical Activity: Not on file   Stress: Not on file   Social Connections: Not on file   Intimate Partner Violence: Not on file   Housing Stability: Not on file       Family History:     Family History   Problem Relation Age of Onset    Cancer Father [de-identified]        prostate cancer    Diabetes Brother     Kidney Disease Brother        REVIEW OF SYSTEMS:    CONSTITUTIONAL:  Denies fever, chill or rigors, nausea or vomiting. HEENT: denies blurring of vision or double vision, denies hearing problem  RESPIRATORY: denies cough, shortness of breath, sputum expectoration, chest pain. CARDIOVASCULAR:  Denies palpitation  GASTROINTESTINAL:  Denies abdomen pain, diarrhea or constipation. GENITOURINARY:  Indwelling Del Rio catheter   INTEGUMENT: denies wound , rash  HEMATOLOGIC/LYMPHATIC:  Denies lymph node swelling, gum bleeding or easy bruising. MUSCULOSKELETAL:  Denies leg pain , joint pain , joint swelling  NEUROLOGICAL:  Denies light headed, dizziness, loss of consciousness,    PHYSICAL EXAM:      Vitals:     BP (!) 130/58   Pulse 97   Temp 98.3 °F (36.8 °C) (Temporal)   Resp 16   Ht 5' 7\" (1.702 m)   Wt 167 lb 9.6 oz (76 kg)   LMP 10/21/1985   SpO2 98%   BMI 26.25 kg/m²     General Appearance:    Awake, alert , no acute distress. Head:    Normocephalic, atraumatic   Eyes:    No pallor, no icterus,   Ears:    No obvious deformity or drainage.    Nose:   No nasal drainage   Throat:   Mucosa moist, no oral thrush   Neck:   Supple, no lymphadenopathy   Back:     no CVA tenderness   Lungs:     Clear to auscultation bilaterally, no wheeze    Heart:    Regular rate and rhythm, no murmur   Abdomen:     Soft, non-tender, bowel sounds present    Extremities:   No edema, no cyanosis    Pulses:   Dorsalis pedis palpable    Skin:   no rashes or lesions       CBC with Differential:      Lab Results   Component Value Date/Time    WBC 7.4 10/13/2022 05:00 AM    RBC 3.38 10/13/2022 05:00 AM    HGB 7.8 10/13/2022 05:00 AM HCT 28.5 10/13/2022 05:00 AM     10/13/2022 05:00 AM    MCV 84.3 10/13/2022 05:00 AM    MCH 23.1 10/13/2022 05:00 AM    MCHC 27.4 10/13/2022 05:00 AM    RDW 14.4 10/13/2022 05:00 AM    NRBC 4.3 10/13/2022 05:00 AM    SEGSPCT 26 02/16/2014 06:30 PM    METASPCT 2.6 10/11/2022 05:41 AM    LYMPHOPCT 3.5 10/13/2022 05:00 AM    PROMYELOPCT 0.9 05/18/2020 03:48 AM    MONOPCT 4.4 10/13/2022 05:00 AM    MYELOPCT 1.7 10/13/2022 05:00 AM    BASOPCT 0.1 10/13/2022 05:00 AM    MONOSABS 0.30 10/13/2022 05:00 AM    LYMPHSABS 0.30 10/13/2022 05:00 AM    EOSABS 0.00 10/13/2022 05:00 AM    BASOSABS 0.00 10/13/2022 05:00 AM       CMP     Lab Results   Component Value Date/Time     10/13/2022 05:00 AM    K 5.2 10/13/2022 05:00 AM    K 4.5 05/29/2022 07:25 PM    CL 96 10/13/2022 05:00 AM    CO2 39 10/13/2022 05:00 AM    BUN 37 10/13/2022 05:00 AM    CREATININE 0.7 10/13/2022 05:00 AM    GFRAA >60 10/13/2022 05:00 AM    LABGLOM >60 10/13/2022 05:00 AM    GLUCOSE 180 10/13/2022 05:00 AM    PROT 8.0 10/11/2022 05:41 AM    LABALBU 4.2 10/11/2022 05:41 AM    CALCIUM 9.9 10/13/2022 05:00 AM    BILITOT 0.2 10/11/2022 05:41 AM    ALKPHOS 85 10/11/2022 05:41 AM    AST 17 10/11/2022 05:41 AM    ALT 16 10/11/2022 05:41 AM         Hepatic Function Panel:    Lab Results   Component Value Date/Time    ALKPHOS 85 10/11/2022 05:41 AM    ALT 16 10/11/2022 05:41 AM    AST 17 10/11/2022 05:41 AM    PROT 8.0 10/11/2022 05:41 AM    BILITOT 0.2 10/11/2022 05:41 AM    BILIDIR <0.2 10/11/2022 05:41 AM    IBILI see below 10/11/2022 05:41 AM    LABALBU 4.2 10/11/2022 05:41 AM       PT/INR:    Lab Results   Component Value Date/Time    PROTIME 9.7 02/26/2021 06:31 PM    INR 0.9 02/26/2021 06:31 PM       TSH:    Lab Results   Component Value Date/Time    TSH 0.140 10/11/2022 02:28 PM       U/A:    Lab Results   Component Value Date/Time    COLORU Yellow 10/11/2022 02:27 PM    PHUR 6.0 10/11/2022 02:27 PM    WBCUA 2-5 10/11/2022 02:27 PM    RBCUA 1-3 10/11/2022 02:27 PM    RBCUA NONE 02/18/2014 07:23 PM    MUCUS Present 04/19/2018 01:10 PM    BACTERIA MODERATE 10/11/2022 02:27 PM    CLARITYU SL CLOUDY 10/11/2022 02:27 PM    SPECGRAV 1.025 10/11/2022 02:27 PM    LEUKOCYTESUR Negative 10/11/2022 02:27 PM    UROBILINOGEN 0.2 10/11/2022 02:27 PM    BILIRUBINUR Negative 10/11/2022 02:27 PM    BLOODU MODERATE 10/11/2022 02:27 PM    GLUCOSEU Negative 10/11/2022 02:27 PM    AMORPHOUS MODERATE 02/26/2021 11:30 PM       ABG:  No results found for: HMZ8IAE, BEART, Z3CWVJVV, PHART, THGBART, LOB6EJK, PO2ART, GYN7LWW    MICROBIOLOGY:    Blood culture - negative     Urine Culture - GNR     Urine antigen test - negative       Radiology :    Chest X ray    Developing bibasilar subsegmental atelectasis.        IMPRESSION:     Hypercapnic respiratory failure   GNR bacteriuria , in the presence of Del Rio catheter - chronic colonization       RECOMMENDATIONS:       Stop rocephin       Thank you Dr Hilario Chase for the consult

## 2022-10-13 NOTE — PLAN OF CARE
Problem: Chronic Conditions and Co-morbidities  Goal: Patient's chronic conditions and co-morbidity symptoms are monitored and maintained or improved  10/13/2022 0838 by Chey Huerta RN  Outcome: Progressing  Flowsheets (Taken 10/13/2022 0800)  Care Plan - Patient's Chronic Conditions and Co-Morbidity Symptoms are Monitored and Maintained or Improved: Monitor and assess patient's chronic conditions and comorbid symptoms for stability, deterioration, or improvement  10/12/2022 2045 by Alea Zamorano RN  Outcome: Progressing     Problem: Skin/Tissue Integrity  Goal: Absence of new skin breakdown  Description: 1. Monitor for areas of redness and/or skin breakdown  2. Assess vascular access sites hourly  3. Every 4-6 hours minimum:  Change oxygen saturation probe site  4. Every 4-6 hours:  If on nasal continuous positive airway pressure, respiratory therapy assess nares and determine need for appliance change or resting period.   10/13/2022 0838 by Chey Huerta RN  Outcome: Progressing  10/12/2022 2045 by Alea Zamorano RN  Outcome: Progressing

## 2022-10-13 NOTE — PLAN OF CARE
Problem: Chronic Conditions and Co-morbidities  Goal: Patient's chronic conditions and co-morbidity symptoms are monitored and maintained or improved  10/12/2022 2045 by Janelle Mccann RN  Outcome: Progressing  10/12/2022 0914 by Carmen Ramos RN  Outcome: Progressing  Flowsheets (Taken 10/12/2022 0800)  Care Plan - Patient's Chronic Conditions and Co-Morbidity Symptoms are Monitored and Maintained or Improved: Monitor and assess patient's chronic conditions and comorbid symptoms for stability, deterioration, or improvement     Problem: Discharge Planning  Goal: Discharge to home or other facility with appropriate resources  10/12/2022 2045 by Janelle Mccann RN  Outcome: Progressing  10/12/2022 0914 by Carmen Ramos RN  Outcome: Progressing  Flowsheets (Taken 10/12/2022 0800)  Discharge to home or other facility with appropriate resources: Identify barriers to discharge with patient and caregiver     Problem: Skin/Tissue Integrity  Goal: Absence of new skin breakdown  Description: 1. Monitor for areas of redness and/or skin breakdown  2. Assess vascular access sites hourly  3. Every 4-6 hours minimum:  Change oxygen saturation probe site  4. Every 4-6 hours:  If on nasal continuous positive airway pressure, respiratory therapy assess nares and determine need for appliance change or resting period.   10/12/2022 2045 by Janelle Mccann RN  Outcome: Progressing  10/12/2022 0914 by Carmen Ramos RN  Outcome: Progressing     Problem: Safety - Adult  Goal: Free from fall injury  Outcome: Progressing  Flowsheets (Taken 10/12/2022 0912 by Carmen Ramos RN)  Free From Fall Injury: Instruct family/caregiver on patient safety     Problem: ABCDS Injury Assessment  Goal: Absence of physical injury  Outcome: Progressing  Flowsheets (Taken 10/12/2022 0912 by Carmen Ramos RN)  Absence of Physical Injury: Implement safety measures based on patient assessment     Problem: Respiratory - Adult  Goal: Achieves optimal ventilation and oxygenation  10/12/2022 2045 by Aurea Hamman, RN  Outcome: Progressing  10/12/2022 0915 by Shira Servin RN  Outcome: Progressing

## 2022-10-13 NOTE — PROGRESS NOTES
[address] [facility]  Department of Internal Medicine   Internal Medicine Residency   MICU Progress Note    Patient:  Renetta Porter 69 y.o. female  MRN: [id number]     Date of Service: 10/13/2022    Allergy: Sulfa antibiotics    Subjective     Alert and oriented x3 this morning. Asking for breakfast. She denies any chest pain, abdominal pain, or shortness of breath. 24h change: No acute events overnight. Wean to 4L NC. Swallow eval with no restrictions to diet, regular solids and thin liquids. Objective     VS: BP (!) 130/58   Pulse 97   Temp 98.3 °F (36.8 °C) (Temporal)   Resp 16   Ht 5' 7\" (1.702 m)   Wt 167 lb 9.6 oz (76 kg)   LMP 10/21/1985   SpO2 98%   BMI 26.25 kg/m²           I & O - 24hr:   Intake/Output Summary (Last 24 hours) at 10/13/2022 1445  Last data filed at 10/13/2022 1300  Gross per 24 hour   Intake 1342.47 ml   Output 900 ml   Net 442.47 ml       Physical Exam:  General Appearance: alert, appears stated age, and cooperative  Lung: clear to auscultation bilaterally  Heart: regular rate and rhythm, S1, S2 normal, no murmur, click, rub or gallop  Abdomen: soft, non-tender; bowel sounds normal; no masses,  no organomegaly  Extremities:  extremities normal, atraumatic, no cyanosis or edema  Musculoskeletal: No joint swelling, no muscle tenderness. ROM normal in all joints of extremities.    Neurologic: Mental status: Alert, oriented, thought content appropriate    Lines     site day    Art line   None    TLC None    PICC None    Hemoaccess None    Oxygen:    nasal canula at 4L    ABG:     Lab Results   Component Value Date/Time    PH 7.340 10/12/2022 08:18 AM    PCO2 78.5 10/12/2022 08:18 AM    PO2 105.2 10/12/2022 08:18 AM    HCO3 41.4 10/12/2022 08:18 AM    BE 13.2 10/12/2022 08:18 AM    THB 9.7 10/12/2022 08:18 AM    O2SAT 97.2 10/12/2022 08:18 AM        Medications     Infusions: (Fluid, Sedation, Vasopressors)  None     Nutrition:   Regular diet     ATB:   Antibiotics  Days Skin issues: none  Patient currently has   Urinary cath    Labs   CBC:   Lab Results   Component Value Date/Time    WBC 7.4 10/13/2022 05:00 AM    RBC 3.38 10/13/2022 05:00 AM    HGB 7.8 10/13/2022 05:00 AM    HCT 28.5 10/13/2022 05:00 AM    MCV 84.3 10/13/2022 05:00 AM    MCH 23.1 10/13/2022 05:00 AM    MCHC 27.4 10/13/2022 05:00 AM    RDW 14.4 10/13/2022 05:00 AM     10/13/2022 05:00 AM    MPV 9.2 10/13/2022 05:00 AM     BMP:    Lab Results   Component Value Date/Time     10/13/2022 05:00 AM    K 5.2 10/13/2022 05:00 AM    K 4.5 05/29/2022 07:25 PM    CL 96 10/13/2022 05:00 AM    CO2 39 10/13/2022 05:00 AM    BUN 37 10/13/2022 05:00 AM    LABALBU 4.2 10/11/2022 05:41 AM    CREATININE 0.7 10/13/2022 05:00 AM    CALCIUM 9.9 10/13/2022 05:00 AM    GFRAA >60 10/13/2022 05:00 AM    LABGLOM >60 10/13/2022 05:00 AM    GLUCOSE 180 10/13/2022 05:00 AM     TSH:    Lab Results   Component Value Date/Time    TSH 0.140 10/11/2022 02:28 PM       Imaging Studies:  CXR 10/12/2022:   Developing bibasilar subsegmental atelectasis. Resident's Assessment and Plan     Neurology:   Acute encephalopathy 2/2 hypercapnia in the setting of COPD exacerbation,  resolved   Improved mentation, now A&Ox3, on 4L NC   Continue treatment for COPD exacerbation as below   Monitor mental status   Hx of depression   Continue home seroquel and zoloft     Cardiology:   Qtc prolongation   507  Avoid medications that prolong qtc   Elevated troponin  Downtrending 42 >39  EKG without ischemic abnormality   Pro-BNP  8856 > 4385  Not clinically fluid overloaded   Echo: LVEF 55-60%, indeterminate diastolic function, normal atrial size, no wall motion abnormalities.      Pulmonary:   Acute hypoxic hypercapnic respiratory failure likely 2/2 COPD exacerbation   Hypercapnic on ABG, some improvement seen with steroids and AVAP  On Bipap overnight, improvement of ABG, now wean to 4L NC (baseline 3L NC)   Change solumedrol to prednisone 40 mg daily   Monitor respiratory status   Proventil, brovana, pulmicort, duoneb   Hx of COPD, on 3L at baseline      Gastroenterology:   Chronic calcified pancreatitis     Genitourinary:   Hx of urinary retention   Chronic jarquin use, [facility] [employer]   Urinary culture with Gram neg rods, colonization per ID     Infectious Disease:   UTI  Chronic jarquin use   Concern for UTI on UA   Urine culture with gram neg rods  Per ID, chronic colonization in the setting of chronic jarquin use  Stop ceftriaxone   Follow sensitivities     Endocrine:   Low TSH   TSH low 0.140  T4 WNL   Diabetes ? Elevated blood sugar during admission (234, 200)  LDSSI with nightly and with meals     Heme/onc: Anemia  Hb stable at 7.8  No signs of bleeding   Resume DVT prophylaxis   Continue to monitor for signs of bleeding     Next of Kin/ POA:   Sister, Kristin Pastrana   Code Status:   Full       PT/OT: Eval ordered   DVT ppx: Lovenox   GI ppx: Normal diet       Thea Antony MD, PGY-1   Attending physician: Dr. Lew Lopez       I personally saw, examined and provided care for the patient. Radiographs, labs and medication list were reviewed by me independently. Review of Residents documentation was conducted and revisions were made as appropriate. I agree with the above documented exam, problem list and plan of care.         CCT excluding procedures 33 minutes    Brynn Santiago DO

## 2022-10-13 NOTE — PROGRESS NOTES
Physician Progress Note      PATIENT:               Juan Schmitz  CSN #:                  [id number]  :                       1955  ADMIT DATE:       10/10/2022 5:59 PM  [facility] DATE:  RESPONDING  PROVIDER #:        Antoine Miranda DO          QUERY TEXT:    Dear Provider,    Pt admitted with COPD exacerbation and has encephalopathy 2/2 hypercapnia   documented. If possible, please document in progress notes and discharge   summary further specificity regarding the type of encephalopathy:    The medical record reflects the following:  Risk Factors: COPD exacerbation, respiratory failure  Clinical Indicators:  10/11 note: Acute encephalopathy 2/2 hypercapnia in the   setting of COPD exacerbation  continue treatment for COPD exacerbation,    Monitor mental status,  CT head,   follow  Initial ABG-PH 7.1, pac02 100  Treatment: Monitor mental status    Thank you,  Luke Aquino RN  Clinical Documentation Improvement  [phone]  Options provided:  -- Metabolic encephalopathy due to hypercapnia  -- Other - I will add my own diagnosis  -- Disagree - Not applicable / Not valid  -- Disagree - Clinically unable to determine / Unknown  -- Refer to Clinical Documentation Reviewer    PROVIDER RESPONSE TEXT:    This patient has metabolic encephalopathy due to hypercapnia.     Query created by: Bettie Andrade on 10/12/2022 1:55 PM      Electronically signed by:  Antoine Miranda DO 10/13/2022 5:18 PM

## 2022-10-13 NOTE — PROGRESS NOTES
Pulmonary Progress Note  10/13/2022 1:56 PM  Subjective:   Admit Date: 10/10/2022  PCP: Nilo Pimentel MD  Interval History: Breathing is easier. Says she did wear BiPAP overnight. Diet: ADULT DIET; Regular  SOB is: None  Cough: None  Wheezing: None  chest pain: None    Data:   Scheduled Meds:    predniSONE  40 mg Oral Daily    insulin lispro  0-4 Units SubCUTAneous TID WC    insulin lispro  0-4 Units SubCUTAneous Nightly    sodium chloride flush  5-40 mL IntraVENous 2 times per day    folic acid  1 mg Oral Daily    gabapentin  300 mg Oral Nightly    multivitamin  1 tablet Oral Daily    QUEtiapine  50 mg Oral Nightly    sertraline  50 mg Oral Nightly    enoxaparin  40 mg SubCUTAneous Daily    ipratropium-albuterol  1 ampule Inhalation Q4H WA    budesonide  0.5 mg Nebulization BID    Arformoterol Tartrate  15 mcg Nebulization BID       Continuous Infusions:    dextrose      sodium chloride         PRN Meds: [COMPLETED] insulin regular **AND** [COMPLETED] dextrose **AND** POCT Glucose **AND** POCT Glucose **AND** dextrose, glucose, dextrose bolus **OR** dextrose bolus, glucagon (rDNA), dextrose, medicated lip balm, sodium chloride flush, sodium chloride, polyethylene glycol, acetaminophen **OR** acetaminophen, senna, perflutren lipid microspheres, albuterol    I/O last 3 completed shifts: In: 642.5 [P.O.:560; I.V.:82.5]  Out: 1700 [Urine:1700]    I/O this shift:   In: 700 [P.O.:700]  Out: 150 [Urine:150]      Intake/Output Summary (Last 24 hours) at 10/13/2022 1356  Last data filed at 10/13/2022 1300  Gross per 24 hour   Intake 1342.47 ml   Output 950 ml   Net 392.47 ml       Patient Vitals for the past 96 hrs (Last 3 readings):   Weight   10/13/22 0600 167 lb 9.6 oz (76 kg)   10/10/22 1800 165 lb (74.8 kg)         Recent Labs     10/11/22  1428 10/12/22  0456 10/13/22  0500   WBC 7.6 7.6 7.4   HGB 9.2* 7.9* 7.8*   HCT 33.2* 27.7* 28.5*   MCV 85.1 82.7 84.3    265 297     Recent Labs     10/11/22  7417 10/12/22  0456 10/12/22  0818 10/13/22  0500    139  --  142   K 5.0 5.1* 4.67 5.2*   CL 91* 93*  --  96*   CO2 32* 37*  --  39*   BUN 31* 38*  --  37*   CREATININE 0.7 0.7  --  0.7   PHOS 3.8  --   --   --      Recent Labs     10/10/22  1908 10/11/22  0541 10/12/22  1353   PROT 7.9 8.0  --    ALKPHOS 82 85  --    ALT 16 16  --    AST 21 17  --    BILITOT 0.2 0.2  --    LIPASE  --   --  42     CPK:  Lab Results   Component Value Date    CKTOTAL 108 2021         -----------------------------------------------------------------  RAD:   Results for orders placed during the hospital encounter of 10/10/22    XR CHEST PORTABLE    Narrative  EXAMINATION:  ONE XRAY VIEW OF THE CHEST    10/12/2022 8:17 am    COMPARISON:  2022    HISTORY:  ORDERING SYSTEM PROVIDED HISTORY: Respiratory distress  TECHNOLOGIST PROVIDED HISTORY:  Reason for exam:->Respiratory distress  What reading provider will be dictating this exam?->CRC    FINDINGS:  Single AP semi upright portable chest demonstrates bibasilar increased  markings consistent with subsegmental atelectasis. There is no evidence of a  pneumothorax. There are atherosclerotic changes of the aorta without  cardiomegaly. Impression  Developing bibasilar subsegmental atelectasis. Micro:  No results for input(s): BC in the last 72 hours.   Recent Labs     10/10/22  2031   ORG Gram negative mark*       Recent Labs     10/10/22  2031   ORG Gram negative mark*     Recent Labs     10/10/22  2031   LABURIN >100,000 CFU/ml  Identification and sensitivity to follow            Additional Respiratory Assessments  Heart Rate: 97  Resp: 16  SpO2: 98 %  Swallow: Normal - able to swallow solids    Objective:   Vitals:   Vitals:    10/13/22 1300   BP: (!) 130/58   Pulse: 97   Resp: 16   Temp:    SpO2: 98%      TEMP:Current: Temp: 98.3 °F (36.8 °C)  Max: Temp  Av.4 °F (36.9 °C)  Min: 97.9 °F (36.6 °C)  Max: 99.1 °F (37.3 °C)    BP Range: Systolic (75GRP), CGL:181 , Min:97 , PHX:302     Diastolic (37DOF), XZO:09, Min:44, Max:73      General appearance: alert, appears stated age and cooperative  In no acute distress  Skin: No rashes or lesions  HEENT: mucous membranes are moist  Neck: No JVD  Lungs: symmetrical expansion, Rales at bases bilaterally  to auscultation, no use of accessory muscles  Heart: S1S2 no murmurs,  Abdomen: soft, non tender,   Extremities: no peripheral edema  Neurologic: Alert, oriented times 3,  Affect: pleasant        Assessment:   Patient Active Problem List:  69 YOF with severe COPD, depression, chronic calcified pancreatitis, on home oxygen at 3 L on brovana and budesonide  BID with albuterol PRN, with chronic indwelling Del Rio dysphagia    10/10 c/o n/v and body aches x 5 days was on the phone with a family member when the phone dropped and they called EMS. ABG with ph7.1/100, placed on AVAPS   Repeat ABG 7.25/76.   COVID-negative, urinary strep antigen and Legionella negative  Negative urine drug screen  proBNP 4385  10/11 RRT called transfer to ICU  Chest x-ray with atelectasis right base  10/12 on AVAPS overnight ABG 7.3 4/79/105/41  Chest x-ray with developing atelectasis  On 4 L saturating 98%    Acute encephalopathy  Urosepsis on ceftriaxone felt to be colonization  Acute on chronic hypercapnic respiratory failure  Hypochloremia  COPD exacerbation   h/o of asthma/COPD, advanced emphysema seen on CT chest 3/4/2022  Chronic hypoxic respiratory failure on 3 L  Anemia  History of depression on Zoloft  History of chronic calcified pancreatitis on Creon  History of chronic Del Rio use  Anemia  Muscle weakness  Echo 10/12 EF 55%, previous 3/23/2022 stress test EF 75% no ischemia  History of kidney stones  History of recurrent UTIs  History of dysphagia EGD showing gastritis  DVT prophylaxis with Lovenox          Plan:   Continue Brovana budesonide  Change to p.o. prednisone  On Seroquel and Zoloft nightly watch QTc interval  With benefit from saline  Would benefit from having a noninvasive ventilator at home.   Wheezing is improving can start weaning down the steroids  Check swallow study  Okay to transfer out of unit    Cyndy Rubin MD,[facility]

## 2022-10-13 NOTE — PROGRESS NOTES
SPEECH/LANGUAGE PATHOLOGY  CLINICAL ASSESSMENT OF SWALLOWING FUNCTION   and PLAN OF CARE  PATIENT NAME:  Brunilda Garcia  (female)     MRN:  [id number]    :  1955  (69 y.o.)  STATUS:  Inpatient: Room 4419/4419-A    TODAY'S DATE:  10/13/2022  REFERRING PROVIDER:   Matilde Wallis DO  SPECIFIC PROVIDER ORDER: SLP swallowing-dysphagia (IP) Date of order:  10-13-22  REASON FOR REFERRAL: dysphagia   EVALUATING THERAPIST: SUSAN Frye                 ASSESSMENT:    DYSPHAGIA DIAGNOSIS:   Clinical indicators of normal swallow function      DIET RECOMMENDATIONS:  Regular consistency solids (IDDSI level 7) with  thin liquids (IDDSI level 0)     FEEDING RECOMMENDATIONS:     Assistance level:  No assistance needed      Compensatory strategies recommended: No strategies are recommended at this time      Discussed recommendations with nursing?: No secondary to no diet/liquid change recommended     SPEECH THERAPY  PLAN OF CARE   The dysphagia POC is established based on physician order, dysphagia diagnosis and results of clinical assessment     Dysphagia therapy is not recommended     Conditions Requiring Skilled Therapeutic Intervention for dysphagia:    not applicable    Specific dysphagia interventions to include:     Not applicable    Specific instructions for next treatment:  not applicable   Patient Treatment Goals:    Short Term Goals:  Not applicable no therapy warranted     Long Term Goals:   Not applicable no therapy warranted      Patient/family Goal:    not applicable                    ADMITTING DIAGNOSIS: COPD exacerbation ([address] [J44.1]  Supplemental oxygen dependent [Z99.81]  Acute on chronic respiratory failure with hypercapnia ([address] [J96.22]  [place] coma scale total score 9-12, at hospital admission [R40.2423]    VISIT DIAGNOSIS:   Visit Diagnoses         Codes    Supplemental oxygen dependent     Z99.81    Acute on chronic respiratory failure with hypercapnia ([address]     J96.22    [place] coma scale total score 9-12, at hospital admission     R40.4860             PATIENT REPORT/COMPLAINT: denies difficulty swallowing  nursing not available at time of evaluation chart reviewed and patient is not NPO for any procedures per current documentation. PRIOR LEVEL OF SWALLOW FUNCTION:    PAST HISTORY OF DYSPHAGIA?: none reported    Current Diet Order:  ADULT DIET; Regular    PROCEDURE:  Consistencies Administered During the Evaluation   Liquids: thin liquid   Solids:  pureed foods and soft solid foods      Method of Intake:   cup, straw, spoon  Self fed      Position:   Seated, upright    CLINICAL ASSESSMENT  Oral Stage: The oral stage of swallowing was within functional limits      Pharyngeal Stage:    No signs of aspiration were noted during this evaluation however, silent aspiration cannot be ruled out at bedside. If silent aspiration is suspected, a Videofluoroscopic Study of Swallowing (MBS) is recommended and requires a physician order. Cognition:   Confusion noted    Oral Peripheral Examination   Adequate lingual/labial strength     Current Respiratory Status    4 liters nasal cannula     Parameters of Speech Production  Respiration:  Adequate for speech production  Quality:   Within functional limits  Intensity: Within functional limits    Volitional Swallow: Present    Volitional Cough:    Present    Pain: No pain reported. EDUCATION:   The Speech Language Pathologist (SLP) completed education regarding results of evaluation and that intervention is not warranted at this time. Learner: Patient  Education:  Reviewed results and recommendations of this evaluation  Evaluation of Education: Needs further instruction    This plan will be re-evaluated and revised in 1 week  if warranted. CPT code:  14663  bedside swallow eval      [x]The admitting diagnosis and active problem list, have been reviewed prior to initiation of this evaluation.         ACTIVE PROBLEM LIST:   Patient Active Problem List   Diagnosis    COPD (chronic obstructive pulmonary disease) (HCC)    MDD (major depressive disorder)    Hematuria    Neurogenic bladder    Chronic respiratory failure with hypoxia (HCC)    Anemia    Gastric wall thickening    Bladder wall thickening    Difficulty in walking, not elsewhere classified    Moderate persistent asthma with (acute) exacerbation    Muscle weakness (generalized)    Need for assistance with personal care    Neuromuscular dysfunction of bladder, unspecified    Personal history of other malignant neoplasm of kidney    Breast pain    COPD exacerbation [address] [address]    New onset of congestive heart failure (HCC)    Prolonged Q-T interval on ECG    Elevated troponin

## 2022-10-14 PROBLEM — E43 SEVERE PROTEIN-CALORIE MALNUTRITION (HCC): Chronic | Status: ACTIVE | Noted: 2022-10-14

## 2022-10-14 LAB
ANION GAP SERPL CALCULATED.3IONS-SCNC: 6 MMOL/L (ref 7–16)
ANISOCYTOSIS: ABNORMAL
B.E.: 16.5 MMOL/L (ref -3–3)
BASOPHILS ABSOLUTE: 0 E9/L (ref 0–0.2)
BASOPHILS RELATIVE PERCENT: 0.2 % (ref 0–2)
BUN BLDV-MCNC: 36 MG/DL (ref 6–23)
CALCIUM SERPL-MCNC: 10.4 MG/DL (ref 8.6–10.2)
CHLORIDE BLD-SCNC: 93 MMOL/L (ref 98–107)
CO2: 39 MMOL/L (ref 22–29)
COHB: 0.4 % (ref 0–1.5)
CREAT SERPL-MCNC: 0.7 MG/DL (ref 0.5–1)
CRITICAL: ABNORMAL
DATE ANALYZED: ABNORMAL
DATE OF COLLECTION: ABNORMAL
EOSINOPHILS ABSOLUTE: 0 E9/L (ref 0.05–0.5)
EOSINOPHILS RELATIVE PERCENT: 0 % (ref 0–6)
GFR AFRICAN AMERICAN: >60
GFR NON-AFRICAN AMERICAN: >60 ML/MIN/1.73
GLUCOSE BLD-MCNC: 183 MG/DL (ref 74–99)
HCO3: 44.8 MMOL/L (ref 22–26)
HCT VFR BLD CALC: 30 % (ref 34–48)
HEMOGLOBIN: 8.4 G/DL (ref 11.5–15.5)
HHB: 7.2 % (ref 0–5)
LAB: ABNORMAL
LYMPHOCYTES ABSOLUTE: 0.94 E9/L (ref 1.5–4)
LYMPHOCYTES RELATIVE PERCENT: 8.6 % (ref 20–42)
Lab: ABNORMAL
MCH RBC QN AUTO: 23.7 PG (ref 26–35)
MCHC RBC AUTO-ENTMCNC: 28 % (ref 32–34.5)
MCV RBC AUTO: 84.7 FL (ref 80–99.9)
METER GLUCOSE: 125 MG/DL (ref 74–99)
METER GLUCOSE: 144 MG/DL (ref 74–99)
METER GLUCOSE: 161 MG/DL (ref 74–99)
METER GLUCOSE: 171 MG/DL (ref 74–99)
METER GLUCOSE: 172 MG/DL (ref 74–99)
METHB: 0.3 % (ref 0–1.5)
MODE: ABNORMAL
MONOCYTES ABSOLUTE: 0.21 E9/L (ref 0.1–0.95)
MONOCYTES RELATIVE PERCENT: 1.7 % (ref 2–12)
NEUTROPHILS ABSOLUTE: 9.36 E9/L (ref 1.8–7.3)
NEUTROPHILS RELATIVE PERCENT: 89.7 % (ref 43–80)
O2 SATURATION: 92.7 % (ref 92–98.5)
O2HB: 92.1 % (ref 94–97)
OPERATOR ID: 8217
ORGANISM: ABNORMAL
OVALOCYTES: ABNORMAL
PATIENT TEMP: 37 C
PCO2: 82.9 MMHG (ref 35–45)
PDW BLD-RTO: 14.4 FL (ref 11.5–15)
PH BLOOD GAS: 7.35 (ref 7.35–7.45)
PLATELET # BLD: 309 E9/L (ref 130–450)
PMV BLD AUTO: 9.5 FL (ref 7–12)
PO2: 71.9 MMHG (ref 75–100)
POIKILOCYTES: ABNORMAL
POLYCHROMASIA: ABNORMAL
POTASSIUM SERPL-SCNC: 5.2 MMOL/L (ref 3.5–5)
RBC # BLD: 3.54 E12/L (ref 3.5–5.5)
SODIUM BLD-SCNC: 138 MMOL/L (ref 132–146)
SOURCE, BLOOD GAS: ABNORMAL
STOMATOCYTES: ABNORMAL
TARGET CELLS: ABNORMAL
THB: 9.5 G/DL (ref 11.5–16.5)
TIME ANALYZED: 947
URINE CULTURE, ROUTINE: ABNORMAL
WBC # BLD: 10.4 E9/L (ref 4.5–11.5)

## 2022-10-14 PROCEDURE — 94640 AIRWAY INHALATION TREATMENT: CPT

## 2022-10-14 PROCEDURE — 6370000000 HC RX 637 (ALT 250 FOR IP): Performed by: INTERNAL MEDICINE

## 2022-10-14 PROCEDURE — 82962 GLUCOSE BLOOD TEST: CPT

## 2022-10-14 PROCEDURE — 82805 BLOOD GASES W/O2 SATURATION: CPT

## 2022-10-14 PROCEDURE — 2000000000 HC ICU R&B

## 2022-10-14 PROCEDURE — 94660 CPAP INITIATION&MGMT: CPT

## 2022-10-14 PROCEDURE — 97161 PT EVAL LOW COMPLEX 20 MIN: CPT

## 2022-10-14 PROCEDURE — 2700000000 HC OXYGEN THERAPY PER DAY

## 2022-10-14 PROCEDURE — 6360000002 HC RX W HCPCS: Performed by: INTERNAL MEDICINE

## 2022-10-14 PROCEDURE — 2580000003 HC RX 258: Performed by: INTERNAL MEDICINE

## 2022-10-14 PROCEDURE — 85025 COMPLETE CBC W/AUTO DIFF WBC: CPT

## 2022-10-14 PROCEDURE — 97530 THERAPEUTIC ACTIVITIES: CPT

## 2022-10-14 PROCEDURE — 80048 BASIC METABOLIC PNL TOTAL CA: CPT

## 2022-10-14 RX ADMIN — IPRATROPIUM BROMIDE AND ALBUTEROL SULFATE 1 AMPULE: .5; 2.5 SOLUTION RESPIRATORY (INHALATION) at 17:27

## 2022-10-14 RX ADMIN — MULTIVITAMIN TABLET 1 TABLET: TABLET at 09:07

## 2022-10-14 RX ADMIN — BUDESONIDE INHALATION SUSPENSION 500 MCG: 0.5 SUSPENSION RESPIRATORY (INHALATION) at 12:27

## 2022-10-14 RX ADMIN — FOLIC ACID 1 MG: 1 TABLET ORAL at 09:08

## 2022-10-14 RX ADMIN — ENOXAPARIN SODIUM 40 MG: 100 INJECTION SUBCUTANEOUS at 09:08

## 2022-10-14 RX ADMIN — Medication 10 ML: at 09:08

## 2022-10-14 RX ADMIN — PREDNISONE 40 MG: 20 TABLET ORAL at 09:08

## 2022-10-14 RX ADMIN — GABAPENTIN 300 MG: 300 CAPSULE ORAL at 21:35

## 2022-10-14 RX ADMIN — ARFORMOTEROL TARTRATE 15 MCG: 15 SOLUTION RESPIRATORY (INHALATION) at 12:27

## 2022-10-14 RX ADMIN — IPRATROPIUM BROMIDE AND ALBUTEROL SULFATE 1 AMPULE: .5; 2.5 SOLUTION RESPIRATORY (INHALATION) at 12:28

## 2022-10-14 RX ADMIN — SERTRALINE HYDROCHLORIDE 50 MG: 50 TABLET ORAL at 21:35

## 2022-10-14 RX ADMIN — Medication 10 ML: at 21:36

## 2022-10-14 RX ADMIN — QUETIAPINE FUMARATE 50 MG: 50 TABLET, EXTENDED RELEASE ORAL at 21:35

## 2022-10-14 ASSESSMENT — PAIN SCALES - GENERAL
PAINLEVEL_OUTOF10: 0

## 2022-10-14 NOTE — CARE COORDINATION
10/14:  Update CM Note:  Pt presented to the ER for generalized weakness,nausea, vomiting & SOB. RRT on 10/11 for AMS. Pt was transferred to MICU. Pt is on 3 L/Nc at 100%. CM spoke with sister Harshal Rouse at [phone]. Pt's PCP is Dr. Mohsen Santos & her sister believes it is [employer] on . Pt lives alone in a house with 1-2 steps to enter. The bath is on the 1st floor & the bedroom on the 2nd floor with apprx 6-8 steps. PTA pt was independent with 02 - 3L/NC continuously via [employer]. Pt has a hx of SNF with [address]. [facility] doesn't have anything available til next Thursday. Harshal Rouse states she would be open to [facility] if needed. CM sent referral to [facility]. PT 16/24 OT pending. If pt is dc home her sister said someone maybe able to pick her up in the family if not transportation can be arrange via [address] [facility] transport [phone]. CM sent referral for [facility] [phone], pending a call back. Pt will need to have her 02 to transport home. Needs are unclear. Sw/ROSALINA will continue to follow for dc planning. Electronically signed by Gabe Chau RN on 10/14/2022 at 3:56 PM    The Plan for Transition of Care is related to the following treatment goals: SNF/HHC    The Patient and/or patient representative  was provided with a choice of provider and agrees   with the discharge plan. [x] Yes [] No    Freedom of choice list was provided with basic dialogue that supports the patient's individualized plan of care/goals, treatment preferences and shares the quality data associated with the providers.  [x] Yes [] No

## 2022-10-14 NOTE — PLAN OF CARE
Problem: Discharge Planning  Goal: Discharge to home or other facility with appropriate resources  10/14/2022 0139 by Gucci Dejesus RN  Outcome: Progressing     Problem: Skin/Tissue Integrity  Goal: Absence of new skin breakdown  Description: 1. Monitor for areas of redness and/or skin breakdown  2. Assess vascular access sites hourly  3. Every 4-6 hours minimum:  Change oxygen saturation probe site  4. Every 4-6 hours:  If on nasal continuous positive airway pressure, respiratory therapy assess nares and determine need for appliance change or resting period.   10/14/2022 0139 by Gucci Dejesus RN  Outcome: Progressing     Problem: Safety - Adult  Goal: Free from fall injury  10/14/2022 0139 by Gucci Dejesus RN  Outcome: Progressing

## 2022-10-14 NOTE — PROGRESS NOTES
[facility]                                Progress note    Subjective:    Feels well  No acute complaints  Tolerating medications    Objective:    BP (!) 120/56   Pulse 88   Temp 98 °F (36.7 °C)   Resp 15   Ht 5' 7\" (1.702 m)   Wt 137 lb (62.1 kg)   LMP 10/21/1985   SpO2 99%   BMI 21.46 kg/m²     In: 1430 [P.O.:1420; I.V.:10]  Out: 1080   In: 1430   Out: 1080 [Urine:1080]    General appearance: NAD, conversant   HEENT: AT/NC, MMM  Neck: FROM, supple  Lungs: Clear to auscultation-no evidence of wheeze or rhonchi on my eval today  CV: RRR, no MRGs  Vasc: Radial pulses 2+  Abdomen: Soft, non-tender; no masses or HSM  Extremities: No peripheral edema or digital cyanosis  Skin: no rash, lesions or ulcers  Psych: Alert and oriented to person, place and time not able to fully assess  Neuro: Alert and interactive     Recent Labs     10/12/22  0456 10/13/22  0500 10/14/22  0415   WBC 7.6 7.4 10.4   HGB 7.9* 7.8* 8.4*   HCT 27.7* 28.5* 30.0*    297 309       Recent Labs     10/12/22  0456 10/12/22  0818 10/13/22  0500 10/14/22  0415     --  142 138   K 5.1* 4.67 5.2* 5.2*   CL 93*  --  96* 93*   CO2 37*  --  39* 39*   BUN 38*  --  37* 36*   CREATININE 0.7  --  0.7 0.7   CALCIUM 9.4  --  9.9 10.4*       Assessment:    Principal Problem:    COPD exacerbation (HCC)  Active Problems:    New onset of congestive heart failure (HCC)    Prolonged Q-T interval on ECG    Elevated troponin  Resolved Problems:    * No resolved hospital problems.  *      Plan:  20-year-old female with a history of COPD who is oxygen dependent presents to the ED with complaints of generalized weakness and shortness of breath and is admitted to telemetry unit with hypercapnic respiratory failure and acute on chronic CHF  10/11:  -Co2 on ABG's 100.1, followed by RRT d/t minimally responsive, transferred to ICU after being continuously on AVAPS  -IV Bumex 1mg BID  -Echo   -Pulmonary following     10/12:  -Co2 on ABGs 78.5 today-continue noninvasive ventilation as needed, likely chronic CO2 retention  -Diuretics discontinued, improvement in BNP from 8K to 4K  -Echo > 55-60% EF  -IV solumedrol 40 BID-continue to taper, transition to p.o. at discretion of pulmonology  -Duo nebs, ICS, anticholinergics/LABA/Elsa  -UTI IV rocephin, await cultures     10/13:   Answering all questions appropriately today  Has a chronic indwelling Del Rio catheter for unknown reasons-she is not able to tell me why-?  Neurogenic bladder  Gram-negative rods in urine culture-likely colonization from chronic indwelling catheter  Infectious disease service consulted by critical care team-Case discussed with Dr. Ann Escalante for trend out of ICU medicine standpoint    10/14:  No acute complaints, no shortness of breath  Serratia in urine culture and patient with indwelling urinary catheter-ID on board  Chronic PCO2 elevation on chronic home O2  Okay for transfer out of ICU from medicine standpoint    Code status: Full  Consultants: Pulm and critical care   DVT Prophylaxis Lovenox   PT/OT  Discharge planning     Baby Seip, MD

## 2022-10-14 NOTE — PROGRESS NOTES
Department of Internal Medicine  Infectious Diseases  Progress  Note      C/CP Serratia bacteriuria     Pt denies fever, chills, abdomen pain   Afebrile       Current Facility-Administered Medications   Medication Dose Route Frequency Provider Last Rate Last Admin    dextrose 50 % IV solution  25 g IntraVENous PRN Mildred Wild MD        glucose chewable tablet 16 g  4 tablet Oral PRN Mildred Wild MD        dextrose bolus 10% 125 mL  125 mL IntraVENous PRN Mildred Wild MD        Or    dextrose bolus 10% 250 mL  250 mL IntraVENous PRN Mildred Wild MD        glucagon (rDNA) injection 1 mg  1 mg SubCUTAneous PRN Mildred Wild MD        dextrose 10 % infusion   IntraVENous Continuous PRN Mildred Wild MD        predniSONE (DELTASONE) tablet 40 mg  40 mg Oral Daily Mildred Wild MD   40 mg at 10/14/22 0908    insulin lispro (HUMALOG) injection vial 0-4 Units  0-4 Units SubCUTAneous TID WC Mildred Wild MD   2 Units at 10/13/22 1353    insulin lispro (HUMALOG) injection vial 0-4 Units  0-4 Units SubCUTAneous Nightly Mildred Wild MD        medicated lip balm (BLISTEX/CARMEX) stick   Topical PRN Mildred Wild MD   Given at 10/12/22 1002    sodium chloride flush 0.9 % injection 5-40 mL  5-40 mL IntraVENous 2 times per day Mildred Wild MD   10 mL at 10/14/22 0908    sodium chloride flush 0.9 % injection 5-40 mL  5-40 mL IntraVENous PRN Mildred Wild MD        0.9 % sodium chloride infusion   IntraVENous PRN Mildred Wild MD        polyethylene glycol (GLYCOLAX) packet 17 g  17 g Oral Daily PRN Mildred Wild MD        acetaminophen (TYLENOL) tablet 650 mg  650 mg Oral Q6H PRN Mildred Wild MD        Or    acetaminophen (TYLENOL) suppository 650 mg  650 mg Rectal Q6H PRN Mildred Wild MD        folic acid (FOLVITE) tablet 1 mg  1 mg Oral Daily Mildred Wild MD   1 mg at 10/14/22 0908    gabapentin (NEURONTIN) capsule 300 mg  300 mg Oral Nightly Mildred Wild MD   300 mg at 10/13/22 2002    multivitamin 1 tablet  1 tablet Oral Daily Mildred Wild MD   1 tablet at 10/14/22 0910 QUEtiapine (SEROQUEL XR) extended release tablet 50 mg  50 mg Oral Nightly Curly Gutierrez MD   50 mg at 10/13/22 2002    sertraline (ZOLOFT) tablet 50 mg  50 mg Oral Nightly Curly Gutierrez MD   50 mg at 10/13/22 2002    enoxaparin (LOVENOX) injection 40 mg  40 mg SubCUTAneous Daily Curly Gutierrez MD   40 mg at 10/14/22 0908    senna (SENOKOT) tablet 8.6 mg  1 tablet Oral Daily PRN Curly Gutierrez MD   8.6 mg at 10/11/22 2104    ipratropium-albuterol (DUONEB) nebulizer solution 1 ampule  1 ampule Inhalation Q4H WA Curly Gutierrez MD   1 ampule at 10/13/22 2042    perflutren lipid microspheres (DEFINITY) injection 1.65 mg  1.5 mL IntraVENous ONCE PRN Curly Gutierrez MD        albuterol (PROVENTIL) nebulizer solution 2.5 mg  2.5 mg Nebulization Q6H PRN Curly Gutierrez MD        budesonide (PULMICORT) nebulizer suspension 500 mcg  0.5 mg Nebulization BID Curly Gutierrez MD   500 mcg at 10/13/22 2042    Arformoterol Tartrate (BROVANA) nebulizer solution 15 mcg  15 mcg Nebulization BID Curly Gutierrez MD   15 mcg at 10/13/22 2042         REVIEW OF SYSTEMS:    CONSTITUTIONAL:  Denies fever, chill or rigors, nausea or vomiting. HEENT: denies blurring of vision or double vision, denies hearing problem  RESPIRATORY: denies cough, shortness of breath, sputum expectoration, chest pain. CARDIOVASCULAR:  Denies palpitation  GASTROINTESTINAL:  Denies abdomen pain, diarrhea or constipation. GENITOURINARY:  Indwelling Del Rio catheter   INTEGUMENT: denies wound , rash  HEMATOLOGIC/LYMPHATIC:  Denies lymph node swelling, gum bleeding or easy bruising. MUSCULOSKELETAL:  Denies leg pain , joint pain , joint swelling  NEUROLOGICAL:  Denies light headed, dizziness, loss of consciousness,    PHYSICAL EXAM:      Vitals:     /66   Pulse 88   Temp 97.6 °F (36.4 °C)   Resp 17   Ht 5' 7\" (1.702 m)   Wt 137 lb (62.1 kg)   LMP 10/21/1985   SpO2 97%   BMI 21.46 kg/m²     General Appearance:    Awake, alert , no acute distress.    Head:    Normocephalic, atraumatic   Eyes: No pallor, no icterus,   Ears:    No obvious deformity or drainage.    Nose:   No nasal drainage   Throat:   Mucosa moist, no oral thrush   Neck:   Supple, no lymphadenopathy   Back:     no CVA tenderness   Lungs:     Clear to auscultation bilaterally, no wheeze    Heart:    Regular rate and rhythm, no murmur   Abdomen:     Soft, non-tender, bowel sounds present    Extremities:   No edema, no cyanosis    Pulses:   Dorsalis pedis palpable    Skin:   no rashes or lesions       CBC with Differential:      Lab Results   Component Value Date/Time    WBC 10.4 10/14/2022 04:15 AM    RBC 3.54 10/14/2022 04:15 AM    HGB 8.4 10/14/2022 04:15 AM    HCT 30.0 10/14/2022 04:15 AM     10/14/2022 04:15 AM    MCV 84.7 10/14/2022 04:15 AM    MCH 23.7 10/14/2022 04:15 AM    MCHC 28.0 10/14/2022 04:15 AM    RDW 14.4 10/14/2022 04:15 AM    NRBC 4.3 10/13/2022 05:00 AM    SEGSPCT 26 02/16/2014 06:30 PM    METASPCT 2.6 10/11/2022 05:41 AM    LYMPHOPCT 8.6 10/14/2022 04:15 AM    PROMYELOPCT 0.9 05/18/2020 03:48 AM    MONOPCT 1.7 10/14/2022 04:15 AM    MYELOPCT 1.7 10/13/2022 05:00 AM    BASOPCT 0.2 10/14/2022 04:15 AM    MONOSABS 0.21 10/14/2022 04:15 AM    LYMPHSABS 0.94 10/14/2022 04:15 AM    EOSABS 0.00 10/14/2022 04:15 AM    BASOSABS 0.00 10/14/2022 04:15 AM       CMP     Lab Results   Component Value Date/Time     10/14/2022 04:15 AM    K 5.2 10/14/2022 04:15 AM    K 4.5 05/29/2022 07:25 PM    CL 93 10/14/2022 04:15 AM    CO2 39 10/14/2022 04:15 AM    BUN 36 10/14/2022 04:15 AM    CREATININE 0.7 10/14/2022 04:15 AM    GFRAA >60 10/14/2022 04:15 AM    LABGLOM >60 10/14/2022 04:15 AM    GLUCOSE 183 10/14/2022 04:15 AM    PROT 8.0 10/11/2022 05:41 AM    LABALBU 4.2 10/11/2022 05:41 AM    CALCIUM 10.4 10/14/2022 04:15 AM    BILITOT 0.2 10/11/2022 05:41 AM    ALKPHOS 85 10/11/2022 05:41 AM    AST 17 10/11/2022 05:41 AM    ALT 16 10/11/2022 05:41 AM         Hepatic Function Panel:    Lab Results   Component Value Date/Time ALKPHOS 85 10/11/2022 05:41 AM    ALT 16 10/11/2022 05:41 AM    AST 17 10/11/2022 05:41 AM    PROT 8.0 10/11/2022 05:41 AM    BILITOT 0.2 10/11/2022 05:41 AM    BILIDIR <0.2 10/11/2022 05:41 AM    IBILI see below 10/11/2022 05:41 AM    LABALBU 4.2 10/11/2022 05:41 AM       PT/INR:    Lab Results   Component Value Date/Time    PROTIME 9.7 02/26/2021 06:31 PM    INR 0.9 02/26/2021 06:31 PM       TSH:    Lab Results   Component Value Date/Time    TSH 0.140 10/11/2022 02:28 PM       U/A:    Lab Results   Component Value Date/Time    COLORU Yellow 10/11/2022 02:27 PM    PHUR 6.0 10/11/2022 02:27 PM    WBCUA 2-5 10/11/2022 02:27 PM    RBCUA 1-3 10/11/2022 02:27 PM    RBCUA NONE 02/18/2014 07:23 PM    MUCUS Present 04/19/2018 01:10 PM    BACTERIA MODERATE 10/11/2022 02:27 PM    CLARITYU SL CLOUDY 10/11/2022 02:27 PM    SPECGRAV 1.025 10/11/2022 02:27 PM    LEUKOCYTESUR Negative 10/11/2022 02:27 PM    UROBILINOGEN 0.2 10/11/2022 02:27 PM    BILIRUBINUR Negative 10/11/2022 02:27 PM    BLOODU MODERATE 10/11/2022 02:27 PM    GLUCOSEU Negative 10/11/2022 02:27 PM    AMORPHOUS MODERATE 02/26/2021 11:30 PM       ABG:  No results found for: DAW1LAL, BEART, N7WYRPGC, PHART, THGBART, YYD4GTR, PO2ART, ZVE2XFS    MICROBIOLOGY:    Blood culture - negative     Urine Culture -     Serratia fonticola (1)    Antibiotic Interpretation Microscan  Method Status    amoxicillin-clavulanate Resistant >=^32 mcg/mL BACTERIAL SUSCEPTIBILITY PANEL BY MAI     ceFAZolin Resistant >=^64 mcg/mL BACTERIAL SUSCEPTIBILITY PANEL BY MAI     cefepime Sensitive ^0.5 mcg/mL BACTERIAL SUSCEPTIBILITY PANEL BY MAI     cefotaxime Sensitive <=^0.25 mcg/mL BACTERIAL SUSCEPTIBILITY PANEL BY MAI     cefOXitin Resistant ^32 mcg/mL BACTERIAL SUSCEPTIBILITY PANEL BY MAI     cefTAZidime-avibactam Sensitive ^4 mcg/mL BACTERIAL SUSCEPTIBILITY PANEL BY MAI     gentamicin Sensitive <=^1 mcg/mL BACTERIAL SUSCEPTIBILITY PANEL BY MAI     levofloxacin Sensitive <=^0.12 mcg/mL BACTERIAL SUSCEPTIBILITY PANEL BY MAI     meropenem Sensitive ^4 mcg/mL BACTERIAL SUSCEPTIBILITY PANEL BY MAI     nitrofurantoin Resistant ^256 mcg/mL BACTERIAL SUSCEPTIBILITY PANEL BY MAI     piperacillin-tazobactam Sensitive <=^4 mcg/mL BACTERIAL SUSCEPTIBILITY PANEL BY MAI     trimethoprim-sulfamethoxazole Sensitive <=^20 mcg/mL BACTERIAL SUSCEPTIBILITY PANEL BY MAI        Narrative      Urine antigen test - negative       Radiology :    Chest X ray    Developing bibasilar subsegmental atelectasis.        IMPRESSION:     Hypercapnic respiratory failure   Serraia bacteriuria , in the presence of Del Rio catheter - chronic colonization       RECOMMENDATIONS:       Monitor off abx   ID sign off , please call us if any question

## 2022-10-14 NOTE — CONSULTS
10/14/2022 2:49 PM  Ayesha Cronin  [id number]     Chief Complaint:    Jarquin catheter change    History of Present Illness: The patient is a 69 y.o. female patient who presented to the hospital with complaints of generalized body aches, nausea, and emesis. She became hypoxic and was transferred to the ICU. We were asked change her chronic jarquin catheter. She has a history of neurogenic bladder and used to perform CIC  She now has a chronic Jarquin catheter  She was a patient of Dr. Williams Cho but now follows with [facility]. She did undergo cystolitholapaxy in April 2020  She has a history of 2.8cm left renal mass s/p RFA with Dr. Supriya Pineda in June 2014. She states her jarquin catheter is changed at [facility] every 4 weeks   This was last chaned on 9/13    Past Medical History:   Diagnosis Date    Asthma     COPD (chronic obstructive pulmonary disease) ([facility] [address]     uses )3 prn daily and nightly / Dr. Rivas Area F/U monthly    Depression     Dysphagia 2021    Jarquin catheter in place     continuous since 2013, changed monthly at [facility] 2017         Past Surgical History:   Procedure Laterality Date    COLONOSCOPY      COLONOSCOPY N/A 9/4/2019    COLONOSCOPY DIAGNOSTIC performed by Andie Johnson MD at [address] [place] [address] (CERVIX STATUS UNKNOWN)      KIDNEY SURGERY Left 06/16/2014    ABLATION PERFORMED UNDER CT SCAN    MT CYSTOURETHROSCOPY,BIOPSIES N/A 9/14/2018    CYSTOSCOPY RETROGRADE PYELOGRAM, CLOT EVACATION , POSS.   BLADDER BIOPSY ---DR Darlene Chen 2 :30 performed by Sparkle Sue DO at [address] [place] [address] N/A 5/10/2019    EGD BIOPSY performed by Andie Johnson MD at [address] [place] [address] Dr Smith 6/28/2019    EGD EUS performed by Lucina Buenrostro DO at [address] [place] [address] Dr Smith N/A 6/28/2019    EGD performed by Lucina Buenrostro DO at [address] [place] [address] Dr Smith 5/19/2020    EGD DIAGNOSTIC ONLY performed by Timi Jackson MD at [facility] N/A 10/25/2021    EGD BIOPSY performed by Timi Jackson MD at [facility] N/A 12/6/2021    EGD BIOPSY performed by Timi Jackson MD at [facility] [address]       Medications Prior to Admission:    Medications Prior to Admission: amoxicillin (AMOXIL) 500 MG capsule, Take 500 mg by mouth 2 times daily  clarithromycin (BIAXIN) 500 MG tablet, Take 500 mg by mouth 2 times daily  gabapentin (NEURONTIN) 300 MG capsule, Take 1 capsule by mouth nightly for 30 days. Intended supply: 30 days  diclofenac sodium (VOLTAREN) 1 % GEL, Apply topically 4 times daily as needed for Pain (Patient not taking: Reported on 6/16/2022)  QUEtiapine (SEROQUEL XR) 50 MG extended release tablet, Take 50 mg by mouth nightly  fluticasone-umeclidin-vilant (TRELEGY ELLIPTA) 100-62.5-25 MCG/INH AEPB, Inhale 1 puff into the lungs daily (Patient not taking: Reported on 6/16/2022)  albuterol sulfate  (90 Base) MCG/ACT inhaler, Inhale 1 puff into the lungs every 4 hours as needed (every 4-6 hours as needed) Instructed to take am of procedure if needed and bring dos  lipase-protease-amylase (CREON) 76167-30421 units delayed release capsule, Take 1 capsule by mouth 3 times daily (with meals) (Patient taking differently: Take 2 capsules by mouth 4 times daily (with meals and nightly) )  OXYGEN, Inhale 3 L into the lungs as needed Uses 3L night and during day prn  vitamin D (ERGOCALCIFEROL) 13180 UNITS CAPS capsule, Take 50,000 Units by mouth once a week Saturday  Multiple Vitamins-Iron (DAILY-FEDERICO/IRON) TABS, Take 1 tablet by mouth daily   folic acid (FOLVITE) 1 MG tablet, Take 1 mg by mouth daily   sertraline (ZOLOFT) 50 MG tablet, Take 1 tablet by mouth daily.  (Patient taking differently: Take 50 mg by mouth nightly )  alendronate (FOSAMAX) 70 MG tablet, Take 70 mg by mouth every 7 days Sunday    Allergies:    Sulfa antibiotics    Social History:    reports that she quit smoking about 2 years ago. She has never used smokeless tobacco. She reports that she does not drink alcohol and does not use drugs. Family History:   Non-contributory to this Urological problem  family history includes Cancer (age of onset: [de-identified]) in her father; Diabetes in her brother; Kidney Disease in her brother. Review of Systems:  Constitutional: No fever or chills   Respiratory: negative for cough and hemoptysis  Cardiovascular: negative for chest pain and dyspnea  Gastrointestinal: negative for abdominal pain, diarrhea, nausea and vomiting   Derm: negative for rash and skin lesion(s)  Neurological: negative for seizures and tremors  Musculoskeletal: Negative    Psychiatric: Negative   : As above in the HPI, otherwise negative  All other reviews are negative    Physical Exam:     Vitals:  BP (!) 114/55   Pulse 85   Temp 98.2 °F (36.8 °C)   Resp 21   Ht 5' 7\" (1.702 m)   Wt 137 lb (62.1 kg)   LMP 10/21/1985   SpO2 98%   BMI 21.46 kg/m²     General:  Awake, alert, oriented X 3. No apparent distress. HEENT:  Normocephalic, atraumatic. Lungs:  Respirations symmetric and non-labored. Abdomen:  soft, nontender, no masses  Extremities:  No clubbing, cyanosis, or edema  Skin:  Warm and dry, no open lesions or rashes  Neuro:  There are no motor or sensory deficits in the 4 quadrant extremities   Rectal: deferred  Genitourinary:  jarquin with yellow urine     Labs:     Recent Labs     10/12/22  0456 10/13/22  0500 10/14/22  0415   WBC 7.6 7.4 10.4   RBC 3.35* 3.38* 3.54   HGB 7.9* 7.8* 8.4*   HCT 27.7* 28.5* 30.0*   MCV 82.7 84.3 84.7   MCH 23.6* 23.1* 23.7*   MCHC 28.5* 27.4* 28.0*   RDW 14.6 14.4 14.4    297 309   MPV 9.4 9.2 9.5         Recent Labs     10/12/22  0456 10/13/22  0500 10/14/22  0415   CREATININE 0.7 0.7 0.7       No results found for: PSA          Assessment/plan:  Neurogenic bladder with chronic jarquin   Hx 2.8cm left renal mass s/p RFA with Dr. Elvis Collado in June 2014   Hx of bladder calculi s/p cystolitholapaxy April 2020      Her jarquin was removed. Her genitalia were prepped and draped in sterile fashion. Following this a 16F jarquin catheter was inserted into her bladder without difficulty. She tolerated well.      Continue the ICU care  Urine culture reviewed  Antibiotics per ID  Continue the Jarquin  Will continue to Change every 4 weeks with [facility]  Please call with questions      Electronically signd by ANGELA Mercado CNP on 10/14/2022 at 2:49 PM  [facility]     Agree with above  Seen and examined  Agree with the plan and treatment    [facility], DO

## 2022-10-14 NOTE — PROGRESS NOTES
Sleeping and impossible to arouse. Breathing comfortably. Not clear if she wore NIV last night.      Additional Respiratory Assessments  Heart Rate: 78  Resp: 24  SpO2: 100 %  Swallow: Normal - able to swallow solids      Current Facility-Administered Medications:     [COMPLETED] insulin regular (HUMULIN R;NOVOLIN R) injection 10 Units, 10 Units, IntraVENous, Once, 10 Units at 10/13/22 1247 **AND** [COMPLETED] dextrose 50 % IV solution, 25 g, IntraVENous, Once, 25 g at 10/13/22 1247 **AND** POCT Glucose, , , Q30 Min **AND** POCT Glucose, , , 1 Time **AND** dextrose 50 % IV solution, 25 g, IntraVENous, PRN, Zack Villarreal MD    glucose chewable tablet 16 g, 4 tablet, Oral, PRN, Zack Villarreal MD    dextrose bolus 10% 125 mL, 125 mL, IntraVENous, PRN **OR** dextrose bolus 10% 250 mL, 250 mL, IntraVENous, PRN, Zack Villarreal MD    glucagon (rDNA) injection 1 mg, 1 mg, SubCUTAneous, PRN, Zack Villarreal MD    dextrose 10 % infusion, , IntraVENous, Continuous PRN, Zack Villarreal MD    predniSONE (DELTASONE) tablet 40 mg, 40 mg, Oral, Daily, Zack Villarreal MD, 40 mg at 10/13/22 2002    insulin lispro (HUMALOG) injection vial 0-4 Units, 0-4 Units, SubCUTAneous, TID WC, Zack Villarreal MD, 2 Units at 10/13/22 1353    insulin lispro (HUMALOG) injection vial 0-4 Units, 0-4 Units, SubCUTAneous, Nightly, Zack Villarreal MD    medicated lip balm (BLISTEX/CARMEX) stick, , Topical, PRN, Zack Villarreal MD, Given at 10/12/22 1002    sodium chloride flush 0.9 % injection 5-40 mL, 5-40 mL, IntraVENous, 2 times per day, Zack Villarreal MD, 10 mL at 10/13/22 2002    sodium chloride flush 0.9 % injection 5-40 mL, 5-40 mL, IntraVENous, PRN, Zack Villarreal MD    0.9 % sodium chloride infusion, , IntraVENous, PRN, Zack Villarreal MD    polyethylene glycol (GLYCOLAX) packet 17 g, 17 g, Oral, Daily PRN, Zack Villarreal MD    acetaminophen (TYLENOL) tablet 650 mg, 650 mg, Oral, Q6H PRN **OR** acetaminophen (TYLENOL) suppository 650 mg, 650 mg, Rectal, Q6H PRN, Zack Villarreal MD    folic acid (Tai Quintana) tablet 1 mg, 1 mg, Oral, Daily, Latonia Sullivan MD, 1 mg at 10/13/22 1002    gabapentin (NEURONTIN) capsule 300 mg, 300 mg, Oral, Nightly, Latonia Sullivan MD, 300 mg at 10/13/22 2002    multivitamin 1 tablet, 1 tablet, Oral, Daily, Latonia Sullivan MD, 1 tablet at 10/13/22 1002    QUEtiapine (SEROQUEL XR) extended release tablet 50 mg, 50 mg, Oral, Nightly, Latonia Sullivan MD, 50 mg at 10/13/22 2002    sertraline (ZOLOFT) tablet 50 mg, 50 mg, Oral, Nightly, Latonia Sullivan MD, 50 mg at 10/13/22 2002    enoxaparin (LOVENOX) injection 40 mg, 40 mg, SubCUTAneous, Daily, Latonia Sullivan MD    [facility]) tablet 8.6 mg, 1 tablet, Oral, Daily PRN, Latonia Sullivan MD, 8.6 mg at 10/11/22 2104    ipratropium-albuterol (Gerline Beady) nebulizer solution 1 ampule, 1 ampule, Inhalation, Q4H WA, Latonia Sullivan MD, 1 ampule at 10/13/22 2042    perflutren lipid microspheres (DEFINITY) injection 1.65 mg, 1.5 mL, IntraVENous, ONCE PRN, Latonia Sullivan MD    albuterol (PROVENTIL) nebulizer solution 2.5 mg, 2.5 mg, Nebulization, Q6H PRN, Latonia Sullivan MD    budesonide (PULMICORT) nebulizer suspension 500 mcg, 0.5 mg, Nebulization, BID, Latonia Sullivan MD, 500 mcg at 10/13/22 2042    Arformoterol Tartrate (BROVANA) nebulizer solution 15 mcg, 15 mcg, Nebulization, BID, Latonia Sullivan MD, 15 mcg at 10/13/22 2042  /66   Pulse 78   Temp 97.3 °F (36.3 °C) (Temporal)   Resp 24   Ht 5' 7\" (1.702 m)   Wt 137 lb (62.1 kg)   LMP 10/21/1985   SpO2 100%   BMI 21.46 kg/m²     General: No distress  Skin: No rash  Chest: Symmetric, no accessory use  Heart: RRR  Lungs: Diminished bs  Abdomen: Soft nt, no organomegaly  Extremities: No edema    Intake/Output Summary (Last 24 hours) at 10/14/2022 0851  Last data filed at 10/14/2022 0600  Gross per 24 hour   Intake 1120 ml   Output 745 ml   Net 375 ml     CBC with Differential:    Lab Results   Component Value Date/Time    WBC 10.4 10/14/2022 04:15 AM    RBC 3.54 10/14/2022 04:15 AM    HGB 8.4 10/14/2022 04:15 AM    HCT 30.0 10/14/2022 04:15 AM  10/14/2022 04:15 AM    MCV 84.7 10/14/2022 04:15 AM    MCH 23.7 10/14/2022 04:15 AM    MCHC 28.0 10/14/2022 04:15 AM    RDW 14.4 10/14/2022 04:15 AM    NRBC 4.3 10/13/2022 05:00 AM    SEGSPCT 26 02/16/2014 06:30 PM    METASPCT 2.6 10/11/2022 05:41 AM    LYMPHOPCT 8.6 10/14/2022 04:15 AM    PROMYELOPCT 0.9 05/18/2020 03:48 AM    MONOPCT 1.7 10/14/2022 04:15 AM    MYELOPCT 1.7 10/13/2022 05:00 AM    BASOPCT 0.2 10/14/2022 04:15 AM    MONOSABS 0.21 10/14/2022 04:15 AM    LYMPHSABS 0.94 10/14/2022 04:15 AM    EOSABS 0.00 10/14/2022 04:15 AM    BASOSABS 0.00 10/14/2022 04:15 AM     BMP:    Lab Results   Component Value Date/Time     10/14/2022 04:15 AM    K 5.2 10/14/2022 04:15 AM    K 4.5 05/29/2022 07:25 PM    CL 93 10/14/2022 04:15 AM    CO2 39 10/14/2022 04:15 AM    BUN 36 10/14/2022 04:15 AM    LABALBU 4.2 10/11/2022 05:41 AM    CREATININE 0.7 10/14/2022 04:15 AM    CALCIUM 10.4 10/14/2022 04:15 AM    GFRAA >60 10/14/2022 04:15 AM    LABGLOM >60 10/14/2022 04:15 AM    GLUCOSE 183 10/14/2022 04:15 AM     ABG:    Lab Results   Component Value Date/Time    PH 7.340 10/12/2022 08:18 AM    PCO2 78.5 10/12/2022 08:18 AM    PO2 105.2 10/12/2022 08:18 AM    HCO3 41.4 10/12/2022 08:18 AM    BE 13.2 10/12/2022 08:18 AM    O2SAT 97.2 10/12/2022 08:18 AM       IMPRESSION:   Patient Active Problem List   Diagnosis    COPD (chronic obstructive pulmonary disease) (HCC)    MDD (major depressive disorder)    Hematuria    Neurogenic bladder    Chronic respiratory failure with hypoxia (HCC)    Anemia    Gastric wall thickening    Bladder wall thickening    Difficulty in walking, not elsewhere classified    Moderate persistent asthma with (acute) exacerbation    Muscle weakness (generalized)    Need for assistance with personal care    Neuromuscular dysfunction of bladder, unspecified    Personal history of other malignant neoplasm of kidney    Breast pain    COPD exacerbation ([facility] [address]    New onset of congestive heart failure (HCC)    Prolonged Q-T interval on ECG    Elevated troponin     More lethargic, if fails to awaken within an hour, would check abg. COPD exac overall seems better as in no distress just lethargic. DW Dr. Silvio Vick and RN.   Nasim Palafox MD  10/14/2022  8:51 AM

## 2022-10-14 NOTE — PLAN OF CARE
Problem: Chronic Conditions and Co-morbidities  Goal: Patient's chronic conditions and co-morbidity symptoms are monitored and maintained or improved  10/13/2022 2011 by Janelle Mccann RN  Outcome: Progressing  10/13/2022 0838 by Carmen Ramos RN  Outcome: Progressing  Flowsheets (Taken 10/13/2022 0800)  Care Plan - Patient's Chronic Conditions and Co-Morbidity Symptoms are Monitored and Maintained or Improved: Monitor and assess patient's chronic conditions and comorbid symptoms for stability, deterioration, or improvement     Problem: Discharge Planning  Goal: Discharge to home or other facility with appropriate resources  Outcome: Progressing  Flowsheets (Taken 10/13/2022 0800 by Carmen Ramos RN)  Discharge to home or other facility with appropriate resources: Identify barriers to discharge with patient and caregiver     Problem: Skin/Tissue Integrity  Goal: Absence of new skin breakdown  Description: 1. Monitor for areas of redness and/or skin breakdown  2. Assess vascular access sites hourly  3. Every 4-6 hours minimum:  Change oxygen saturation probe site  4. Every 4-6 hours:  If on nasal continuous positive airway pressure, respiratory therapy assess nares and determine need for appliance change or resting period.   10/13/2022 2011 by Janelle Mccann RN  Outcome: Progressing  10/13/2022 0838 by Carmen Ramos RN  Outcome: Progressing     Problem: Safety - Adult  Goal: Free from fall injury  Outcome: Progressing  Flowsheets (Taken 10/13/2022 0833 by Carmen Ramos RN)  Free From Fall Injury: Instruct family/caregiver on patient safety     Problem: ABCDS Injury Assessment  Goal: Absence of physical injury  Outcome: Progressing  Flowsheets (Taken 10/13/2022 0833 by Carmen Ramos RN)  Absence of Physical Injury: Implement safety measures based on patient assessment     Problem: Respiratory - Adult  Goal: Achieves optimal ventilation and oxygenation  Outcome: Progressing

## 2022-10-14 NOTE — PROGRESS NOTES
Comprehensive Nutrition Assessment    Type and Reason for Visit:  Initial (LOS ICU)    Nutrition Recommendations/Plan:     Continue Current Diet, Start Oral Nutrition Supplement     Malnutrition Assessment:  Malnutrition Status:  Severe malnutrition (10/14/22 1544)    Context:  Chronic Illness     Findings of the 6 clinical characteristics of malnutrition:  Energy Intake:  75% or less estimated energy requirements for 1 month or longer  Weight Loss:  Unable to assess (limited hx/ wt shifts w/ CHF)     Body Fat Loss:  Severe body fat loss Orbital   Muscle Mass Loss:  Severe muscle mass loss Temples (temporalis), Clavicles (pectoralis & deltoids), Thigh (quadraceps), Calf (gastrocnemius)  Fluid Accumulation:  No significant fluid accumulation    Strength:  Not Performed    Nutrition Assessment:    Pt admit w/ Acute respiratory failure likely 2/2 COPD exacerbation. Noted UTI. PMHx CHF & dysphagia (however passed swallow eval for general diet 10/13). Pt meets criteria for Severe Malnutriton. Noted possible DM- monitor need for CHO control diet w/ elevated glucose. Will add Glucerna BID & monitor. Nutrition Related Findings:    Pt lethargic, -I/O's, no edema, weakness, hypoactive BS, N/V PTA Wound Type: None       Current Nutrition Intake & Therapies:    Average Meal Intake: 1-25%     ADULT DIET; Regular    Anthropometric Measures:  Height: 5' 7\" (170.2 cm)  Ideal Body Weight (IBW): 135 lbs (61 kg)    Admission Body Weight: 167 lb (75.8 kg) (10/13 first measured- likely elevated)  Current Body Weight: 137 lb (62.1 kg) (10/14 actual), 101.5 % IBW.     Current BMI (kg/m2): 21.5  Usual Body Weight: 150 lb (68 kg) (9/2021 EMR measured wt x 13 mon)  % Weight Change (Calculated): -8.7 unable to assess true changes d/t CHF fluid shifts                    BMI Categories: Underweight (BMI less than 22) age over 72    Estimated Daily Nutrient Needs:  Energy Requirements Based On: Formula  Weight Used for Energy Requirements: Current  Energy (kcal/day): MSJ 1179 x 1.3 SF= 5551-7271  Weight Used for Protein Requirements: Current  Protein (g/day): 1.3-1.5 g/kg CBW; 80-95  Fluid (ml/day): per critical care    Nutrition Diagnosis:   Severe malnutrition, In context of chronic illness related to catabolic illness as evidenced by poor intake prior to admission, severe loss of subcutaneous fat, severe muscle loss    Nutrition Interventions:   Nutrition Education/Counseling: Education not indicated  Coordination of Nutrition Care: Continue to monitor while inpatient       Goals:     Goals: PO intake 75% or greater, by next RD assessment       Nutrition Monitoring and Evaluation:      Food/Nutrient Intake Outcomes: Food and Nutrient Intake, Supplement Intake  Physical Signs/Symptoms Outcomes: Biochemical Data, Nutrition Focused Physical Findings, Skin, Weight, GI Status, Fluid Status or Edema, Hemodynamic Status, Nausea or Vomiting, Chewing or Swallowing    Discharge Planning:     Too soon to determine     Shaniqua Story RD, LD  Contact: Ext [phone]

## 2022-10-14 NOTE — PROGRESS NOTES
[facility]                                Progress note    Subjective:    Much more talkative  Answering all questions appropriately, pleasant    Objective:    /71   Pulse 85   Temp 98.6 °F (37 °C) (Temporal)   Resp 15   Ht 5' 7\" (1.702 m)   Wt 167 lb 9.6 oz (76 kg)   LMP 10/21/1985   SpO2 100%   BMI 26.25 kg/m²     In: 1462.5 [P.O.:1380; I.V.:82.5]  Out: 960   In: 1462.5   Out: 960 [Urine:960]    General appearance: NAD, conversant   HEENT: AT/NC, MMM  Neck: FROM, supple  Lungs: Clear to auscultation-no evidence of wheeze or rhonchi on my eval today  CV: RRR, no MRGs  Vasc: Radial pulses 2+  Abdomen: Soft, non-tender; no masses or HSM  Extremities: No peripheral edema or digital cyanosis  Skin: no rash, lesions or ulcers  Psych: Alert and oriented to person, place and time not able to fully assess  Neuro: Alert and interactive     Recent Labs     10/11/22  1428 10/12/22  0456 10/13/22  0500   WBC 7.6 7.6 7.4   HGB 9.2* 7.9* 7.8*   HCT 33.2* 27.7* 28.5*    265 297       Recent Labs     10/11/22  1428 10/12/22  0456 10/12/22  0818 10/13/22  0500    139  --  142   K 5.0 5.1* 4.67 5.2*   CL 91* 93*  --  96*   CO2 32* 37*  --  39*   BUN 31* 38*  --  37*   CREATININE 0.7 0.7  --  0.7   CALCIUM 10.1 9.4  --  9.9       Assessment:    Principal Problem:    COPD exacerbation (HCC)  Active Problems:    New onset of congestive heart failure (HCC)    Prolonged Q-T interval on ECG    Elevated troponin  Resolved Problems:    * No resolved hospital problems.  *      Plan:  58-year-old female with a history of COPD who is oxygen dependent presents to the ED with complaints of generalized weakness and shortness of breath and is admitted to telemetry unit with hypercapnic respiratory failure and acute on chronic CHF  10/11:  -Co2 on ABG's 100.1, followed by RRT d/t minimally responsive, transferred to ICU after being continuously on AVAPS  -IV Bumex 1mg BID  -Echo   -Pulmonary following 10/12: -Co2 on ABGs 78.5 today-continue noninvasive ventilation as needed, likely chronic CO2 retention  -Diuretics discontinued, improvement in BNP from 8K to 4K  -Echo > 55-60% EF  -IV solumedrol 40 BID-continue to taper, transition to p.o. at discretion of pulmonology  -Duo nebs, ICS, anticholinergics/LABA/Elsa  -UTI IV rocephin, await cultures     10/13:   Answering all questions appropriately today  Has a chronic indwelling Del Rio catheter for unknown reasons-she is not able to tell me why-?  Neurogenic bladder  Gram-negative rods in urine culture-likely colonization from chronic indwelling catheter  Infectious disease service consulted by critical care team-Case discussed with Dr. Jamie Carl for trend out of ICU medicine standpoint    Code status: Full  Consultants: Pulm and critical care   DVT Prophylaxis Lovenox   PT/OT  Discharge planning     Rosamaria Caceres MD

## 2022-10-14 NOTE — PROGRESS NOTES
[facility]  Department of Internal Medicine   Internal Medicine Residency   MICU Progress Note    Patient:  Brenda Molina 69 y.o. female  MRN: [id number]     Date of Service: 10/14/2022    Allergy: Sulfa antibiotics    Subjective     Patient was seen by the bedside in the a.m. The patient was answering questions appropriately, was alert oriented x3. No significant complaints. ROS: Denies Fever/chills/CP/SOB/N/V/D/C/Dysuria/Blood in stool or urine  Objective     Physical Exam:  Vitals: BP (!) 111/52   Pulse 81   Temp 98 °F (36.7 °C)   Resp 22   Ht 5' 7\" (1.702 m)   Wt 137 lb (62.1 kg)   LMP 10/21/1985   SpO2 97%   BMI 21.46 kg/m²       Constitutional: Alert, appears stated follows commands. In no apparent distress. Head: Normocephalic and atraumatic. Eyes: Conjunctiva normal, (-) scleral icterus. Mucus membranes moist.  Mouth: Mucus membranes moist. Oropharynx clear. No deviation of the tongue or uvula. Neck: (-)  swelling,   Respiratory: Lungs clear to auscultation bilaterally. (-)  wheezes, (-)  rales, (-)  rhonchi. Cardiovascular: RRR. (-)  murmurs, (-) gallops,  (-) rubs. S1 and S2 were normal.   GI:  Abdomen soft, non-tender, non-distended. (+) BS. (-) guarding, (-) rigidity. Extremities: Warm and well perfused. (-) clubbing, (-) cyanosis. (-) peripheral edema. Neurologic:  No focal neurological deficits. No speech slurring or tremor. Alert oriented x3.     I & O - 24hr:   Intake/Output Summary (Last 24 hours) at 10/14/2022 1957  Last data filed at 10/14/2022 1800  Gross per 24 hour   Intake 280 ml   Output 990 ml   Net -710 ml       Lines    None     oxygen:    Nasal cannula 3 L    ABG:     Lab Results   Component Value Date/Time    PH 7.351 10/14/2022 09:47 AM    PCO2 82.9 10/14/2022 09:47 AM    PO2 71.9 10/14/2022 09:47 AM    HCO3 44.8 10/14/2022 09:47 AM    BE 16.5 10/14/2022 09:47 AM    THB 9.5 10/14/2022 09:47 AM    O2SAT 92.7 10/14/2022 09:47 AM Medications     Infusions: (Fluid, Sedation, Vasopressors)  None    Nutrition:   Regular diet  ATB:   None    Skin issues: None  Patient currently has   Urinary cath    Labs   CBC with Differential:    Lab Results   Component Value Date/Time    WBC 10.4 10/14/2022 04:15 AM    RBC 3.54 10/14/2022 04:15 AM    HGB 8.4 10/14/2022 04:15 AM    HCT 30.0 10/14/2022 04:15 AM     10/14/2022 04:15 AM    MCV 84.7 10/14/2022 04:15 AM    MCH 23.7 10/14/2022 04:15 AM    MCHC 28.0 10/14/2022 04:15 AM    RDW 14.4 10/14/2022 04:15 AM    NRBC 4.3 10/13/2022 05:00 AM    SEGSPCT 26 02/16/2014 06:30 PM    METASPCT 2.6 10/11/2022 05:41 AM    LYMPHOPCT 8.6 10/14/2022 04:15 AM    PROMYELOPCT 0.9 05/18/2020 03:48 AM    MONOPCT 1.7 10/14/2022 04:15 AM    MYELOPCT 1.7 10/13/2022 05:00 AM    BASOPCT 0.2 10/14/2022 04:15 AM    MONOSABS 0.21 10/14/2022 04:15 AM    LYMPHSABS 0.94 10/14/2022 04:15 AM    EOSABS 0.00 10/14/2022 04:15 AM    BASOSABS 0.00 10/14/2022 04:15 AM     BMP:    Lab Results   Component Value Date/Time     10/14/2022 04:15 AM    K 5.2 10/14/2022 04:15 AM    K 4.5 05/29/2022 07:25 PM    CL 93 10/14/2022 04:15 AM    CO2 39 10/14/2022 04:15 AM    BUN 36 10/14/2022 04:15 AM    LABALBU 4.2 10/11/2022 05:41 AM    CREATININE 0.7 10/14/2022 04:15 AM    CALCIUM 10.4 10/14/2022 04:15 AM    GFRAA >60 10/14/2022 04:15 AM    LABGLOM >60 10/14/2022 04:15 AM    GLUCOSE 183 10/14/2022 04:15 AM       Resident's Assessment and Plan     Acute encephalopathy 2/2 hypercapnia in the setting of COPD exacerbation  The patient mentation improved previously but seem lethargic today. Continue breathing treatments. Started on 4 hours on 4 to hours of noninvasive ventilation. Monitor mental status. Patient is on 3 L nasal cannula.     Cardiology  QTC prolongation  507, avoid medication that prolong QTC    Elevated troponin-Down trended    Elevated proBNP-Down trended  Not clinically fluid volume overloaded  Echo: LVEF 55 to 60%, indeterminate diastolic dysfunction, normal atrial size, no wall motion abnormalities    Pulmonary  Acute hypoxic hypercapnic respiratory failure 2/2 COPD exacerbation  Hypercapnia on ABG, CO2 was 82% on ABG today. We do not know the patient's baseline. On BiPAP overnight, 3 L nasal cannula during the day. Changed to 4 hours on 4 hours of noninvasive ventilation during the day and BiPAP overnight. Continue prednisone 40 mg daily. Continue breathing treatment. Continue monitoring respiratory status. History of COPD on 3 L at baseline    Gastroenterology  Chronic calcified pancreatitis    Genitourinary  History of urinary retention  Chronic Del Rio use, changed monthly at [place]. Urology was consulted today to change Del Rio. Del Rio changed. Infectious disease  UTI  Chronic Del Rio use  Concern for UTI on urinalysis, urine culture grew gram-negative rods. ID not impressed by cultures. Stop antibiotics per ID. Hematology oncology  Anemia  Hemoglobin stable at 7.8, no signs of bleeding. Resume DVT prophylaxis. Continue monitoring signs of bleeding. Britton Reyez MD, PGY-1    Attending physician: Dr. Lew Lopez      I personally saw, examined and provided care for the patient. Radiographs, labs and medication list were reviewed by me independently. Review of Residents documentation was conducted and revisions were made as appropriate. I agree with the above documented exam, problem list and plan of care.         CCT excluding procedures 34 minutes    Brynn Santiago, DO

## 2022-10-14 NOTE — PROGRESS NOTES
Physical Therapy    Physical Therapy Initial Assessment     Name: Gordon Manuel  : 1955  MRN: [id number]      Date of Service: 10/14/2022    Evaluating PT:  Jonathon Funez PT, DPT  [id number]     Room #:  9784/1038-X  Diagnosis:  COPD exacerbation ([facility] [J44.1]  Supplemental oxygen dependent [Z99.81]  Acute on chronic respiratory failure with hypercapnia ([facility] [J96.22]  Gilbert coma scale total score 9-12, at hospital admission [R40.2423]  PMHx/PSHx:   has a past medical history of Asthma, COPD (chronic obstructive pulmonary disease) ([facility] Depression, Dysphagia, Del Rio catheter in place, and Oxygen dependent. Procedure/Surgery:  NA  Precautions:  Falls, O2  Equipment Needs:  TBD    SUBJECTIVE:    Pt lives alone in a 2 story home with 3 steps and 1 rail. Bedroom and bathroom on the 2nd level with 14 steps and 1 rail. Pt ambulates with Rollator and uses home O2. Reports home health aides 5 days/week. OBJECTIVE:   Initial Evaluation  Date: 10/14/22 Treatment Short Term/ Long Term   Goals   AM-PAC 6 Clicks      Was pt agreeable to Eval/treatment? Yes      Does pt have pain? No c/o pain     Bed Mobility  Rolling: SBA  Supine to sit: SBA  Sit to supine: NT  Scooting: SBA  Rolling: Independent   Supine to sit: Independent   Sit to supine: Independent   Scooting: Independent    Transfers Sit to stand: SBA from bed;  Min A from chair   Stand to sit: Min A  Stand pivot: Min A with FWW  Sit to stand: Independent   Stand to sit:  Independent   Stand pivot: Modified Independent with FWW or Rollator    Ambulation    65 feet with FWW Min A  >150 feet with FWW or Rollator Modified Independent     Stair negotiation: ascended and descended  NT  >4 steps with 1 rail Modified Independent     ROM BUE:  WFL  BLE:  WFL     Strength BUE:  NT  BLE:  grossly WFL     Balance Sitting EOB:  SBA  Dynamic Standing:  Min A  Sitting EOB:  Independent   Dynamic Standing:  Modified Independent       Pt is A & O x 4  RASS: 0  CAM-ICU:  NA  Sensation:  Pt denies numbness and tingling to extremities  Edema:  Unremarkable    Vitals:  Blood Pressure at rest 106/53 mmHg  Blood Pressure post session 130/83 mmHg   Heart Rate at rest 93 bpm  Heart Rate post session 11 bpm    SPO2 at rest 97% on 3 L SPO2 post session 94% on 3 L   HR up to 122 bpm, SpO2 92-94% on 3 L during ambulation       Functional Status Score-Intensive Care Unit (FSS-ICU)   Rolling 5/7   Supine to sit transfer 5/7   Unsupported sitting  5/7   Sit to stand transfers 4/7   Ambulation 2/7   Total  21/35     Therapeutic Exercises:    BLE AROM at EOB  STS x2 reps     Patient education  Pt educated on PT role, safety during functional mobility    Patient response to education:   Pt verbalized understanding Pt demonstrated skill Pt requires further education in this area   Yes  Yes  Reinforce      ASSESSMENT:    Conditions Requiring Skilled Therapeutic Intervention:    [x]Decreased strength     []Decreased ROM  [x]Decreased functional mobility  [x]Decreased balance   [x]Decreased endurance   []Decreased posture  []Decreased sensation  []Decreased coordination   []Decreased vision  []Decreased safety awareness   []Increased pain       Comments:  Pt received supine and agreeable to PT evaluation. Pt cleared for participation by RN prior to session. Vitals monitored during session. Pt limited by endurance and generalized weakness. Able to sit up and complete transfer to chair. Slightly more assistance requiring during STS from chair compared to the bed. Pt ambulated but reported fatigue and COWAN. Pt left seated in chair with call button in reach, lines attached, and needs met. Spoke with RN.   Discussed pt case at interdisciplinary rounds in AM.     Treatment:  Patient practiced and was instructed in the following treatment:    Bed mobility training - pt given verbal and tactile cues to facilitate proper sequencing and safety during rolling and supine>sit as well as provided with physical assistance to complete task    STS and pivot transfer training - pt educated on proper hand and foot placement, safety and sequencing, and use of FWW to safely complete sit<>stand and pivot transfers with hands on assistance to complete task safely    Gait training- pt was given verbal and tactile cues to facilitate safety/balance during ambulation as well as provided with physical assistance. Pt's/ family goals   1. Home     Prognosis is fair for reaching above PT goals. Patient and or family understand(s) diagnosis, prognosis, and plan of care. yes    PHYSICAL THERAPY PLAN OF CARE:    PT POC is established based on physician order and patient diagnosis     Referring provider/PT Order:  Codi Woodruff MD / PT eval and treat   Diagnosis:  COPD exacerbation ([facility] [J44.1]  Supplemental oxygen dependent [Z99.81]  Acute on chronic respiratory failure with hypercapnia ([facility] [J96.22]  Hugh coma scale total score 9-12, at hospital admission [R40.2423]  Specific instructions for next treatment:  progress gait, attempt stairs when able     Current Treatment Recommendations:     [x] Strengthening to improve independence with functional mobility   [] ROM to improve independence with functional mobility   [x] Balance Training to improve static/dynamic balance and to reduce fall risk  [x] Endurance Training to improve activity tolerance during functional mobility   [x] Transfer Training to improve safety and independence with all functional transfers   [x] Gait Training to improve gait mechanics, endurance and asses need for appropriate assistive device  [x] Stair Training in preparation for safe discharge home and/or into the community   [x] Positioning to prevent skin breakdown and contractures  [x] Safety and Education Training   [x] Patient/Caregiver Education   [x] HEP  [] Other     PT long term treatment goals are located in above grid    Frequency of treatments: 2-5x/week x 1-2 weeks.     Time in 4771  Time out  1030    Total Treatment Time  23 minutes     Evaluation Time includes thorough review of current medical information, gathering information on past medical history/social history and prior level of function, completion of standardized testing/informal observation of tasks, assessment of data and education on plan of care and goals.     CPT codes:  [x] Low Complexity PT evaluation 90294  [] Moderate Complexity PT evaluation 77372  [] High Complexity PT evaluation 31143  [] PT Re-evaluation 02329  [] Gait training 40784 -- minutes  [] Manual therapy [id number] -- minutes  [x] Therapeutic activities 88447 23 minutes  [] Therapeutic exercises 68557 - minutes  [] Neuromuscular reeducation 72509 -- minutes     Quinten Siddiqi, PT, DPT  [id number]

## 2022-10-14 NOTE — PLAN OF CARE
Problem: Chronic Conditions and Co-morbidities  Goal: Patient's chronic conditions and co-morbidity symptoms are monitored and maintained or improved  10/14/2022 0710 by Kendra Hoyt RN  Outcome: Progressing  10/13/2022 2011 by Jem Fitch RN  Outcome: Progressing     Problem: Discharge Planning  Goal: Discharge to home or other facility with appropriate resources  10/14/2022 0710 by Kendra Hoyt RN  Outcome: Progressing  10/14/2022 0139 by Duong Champion RN  Outcome: Progressing  10/13/2022 2011 by Jem Fitch RN  Outcome: Progressing  Flowsheets (Taken 10/13/2022 0800 by Cira Silva RN)  Discharge to home or other facility with appropriate resources: Identify barriers to discharge with patient and caregiver     Problem: Skin/Tissue Integrity  Goal: Absence of new skin breakdown  Description: 1. Monitor for areas of redness and/or skin breakdown  2. Assess vascular access sites hourly  3. Every 4-6 hours minimum:  Change oxygen saturation probe site  4. Every 4-6 hours:  If on nasal continuous positive airway pressure, respiratory therapy assess nares and determine need for appliance change or resting period.   10/14/2022 0710 by Kendra Hoyt RN  Outcome: Progressing  10/14/2022 0139 by Duong Champion RN  Outcome: Progressing  10/13/2022 2011 by Jem Fitch RN  Outcome: Progressing     Problem: Safety - Adult  Goal: Free from fall injury  10/14/2022 0710 by Kendra Hoyt RN  Outcome: Progressing  10/14/2022 0139 by Duong Champion RN  Outcome: Progressing  10/13/2022 2011 by Jem Fitch RN  Outcome: Progressing  Flowsheets (Taken 10/13/2022 6174 by Cira Silva RN)  Free From Fall Injury: Instruct family/caregiver on patient safety     Problem: ABCDS Injury Assessment  Goal: Absence of physical injury  10/14/2022 0710 by Kendra Hoyt RN  Outcome: Progressing  10/13/2022 2011 by Jem Fitch RN  Outcome: Progressing  Flowsheets (Taken 10/13/2022 0833 by Cira Silva RN)  Absence of Physical Injury: Implement safety measures based on patient assessment     Problem: Respiratory - Adult  Goal: Achieves optimal ventilation and oxygenation  10/14/2022 0710 by Melissa Enciso RN  Outcome: Progressing  10/13/2022 2011 by Jacek Sims RN  Outcome: Progressing

## 2022-10-15 LAB
ANION GAP SERPL CALCULATED.3IONS-SCNC: 5 MMOL/L (ref 7–16)
BASOPHILS ABSOLUTE: 0 E9/L (ref 0–0.2)
BASOPHILS RELATIVE PERCENT: 0.1 % (ref 0–2)
BUN BLDV-MCNC: 29 MG/DL (ref 6–23)
CALCIUM SERPL-MCNC: 10.3 MG/DL (ref 8.6–10.2)
CHLORIDE BLD-SCNC: 95 MMOL/L (ref 98–107)
CO2: 43 MMOL/L (ref 22–29)
CREAT SERPL-MCNC: 0.6 MG/DL (ref 0.5–1)
EOSINOPHILS ABSOLUTE: 0 E9/L (ref 0.05–0.5)
EOSINOPHILS RELATIVE PERCENT: 0.1 % (ref 0–6)
GFR AFRICAN AMERICAN: >60
GFR NON-AFRICAN AMERICAN: >60 ML/MIN/1.73
GLUCOSE BLD-MCNC: 110 MG/DL (ref 74–99)
HCT VFR BLD CALC: 32.7 % (ref 34–48)
HEMOGLOBIN: 8.8 G/DL (ref 11.5–15.5)
HYPOCHROMIA: ABNORMAL
LYMPHOCYTES ABSOLUTE: 1.96 E9/L (ref 1.5–4)
LYMPHOCYTES RELATIVE PERCENT: 21.5 % (ref 20–42)
MCH RBC QN AUTO: 23 PG (ref 26–35)
MCHC RBC AUTO-ENTMCNC: 26.9 % (ref 32–34.5)
MCV RBC AUTO: 85.4 FL (ref 80–99.9)
METER GLUCOSE: 105 MG/DL (ref 74–99)
METER GLUCOSE: 118 MG/DL (ref 74–99)
METER GLUCOSE: 121 MG/DL (ref 74–99)
METER GLUCOSE: 242 MG/DL (ref 74–99)
MONOCYTES ABSOLUTE: 0.44 E9/L (ref 0.1–0.95)
MONOCYTES RELATIVE PERCENT: 5.2 % (ref 2–12)
MYELOCYTE PERCENT: 2.6 % (ref 0–0)
NEUTROPHILS ABSOLUTE: 6.5 E9/L (ref 1.8–7.3)
NEUTROPHILS RELATIVE PERCENT: 70.7 % (ref 43–80)
NUCLEATED RED BLOOD CELLS: 2.6 /100 WBC
PDW BLD-RTO: 14.4 FL (ref 11.5–15)
PLATELET # BLD: 288 E9/L (ref 130–450)
PMV BLD AUTO: 9.1 FL (ref 7–12)
POIKILOCYTES: ABNORMAL
POLYCHROMASIA: ABNORMAL
POTASSIUM SERPL-SCNC: 4.3 MMOL/L (ref 3.5–5)
RBC # BLD: 3.83 E12/L (ref 3.5–5.5)
SODIUM BLD-SCNC: 143 MMOL/L (ref 132–146)
STOMATOCYTES: ABNORMAL
WBC # BLD: 8.9 E9/L (ref 4.5–11.5)

## 2022-10-15 PROCEDURE — 6370000000 HC RX 637 (ALT 250 FOR IP): Performed by: INTERNAL MEDICINE

## 2022-10-15 PROCEDURE — 6360000002 HC RX W HCPCS: Performed by: INTERNAL MEDICINE

## 2022-10-15 PROCEDURE — 80048 BASIC METABOLIC PNL TOTAL CA: CPT

## 2022-10-15 PROCEDURE — 2580000003 HC RX 258: Performed by: INTERNAL MEDICINE

## 2022-10-15 PROCEDURE — 2060000000 HC ICU INTERMEDIATE R&B

## 2022-10-15 PROCEDURE — 94660 CPAP INITIATION&MGMT: CPT

## 2022-10-15 PROCEDURE — 85025 COMPLETE CBC W/AUTO DIFF WBC: CPT

## 2022-10-15 PROCEDURE — 94640 AIRWAY INHALATION TREATMENT: CPT

## 2022-10-15 PROCEDURE — 82962 GLUCOSE BLOOD TEST: CPT

## 2022-10-15 PROCEDURE — 2700000000 HC OXYGEN THERAPY PER DAY

## 2022-10-15 RX ORDER — PREDNISONE 20 MG/1
20 TABLET ORAL DAILY
Status: DISCONTINUED | OUTPATIENT
Start: 2022-10-15 | End: 2022-10-19

## 2022-10-15 RX ADMIN — SERTRALINE HYDROCHLORIDE 50 MG: 50 TABLET ORAL at 22:37

## 2022-10-15 RX ADMIN — IPRATROPIUM BROMIDE AND ALBUTEROL SULFATE 1 AMPULE: .5; 2.5 SOLUTION RESPIRATORY (INHALATION) at 12:15

## 2022-10-15 RX ADMIN — BUDESONIDE INHALATION SUSPENSION 500 MCG: 0.5 SUSPENSION RESPIRATORY (INHALATION) at 20:20

## 2022-10-15 RX ADMIN — IPRATROPIUM BROMIDE AND ALBUTEROL SULFATE 1 AMPULE: .5; 2.5 SOLUTION RESPIRATORY (INHALATION) at 08:00

## 2022-10-15 RX ADMIN — ARFORMOTEROL TARTRATE 15 MCG: 15 SOLUTION RESPIRATORY (INHALATION) at 08:00

## 2022-10-15 RX ADMIN — Medication 10 ML: at 22:37

## 2022-10-15 RX ADMIN — INSULIN LISPRO 1 UNITS: 100 INJECTION, SOLUTION INTRAVENOUS; SUBCUTANEOUS at 11:14

## 2022-10-15 RX ADMIN — ARFORMOTEROL TARTRATE 15 MCG: 15 SOLUTION RESPIRATORY (INHALATION) at 20:20

## 2022-10-15 RX ADMIN — FOLIC ACID 1 MG: 1 TABLET ORAL at 09:53

## 2022-10-15 RX ADMIN — IPRATROPIUM BROMIDE AND ALBUTEROL SULFATE 1 AMPULE: .5; 2.5 SOLUTION RESPIRATORY (INHALATION) at 20:20

## 2022-10-15 RX ADMIN — BUDESONIDE INHALATION SUSPENSION 500 MCG: 0.5 SUSPENSION RESPIRATORY (INHALATION) at 08:00

## 2022-10-15 RX ADMIN — PREDNISONE 20 MG: 20 TABLET ORAL at 09:53

## 2022-10-15 RX ADMIN — IPRATROPIUM BROMIDE AND ALBUTEROL SULFATE 1 AMPULE: .5; 2.5 SOLUTION RESPIRATORY (INHALATION) at 15:51

## 2022-10-15 RX ADMIN — ENOXAPARIN SODIUM 40 MG: 100 INJECTION SUBCUTANEOUS at 09:52

## 2022-10-15 RX ADMIN — MULTIVITAMIN TABLET 1 TABLET: TABLET at 09:53

## 2022-10-15 RX ADMIN — Medication 10 ML: at 09:52

## 2022-10-15 RX ADMIN — GABAPENTIN 300 MG: 300 CAPSULE ORAL at 22:37

## 2022-10-15 ASSESSMENT — PAIN SCALES - GENERAL
PAINLEVEL_OUTOF10: 0

## 2022-10-15 NOTE — PLAN OF CARE
Problem: Chronic Conditions and Co-morbidities  Goal: Patient's chronic conditions and co-morbidity symptoms are monitored and maintained or improved  Outcome: Progressing     Problem: Discharge Planning  Goal: Discharge to home or other facility with appropriate resources  Outcome: Progressing     Problem: Skin/Tissue Integrity  Goal: Absence of new skin breakdown  Description: 1. Monitor for areas of redness and/or skin breakdown  2. Assess vascular access sites hourly  3. Every 4-6 hours minimum:  Change oxygen saturation probe site  4. Every 4-6 hours:  If on nasal continuous positive airway pressure, respiratory therapy assess nares and determine need for appliance change or resting period.   Outcome: Progressing     Problem: Safety - Adult  Goal: Free from fall injury  Outcome: Progressing     Problem: ABCDS Injury Assessment  Goal: Absence of physical injury  Outcome: Progressing     Problem: Respiratory - Adult  Goal: Achieves optimal ventilation and oxygenation  Outcome: Progressing

## 2022-10-15 NOTE — PROGRESS NOTES
Educated the patient on the need for bipap, she refused stated \" I don't wear that\"  despite education she continued to refuse.

## 2022-10-15 NOTE — PROGRESS NOTES
[facility]  Department of Internal Medicine   Internal Medicine Residency   MICU Progress Note    Patient:  Cliff Davies 69 y.o. female  MRN: [id number]     Date of Service: 10/15/2022    Allergy: Sulfa antibiotics    Subjective     Alert and oriented this morning. She denies chest pain, shortness of breath, and abdominal pain. 24h change: No acute events overnight. Refused bipap overnight. Alert and oriented this morning with no concerns for mentation. For transfer. Objective     VS: /71   Pulse (!) 101   Temp 98 °F (36.7 °C)   Resp 21   Ht 5' 7\" (1.702 m)   Wt 142 lb (64.4 kg)   LMP 10/21/1985   SpO2 98%   BMI 22.24 kg/m²           I & O - 24hr:   Intake/Output Summary (Last 24 hours) at 10/15/2022 1239  Last data filed at 10/15/2022 1000  Gross per 24 hour   Intake 460 ml   Output 720 ml   Net -260 ml       Physical Exam:  General Appearance: alert, appears stated age, and cooperative  Lung: clear to auscultation bilaterally  Heart: regular rate and rhythm, S1, S2 normal, no murmur, click, rub or gallop  Abdomen: soft, non-tender; bowel sounds normal; no masses,  no organomegaly  Extremities:  extremities normal, atraumatic, no cyanosis or edema  Musculoskeletal: No joint swelling, no muscle tenderness. ROM normal in all joints of extremities.    Neurologic: Mental status: Alert, oriented, thought content appropriate    Lines     site day    Art line   None    TLC None    PICC None    Hemoaccess None    Oxygen:    nasal canula at 3L    ABG:     Lab Results   Component Value Date/Time    PH 7.351 10/14/2022 09:47 AM    PCO2 82.9 10/14/2022 09:47 AM    PO2 71.9 10/14/2022 09:47 AM    HCO3 44.8 10/14/2022 09:47 AM    BE 16.5 10/14/2022 09:47 AM    THB 9.5 10/14/2022 09:47 AM    O2SAT 92.7 10/14/2022 09:47 AM        Medications     Infusions: (Fluid, Sedation, Vasopressors)  None     Nutrition:   Regular diet     ATB:   Antibiotics  Days                 Skin issues: none  Patient currently has   Urinary cath    Labs   CBC:   Lab Results   Component Value Date/Time    WBC 8.9 10/15/2022 05:26 AM    RBC 3.83 10/15/2022 05:26 AM    HGB 8.8 10/15/2022 05:26 AM    HCT 32.7 10/15/2022 05:26 AM    MCV 85.4 10/15/2022 05:26 AM    MCH 23.0 10/15/2022 05:26 AM    MCHC 26.9 10/15/2022 05:26 AM    RDW 14.4 10/15/2022 05:26 AM     10/15/2022 05:26 AM    MPV 9.1 10/15/2022 05:26 AM     BMP:    Lab Results   Component Value Date/Time     10/15/2022 05:26 AM    K 4.3 10/15/2022 05:26 AM    K 4.5 05/29/2022 07:25 PM    CL 95 10/15/2022 05:26 AM    CO2 43 10/15/2022 05:26 AM    BUN 29 10/15/2022 05:26 AM    LABALBU 4.2 10/11/2022 05:41 AM    CREATININE 0.6 10/15/2022 05:26 AM    CALCIUM 10.3 10/15/2022 05:26 AM    GFRAA >60 10/15/2022 05:26 AM    LABGLOM >60 10/15/2022 05:26 AM    GLUCOSE 110 10/15/2022 05:26 AM     TSH:    Lab Results   Component Value Date/Time    TSH 0.140 10/11/2022 02:28 PM       Imaging Studies:  CXR 10/12/2022:   Developing bibasilar subsegmental atelectasis. Resident's Assessment and Plan     Neurology:   Acute encephalopathy 2/2 hypercapnia in the setting of COPD exacerbation,  resolved   Improved mentation, now A&Ox3, on 4L NC   Continue treatment for COPD exacerbation as below   Monitor mental status   Hx of depression   Continue home seroquel and zoloft     Cardiology:   Qtc prolongation   507  Avoid medications that prolong qtc   Elevated troponin  Downtrending 42 >39  EKG without ischemic abnormality   Pro-BNP  8856 > 4385  Not clinically fluid overloaded   Echo: LVEF 55-60%, indeterminate diastolic function, normal atrial size, no wall motion abnormalities.      Pulmonary:   Acute on chronic hypoxic hypercapnic respiratory failure likely 2/2 COPD exacerbation   Stable on 3L NC (home O2 requirement) with normal mentation    Change solumedrol to prednisone 20 mg daily   Monitor respiratory status   Proventil, brovana, pulmicort, duoneb   Hx of COPD, on 3L at baseline      Gastroenterology:   Chronic calcified pancreatitis     Genitourinary:   Hx of urinary retention   Chronic jarquin use, changed monthly at [email or web address] [facility]   Urinary culture with Gram neg rods, colonization per ID   Jarquin changed 10/14 by urology     Infectious Disease:   UTI  Chronic jarquin use   Concern for UTI on UA   Urine culture with Serratia fonticola   Per ID, chronic colonization in the setting of chronic jarquin use  Antibiotics not indicated at this time     Endocrine:   Low TSH   TSH low 0.140  T4 WNL   Hyperglycemia   Elevated blood sugar during admission (234, 200) possibly 2/2 steroids   LDSSI with nightly and with meals     Heme/onc: Anemia  Hb stable at 8.9  No signs of bleeding   Resume DVT prophylaxis   Continue to monitor for signs of bleeding     Next of Kin/ POA:   Sister, Lety Angulo   Code Status:   Full       PT/OT: Eval ordered   DVT ppx: Lovenox   GI ppx: Normal diet       Javier Chamberlain MD, PGY-1   Attending physician: Dr. Lauren Velázquez     I personally saw, examined and provided care for the patient. Radiographs, labs and medication list were reviewed by me independently. Review of Residents documentation was conducted and revisions were made as appropriate. I agree with the above documented exam, problem list and plan of care.         CCT excluding procedures 32 minutes    Melina Harman DO

## 2022-10-15 NOTE — PROGRESS NOTES
[facility]                                Progress note    Subjective:    Feels well  No acute complaints  Tolerating medications    Objective:    BP (!) 113/42   Pulse 87   Temp 97.1 °F (36.2 °C) (Temporal)   Resp 16   Ht 5' 7\" (1.702 m)   Wt 142 lb (64.4 kg)   LMP 10/21/1985   SpO2 100%   BMI 22.24 kg/m²     In: 570 [P.O.:550; I.V.:20]  Out: 1055   In: 570   Out: 1055 [Urine:1055]    General appearance: NAD, conversant   HEENT: AT/NC, MMM  Neck: FROM, supple  Lungs: Clear to auscultation-no evidence of wheeze or rhonchi on my eval today  CV: RRR, no MRGs  Vasc: Radial pulses 2+  Abdomen: Soft, non-tender; no masses or HSM  Extremities: No peripheral edema or digital cyanosis  Skin: no rash, lesions or ulcers  Psych: Alert and oriented to person, place and time not able to fully assess  Neuro: Alert and interactive     Recent Labs     10/13/22  0500 10/14/22  0415 10/15/22  0526   WBC 7.4 10.4 8.9   HGB 7.8* 8.4* 8.8*   HCT 28.5* 30.0* 32.7*    309 288       Recent Labs     10/13/22  0500 10/14/22  0415 10/15/22  0526    138 143   K 5.2* 5.2* 4.3   CL 96* 93* 95*   CO2 39* 39* 43*   BUN 37* 36* 29*   CREATININE 0.7 0.7 0.6   CALCIUM 9.9 10.4* 10.3*       Assessment:    Principal Problem:    COPD exacerbation (HCC)  Active Problems:    New onset of congestive heart failure (HCC)    Prolonged Q-T interval on ECG    Elevated troponin    Severe protein-calorie malnutrition (HCC)  Resolved Problems:    * No resolved hospital problems.  *      Plan:  75-year-old female with a history of COPD who is oxygen dependent presents to the ED with complaints of generalized weakness and shortness of breath and is admitted to telemetry unit with hypercapnic respiratory failure and acute on chronic CHF  10/11:  -Co2 on ABG's 100.1, followed by RRT d/t minimally responsive, transferred to ICU after being continuously on AVAPS  -IV Bumex 1mg BID  -Echo   -Pulmonary following     10/12:  -Co2 on ABGs 78.5 today-continue noninvasive ventilation as needed, likely chronic CO2 retention  -Diuretics discontinued, improvement in BNP from 8K to 4K  -Echo > 55-60% EF  -IV solumedrol 40 BID-continue to taper, transition to p.o. at discretion of pulmonology  -Duo nebs, ICS, anticholinergics/LABA/Elsa  -UTI IV rocephin, await cultures     10/13:   Answering all questions appropriately today  Has a chronic indwelling [place] catheter for unknown reasons-she is not able to tell me why-?  Neurogenic bladder  Gram-negative rods in urine culture-likely colonization from chronic indwelling catheter  Infectious disease service consulted by critical care team-Case discussed with Dr. Luke Beard for trend out of ICU medicine standpoint    10/14:  No acute complaints, no shortness of breath  Serratia in urine culture and patient with indwelling urinary catheter-ID on board  Chronic PCO2 elevation on chronic home O2  Okay for transfer out of ICU from medicine standpoint    10/15:  Out of ICU  PCO2 much improved down to 43 today-chronic elevation  Remains off antibiotics  Continue to monitor O2 needs  Will likely need rehab at discharge-scored 16/24    Code status: Full  Consultants: Pulm and critical care   DVT Prophylaxis Lovenox   PT/OT  Discharge planning     Kem Vera MD

## 2022-10-16 LAB
ANION GAP SERPL CALCULATED.3IONS-SCNC: 9 MMOL/L (ref 7–16)
BASOPHILS ABSOLUTE: 0.01 E9/L (ref 0–0.2)
BASOPHILS RELATIVE PERCENT: 0.1 % (ref 0–2)
BUN BLDV-MCNC: 28 MG/DL (ref 6–23)
CALCIUM SERPL-MCNC: 10.2 MG/DL (ref 8.6–10.2)
CHLORIDE BLD-SCNC: 97 MMOL/L (ref 98–107)
CO2: 40 MMOL/L (ref 22–29)
CREAT SERPL-MCNC: 0.6 MG/DL (ref 0.5–1)
EOSINOPHILS ABSOLUTE: 0.02 E9/L (ref 0.05–0.5)
EOSINOPHILS RELATIVE PERCENT: 0.3 % (ref 0–6)
GFR AFRICAN AMERICAN: >60
GFR NON-AFRICAN AMERICAN: >60 ML/MIN/1.73
GLUCOSE BLD-MCNC: 106 MG/DL (ref 74–99)
HCT VFR BLD CALC: 31.4 % (ref 34–48)
HEMOGLOBIN: 8.7 G/DL (ref 11.5–15.5)
IMMATURE GRANULOCYTES #: 0.13 E9/L
IMMATURE GRANULOCYTES %: 1.9 % (ref 0–5)
LYMPHOCYTES ABSOLUTE: 1.14 E9/L (ref 1.5–4)
LYMPHOCYTES RELATIVE PERCENT: 16.7 % (ref 20–42)
MCH RBC QN AUTO: 23.4 PG (ref 26–35)
MCHC RBC AUTO-ENTMCNC: 27.7 % (ref 32–34.5)
MCV RBC AUTO: 84.4 FL (ref 80–99.9)
METER GLUCOSE: 118 MG/DL (ref 74–99)
METER GLUCOSE: 132 MG/DL (ref 74–99)
METER GLUCOSE: 147 MG/DL (ref 74–99)
METER GLUCOSE: 182 MG/DL (ref 74–99)
MONOCYTES ABSOLUTE: 0.55 E9/L (ref 0.1–0.95)
MONOCYTES RELATIVE PERCENT: 8.1 % (ref 2–12)
NEUTROPHILS ABSOLUTE: 4.98 E9/L (ref 1.8–7.3)
NEUTROPHILS RELATIVE PERCENT: 72.9 % (ref 43–80)
PDW BLD-RTO: 14.6 FL (ref 11.5–15)
PLATELET # BLD: 267 E9/L (ref 130–450)
PMV BLD AUTO: 9.8 FL (ref 7–12)
POTASSIUM SERPL-SCNC: 4.3 MMOL/L (ref 3.5–5)
RBC # BLD: 3.72 E12/L (ref 3.5–5.5)
SODIUM BLD-SCNC: 146 MMOL/L (ref 132–146)
WBC # BLD: 6.8 E9/L (ref 4.5–11.5)

## 2022-10-16 PROCEDURE — 94660 CPAP INITIATION&MGMT: CPT

## 2022-10-16 PROCEDURE — 85025 COMPLETE CBC W/AUTO DIFF WBC: CPT

## 2022-10-16 PROCEDURE — 6370000000 HC RX 637 (ALT 250 FOR IP): Performed by: INTERNAL MEDICINE

## 2022-10-16 PROCEDURE — 6360000002 HC RX W HCPCS: Performed by: INTERNAL MEDICINE

## 2022-10-16 PROCEDURE — 80048 BASIC METABOLIC PNL TOTAL CA: CPT

## 2022-10-16 PROCEDURE — 2580000003 HC RX 258: Performed by: INTERNAL MEDICINE

## 2022-10-16 PROCEDURE — 51702 INSERT TEMP BLADDER CATH: CPT

## 2022-10-16 PROCEDURE — 82962 GLUCOSE BLOOD TEST: CPT

## 2022-10-16 PROCEDURE — 94640 AIRWAY INHALATION TREATMENT: CPT

## 2022-10-16 PROCEDURE — 2060000000 HC ICU INTERMEDIATE R&B

## 2022-10-16 PROCEDURE — 36415 COLL VENOUS BLD VENIPUNCTURE: CPT

## 2022-10-16 PROCEDURE — 2700000000 HC OXYGEN THERAPY PER DAY

## 2022-10-16 RX ADMIN — ARFORMOTEROL TARTRATE 15 MCG: 15 SOLUTION RESPIRATORY (INHALATION) at 20:46

## 2022-10-16 RX ADMIN — MULTIVITAMIN TABLET 1 TABLET: TABLET at 10:21

## 2022-10-16 RX ADMIN — IPRATROPIUM BROMIDE AND ALBUTEROL SULFATE 1 AMPULE: .5; 2.5 SOLUTION RESPIRATORY (INHALATION) at 20:46

## 2022-10-16 RX ADMIN — IPRATROPIUM BROMIDE AND ALBUTEROL SULFATE 1 AMPULE: .5; 2.5 SOLUTION RESPIRATORY (INHALATION) at 15:11

## 2022-10-16 RX ADMIN — BUDESONIDE INHALATION SUSPENSION 500 MCG: 0.5 SUSPENSION RESPIRATORY (INHALATION) at 08:00

## 2022-10-16 RX ADMIN — ARFORMOTEROL TARTRATE 15 MCG: 15 SOLUTION RESPIRATORY (INHALATION) at 08:00

## 2022-10-16 RX ADMIN — IPRATROPIUM BROMIDE AND ALBUTEROL SULFATE 1 AMPULE: .5; 2.5 SOLUTION RESPIRATORY (INHALATION) at 11:35

## 2022-10-16 RX ADMIN — QUETIAPINE FUMARATE 50 MG: 50 TABLET, EXTENDED RELEASE ORAL at 20:01

## 2022-10-16 RX ADMIN — GABAPENTIN 300 MG: 300 CAPSULE ORAL at 20:01

## 2022-10-16 RX ADMIN — Medication 10 ML: at 10:22

## 2022-10-16 RX ADMIN — FOLIC ACID 1 MG: 1 TABLET ORAL at 10:22

## 2022-10-16 RX ADMIN — BUDESONIDE INHALATION SUSPENSION 500 MCG: 0.5 SUSPENSION RESPIRATORY (INHALATION) at 20:46

## 2022-10-16 RX ADMIN — QUETIAPINE FUMARATE 50 MG: 50 TABLET, EXTENDED RELEASE ORAL at 00:39

## 2022-10-16 RX ADMIN — ENOXAPARIN SODIUM 40 MG: 100 INJECTION SUBCUTANEOUS at 10:21

## 2022-10-16 RX ADMIN — IPRATROPIUM BROMIDE AND ALBUTEROL SULFATE 1 AMPULE: .5; 2.5 SOLUTION RESPIRATORY (INHALATION) at 08:00

## 2022-10-16 RX ADMIN — PREDNISONE 20 MG: 20 TABLET ORAL at 10:21

## 2022-10-16 RX ADMIN — Medication 10 ML: at 20:01

## 2022-10-16 RX ADMIN — SERTRALINE HYDROCHLORIDE 50 MG: 50 TABLET ORAL at 20:01

## 2022-10-16 ASSESSMENT — PAIN SCALES - GENERAL: PAINLEVEL_OUTOF10: 0

## 2022-10-16 NOTE — PROGRESS NOTES
Pulmonary Progress Note    Admit Date: 10/10/2022                            PCP: Shreya Brush MD  Principal Problem:    COPD exacerbation [facility])  Active Problems:    New onset of congestive heart failure ([facility] [address]    Prolonged Q-T interval on ECG    Elevated troponin    Severe protein-calorie malnutrition ([facility] [address]  Resolved Problems:    * No resolved hospital problems. *      Subjective:  Patient was seen on 3L NC, very alert this morning  She denies shortness of breath, cough or wheezing   Patient refused Bipap last night     Medications:   dextrose      sodium chloride          predniSONE  20 mg Oral Daily    insulin lispro  0-4 Units SubCUTAneous TID WC    insulin lispro  0-4 Units SubCUTAneous Nightly    sodium chloride flush  5-40 mL IntraVENous 2 times per day    folic acid  1 mg Oral Daily    gabapentin  300 mg Oral Nightly    multivitamin  1 tablet Oral Daily    QUEtiapine  50 mg Oral Nightly    sertraline  50 mg Oral Nightly    enoxaparin  40 mg SubCUTAneous Daily    ipratropium-albuterol  1 ampule Inhalation Q4H WA    budesonide  0.5 mg Nebulization BID    Arformoterol Tartrate  15 mcg Nebulization BID       Vitals:  VITALS:  BP (!) 107/58   Pulse 94   Temp (!) 96.7 °F (35.9 °C) (Temporal)   Resp 20   Ht 5' 7\" (1.702 m)   Wt 142 lb (64.4 kg)   LMP 10/21/1985   SpO2 95%   BMI 22.24 kg/m²   24HR INTAKE/OUTPUT:    Intake/Output Summary (Last 24 hours) at 10/16/2022 0927  Last data filed at 10/15/2022 1000  Gross per 24 hour   Intake 150 ml   Output 65 ml   Net 85 ml     CURRENT PULSE OXIMETRY:  SpO2: 95 %  24HR PULSE OXIMETRY RANGE:  SpO2  Av.7 %  Min: 11 %  Max: 100 %  CVP:    VENT SETTINGS:      Additional Respiratory Assessments  Heart Rate: 94  Resp: 20  SpO2: 95 %  Swallow: Normal - able to swallow solids      EXAM:  General: No distress. Alert. 3L   Eyes: No sclera icterus. No conjunctival injection. ENT: No discharge. Pharynx clear. Neck: Trachea midline. Normal thyroid.   Resp: No accessory muscle use. No crackles. No wheezing. No rhonchi. Diminished bilateral lower lobes   CV: Regular rate. Regular rhythm. No mumur or rub. No edema. ABD: Non-tender. Non-distended. No masses. No organmegaly. Normal bowel sounds. Skin: Warm and dry. No nodule on exposed extremities. No rash on exposed extremities. M/S: No cyanosis. No joint deformity. No clubbing. Neuro: Awake. Follows commands. A&OX3    I/O: I/O last 3 completed shifts: In: 410 [P.O.:400; I.V.:10]  Out: 465 [Urine:465]  No intake/output data recorded. Results:  CBC:   Recent Labs     10/14/22  0415 10/15/22  0526 10/16/22  0508   WBC 10.4 8.9 6.8   HGB 8.4* 8.8* 8.7*   HCT 30.0* 32.7* 31.4*   MCV 84.7 85.4 84.4    288 267     BMP:   Recent Labs     10/14/22  0415 10/15/22  0526 10/16/22  0508    143 146   K 5.2* 4.3 4.3   CL 93* 95* 97*   CO2 39* 43* 40*   BUN 36* 29* 28*   CREATININE 0.7 0.6 0.6     LFT: No results for input(s): ALKPHOS, ALT, AST, PROT, BILITOT, BILIDIR, LABALBU in the last 72 hours. PT/INR: No results for input(s): PROTIME, INR in the last 72 hours. Cultures:  No results for input(s): CULTRESP in the last 72 hours. ABG:   Recent Labs     10/14/22  0947   PH 7.351   PO2 71.9*   PCO2 82.9*   HCO3 44.8*   BE 16.5*   O2SAT 92.7       Films:  XR CHEST PORTABLE    Result Date: 10/11/2022  EXAMINATION: ONE XRAY VIEW OF THE CHEST 10/11/2022 8:19 am COMPARISON: 10 October 2022 HISTORY: ORDERING SYSTEM PROVIDED HISTORY: Hypoxia TECHNOLOGIST PROVIDED HISTORY: Reason for exam:->Hypoxia What reading provider will be dictating this exam?->CRC FINDINGS: There is likely atelectasis at the right base associated with some volume loss. Stable cardiomediastinal silhouette with normal pulmonary vascularity. Neither costophrenic angle appears blunted. Likely developing atelectasis at the right base.      XR CHEST PORTABLE    Result Date: 10/10/2022  EXAMINATION: ONE XRAY VIEW OF THE CHEST 10/10/2022 7:51 pm hypercapnia in the setting of COPD exacerbation - resolved   UTI - UC with Serratia fonticola   ID following, chronic colonization d/t chronic jarquin use, no need for ATB at this time       Plan:  Patient seen on 3L NC, pulse ox 95% which is baseline. Continue supplemental O2 to maintain pulse ox 90-94%  Continue Proventil, Brovana, Pulmicort and duo-nebs. She will resume Brovana and Pulmicort at discharge   S/p Solu-medrol.  Continue Prednisone 20 mg daily starting 10/15  Continue NIV at [facility] and PRN         Electronically signed by ANGELA Arreola CNP on 10/16/2022 at 9:27 AM

## 2022-10-16 NOTE — PLAN OF CARE
Problem: Chronic Conditions and Co-morbidities  Goal: Patient's chronic conditions and co-morbidity symptoms are monitored and maintained or improved  Outcome: Progressing     Problem: Discharge Planning  Goal: Discharge to home or other facility with appropriate resources  Outcome: Progressing     Problem: Skin/Tissue Integrity  Goal: Absence of new skin breakdown  Description: 1. Monitor for areas of redness and/or skin breakdown  2. Assess vascular access sites hourly  3. Every 4-6 hours minimum:  Change oxygen saturation probe site  4. Every 4-6 hours:  If on nasal continuous positive airway pressure, respiratory therapy assess nares and determine need for appliance change or resting period.   Outcome: Progressing     Problem: Safety - Adult  Goal: Free from fall injury  Outcome: Progressing     Problem: ABCDS Injury Assessment  Goal: Absence of physical injury  Outcome: Progressing     Problem: Respiratory - Adult  Goal: Achieves optimal ventilation and oxygenation  Outcome: Progressing     Problem: Pain  Goal: Verbalizes/displays adequate comfort level or baseline comfort level  Outcome: Progressing     Problem: Nutrition Deficit:  Goal: Optimize nutritional status  Outcome: Progressing

## 2022-10-16 NOTE — PROGRESS NOTES
[facility]                                Progress note    Subjective:    Patient is awake and alert lying in bed without complaints  No acute complaints  Tolerating medications    Objective:    BP (!) 128/55   Pulse 90   Temp 97.8 °F (36.6 °C) (Temporal)   Resp 18   Ht 5' 7\" (1.702 m)   Wt 142 lb (64.4 kg)   LMP 10/21/1985   SpO2 95%   BMI 22.24 kg/m²     In: 210 [P.O.:200; I.V.:10]  Out: 125   In: 210   Out: 125 [Urine:125]    General appearance: NAD, conversant   HEENT: AT/NC, MMM  Neck: FROM, supple  Lungs: Clear to auscultation-  CV: RRR, no MRGs  Vasc: Radial pulses 2+  Abdomen: Soft, non-tender; no masses or HSM  Extremities: No peripheral edema or digital cyanosis  Skin: no rash, lesions or ulcers  Psych: Alert and oriented to person, place and time not able to fully assess  Neuro: Alert and interactive     Recent Labs     10/14/22  0415 10/15/22  0526 10/16/22  0508   WBC 10.4 8.9 6.8   HGB 8.4* 8.8* 8.7*   HCT 30.0* 32.7* 31.4*    288 267       Recent Labs     10/14/22  0415 10/15/22  0526 10/16/22  0508    143 146   K 5.2* 4.3 4.3   CL 93* 95* 97*   CO2 39* 43* 40*   BUN 36* 29* 28*   CREATININE 0.7 0.6 0.6   CALCIUM 10.4* 10.3* 10.2       Assessment:    Principal Problem:    COPD exacerbation (HCC)  Active Problems:    New onset of congestive heart failure (HCC)    Prolonged Q-T interval on ECG    Elevated troponin    Severe protein-calorie malnutrition (HCC)  Resolved Problems:    * No resolved hospital problems.  *      Plan:  20-year-old female with a history of COPD who is oxygen dependent presents to the ED with complaints of generalized weakness and shortness of breath and is admitted to telemetry unit with hypercapnic respiratory failure and acute on chronic CHF  10/11:  -Co2 on ABG's 100.1, followed by RRT d/t minimally responsive, transferred to ICU after being continuously on AVAPS  -IV Bumex 1mg BID  -Echo   -Pulmonary following     10/12:  -Co2 on ABGs 78.5 today-continue noninvasive ventilation as needed, likely chronic CO2 retention  -Diuretics discontinued, improvement in BNP from 8K to 4K  -Echo > 55-60% EF  -IV solumedrol 40 BID-continue to taper, transition to p.o. at discretion of pulmonology  -Duo nebs, ICS, anticholinergics/LABA/Elsa  -UTI IV rocephin, await cultures     10/13: Answering all questions appropriately today  Has a chronic indwelling [place] catheter for unknown reasons-she is not able to tell me why-?  Neurogenic bladder  Gram-negative rods in urine culture-likely colonization from chronic indwelling catheter  Infectious disease service consulted by critical care team-Case discussed with Dr. Jair Barrios for trend out of ICU medicine standpoint    10/14:  No acute complaints, no shortness of breath  Serratia in urine culture and patient with indwelling urinary catheter-ID on board  Chronic PCO2 elevation on chronic home O2  Okay for transfer out of ICU from medicine standpoint    10/15:  Out of ICU  PCO2 much improved down to 43 today-chronic elevation  Remains off antibiotics  Continue to monitor O2 needs  Will likely need rehab at discharge-scored 16/24    10/16/2022  PCO2 40 today  Continue duo nebs/ISP  Steroids transitioned to p.o. prednisone  Currently utilizing 3 L O2 for saturation of 95%    Code status: Full  Consultants: Pulm and critical care   DVT Prophylaxis Lovenox   PT/OT  Discharge planning-can likely be discharged tomorrow to rehab if able to accept    [address]  1214 pm  10/16/2022    Above note edited to reflect my thoughts     I personally saw, examined and provided care for the patient. Radiographs, labs and medication list were reviewed by me independently. The case was discussed in detail and plans for care were established. Review of Kendra MOTTA CNP, documentation was conducted and revisions were made as appropriate directly by me.  I agree with the above documented exam, problem list, and plan of care.      Warden Lenora MD  1:20 PM  10/16/2022

## 2022-10-17 LAB
ADENOVIRUS BY PCR: NOT DETECTED
ANION GAP SERPL CALCULATED.3IONS-SCNC: 6 MMOL/L (ref 7–16)
ANISOCYTOSIS: ABNORMAL
BASOPHILS ABSOLUTE: 0 E9/L (ref 0–0.2)
BASOPHILS RELATIVE PERCENT: 0.1 % (ref 0–2)
BORDETELLA PARAPERTUSSIS BY PCR: NOT DETECTED
BORDETELLA PERTUSSIS BY PCR: NOT DETECTED
BUN BLDV-MCNC: 23 MG/DL (ref 6–23)
CALCIUM SERPL-MCNC: 9.9 MG/DL (ref 8.6–10.2)
CHLAMYDOPHILIA PNEUMONIAE BY PCR: NOT DETECTED
CHLORIDE BLD-SCNC: 92 MMOL/L (ref 98–107)
CO2: 42 MMOL/L (ref 22–29)
CORONAVIRUS 229E BY PCR: NOT DETECTED
CORONAVIRUS HKU1 BY PCR: NOT DETECTED
CORONAVIRUS NL63 BY PCR: NOT DETECTED
CORONAVIRUS OC43 BY PCR: NOT DETECTED
CREAT SERPL-MCNC: 0.7 MG/DL (ref 0.5–1)
EOSINOPHILS ABSOLUTE: 0.13 E9/L (ref 0.05–0.5)
EOSINOPHILS RELATIVE PERCENT: 1.8 % (ref 0–6)
GFR AFRICAN AMERICAN: >60
GFR NON-AFRICAN AMERICAN: >60 ML/MIN/1.73
GLUCOSE BLD-MCNC: 100 MG/DL (ref 74–99)
HCT VFR BLD CALC: 28.7 % (ref 34–48)
HEMOGLOBIN: 8 G/DL (ref 11.5–15.5)
HUMAN METAPNEUMOVIRUS BY PCR: NOT DETECTED
HUMAN RHINOVIRUS/ENTEROVIRUS BY PCR: NOT DETECTED
HYPOCHROMIA: ABNORMAL
INFLUENZA A BY PCR: NOT DETECTED
INFLUENZA B BY PCR: NOT DETECTED
LYMPHOCYTES ABSOLUTE: 1.28 E9/L (ref 1.5–4)
LYMPHOCYTES RELATIVE PERCENT: 18.4 % (ref 20–42)
MCH RBC QN AUTO: 23.3 PG (ref 26–35)
MCHC RBC AUTO-ENTMCNC: 27.9 % (ref 32–34.5)
MCV RBC AUTO: 83.7 FL (ref 80–99.9)
METER GLUCOSE: 108 MG/DL (ref 74–99)
METER GLUCOSE: 125 MG/DL (ref 74–99)
METER GLUCOSE: 175 MG/DL (ref 74–99)
METER GLUCOSE: 234 MG/DL (ref 74–99)
MONOCYTES ABSOLUTE: 0.43 E9/L (ref 0.1–0.95)
MONOCYTES RELATIVE PERCENT: 6.1 % (ref 2–12)
MYCOPLASMA PNEUMONIAE BY PCR: NOT DETECTED
MYELOCYTE PERCENT: 1.8 % (ref 0–0)
NEUTROPHILS ABSOLUTE: 5.25 E9/L (ref 1.8–7.3)
NEUTROPHILS RELATIVE PERCENT: 71.9 % (ref 43–80)
OVALOCYTES: ABNORMAL
PARAINFLUENZA VIRUS 1 BY PCR: NOT DETECTED
PARAINFLUENZA VIRUS 2 BY PCR: NOT DETECTED
PARAINFLUENZA VIRUS 3 BY PCR: NOT DETECTED
PARAINFLUENZA VIRUS 4 BY PCR: NOT DETECTED
PDW BLD-RTO: 14.8 FL (ref 11.5–15)
PLATELET # BLD: 253 E9/L (ref 130–450)
PMV BLD AUTO: 9.3 FL (ref 7–12)
POIKILOCYTES: ABNORMAL
POLYCHROMASIA: ABNORMAL
POTASSIUM SERPL-SCNC: 4.1 MMOL/L (ref 3.5–5)
RBC # BLD: 3.43 E12/L (ref 3.5–5.5)
RESPIRATORY SYNCYTIAL VIRUS BY PCR: NOT DETECTED
SARS-COV-2, PCR: NOT DETECTED
SODIUM BLD-SCNC: 140 MMOL/L (ref 132–146)
TARGET CELLS: ABNORMAL
WBC # BLD: 7.1 E9/L (ref 4.5–11.5)

## 2022-10-17 PROCEDURE — 6370000000 HC RX 637 (ALT 250 FOR IP): Performed by: INTERNAL MEDICINE

## 2022-10-17 PROCEDURE — 2060000000 HC ICU INTERMEDIATE R&B

## 2022-10-17 PROCEDURE — 6360000002 HC RX W HCPCS: Performed by: INTERNAL MEDICINE

## 2022-10-17 PROCEDURE — 2580000003 HC RX 258: Performed by: INTERNAL MEDICINE

## 2022-10-17 PROCEDURE — 94640 AIRWAY INHALATION TREATMENT: CPT

## 2022-10-17 PROCEDURE — 2700000000 HC OXYGEN THERAPY PER DAY

## 2022-10-17 PROCEDURE — 80048 BASIC METABOLIC PNL TOTAL CA: CPT

## 2022-10-17 PROCEDURE — 36415 COLL VENOUS BLD VENIPUNCTURE: CPT

## 2022-10-17 PROCEDURE — 94660 CPAP INITIATION&MGMT: CPT

## 2022-10-17 PROCEDURE — 0202U NFCT DS 22 TRGT SARS-COV-2: CPT

## 2022-10-17 PROCEDURE — 82962 GLUCOSE BLOOD TEST: CPT

## 2022-10-17 PROCEDURE — 85025 COMPLETE CBC W/AUTO DIFF WBC: CPT

## 2022-10-17 RX ADMIN — IPRATROPIUM BROMIDE AND ALBUTEROL SULFATE 1 AMPULE: .5; 2.5 SOLUTION RESPIRATORY (INHALATION) at 16:39

## 2022-10-17 RX ADMIN — IPRATROPIUM BROMIDE AND ALBUTEROL SULFATE 1 AMPULE: .5; 2.5 SOLUTION RESPIRATORY (INHALATION) at 20:21

## 2022-10-17 RX ADMIN — ARFORMOTEROL TARTRATE 15 MCG: 15 SOLUTION RESPIRATORY (INHALATION) at 20:21

## 2022-10-17 RX ADMIN — FOLIC ACID 1 MG: 1 TABLET ORAL at 08:36

## 2022-10-17 RX ADMIN — MULTIVITAMIN TABLET 1 TABLET: TABLET at 08:36

## 2022-10-17 RX ADMIN — IPRATROPIUM BROMIDE AND ALBUTEROL SULFATE 1 AMPULE: .5; 2.5 SOLUTION RESPIRATORY (INHALATION) at 08:41

## 2022-10-17 RX ADMIN — SERTRALINE HYDROCHLORIDE 50 MG: 50 TABLET ORAL at 21:57

## 2022-10-17 RX ADMIN — BUDESONIDE INHALATION SUSPENSION 500 MCG: 0.5 SUSPENSION RESPIRATORY (INHALATION) at 20:21

## 2022-10-17 RX ADMIN — PREDNISONE 20 MG: 20 TABLET ORAL at 08:36

## 2022-10-17 RX ADMIN — ARFORMOTEROL TARTRATE 15 MCG: 15 SOLUTION RESPIRATORY (INHALATION) at 08:42

## 2022-10-17 RX ADMIN — IPRATROPIUM BROMIDE AND ALBUTEROL SULFATE 1 AMPULE: .5; 2.5 SOLUTION RESPIRATORY (INHALATION) at 12:37

## 2022-10-17 RX ADMIN — ENOXAPARIN SODIUM 40 MG: 100 INJECTION SUBCUTANEOUS at 08:36

## 2022-10-17 RX ADMIN — Medication 10 ML: at 21:58

## 2022-10-17 RX ADMIN — Medication 10 ML: at 08:37

## 2022-10-17 RX ADMIN — QUETIAPINE FUMARATE 50 MG: 50 TABLET, EXTENDED RELEASE ORAL at 21:58

## 2022-10-17 RX ADMIN — GABAPENTIN 300 MG: 300 CAPSULE ORAL at 21:57

## 2022-10-17 RX ADMIN — BUDESONIDE INHALATION SUSPENSION 500 MCG: 0.5 SUSPENSION RESPIRATORY (INHALATION) at 08:43

## 2022-10-17 NOTE — PLAN OF CARE
Problem: Chronic Conditions and Co-morbidities  Goal: Patient's chronic conditions and co-morbidity symptoms are monitored and maintained or improved  Outcome: Progressing  Flowsheets (Taken 10/16/2022 1951)  Care Plan - Patient's Chronic Conditions and Co-Morbidity Symptoms are Monitored and Maintained or Improved: Monitor and assess patient's chronic conditions and comorbid symptoms for stability, deterioration, or improvement     Problem: Discharge Planning  Goal: Discharge to home or other facility with appropriate resources  Outcome: Progressing  Flowsheets (Taken 10/16/2022 1951)  Discharge to home or other facility with appropriate resources: Identify barriers to discharge with patient and caregiver     Problem: Skin/Tissue Integrity  Goal: Absence of new skin breakdown  Description: 1. Monitor for areas of redness and/or skin breakdown  2. Assess vascular access sites hourly  3. Every 4-6 hours minimum:  Change oxygen saturation probe site  4. Every 4-6 hours:  If on nasal continuous positive airway pressure, respiratory therapy assess nares and determine need for appliance change or resting period.   Outcome: Progressing     Problem: Safety - Adult  Goal: Free from fall injury  Outcome: Progressing     Problem: ABCDS Injury Assessment  Goal: Absence of physical injury  Outcome: Progressing     Problem: Respiratory - Adult  Goal: Achieves optimal ventilation and oxygenation  Outcome: Progressing  Flowsheets (Taken 10/16/2022 1951)  Achieves optimal ventilation and oxygenation: Assess for changes in respiratory status     Problem: Pain  Goal: Verbalizes/displays adequate comfort level or baseline comfort level  Outcome: Progressing     Problem: Nutrition Deficit:  Goal: Optimize nutritional status  Outcome: Progressing

## 2022-10-17 NOTE — DISCHARGE INSTR - COC
Continuity of Care Form    Patient Name: Artem Griggs   :  1955  MRN:  [id number]    Admit date:  10/10/2022  Discharge date:  ***    Code Status Order: Full Code   Advance Directives:     Admitting Physician:  Margi Serrano MD  PCP: Manan Sutton MD    Discharging Nurse: [facility] Unit/Room#: 3247/1209-X  Discharging Unit Phone Number: ***    Emergency Contact:   Extended Emergency Contact Information  Primary Emergency Contact: Lin 10, [address] Phone: [phone]  Mobile Phone: [phone]  Relation: Brother/Sister  Preferred language: English   needed? No  Secondary Emergency Contact: Rishabh Mane  Address: [address] [place] [address], [address] Phone: [phone]  Work Phone: [phone]  Mobile Phone: [phone]  Relation: Brother/Sister  Preferred language: English   needed? No    Past Surgical History:  Past Surgical History:   Procedure Laterality Date    COLONOSCOPY      COLONOSCOPY N/A 2019    COLONOSCOPY DIAGNOSTIC performed by Cathi Hicks MD at [address] [place] [address] (CERVIX STATUS UNKNOWN)      KIDNEY SURGERY Left 2014    ABLATION PERFORMED UNDER CT SCAN    IA CYSTOURETHROSCOPY,BIOPSIES N/A 2018    CYSTOSCOPY RETROGRADE PYELOGRAM, CLOT EVACATION , POSS.   BLADDER BIOPSY ---DR Moi Virgen 2 :30 performed by Maurilio Sue DO at [address] [facility] N/A 5/10/2019    EGD BIOPSY performed by Cathi Hicks MD at [address] [place] [address] 2019    EGD EUS performed by Geri Terry DO at [address] [place] [address] N/A 2019    EGD performed by Geri Terry DO at [address] [place] [address] 2020    EGD DIAGNOSTIC ONLY performed by Davion Gonzalez MD at [address] [place] [address] N/A 10/25/2021 EGD BIOPSY performed by Michael Ortega MD at [address] [place] [address] N/A 12/6/2021    EGD BIOPSY performed by Michael Ortega MD at [address] [place] [address] History:   Immunization History   Administered Date(s) Administered    COVID-19, PFIZER PURPLE top, DILUTE for use, (age 15 y+), 30mcg/0.3mL 05/20/2021       Active Problems:  Patient Active Problem List   Diagnosis Code    COPD (chronic obstructive pulmonary disease) ([address] J44.9    MDD (major depressive disorder) F32.9    Hematuria R31.9    Neurogenic bladder N31.9    Chronic respiratory failure with hypoxia ([address] J96.11    Anemia D64.9    Gastric wall thickening K31.89    Bladder wall thickening N32.89    Difficulty in walking, not elsewhere classified R26.2    Moderate persistent asthma with (acute) exacerbation J45.41    Muscle weakness (generalized) M62.81    Need for assistance with personal care Z74.1    Neuromuscular dysfunction of bladder, unspecified N31.9    Personal history of other malignant neoplasm of kidney Z85.528    Breast pain N64.4    COPD exacerbation ([address] J44.1    New onset of congestive heart failure ([address] I50.9    Prolonged Q-T interval on ECG R94.31    Elevated troponin R77.8    Severe protein-calorie malnutrition ([address] E43       Isolation/Infection:   Isolation            No Isolation          Patient Infection Status       Infection Onset Added Last Indicated Last Indicated By Review Planned Expiration Resolved Resolved By    COVID-19 (Rule Out) 10/17/22 10/17/22 10/17/22 Respiratory Panel, Molecular, with COVID-19 (Restricted: peds pts or suitable admitted adults) (Ordered) 10/27/22 10/31/22      Resolved    COVID-19 (Rule Out) 10/11/22 10/11/22 10/12/22 Respiratory Panel, Molecular, with COVID-19 (Restricted: peds pts or suitable admitted adults) (Ordered)   10/13/22 Rule-Out Test Canceled    COVID-19 (Rule Out) 10/10/22 10/10/22 10/10/22 COVID-19, Rapid (Ordered)   10/10/22 Rule-Out Test Resulted    COVID-19 (Rule Out) 02/26/21 02/26/21 02/26/21 COVID-19, Rapid (Ordered)   02/26/21 Rule-Out Test Resulted    COVID-19 (Rule Out) 05/20/20 05/20/20 05/20/20 COVID-19 (Ordered)   05/20/20 Rule-Out Test Resulted    ESBL (Extended Spectrum Beta Lactamase)  08/14/14 08/14/14 Pipo Vo RN   02/20/17 Tai Soni RN    E coli esbl in urine 8/12/14            Nurse Assessment:  Last Vital Signs: /60   Pulse 70   Temp 96.9 °F (36.1 °C) (Temporal)   Resp 19   Ht 5' 7\" (1.702 m)   Wt 155 lb 10.3 oz (70.6 kg)   LMP 10/21/1985   SpO2 99%   BMI 24.38 kg/m²     Last documented pain score (0-10 scale): Pain Level: 0  Last Weight:   Wt Readings from Last 1 Encounters:   10/17/22 155 lb 10.3 oz (70.6 kg)     Mental Status:  oriented and alert    IV Access:  - None    Nursing Mobility/ADLs:  Walking   Independent  Transfer  Independent  Bathing  Independent  Dressing  Independent  [address]  Independent  Med Delivery   whole    Wound Care Documentation and Therapy:        Elimination:  Continence: Bowel: Yes  Bladder: jarquin  Urinary Catheter:  chronic jarquin    Colostomy/Ileostomy/Ileal Conduit: No       Date of Last BM: ***    Intake/Output Summary (Last 24 hours) at 10/17/2022 1135  Last data filed at 10/17/2022 0004  Gross per 24 hour   Intake 120 ml   Output 600 ml   Net -480 ml     I/O last 3 completed shifts: In: 120 [P.O.:120]  Out: 1175 [Urine:1175]    Safety Concerns:     None    Impairments/Disabilities:      None    Nutrition Therapy:  Current Nutrition Therapy:   - Oral Diet:  General    Routes of Feeding: Oral  Liquids: Thin Liquids  Daily Fluid Restriction: no  Last Modified Barium Swallow with Video (Video Swallowing Test): not done    Treatments at the Time of Hospital Discharge:   Respiratory Treatments: yes  Oxygen Therapy:  is on oxygen at 3 L/min per nasal cannula.   Ventilator:    - No ventilator support    Rehab Therapies: ***  Weight Bearing Status/Restrictions: No weight bearing restrictions  Other Medical Equipment (for information only, NOT a DME order):  {EQUIPMENT:765587443}  Other Treatments: ***    Patient's personal belongings (please select all that are sent with patient):  None    RN SIGNATURE:  Electronically signed by Lyric Cameron RN on 10/20/22 at 9:13 AM EDT    CASE MANAGEMENT/SOCIAL WORK SECTION    Inpatient Status Date: ***    Readmission Risk Assessment Score:  Readmission Risk              Risk of Unplanned Readmission:  20           Discharging to Facility/ Agency   Name:  [facility]  Address: [address]  Phone: [phone]  Fax:  [phone]    Dialysis Facility (if applicable)   Name:  Address:  Dialysis Schedule:  Phone:  Fax:    / signature: Electronically signed by EUGENE Parks on 10/17/22 at 11:37 AM EDT    PHYSICIAN SECTION    Prognosis: {Prognosis:5090068191}    Condition at Discharge: [address] [place] [address] Patient Condition:525269043}    Rehab Potential (if transferring to Rehab): {Prognosis:3195793921}    Recommended Labs or Other Treatments After Discharge: ***    Physician Certification: I certify the above information and transfer of Jossy Reynolds  is necessary for the continuing treatment of the diagnosis listed and that she requires {Admit to Appropriate Level of Care:36849} for {GREATER/LESS:977336446} 30 days.      Update Admission H&P: {CHP DME Changes in JIEUH:717833747}    PHYSICIAN SIGNATURE:  {Esignature:023911423}

## 2022-10-17 NOTE — PROGRESS NOTES
Physician Progress Note      PATIENT:               May Cadena  CSN #:                  [id number]  :                       1955  ADMIT DATE:       10/10/2022 5:59 PM  [address] [place] DATE:  RESPONDING  PROVIDER #:        Jacquie Angeles MD          QUERY TEXT:    Dear Provider,    Pt admitted with UTI. Pt noted to have chronic indwelling urinary catheter. If possible, please document in the progress notes and discharge summary if   you are evaluating and/or treating any of the following:  [  The medical record reflects the following:  Risk Factors: Chronic jarquin changed monthly  Clinical Indicators: UTI documented. Urine cx : >100,000 gram negative mark. I   & S pending  Treatment: Jarquin changed on admission, Rocephin    Thank you,  Mayda Lehman RN  Clinical Documentation Improvement  [phone]  Options provided:  -- UTI due to chronic indwelling urinary catheter  -- UTI not due to chronic indwelling urinary catheter  -- Other - I will add my own diagnosis  -- Disagree - Not applicable / Not valid  -- Disagree - Clinically unable to determine / Unknown  -- Refer to Clinical Documentation Reviewer    PROVIDER RESPONSE TEXT:    UTI is due to the chronic indwelling urinary catheter. Query created by: Saniya Amanda on 10/13/2022 8:02 AM      QUERY TEXT:    Dear Provider,    Pt presented with UTI and questionable aspiration PNA. Noted 10/11   pulmonology consultant documentation of UTI/ sepsis .   If possible, please   document in progress notes and discharge summary if you are in agreement with   the consultant's documentation of sepsis:    The medical record reflects the following:  Risk Factors: UTI, Questionable aspiration PNA  Clinical Indicators: T 97.6   -120     RR 22-26     140/79  WBC 9.3 to 7.4  LA 1.4 , Procalcitonin 0.30-0.29-0.21  UC   >100,000 Gram negative rods   I & S pending  Pneumonia- Legionella Presumptive negative, Strep Pneumoniae presumptive   negative  Admission CXR: Mild bibasilar increased markings consistent with subsegmental   atelectasis. Treatment: Imaging, Ua, C & S, lactic acid, Procalcitonin, Legionella & strep   antigens, Rocephin    Thank you,  Reema Longoria RN  Clinical Documentation Improvement  [phone]  Options provided:  -- Sepsis confirmed present on admission  -- Sepsis ruled out  -- Other - I will add my own diagnosis  -- Disagree - Not applicable / Not valid  -- Disagree - Clinically unable to determine / Unknown  -- Refer to Clinical Documentation Reviewer    PROVIDER RESPONSE TEXT:    The diagnosis of sepsis was confirmed as present on admission.     Query created by: Kusum Waters on 10/13/2022 8:31 AM      Electronically signed by:  Baby Seip MD 10/17/2022 9:03 AM

## 2022-10-17 NOTE — PROGRESS NOTES
[facility]                                Progress note    Subjective:    Patient is awake and alert lying in bed without complaints  No acute complaints  States she is feeling better, just tired    Objective:    /60   Pulse 70   Temp 96.9 °F (36.1 °C) (Temporal)   Resp 19   Ht 5' 7\" (1.702 m)   Wt 155 lb 10.3 oz (70.6 kg)   LMP 10/21/1985   SpO2 99%   BMI 24.38 kg/m²     In: 120 [P.O.:120]  Out: 1175   In: 120   Out: 1175 [Urine:1175]    General appearance: NAD, conversant   HEENT: AT/NC, MMM  Neck: FROM, supple  Lungs: Diminished lung bases, 3L   CV: RRR, no MRGs  Vasc: Radial pulses 2+  Abdomen: Soft, non-tender; no masses or HSM  Extremities: No peripheral edema or digital cyanosis  Skin: no rash, lesions or ulcers  Psych: Alert and oriented to person, place and time not able to fully assess  Neuro: Alert and interactive     Recent Labs     10/15/22  0526 10/16/22  0508 10/17/22  0559   WBC 8.9 6.8 7.1   HGB 8.8* 8.7* 8.0*   HCT 32.7* 31.4* 28.7*    267 253         Recent Labs     10/15/22  0526 10/16/22  0508 10/17/22  0559    146 140   K 4.3 4.3 4.1   CL 95* 97* 92*   CO2 43* 40* 42*   BUN 29* 28* 23   CREATININE 0.6 0.6 0.7   CALCIUM 10.3* 10.2 9.9         Assessment:    Principal Problem:    COPD exacerbation (HCC)  Active Problems:    New onset of congestive heart failure (HCC)    Prolonged Q-T interval on ECG    Elevated troponin    Severe protein-calorie malnutrition (HCC)  Resolved Problems:    * No resolved hospital problems.  *      Plan:  61-year-old female with a history of COPD who is oxygen dependent presents to the ED with complaints of generalized weakness and shortness of breath and is admitted to telemetry unit with hypercapnic respiratory failure and acute on chronic CHF  10/11:  -Co2 on ABG's 100.1, followed by RRT d/t minimally responsive, transferred to ICU after being continuously on AVAPS  -IV Bumex 1mg BID  -Echo   -Pulmonary following 10/12: -Co2 on ABGs 78.5 today-continue noninvasive ventilation as needed, likely chronic CO2 retention  -Diuretics discontinued, improvement in BNP from 8K to 4K  -Echo > 55-60% EF  -IV solumedrol 40 BID-continue to taper, transition to p.o. at discretion of pulmonology  -Duo nebs, ICS, anticholinergics/LABA/Elsa  -UTI IV rocephin, await cultures     10/13: Answering all questions appropriately today  Has a chronic indwelling [place] catheter for unknown reasons-she is not able to tell me why-?  Neurogenic bladder  Gram-negative rods in urine culture-likely colonization from chronic indwelling catheter  Infectious disease service consulted by critical care team-Case discussed with Dr. Romi Albarado for trend out of ICU medicine standpoint    10/14:  No acute complaints, no shortness of breath  Serratia in urine culture and patient with indwelling urinary catheter-ID on board  Chronic PCO2 elevation on chronic home O2  Okay for transfer out of ICU from medicine standpoint    10/15:  Out of ICU  PCO2 much improved down to 43 today-chronic elevation  Remains off antibiotics  Continue to monitor O2 needs  Will likely need rehab at discharge-scored 16/24    10/16/2022  PCO2 40 today  Continue duo nebs/ISP  Steroids transitioned to p.o. prednisone  Currently utilizing 3 L O2 for saturation of 95%    10/17/2022:  -PCO2 on morning labs 42, ABG's ordered by pulm  -Continue oral prednisone 20 mg daily   -Follow up with pulm 2-4 weeks    Code status: Full  Consultants: Pulm and critical care   DVT Prophylaxis Lovenox   PT/OT  Discharge planning > can discharge home today if ok with pulmonology     ANGELA Franco - CNP  11:46 AM  10/17/2022    Above note edited to reflect my thoughts     I personally saw, examined and provided care for the patient. Radiographs, labs and medication list were reviewed by me independently. The case was discussed in detail and plans for care were established.  Review of Radha ANGELA Daniels-CNP   , documentation was conducted and revisions were made as appropriate directly by me. I agree with the above documented exam, problem list, and plan of care.      [facility], MD  7:20 PM  10/17/2022
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
## 2022-10-17 NOTE — CARE COORDINATION
Confirmed with Pt Discharge plan continues to return home with [facility]. Family or Insurance to provide transport at discharge. PT 16/24 with stand by Assist.  Family to bring portable oxygen tank ([employer]) for discharge home. [facility]AL[facility] to follow for discharge needs.    John Aleman, L.S.W.  [phone]

## 2022-10-17 NOTE — PROGRESS NOTES
Occupational Therapy  OCCUPATIONAL THERAPY INITIAL EVALUATION    [place] [address] [address] [address]      OMZM:                                                Patient Name: Mindy Cervantes  MRN: [id number]  : 1955  Room: [address] [place] [address] [id number]    Referring Provider: Paulo Campos MD  Specific Provider Orders/Date: OT eval and treat 10/10/22     Diagnosis: COPD exacerbation ([address] [J44.1]  Supplemental oxygen dependent [Z99.81]  Acute on chronic respiratory failure with hypercapnia ([address] [J96.22]  S[place]rock coma scale total score 9-12, at hospital admission [R40.2423]   Pt admitted to hospital on 10/10/22 for generalized body aches, n/v  RRT on 10/11 after being found minimally responsive, abnormal ABGs    Pertinent Medical History:  has a past medical history of Asthma, COPD (chronic obstructive pulmonary disease) ([address] Depression, Dysphagia, Jarquin catheter in place, and Oxygen dependent.        Precautions:  Fall Risk, O2 (3L), jarquin, bed alarm    Assessment of current deficits    [x] Functional mobility  [x]ADLs  [x] Strength               [x]Cognition    [x] Functional transfers   [x] IADLs         [x] Safety Awareness   [x]Endurance    [] Fine Coordination              [x] Balance      [] Vision/perception   []Sensation     []Gross Motor Coordination  [] ROM  [] Delirium                   [] Motor Control     OT PLAN OF CARE   OT POC based on physician orders, patient diagnosis and results of clinical assessment    Frequency/Duration 1-3 days/wk for 2 weeks PRN   Specific OT Treatment Interventions to include:   * Instruction/training on adapted ADL techniques and AE recommendations to increase functional independence within precautions       * Training on energy conservation strategies, correct breathing pattern and techniques to improve independence/tolerance for self-care routine  * Functional transfer/mobility training/DME recommendations for increased independence, safety, and fall prevention  * Patient/Family education to increase follow through with safety techniques and functional independence  * Recommendation of environmental modifications for increased safety with functional transfers/mobility and ADLs  * Cognitive retraining/development of therapeutic activities to improve problem solving, judgement, memory, and attention for increased safety/participation in ADL/IADL tasks  * Therapeutic exercise to improve motor endurance, ROM, and functional strength for ADLs/functional transfers  * Therapeutic activities to facilitate/challenge dynamic balance, stand tolerance for increased safety and independence with ADLs  * Therapeutic activities to facilitate gross/fine motor skills for increased independence with ADLs      Recommended Adaptive Equipment:  TBD     Home Living: Pt lives alone in 2 floor home. 3 CHANTELLE, 1 handrail  Bath and bedroom on 2nd floor - chair lift    Bathroom setup: tub/shower with shower chair    Equipment owned: rollator, shower chair, home O2    Prior Level of Function: independent/mod I with ADLs , assist with IADLs; ambulated w/ rollator   Driving: no - home aides assist  Home health aides assist 5 days/wk, 4 hrs/day    Pain Level: Pt c/o 7-8/10 B hip pain this session ; reinforced pain management strategies including repositioning    Cognition: A&O: 3/4 (situational confusion noted); Follows 1 step directions  Delayed processing speed.     Memory:  fair    Sequencing:  fair    Problem solving:  fair    Judgement/safety:  fair      Functional Assessment:  AM-PAC Daily Activity Raw Score: 15/24   Initial Eval Status  Date: 10/17/22 Treatment Status  Date: STGs = LTGs  Time frame: 10-14 days   Feeding Supervision   Modified Calder    Grooming Minimal Assist   Seated EOB  Modified Calder    UB Dressing Minimal Assist   Gown  Modified Calder    LB Dressing Moderate Assist   Supervision    Bathing Moderate Assist  Supervision    Toileting Moderate Assist   Supervision    Bed Mobility  Supine to sit: Minimal Assist   Sit to supine: Minimal Assist   Supine to sit: Modified [place]   Sit to supine: Modified [place]    Functional Transfers Minimal Assist   Supervision    Functional Mobility Minimal Assist w/ w/w  Light mobility in room only  Limited by noted fatigue and weakness  Supervision    Balance Sitting:     Static:  SBA><Min A    Dynamic:Mod A (LOB x2 to right - assist and cues to correct)  Standing: Min A w/ w/w (static)  Min><Mod A w/ w/w (dynamic)     Activity Tolerance Fair-  Noted fatigue and generalized weakness w/ minimal tasks. Frequent rest breaks reinforced  Good   Visual/  Perceptual Glasses: no                  Hand Dominance R   AROM (PROM) Strength Additional Info:    RUE  WFL 4-/5 good  and wfl FMC/dexterity noted during ADL tasks       LUE WFL 4-/5 good  and wfl FMC/dexterity noted during ADL tasks       Hearing: WFL   Sensation:  No c/o numbness or tingling   Tone: WFL   Edema: none noted    Comments: Upon arrival patient lying in bed. Therapist educated pt on role of OT. At end of session, patient lying in bed (bed alarm on) with call light and phone within reach, all lines and tubes intact. Overall patient demonstrated decreased independence and safety during completion of ADL/functional transfer/mobility tasks. Pt would benefit from continued skilled OT to increase safety and independence with completion of ADL/IADL tasks for functional independence and quality of life.     Treatment: OT treatment provided this date includes: Facilitation of bed mobility (education/cues for body mechanics), unsupported sitting balance (addressing posture, weight shifting, dynamic reaching to prep for ADL's), functional transfers (w/ education/cues for safety/hand placement), standing tolerance tasks (addressing posture, balance and activity tolerance while incorporating light functional reaching impacting ADLs and functional activity) and functional ambulation tasks with w/w (w/ education/cuing on posture, w/w management and safety). Therapist facilitated self-care retraining: UB/LB self-care tasks, simulated toileting task and standing grooming tasks while educating pt on modified techniques, posture, safety and energy conservation techniques. Skilled monitoring of HR, O2 sats and pts response to treatment. Pt on 3L O2 via nasal cannula. O2 sat=WFL at rest and w/ activity. Rehab Potential: Good for established goals     Patient / Family Goal: not stated      Patient and/or family were instructed on functional diagnosis, prognosis/goals and OT plan of care. Demonstrated fair understanding. Eval Complexity: Low    Time In: 09:09  Time Out: 09:36  Total Treatment Time: 14 minutes    Min Units   OT Eval Low 97165  X  1   OT Eval Medium 21659      OT Eval High 31692      OT Re-Eval [id number]       Therapeutic Ex 87120       Therapeutic Activities 17530  5  0   ADL/Self Care 84684  9  1   Orthotic Management 53504       Manual [id number]     Neuro Re-Ed 06108       Non-Billable Time          Evaluation Time additionally includes thorough review of current medical information, gathering information on past medical history/social history and prior level of function, interpretation of standardized testing/informal observation of tasks, assessment of data and development of plan of care and goals.         THOR InmanR/L #[id number]

## 2022-10-18 LAB
ANION GAP SERPL CALCULATED.3IONS-SCNC: 4 MMOL/L (ref 7–16)
ANISOCYTOSIS: ABNORMAL
B.E.: 16.6 MMOL/L (ref -3–3)
BASOPHILS ABSOLUTE: 0 E9/L (ref 0–0.2)
BASOPHILS RELATIVE PERCENT: 0.1 % (ref 0–2)
BUN BLDV-MCNC: 19 MG/DL (ref 6–23)
CALCIUM SERPL-MCNC: 9.9 MG/DL (ref 8.6–10.2)
CHLORIDE BLD-SCNC: 94 MMOL/L (ref 98–107)
CO2: 43 MMOL/L (ref 22–29)
COHB: 1.1 % (ref 0–1.5)
CREAT SERPL-MCNC: 0.6 MG/DL (ref 0.5–1)
CRITICAL: ABNORMAL
DATE ANALYZED: ABNORMAL
DATE OF COLLECTION: ABNORMAL
EOSINOPHILS ABSOLUTE: 0.2 E9/L (ref 0.05–0.5)
EOSINOPHILS RELATIVE PERCENT: 2.6 % (ref 0–6)
GFR SERPL CREATININE-BSD FRML MDRD: >60 ML/MIN/1.73
GLUCOSE BLD-MCNC: 95 MG/DL (ref 74–99)
HCO3: 42.3 MMOL/L (ref 22–26)
HCT VFR BLD CALC: 27.8 % (ref 34–48)
HEMOGLOBIN: 7.8 G/DL (ref 11.5–15.5)
HHB: 1.8 % (ref 0–5)
HYPOCHROMIA: ABNORMAL
LAB: ABNORMAL
LYMPHOCYTES ABSOLUTE: 1.4 E9/L (ref 1.5–4)
LYMPHOCYTES RELATIVE PERCENT: 18.3 % (ref 20–42)
Lab: ABNORMAL
MCH RBC QN AUTO: 23.4 PG (ref 26–35)
MCHC RBC AUTO-ENTMCNC: 28.1 % (ref 32–34.5)
MCV RBC AUTO: 83.5 FL (ref 80–99.9)
METER GLUCOSE: 109 MG/DL (ref 74–99)
METER GLUCOSE: 135 MG/DL (ref 74–99)
METER GLUCOSE: 184 MG/DL (ref 74–99)
METER GLUCOSE: 225 MG/DL (ref 74–99)
METHB: 0.4 % (ref 0–1.5)
MODE: ABNORMAL
MONOCYTES ABSOLUTE: 0.31 E9/L (ref 0.1–0.95)
MONOCYTES RELATIVE PERCENT: 3.5 % (ref 2–12)
MV: 7.4
MYELOCYTE PERCENT: 1.7 % (ref 0–0)
NEUTROPHILS ABSOLUTE: 5.93 E9/L (ref 1.8–7.3)
NEUTROPHILS RELATIVE PERCENT: 73.9 % (ref 43–80)
O2 SATURATION: 98.2 % (ref 92–98.5)
O2HB: 96.7 % (ref 94–97)
OPERATOR ID: 1222
OVALOCYTES: ABNORMAL
PATIENT TEMP: 37 C
PCO2: 58.9 MMHG (ref 35–45)
PDW BLD-RTO: 14.6 FL (ref 11.5–15)
PEEP/CPAP: 5 CMH2O
PH BLOOD GAS: 7.47 (ref 7.35–7.45)
PLATELET # BLD: 267 E9/L (ref 130–450)
PMV BLD AUTO: 9.9 FL (ref 7–12)
PO2: 110.2 MMHG (ref 75–100)
POIKILOCYTES: ABNORMAL
POLYCHROMASIA: ABNORMAL
POTASSIUM SERPL-SCNC: 4.1 MMOL/L (ref 3.5–5)
PS: 15 CMH20
RBC # BLD: 3.33 E12/L (ref 3.5–5.5)
RR MECHANICAL: 18 B/MIN
SODIUM BLD-SCNC: 141 MMOL/L (ref 132–146)
SOURCE, BLOOD GAS: ABNORMAL
THB: 8.9 G/DL (ref 11.5–16.5)
TIME ANALYZED: 734
VT MECHANICAL: 409 ML
WBC # BLD: 7.8 E9/L (ref 4.5–11.5)

## 2022-10-18 PROCEDURE — 6370000000 HC RX 637 (ALT 250 FOR IP): Performed by: INTERNAL MEDICINE

## 2022-10-18 PROCEDURE — 2700000000 HC OXYGEN THERAPY PER DAY

## 2022-10-18 PROCEDURE — 2580000003 HC RX 258: Performed by: INTERNAL MEDICINE

## 2022-10-18 PROCEDURE — 85025 COMPLETE CBC W/AUTO DIFF WBC: CPT

## 2022-10-18 PROCEDURE — 36600 WITHDRAWAL OF ARTERIAL BLOOD: CPT

## 2022-10-18 PROCEDURE — 51702 INSERT TEMP BLADDER CATH: CPT

## 2022-10-18 PROCEDURE — 80048 BASIC METABOLIC PNL TOTAL CA: CPT

## 2022-10-18 PROCEDURE — 94660 CPAP INITIATION&MGMT: CPT

## 2022-10-18 PROCEDURE — 94640 AIRWAY INHALATION TREATMENT: CPT

## 2022-10-18 PROCEDURE — 6360000002 HC RX W HCPCS: Performed by: INTERNAL MEDICINE

## 2022-10-18 PROCEDURE — 2060000000 HC ICU INTERMEDIATE R&B

## 2022-10-18 PROCEDURE — 82962 GLUCOSE BLOOD TEST: CPT

## 2022-10-18 PROCEDURE — 36415 COLL VENOUS BLD VENIPUNCTURE: CPT

## 2022-10-18 PROCEDURE — 82805 BLOOD GASES W/O2 SATURATION: CPT

## 2022-10-18 RX ADMIN — IPRATROPIUM BROMIDE AND ALBUTEROL SULFATE 1 AMPULE: .5; 2.5 SOLUTION RESPIRATORY (INHALATION) at 12:08

## 2022-10-18 RX ADMIN — BUDESONIDE INHALATION SUSPENSION 500 MCG: 0.5 SUSPENSION RESPIRATORY (INHALATION) at 20:14

## 2022-10-18 RX ADMIN — IPRATROPIUM BROMIDE AND ALBUTEROL SULFATE 1 AMPULE: .5; 2.5 SOLUTION RESPIRATORY (INHALATION) at 08:59

## 2022-10-18 RX ADMIN — MULTIVITAMIN TABLET 1 TABLET: TABLET at 08:57

## 2022-10-18 RX ADMIN — Medication 10 ML: at 21:16

## 2022-10-18 RX ADMIN — BUDESONIDE INHALATION SUSPENSION 500 MCG: 0.5 SUSPENSION RESPIRATORY (INHALATION) at 08:59

## 2022-10-18 RX ADMIN — ARFORMOTEROL TARTRATE 15 MCG: 15 SOLUTION RESPIRATORY (INHALATION) at 20:14

## 2022-10-18 RX ADMIN — ARFORMOTEROL TARTRATE 15 MCG: 15 SOLUTION RESPIRATORY (INHALATION) at 08:58

## 2022-10-18 RX ADMIN — INSULIN LISPRO 1 UNITS: 100 INJECTION, SOLUTION INTRAVENOUS; SUBCUTANEOUS at 12:14

## 2022-10-18 RX ADMIN — IPRATROPIUM BROMIDE AND ALBUTEROL SULFATE 1 AMPULE: .5; 2.5 SOLUTION RESPIRATORY (INHALATION) at 16:10

## 2022-10-18 RX ADMIN — PREDNISONE 20 MG: 20 TABLET ORAL at 08:58

## 2022-10-18 RX ADMIN — SERTRALINE HYDROCHLORIDE 50 MG: 50 TABLET ORAL at 21:16

## 2022-10-18 RX ADMIN — ENOXAPARIN SODIUM 40 MG: 100 INJECTION SUBCUTANEOUS at 08:57

## 2022-10-18 RX ADMIN — Medication 10 ML: at 08:58

## 2022-10-18 RX ADMIN — FOLIC ACID 1 MG: 1 TABLET ORAL at 08:57

## 2022-10-18 RX ADMIN — GABAPENTIN 300 MG: 300 CAPSULE ORAL at 21:16

## 2022-10-18 RX ADMIN — IPRATROPIUM BROMIDE AND ALBUTEROL SULFATE 1 AMPULE: .5; 2.5 SOLUTION RESPIRATORY (INHALATION) at 20:14

## 2022-10-18 RX ADMIN — QUETIAPINE FUMARATE 50 MG: 50 TABLET, EXTENDED RELEASE ORAL at 21:16
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
## 2022-10-18 NOTE — PROGRESS NOTES
[facility]                                Progress note    Subjective:    Patient is awake and alert lying in bed without complaints  No acute complaints    Objective:    BP (!) 104/49   Pulse 74   Temp 98.1 °F (36.7 °C) (Axillary)   Resp 18   Ht 5' 7\" (1.702 m)   Wt 155 lb 10.3 oz (70.6 kg)   LMP 10/21/1985   SpO2 93%   BMI 24.38 kg/m²     In: 120 [P.O.:120]  Out: 550   In: 120   Out: 550 [Urine:550]    General appearance: NAD, conversant   HEENT: AT/NC, MMM  Neck: FROM, supple  Lungs: Diminished lung bases, 3L   CV: RRR, no MRGs  Vasc: Radial pulses 2+  Abdomen: Soft, non-tender; no masses or HSM  Extremities: No peripheral edema or digital cyanosis  Skin: no rash, lesions or ulcers  Psych: Alert and oriented to person, place and time not able to fully assess  Neuro: Alert and interactive     Recent Labs     10/16/22  0508 10/17/22  0559 10/18/22  0555   WBC 6.8 7.1 7.8   HGB 8.7* 8.0* 7.8*   HCT 31.4* 28.7* 27.8*    253 267         Recent Labs     10/16/22  0508 10/17/22  0559 10/18/22  0555    140 141   K 4.3 4.1 4.1   CL 97* 92* 94*   CO2 40* 42* 43*   BUN 28* 23 19   CREATININE 0.6 0.7 0.6   CALCIUM 10.2 9.9 9.9         Assessment:    Principal Problem:    COPD exacerbation (H[facility])  Active Problems:    New onset of congestive heart failure (H[facility])    Prolonged Q-T interval on ECG    Elevated troponin    Severe protein-calorie malnutrition ([facility])  Resolved Problems:    * No resolved hospital problems.  *      Plan:  66-year-old female with a history of COPD who is oxygen dependent presents to the ED with complaints of generalized weakness and shortness of breath and is admitted to telemetry unit with hypercapnic respiratory failure and acute on chronic CHF  10/11:  -Co2 on ABG's 100.1, followed by RRT d/t minimally responsive, transferred to ICU after being continuously on AVAPS  -IV Bumex 1mg BID  -Echo   -Pulmonary following     10/12:  -Co2 on ABGs 78.5 today-continue noninvasive ventilation as needed, likely chronic CO2 retention  -Diuretics discontinued, improvement in BNP from 8K to 4K  -Echo > 55-60% EF  -IV solumedrol 40 BID-continue to taper, transition to p.o. at discretion of pulmonology  -Duo nebs, ICS, anticholinergics/LABA/Elsa  -UTI IV rocephin, await cultures     10/13: Answering all questions appropriately today  Has a chronic indwelling Del Rio catheter for unknown reasons-she is not able to tell me why-?  Neurogenic bladder  Gram-negative rods in urine culture-likely colonization from chronic indwelling catheter  Infectious disease service consulted by critical care team-Case discussed with Dr. Luma Lopez for trend out of ICU medicine standpoint    10/14:  No acute complaints, no shortness of breath  Serratia in urine culture and patient with indwelling urinary catheter-ID on board  Chronic PCO2 elevation on chronic home O2  Okay for transfer out of ICU from medicine standpoint    10/15:  Out of ICU  PCO2 much improved down to 43 today-chronic elevation  Remains off antibiotics  Continue to monitor O2 needs  Will likely need rehab at discharge-scored 16/24    10/16/2022  PCO2 40 today  Continue duo nebs/ISP  Steroids transitioned to p.o. prednisone  Currently utilizing 3 L O2 for saturation of 95%    10/17/2022:  -PCO2 on morning labs 42, ABG's ordered by pulm  -Continue oral prednisone 20 mg daily   -Follow up with pulm 2-4 weeks    10/18/22:  -Pulm setting up NIV for home on discharge, likely not to get the auth until tomorrow.  -Patient is stable for discharge from a medicine standpoint once NIV arrangements have been made. Code status: Full  Consultants: Pulm and critical care   DVT Prophylaxis Lovenox   PT/OT  Discharge planning discharge home tomorrow once NIV has been set up for home. Suzette Gowers, ANGELA - CNP  2:41 PM  10/18/2022    Above note edited to reflect my thoughts     I personally saw, examined and provided care for the patient. Radiographs, labs and medication list were reviewed by me independently. The case was discussed in detail and plans for care were established. Review of GAVIN Regalado   , documentation was conducted and revisions were made as appropriate directly by me. I agree with the above documented exam, problem list, and plan of care.      Viki Leo MD  7:03 PM  10/18/2022

## 2022-10-18 NOTE — CARE COORDINATION
Dr Shailesh Castañeda informed that Pt will need NIV for at home. Margarita Farrell to start the prior authorization for NIV since Pt has home oxygen through them. Discharge Plan continues as returning home with no further needs. RIC/ROSALINA to follow for discharge needs.   John Aleman, L.S.W.  [phone]

## 2022-10-18 NOTE — CARE COORDINATION
Returned script to [facility] by fax. Sent  message to Sonia Levin script was faxed. NP  was notified that Prior auth should be back on Wednesday for NIV. Discharge Plan is to return home with [facility] and [facility]. RIC/ROSALINA to follow for discharge needs.    Kevin Kennedy, L.S.W.  [phone]

## 2022-10-19 LAB
ANION GAP SERPL CALCULATED.3IONS-SCNC: 5 MMOL/L (ref 7–16)
ANISOCYTOSIS: ABNORMAL
BASOPHILS ABSOLUTE: 0 E9/L (ref 0–0.2)
BASOPHILS RELATIVE PERCENT: 0 % (ref 0–2)
BUN BLDV-MCNC: 13 MG/DL (ref 6–23)
CALCIUM SERPL-MCNC: 10 MG/DL (ref 8.6–10.2)
CHLORIDE BLD-SCNC: 97 MMOL/L (ref 98–107)
CO2: 40 MMOL/L (ref 22–29)
CREAT SERPL-MCNC: 0.6 MG/DL (ref 0.5–1)
EOSINOPHILS ABSOLUTE: 0.08 E9/L (ref 0.05–0.5)
EOSINOPHILS RELATIVE PERCENT: 1.1 % (ref 0–6)
GFR SERPL CREATININE-BSD FRML MDRD: >60 ML/MIN/1.73
GLUCOSE BLD-MCNC: 98 MG/DL (ref 74–99)
HCT VFR BLD CALC: 27.9 % (ref 34–48)
HEMOGLOBIN: 7.8 G/DL (ref 11.5–15.5)
HYPOCHROMIA: ABNORMAL
IMMATURE GRANULOCYTES #: 0.11 E9/L
IMMATURE GRANULOCYTES %: 1.5 % (ref 0–5)
LYMPHOCYTES ABSOLUTE: 1.54 E9/L (ref 1.5–4)
LYMPHOCYTES RELATIVE PERCENT: 21.1 % (ref 20–42)
MCH RBC QN AUTO: 23.4 PG (ref 26–35)
MCHC RBC AUTO-ENTMCNC: 28 % (ref 32–34.5)
MCV RBC AUTO: 83.5 FL (ref 80–99.9)
METER GLUCOSE: 103 MG/DL (ref 74–99)
METER GLUCOSE: 125 MG/DL (ref 74–99)
METER GLUCOSE: 155 MG/DL (ref 74–99)
METER GLUCOSE: 293 MG/DL (ref 74–99)
MONOCYTES ABSOLUTE: 0.44 E9/L (ref 0.1–0.95)
MONOCYTES RELATIVE PERCENT: 6 % (ref 2–12)
NEUTROPHILS ABSOLUTE: 5.14 E9/L (ref 1.8–7.3)
NEUTROPHILS RELATIVE PERCENT: 70.3 % (ref 43–80)
OVALOCYTES: ABNORMAL
PDW BLD-RTO: 15.1 FL (ref 11.5–15)
PLATELET # BLD: 268 E9/L (ref 130–450)
PMV BLD AUTO: 10 FL (ref 7–12)
POIKILOCYTES: ABNORMAL
POLYCHROMASIA: ABNORMAL
POTASSIUM SERPL-SCNC: 4.3 MMOL/L (ref 3.5–5)
RBC # BLD: 3.34 E12/L (ref 3.5–5.5)
SODIUM BLD-SCNC: 142 MMOL/L (ref 132–146)
WBC # BLD: 7.3 E9/L (ref 4.5–11.5)

## 2022-10-19 PROCEDURE — 6370000000 HC RX 637 (ALT 250 FOR IP): Performed by: INTERNAL MEDICINE

## 2022-10-19 PROCEDURE — 80048 BASIC METABOLIC PNL TOTAL CA: CPT

## 2022-10-19 PROCEDURE — 36415 COLL VENOUS BLD VENIPUNCTURE: CPT

## 2022-10-19 PROCEDURE — 94640 AIRWAY INHALATION TREATMENT: CPT

## 2022-10-19 PROCEDURE — 82962 GLUCOSE BLOOD TEST: CPT

## 2022-10-19 PROCEDURE — 85025 COMPLETE CBC W/AUTO DIFF WBC: CPT

## 2022-10-19 PROCEDURE — 6360000002 HC RX W HCPCS: Performed by: INTERNAL MEDICINE

## 2022-10-19 PROCEDURE — 2700000000 HC OXYGEN THERAPY PER DAY

## 2022-10-19 PROCEDURE — 2060000000 HC ICU INTERMEDIATE R&B

## 2022-10-19 PROCEDURE — 2580000003 HC RX 258: Performed by: INTERNAL MEDICINE

## 2022-10-19 PROCEDURE — 94660 CPAP INITIATION&MGMT: CPT

## 2022-10-19 RX ORDER — PREDNISONE 1 MG/1
10 TABLET ORAL DAILY
Status: DISCONTINUED | OUTPATIENT
Start: 2022-10-20 | End: 2022-10-20 | Stop reason: HOSPADM

## 2022-10-19 RX ORDER — BUDESONIDE 0.5 MG/2ML
0.5 INHALANT ORAL 2 TIMES DAILY
Qty: 60 EACH | Refills: 3 | Status: SHIPPED | OUTPATIENT
Start: 2022-10-19 | End: 2022-11-14

## 2022-10-19 RX ORDER — ARFORMOTEROL TARTRATE 15 UG/2ML
15 SOLUTION RESPIRATORY (INHALATION) 2 TIMES DAILY
Qty: 120 ML | Refills: 3 | Status: SHIPPED | OUTPATIENT
Start: 2022-10-19 | End: 2022-11-14

## 2022-10-19 RX ORDER — PREDNISONE 10 MG/1
10 TABLET ORAL DAILY
Qty: 3 TABLET | Refills: 0 | Status: SHIPPED | OUTPATIENT
Start: 2022-10-20 | End: 2022-10-23

## 2022-10-19 RX ADMIN — Medication 10 ML: at 20:18

## 2022-10-19 RX ADMIN — BUDESONIDE INHALATION SUSPENSION 500 MCG: 0.5 SUSPENSION RESPIRATORY (INHALATION) at 19:50

## 2022-10-19 RX ADMIN — GABAPENTIN 300 MG: 300 CAPSULE ORAL at 20:18

## 2022-10-19 RX ADMIN — IPRATROPIUM BROMIDE AND ALBUTEROL SULFATE 1 AMPULE: .5; 2.5 SOLUTION RESPIRATORY (INHALATION) at 19:50

## 2022-10-19 RX ADMIN — IPRATROPIUM BROMIDE AND ALBUTEROL SULFATE 1 AMPULE: .5; 2.5 SOLUTION RESPIRATORY (INHALATION) at 09:18

## 2022-10-19 RX ADMIN — IPRATROPIUM BROMIDE AND ALBUTEROL SULFATE 1 AMPULE: .5; 2.5 SOLUTION RESPIRATORY (INHALATION) at 16:16

## 2022-10-19 RX ADMIN — ARFORMOTEROL TARTRATE 15 MCG: 15 SOLUTION RESPIRATORY (INHALATION) at 09:18

## 2022-10-19 RX ADMIN — ARFORMOTEROL TARTRATE 15 MCG: 15 SOLUTION RESPIRATORY (INHALATION) at 19:50

## 2022-10-19 RX ADMIN — SERTRALINE HYDROCHLORIDE 50 MG: 50 TABLET ORAL at 20:18

## 2022-10-19 RX ADMIN — PREDNISONE 20 MG: 20 TABLET ORAL at 08:54

## 2022-10-19 RX ADMIN — QUETIAPINE FUMARATE 50 MG: 50 TABLET, EXTENDED RELEASE ORAL at 20:18

## 2022-10-19 RX ADMIN — ACETAMINOPHEN 650 MG: 325 TABLET, FILM COATED ORAL at 20:18

## 2022-10-19 RX ADMIN — Medication 10 ML: at 08:54

## 2022-10-19 RX ADMIN — INSULIN LISPRO 2 UNITS: 100 INJECTION, SOLUTION INTRAVENOUS; SUBCUTANEOUS at 17:42

## 2022-10-19 RX ADMIN — IPRATROPIUM BROMIDE AND ALBUTEROL SULFATE 1 AMPULE: .5; 2.5 SOLUTION RESPIRATORY (INHALATION) at 12:37

## 2022-10-19 RX ADMIN — ENOXAPARIN SODIUM 40 MG: 100 INJECTION SUBCUTANEOUS at 08:54

## 2022-10-19 RX ADMIN — FOLIC ACID 1 MG: 1 TABLET ORAL at 08:54

## 2022-10-19 RX ADMIN — MULTIVITAMIN TABLET 1 TABLET: TABLET at 08:54

## 2022-10-19 RX ADMIN — BUDESONIDE INHALATION SUSPENSION 500 MCG: 0.5 SUSPENSION RESPIRATORY (INHALATION) at 09:18

## 2022-10-19 ASSESSMENT — PAIN SCALES - GENERAL: PAINLEVEL_OUTOF10: 6

## 2022-10-19 ASSESSMENT — PAIN DESCRIPTION - LOCATION: LOCATION: HEAD

## 2022-10-19 NOTE — PROGRESS NOTES
Pulmonary Progress Note  10/19/2022 12:05 PM  Subjective:   Admit Date: 10/10/2022  PCP: Tim Burdick MD  Interval History: Denies any dizziness    Diet: ADULT DIET; Regular  ADULT ORAL NUTRITION SUPPLEMENT; Breakfast, Dinner; Diabetic Oral Supplement patient denies any shortness of breath cough wheezing or chest pain    Data:   Scheduled Meds:    predniSONE  20 mg Oral Daily    insulin lispro  0-4 Units SubCUTAneous TID WC    insulin lispro  0-4 Units SubCUTAneous Nightly    sodium chloride flush  5-40 mL IntraVENous 2 times per day    folic acid  1 mg Oral Daily    gabapentin  300 mg Oral Nightly    multivitamin  1 tablet Oral Daily    QUEtiapine  50 mg Oral Nightly    sertraline  50 mg Oral Nightly    enoxaparin  40 mg SubCUTAneous Daily    ipratropium-albuterol  1 ampule Inhalation Q4H WA    budesonide  0.5 mg Nebulization BID    Arformoterol Tartrate  15 mcg Nebulization BID       Continuous Infusions:    dextrose      sodium chloride         PRN Meds: [COMPLETED] insulin regular **AND** [COMPLETED] dextrose **AND** POCT Glucose **AND** POCT Glucose **AND** dextrose, glucose, dextrose bolus **OR** dextrose bolus, glucagon (rDNA), dextrose, medicated lip balm, sodium chloride flush, sodium chloride, polyethylene glycol, acetaminophen **OR** acetaminophen, senna, perflutren lipid microspheres, albuterol    I/O last 3 completed shifts: In: 0   Out: 775 [Urine:775]    No intake/output data recorded.       Intake/Output Summary (Last 24 hours) at 10/19/2022 1205  Last data filed at 10/19/2022 0048  Gross per 24 hour   Intake 0 ml   Output 775 ml   Net -775 ml       Patient Vitals for the past 96 hrs (Last 3 readings):   Weight   10/19/22 0600 156 lb 8 oz (71 kg)   10/17/22 0536 155 lb 10.3 oz (70.6 kg)         Recent Labs     10/17/22  0559 10/18/22  0555 10/19/22  0709   WBC 7.1 7.8 7.3   HGB 8.0* 7.8* 7.8*   HCT 28.7* 27.8* 27.9*   MCV 83.7 83.5 83.5    267 268     Recent Labs     10/17/22  0559 10/18/22  0555 10/19/22  0709    141 142   K 4.1 4.1 4.3   CL 92* 94* 97*   CO2 42* 43* 40*   BUN 23 19 13   CREATININE 0.7 0.6 0.6       CPK:  Lab Results   Component Value Date    CKTOTAL 108 2021         -----------------------------------------------------------------  RAD:   Results for orders placed during the hospital encounter of 10/10/22    XR CHEST PORTABLE    Narrative  EXAMINATION:  ONE XRAY VIEW OF THE CHEST    10/12/2022 8:17 am    COMPARISON:  2022    HISTORY:  ORDERING SYSTEM PROVIDED HISTORY: Respiratory distress  TECHNOLOGIST PROVIDED HISTORY:  Reason for exam:->Respiratory distress  What reading provider will be dictating this exam?->CRC    FINDINGS:  Single AP semi upright portable chest demonstrates bibasilar increased  markings consistent with subsegmental atelectasis. There is no evidence of a  pneumothorax. There are atherosclerotic changes of the aorta without  cardiomegaly. Impression  Developing bibasilar subsegmental atelectasis. Micro:     Additional Respiratory Assessments  Heart Rate: 86  Resp: 18  SpO2: 96 %  Swallow: Normal - able to swallow solids    Objective:   Vitals:   Vitals:    10/19/22 0751   BP: (!) 108/51   Pulse: 86   Resp: 18   Temp: 97.4 °F (36.3 °C)   SpO2: 96%      TEMP:Current: Temp: 97.4 °F (36.3 °C)  Max: Temp  Av.2 °F (36.2 °C)  Min: 96.8 °F (36 °C)  Max: 97.5 °F (36.4 °C)    BP Range: Systolic (83OLA), BJQ:651 , Min:108 , ERICA:453     Diastolic (59TYR), GIV:58, Min:51, Max:57      General appearance: alert, appears stated age and cooperative  In no acute distress  Skin: No rashes or lesions  HEENT: mucous membranes are moist  Neck: No JVD  Lungs: symmetrical expansion, clear to auscultation, no use of accessory muscles  Heart: S1S2 no murmurs,  Abdomen: soft, non tender,   Extremities: no peripheral edema  Neurologic: Alert, oriented times 3,  Affect: pleasant      Assessment:   Patient Active Problem List:     69 YOF with severe COPD, depression, chronic calcified pancreatitis, on home oxygen at 3 L on brovana and budesonide  BID with albuterol PRN, with chronic indwelling Del Rio dysphagia     10/10 c/o n/v and body aches x 5 days was on the phone with a family member when the phone dropped and they called EMS. ABG with ph7.1/100, placed on AVAPS   Repeat ABG 7.25/76. COVID-negative, urinary strep antigen and Legionella negative   Negative urine drug screen  proBNP 4385  10/11 RRT called transfer to ICU  Chest x-ray with atelectasis right base  10/12 on AVAPS overnight ABG 7.3 4/79/105/41  Chest x-ray with developing atelectasis  On 4 L saturating 98%  10/14: sleepy, unable to arouse ABG 7.35/82.9/71.9/44.8 on 3L   10/15: 4L NC, mentation at baseline, transfer out of ICU Respiratory panel negative. 10/16: 3L 95%, alert   10/17: 3L pox 99%, bipap all night. Viral panel negative  10/18: Bipap all night, 3L nc during the day with pox 97% ABG 7.47/59/220/42/98% C/o dizziness when she sits up. On medications for hypertension being adjusted by PCP   10/19 on 4 L     Resolved acute encephalopathy d/t hypercapnia  Urosepsis with Serratia fonticola felt to be colonization  S/p acute on chronic hypercapnic respiratory failure back to baseline   Chronic hypoxic and hypercapnic respiratory failure - at baseline 3L NC   Hypochloremia Contraction Alkalosis   COPD exacerbation on po steroids  h/o of asthma/COPD, advanced emphysema seen on CT chest 3/4/2022  Chronic hypoxic respiratory failure on 3 L  Anemia  History of depression on Zoloft  History of chronic calcified pancreatitis on Creon  History of chronic Del Rio use  Anemia  Muscle weakness  Echo 10/12 EF 55%, previous 3/23/2022 stress test EF 75% no ischemia  History of kidney stones  History of recurrent UTIs  History of dysphagia EGD showing gastritis  DVT prophylaxis with Lovenox   Plan:   Patient seen on bipap, wearing 3L NC during the day (baseline) , pulse ox 97%.  Continue supplemental O2 to maintain pulse ox 90-94%  Pt does not have NIV at home, only O2. With continued hypercarbia may warrant a NIV at home to reduced hospital re-admissions and reduce the risk of death. BiPAP was attempted with no improvement therefore patient required NIV  Continue Proventil, Brovana, Pulmicort and duo-nebs.  She will resume Brovana and Pulmicort at discharge   Continue Prednisone 20 mg daily starting 10/15 can wean off, 10 mg for 3 more days  NIV to be set up at home    Diogo Roach MD,[facility], [facility]

## 2022-10-19 NOTE — CARE COORDINATION
Checked with Javi Levy if prior Jerrald Dillon was received. Jean-Pierre Padilla said not back yet and he'll call as soon as he hears something. Informed Pt that waiting for auth for NIV. Informed that Cherie ROMANO left  requesting an update. Pt stated that he was ok to contact [employer]. Updated [facility] ([phone]) by secure VM. Discharge plan is to [facility]. Notified [facility] that Pt may be discharging today. RIC/ROSALINA to follow for discharge needs.    Maci Zepeda, L.S.W.  [phone]

## 2022-10-19 NOTE — PROGRESS NOTES
[facility]                                Progress note    Subjective:    Patient is awake and alert lying in bed without complaints  No acute complaints    Objective:    BP (!) 108/51   Pulse 86   Temp 97.4 °F (36.3 °C) (Temporal)   Resp 18   Ht 5' 7\" (1.702 m)   Wt 156 lb 8 oz (71 kg)   LMP 10/21/1985   SpO2 96%   BMI 24.51 kg/m²     In: 240 [P.O.:240]  Out: 775   In: 240   Out: 775 [Urine:775]    General appearance: NAD, conversant   HEENT: AT/NC, MMM  Neck: FROM, supple  Lungs: Diminished lung bases, 3L   CV: RRR, no MRGs  Vasc: Radial pulses 2+  Abdomen: Soft, non-tender; no masses or HSM  Extremities: No peripheral edema or digital cyanosis  Skin: no rash, lesions or ulcers  Psych: Alert and oriented to person, place and time not able to fully assess  Neuro: Alert and interactive     Recent Labs     10/17/22  0559 10/18/22  0555 10/19/22  0709   WBC 7.1 7.8 7.3   HGB 8.0* 7.8* 7.8*   HCT 28.7* 27.8* 27.9*    267 268         Recent Labs     10/17/22  0559 10/18/22  0555 10/19/22  0709    141 142   K 4.1 4.1 4.3   CL 92* 94* 97*   CO2 42* 43* 40*   BUN 23 19 13   CREATININE 0.7 0.6 0.6   CALCIUM 9.9 9.9 10.0         Assessment:    Principal Problem:    COPD exacerbation (HCC)  Active Problems:    New onset of congestive heart failure (HCC)    Prolonged Q-T interval on ECG    Elevated troponin    Severe protein-calorie malnutrition (HCC)  Resolved Problems:    * No resolved hospital problems.  *      Plan:  24-year-old female with a history of COPD who is oxygen dependent presents to the ED with complaints of generalized weakness and shortness of breath and is admitted to telemetry unit with hypercapnic respiratory failure and acute on chronic CHF  10/11:  -Co2 on ABG's 100.1, followed by RRT d/t minimally responsive, transferred to ICU after being continuously on AVAPS  -IV Bumex 1mg BID  -Echo   -Pulmonary following     10/12:  -Co2 on ABGs 78.5 today-continue noninvasive ventilation as needed, likely chronic CO2 retention  -Diuretics discontinued, improvement in BNP from 8K to 4K  -Echo > 55-60% EF  -IV solumedrol 40 BID-continue to taper, transition to p.o. at discretion of pulmonology  -Duo nebs, ICS, anticholinergics/LABA/Elsa  -UTI IV rocephin, await cultures     10/13: Answering all questions appropriately today  Has a chronic indwelling Del Rio catheter for unknown reasons-she is not able to tell me why-?  Neurogenic bladder  Gram-negative rods in urine culture-likely colonization from chronic indwelling catheter  Infectious disease service consulted by critical care team-Case discussed with Dr. Dilip Rodriguez for trend out of ICU medicine standpoint    10/14:  No acute complaints, no shortness of breath  Serratia in urine culture and patient with indwelling urinary catheter-ID on board  Chronic PCO2 elevation on chronic home O2  Okay for transfer out of ICU from medicine standpoint    10/15:  Out of ICU  PCO2 much improved down to 43 today-chronic elevation  Remains off antibiotics  Continue to monitor O2 needs  Will likely need rehab at discharge-scored 16/24    10/16/2022  PCO2 40 today  Continue duo nebs/ISP  Steroids transitioned to p.o. prednisone  Currently utilizing 3 L O2 for saturation of 95%    10/17/2022:  -PCO2 on morning labs 42, ABG's ordered by pulm  -Continue oral prednisone 20 mg daily   -Follow up with pulm 2-4 weeks    10/18/22:  -Pulm setting up NIV for home on discharge, likely not to get the auth until tomorrow.  -Patient is stable for discharge from a medicine standpoint once NIV arrangements have been made. 10/19/22:  -Awaiting auth for NIV for discharge home  -Alison Agrawal ordered for discharge     Code status: Full  Consultants: Pulm and critical care   DVT Prophylaxis Lovenox   PT/OT  Discharge planning discharge home  once NIV has been set up for home.      Jose Lares, APRN - CNP  1:43 PM  10/19/2022    Above note edited to reflect my thoughts     I personally saw, examined and provided care for the patient. Radiographs, labs and medication list were reviewed by me independently. The case was discussed in detail and plans for care were established. Review of GAVIN Ruff   , documentation was conducted and revisions were made as appropriate directly by me. I agree with the above documented exam, problem list, and plan of care.      Petey Castañeda MD  8:09 PM  10/19/2022

## 2022-10-19 NOTE — CARE COORDINATION
Eleno Esqueda called . No Auth yet. May come back in morning. Set up for Pt's discharge Thursday  9 am with family picking up and [employer] delivering NIV between 10- 1. [employer] unable to accept. [facility] referral made Start date 6-20. Pt informed that she was going to therapy at [facility].  ([phone]) Carson Feng. Pt will have family or take taxi home. Called [employer]. Insurance denied an extension of therapy. Denise Dumont requested to call Liliana Bustamante ([phone]) for home services in the morning. Discharge Plan is to return home with [facility] after approval for NIV. RIC/ROSALINA to follow for discharge needs.    Joe Jerome, L.S.W.  [phone]

## 2022-10-19 NOTE — PROGRESS NOTES
Patient watching TV show, in no distress. Patient would like to finish watching TV before BIPAP placed.

## 2022-10-20 VITALS
BODY MASS INDEX: 24.56 KG/M2 | DIASTOLIC BLOOD PRESSURE: 57 MMHG | RESPIRATION RATE: 18 BRPM | SYSTOLIC BLOOD PRESSURE: 102 MMHG | HEART RATE: 70 BPM | WEIGHT: 156.5 LBS | HEIGHT: 67 IN | OXYGEN SATURATION: 92 % | TEMPERATURE: 96.6 F

## 2022-10-20 LAB
ANION GAP SERPL CALCULATED.3IONS-SCNC: 6 MMOL/L (ref 7–16)
ANISOCYTOSIS: ABNORMAL
BASOPHILS ABSOLUTE: 0.01 E9/L (ref 0–0.2)
BASOPHILS RELATIVE PERCENT: 0.1 % (ref 0–2)
BUN BLDV-MCNC: 15 MG/DL (ref 6–23)
CALCIUM SERPL-MCNC: 10.1 MG/DL (ref 8.6–10.2)
CHLORIDE BLD-SCNC: 95 MMOL/L (ref 98–107)
CO2: 39 MMOL/L (ref 22–29)
CREAT SERPL-MCNC: 0.7 MG/DL (ref 0.5–1)
EOSINOPHILS ABSOLUTE: 0.05 E9/L (ref 0.05–0.5)
EOSINOPHILS RELATIVE PERCENT: 0.7 % (ref 0–6)
GFR SERPL CREATININE-BSD FRML MDRD: >60 ML/MIN/1.73
GLUCOSE BLD-MCNC: 102 MG/DL (ref 74–99)
HCT VFR BLD CALC: 26.7 % (ref 34–48)
HEMOGLOBIN: 7.4 G/DL (ref 11.5–15.5)
HYPOCHROMIA: ABNORMAL
IMMATURE GRANULOCYTES #: 0.1 E9/L
IMMATURE GRANULOCYTES %: 1.3 % (ref 0–5)
LYMPHOCYTES ABSOLUTE: 1.5 E9/L (ref 1.5–4)
LYMPHOCYTES RELATIVE PERCENT: 19.7 % (ref 20–42)
MCH RBC QN AUTO: 23.3 PG (ref 26–35)
MCHC RBC AUTO-ENTMCNC: 27.7 % (ref 32–34.5)
MCV RBC AUTO: 84 FL (ref 80–99.9)
METER GLUCOSE: 103 MG/DL (ref 74–99)
MONOCYTES ABSOLUTE: 0.48 E9/L (ref 0.1–0.95)
MONOCYTES RELATIVE PERCENT: 6.3 % (ref 2–12)
NEUTROPHILS ABSOLUTE: 5.48 E9/L (ref 1.8–7.3)
NEUTROPHILS RELATIVE PERCENT: 71.9 % (ref 43–80)
PDW BLD-RTO: 15.3 FL (ref 11.5–15)
PLATELET # BLD: 271 E9/L (ref 130–450)
PMV BLD AUTO: 9.5 FL (ref 7–12)
POIKILOCYTES: ABNORMAL
POLYCHROMASIA: ABNORMAL
POTASSIUM SERPL-SCNC: 4.4 MMOL/L (ref 3.5–5)
RBC # BLD: 3.18 E12/L (ref 3.5–5.5)
SODIUM BLD-SCNC: 140 MMOL/L (ref 132–146)
TEAR DROP CELLS: ABNORMAL
WBC # BLD: 7.6 E9/L (ref 4.5–11.5)

## 2022-10-20 PROCEDURE — 94660 CPAP INITIATION&MGMT: CPT

## 2022-10-20 PROCEDURE — 36415 COLL VENOUS BLD VENIPUNCTURE: CPT

## 2022-10-20 PROCEDURE — 82962 GLUCOSE BLOOD TEST: CPT

## 2022-10-20 PROCEDURE — 2700000000 HC OXYGEN THERAPY PER DAY

## 2022-10-20 PROCEDURE — 6370000000 HC RX 637 (ALT 250 FOR IP): Performed by: INTERNAL MEDICINE

## 2022-10-20 PROCEDURE — 94640 AIRWAY INHALATION TREATMENT: CPT

## 2022-10-20 PROCEDURE — 6360000002 HC RX W HCPCS: Performed by: INTERNAL MEDICINE

## 2022-10-20 PROCEDURE — 80048 BASIC METABOLIC PNL TOTAL CA: CPT

## 2022-10-20 PROCEDURE — 2580000003 HC RX 258: Performed by: INTERNAL MEDICINE

## 2022-10-20 PROCEDURE — 85025 COMPLETE CBC W/AUTO DIFF WBC: CPT

## 2022-10-20 RX ADMIN — FOLIC ACID 1 MG: 1 TABLET ORAL at 08:36

## 2022-10-20 RX ADMIN — ARFORMOTEROL TARTRATE 15 MCG: 15 SOLUTION RESPIRATORY (INHALATION) at 09:05

## 2022-10-20 RX ADMIN — IPRATROPIUM BROMIDE AND ALBUTEROL SULFATE 1 AMPULE: .5; 2.5 SOLUTION RESPIRATORY (INHALATION) at 09:04

## 2022-10-20 RX ADMIN — MULTIVITAMIN TABLET 1 TABLET: TABLET at 08:36

## 2022-10-20 RX ADMIN — BUDESONIDE INHALATION SUSPENSION 500 MCG: 0.5 SUSPENSION RESPIRATORY (INHALATION) at 09:05

## 2022-10-20 RX ADMIN — ENOXAPARIN SODIUM 40 MG: 100 INJECTION SUBCUTANEOUS at 08:40

## 2022-10-20 RX ADMIN — Medication 10 ML: at 08:40

## 2022-10-20 RX ADMIN — PREDNISONE 10 MG: 5 TABLET ORAL at 08:36

## 2022-10-20 NOTE — PROGRESS NOTES
Discharge paperwork, meds and follow up appointments reviewed with pt. IV and tele removed. All belongings gathered and sent with pt.
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
## 2022-10-20 NOTE — PROGRESS NOTES
Has cough with white sputum. Dyspnea improved and tolerating NIV fair.   Vent settings:   Additional Respiratory Assessments  Heart Rate: 70  Resp: 18  SpO2: 100 %  Swallow: Normal - able to swallow solids      Current Facility-Administered Medications:     predniSONE (DELTASONE) tablet 10 mg, 10 mg, Oral, Daily, Staci Abrams MD, 10 mg at 10/20/22 0836    [COMPLETED] insulin regular (HUMULIN R;NOVOLIN R) injection 10 Units, 10 Units, IntraVENous, Once, 10 Units at 10/13/22 1247 **AND** [COMPLETED] dextrose 50 % IV solution, 25 g, IntraVENous, Once, 25 g at 10/13/22 1247 **AND** POCT Glucose, , , Q30 Min **AND** POCT Glucose, , , 1 Time **AND** dextrose 50 % IV solution, 25 g, IntraVENous, PRN, Dakota Walsh MD    glucose chewable tablet 16 g, 4 tablet, Oral, PRN, Dakota Walsh MD    dextrose bolus 10% 125 mL, 125 mL, IntraVENous, PRN **OR** dextrose bolus 10% 250 mL, 250 mL, IntraVENous, PRN, Dakota Walsh MD    glucagon (rDNA) injection 1 mg, 1 mg, SubCUTAneous, PRN, Dakota Walsh MD    dextrose 10 % infusion, , IntraVENous, Continuous PRN, Daktoa Walsh MD    insulin lispro (HUMALOG) injection vial 0-4 Units, 0-4 Units, SubCUTAneous, TID WC, Dakota Walsh MD, 2 Units at 10/19/22 1742    insulin lispro (HUMALOG) injection vial 0-4 Units, 0-4 Units, SubCUTAneous, Nightly, Dakota Walsh MD    medicated lip balm (BLISTEX/CARMEX) stick, , Topical, PRN, Dakota Walsh MD, Given at 10/12/22 1002    sodium chloride flush 0.9 % injection 5-40 mL, 5-40 mL, IntraVENous, 2 times per day, Dakota Walsh MD, 10 mL at 10/20/22 0840    sodium chloride flush 0.9 % injection 5-40 mL, 5-40 mL, IntraVENous, PRN, Dakota Walsh MD    0.9 % sodium chloride infusion, , IntraVENous, PRN, Dakota Walsh MD    polyethylene glycol (GLYCOLAX) packet 17 g, 17 g, Oral, Daily PRN, Dakota Walsh MD    acetaminophen (TYLENOL) tablet 650 mg, 650 mg, Oral, Q6H PRN, 650 mg at 10/19/22 2018 **OR** acetaminophen (TYLENOL) suppository 650 mg, 650 mg, Rectal, Q6H PRN, Dakota Walsh MD    folic acid (FOLVITE) tablet 1 mg, 1 mg, Oral, Daily, Evelyn Ugarte MD, 1 mg at 10/20/22 0836    gabapentin (NEURONTIN) capsule 300 mg, 300 mg, Oral, Nightly, Evelyn Ugarte MD, 300 mg at 10/19/22 2018    multivitamin 1 tablet, 1 tablet, Oral, Daily, Evelyn Ugarte MD, 1 tablet at 10/20/22 0836    QUEtiapine (SEROQUEL XR) extended release tablet 50 mg, 50 mg, Oral, Nightly, Evelyn Ugarte MD, 50 mg at 10/19/22 2018    sertraline (ZOLOFT) tablet 50 mg, 50 mg, Oral, Nightly, Evelyn Ugarte MD, 50 mg at 10/19/22 2018    enoxaparin (LOVENOX) injection 40 mg, 40 mg, SubCUTAneous, Daily, Evelyn Ugarte MD, 40 mg at 10/20/22 0840    senna (SENOKOT) tablet 8.6 mg, 1 tablet, Oral, Daily PRN, Evelyn Ugarte MD, 8.6 mg at 10/11/22 2104    ipratropium-albuterol (Mar High) nebulizer solution 1 ampule, 1 ampule, Inhalation, Q4H WA, Evelyn Ugarte MD, 1 ampule at 10/19/22 1950    perflutren lipid microspheres (DEFINITY) injection 1.65 mg, 1.5 mL, IntraVENous, ONCE PRN, Evelyn Ugarte MD    albuterol (PROVENTIL) nebulizer solution 2.5 mg, 2.5 mg, Nebulization, Q6H PRN, Evelyn Ugarte MD    budesonide (PULMICORT) nebulizer suspension 500 mcg, 0.5 mg, Nebulization, BID, Evelyn Ugarte MD, 500 mcg at 10/19/22 1950    Arformoterol Tartrate (BROVANA) nebulizer solution 15 mcg, 15 mcg, Nebulization, BID, Evelyn Ugarte MD, 15 mcg at 10/19/22 1950  BP (!) 102/57   Pulse 70   Temp (!) 96.6 °F (35.9 °C) (Temporal)   Resp 18   Ht 5' 7\" (1.702 m)   Wt 156 lb 8 oz (71 kg)   LMP 10/21/1985   SpO2 100%   BMI 24.51 kg/m²     General: No distress  Chest: Symmetric, no accessory use  Heart: RRR  Lungs: Diminished  Abdomen: Soft, nt  Extremities: No edema    Intake/Output Summary (Last 24 hours) at 10/20/2022 0841  Last data filed at 10/19/2022 2216  Gross per 24 hour   Intake 360 ml   Output 700 ml   Net -340 ml     CBC with Differential:    Lab Results   Component Value Date/Time    WBC 7.6 10/20/2022 06:34 AM    RBC 3.18 10/20/2022 06:34 AM    HGB 7.4 10/20/2022 06:34 AM    HCT 26.7 10/20/2022 06:34 AM     10/20/2022 06:34 AM    MCV 84.0 10/20/2022 06:34 AM    MCH 23.3 10/20/2022 06:34 AM    MCHC 27.7 10/20/2022 06:34 AM    RDW 15.3 10/20/2022 06:34 AM    NRBC 2.6 10/15/2022 05:26 AM    SEGSPCT 26 02/16/2014 06:30 PM    METASPCT 2.6 10/11/2022 05:41 AM    LYMPHOPCT 21.1 10/19/2022 07:09 AM    PROMYELOPCT 0.9 05/18/2020 03:48 AM    MONOPCT 6.0 10/19/2022 07:09 AM    MYELOPCT 1.7 10/18/2022 05:55 AM    BASOPCT 0.0 10/19/2022 07:09 AM    MONOSABS 0.44 10/19/2022 07:09 AM    LYMPHSABS 1.54 10/19/2022 07:09 AM    EOSABS 0.08 10/19/2022 07:09 AM    BASOSABS 0.00 10/19/2022 07:09 AM     BMP:    Lab Results   Component Value Date/Time     10/20/2022 06:34 AM    K 4.4 10/20/2022 06:34 AM    K 4.5 05/29/2022 07:25 PM    CL 95 10/20/2022 06:34 AM    CO2 39 10/20/2022 06:34 AM    BUN 15 10/20/2022 06:34 AM    LABALBU 4.2 10/11/2022 05:41 AM    CREATININE 0.7 10/20/2022 06:34 AM    CALCIUM 10.1 10/20/2022 06:34 AM    GFRAA >60 10/17/2022 05:59 AM    LABGLOM >60 10/20/2022 06:34 AM    GLUCOSE 102 10/20/2022 06:34 AM       IMPRESSION:   Patient Active Problem List   Diagnosis    COPD (chronic obstructive pulmonary disease) (HCC)    MDD (major depressive disorder)    Hematuria    Neurogenic bladder    Chronic respiratory failure with hypoxia (HCC)    Anemia    Gastric wall thickening    Bladder wall thickening    Difficulty in walking, not elsewhere classified    Moderate persistent asthma with (acute) exacerbation    Muscle weakness (generalized)    Need for assistance with personal care    Neuromuscular dysfunction of bladder, unspecified    Personal history of other malignant neoplasm of kidney    Breast pain    COPD exacerbation ([address]    New onset of congestive heart failure (HCC)    Prolonged Q-T interval on ECG    Elevated troponin    Severe protein-calorie malnutrition (HCC)     Acute exac of copd is improved and she is okay to home from pulmonary.   Acute on chronic hypercapnic resp failure now compensated and ok to home on NIV with fu with me 3 weeks.   Elham Singer MD  10/20/2022  8:41 AM

## 2022-10-20 NOTE — CARE COORDINATION
[facility] Mend[facility] only does home therapy. Informed that Pt is going to be seen by [facility] d/t need for skilled Nursing besides therapy. NIV was approved and [employer] will deliver between 10am - 1pm. Brother picked up Pt his morning. Discharge plan is to return home with [facility] start of care 10/25/22. Notified CNP that [facility] order needed adjusted to show start of care for 10/25/22.    Vanesa Horton, L.S.W.  [phone]

## 2022-11-09 PROBLEM — R79.89 ELEVATED TROPONIN: Status: RESOLVED | Noted: 2022-10-10 | Resolved: 2022-11-09

## 2022-11-09 PROBLEM — R77.8 ELEVATED TROPONIN: Status: RESOLVED | Noted: 2022-10-10 | Resolved: 2022-11-09

## 2022-11-14 ENCOUNTER — HOSPITAL ENCOUNTER (INPATIENT)
Age: 67
LOS: 7 days | Discharge: HOME HEALTH | DRG: 190 | End: 2022-11-23
Attending: EMERGENCY MEDICINE | Admitting: INTERNAL MEDICINE
Payer: COMMERCIAL

## 2022-11-14 ENCOUNTER — APPOINTMENT (OUTPATIENT)
Dept: GENERAL RADIOLOGY | Age: 67
DRG: 190 | End: 2022-11-14
Payer: COMMERCIAL

## 2022-11-14 ENCOUNTER — APPOINTMENT (OUTPATIENT)
Dept: CT IMAGING | Age: 67
DRG: 190 | End: 2022-11-14
Payer: COMMERCIAL

## 2022-11-14 DIAGNOSIS — J44.1 ACUTE EXACERBATION OF CHRONIC OBSTRUCTIVE PULMONARY DISEASE (COPD) (HCC): Primary | ICD-10-CM

## 2022-11-14 DIAGNOSIS — N31.9 NEUROGENIC BLADDER: ICD-10-CM

## 2022-11-14 LAB
ALBUMIN SERPL-MCNC: 4.1 G/DL (ref 3.5–5.2)
ALP BLD-CCNC: 85 U/L (ref 35–104)
ALT SERPL-CCNC: 8 U/L (ref 0–32)
AMORPHOUS: PRESENT
ANION GAP SERPL CALCULATED.3IONS-SCNC: 8 MMOL/L (ref 7–16)
ANISOCYTOSIS: ABNORMAL
AST SERPL-CCNC: 12 U/L (ref 0–31)
B.E.: 8.1 MMOL/L (ref -3–3)
BACTERIA: ABNORMAL /HPF
BASOPHILS ABSOLUTE: 0.05 E9/L (ref 0–0.2)
BASOPHILS RELATIVE PERCENT: 0.9 % (ref 0–2)
BILIRUB SERPL-MCNC: <0.2 MG/DL (ref 0–1.2)
BILIRUBIN URINE: NEGATIVE
BLOOD, URINE: ABNORMAL
BUN BLDV-MCNC: 8 MG/DL (ref 6–23)
CALCIUM SERPL-MCNC: 9.9 MG/DL (ref 8.6–10.2)
CHLORIDE BLD-SCNC: 101 MMOL/L (ref 98–107)
CLARITY: CLEAR
CO2: 37 MMOL/L (ref 22–29)
COHB: 0.9 % (ref 0–1.5)
COLOR: YELLOW
CREAT SERPL-MCNC: 0.6 MG/DL (ref 0.5–1)
CRITICAL: ABNORMAL
CRYSTALS, UA: ABNORMAL /HPF
D DIMER: <200 NG/ML DDU
DATE ANALYZED: ABNORMAL
DATE OF COLLECTION: ABNORMAL
EKG ATRIAL RATE: 94 BPM
EKG P AXIS: 78 DEGREES
EKG P-R INTERVAL: 160 MS
EKG Q-T INTERVAL: 374 MS
EKG QRS DURATION: 70 MS
EKG QTC CALCULATION (BAZETT): 467 MS
EKG R AXIS: 70 DEGREES
EKG T AXIS: 99 DEGREES
EKG VENTRICULAR RATE: 94 BPM
EOSINOPHILS ABSOLUTE: 0 E9/L (ref 0.05–0.5)
EOSINOPHILS RELATIVE PERCENT: 0.6 % (ref 0–6)
GFR SERPL CREATININE-BSD FRML MDRD: >60 ML/MIN/1.73
GLUCOSE BLD-MCNC: 121 MG/DL (ref 74–99)
GLUCOSE URINE: NEGATIVE MG/DL
HCO3: 33.8 MMOL/L (ref 22–26)
HCT VFR BLD CALC: 29.2 % (ref 34–48)
HEMOGLOBIN: 8 G/DL (ref 11.5–15.5)
HHB: 7.7 % (ref 0–5)
HYPOCHROMIA: ABNORMAL
INFLUENZA A BY PCR: NOT DETECTED
INFLUENZA B BY PCR: NOT DETECTED
KETONES, URINE: NEGATIVE MG/DL
LAB: ABNORMAL
LACTIC ACID, SEPSIS: 0.8 MMOL/L (ref 0.5–1.9)
LEUKOCYTE ESTERASE, URINE: ABNORMAL
LYMPHOCYTES ABSOLUTE: 0.9 E9/L (ref 1.5–4)
LYMPHOCYTES RELATIVE PERCENT: 16.5 % (ref 20–42)
Lab: ABNORMAL
MCH RBC QN AUTO: 22.5 PG (ref 26–35)
MCHC RBC AUTO-ENTMCNC: 27.4 % (ref 32–34.5)
MCV RBC AUTO: 82.3 FL (ref 80–99.9)
METHB: 0.5 % (ref 0–1.5)
MODE: ABNORMAL
MONOCYTES ABSOLUTE: 0.16 E9/L (ref 0.1–0.95)
MONOCYTES RELATIVE PERCENT: 2.6 % (ref 2–12)
NEUTROPHILS ABSOLUTE: 4.24 E9/L (ref 1.8–7.3)
NEUTROPHILS RELATIVE PERCENT: 80 % (ref 43–80)
NITRITE, URINE: NEGATIVE
O2 CONTENT: 11.1 ML/DL
O2 SATURATION: 92.2 % (ref 92–98.5)
O2HB: 90.9 % (ref 94–97)
OPERATOR ID: 7291
OVALOCYTES: ABNORMAL
PATIENT TEMP: 37 C
PCO2: 54.4 MMHG (ref 35–45)
PDW BLD-RTO: 16 FL (ref 11.5–15)
PH BLOOD GAS: 7.41 (ref 7.35–7.45)
PH UA: 8.5 (ref 5–9)
PLATELET # BLD: 303 E9/L (ref 130–450)
PMV BLD AUTO: 9.8 FL (ref 7–12)
PO2: 66.5 MMHG (ref 75–100)
POIKILOCYTES: ABNORMAL
POLYCHROMASIA: ABNORMAL
POTASSIUM REFLEX MAGNESIUM: 4.3 MMOL/L (ref 3.5–5)
PRO-BNP: 397 PG/ML (ref 0–125)
PROTEIN UA: 100 MG/DL
RBC # BLD: 3.55 E12/L (ref 3.5–5.5)
RBC UA: ABNORMAL /HPF (ref 0–2)
SARS-COV-2, NAAT: NOT DETECTED
SODIUM BLD-SCNC: 146 MMOL/L (ref 132–146)
SOURCE, BLOOD GAS: ABNORMAL
SPECIFIC GRAVITY UA: <=1.005 (ref 1–1.03)
STOMATOCYTES: ABNORMAL
TEAR DROP CELLS: ABNORMAL
THB: 8.6 G/DL (ref 11.5–16.5)
TIME ANALYZED: 1401
TOTAL PROTEIN: 6.6 G/DL (ref 6.4–8.3)
TROPONIN, HIGH SENSITIVITY: 15 NG/L (ref 0–9)
TROPONIN, HIGH SENSITIVITY: 16 NG/L (ref 0–9)
UROBILINOGEN, URINE: 0.2 E.U./DL
WBC # BLD: 5.3 E9/L (ref 4.5–11.5)
WBC UA: ABNORMAL /HPF (ref 0–5)

## 2022-11-14 PROCEDURE — G0378 HOSPITAL OBSERVATION PER HR: HCPCS

## 2022-11-14 PROCEDURE — 6360000002 HC RX W HCPCS: Performed by: EMERGENCY MEDICINE

## 2022-11-14 PROCEDURE — 96365 THER/PROPH/DIAG IV INF INIT: CPT

## 2022-11-14 PROCEDURE — 6360000002 HC RX W HCPCS: Performed by: FAMILY MEDICINE

## 2022-11-14 PROCEDURE — 96366 THER/PROPH/DIAG IV INF ADDON: CPT

## 2022-11-14 PROCEDURE — 87186 SC STD MICRODIL/AGAR DIL: CPT

## 2022-11-14 PROCEDURE — 87077 CULTURE AEROBIC IDENTIFY: CPT

## 2022-11-14 PROCEDURE — 74176 CT ABD & PELVIS W/O CONTRAST: CPT

## 2022-11-14 PROCEDURE — 87635 SARS-COV-2 COVID-19 AMP PRB: CPT

## 2022-11-14 PROCEDURE — 6370000000 HC RX 637 (ALT 250 FOR IP): Performed by: EMERGENCY MEDICINE

## 2022-11-14 PROCEDURE — 83880 ASSAY OF NATRIURETIC PEPTIDE: CPT

## 2022-11-14 PROCEDURE — 85378 FIBRIN DEGRADE SEMIQUANT: CPT

## 2022-11-14 PROCEDURE — 71045 X-RAY EXAM CHEST 1 VIEW: CPT

## 2022-11-14 PROCEDURE — 81001 URINALYSIS AUTO W/SCOPE: CPT

## 2022-11-14 PROCEDURE — 84484 ASSAY OF TROPONIN QUANT: CPT

## 2022-11-14 PROCEDURE — 2580000003 HC RX 258: Performed by: FAMILY MEDICINE

## 2022-11-14 PROCEDURE — 87088 URINE BACTERIA CULTURE: CPT

## 2022-11-14 PROCEDURE — 85025 COMPLETE CBC W/AUTO DIFF WBC: CPT

## 2022-11-14 PROCEDURE — 82805 BLOOD GASES W/O2 SATURATION: CPT

## 2022-11-14 PROCEDURE — 96374 THER/PROPH/DIAG INJ IV PUSH: CPT

## 2022-11-14 PROCEDURE — 96375 TX/PRO/DX INJ NEW DRUG ADDON: CPT

## 2022-11-14 PROCEDURE — 83605 ASSAY OF LACTIC ACID: CPT

## 2022-11-14 PROCEDURE — 80053 COMPREHEN METABOLIC PANEL: CPT

## 2022-11-14 PROCEDURE — 93005 ELECTROCARDIOGRAM TRACING: CPT | Performed by: EMERGENCY MEDICINE

## 2022-11-14 PROCEDURE — 6370000000 HC RX 637 (ALT 250 FOR IP): Performed by: FAMILY MEDICINE

## 2022-11-14 PROCEDURE — 2700000000 HC OXYGEN THERAPY PER DAY

## 2022-11-14 PROCEDURE — 99285 EMERGENCY DEPT VISIT HI MDM: CPT

## 2022-11-14 PROCEDURE — 94664 DEMO&/EVAL PT USE INHALER: CPT

## 2022-11-14 PROCEDURE — 87502 INFLUENZA DNA AMP PROBE: CPT

## 2022-11-14 PROCEDURE — 96372 THER/PROPH/DIAG INJ SC/IM: CPT

## 2022-11-14 PROCEDURE — 94640 AIRWAY INHALATION TREATMENT: CPT

## 2022-11-14 RX ORDER — DIAZEPAM 10 MG/1
10 TABLET ORAL NIGHTLY PRN
Status: ON HOLD | COMMUNITY
End: 2022-11-23 | Stop reason: HOSPADM

## 2022-11-14 RX ORDER — SODIUM CHLORIDE 0.9 % (FLUSH) 0.9 %
5-40 SYRINGE (ML) INJECTION PRN
Status: DISCONTINUED | OUTPATIENT
Start: 2022-11-14 | End: 2022-11-23 | Stop reason: HOSPADM

## 2022-11-14 RX ORDER — ALBUTEROL SULFATE 90 UG/1
1 AEROSOL, METERED RESPIRATORY (INHALATION) EVERY 4 HOURS PRN
COMMUNITY

## 2022-11-14 RX ORDER — ALBUTEROL SULFATE 2.5 MG/3ML
2.5 SOLUTION RESPIRATORY (INHALATION) EVERY 4 HOURS PRN
Status: DISCONTINUED | OUTPATIENT
Start: 2022-11-14 | End: 2022-11-23 | Stop reason: HOSPADM

## 2022-11-14 RX ORDER — SODIUM CHLORIDE 0.9 % (FLUSH) 0.9 %
5-40 SYRINGE (ML) INJECTION EVERY 12 HOURS SCHEDULED
Status: DISCONTINUED | OUTPATIENT
Start: 2022-11-14 | End: 2022-11-23 | Stop reason: HOSPADM

## 2022-11-14 RX ORDER — ACETAMINOPHEN 325 MG/1
650 TABLET ORAL EVERY 6 HOURS PRN
Status: DISCONTINUED | OUTPATIENT
Start: 2022-11-14 | End: 2022-11-23 | Stop reason: HOSPADM

## 2022-11-14 RX ORDER — METHYLPREDNISOLONE SODIUM SUCCINATE 125 MG/2ML
125 INJECTION, POWDER, LYOPHILIZED, FOR SOLUTION INTRAMUSCULAR; INTRAVENOUS ONCE
Status: COMPLETED | OUTPATIENT
Start: 2022-11-14 | End: 2022-11-14

## 2022-11-14 RX ORDER — BUDESONIDE 0.5 MG/2ML
1 INHALANT ORAL 2 TIMES DAILY
COMMUNITY

## 2022-11-14 RX ORDER — ALBUTEROL SULFATE 2.5 MG/3ML
2.5 SOLUTION RESPIRATORY (INHALATION) ONCE
Status: COMPLETED | OUTPATIENT
Start: 2022-11-14 | End: 2022-11-14

## 2022-11-14 RX ORDER — OLANZAPINE 5 MG/1
5 TABLET ORAL NIGHTLY
Status: DISCONTINUED | OUTPATIENT
Start: 2022-11-14 | End: 2022-11-23 | Stop reason: HOSPADM

## 2022-11-14 RX ORDER — ARFORMOTEROL TARTRATE 15 UG/2ML
1 SOLUTION RESPIRATORY (INHALATION) 2 TIMES DAILY
COMMUNITY

## 2022-11-14 RX ORDER — ONDANSETRON 4 MG/1
4 TABLET, ORALLY DISINTEGRATING ORAL EVERY 8 HOURS PRN
Status: DISCONTINUED | OUTPATIENT
Start: 2022-11-14 | End: 2022-11-23 | Stop reason: HOSPADM

## 2022-11-14 RX ORDER — OMEPRAZOLE 40 MG/1
40 CAPSULE, DELAYED RELEASE ORAL DAILY
COMMUNITY

## 2022-11-14 RX ORDER — GABAPENTIN 300 MG/1
300 CAPSULE ORAL NIGHTLY
COMMUNITY

## 2022-11-14 RX ORDER — QUETIAPINE FUMARATE 50 MG/1
50 TABLET, EXTENDED RELEASE ORAL NIGHTLY
Status: DISCONTINUED | OUTPATIENT
Start: 2022-11-14 | End: 2022-11-23 | Stop reason: HOSPADM

## 2022-11-14 RX ORDER — GABAPENTIN 300 MG/1
300 CAPSULE ORAL NIGHTLY
Status: DISCONTINUED | OUTPATIENT
Start: 2022-11-14 | End: 2022-11-23 | Stop reason: HOSPADM

## 2022-11-14 RX ORDER — ENOXAPARIN SODIUM 100 MG/ML
40 INJECTION SUBCUTANEOUS DAILY
Status: DISCONTINUED | OUTPATIENT
Start: 2022-11-14 | End: 2022-11-23 | Stop reason: HOSPADM

## 2022-11-14 RX ORDER — IPRATROPIUM BROMIDE AND ALBUTEROL SULFATE 2.5; .5 MG/3ML; MG/3ML
3 SOLUTION RESPIRATORY (INHALATION) ONCE
Status: COMPLETED | OUTPATIENT
Start: 2022-11-14 | End: 2022-11-14

## 2022-11-14 RX ORDER — BUDESONIDE 0.5 MG/2ML
500 INHALANT ORAL 2 TIMES DAILY
Status: DISCONTINUED | OUTPATIENT
Start: 2022-11-14 | End: 2022-11-23 | Stop reason: HOSPADM

## 2022-11-14 RX ORDER — ONDANSETRON 2 MG/ML
4 INJECTION INTRAMUSCULAR; INTRAVENOUS EVERY 6 HOURS PRN
Status: DISCONTINUED | OUTPATIENT
Start: 2022-11-14 | End: 2022-11-23 | Stop reason: HOSPADM

## 2022-11-14 RX ORDER — ARFORMOTEROL TARTRATE 15 UG/2ML
15 SOLUTION RESPIRATORY (INHALATION) 2 TIMES DAILY
Status: DISCONTINUED | OUTPATIENT
Start: 2022-11-14 | End: 2022-11-23 | Stop reason: HOSPADM

## 2022-11-14 RX ORDER — PANTOPRAZOLE SODIUM 40 MG/1
40 TABLET, DELAYED RELEASE ORAL
Status: DISCONTINUED | OUTPATIENT
Start: 2022-11-15 | End: 2022-11-23 | Stop reason: HOSPADM

## 2022-11-14 RX ORDER — ACETAMINOPHEN 650 MG/1
650 SUPPOSITORY RECTAL EVERY 6 HOURS PRN
Status: DISCONTINUED | OUTPATIENT
Start: 2022-11-14 | End: 2022-11-23 | Stop reason: HOSPADM

## 2022-11-14 RX ORDER — FOLIC ACID 1 MG/1
1 TABLET ORAL DAILY
Status: DISCONTINUED | OUTPATIENT
Start: 2022-11-14 | End: 2022-11-23 | Stop reason: HOSPADM

## 2022-11-14 RX ORDER — SODIUM CHLORIDE 9 MG/ML
INJECTION, SOLUTION INTRAVENOUS PRN
Status: DISCONTINUED | OUTPATIENT
Start: 2022-11-14 | End: 2022-11-23 | Stop reason: HOSPADM

## 2022-11-14 RX ORDER — DIAZEPAM 5 MG/1
10 TABLET ORAL NIGHTLY PRN
Status: DISCONTINUED | OUTPATIENT
Start: 2022-11-14 | End: 2022-11-18

## 2022-11-14 RX ORDER — MAGNESIUM SULFATE IN WATER 40 MG/ML
2000 INJECTION, SOLUTION INTRAVENOUS ONCE
Status: COMPLETED | OUTPATIENT
Start: 2022-11-14 | End: 2022-11-14

## 2022-11-14 RX ORDER — OLANZAPINE 5 MG/1
5 TABLET ORAL NIGHTLY
COMMUNITY

## 2022-11-14 RX ADMIN — BUDESONIDE 500 MCG: 0.5 SUSPENSION RESPIRATORY (INHALATION) at 21:44

## 2022-11-14 RX ADMIN — IPRATROPIUM BROMIDE AND ALBUTEROL SULFATE 3 AMPULE: .5; 2.5 SOLUTION RESPIRATORY (INHALATION) at 15:05

## 2022-11-14 RX ADMIN — Medication 10 ML: at 21:57

## 2022-11-14 RX ADMIN — DIAZEPAM 10 MG: 5 TABLET ORAL at 21:56

## 2022-11-14 RX ADMIN — SERTRALINE 50 MG: 50 TABLET, FILM COATED ORAL at 21:56

## 2022-11-14 RX ADMIN — MAGNESIUM SULFATE HEPTAHYDRATE 2000 MG: 40 INJECTION, SOLUTION INTRAVENOUS at 15:59

## 2022-11-14 RX ADMIN — ENOXAPARIN SODIUM 40 MG: 100 INJECTION SUBCUTANEOUS at 18:34

## 2022-11-14 RX ADMIN — METHYLPREDNISOLONE SODIUM SUCCINATE 125 MG: 125 INJECTION, POWDER, FOR SOLUTION INTRAMUSCULAR; INTRAVENOUS at 13:18

## 2022-11-14 RX ADMIN — WATER 1000 MG: 1 INJECTION INTRAMUSCULAR; INTRAVENOUS; SUBCUTANEOUS at 18:34

## 2022-11-14 RX ADMIN — ALBUTEROL SULFATE 2.5 MG: 2.5 SOLUTION RESPIRATORY (INHALATION) at 15:05

## 2022-11-14 RX ADMIN — GABAPENTIN 300 MG: 300 CAPSULE ORAL at 21:56

## 2022-11-14 RX ADMIN — ARFORMOTEROL TARTRATE 15 MCG: 15 SOLUTION RESPIRATORY (INHALATION) at 21:44

## 2022-11-14 RX ADMIN — OLANZAPINE 5 MG: 5 TABLET, FILM COATED ORAL at 21:56

## 2022-11-14 ASSESSMENT — PAIN - FUNCTIONAL ASSESSMENT: PAIN_FUNCTIONAL_ASSESSMENT: NONE - DENIES PAIN

## 2022-11-14 NOTE — ED PROVIDER NOTES
Department of Emergency Medicine   ED  Provider Note  Admit Date/RoomTime: 11/14/2022 12:37 PM  ED Room: 8210/8210-B          History of Present Illness:  11/14/22, Time: 12:48 PM EST  Chief Complaint   Patient presents with    Shortness of Breath     Normally wears 3L N/C at home. Still SOB but 98% on 3L                Ayesha Muse is a 69 y.o. female presenting to the ED for shortness of breath, beginning prior to arrival.  The complaint has been constant, severe in severity, and worsened by nothing. Worsening shortness of breath for the last few days. Wheezing with cough for a few days. Says she is not moving much air. Says her home meds are not working. Denies fever or chills. No chest pain. +shortness of breath. Also with chronic jarquin. Says she noticed some blood in the tubing. She denies urinary odor. She reports some irritation for the jarquin. She denies other complaints. Denies abdominal pain. Review of Systems:   A complete review of systems was performed and pertinent positives and negatives are stated within HPI, all other systems reviewed and are negative.        --------------------------------------------- PAST HISTORY ---------------------------------------------  Past Medical History:  has a past medical history of Asthma, COPD (chronic obstructive pulmonary disease) ([facility] [address] Depression, Dysphagia, Jarquin catheter in place, and Oxygen dependent. Past Surgical History:  has a past surgical history that includes Hysterectomy; Kidney surgery (Left, 06/16/2014); pr cystourethroscopy,biopsies (N/A, 9/14/2018); Colonoscopy; Upper gastrointestinal endoscopy (N/A, 5/10/2019); Upper gastrointestinal endoscopy (N/A, 6/28/2019); Upper gastrointestinal endoscopy (N/A, 6/28/2019); Colonoscopy (N/A, 9/4/2019); Upper gastrointestinal endoscopy (N/A, 5/19/2020); Upper gastrointestinal endoscopy (N/A, 10/25/2021); and Upper gastrointestinal endoscopy (N/A, 12/6/2021).     Social History:  reports that she quit smoking about 3 years ago. She has never used smokeless tobacco. She reports that she does not drink alcohol and does not use drugs. Family History: family history includes Cancer (age of onset: [de-identified]) in her father; Diabetes in her brother; Kidney Disease in her brother. . Unless otherwise noted, family history is non contributory    The patients home medications have been reviewed. Allergies: Sulfa antibiotics    I have reviewed the past medical history, past surgical history, social history, and family history    ---------------------------------------------------PHYSICAL EXAM--------------------------------------    Constitutional/General: Alert and oriented x3  Head: Normocephalic and atraumatic  Eyes: PERRL, EOMI, sclera non icteric  ENT: Oropharynx clear, handling secretions, no trismus, no asymmetry of the posterior oropharynx or uvular edema  Neck: Supple, full ROM, no stridor, no meningeal signs  Respiratory: Lungs with poor air movement, tight wheezes bilaterally. Tachypnea but not in distress  Cardiovascular:  tachycardic but Regular rhythm. No murmurs, no gallops, no rubs. 2+ distal pulses. Equal extremity pulses. Gastrointestinal:  Abdomen Soft, mild suprapubic tenderness, Non distended. No rebound, guarding, or rigidity. No pulsatile masses. Musculoskeletal: Moves all extremities x 4. Warm and well perfused, no clubbing, no cyanosis, no edema. Capillary refill <3 seconds  Skin: skin warm and dry. No rashes. Neurologic: GCS 15, no focal deficits, symmetric strength 5/5 in the upper and lower extremities bilaterally  Psychiatric: Normal Affect          -------------------------------------------------- RESULTS -------------------------------------------------  Results are listed below.      LABS: (Lab results interpreted by me)  Results for orders placed or performed during the hospital encounter of 11/14/22   COVID-19, Rapid    Specimen: Nasopharyngeal Swab   Result Value Ref Range SARS-CoV-2, NAAT Not Detected Not Detected   RAPID INFLUENZA A/B ANTIGENS    Specimen: Nasopharyngeal   Result Value Ref Range    Influenza A by PCR Not Detected Not Detected    Influenza B by PCR Not Detected Not Detected   CBC with Auto Differential   Result Value Ref Range    WBC 5.3 4.5 - 11.5 E9/L    RBC 3.55 3.50 - 5.50 E12/L    Hemoglobin 8.0 (L) 11.5 - 15.5 g/dL    Hematocrit 29.2 (L) 34.0 - 48.0 %    MCV 82.3 80.0 - 99.9 fL    MCH 22.5 (L) 26.0 - 35.0 pg    MCHC 27.4 (L) 32.0 - 34.5 %    RDW 16.0 (H) 11.5 - 15.0 fL    Platelets 016 077 - 012 E9/L    MPV 9.8 7.0 - 12.0 fL    Neutrophils % 80.0 43.0 - 80.0 %    Lymphocytes % 16.5 (L) 20.0 - 42.0 %    Monocytes % 2.6 2.0 - 12.0 %    Eosinophils % 0.6 0.0 - 6.0 %    Basophils % 0.9 0.0 - 2.0 %    Neutrophils Absolute 4.24 1.80 - 7.30 E9/L    Lymphocytes Absolute 0.90 (L) 1.50 - 4.00 E9/L    Monocytes Absolute 0.16 0.10 - 0.95 E9/L    Eosinophils Absolute 0.00 (L) 0.05 - 0.50 E9/L    Basophils Absolute 0.05 0.00 - 0.20 E9/L    Anisocytosis 1+     Polychromasia 2+     Hypochromia 1+     Poikilocytes 2+     Ovalocytes 2+     Stomatocytes 2+     Tear Drop Cells 1+    Comprehensive Metabolic Panel w/ Reflex to MG   Result Value Ref Range    Sodium 146 132 - 146 mmol/L    Potassium reflex Magnesium 4.3 3.5 - 5.0 mmol/L    Chloride 101 98 - 107 mmol/L    CO2 37 (H) 22 - 29 mmol/L    Anion Gap 8 7 - 16 mmol/L    Glucose 121 (H) 74 - 99 mg/dL    BUN 8 6 - 23 mg/dL    Creatinine 0.6 0.5 - 1.0 mg/dL    Est, Glom Filt Rate >60 >=60 mL/min/1.73    Calcium 9.9 8.6 - 10.2 mg/dL    Total Protein 6.6 6.4 - 8.3 g/dL    Albumin 4.1 3.5 - 5.2 g/dL    Total Bilirubin <0.2 0.0 - 1.2 mg/dL    Alkaline Phosphatase 85 35 - 104 U/L    ALT 8 0 - 32 U/L    AST 12 0 - 31 U/L   Troponin   Result Value Ref Range    Troponin, High Sensitivity 16 (H) 0 - 9 ng/L   Brain Natriuretic Peptide   Result Value Ref Range    Pro- (H) 0 - 125 pg/mL   Lactate, Sepsis   Result Value Ref Range Lactic Acid, Sepsis 0.8 0.5 - 1.9 mmol/L   D-Dimer, Quantitative   Result Value Ref Range    D-Dimer, Quant <200 ng/mL DDU   Urinalysis   Result Value Ref Range    Color, UA Yellow Straw/Yellow    Clarity, UA Clear Clear    Glucose, Ur Negative Negative mg/dL    Bilirubin Urine Negative Negative    Ketones, Urine Negative Negative mg/dL    Specific Gravity, UA <=1.005 1.005 - 1.030    Blood, Urine TRACE-INTACT Negative    pH, UA 8.5 5.0 - 9.0    Protein,  (A) Negative mg/dL    Urobilinogen, Urine 0.2 <2.0 E.U./dL    Nitrite, Urine Negative Negative    Leukocyte Esterase, Urine LARGE (A) Negative   Microscopic Urinalysis   Result Value Ref Range    WBC, UA 1-3 0 - 5 /HPF    RBC, UA 1-3 0 - 2 /HPF    Bacteria, UA MODERATE (A) None Seen /HPF    Amorphous, UA PRESENT     Crystals, UA Moderate (A) None Seen /HPF   Blood Gas, Arterial   Result Value Ref Range    Date Analyzed [id number]     Time Analyzed 1409     Source: Blood Arterial     pH, Blood Gas 7.411 7.350 - 7.450    PCO2 54.4 (H) 35.0 - 45.0 mmHg    PO2 66.5 (L) 75.0 - 100.0 mmHg    HCO3 33.8 (H) 22.0 - 26.0 mmol/L    B.E. 8.1 (H) -3.0 - 3.0 mmol/L    O2 Sat 92.2 92.0 - 98.5 %    O2Hb 90.9 (L) 94.0 - 97.0 %    COHb 0.9 0.0 - 1.5 %    MetHb 0.5 0.0 - 1.5 %    O2 Content 11.1 mL/dL    HHb 7.7 (H) 0.0 - 5.0 %    tHb (est) 8.6 (L) 11.5 - 16.5 g/dL    Mode NC-  5L     Date Of Collection      Time Collected      Pt Temp 37.0 C     ID [id number]     Lab [id number]     Critical(s) Notified . No Critical Values    Troponin   Result Value Ref Range    Troponin, High Sensitivity 15 (H) 0 - 9 ng/L   EKG 12 Lead   Result Value Ref Range    Ventricular Rate 94 BPM    Atrial Rate 94 BPM    P-R Interval 160 ms    QRS Duration 70 ms    Q-T Interval 374 ms    QTc Calculation (Bazett) 467 ms    P Axis 78 degrees    R Axis 70 degrees    T Axis 99 degrees   ,       RADIOLOGY:    Radiologist interpretation  CT ABDOMEN PELVIS WO CONTRAST Additional Contrast? None   Final Result   1. Generalized thickened appearance of urinary bladder wall related to   nondistention or cystitis. 2. Diverticulosis. 3. No evidence of renal or ureteral calculus. 4. Numerous calcifications throughout the pancreas suggesting chronic   calcific pancreatitis. XR CHEST PORTABLE   Final Result   1. Stable chronic interstitial opacities bilaterally. No focal pneumonia or   evidence of pleural effusion. 2.  Emphysematous changes. EKG Interpretation  Interpreted by emergency department physician, Dwaine Calvert MD    Date of EK22  Time: 1253    Rhythm: normal sinus   Rate: normal  Axis: normal  Conduction: normal  ST Segments: no acute change  T Waves: no acute change    Clinical Impression: sinus rhythm, no acute ischemic changes  Comparison to prior EKG: stable as compared to patient's most recent EKG      ------------------------- NURSING NOTES AND VITALS REVIEWED ---------------------------   The nursing notes within the ED encounter and vital signs as below have been reviewed by myself  /64   Pulse (!) 105   Temp 98.5 °F (36.9 °C) (Oral)   Resp 19   Ht 5' 7\" (1.702 m)   Wt 152 lb (68.9 kg)   LMP 10/21/1985   SpO2 98%   BMI 23.81 kg/m²     Oxygen Saturation Interpretation: Improved after treatment    The patients available past medical records and past encounters were reviewed.         ------------------------------ ED Dilip Arellano----------------------             Dr. Kerry SOLIS, am the primary provider of record        Medical Decision Making:   Acute COPD exacerbation  Nebs, steroids, staring to improve  ABG appears to be chronic/compensated  CT as she has hematuria and to evaluate for stone  Medicine consulted for admission\        Name and Route of medications administered in the ED:  Medications   albuterol (PROVENTIL) nebulizer solution 2.5 mg (has no administration in time range)   Arformoterol Tartrate (BROVANA) nebulizer solution 15 mcg (15 mcg Nebulization Given 11/14/22 2144)   budesonide (PULMICORT) nebulizer suspension 500 mcg (500 mcg Nebulization Given 11/14/22 2144)   diazePAM (VALIUM) tablet 10 mg (10 mg Oral Given 99/33/21 2515)   folic acid (FOLVITE) tablet 1 mg (1 mg Oral Not Given 11/14/22 1834)   gabapentin (NEURONTIN) capsule 300 mg (300 mg Oral Given 11/14/22 2156)   OLANZapine (ZYPREXA) tablet 5 mg (5 mg Oral Given 11/14/22 2156)   pantoprazole (PROTONIX) tablet 40 mg (has no administration in time range)   QUEtiapine (SEROQUEL XR) extended release tablet 50 mg (has no administration in time range)   sertraline (ZOLOFT) tablet 50 mg (50 mg Oral Given 11/14/22 2156)   sodium chloride flush 0.9 % injection 5-40 mL (10 mLs IntraVENous Given 11/14/22 2157)   sodium chloride flush 0.9 % injection 5-40 mL (has no administration in time range)   0.9 % sodium chloride infusion (has no administration in time range)   enoxaparin (LOVENOX) injection 40 mg (40 mg SubCUTAneous Given 11/14/22 1834)   ondansetron (ZOFRAN-ODT) disintegrating tablet 4 mg (has no administration in time range)     Or   ondansetron (ZOFRAN) injection 4 mg (has no administration in time range)   acetaminophen (TYLENOL) tablet 650 mg (has no administration in time range)     Or   acetaminophen (TYLENOL) suppository 650 mg (has no administration in time range)   magnesium hydroxide (MILK OF MAGNESIA) 400 MG/5ML suspension 30 mL (has no administration in time range)   cefTRIAXone (ROCEPHIN) 1,000 mg in sterile water 10 mL IV syringe (1,000 mg IntraVENous Given 11/14/22 1834)   ipratropium-albuterol (DUONEB) nebulizer solution 3 ampule (3 ampules Inhalation Given 11/14/22 1505)   methylPREDNISolone sodium (SOLU-MEDROL) injection 125 mg (125 mg IntraVENous Given 11/14/22 1318)   albuterol (PROVENTIL) nebulizer solution 2.5 mg (2.5 mg Nebulization Given 11/14/22 1505)   magnesium sulfate 2000 mg in 50 mL IVPB premix (0 mg IntraVENous Stopped 11/14/22 1903) Re-Evaluations:       improving      This patient's ED course included: a personal history and physicial examination, re-evaluation prior to disposition, IV medications, cardiac monitoring, continuous pulse oximetry, and complex medical decision making and emergency management    This patient has remained hemodynamically stable during their ED course. Consultations:  Internal Medicine          Counseling: The emergency provider has spoken with the patient and discussed todays results, in addition to providing specific details for the plan of care and counseling regarding the diagnosis and prognosis. Questions are answered at this time and they are agreeable with the plan.       --------------------------------- IMPRESSION AND DISPOSITION ---------------------------------    IMPRESSION  1. Acute [facility] of chronic [facility] pulmonary disease ([facility]        DISPOSITION  Disposition: Admit to telemetry  Patient condition is stable        NOTE: This report was transcribed using voice recognition software.  Every effort was made to ensure accuracy; however, inadvertent computerized transcription errors may be present        Lenny Long MD  11/14/22 5349

## 2022-11-14 NOTE — ED NOTES
Oxygen increased to 5l per [facility], RN  11/14/22 2695

## 2022-11-15 PROBLEM — N39.0 UTI (URINARY TRACT INFECTION): Status: ACTIVE | Noted: 2022-11-15

## 2022-11-15 LAB
ANION GAP SERPL CALCULATED.3IONS-SCNC: 9 MMOL/L (ref 7–16)
ANISOCYTOSIS: ABNORMAL
BASOPHILS ABSOLUTE: 0.01 E9/L (ref 0–0.2)
BASOPHILS RELATIVE PERCENT: 0.2 % (ref 0–2)
BUN BLDV-MCNC: 11 MG/DL (ref 6–23)
CALCIUM SERPL-MCNC: 9.8 MG/DL (ref 8.6–10.2)
CHLORIDE BLD-SCNC: 101 MMOL/L (ref 98–107)
CO2: 32 MMOL/L (ref 22–29)
CREAT SERPL-MCNC: 0.6 MG/DL (ref 0.5–1)
EOSINOPHILS ABSOLUTE: 0 E9/L (ref 0.05–0.5)
EOSINOPHILS RELATIVE PERCENT: 0 % (ref 0–6)
GFR SERPL CREATININE-BSD FRML MDRD: >60 ML/MIN/1.73
GLUCOSE BLD-MCNC: 144 MG/DL (ref 74–99)
HCT VFR BLD CALC: 27.3 % (ref 34–48)
HEMOGLOBIN: 7.5 G/DL (ref 11.5–15.5)
HYPOCHROMIA: ABNORMAL
IMMATURE GRANULOCYTES #: 0.03 E9/L
IMMATURE GRANULOCYTES %: 0.6 % (ref 0–5)
LYMPHOCYTES ABSOLUTE: 0.67 E9/L (ref 1.5–4)
LYMPHOCYTES RELATIVE PERCENT: 12.8 % (ref 20–42)
MCH RBC QN AUTO: 22.3 PG (ref 26–35)
MCHC RBC AUTO-ENTMCNC: 27.5 % (ref 32–34.5)
MCV RBC AUTO: 81.3 FL (ref 80–99.9)
MONOCYTES ABSOLUTE: 0.29 E9/L (ref 0.1–0.95)
MONOCYTES RELATIVE PERCENT: 5.5 % (ref 2–12)
NEUTROPHILS ABSOLUTE: 4.24 E9/L (ref 1.8–7.3)
NEUTROPHILS RELATIVE PERCENT: 80.9 % (ref 43–80)
OVALOCYTES: ABNORMAL
PDW BLD-RTO: 16 FL (ref 11.5–15)
PLATELET # BLD: 296 E9/L (ref 130–450)
PMV BLD AUTO: 9.8 FL (ref 7–12)
POIKILOCYTES: ABNORMAL
POLYCHROMASIA: ABNORMAL
POTASSIUM REFLEX MAGNESIUM: 4.4 MMOL/L (ref 3.5–5)
RBC # BLD: 3.36 E12/L (ref 3.5–5.5)
SODIUM BLD-SCNC: 142 MMOL/L (ref 132–146)
TEAR DROP CELLS: ABNORMAL
VACUOLATED NEUTROPHILS: ABNORMAL
WBC # BLD: 5.2 E9/L (ref 4.5–11.5)

## 2022-11-15 PROCEDURE — 2700000000 HC OXYGEN THERAPY PER DAY

## 2022-11-15 PROCEDURE — 96376 TX/PRO/DX INJ SAME DRUG ADON: CPT

## 2022-11-15 PROCEDURE — 97165 OT EVAL LOW COMPLEX 30 MIN: CPT

## 2022-11-15 PROCEDURE — 2580000003 HC RX 258: Performed by: FAMILY MEDICINE

## 2022-11-15 PROCEDURE — 94640 AIRWAY INHALATION TREATMENT: CPT

## 2022-11-15 PROCEDURE — G0378 HOSPITAL OBSERVATION PER HR: HCPCS

## 2022-11-15 PROCEDURE — 85025 COMPLETE CBC W/AUTO DIFF WBC: CPT

## 2022-11-15 PROCEDURE — 96375 TX/PRO/DX INJ NEW DRUG ADDON: CPT

## 2022-11-15 PROCEDURE — 36415 COLL VENOUS BLD VENIPUNCTURE: CPT

## 2022-11-15 PROCEDURE — 97161 PT EVAL LOW COMPLEX 20 MIN: CPT

## 2022-11-15 PROCEDURE — 80048 BASIC METABOLIC PNL TOTAL CA: CPT

## 2022-11-15 PROCEDURE — 6360000002 HC RX W HCPCS: Performed by: FAMILY MEDICINE

## 2022-11-15 PROCEDURE — 96372 THER/PROPH/DIAG INJ SC/IM: CPT

## 2022-11-15 PROCEDURE — 6370000000 HC RX 637 (ALT 250 FOR IP): Performed by: FAMILY MEDICINE

## 2022-11-15 RX ORDER — METHYLPREDNISOLONE SODIUM SUCCINATE 40 MG/ML
40 INJECTION, POWDER, LYOPHILIZED, FOR SOLUTION INTRAMUSCULAR; INTRAVENOUS EVERY 12 HOURS
Status: DISCONTINUED | OUTPATIENT
Start: 2022-11-15 | End: 2022-11-16

## 2022-11-15 RX ADMIN — WATER 1000 MG: 1 INJECTION INTRAMUSCULAR; INTRAVENOUS; SUBCUTANEOUS at 18:02

## 2022-11-15 RX ADMIN — QUETIAPINE FUMARATE 50 MG: 50 TABLET, EXTENDED RELEASE ORAL at 20:51

## 2022-11-15 RX ADMIN — ARFORMOTEROL TARTRATE 15 MCG: 15 SOLUTION RESPIRATORY (INHALATION) at 20:46

## 2022-11-15 RX ADMIN — ENOXAPARIN SODIUM 40 MG: 100 INJECTION SUBCUTANEOUS at 10:08

## 2022-11-15 RX ADMIN — BUDESONIDE 500 MCG: 0.5 SUSPENSION RESPIRATORY (INHALATION) at 20:46

## 2022-11-15 RX ADMIN — ACETAMINOPHEN 650 MG: 325 TABLET, FILM COATED ORAL at 20:51

## 2022-11-15 RX ADMIN — ARFORMOTEROL TARTRATE 15 MCG: 15 SOLUTION RESPIRATORY (INHALATION) at 10:03

## 2022-11-15 RX ADMIN — PANTOPRAZOLE SODIUM 40 MG: 40 TABLET, DELAYED RELEASE ORAL at 05:49

## 2022-11-15 RX ADMIN — GABAPENTIN 300 MG: 300 CAPSULE ORAL at 20:51

## 2022-11-15 RX ADMIN — FOLIC ACID 1 MG: 1 TABLET ORAL at 10:07

## 2022-11-15 RX ADMIN — METHYLPREDNISOLONE SODIUM SUCCINATE 40 MG: 40 INJECTION, POWDER, FOR SOLUTION INTRAMUSCULAR; INTRAVENOUS at 18:02

## 2022-11-15 RX ADMIN — BUDESONIDE 500 MCG: 0.5 SUSPENSION RESPIRATORY (INHALATION) at 10:04

## 2022-11-15 RX ADMIN — Medication 10 ML: at 10:08

## 2022-11-15 RX ADMIN — Medication 10 ML: at 20:53

## 2022-11-15 RX ADMIN — SERTRALINE 50 MG: 50 TABLET, FILM COATED ORAL at 20:51

## 2022-11-15 RX ADMIN — OLANZAPINE 5 MG: 5 TABLET, FILM COATED ORAL at 20:51

## 2022-11-15 ASSESSMENT — PAIN SCALES - GENERAL: PAINLEVEL_OUTOF10: 8

## 2022-11-15 NOTE — PROGRESS NOTES
[address]                                                  Patient Name: Jesus Bhakta    MRN: [id number]    : 1955    Room: [address] [place] [address]          Evaluating OT: Willian Carson OTR/L; [id number]       Referring Provider: ANGELA Doty CNP    Specific Provider Orders/Date: OT Eval and Treat 22      Diagnosis: Acute exacerbation of chronic obstructive pulmonary disease (COPD). Surgery: None this admission     Pertinent Medical History:  has a past medical history of Asthma, COPD (chronic obstructive pulmonary disease) ([facility] [place] [address] Depression, Dysphagia, Jarquin catheter in place, and Oxygen dependent.      Recommended Adaptive Equipment: TBD     Precautions:  Fall Risk, O2, chronic jarquin, +bed alarm     Assessment of current deficits    [x] Functional mobility  [x]ADLs  [x] Strength               [x]Cognition    [x] Functional transfers   [x] IADLs         [x] Safety Awareness   [x]Endurance    [x] Fine Coordination              [x] Balance      [] Vision/perception   []Sensation     []Gross Motor Coordination  [] ROM  [] Delirium                   [] Motor Control     OT PLAN OF CARE   OT POC based on physician orders, patient diagnosis and results of clinical assessment    Frequency/Duration 1-3 days/wk for 2 weeks PRN   Specific OT Treatment Interventions to include:   * Instruction/training on adapted ADL techniques and AE recommendations to increase functional independence within precautions       * Training on energy conservation strategies, correct breathing pattern and techniques to improve independence/tolerance for self-care routine  * Functional transfer/mobility training/DME recommendations for increased independence, safety, and fall prevention  * Patient/Family education to increase follow through with safety techniques and functional independence  * Recommendation of environmental modifications for increased safety with functional transfers/mobility and ADLs  * Therapeutic exercise to improve motor endurance, ROM, and functional strength for ADLs/functional transfers  * Therapeutic activities to facilitate/challenge dynamic balance, stand tolerance for increased safety and independence with ADLs  * Positioning to improve skin integrity, interaction with environment and functional independence    Home Living: Pt lives alone in 2 story home with 3 steps & 1 handrail to enter. Pt reports having aides 5 days/wk for 4hrs/day. Bathroom setup: Tub/shower with shower chair   Equipment owned: Shower chair, rollator, 3L O2    Prior Level of Function: assist with ADLs , assist with IADLs; engaged in functional mobility with use of  rollator  Driving: No  Occupation: None reported    Pain Level: Pt with no c/o pain during session  Cognition: A&O: 4/4; Follows 2 step directions   Memory:  Good   Sequencing:  Fair+   Problem solving:  Fair+   Judgement/safety:  Fair     Functional Assessment:  AM-PAC Daily Activity Raw Score: 15/24   Initial Eval Status  Date: 11/15/22 Treatment Status  Date: STGs = LTGs  Time frame: 10-14 days   Feeding Setup    Independent    Grooming Minimal Assist   Modified [place]    UB Dressing Minimal Assist   Modified [place]    LB Dressing Moderate Assist   Modified [place]    Bathing Moderate Assist  Modified [place]    Toileting Moderate Assist   Modified [place]    Bed Mobility  Supine to sit: Minimal Assist   Sit to supine: Minimal Assist   Supine to sit:  Independent   Sit to supine: Independent    Functional Transfers Sit to stand:Minimal Assist   Stand to sit:Minimal Assist  Stand pivot: NT  Commode: NT  Sit to stand:Modified [place]    Stand to sit:Modified [place]   Stand pivot: Modified [place]   Commode: Modified [place]     Functional Mobility Minimal Assist  Use of ww to take ~5 steps forward<>back. Modified [place] with use of Appropriate AD   Balance Sitting:     Static - Supervision     Dynamic - SBA  Standing: Minimal Assist  Sitting:     Static: Independent     Dynamic: Independent  Standing: Modified [place]    Activity Tolerance Fair  Good   Visual/  Perceptual Glasses: None reported  Appears [facility]      Safety Fair  Good  during ADL completion   Vitals Pt on 3L O2 nasal cannula upon arrival. SpO2 96% & above throughout session. Hand Dominance Right   AROM (PROM) Strength Additional Info:    RUE  WFL 4-/5 grossly tested good  and wfl FMC/dexterity noted during ADL tasks     LUE WFL 4-/5 grossly tested Good  and wfl FMC/dexterity noted during ADL tasks       Hearing: WFL  Sensation:  No c/o numbness or tingling BUE  Tone: WFL BUE  Edema: Unremarkable    Comment: Cleared by RN to see pt. Upon arrival patient supine in bed and agreeable to OT session. At end of session, patient supine in bed with call light and phone within reach, +bed alarm, all lines and tubes intact. Overall patient demonstrated decreased independence and safety during completion of ADL/functional transfer/mobility tasks. Therapist facilitated ADL tasks, functional transfers, functional mobility, bed mobility to address safety awareness, implementation of fall prevention strategies, & engagement throughout functional tasks. Pt sat EOB to don socks with light assist to maintain dynamic sitting balance. Pt required assist to tie gown seated EOB. Pt utilized ww to take ~5 steps forward<>back demo'ing narrow SALVATORE & slow lydia. Pt would benefit from continued skilled OT to increase safety and independence with completion of ADL/IADL tasks for functional independence and quality of life.     Rehab Potential: Good for established goals     LTG: maximize independence with ADLs to return to PLOF    Patient and/or family were instructed on functional diagnosis, prognosis/goals and OT plan of care. Demonstrated fair understanding. Eval Complexity: Low  History: Expanded chart review of medical records and additional review of physical, cognitive, or psychosocial history related to current functional performance  Exam: 3+ performance deficits  Assistance/Modification: Min/mod assistance or modifications required to perform tasks. May have comorbidities that affect occupational performance. Evaluation time includes thorough review of current medical information, gathering information on past medical & social history & PLOF, completion of standardized testing, informal observation of tasks, consultation with other medical professions/disciplines, assessment of data & development of POC/goals. Time In: 12:50p  Time Out: 1:05p  Total Treatment Time: 0 minutes    Min Units   OT Eval Low 58288  x     OT Eval Medium 20830      OT Eval High 47627      OT Re-Eval [id number]       Therapeutic Ex 18288       Therapeutic Activities 95719       ADL/Self Care 35796       Orthotic Management 61792       Manual 20453     Neuro Re-Ed 62371       Non-Billable Time          Evaluation Time additionally includes thorough review of current medical information, gathering information on past medical history/social history and prior level of function, interpretation of standardized testing/informal observation of tasks, assessment of data and development of plan of care and goals.               Catalina Hawkins OTR/L; [id number]

## 2022-11-15 NOTE — H&P
[facility]  History and Physical      CHIEF COMPLAINT:    Chief Complaint   Patient presents with    Shortness of Breath     Normally wears 3L N/C at home. Still SOB but 98% on 3L        Patient of Mike Albarado MD presents with:  Acute exacerbation of chronic obstructive pulmonary disease (COPD) ([facility] [address]    History of Present Illness:   Patient is a 70-year-old female with past medical history of asthma, COPD, depression, dysphagia, chronic indwelling [place], and O2 dependent. Patient presented to the ED with complaints of worsening shortness of breath over the past few days. Patient admits she has had a cough with wheezing. She says she is not moving much air. Patient when she noticed some blood in her tubing. Patient is currently utilizing 3 L O2 which is baseline. Patient is alert and oriented on exam.  Patient denies any chest pain, headache, dizziness, and abdominal pain. ER work-up revealed acute exacerbation of COPD troponin 16, patient was given duo nebs and steroids in the ED and admitted to telemetry unit for further testing and treatment. REVIEW OF SYSTEMS:  Pertinent negatives are above in HPI. 10 point ROS otherwise negative. Past Medical History:   Diagnosis Date    Asthma     COPD (chronic obstructive pulmonary disease) ([facility] [address]     uses )3 prn daily and nightly / Dr. Priscila Gomez F/U monthly    Depression     Dysphagia 2021    Del Roi catheter in place     continuous since 2013, changed monthly at [facility] dependent 2017         Past Surgical History:   Procedure Laterality Date    COLONOSCOPY      COLONOSCOPY N/A 9/4/2019    COLONOSCOPY DIAGNOSTIC performed by Meghan Guerin MD at [address] [place] [address] (CERVIX STATUS UNKNOWN)      KIDNEY SURGERY Left 06/16/2014    ABLATION PERFORMED UNDER CT SCAN    HI CYSTOURETHROSCOPY,BIOPSIES N/A 9/14/2018    CYSTOSCOPY RETROGRADE PYELOGRAM, CLOT EVACATION , POSS.   BLADDER BIOPSY ---DR Jose Avina 2 :30 performed by Sera Sue, DO at [address] N/A 5/10/2019    EGD BIOPSY performed by Zain [place] MD at [address] [place] [address] J 6/28/2019    EGD EUS performed by Lyndsey Kasper DO at [address] [place] [address] J 6/28/2019    EGD performed by Lyndsey Kasper DO at [address] [place] [address] J 5/19/2020    EGD DIAGNOSTIC ONLY performed by Cielo Leiva MD at [address] [place] [address] J 10/25/2021    EGD BIOPSY performed by Cielo Leiva MD at [address] [place] [address] N/A 12/6/2021    EGD BIOPSY performed by Cielo Leiva MD at [facility]       Medications Prior to Admission:    Medications Prior to Admission: albuterol sulfate HFA (VENTOLIN HFA) 108 (90 Base) MCG/ACT inhaler, Inhale 1 puff into the lungs every 4 hours as needed for Wheezing  Arformoterol Tartrate (BROVANA) 15 MCG/2ML NEBU, Take 1 ampule by nebulization 2 times daily  budesonide (PULMICORT) 0.5 MG/2ML nebulizer suspension, Take 1 ampule by nebulization 2 times daily  gabapentin (NEURONTIN) 300 MG capsule, Take 300 mg by mouth at bedtime.   OLANZapine (ZYPREXA) 5 MG tablet, Take 5 mg by mouth nightly  diazePAM (VALIUM) 10 MG tablet, Take 10 mg by mouth nightly as needed for Sleep.  omeprazole (PRILOSEC) 40 MG delayed release capsule, Take 40 mg by mouth daily  sertraline (ZOLOFT) 50 MG tablet, Take 1 tablet by mouth nightly  QUEtiapine (SEROQUEL XR) 50 MG extended release tablet, Take 50 mg by mouth nightly  OXYGEN, Inhale 3 L into the lungs continuous  vitamin D (ERGOCALCIFEROL) 65388 UNITS CAPS capsule, Take 50,000 Units by mouth once a week Given Saturday  Multiple Vitamins-Iron (DAILY-FEDERICO/IRON) TABS, Take 1 tablet by mouth daily   folic acid (FOLVITE) 1 MG tablet, Take 1 mg by mouth daily   alendronate (FOSAMAX) 70 MG tablet, Take 70 mg by mouth every 7 days Given Sunday    Note that the patient's home medications were reviewed and the above list is accurate to the best of my knowledge at the time of the exam.    Allergies:    Sulfa antibiotics    Social History:    reports that she quit smoking about 3 years ago. She has never used smokeless tobacco. She reports that she does not drink alcohol and does not use drugs. Family History:   family history includes Cancer (age of onset: [de-identified]) in her father; Diabetes in her brother; Kidney Disease in her brother. PHYSICAL EXAM:    Vitals:  /88   Pulse (!) 101   Temp 97.8 °F (36.6 °C) (Temporal)   Resp 20   Ht 5' 7\" (1.702 m)   Wt 152 lb (68.9 kg)   LMP 10/21/1985   SpO2 99%   BMI 23.81 kg/m²       General appearance: NAD, conversant, pleasant  Eyes: Sclerae anicteric, PERRLA  HEENT: AT/NC, MMM  Neck: FROM, supple, no thyromegaly  Lymph: No cervical / supraclavicular lymphadenopathy  Lungs: Exp wheezes, 3 liters O2  CV: RRR, no MRGs, no lower extremity edema  Abdomen: Soft, non-tender; no masses or HSM, +BS  Extremities: FROM without synovitis. No clubbing or cyanosis of the hands. Skin: no rash, induration, lesions, or ulcers  Psych: Calm and cooperative. Normal judgement and insight. Normal mood and affect. Neuro: Alert and interactive, face symmetric, speech fluent. LABS:  All labs reviewed.   Of note:  CBC with Differential:    Lab Results   Component Value Date/Time    WBC 5.2 11/15/2022 06:51 AM    RBC 3.36 11/15/2022 06:51 AM    HGB 7.5 11/15/2022 06:51 AM    HCT 27.3 11/15/2022 06:51 AM     11/15/2022 06:51 AM    MCV 81.3 11/15/2022 06:51 AM    MCH 22.3 11/15/2022 06:51 AM    MCHC 27.5 11/15/2022 06:51 AM    RDW 16.0 11/15/2022 06:51 AM    NRBC 2.6 10/15/2022 05:26 AM    SEGSPCT 26 02/16/2014 06:30 PM    METASPCT 2.6 10/11/2022 05:41 AM    LYMPHOPCT 12.8 11/15/2022 06:51 AM    PROMYELOPCT 0.9 05/18/2020 03:48 AM    MONOPCT 5.5 11/15/2022 06:51 AM    MYELOPCT 1.7 10/18/2022 05:55 AM    BASOPCT 0.2 11/15/2022 06:51 AM MONOSABS 0.29 11/15/2022 06:51 AM    LYMPHSABS 0.67 11/15/2022 06:51 AM    EOSABS 0.00 11/15/2022 06:51 AM    BASOSABS 0.01 11/15/2022 06:51 AM     CMP:    Lab Results   Component Value Date/Time     11/15/2022 06:51 AM    K 4.4 11/15/2022 06:51 AM     11/15/2022 06:51 AM    CO2 32 11/15/2022 06:51 AM    BUN 11 11/15/2022 06:51 AM    CREATININE 0.6 11/15/2022 06:51 AM    GFRAA >60 10/17/2022 05:59 AM    LABGLOM >60 11/15/2022 06:51 AM    GLUCOSE 144 11/15/2022 06:51 AM    PROT 6.6 11/14/2022 01:04 PM    LABALBU 4.1 11/14/2022 01:04 PM    CALCIUM 9.8 11/15/2022 06:51 AM    BILITOT <0.2 11/14/2022 01:04 PM    ALKPHOS 85 11/14/2022 01:04 PM    AST 12 11/14/2022 01:04 PM    ALT 8 11/14/2022 01:04 PM       Imaging:  CXR: Stable chronic interstitial opacities bilaterally. No focal pneumonia or evidence of pleural effusion. Emphysema changes. CT abdomen pelvis: Generalized thickened appearance of urinary bladder wall related to nondistention or cystitis. Diverticulosis. No evidence of brain or your calculus. Numerous calcifications throughout the pancreas suggesting chronic calcific pancreatitis. EKG:  I've personally reviewed the patient's EKG:  NSR    Telemetry:  I've personally reviewed the patient's telemetry:      ASSESSMENT/PLAN:  Principal Problem:    Acute exacerbation of chronic obstructive pulmonary disease (COPD) (HCC)  Active Problems:    UTI (urinary tract infection)  Resolved Problems:    * No resolved hospital problems.  *    72 year old female with a history of COPD presents to the ED with complaints of chills and shortness of breath and is admitted to telemetry unit with    Acute exacerbation of COPD  -Supplement O2 demands keeping oxygen saturation greater than 92%, currently utilizing 3 L which is baseline  -DuoNebs  Solu-Medrol 40 mg every 12 hours  -Consult Dr. Alpena Adalberto known to him    Chronic UTI in patient with chronic indwelling catheter-most likely colonization  Monitor labs-WBC 5.2  Culture-growing gram-positive cocci greater than 100,000  Monitor for temps  Rocephin 1 g every 24 hours    Medication for other comorbidities continue as appropriate dose adjustment as necessary. DVT prophylaxis  PT OT  Discharge planning  Case discussed with attending and agreed upon plan of care. Code status: Full  Requires inpatient level of care  Osman HolcombANGELA bobby - CNP    1:49 PM  11/15/2022     Above note edited to reflect my thoughts     I personally saw, examined and provided care for the patient. Radiographs, labs and medication list were reviewed by me independently. The case was discussed in detail and plans for care were established. Review of Kendra Knott APRN- CNP, documentation was conducted and revisions were made as appropriate directly by me. I agree with the above documented exam, problem list, and plan of care.      He Watt MD  6:14 PM  11/15/2022

## 2022-11-15 NOTE — HOME CARE
Patient current with [facility] for SN,PT. Will need KAI orders if Patient becomes Inpatient. Solo Paulson LPN  [facility].

## 2022-11-15 NOTE — CARE COORDINATION
11/15 Care Coordination: Pt presenting to the ED for shortness of breath. past medical history of asthma, COPD, depression, dysphagia, chronic indwelling [place], and O2 dependent. Patient is currently utilizing 3 L O2 which is baseline. Pt was just discharged 10/20 for  COPD exacerbation. Pt is current with [facility], and O2 is from [facility]. Pt lives alone in a house with 1-2 steps to enter. The bath is on the 1st floor & the bedroom on the 2nd floor with apprx 6-8 steps. PTA pt was independent with 02 - 3L/NC continuously . She will need a ride home. Will need to call Provider a Ride. CM/SW will continue to follow for discharge planning.    Ramsey STEIN,RN--BC  [phone]

## 2022-11-15 NOTE — PROGRESS NOTES
Physical Therapy  Physical Therapy Initial Assessment     Name: Jonathan Kay  : 1955  MRN: [id number]      Date of Service: 11/15/2022    Evaluating PT:  Chelsea Be PT, DPT, [id number]    Room #:  8210/8210-B  Diagnosis:  Acute exacerbation of chronic obstructive pulmonary disease (COPD) ([facility] [J44.1]  PMHx/PSHx:    Past Medical History:   Diagnosis Date    Asthma     COPD (chronic obstructive pulmonary disease) ([facility]     uses )3 prn daily and nightly / Dr. Jamal Gurrola F/U monthly    Depression     Dysphagia     [place] catheter in place     continuous since , changed monthly at [facility]       Past Surgical History:   Procedure Laterality Date    COLONOSCOPY      COLONOSCOPY N/A 2019    COLONOSCOPY DIAGNOSTIC performed by Spring Robertson MD at [address] [place] [address] (CERVIX STATUS UNKNOWN)      KIDNEY SURGERY Left 2014    ABLATION PERFORMED UNDER CT SCAN    [place] CYSTOURETHROSCOPY,BIOPSIES N/A 2018    CYSTOSCOPY RETROGRADE PYELOGRAM, CLOT EVACATION , POSS.   BLADDER BIOPSY ---DR Mike Olivier 2 :30 performed by Juana Sue DO at [address] [place] [address] N/A 5/10/2019    EGD BIOPSY performed by Spring Robertson MD at [address] [place] [address],[facility] 2019    EGD EUS performed by Gene Michel DO at [address] [place] [address],[facility] N/A 2019    EGD performed by Gene Michel DO at [address] [place] [address],[facility] 2020    EGD DIAGNOSTIC ONLY performed by Bethanie Hickman MD at [address] [place] [address],[facility] 10/25/2021    EGD BIOPSY performed by Bethanie Hickman MD at [address] [place] [address],[facility] N/A 2021    EGD BIOPSY performed by Bethanie Hickman MD at [facility] ENDOSCOPY    Procedure/Surgery:  none  this admission  Precautions:  Fall risk, O2, TAPS, bed alarm  Equipment Needs:  TBD    SUBJECTIVE:    Pt lives alone in a 2 story home with 3 stairs to enter and 1 rail from front and 3 stairs and 0 rail to enter from back (pt prefers). Bed is on 2 floor and bath is on 2 floor with 14 stairs and 1 rail to access with chair lift. Pt ambulated with rollator outside and in community PTA. Pt is on 3L/min O2 at baseline. Pt has home health aide 5x/week from 9am-1pm.    OBJECTIVE:   Initial Evaluation  Date: 11/15/22 Treatment Short Term/ Long Term   Goals   AM-PAC 6 Clicks 25/19     Was pt agreeable to Eval/treatment? yes     Does pt have pain? No c/o pain     Bed Mobility  Rolling: NT  Supine to sit: SBA  Sit to supine: SBA  Scooting: SBA  Rolling: Independent   Supine to sit: Independent   Sit to supine: Independent   Scooting: Independent    Transfers Sit to stand: Daryl  Stand to sit: Daryl  Stand pivot: Daryl with [address] Dar[place]  Sit to stand: Independent   Stand to sit: Independent   Stand pivot: mod Independent with AAD   Ambulation    75 feet x2 with [address] Dar[address] Daryl  >150 feet with AAD mod Independent    Stair negotiation: ascended and descended   8 steps with 1 rail Mod Independent    ROM BUE:  Refer to OT  BLE:  WFL     Strength BUE:  Refer to OT  BLE:  grossly 4+/5  5/5   Balance Sitting EOB:  SBA  Dynamic Standing:  Daryl with [address] [place]  Sitting EOB:  Independent   Dynamic Standing:  mod Independent with AAD     Pt is A & O x 4  Sensation:  Pt denies numbness and tingling to extremities  Edema:  unremarkable    Vitals:  SPO2 at rest 99% on 3L/min   SPO2 post ambulation  92% on 3L/min   SPO2 post session  96% on 3L/min       Patient education  Pt educated on role of PT, MARK vs home, and safe functional mobility.     Patient response to education:   Pt verbalized understanding Pt demonstrated skill Pt requires further education in this area   x x x     ASSESSMENT:    Conditions Requiring Skilled Therapeutic Intervention:    [x]Decreased strength     []Decreased ROM  [x]Decreased functional mobility  [x]Decreased balance   [x]Decreased endurance   [x]Decreased posture  []Decreased sensation  []Decreased coordination   []Decreased vision  []Decreased safety awareness   []Increased pain       Comments:  Pt was semi-supine in bed upon PT entry. Pt was agreeable to participate. Pt was able to transfer supine<>sit without hands on assistance. Pt was able to maintain sitting balance with UE support. Pt completed sit<>stand transfer with verbal cues for hand placement. Pt ambulated short distances in hallway with [employer], noted slowed pace, forward flexed posture, and slightly decreased balance. Pt required steadying assist and verbal cues for [employer] approximation, SPO2 decreased, pt was instructed on pursed lip breathing, SPO2 increased. Pt's vitals remained stable throughout session. Pt was returned to supine ans positioned in bed with all needs met at conclusion of session. RN notified. Pt would benefit from continued skilled PT intervention to improve balance and tolerance to activity for improved independent functional mobility. Treatment:  Patient practiced and was instructed in the following treatment:    Bed mobility training - pt given verbal and tactile cues to facilitate proper sequencing and safety during rolling and supine>sit as well as provided with physical assistance to complete task   Sitting EOB for >5 minutes for upright tolerance, postural awareness and BLE ROM  Transfer training - pt was given verbal and tactile cues to facilitate proper hand placement, technique and safety during sit to stand and stand to sit as well as provided with physical assistance. Gait training- pt was given verbal and tactile cues to facilitate balance, posture, WW approximation, and safety during ambulation as well as provided with physical assistance. Closely monitor vitals throughout session. Pt's/ family goals   1. To return home    Prognosis is fair for reaching above PT goals. Patient and or family understand(s) diagnosis, prognosis, and plan of care.   yes    PHYSICAL 0 minutes  [x] Therapeutic activities 65236 5 minutes  [] Therapeutic exercises 53503 0 minutes  [] Neuromuscular reeducation 99836 0 minutes     Tamika Cortes PT, DPT  [id number]

## 2022-11-16 PROBLEM — J44.1: Status: ACTIVE | Noted: 2022-11-16

## 2022-11-16 LAB
ANION GAP SERPL CALCULATED.3IONS-SCNC: 8 MMOL/L (ref 7–16)
ANISOCYTOSIS: ABNORMAL
BASOPHILS ABSOLUTE: 0.01 E9/L (ref 0–0.2)
BASOPHILS RELATIVE PERCENT: 0.2 % (ref 0–2)
BUN BLDV-MCNC: 12 MG/DL (ref 6–23)
CALCIUM SERPL-MCNC: 10.3 MG/DL (ref 8.6–10.2)
CHLORIDE BLD-SCNC: 102 MMOL/L (ref 98–107)
CO2: 35 MMOL/L (ref 22–29)
CREAT SERPL-MCNC: 0.5 MG/DL (ref 0.5–1)
EOSINOPHILS ABSOLUTE: 0 E9/L (ref 0.05–0.5)
EOSINOPHILS RELATIVE PERCENT: 0 % (ref 0–6)
GFR SERPL CREATININE-BSD FRML MDRD: >60 ML/MIN/1.73
GLUCOSE BLD-MCNC: 165 MG/DL (ref 74–99)
HCT VFR BLD CALC: 27.5 % (ref 34–48)
HEMOGLOBIN: 7.5 G/DL (ref 11.5–15.5)
HYPOCHROMIA: ABNORMAL
IMMATURE GRANULOCYTES #: 0.03 E9/L
IMMATURE GRANULOCYTES %: 0.5 % (ref 0–5)
LYMPHOCYTES ABSOLUTE: 0.4 E9/L (ref 1.5–4)
LYMPHOCYTES RELATIVE PERCENT: 6.6 % (ref 20–42)
MCH RBC QN AUTO: 22.6 PG (ref 26–35)
MCHC RBC AUTO-ENTMCNC: 27.3 % (ref 32–34.5)
MCV RBC AUTO: 82.8 FL (ref 80–99.9)
MONOCYTES ABSOLUTE: 0.04 E9/L (ref 0.1–0.95)
MONOCYTES RELATIVE PERCENT: 0.7 % (ref 2–12)
NEUTROPHILS ABSOLUTE: 5.59 E9/L (ref 1.8–7.3)
NEUTROPHILS RELATIVE PERCENT: 92 % (ref 43–80)
OVALOCYTES: ABNORMAL
PDW BLD-RTO: 16 FL (ref 11.5–15)
PLATELET # BLD: 279 E9/L (ref 130–450)
PMV BLD AUTO: 8.9 FL (ref 7–12)
POIKILOCYTES: ABNORMAL
POLYCHROMASIA: ABNORMAL
POTASSIUM REFLEX MAGNESIUM: 4.9 MMOL/L (ref 3.5–5)
RBC # BLD: 3.32 E12/L (ref 3.5–5.5)
SODIUM BLD-SCNC: 145 MMOL/L (ref 132–146)
TEAR DROP CELLS: ABNORMAL
WBC # BLD: 6.1 E9/L (ref 4.5–11.5)

## 2022-11-16 PROCEDURE — 85025 COMPLETE CBC W/AUTO DIFF WBC: CPT

## 2022-11-16 PROCEDURE — 36415 COLL VENOUS BLD VENIPUNCTURE: CPT

## 2022-11-16 PROCEDURE — 96372 THER/PROPH/DIAG INJ SC/IM: CPT

## 2022-11-16 PROCEDURE — 6370000000 HC RX 637 (ALT 250 FOR IP): Performed by: FAMILY MEDICINE

## 2022-11-16 PROCEDURE — 94640 AIRWAY INHALATION TREATMENT: CPT

## 2022-11-16 PROCEDURE — 2580000003 HC RX 258: Performed by: FAMILY MEDICINE

## 2022-11-16 PROCEDURE — 51702 INSERT TEMP BLADDER CATH: CPT

## 2022-11-16 PROCEDURE — 80048 BASIC METABOLIC PNL TOTAL CA: CPT

## 2022-11-16 PROCEDURE — 6370000000 HC RX 637 (ALT 250 FOR IP): Performed by: INTERNAL MEDICINE

## 2022-11-16 PROCEDURE — 96376 TX/PRO/DX INJ SAME DRUG ADON: CPT

## 2022-11-16 PROCEDURE — 6360000002 HC RX W HCPCS: Performed by: FAMILY MEDICINE

## 2022-11-16 PROCEDURE — 2700000000 HC OXYGEN THERAPY PER DAY

## 2022-11-16 PROCEDURE — 1200000000 HC SEMI PRIVATE

## 2022-11-16 RX ORDER — PREDNISONE 20 MG/1
40 TABLET ORAL DAILY
Status: DISCONTINUED | OUTPATIENT
Start: 2022-11-16 | End: 2022-11-21

## 2022-11-16 RX ADMIN — FOLIC ACID 1 MG: 1 TABLET ORAL at 09:53

## 2022-11-16 RX ADMIN — QUETIAPINE FUMARATE 50 MG: 50 TABLET, EXTENDED RELEASE ORAL at 21:52

## 2022-11-16 RX ADMIN — OLANZAPINE 5 MG: 5 TABLET, FILM COATED ORAL at 21:52

## 2022-11-16 RX ADMIN — ACETAMINOPHEN 650 MG: 325 TABLET, FILM COATED ORAL at 11:27

## 2022-11-16 RX ADMIN — Medication 10 ML: at 22:01

## 2022-11-16 RX ADMIN — ARFORMOTEROL TARTRATE 15 MCG: 15 SOLUTION RESPIRATORY (INHALATION) at 21:41

## 2022-11-16 RX ADMIN — WATER 1000 MG: 1 INJECTION INTRAMUSCULAR; INTRAVENOUS; SUBCUTANEOUS at 17:22

## 2022-11-16 RX ADMIN — BUDESONIDE 500 MCG: 0.5 SUSPENSION RESPIRATORY (INHALATION) at 11:03

## 2022-11-16 RX ADMIN — ENOXAPARIN SODIUM 40 MG: 100 INJECTION SUBCUTANEOUS at 09:53

## 2022-11-16 RX ADMIN — BUDESONIDE 500 MCG: 0.5 SUSPENSION RESPIRATORY (INHALATION) at 21:41

## 2022-11-16 RX ADMIN — PREDNISONE 40 MG: 20 TABLET ORAL at 14:48

## 2022-11-16 RX ADMIN — Medication 10 ML: at 09:53

## 2022-11-16 RX ADMIN — ACETAMINOPHEN 650 MG: 325 TABLET, FILM COATED ORAL at 21:52

## 2022-11-16 RX ADMIN — SERTRALINE 50 MG: 50 TABLET, FILM COATED ORAL at 21:52

## 2022-11-16 RX ADMIN — GABAPENTIN 300 MG: 300 CAPSULE ORAL at 21:52

## 2022-11-16 RX ADMIN — ARFORMOTEROL TARTRATE 15 MCG: 15 SOLUTION RESPIRATORY (INHALATION) at 11:02

## 2022-11-16 RX ADMIN — PANTOPRAZOLE SODIUM 40 MG: 40 TABLET, DELAYED RELEASE ORAL at 05:30

## 2022-11-16 RX ADMIN — METHYLPREDNISOLONE SODIUM SUCCINATE 40 MG: 40 INJECTION, POWDER, FOR SOLUTION INTRAMUSCULAR; INTRAVENOUS at 02:22

## 2022-11-16 ASSESSMENT — PAIN DESCRIPTION - DESCRIPTORS: DESCRIPTORS: POUNDING;PRESSURE;SHARP

## 2022-11-16 ASSESSMENT — PAIN SCALES - GENERAL: PAINLEVEL_OUTOF10: 7

## 2022-11-16 NOTE — PROGRESS NOTES
Pulmonary  Progress Note    Admit Date: 11/14/2022                            PCP: Anuj Griggs MD  Patient Active Problem List   Diagnosis    COPD (chronic obstructive pulmonary disease) ([address]    MDD (major depressive disorder)    Hematuria    Neurogenic bladder    Chronic respiratory failure with hypoxia (HCC)    Anemia    Gastric wall thickening    Bladder wall thickening    Difficulty in walking, not elsewhere classified    Moderate persistent asthma with (acute) exacerbation    Muscle weakness (generalized)    Need for assistance with personal care    Neuromuscular dysfunction of bladder, unspecified    Personal history of other malignant neoplasm of kidney    Breast pain    COPD exacerbation ([address]    New onset of congestive heart failure (HCC)    Prolonged Q-T interval on ECG    Severe protein-calorie malnutrition (HCC)    Acute exacerbation of chronic obstructive pulmonary disease (COPD) ([address]    UTI (urinary tract infection)       Subjective:  -Patient is well-known to our practice due to history of severe COPD and home O2, 3 lts. Recent discharge October 20, 2022 with a picture of acute exacerbation of COPD. Patient was scheduled for follow-up visit today in our office November 16, 2022. Now complaining of progressive shortness of breath and general weakness with mild cough (nonproductive) for the last week. Because of concern of possible COPD exacerbation pulmonary consult was requested. Patient denies wheezes, hemoptysis or palpitations.   Denies chest pain  Reportedly patient is a chronic indwelling [place] catheter, recently saw hematuria in her urine    Medications:   sodium chloride          methylPREDNISolone  40 mg IntraVENous Q12H    Arformoterol Tartrate  15 mcg Nebulization BID    budesonide  500 mcg Nebulization BID    folic acid  1 mg Oral Daily    gabapentin  300 mg Oral Nightly    OLANZapine  5 mg Oral Nightly    pantoprazole  40 mg Oral QAM AC    QUEtiapine  50 mg Oral Nightly sertraline  50 mg Oral Nightly    sodium chloride flush  5-40 mL IntraVENous 2 times per day    enoxaparin  40 mg SubCUTAneous Daily    cefTRIAXone (ROCEPHIN) IV  1,000 mg IntraVENous Q24H       Vitals:  Tmax:  VITALS:  BP (!) 97/40   Pulse 93   Temp 98.3 °F (36.8 °C) (Oral)   Resp 16   Ht 5' 7\" (1.702 m)   Wt 152 lb (68.9 kg)   LMP 10/21/1985   SpO2 98%   BMI 23.81 kg/m²   24HR INTAKE/OUTPUT:    Intake/Output Summary (Last 24 hours) at 2022 1159  Last data filed at 2022 0015  Gross per 24 hour   Intake 720 ml   Output 1300 ml   Net -580 ml     CURRENT PULSE OXIMETRY:  SpO2: 98 %  24HR PULSE OXIMETRY RANGE:  SpO2  Av.3 %  Min: 98 %  Max: 100 %  CVP:    VENT SETTINGS:      Additional Respiratory Assessments  Heart Rate: 93  Resp: 16  SpO2: 98 %      EXAM:  General: No distress. Alert. Chronically ill  Eyes: PERRL. No sclera icterus. No conjunctival injection. ENT: No discharge. Pharynx clear. Neck: Trachea midline. Normal thyroid. Resp: No accessory muscle use. No crackles. No wheezing. No rhonchi. CV: Regular rate. Regular rhythm. No mumur or rub. No edema. ABD: Non-tender. Non-distended. No masses. No organmegaly. Normal bowel sounds. Skin: Warm and dry. No nodule on exposed extremities. No rash on exposed extremities. Lymph: No cervical LAD. No supraclavicular LAD. M/S: No cyanosis. No joint deformity. No clubbing. Neuro: Awake. Follows commands. Positive pupils/gag/corneals. Normal pain response. I/O: I/O last 3 completed shifts: In: 1080 [P.O.:1080]  Out: 1300 [Urine:1300]  No intake/output data recorded.      Results:  CBC:   Recent Labs     22  1304 11/15/22  0651 22  0604   WBC 5.3 5.2 6.1   HGB 8.0* 7.5* 7.5*   HCT 29.2* 27.3* 27.5*   MCV 82.3 81.3 82.8    296 279     BMP:   Recent Labs     22  1304 11/15/22  0651 22  0604    142 145   K 4.3 4.4 4.9    101 102   CO2 37* 32* 35*   BUN 8 11 12   CREATININE 0.6 0.6 0.5 PT/INR: No results for input(s): PROTIME, INR in the last 72 hours. Cultures:  -Urine culture with Enterococcus  Influenza and COVID-19 tests negative  ABG: noted if resulted  Films:  CXR : Nonacute disease  CT: none     Assessment:  -Acute exacerbation of COPD? History of severe COPD  Possible UTI      Plan:  -Switch IV steroids to oral  Continue home bronchodilators  Consider ID consult  Needs rehab  May benefit of the use of Roflumilast as an outpatient, to prevent further exacerbations readmissions        Christine Perry MD,  M.D., F.C.C.P.

## 2022-11-16 NOTE — PROGRESS NOTES
Pts abdomen was distended and tender. Jarquin wasn't draining. Perfect serve Sierra Breeze NP to replace jarquin and order was placed. 25 [address] Jarquin catheter was place and 500ml output. Draining and secured.

## 2022-11-16 NOTE — ACP (ADVANCE CARE PLANNING)
Advance Care Planning   Healthcare Decision Maker:    Primary Decision Maker: Nelly mock - Brother/Sister - [phone]    Secondary Decision Maker: Sherl Goodell - Brother/Sister - [phone]    Click here to complete Healthcare Decision Makers including selection of the Healthcare Decision Maker Relationship (ie \"Primary\").

## 2022-11-16 NOTE — PROGRESS NOTES
[facility]                                Progress note    Subjective: The patient is awake and alert. Lying in bed without complaints  No acute events overnight. Denies chest pain, angina, SOB     Objective:    BP (!) 97/40   Pulse 93   Temp 98.3 °F (36.8 °C) (Oral)   Resp 16   Ht 5' 7\" (1.702 m)   Wt 152 lb (68.9 kg)   LMP 10/21/1985   SpO2 98%   BMI 23.81 kg/m²     In: 1080 [P.O.:1080]  Out: 1300   In: 1080   Out: 1300 [Urine:1300]    General appearance: NAD, conversant  HEENT: AT/NC, MMM  Neck: FROM, supple  Lungs: Diminished, 3 L O2 at baseline  CV: RRR, no MRGs  Vasc: Radial pulses 2+  Abdomen: Soft, non-tender; no masses or HSM  Extremities: No peripheral edema or digital cyanosis  Skin: no rash, lesions or ulcers  Psych: Alert and oriented to person, place and time  Neuro: Alert and interactive     Recent Labs     11/14/22  1304 11/15/22  0651 11/16/22  0604   WBC 5.3 5.2 6.1   HGB 8.0* 7.5* 7.5*   HCT 29.2* 27.3* 27.5*    296 279       Recent Labs     11/14/22  1304 11/15/22  0651 11/16/22  0604    142 145   K 4.3 4.4 4.9    101 102   CO2 37* 32* 35*   BUN 8 11 12   CREATININE 0.6 0.6 0.5   CALCIUM 9.9 9.8 10.3*       Assessment:    Principal Problem:    Acute exacerbation of chronic obstructive pulmonary disease (COPD) ([facility])  Active Problems:    UTI (urinary tract infection)  Resolved Problems:    * No resolved hospital problems.  *      Plan:  69year old female with a history of COPD presents to the ED with complaints of chills and shortness of breath and is admitted to telemetry unit with     11/15/2022  Acute exacerbation of COPD  -Supplement O2 demands keeping oxygen saturation greater than 92%, currently utilizing 3 L which is baseline  -DuoNebs  Solu-Medrol 40 mg every 12 hours  -Consult Dr. Martinez Reynaga known to him     Chronic UTI in patient with chronic indwelling catheter-most likely colonization  Monitor labs-WBC 5.2  Culture-growing gram-positive cocci greater than 100,000  Monitor for temps  Rocephin 1 g every 24 hours    11/16/2022  Continue Rocephin 1 g  Culture-Enterococcus AVIM >100,000  Pain has improved with addition of steroids  Patient remains on 3 L O2  Await pulmonary consult  IV fluid hydration 100 cc an hour, blood pressure a bit on the low side this evening  Check procalcitonin    Code status: Full  Consultants: Pulmonary     DVT Prophylaxis -Lovenox  PT/OT  Discharge planning     Jose MarinoANGELA laws - CNP  11:17 AM  11/16/2022     Above note edited to reflect my thoughts     I personally saw, examined and provided care for the patient. Radiographs, labs and medication list were reviewed by me independently. The case was discussed in detail and plans for care were established. Review of Kendra MILLER- CNP, documentation was conducted and revisions were made as appropriate directly by me. I agree with the above documented exam, problem list, and plan of care. Johana Bradley MD  6:25 PM  11/16/2022      Above note edited to reflect my thoughts     I personally saw, examined and provided care for the patient. Radiographs, labs and medication list were reviewed by me independently. The case was discussed in detail and plans for care were established. Review of Kendra MILLER- CNP, documentation was conducted and revisions were made as appropriate directly by me. I agree with the above documented exam, problem list, and plan of care.      Johana Bradley MD  6:24 PM  11/16/2022

## 2022-11-17 LAB
ANION GAP SERPL CALCULATED.3IONS-SCNC: 8 MMOL/L (ref 7–16)
ANISOCYTOSIS: ABNORMAL
BASOPHILS ABSOLUTE: 0.01 E9/L (ref 0–0.2)
BASOPHILS RELATIVE PERCENT: 0.2 % (ref 0–2)
BUN BLDV-MCNC: 15 MG/DL (ref 6–23)
CALCIUM SERPL-MCNC: 10.4 MG/DL (ref 8.6–10.2)
CHLORIDE BLD-SCNC: 99 MMOL/L (ref 98–107)
CO2: 38 MMOL/L (ref 22–29)
CREAT SERPL-MCNC: 0.6 MG/DL (ref 0.5–1)
EOSINOPHILS ABSOLUTE: 0 E9/L (ref 0.05–0.5)
EOSINOPHILS RELATIVE PERCENT: 0 % (ref 0–6)
GFR SERPL CREATININE-BSD FRML MDRD: >60 ML/MIN/1.73
GLUCOSE BLD-MCNC: 124 MG/DL (ref 74–99)
HCT VFR BLD CALC: 25.3 % (ref 34–48)
HCT VFR BLD CALC: 28.4 % (ref 34–48)
HEMOGLOBIN: 6.8 G/DL (ref 11.5–15.5)
HEMOGLOBIN: 7.6 G/DL (ref 11.5–15.5)
HYPOCHROMIA: ABNORMAL
IMMATURE GRANULOCYTES #: 0.05 E9/L
IMMATURE GRANULOCYTES %: 0.8 % (ref 0–5)
LYMPHOCYTES ABSOLUTE: 0.86 E9/L (ref 1.5–4)
LYMPHOCYTES RELATIVE PERCENT: 13.3 % (ref 20–42)
MCH RBC QN AUTO: 22.3 PG (ref 26–35)
MCHC RBC AUTO-ENTMCNC: 26.9 % (ref 32–34.5)
MCV RBC AUTO: 83 FL (ref 80–99.9)
MONOCYTES ABSOLUTE: 0.31 E9/L (ref 0.1–0.95)
MONOCYTES RELATIVE PERCENT: 4.8 % (ref 2–12)
NEUTROPHILS ABSOLUTE: 5.24 E9/L (ref 1.8–7.3)
NEUTROPHILS RELATIVE PERCENT: 80.9 % (ref 43–80)
OVALOCYTES: ABNORMAL
PDW BLD-RTO: 15.9 FL (ref 11.5–15)
PLATELET # BLD: 278 E9/L (ref 130–450)
PMV BLD AUTO: 9.5 FL (ref 7–12)
POIKILOCYTES: ABNORMAL
POLYCHROMASIA: ABNORMAL
POTASSIUM REFLEX MAGNESIUM: 4.4 MMOL/L (ref 3.5–5)
PROCALCITONIN: 0.03 NG/ML (ref 0–0.08)
RBC # BLD: 3.05 E12/L (ref 3.5–5.5)
SODIUM BLD-SCNC: 145 MMOL/L (ref 132–146)
TEAR DROP CELLS: ABNORMAL
TROPONIN, HIGH SENSITIVITY: 11 NG/L (ref 0–9)
WBC # BLD: 6.5 E9/L (ref 4.5–11.5)

## 2022-11-17 PROCEDURE — 2700000000 HC OXYGEN THERAPY PER DAY

## 2022-11-17 PROCEDURE — 80048 BASIC METABOLIC PNL TOTAL CA: CPT

## 2022-11-17 PROCEDURE — 6360000002 HC RX W HCPCS: Performed by: FAMILY MEDICINE

## 2022-11-17 PROCEDURE — 84484 ASSAY OF TROPONIN QUANT: CPT

## 2022-11-17 PROCEDURE — 85018 HEMOGLOBIN: CPT

## 2022-11-17 PROCEDURE — 36415 COLL VENOUS BLD VENIPUNCTURE: CPT

## 2022-11-17 PROCEDURE — 94640 AIRWAY INHALATION TREATMENT: CPT

## 2022-11-17 PROCEDURE — 84145 PROCALCITONIN (PCT): CPT

## 2022-11-17 PROCEDURE — 6370000000 HC RX 637 (ALT 250 FOR IP): Performed by: INTERNAL MEDICINE

## 2022-11-17 PROCEDURE — 6370000000 HC RX 637 (ALT 250 FOR IP): Performed by: FAMILY MEDICINE

## 2022-11-17 PROCEDURE — 2580000003 HC RX 258: Performed by: FAMILY MEDICINE

## 2022-11-17 PROCEDURE — 85025 COMPLETE CBC W/AUTO DIFF WBC: CPT

## 2022-11-17 PROCEDURE — 1200000000 HC SEMI PRIVATE

## 2022-11-17 PROCEDURE — 85014 HEMATOCRIT: CPT

## 2022-11-17 PROCEDURE — 93005 ELECTROCARDIOGRAM TRACING: CPT

## 2022-11-17 RX ADMIN — GABAPENTIN 300 MG: 300 CAPSULE ORAL at 20:41

## 2022-11-17 RX ADMIN — SERTRALINE 50 MG: 50 TABLET, FILM COATED ORAL at 20:42

## 2022-11-17 RX ADMIN — Medication 10 ML: at 20:42

## 2022-11-17 RX ADMIN — ARFORMOTEROL TARTRATE 15 MCG: 15 SOLUTION RESPIRATORY (INHALATION) at 20:20

## 2022-11-17 RX ADMIN — ENOXAPARIN SODIUM 40 MG: 100 INJECTION SUBCUTANEOUS at 08:22

## 2022-11-17 RX ADMIN — FOLIC ACID 1 MG: 1 TABLET ORAL at 08:22

## 2022-11-17 RX ADMIN — BUDESONIDE 500 MCG: 0.5 SUSPENSION RESPIRATORY (INHALATION) at 07:32

## 2022-11-17 RX ADMIN — ARFORMOTEROL TARTRATE 15 MCG: 15 SOLUTION RESPIRATORY (INHALATION) at 07:31

## 2022-11-17 RX ADMIN — OLANZAPINE 5 MG: 5 TABLET, FILM COATED ORAL at 20:42

## 2022-11-17 RX ADMIN — Medication 10 ML: at 08:22

## 2022-11-17 RX ADMIN — QUETIAPINE FUMARATE 50 MG: 50 TABLET, EXTENDED RELEASE ORAL at 20:42

## 2022-11-17 RX ADMIN — PANTOPRAZOLE SODIUM 40 MG: 40 TABLET, DELAYED RELEASE ORAL at 05:26

## 2022-11-17 RX ADMIN — WATER 1000 MG: 1 INJECTION INTRAMUSCULAR; INTRAVENOUS; SUBCUTANEOUS at 17:42

## 2022-11-17 RX ADMIN — PREDNISONE 40 MG: 20 TABLET ORAL at 08:22

## 2022-11-17 RX ADMIN — BUDESONIDE 500 MCG: 0.5 SUSPENSION RESPIRATORY (INHALATION) at 20:20

## 2022-11-17 ASSESSMENT — PAIN SCALES - GENERAL: PAINLEVEL_OUTOF10: 10

## 2022-11-17 NOTE — PROGRESS NOTES
[address] PROGRESS NOTE    Patient: Dariana Enriquez  MRN: [id number]  : 1955    Encounter Date: 2022  Encounter Time: 9:03 AM     Date of Admission: .2022 12:37 PM    Consulting Physician:  Primary Care Physician:     Neli Shelby MD     [phone]    Reason for Consultation: Dyspnea, COPD    Problem List:  Patient Active Problem List   Diagnosis    COPD (chronic obstructive pulmonary disease) ([address]    MDD (major depressive disorder)    Hematuria    Neurogenic bladder    Chronic respiratory failure with hypoxia (HCC)    Anemia    Gastric wall thickening    Bladder wall thickening    Difficulty in walking, not elsewhere classified    Moderate persistent asthma with (acute) exacerbation    Muscle weakness (generalized)    Need for assistance with personal care    Neuromuscular dysfunction of bladder, unspecified    Personal history of other malignant neoplasm of kidney    Breast pain    COPD exacerbation ([address]    New onset of congestive heart failure (HCC)    Prolonged Q-T interval on ECG    Severe protein-calorie malnutrition ([address]    Acute exacerbation of chronic obstructive pulmonary disease (COPD) ([address]    UTI (urinary tract infection)    Decompensated chronic obstructive pulmonary disease with exacerbation ([address]     SUBJECTIVE: Patient seen and examined. Overnight the patient had no shortness of breath, cough, increased work of breathing. HOME MEDICATIONS:  Prior to Admission medications    Medication Sig Start Date End Date Taking?  Authorizing Provider   albuterol sulfate HFA (VENTOLIN HFA) 108 (90 Base) MCG/ACT inhaler Inhale 1 puff into the lungs every 4 hours as needed for Wheezing   Yes Historical Provider, MD   Arformoterol Tartrate (BROVANA) 15 MCG/2ML NEBU Take 1 ampule by nebulization 2 times daily   Yes Historical Provider, MD   budesonide (PULMICORT) 0.5 MG/2ML nebulizer suspension Take 1 ampule by nebulization 2 times daily   Yes Historical Provider, MD   gabapentin (NEURONTIN) 300 MG capsule Take 300 mg by mouth at bedtime. Yes Historical Provider, MD   OLANZapine (ZYPREXA) 5 MG tablet Take 5 mg by mouth nightly   Yes Historical Provider, MD   diazePAM (VALIUM) 10 MG tablet Take 10 mg by mouth nightly as needed for Sleep.    Yes Historical Provider, MD   omeprazole (PRILOSEC) 40 MG delayed release capsule Take 40 mg by mouth daily   Yes Historical Provider, MD   sertraline (ZOLOFT) 50 MG tablet Take 1 tablet by mouth nightly 10/19/22   Christy Precise, APRN - CNP   QUEtiapine (SEROQUEL XR) 50 MG extended release tablet Take 50 mg by mouth nightly    Historical Provider, MD   OXYGEN Inhale 3 L into the lungs continuous    Historical Provider, MD   vitamin D (ERGOCALCIFEROL) 74494 UNITS CAPS capsule Take 50,000 Units by mouth once a week Given Saturday    Historical Provider, MD   Multiple Vitamins-Iron (DAILY-FEDERICO/IRON) TABS Take 1 tablet by mouth daily     Historical Provider, MD   folic acid (FOLVITE) 1 MG tablet Take 1 mg by mouth daily     Historical Provider, MD   alendronate (FOSAMAX) 70 MG tablet Take 70 mg by mouth every 7 days Given Sunday    Historical Provider, MD       CURRENT MEDICATIONS:  Current Facility-Administered Medications: predniSONE (DELTASONE) tablet 40 mg, 40 mg, Oral, Daily  albuterol (PROVENTIL) nebulizer solution 2.5 mg, 2.5 mg, Nebulization, Q4H PRN  Arformoterol Tartrate (BROVANA) nebulizer solution 15 mcg, 15 mcg, Nebulization, BID  budesonide (PULMICORT) nebulizer suspension 500 mcg, 500 mcg, Nebulization, BID  diazePAM (VALIUM) tablet 10 mg, 10 mg, Oral, Nightly PRN  folic acid (FOLVITE) tablet 1 mg, 1 mg, Oral, Daily  gabapentin (NEURONTIN) capsule 300 mg, 300 mg, Oral, Nightly  OLANZapine (ZYPREXA) tablet 5 mg, 5 mg, Oral, Nightly  pantoprazole (PROTONIX) tablet 40 mg, 40 mg, Oral, QAM AC  QUEtiapine (SEROQUEL XR) extended release tablet 50 mg, 50 mg, Oral, Nightly  sertraline (ZOLOFT) tablet 50 mg, 50 mg, Oral, Nightly  sodium chloride flush 0.9 % injection 5-40 mL, 5-40 mL, IntraVENous, 2 times per day  sodium chloride flush 0.9 % injection 5-40 mL, 5-40 mL, IntraVENous, PRN  0.9 % sodium chloride infusion, , IntraVENous, PRN  enoxaparin (LOVENOX) injection 40 mg, 40 mg, SubCUTAneous, Daily  ondansetron (ZOFRAN-ODT) disintegrating tablet 4 mg, 4 mg, Oral, Q8H PRN **OR** ondansetron (ZOFRAN) injection 4 mg, 4 mg, IntraVENous, Q6H PRN  acetaminophen (TYLENOL) tablet 650 mg, 650 mg, Oral, Q6H PRN **OR** acetaminophen (TYLENOL) suppository 650 mg, 650 mg, Rectal, Q6H PRN  magnesium hydroxide (MILK OF MAGNESIA) 400 MG/5ML suspension 30 mL, 30 mL, Oral, Daily PRN  cefTRIAXone (ROCEPHIN) 1,000 mg in sterile water 10 mL IV syringe, 1,000 mg, IntraVENous, Q24H    ALLERGIES:  Allergies   Allergen Reactions    Sulfa Antibiotics Anaphylaxis       REVIEW OF SYSTEMS:  General ROS: Negative For: chills, fatigue, fever, malaise, night sweats or sleep disturbance   ENT ROS: Negative For: epistaxis, headaches, sinus pain, sneezing or sore throat   Respiratory ROS: Negative For: hemoptysis, pleuritic type chest pains  Cardiovascular ROS: Negative For: chest pain, dyspnea on exertion, irregular heartbeat, loss of consciousness, murmur, orthopnea or palpitations   Gastrointestinal ROS: Negative For: abdominal pain, appetite loss, blood in stools, change in bowel habits, change in stools, constipation, diarrhea, hematemesis, melena, nausea/vomiting or stool incontinence     OBJECTIVE:  PHYSICAL EXAMINATION:     VITAL SIGNS:  /63   Pulse (!) 110   Temp 97.7 °F (36.5 °C) (Oral)   Resp 16   Ht 5' 7\" (1.702 m)   Wt 152 lb (68.9 kg)   LMP 10/21/1985   SpO2 97%   BMI 23.81 kg/m²   Wt Readings from Last 3 Encounters:   11/14/22 152 lb (68.9 kg)   10/19/22 156 lb 8 oz (71 kg)   05/29/22 159 lb (72.1 kg)     Temp Readings from Last 3 Encounters:   11/17/22 97.7 °F (36.5 °C) (Oral)   10/20/22 (!) 96.6 °F (35.9 °C) (Temporal)   22 97.2 °F (36.2 °C) (Temporal)     TMAX:  BP Readings from Last 3 Encounters:   22 124/63   10/20/22 (!) 102/57   22 (!) 140/71     Pulse Readings from Last 3 Encounters:   22 (!) 110   10/20/22 70   22 93       CURRENT PULSE OXIMETRY: SpO2: 97 %  24HR PULSE OXIMETRY RANGE: SpO2  Av.2 %  Min: 94 %  Max: 100 %  CVP:      ________________________________________________________________________    VENTILATOR SETTINGS (if applicable): Additional Respiratory Assessments  Heart Rate: (!) 110  Resp: 16  SpO2: 97 %  ETCO2:  Peak Inspiratory Pressure:  End-Inspiratory Plateau Pressure:    ABG:    Recent Labs     22  1401   PH 7.411   PO2 66.5*   PCO2 54.4*   HCO3 33.8*   BE 8.1*   O2SAT 92.2   METHB 0.5   O2HB 90.9*   COHB 0.9   O2CON 11.1   HHB 7.7*   THB 8.6*           ________________________________________________________________________    IV ACCESS:    NUTRITION: ADULT DIET; Regular; Low Fat/Low Chol/High Fiber/TANA    INTAKE/OUTPUTS:  I/O last 3 completed shifts:   In: 600 [P.O.:600]  Out:  [Urine:]    Intake/Output Summary (Last 24 hours) at 2022 0903  Last data filed at 2022 9056  Gross per 24 hour   Intake 240 ml   Output 1225 ml   Net -985 ml       General Appearance: alert and oriented to person, place and time, well-developed and   well-nourished, in no acute distress   Eyes: pupils equal, round, and reactive to light, extraocular eye movements intact, conjunctivae normal and sclera anicteric   Neck: neck supple and non tender without mass, no thyromegaly, no thyroid nodules and no cervical adenopathy   Pulmonary/Chest: decreased breath sounds at bases, no accessory muscles of inspiration noted  Cardiovascular: normal rate, regular rhythm, normal S1 and S2, no murmurs, rubs, clicks or gallops, distal pulses intact, no carotid bruits, no murmurs, no gallops, no carotid bruits and no JVD   Abdomen: soft, non-tender, non-distended, normal bowel sounds, no masses or organomegaly   Extremities: no cyanosis, no clubbing, no edema  Musculoskeletal: normal range of motion, no joint swelling, deformity or tenderness   Neurologic: reflexes normal and symmetric, no cranial nerve deficit noted    LABS/IMAGING:    CBC:  Lab Results   Component Value Date    WBC 6.5 11/17/2022    HGB 7.6 (L) 11/17/2022    HCT 28.4 (L) 11/17/2022    MCV 83.0 11/17/2022     11/17/2022    LYMPHOPCT 6.6 (L) 11/16/2022    RBC 3.05 (L) 11/17/2022    MCH 22.3 (L) 11/17/2022    MCHC 26.9 (L) 11/17/2022    RDW 15.9 (H) 11/17/2022    NEUTOPHILPCT 92.0 (H) 11/16/2022    MONOPCT 0.7 (L) 11/16/2022    BASOPCT 0.2 11/16/2022    NEUTROABS 5.59 11/16/2022    LYMPHSABS 0.40 (L) 11/16/2022    MONOSABS 0.04 (L) 11/16/2022    EOSABS 0.00 (L) 11/16/2022    BASOSABS 0.01 11/16/2022       Recent Labs     11/17/22  0748 11/17/22  0608 11/16/22  0604 11/15/22  0651   WBC  --  6.5 6.1 5.2   HGB 7.6* 6.8* 7.5* 7.5*   HCT 28.4* 25.3* 27.5* 27.3*   MCV  --  83.0 82.8 81.3   PLT  --  278 279 296       BMP:   Recent Labs     11/15/22  0651 11/16/22  0604 11/17/22  0608    145 145   K 4.4 4.9 4.4    102 99   CO2 32* 35* 38*   BUN 11 12 15   CREATININE 0.6 0.5 0.6       MG:   Lab Results   Component Value Date/Time    MG 2.6 10/11/2022 02:28 PM     Ca/Phos:   Lab Results   Component Value Date    CALCIUM 10.4 (H) 11/17/2022    PHOS 3.8 10/11/2022     Amylase:   Lab Results   Component Value Date/Time    AMYLASE 289 02/27/2021 01:48 AM     Lipase:   Lab Results   Component Value Date    LIPASE 42 10/12/2022     LIVER PROFILE:   Recent Labs     11/14/22  1304   AST 12   ALT 8   BILITOT <0.2   ALKPHOS 85       PT/INR: No results for input(s): PROTIME, INR in the last 72 hours. APTT: No results for input(s): APTT in the last 72 hours.     Cardiac Enzymes:  Lab Results   Component Value Date    CKTOTAL 108 02/26/2021    CKMB 3.8 02/26/2021    TROPONINI <0.01 02/27/2021 Hgb A1C:   Lab Results   Component Value Date    LABA1C 6.0 (H) 02/19/2014     No results found for: EAG  KAYDEN: No results found for: KAYDEN  ESR: No results found for: SEDRATE  CRP: No results found for: CRP  D Dimer:   Lab Results   Component Value Date    DDIMER <200 11/14/2022     Folate and B12:   Lab Results   Component Value Date    CNCPYBIQ65 605 10/11/2022   ,   Lab Results   Component Value Date    FOLATE 12.1 10/11/2022       Lactic Acid:   Lab Results   Component Value Date    LACTA 1.4 10/11/2022     Ammonia:   Cortisol:  Thyroid Studies:  Lab Results   Component Value Date    TSH 0.140 (L) 10/11/2022    Y6HBHUF 100.00 02/18/2014    O4YRFAK 5.5 02/18/2014     CT ABDOMEN PELVIS WO CONTRAST Additional Contrast? None   Final Result   1. Generalized thickened appearance of urinary bladder wall related to   nondistention or cystitis. 2. Diverticulosis. 3. No evidence of renal or ureteral calculus. 4. Numerous calcifications throughout the pancreas suggesting chronic   calcific pancreatitis. XR CHEST PORTABLE   Final Result   1. Stable chronic interstitial opacities bilaterally. No focal pneumonia or   evidence of pleural effusion. 2.  Emphysematous changes. CT Chest 3/4/2022:  Advanced centrilobular emphysema with bronchitis. There is no focal   consolidation. Annual surveillance is recommended. Diffuse wall thickening and haziness of the urinary bladder concerning for   cystitis as before. Chronic calcified pancreatitis with significant pancreatic ductal dilatation. Please correlate with pancreatic enzymes. Uncomplicated diverticulosis of the colon.        ASSESSMENT:  1.)  Acute Exacerbation of COPD  2.) H/O Severe Advanced Centrilobular Ephysema/COPD  3.) Possible UTI  4.) Diverticulosis WO Diverticulitis   5.) Anemia  6.) Lymphopenia     PLAN:  1.) steroids to PO Prednisone   2.) Duoneb, Pulmicort, Brovana  3.) Seroquel  4.) Ceftriaxone     - supportive care to continue   - PRBC as needed  - no role for bronchoscopy at this time    Thank you Nelida Hugo MD very much for allowing me to see this patient in consultation and follow up. Care reviewed with nursing staff, medical and surgical specialty care, primary care and the patient's family as available. Restraints are ordered when the patient can do harm to him/herself by pulling out devices.     Louis Murdock MD, M.BRAIN.

## 2022-11-17 NOTE — PROGRESS NOTES
[facility]                                Progress note    Subjective: The patient is awake and alert. Lying in bed without complaints  No acute events overnight. Denies chest pain, angina, SOB -currently utilizing 2 L O2    Objective:    /63   Pulse (!) 110   Temp 97.7 °F (36.5 °C) (Oral)   Resp 16   Ht 5' 7\" (1.702 m)   Wt 152 lb (68.9 kg)   LMP 10/21/1985   SpO2 97%   BMI 23.81 kg/m²     In: 480 [P.O.:480]  Out: 1225   In: 480   Out: 1225 [Urine:1225]    General appearance: NAD, conversant  HEENT: AT/NC, MMM  Neck: FROM, supple  Lungs: Diminished, 2 L O2 at baseline  CV: RRR, no MRGs  Vasc: Radial pulses 2+  Abdomen: Soft, non-tender; no masses or HSM  Extremities: No peripheral edema or digital cyanosis  Skin: no rash, lesions or ulcers  Psych: Alert and oriented to person, place and time  Neuro: Alert and interactive     Recent Labs     11/15/22  0651 11/16/22  0604 11/17/22  0608 11/17/22  0748   WBC 5.2 6.1 6.5  --    HGB 7.5* 7.5* 6.8* 7.6*   HCT 27.3* 27.5* 25.3* 28.4*    279 278  --        Recent Labs     11/15/22  0651 11/16/22  0604 11/17/22  0608    145 145   K 4.4 4.9 4.4    102 99   CO2 32* 35* 38*   BUN 11 12 15   CREATININE 0.6 0.5 0.6   CALCIUM 9.8 10.3* 10.4*       Assessment:    Principal Problem:    Acute exacerbation of chronic obstructive pulmonary disease (COPD) (HCC)  Active Problems:    UTI (urinary tract infection)    Decompensated chronic obstructive pulmonary disease with exacerbation (HCC)  Resolved Problems:    * No resolved hospital problems.  *      Plan:  69year old female with a history of COPD presents to the ED with complaints of chills and shortness of breath and is admitted to telemetry unit with     11/15/2022  Acute exacerbation of COPD  -Supplement O2 demands keeping oxygen saturation greater than 92%, currently utilizing 3 L which is baseline  -DuoNebs  Solu-Medrol 40 mg every 12 hours  -Consult  Rishi-patient known to him     Chronic UTI in patient with chronic indwelling catheter-most likely colonization  Monitor labs-WBC 5.2  Culture-growing gram-positive cocci greater than 100,000  Monitor for temps  Rocephin 1 g every 24 hours    11/16/2022  Continue Rocephin 1 g  Culture-Enterococcus AVIM >100,000  Pain has improved with addition of steroids  Patient remains on 3 L O2  Await pulmonary consult  IV fluid hydration 100 cc an hour, blood pressure a bit on the low side this evening  Check procalcitonin    11/17/2022  Culture continues to grow  Continue on Rocephin 1 g  SBP improved  Patient requiring 2 L O2  Appreciate pulmonary -input  P.o. prednisone  Will continue to monitor  Blood count is decreased to 6.8/25-check Hemoccult stool  Transfuse 1 unit PRBC  Hold offending agents  If hemoglobin continues to decline, surgical evaluation    Code status: Full  Consultants: Pulmonary     DVT Prophylaxis -Lovenox  PT/OT  Discharge planning     ANGELA Krishna CNP  9:40 AM  11/17/2022     Above note edited to reflect my thoughts     I personally saw, examined and provided care for the patient. Radiographs, labs and medication list were reviewed by me independently. The case was discussed in detail and plans for care were established. Review of Kendra MOTTA CNP, documentation was conducted and revisions were made as appropriate directly by me. I agree with the above documented exam, problem list, and plan of care.      Melissa Mitchell MD  4:37 PM  11/17/2022

## 2022-11-17 NOTE — CARE COORDINATION
11/17 Care Coordination: Still waiting on [facility]. Cont IV Rocephin. Pulm following. steroids to PO Prednisone. Pt is current with [facility], and O2 is from [facility]. Pt lives alone in a house with 1-2 steps to enter. The bath is on the 1st floor & the bedroom on the 2nd floor with apprx 6-8 steps. PTA pt was independent with 02 - 3L/NC continuously. Pt still wants to go home when discharged. Sebastian Moss She will need a ride home. Will need to call [employer]. CM/SW will continue to follow for discharge planning.    Pierce STEIN,RN--BC  [phone]

## 2022-11-18 LAB
ADENOVIRUS BY PCR: NOT DETECTED
B.E.: 14.7 MMOL/L (ref -3–3)
BORDETELLA PARAPERTUSSIS BY PCR: NOT DETECTED
BORDETELLA PERTUSSIS BY PCR: NOT DETECTED
CHLAMYDOPHILIA PNEUMONIAE BY PCR: NOT DETECTED
COHB: 0.9 % (ref 0–1.5)
CORONAVIRUS 229E BY PCR: NOT DETECTED
CORONAVIRUS HKU1 BY PCR: NOT DETECTED
CORONAVIRUS NL63 BY PCR: NOT DETECTED
CORONAVIRUS OC43 BY PCR: NOT DETECTED
CRITICAL: ABNORMAL
DATE ANALYZED: ABNORMAL
DATE OF COLLECTION: ABNORMAL
HCO3: 43.1 MMOL/L (ref 22–26)
HCT VFR BLD CALC: 26.4 % (ref 34–48)
HCT VFR BLD CALC: 27.3 % (ref 34–48)
HEMOGLOBIN: 7.1 G/DL (ref 11.5–15.5)
HEMOGLOBIN: 7.2 G/DL (ref 11.5–15.5)
HHB: 6.6 % (ref 0–5)
HUMAN METAPNEUMOVIRUS BY PCR: NOT DETECTED
HUMAN RHINOVIRUS/ENTEROVIRUS BY PCR: NOT DETECTED
INFLUENZA A BY PCR: NOT DETECTED
INFLUENZA B BY PCR: NOT DETECTED
LAB: ABNORMAL
Lab: ABNORMAL
METHB: 0.5 % (ref 0–1.5)
MODE: ABNORMAL
MYCOPLASMA PNEUMONIAE BY PCR: NOT DETECTED
O2 SATURATION: 93.3 % (ref 92–98.5)
O2HB: 92 % (ref 94–97)
OPERATOR ID: 2860
PARAINFLUENZA VIRUS 1 BY PCR: NOT DETECTED
PARAINFLUENZA VIRUS 2 BY PCR: NOT DETECTED
PARAINFLUENZA VIRUS 3 BY PCR: NOT DETECTED
PARAINFLUENZA VIRUS 4 BY PCR: NOT DETECTED
PATIENT TEMP: 37 C
PCO2: 83.5 MMHG (ref 35–45)
PH BLOOD GAS: 7.33 (ref 7.35–7.45)
PO2: 72.1 MMHG (ref 75–100)
RESPIRATORY SYNCYTIAL VIRUS BY PCR: NOT DETECTED
SARS-COV-2, PCR: NOT DETECTED
SOURCE, BLOOD GAS: ABNORMAL
THB: 9.2 G/DL (ref 11.5–16.5)
TIME ANALYZED: 1201

## 2022-11-18 PROCEDURE — 85018 HEMOGLOBIN: CPT

## 2022-11-18 PROCEDURE — 6370000000 HC RX 637 (ALT 250 FOR IP): Performed by: FAMILY MEDICINE

## 2022-11-18 PROCEDURE — 82805 BLOOD GASES W/O2 SATURATION: CPT

## 2022-11-18 PROCEDURE — 2580000003 HC RX 258: Performed by: FAMILY MEDICINE

## 2022-11-18 PROCEDURE — 36415 COLL VENOUS BLD VENIPUNCTURE: CPT

## 2022-11-18 PROCEDURE — 6360000002 HC RX W HCPCS: Performed by: FAMILY MEDICINE

## 2022-11-18 PROCEDURE — 94660 CPAP INITIATION&MGMT: CPT

## 2022-11-18 PROCEDURE — 6370000000 HC RX 637 (ALT 250 FOR IP): Performed by: INTERNAL MEDICINE

## 2022-11-18 PROCEDURE — 85014 HEMATOCRIT: CPT

## 2022-11-18 PROCEDURE — 97530 THERAPEUTIC ACTIVITIES: CPT

## 2022-11-18 PROCEDURE — 0202U NFCT DS 22 TRGT SARS-COV-2: CPT

## 2022-11-18 PROCEDURE — 94640 AIRWAY INHALATION TREATMENT: CPT

## 2022-11-18 PROCEDURE — 1200000000 HC SEMI PRIVATE

## 2022-11-18 PROCEDURE — 2700000000 HC OXYGEN THERAPY PER DAY

## 2022-11-18 RX ADMIN — FOLIC ACID 1 MG: 1 TABLET ORAL at 07:59

## 2022-11-18 RX ADMIN — GABAPENTIN 300 MG: 300 CAPSULE ORAL at 21:38

## 2022-11-18 RX ADMIN — QUETIAPINE FUMARATE 50 MG: 50 TABLET, EXTENDED RELEASE ORAL at 21:38

## 2022-11-18 RX ADMIN — PANTOPRAZOLE SODIUM 40 MG: 40 TABLET, DELAYED RELEASE ORAL at 05:30

## 2022-11-18 RX ADMIN — ARFORMOTEROL TARTRATE 15 MCG: 15 SOLUTION RESPIRATORY (INHALATION) at 08:33

## 2022-11-18 RX ADMIN — PREDNISONE 40 MG: 20 TABLET ORAL at 07:59

## 2022-11-18 RX ADMIN — ARFORMOTEROL TARTRATE 15 MCG: 15 SOLUTION RESPIRATORY (INHALATION) at 19:17

## 2022-11-18 RX ADMIN — OLANZAPINE 5 MG: 5 TABLET, FILM COATED ORAL at 21:38

## 2022-11-18 RX ADMIN — Medication 10 ML: at 21:38

## 2022-11-18 RX ADMIN — BUDESONIDE 500 MCG: 0.5 SUSPENSION RESPIRATORY (INHALATION) at 19:17

## 2022-11-18 RX ADMIN — BUDESONIDE 500 MCG: 0.5 SUSPENSION RESPIRATORY (INHALATION) at 08:33

## 2022-11-18 RX ADMIN — WATER 1000 MG: 1 INJECTION INTRAMUSCULAR; INTRAVENOUS; SUBCUTANEOUS at 18:55

## 2022-11-18 RX ADMIN — SODIUM CHLORIDE, PRESERVATIVE FREE 10 ML: 5 INJECTION INTRAVENOUS at 18:55

## 2022-11-18 RX ADMIN — Medication 10 ML: at 08:00

## 2022-11-18 RX ADMIN — SERTRALINE 50 MG: 50 TABLET, FILM COATED ORAL at 21:38

## 2022-11-18 ASSESSMENT — PAIN SCALES - GENERAL: PAINLEVEL_OUTOF10: 0

## 2022-11-18 NOTE — PROGRESS NOTES
[address] PROGRESS NOTE    Patient: Brunilad Garcia  MRN: [id number]  : 1955    Encounter Date: 2022  Encounter Time: 11:38 AM     Date of Admission: .2022 12:37 PM    Consulting Physician:  Primary Care Physician:     Tim Burdick MD     [phone]    Reason for Consultation: Dyspnea, COPD    Problem List:  Patient Active Problem List   Diagnosis    COPD (chronic obstructive pulmonary disease) ([address]    MDD (major depressive disorder)    Hematuria    Neurogenic bladder    Chronic respiratory failure with hypoxia (HCC)    Anemia    Gastric wall thickening    Bladder wall thickening    Difficulty in walking, not elsewhere classified    Moderate persistent asthma with (acute) exacerbation    Muscle weakness (generalized)    Need for assistance with personal care    Neuromuscular dysfunction of bladder, unspecified    Personal history of other malignant neoplasm of kidney    Breast pain    COPD exacerbation ([address]    New onset of congestive heart failure (HCC)    Prolonged Q-T interval on ECG    Severe protein-calorie malnutrition ([address]    Acute exacerbation of chronic obstructive pulmonary disease (COPD) ([address]    UTI (urinary tract infection)    Decompensated chronic obstructive pulmonary disease with exacerbation ([address]     SUBJECTIVE: Patient seen and examined. Overnight the patient had no shortness of breath, cough, increased work of breathing. HOME MEDICATIONS:  Prior to Admission medications    Medication Sig Start Date End Date Taking?  Authorizing Provider   albuterol sulfate HFA (VENTOLIN HFA) 108 (90 Base) MCG/ACT inhaler Inhale 1 puff into the lungs every 4 hours as needed for Wheezing   Yes Historical Provider, MD   Arformoterol Tartrate (BROVANA) 15 MCG/2ML NEBU Take 1 ampule by nebulization 2 times daily   Yes Historical Provider, MD   budesonide (PULMICORT) 0.5 MG/2ML nebulizer suspension Take 1 ampule by nebulization 2 times daily   Yes Historical Provider, MD   gabapentin (NEURONTIN) 300 MG capsule Take 300 mg by mouth at bedtime. Yes Historical Provider, MD   OLANZapine (ZYPREXA) 5 MG tablet Take 5 mg by mouth nightly   Yes Historical Provider, MD   diazePAM (VALIUM) 10 MG tablet Take 10 mg by mouth nightly as needed for Sleep.    Yes Historical Provider, MD   omeprazole (PRILOSEC) 40 MG delayed release capsule Take 40 mg by mouth daily   Yes Historical Provider, MD   sertraline (ZOLOFT) 50 MG tablet Take 1 tablet by mouth nightly 10/19/22   ANGELA Jones - CNP   QUEtiapine (SEROQUEL XR) 50 MG extended release tablet Take 50 mg by mouth nightly    Historical Provider, MD   OXYGEN Inhale 3 L into the lungs continuous    Historical Provider, MD   vitamin D (ERGOCALCIFEROL) 55284 UNITS CAPS capsule Take 50,000 Units by mouth once a week Given Saturday    Historical Provider, MD   Multiple Vitamins-Iron (DAILY-FEDERICO/IRON) TABS Take 1 tablet by mouth daily     Historical Provider, MD   folic acid (FOLVITE) 1 MG tablet Take 1 mg by mouth daily     Historical Provider, MD   alendronate (FOSAMAX) 70 MG tablet Take 70 mg by mouth every 7 days Given Sunday    Historical Provider, MD       CURRENT MEDICATIONS:  Current Facility-Administered Medications: predniSONE (DELTASONE) tablet 40 mg, 40 mg, Oral, Daily  albuterol (PROVENTIL) nebulizer solution 2.5 mg, 2.5 mg, Nebulization, Q4H PRN  Arformoterol Tartrate (BROVANA) nebulizer solution 15 mcg, 15 mcg, Nebulization, BID  budesonide (PULMICORT) nebulizer suspension 500 mcg, 500 mcg, Nebulization, BID  diazePAM (VALIUM) tablet 10 mg, 10 mg, Oral, Nightly PRN  folic acid (FOLVITE) tablet 1 mg, 1 mg, Oral, Daily  gabapentin (NEURONTIN) capsule 300 mg, 300 mg, Oral, Nightly  OLANZapine (ZYPREXA) tablet 5 mg, 5 mg, Oral, Nightly  pantoprazole (PROTONIX) tablet 40 mg, 40 mg, Oral, QAM AC  QUEtiapine (SEROQUEL XR) extended release tablet 50 mg, 50 mg, Oral, Nightly  sertraline (ZOLOFT) tablet 50 mg, 50 mg, Oral, Nightly  sodium chloride flush 0.9 % injection 5-40 mL, 5-40 mL, IntraVENous, 2 times per day  sodium chloride flush 0.9 % injection 5-40 mL, 5-40 mL, IntraVENous, PRN  0.9 % sodium chloride infusion, , IntraVENous, PRN  [Held by provider] enoxaparin (LOVENOX) injection 40 mg, 40 mg, SubCUTAneous, Daily  ondansetron (ZOFRAN-ODT) disintegrating tablet 4 mg, 4 mg, Oral, Q8H PRN **OR** ondansetron (ZOFRAN) injection 4 mg, 4 mg, IntraVENous, Q6H PRN  acetaminophen (TYLENOL) tablet 650 mg, 650 mg, Oral, Q6H PRN **OR** acetaminophen (TYLENOL) suppository 650 mg, 650 mg, Rectal, Q6H PRN  magnesium hydroxide (MILK OF MAGNESIA) 400 MG/5ML suspension 30 mL, 30 mL, Oral, Daily PRN  cefTRIAXone (ROCEPHIN) 1,000 mg in sterile water 10 mL IV syringe, 1,000 mg, IntraVENous, Q24H    ALLERGIES:  Allergies   Allergen Reactions    Sulfa Antibiotics Anaphylaxis       REVIEW OF SYSTEMS:  General ROS: Negative For: chills, fatigue, fever, malaise, night sweats or sleep disturbance   ENT ROS: Negative For: epistaxis, headaches, sinus pain, sneezing or sore throat   Respiratory ROS: Negative For: hemoptysis, pleuritic type chest pains  Cardiovascular ROS: Negative For: chest pain, dyspnea on exertion, irregular heartbeat, loss of consciousness, murmur, orthopnea or palpitations   Gastrointestinal ROS: Negative For: abdominal pain, appetite loss, blood in stools, change in bowel habits, change in stools, constipation, diarrhea, hematemesis, melena, nausea/vomiting or stool incontinence     OBJECTIVE:  PHYSICAL EXAMINATION:     VITAL SIGNS:  BP (!) 108/53   Pulse 96   Temp 97.7 °F (36.5 °C) (Oral)   Resp 24   Ht 5' 7\" (1.702 m)   Wt 152 lb (68.9 kg)   LMP 10/21/1985   SpO2 97%   BMI 23.81 kg/m²   Wt Readings from Last 3 Encounters:   11/14/22 152 lb (68.9 kg)   10/19/22 156 lb 8 oz (71 kg)   05/29/22 159 lb (72.1 kg)     Temp Readings from Last 3 Encounters:   11/18/22 97.7 °F (36.5 °C) (Oral)   10/20/22 Pushpa Pucker ) 96.6 °F (35.9 °C) (Temporal)   22 97.2 °F (36.2 °C) (Temporal)     TMAX:  BP Readings from Last 3 Encounters:   22 (!) 108/53   10/20/22 (!) 102/57   22 (!) 140/71     Pulse Readings from Last 3 Encounters:   22 96   10/20/22 70   22 93       CURRENT PULSE OXIMETRY: SpO2: 97 %  24HR PULSE OXIMETRY RANGE: SpO2  Av %  Min: 97 %  Max: 100 %  CVP:      ________________________________________________________________________    VENTILATOR SETTINGS (if applicable): Additional Respiratory Assessments  Heart Rate: 96  Resp: 24  SpO2: 97 %  ETCO2:  Peak Inspiratory Pressure:  End-Inspiratory Plateau Pressure:    ABG:    No results for input(s): PH, PO2, PCO2, HCO3, BE, O2SAT, METHB, O2HB, COHB, O2CON, HHB, THB in the last 72 hours. ________________________________________________________________________    IV ACCESS:    NUTRITION: ADULT DIET; Regular; Low Fat/Low Chol/High Fiber/TANA    INTAKE/OUTPUTS:  I/O last 3 completed shifts:   In: 480 [P.O.:480]  Out: 2375 [Urine:2375]    Intake/Output Summary (Last 24 hours) at 2022 1138  Last data filed at 2022 1102  Gross per 24 hour   Intake 360 ml   Output 1950 ml   Net -1590 ml     General Appearance: alert and oriented to person, place and time, well-developed and   well-nourished, in no acute distress   Eyes: pupils equal, round, and reactive to light, extraocular eye movements intact, conjunctivae normal and sclera anicteric   Neck: neck supple and non tender without mass, no thyromegaly, no thyroid nodules and no cervical adenopathy   Pulmonary/Chest: decreased breath sounds at bases, no accessory muscles of inspiration noted, mild end-expiratory wheezes noted   Cardiovascular: normal rate, regular rhythm, normal S1 and S2, no murmurs, rubs, clicks or gallops, distal pulses intact, no carotid bruits, no murmurs, no gallops, no carotid bruits and no JVD   Abdomen: soft, non-tender, non-distended, normal bowel sounds, no masses or organomegaly   Extremities: no cyanosis, no clubbing, no edema  Musculoskeletal: normal range of motion, no joint swelling, deformity or tenderness   Neurologic: reflexes normal and symmetric, no cranial nerve deficit noted    LABS/IMAGING:    CBC:  Lab Results   Component Value Date    WBC 6.5 11/17/2022    HGB 7.1 (L) 11/18/2022    HCT 26.4 (L) 11/18/2022    MCV 83.0 11/17/2022     11/17/2022    LYMPHOPCT 13.3 (L) 11/17/2022    RBC 3.05 (L) 11/17/2022    MCH 22.3 (L) 11/17/2022    MCHC 26.9 (L) 11/17/2022    RDW 15.9 (H) 11/17/2022    NEUTOPHILPCT 80.9 (H) 11/17/2022    MONOPCT 4.8 11/17/2022    BASOPCT 0.2 11/17/2022    NEUTROABS 5.24 11/17/2022    LYMPHSABS 0.86 (L) 11/17/2022    MONOSABS 0.31 11/17/2022    EOSABS 0.00 (L) 11/17/2022    BASOSABS 0.01 11/17/2022       Recent Labs     11/18/22  0708 11/17/22  0748 11/17/22  0608 11/16/22  0604 11/15/22  0651   WBC  --   --  6.5 6.1 5.2   HGB 7.1* 7.6* 6.8* 7.5* 7.5*   HCT 26.4* 28.4* 25.3* 27.5* 27.3*   MCV  --   --  83.0 82.8 81.3   PLT  --   --  278 279 296         BMP:   Recent Labs     11/16/22  0604 11/17/22  0608    145   K 4.9 4.4    99   CO2 35* 38*   BUN 12 15   CREATININE 0.5 0.6         MG:   Lab Results   Component Value Date/Time    MG 2.6 10/11/2022 02:28 PM     Ca/Phos:   Lab Results   Component Value Date    CALCIUM 10.4 (H) 11/17/2022    PHOS 3.8 10/11/2022     Amylase:   Lab Results   Component Value Date/Time    AMYLASE 289 02/27/2021 01:48 AM     Lipase:   Lab Results   Component Value Date    LIPASE 42 10/12/2022     LIVER PROFILE:   No results for input(s): AST, ALT, LIPASE, BILIDIR, BILITOT, ALKPHOS in the last 72 hours. Invalid input(s): AMYLASE,  ALB      PT/INR: No results for input(s): PROTIME, INR in the last 72 hours. APTT: No results for input(s): APTT in the last 72 hours.     Cardiac Enzymes:  Lab Results   Component Value Date    CKTOTAL 108 02/26/2021    CKMB 3.8 02/26/2021 TROPONINI <0.01 02/27/2021       Hgb A1C:   Lab Results   Component Value Date    LABA1C 6.0 (H) 02/19/2014     No results found for: EAG  KAYDEN: No results found for: KAYDEN  ESR: No results found for: SEDRATE  CRP: No results found for: CRP  D Dimer:   Lab Results   Component Value Date    DDIMER <200 11/14/2022     Folate and B12:   Lab Results   Component Value Date    FKLSQOVH65 605 10/11/2022   ,   Lab Results   Component Value Date    FOLATE 12.1 10/11/2022       Lactic Acid:   Lab Results   Component Value Date    LACTA 1.4 10/11/2022     Ammonia:   Cortisol:  Thyroid Studies:  Lab Results   Component Value Date    TSH 0.140 (L) 10/11/2022    [id number] 100.00 02/18/2014    [id number] 5.5 02/18/2014     CT ABDOMEN PELVIS WO CONTRAST Additional Contrast? None   Final Result   1. Generalized thickened appearance of urinary bladder wall related to   nondistention or cystitis. 2. Diverticulosis. 3. No evidence of renal or ureteral calculus. 4. Numerous calcifications throughout the pancreas suggesting chronic   calcific pancreatitis. XR CHEST PORTABLE   Final Result   1. Stable chronic interstitial opacities bilaterally. No focal pneumonia or   evidence of pleural effusion. 2.  Emphysematous changes. CT Chest 3/4/2022:  Advanced centrilobular emphysema with bronchitis. There is no focal   consolidation. Annual surveillance is recommended. Diffuse wall thickening and haziness of the urinary bladder concerning for   cystitis as before. Chronic calcified pancreatitis with significant pancreatic ductal dilatation. Please correlate with pancreatic enzymes. Uncomplicated diverticulosis of the colon.        ASSESSMENT:  1.)  Acute Exacerbation of COPD  2.) H/O Severe Advanced Centrilobular Ephysema/COPD  3.) Possible UTI  4.) Diverticulosis WO Diverticulitis   5.) Anemia  6.) Lymphopenia     PLAN:  1.) steroids to PO Prednisone   2.) Duoneb, Pulmicort, Brovana  3.) Seroquel  4.) Ceftriaxone     - supportive care to continue   - PRBC as needed  - recheck respiratory panel given wheezes and increased confusion   - ABG  - no role for bronchoscopy at this time    Thank you Viki Beavers MD very much for allowing me to see this patient in consultation and follow up. Care reviewed with nursing staff, medical and surgical specialty care, primary care and the patient's family as available. Restraints are ordered when the patient can do harm to him/herself by pulling out devices.     Luda Duong MD, M.D.

## 2022-11-18 NOTE — PROGRESS NOTES
Spoke with on call provider For Dr. Julia Cervantes due to patient having chest pain. Orders placed.

## 2022-11-18 NOTE — CARE COORDINATION
Patient is on 3 l of O2 with pulse ox of 97%. Patient is at baseline as she is at home. Pulmonary ordered respiratory panel with covid 19. Hgb 7.1 from 7.6. Continues on IV Rocephin. Patient asleep when this CM attempted to have further discussion of transition of care. Spoke with patient's brother Lisa Amanda as  patient's  sister's number has been disconnected. Per Lisa Treasure ,he stated he lives with patient. Family ( cousin) can  transport  home with [facility] and family will need to bring up a portable oxygen tank when patient is medically cleared. If family not able to transport will need to call [employer] and obtain a portable O2 tank. Will need [facility] orders . Left message with Sheri Pacheco CM at  regarding any services that patient receives from [facility]. CM./SW will continue to follow .

## 2022-11-18 NOTE — PROGRESS NOTES
BIPAP order placed. Due to patient efforts and disease state patient would benefit from non-invasive ventilation at home because of assistance in reduction of hospital readmission. Patient will continue to be re-admitted to hospital without non-invasive ventilation. Patient has noted chronic respiratory failure with hypercarbia secondary to COPD. Baseline pCO2 noted to be 60. Patient can benefit from pressure support ventilation.     Justin Maki MD  11/18/2022  2:15 PM

## 2022-11-18 NOTE — PROGRESS NOTES
Date: 11/18/2022    Time: 6:14 PM    Patient Placed On BIPAP/CPAP/ Non-Invasive Ventilation? Yes    If no must comment. Facial area red/color change? No           If YES are Blister/Lesion present? No   If yes must notify nursing staff  BIPAP/CPAP skin barrier? Yes    Skin barrier type:mepilexlite       Comments: Was called by the patients RN to place patient on NIV.          Sung Hicks RCP

## 2022-11-18 NOTE — PROGRESS NOTES
[facility]                                Progress note    Subjective: The patient is awake and alert. Lying in bed without complaints  No acute events overnight. Denies chest pain, angina, SOB -currently utilizing 2 L O2  Apparently patient was difficult to arouse this a.m.-ABGs taken elevated CO2. Objective:    BP (!) 108/53   Pulse 96   Temp 97.7 °F (36.5 °C) (Oral)   Resp 24   Ht 5' 7\" (1.702 m)   Wt 152 lb (68.9 kg)   LMP 10/21/1985   SpO2 97%   BMI 23.81 kg/m²     In: 600 [P.O.:600]  Out: 1950   In: 600   Out: 1950 [Urine:1950]    General appearance: NAD, conversant  HEENT: AT/NC, MMM  Neck: FROM, supple  Lungs: Diminished, 2 L O2 at baseline  CV: RRR, no MRGs  Vasc: Radial pulses 2+  Abdomen: Soft, non-tender; no masses or HSM  Extremities: No peripheral edema or digital cyanosis  Skin: no rash, lesions or ulcers  Psych: Alert and oriented to person, place and time  Neuro: Alert and interactive     Recent Labs     11/16/22  0604 11/17/22  0608 11/17/22  0748 11/18/22  0708   WBC 6.1 6.5  --   --    HGB 7.5* 6.8* 7.6* 7.1*   HCT 27.5* 25.3* 28.4* 26.4*    278  --   --        Recent Labs     11/16/22  0604 11/17/22  0608    145   K 4.9 4.4    99   CO2 35* 38*   BUN 12 15   CREATININE 0.5 0.6   CALCIUM 10.3* 10.4*       Assessment:    Principal Problem:    Acute exacerbation of chronic obstructive pulmonary disease (COPD) (HCC)  Active Problems:    UTI (urinary tract infection)    Decompensated chronic obstructive pulmonary disease with exacerbation (HCC)  Resolved Problems:    * No resolved hospital problems.  *      Plan:  69year old female with a history of COPD presents to the ED with complaints of chills and shortness of breath and is admitted to telemetry unit with     11/15/2022  Acute exacerbation of COPD  -Supplement O2 demands keeping oxygen saturation greater than 92%, currently utilizing 3 L which is baseline  -DuoNebs  Solu-Medrol 40 mg every 12 hours  -Consult Dr. Meghan Olsen known to him     Chronic UTI in patient with chronic indwelling catheter-most likely colonization  Monitor labs-WBC 5.2  Culture-growing gram-positive cocci greater than 100,000  Monitor for temps  Rocephin 1 g every 24 hours    11/16/2022  Continue Rocephin 1 g  Culture-Enterococcus AVIM >100,000  Pain has improved with addition of steroids  Patient remains on 3 L O2  Await pulmonary consult  IV fluid hydration 100 cc an hour, blood pressure a bit on the low side this evening  Check procalcitonin    11/17/2022  Culture continues to grow  Continue on Rocephin 1 g  SBP improved  Patient requiring 2 L O2  Appreciate pulmonary -input  P.o. prednisone  Will continue to monitor  Blood count is decreased to 6.8/25-check Hemoccult stool  Transfuse 1 unit PRBC  Hold offending agents  If hemoglobin continues to decline, surgical evaluation    11/18/2022  Urine culture still growing await sensitivity  Patient lethargic-requiring BiPAP secondary to hypercarbic respiratory failure  Pulmonology is following  Resume CPAP - at hs  Discontinue Valium at bedtime  Continue steroids and duo nebs  Steroids changed to p.o. No evidence of GI bleed  Hopeful for DC in a.m.-May need facility-not sure that she is safe enough for home    Code status: Full  Consultants: Pulmonary     DVT Prophylaxis -Lovenox  PT/OT  Discharge planning     ANGELA Krishna CNP  3:51 PM  11/18/2022     Above note edited to reflect my thoughts     I personally saw, examined and provided care for the patient. Radiographs, labs and medication list were reviewed by me independently. The case was discussed in detail and plans for care were established. Review of Kendra MOTTA CNP, documentation was conducted and revisions were made as appropriate directly by me. I agree with the above documented exam, problem list, and plan of care.      Edith Robison MD  8:26 PM  11/18/2022

## 2022-11-18 NOTE — PROGRESS NOTES
Occupational Therapy  OCCUPATIONAL THERAPY TREATMENT SESSION    [place] [address] [address] [address], [facility], [address] TREATMENT NOTE      Date:2022  Patient Name: Farhana Collins  MRN: [id number]  : 1955  Room: [address] [place] [address]     Per OT Eval:      Evaluating OT: Rachel Anthony OTR/L; [id number]        Referring Provider: ANGELA Lai CNP    Specific Provider Orders/Date: OT Eval and Treat 22       Diagnosis: Acute exacerbation of chronic obstructive pulmonary disease (COPD). Surgery: None this admission     Pertinent Medical History:  has a past medical history of Asthma, COPD (chronic obstructive pulmonary disease) ([address] Depression, Dysphagia, Jarquin catheter in place, and Oxygen dependent.       Recommended Adaptive Equipment: TBD      Precautions:  Fall Risk, O2, chronic jarquin, +bed alarm      Assessment of current deficits    [x] Functional mobility            [x]ADLs           [x] Strength                  [x]Cognition    [x] Functional transfers          [x] IADLs         [x] Safety Awareness   [x]Endurance    [x] Fine Coordination                         [x] Balance      [] Vision/perception   []Sensation      []Gross Motor Coordination             [] ROM           [] Delirium                   [] Motor Control      OT PLAN OF CARE   OT POC based on physician orders, patient diagnosis and results of clinical assessment     Frequency/Duration 1-3 days/wk for 2 weeks PRN   Specific OT Treatment Interventions to include:   * Instruction/training on adapted ADL techniques and AE recommendations to increase functional independence within precautions       * Training on energy conservation strategies, correct breathing pattern and techniques to improve independence/tolerance for self-care routine  * Functional transfer/mobility training/DME recommendations for increased independence, safety, and fall prevention  * Patient/Family education to increase follow through with safety techniques and functional independence  * Recommendation of environmental modifications for increased safety with functional transfers/mobility and ADLs  * Therapeutic exercise to improve motor endurance, ROM, and functional strength for ADLs/functional transfers  * Therapeutic activities to facilitate/challenge dynamic balance, stand tolerance for increased safety and independence with ADLs  * Positioning to improve skin integrity, interaction with environment and functional independence     Home Living: Pt lives alone in 2 story home with 3 steps & 1 handrail to enter. Pt reports having aides 5 days/wk for 4hrs/day. Bathroom setup: Tub/shower with shower chair   Equipment owned: Shower chair, rollator, 3L O2     Prior Level of Function: assist with ADLs , assist with IADLs; engaged in functional mobility with use of  rollator  Driving: No  Occupation: None reported     Pain Level: Pt with no c/o pain during session    Cognition: A&O: 3/4; Follows 1 step directions  Lethargic - consistent cues to keep eyes open and to attend to tasks. Alertness began to improve upon return to supine position, repositioned and lunch tray present. RN aware-verbalized lethargy may be secondary to med change.               Memory:  Fair              Sequencing:  Fair              Problem solving:  Fair-              Judgement/safety:  Fair-                Functional Assessment:  AM-PAC Daily Activity Raw Score: 11/24    Initial Eval Status  Date: 11/15/22 Treatment Status  Date:11/18/22 STGs = LTGs  Time frame: 10-14 days   Feeding Setup    Min A  Lunch tray Independent    Grooming Minimal Assist   Mod A  Seated EOB Modified [place]    UB Dressing Minimal Assist    Modified [place]    LB Dressing Moderate Assist   Dependent  Socks Modified [place]    Bathing Moderate Assist   Modified [place]    Toileting Moderate Assist    Modified [place] Bed Mobility  Supine to sit: Minimal Assist   Sit to supine: Minimal Assist  Supine to sit: Max A  Sit to supine: Mod A  Supine to sit: Independent   Sit to supine: Independent    Functional Transfers Sit to stand:Minimal Assist   Stand to sit:Minimal Assist  Stand pivot: NT  Commode: NT  NT  Secondary to lethargy Sit to stand:Modified [place]    Stand to sit:Modified [place]   Stand pivot: Modified [place]   Commode: Modified [place]     Functional Mobility Minimal Assist  Use of ww to take ~5 steps forward<>back. NT Modified [place] with use of Appropriate AD   Balance Sitting:     Static - Supervision     Dynamic - SBA  Standing: Minimal Assist Sitting:     Static - Min A     Dynamic -Mod A  Standing: NT  Sitting:     Static: Independent     Dynamic: Independent  Standing: Modified [place]    Activity Tolerance Fair Poor+  Good   Visual/  Perceptual Glasses: None reported  [facility] [place]        Safety Fair  Poor Good  during ADL completion   Vitals Pt on 3L O2 nasal cannula upon arrival. SpO2 96% & above throughout session. Pt on O2 via nasal cannula. O2 sat=^95% during session         Comments: Upon arrival pt lying in bed. At end of session pt semi-supine in bed (bed alarm on) all lines and tubes intact, call light within reach. Treatment: Facilitation of bed mobility (education/cues for body mechanics, safety, attention to tasks and sequencing) and unsupported sitting balance (education/cues for safety, forward gaze, posture, weight shifting to prep for ADL's). Therapist facilitated self-care retraining: grooming tasks while educating/cuing pt on modified techniques, posture, safety and energy conservation techniques. Therapist then facilitated sit>supine and repositioning for skin and joint integrity (cues provided for attention, safety and sequencing). Once in [facility] fully elevated, facilitated feeding task w/ cues for modified strategies and sequencing.  Attention, alertness improved. RN aware. Skilled monitoring of HR, O2 sats and pts response to treatment. Patient demonstrated decreased independence and safety during completion of ADL/functional transfer/mobility tasks. Pt would benefit from continued skilled OT to increase safety and independence with ADL/IADL tasks for functional independence and quality of life. Pt has made limited progress towards set goals.        Treatment Time In:11:22            Treatment Time Out: 11:35             Treatment Charges: Mins Units   Ther Ex  01421     Manual Therapy [id number]     Thera Activities [id number]   ADL/Home Mgt [id number] [phone]   Neuro Re-ed [id number]     Group Therapy      Orthotic manage/training  [id number]     Non-Billable Time     Total Timed Treatment 13 1700 [address], OTR/L #[id number]

## 2022-11-19 LAB
HCT VFR BLD CALC: 26.9 % (ref 34–48)
HCT VFR BLD CALC: 29.1 % (ref 34–48)
HEMOGLOBIN: 7.3 G/DL (ref 11.5–15.5)
HEMOGLOBIN: 7.9 G/DL (ref 11.5–15.5)
URINE CULTURE, ROUTINE: NORMAL

## 2022-11-19 PROCEDURE — 94640 AIRWAY INHALATION TREATMENT: CPT

## 2022-11-19 PROCEDURE — 1200000000 HC SEMI PRIVATE

## 2022-11-19 PROCEDURE — 36415 COLL VENOUS BLD VENIPUNCTURE: CPT

## 2022-11-19 PROCEDURE — 6360000002 HC RX W HCPCS: Performed by: FAMILY MEDICINE

## 2022-11-19 PROCEDURE — 2580000003 HC RX 258: Performed by: FAMILY MEDICINE

## 2022-11-19 PROCEDURE — 85018 HEMOGLOBIN: CPT

## 2022-11-19 PROCEDURE — 85014 HEMATOCRIT: CPT

## 2022-11-19 PROCEDURE — 6370000000 HC RX 637 (ALT 250 FOR IP): Performed by: INTERNAL MEDICINE

## 2022-11-19 PROCEDURE — 6370000000 HC RX 637 (ALT 250 FOR IP): Performed by: FAMILY MEDICINE

## 2022-11-19 PROCEDURE — 2700000000 HC OXYGEN THERAPY PER DAY

## 2022-11-19 PROCEDURE — 94660 CPAP INITIATION&MGMT: CPT

## 2022-11-19 RX ADMIN — SERTRALINE 50 MG: 50 TABLET, FILM COATED ORAL at 20:11

## 2022-11-19 RX ADMIN — PREDNISONE 40 MG: 20 TABLET ORAL at 08:45

## 2022-11-19 RX ADMIN — BUDESONIDE 500 MCG: 0.5 SUSPENSION RESPIRATORY (INHALATION) at 19:49

## 2022-11-19 RX ADMIN — GABAPENTIN 300 MG: 300 CAPSULE ORAL at 20:11

## 2022-11-19 RX ADMIN — PANTOPRAZOLE SODIUM 40 MG: 40 TABLET, DELAYED RELEASE ORAL at 06:01

## 2022-11-19 RX ADMIN — ARFORMOTEROL TARTRATE 15 MCG: 15 SOLUTION RESPIRATORY (INHALATION) at 19:48

## 2022-11-19 RX ADMIN — BUDESONIDE 500 MCG: 0.5 SUSPENSION RESPIRATORY (INHALATION) at 08:11

## 2022-11-19 RX ADMIN — WATER 1000 MG: 1 INJECTION INTRAMUSCULAR; INTRAVENOUS; SUBCUTANEOUS at 17:45

## 2022-11-19 RX ADMIN — QUETIAPINE FUMARATE 50 MG: 50 TABLET, EXTENDED RELEASE ORAL at 20:11

## 2022-11-19 RX ADMIN — ARFORMOTEROL TARTRATE 15 MCG: 15 SOLUTION RESPIRATORY (INHALATION) at 08:10

## 2022-11-19 RX ADMIN — Medication 10 ML: at 20:12

## 2022-11-19 RX ADMIN — ACETAMINOPHEN 650 MG: 325 TABLET, FILM COATED ORAL at 07:01

## 2022-11-19 RX ADMIN — FOLIC ACID 1 MG: 1 TABLET ORAL at 08:45

## 2022-11-19 RX ADMIN — OLANZAPINE 5 MG: 5 TABLET, FILM COATED ORAL at 20:11

## 2022-11-19 RX ADMIN — Medication 10 ML: at 08:45

## 2022-11-19 ASSESSMENT — PAIN DESCRIPTION - LOCATION: LOCATION: HEAD

## 2022-11-19 ASSESSMENT — PAIN SCALES - GENERAL
PAINLEVEL_OUTOF10: 0
PAINLEVEL_OUTOF10: 2

## 2022-11-19 ASSESSMENT — PAIN DESCRIPTION - DESCRIPTORS: DESCRIPTORS: ACHING

## 2022-11-19 ASSESSMENT — PAIN - FUNCTIONAL ASSESSMENT: PAIN_FUNCTIONAL_ASSESSMENT: ACTIVITIES ARE NOT PREVENTED

## 2022-11-19 NOTE — PROGRESS NOTES
[address] PROGRESS NOTE    Patient: Renetta Porter  MRN: [id number]  : 1955    Encounter Date: 2022  Encounter Time: 9:14 AM     Date of Admission: .2022 12:37 PM    Consulting Physician:  Primary Care Physician:     Veronica Nolasco MD     [phone]    Reason for Consultation: Dyspnea, COPD    Problem List:  Patient Active Problem List   Diagnosis    COPD (chronic obstructive pulmonary disease) ([address]    MDD (major depressive disorder)    Hematuria    Neurogenic bladder    Chronic respiratory failure with hypoxia (HCC)    Anemia    Gastric wall thickening    Bladder wall thickening    Difficulty in walking, not elsewhere classified    Moderate persistent asthma with (acute) exacerbation    Muscle weakness (generalized)    Need for assistance with personal care    Neuromuscular dysfunction of bladder, unspecified    Personal history of other malignant neoplasm of kidney    Breast pain    COPD exacerbation ([address]    New onset of congestive heart failure (HCC)    Prolonged Q-T interval on ECG    Severe protein-calorie malnutrition ([address]    Acute exacerbation of chronic obstructive pulmonary disease (COPD) ([address]    UTI (urinary tract infection)    Decompensated chronic obstructive pulmonary disease with exacerbation ([address]     SUBJECTIVE:   Resting in bed - no further confusion  Wore NIV all night   Still with sob, no cough but feels there is something down there  No CP    HOME MEDICATIONS:  Prior to Admission medications    Medication Sig Start Date End Date Taking?  Authorizing Provider   albuterol sulfate HFA (VENTOLIN HFA) 108 (90 Base) MCG/ACT inhaler Inhale 1 puff into the lungs every 4 hours as needed for Wheezing   Yes Historical Provider, MD   Arformoterol Tartrate (BROVANA) 15 MCG/2ML NEBU Take 1 ampule by nebulization 2 times daily   Yes Historical Provider, MD   budesonide (PULMICORT) 0.5 MG/2ML nebulizer suspension Take 1 ampule by nebulization 2 times daily   Yes Historical Provider, MD   gabapentin (NEURONTIN) 300 MG capsule Take 300 mg by mouth at bedtime. Yes Historical Provider, MD   OLANZapine (ZYPREXA) 5 MG tablet Take 5 mg by mouth nightly   Yes Historical Provider, MD   diazePAM (VALIUM) 10 MG tablet Take 10 mg by mouth nightly as needed for Sleep.    Yes Historical Provider, MD   omeprazole (PRILOSEC) 40 MG delayed release capsule Take 40 mg by mouth daily   Yes Historical Provider, MD   sertraline (ZOLOFT) 50 MG tablet Take 1 tablet by mouth nightly 10/19/22   Suzette Gowers, APRN - CNP   QUEtiapine (SEROQUEL XR) 50 MG extended release tablet Take 50 mg by mouth nightly    Historical Provider, MD   OXYGEN Inhale 3 L into the lungs continuous    Historical Provider, MD   vitamin D (ERGOCALCIFEROL) 41723 UNITS CAPS capsule Take 50,000 Units by mouth once a week Given Saturday    Historical Provider, MD   Multiple Vitamins-Iron (DAILY-FEDERICO/IRON) TABS Take 1 tablet by mouth daily     Historical Provider, MD   folic acid (FOLVITE) 1 MG tablet Take 1 mg by mouth daily     Historical Provider, MD   alendronate (FOSAMAX) 70 MG tablet Take 70 mg by mouth every 7 days Given Sunday    Historical Provider, MD       CURRENT MEDICATIONS:  Current Facility-Administered Medications: predniSONE (DELTASONE) tablet 40 mg, 40 mg, Oral, Daily  albuterol (PROVENTIL) nebulizer solution 2.5 mg, 2.5 mg, Nebulization, Q4H PRN  Arformoterol Tartrate (BROVANA) nebulizer solution 15 mcg, 15 mcg, Nebulization, BID  budesonide (PULMICORT) nebulizer suspension 500 mcg, 500 mcg, Nebulization, BID  folic acid (FOLVITE) tablet 1 mg, 1 mg, Oral, Daily  gabapentin (NEURONTIN) capsule 300 mg, 300 mg, Oral, Nightly  OLANZapine (ZYPREXA) tablet 5 mg, 5 mg, Oral, Nightly  pantoprazole (PROTONIX) tablet 40 mg, 40 mg, Oral, QAM AC  QUEtiapine (SEROQUEL XR) extended release tablet 50 mg, 50 mg, Oral, Nightly  sertraline (ZOLOFT) tablet 50 mg, 50 mg, Oral, Nightly  sodium chloride flush 0.9 % injection 5-40 mL, 5-40 mL, IntraVENous, 2 times per day  sodium chloride flush 0.9 % injection 5-40 mL, 5-40 mL, IntraVENous, PRN  0.9 % sodium chloride infusion, , IntraVENous, PRN  [Held by provider] enoxaparin (LOVENOX) injection 40 mg, 40 mg, SubCUTAneous, Daily  ondansetron (ZOFRAN-ODT) disintegrating tablet 4 mg, 4 mg, Oral, Q8H PRN **OR** ondansetron (ZOFRAN) injection 4 mg, 4 mg, IntraVENous, Q6H PRN  acetaminophen (TYLENOL) tablet 650 mg, 650 mg, Oral, Q6H PRN **OR** acetaminophen (TYLENOL) suppository 650 mg, 650 mg, Rectal, Q6H PRN  magnesium hydroxide (MILK OF MAGNESIA) 400 MG/5ML suspension 30 mL, 30 mL, Oral, Daily PRN  cefTRIAXone (ROCEPHIN) 1,000 mg in sterile water 10 mL IV syringe, 1,000 mg, IntraVENous, Q24H    ALLERGIES:  Allergies   Allergen Reactions    Sulfa Antibiotics Anaphylaxis           OBJECTIVE:  PHYSICAL EXAMINATION:     VITAL SIGNS:  BP (!) 136/46   Pulse 80   Temp 97.9 °F (36.6 °C) (Temporal)   Resp 18   Ht 5' 7\" (1.702 m)   Wt 152 lb (68.9 kg)   LMP 10/21/1985   SpO2 97%   BMI 23.81 kg/m²   Wt Readings from Last 3 Encounters:   22 152 lb (68.9 kg)   10/19/22 156 lb 8 oz (71 kg)   22 159 lb (72.1 kg)     Temp Readings from Last 3 Encounters:   22 97.9 °F (36.6 °C) (Temporal)   10/20/22 (!) 96.6 °F (35.9 °C) (Temporal)   22 97.2 °F (36.2 °C) (Temporal)     TMAX:  BP Readings from Last 3 Encounters:   22 (!) 136/46   10/20/22 (!) 102/57   22 (!) 140/71     Pulse Readings from Last 3 Encounters:   22 80   10/20/22 70   06/16/22 93       CURRENT PULSE OXIMETRY: SpO2: 97 %  24HR PULSE OXIMETRY RANGE: SpO2  Av %  Min: 97 %  Max: 100 %  CVP:      ________________________________________________________________________    VENTILATOR SETTINGS (if applicable):             Additional Respiratory Assessments  Heart Rate: 80  Resp: 18  SpO2: 97 %  ETCO2:  Peak Inspiratory Pressure:  End-Inspiratory Plateau Pressure:    ABG:    Recent Labs     11/18/22  1201   PH 7.331*   PO2 72.1*   PCO2 83.5*   HCO3 43.1*   BE 14.7*   O2SAT 93.3   METHB 0.5   O2HB 92.0*   COHB 0.9   HHB 6.6*   THB 9.2*     FiO2 : 40 %     ________________________________________________________________________    IV ACCESS:    NUTRITION: ADULT DIET; Regular; Low Fat/Low Chol/High Fiber/TANA    INTAKE/OUTPUTS:  I/O last 3 completed shifts: In: 480 [P.O.:480]  Out: 2300 [Urine:2300]    Intake/Output Summary (Last 24 hours) at 11/19/2022 0914  Last data filed at 11/19/2022 0091  Gross per 24 hour   Intake 600 ml   Output 1550 ml   Net -950 ml         General Appearance: well-developed and well-nourished, in no acute distress   Eyes: perrla  Neck: neck supple and non tender without mass   Pulmonary/Chest: decreased breath sounds at bases, no accessory muscles of inspiration noted, diminished >R base  Cardiovascular: normal rate, regular rhythm, normal S1 and S2, no murmurs, rubs, clicks or gallops  Abdomen: soft, non-tender, non-distended, normal bowel sounds  Extremities: no cyanosis, no clubbing, no edema  Musculoskeletal: normal range of motion for age, no joint swelling, deformity or tenderness   Neurologic: DWYER, follows command appropriately.  A&O x3        LABS/IMAGING:    CBC:  Lab Results   Component Value Date    WBC 6.5 11/17/2022    HGB 7.3 (L) 11/19/2022    HCT 26.9 (L) 11/19/2022    MCV 83.0 11/17/2022     11/17/2022    LYMPHOPCT 13.3 (L) 11/17/2022    RBC 3.05 (L) 11/17/2022    MCH 22.3 (L) 11/17/2022    MCHC 26.9 (L) 11/17/2022    RDW 15.9 (H) 11/17/2022    NEUTOPHILPCT 80.9 (H) 11/17/2022    MONOPCT 4.8 11/17/2022    BASOPCT 0.2 11/17/2022    NEUTROABS 5.24 11/17/2022    LYMPHSABS 0.86 (L) 11/17/2022    MONOSABS 0.31 11/17/2022    EOSABS 0.00 (L) 11/17/2022    BASOSABS 0.01 11/17/2022       Recent Labs     11/19/22  0602 11/18/22  1745 11/18/22  0708 11/17/22  0748 11/17/22  0608 11/16/22  0604 11/15/22  0651   WBC  --   --   -- --  6.5 6.1 5.2   HGB 7.3* 7.2* 7.1*   < > 6.8* 7.5* 7.5*   HCT 26.9* 27.3* 26.4*   < > 25.3* 27.5* 27.3*   MCV  --   --   --   --  83.0 82.8 81.3   PLT  --   --   --   --  278 279 296    < > = values in this interval not displayed. BMP:   Recent Labs     11/17/22  0608      K 4.4   CL 99   CO2 38*   BUN 15   CREATININE 0.6       MG:   Lab Results   Component Value Date/Time    MG 2.6 10/11/2022 02:28 PM     Ca/Phos:   Lab Results   Component Value Date    CALCIUM 10.4 (H) 11/17/2022    PHOS 3.8 10/11/2022     Amylase:   Lab Results   Component Value Date/Time    AMYLASE 289 02/27/2021 01:48 AM     Lipase:   Lab Results   Component Value Date    LIPASE 42 10/12/2022     LIVER PROFILE:   No results for input(s): AST, ALT, LIPASE, BILIDIR, BILITOT, ALKPHOS in the last 72 hours. Invalid input(s): AMYLASE,  ALB      PT/INR: No results for input(s): PROTIME, INR in the last 72 hours. APTT: No results for input(s): APTT in the last 72 hours. Cardiac Enzymes:  Lab Results   Component Value Date    CKTOTAL 108 02/26/2021    CKMB 3.8 02/26/2021    TROPONINI <0.01 02/27/2021       Hgb A1C:   Lab Results   Component Value Date    LABA1C 6.0 (H) 02/19/2014     No results found for: EAG  KAYDEN: No results found for: KAYDEN  ESR: No results found for: SEDRATE  CRP: No results found for: CRP  D Dimer:   Lab Results   Component Value Date    DDIMER <200 11/14/2022     Folate and B12:   Lab Results   Component Value Date    STPKFLAC38 605 10/11/2022   ,   Lab Results   Component Value Date    FOLATE 12.1 10/11/2022       Lactic Acid:   Lab Results   Component Value Date    LACTA 1.4 10/11/2022     Ammonia:   Cortisol:  Thyroid Studies:  Lab Results   Component Value Date    TSH 0.140 (L) 10/11/2022    [id number] 100.00 02/18/2014    [id number] 5.5 02/18/2014     CT ABDOMEN PELVIS WO CONTRAST Additional Contrast? None   Final Result   1.  Generalized thickened appearance of urinary bladder wall related to   nondistention or cystitis. 2. Diverticulosis. 3. No evidence of renal or ureteral calculus. 4. Numerous calcifications throughout the pancreas suggesting chronic   calcific pancreatitis. XR CHEST PORTABLE   Final Result   1. Stable chronic interstitial opacities bilaterally. No focal pneumonia or   evidence of pleural effusion. 2.  Emphysematous changes. CT Chest 3/4/2022:  Advanced centrilobular emphysema with bronchitis. There is no focal   consolidation. Annual surveillance is recommended. Diffuse wall thickening and haziness of the urinary bladder concerning for   cystitis as before. Chronic calcified pancreatitis with significant pancreatic ductal dilatation. Please correlate with pancreatic enzymes. Uncomplicated diverticulosis of the colon. ASSESSMENT:    15-year-old female with COPD on home oxygen  Recent admission  10/10 - 10/20/2022 with hypercapnic respiratory failure acute on chronic CHF. Patient was initially in ICU put on AVAPS. 11/14/2022 presented with shortness of breath  CT abdomen pelvis with generalized thickened gallbladder cystitis, diverticulosis chronic calcified pancreas  Chest x-ray with stable chronic interstitial opacities emphysematous changes  11/18/2022 ABGs with hypercapnia. While panel negative  11/20 on 3 L    Acute Exacerbation of COPD  Chronic anemia  Chronic [place] with colonization of bacteria  Chronic hypercapnic and hypoxic respiratory failure Baseline O2 3 L  History of asthma/COPD/advanced emphysema seen on CT 3/4/2022  Anemia  History of depression on Zoloft  History of calcified pancreatitis on Creon  Muscle weakness  Echo 10/12/22 EF 55%, previous 3/23/2022 stress test EF 75% no ischemia  History of kidney stones  History of recurrent UTIs  History of dysphagia EGD showing gastritis  DVT prophylaxis with Lovenox       PLAN:  Continue O2, at baseline  of 3L nc. Keep pox >90%. 11/18 ABG 7.33/83/72/43. Repeat gasses.  Wore NIV all night. States she has a NIV at home but does not know if it is a bipap/cpap or settings. (Unable to access office records at this time)   Continue PO Prednisone   Continue Duoneb, Pulmicort, Brovana  On Seroquel AND Zyprexa - BOTH are atypical antipsychotics - probably only need 1. Continue Ceftriaxone for UTI - procal 0.03 11/17  Repeat respiratory panel negative  Ambulate      Roshni Navarrete, APRN - CNP    I had a face-to-face encounter with the patient at the bedside. I agree with the nurse practitioner's note and assessment and plan as detailed above. Necessary editing and changes made to the note by myself. Indicates that she has NIV at home. She sleeps and has to get up early to empty her [place] catheter as she does have a chronic [place]. Coughing comes and goes. She does not have a headache now. She gets her equipment through [employer].   On p.o. prednisone  On Rocephin though urine probably colonized with bacteria

## 2022-11-19 NOTE — PROGRESS NOTES
Patient placed on NIV for the night.      11/18/22 1919   NIV Type   Skin Assessment Clean, dry, & intact   Skin Protection for O2 Device Yes   Orientation Middle   Location Nose   Intervention(s) Skin Barrier   NIV Started/Stopped On   Equipment Type V60   Mode Bilevel   Mask Type Full face mask   Mask Size Medium   Settings/Measurements   PIP Observed 12 cm H20   IPAP 12 cmH20   CPAP/EPAP 6 cmH2O   Vt (Measured) 344 mL   Rate Ordered 14   Resp 14   Insp Rise Time (%) 3 %   FiO2  40 %   I Time/ I Time % 0.9 s   Minute Volume (L/min) 7.3 Liters   Mask Leak (lpm) 65 lpm   Comfort Level Good   Using Accessory Muscles No   SpO2 98   Oxygen Therapy/Pulse Ox   O2 Therapy Oxygen   O2 Device PAP (positive airway pressure)

## 2022-11-19 NOTE — PROGRESS NOTES
[facility]                                Progress note    Subjective: The patient is awake and alert. Patient sitting up in chair  Denies chest pain, angina,   Currently at baseline with 3 L  Objective:    BP (!) 136/46   Pulse 80   Temp 97.9 °F (36.6 °C) (Temporal)   Resp 18   Ht 5' 7\" (1.702 m)   Wt 152 lb (68.9 kg)   LMP 10/21/1985   SpO2 97%   BMI 23.81 kg/m²     In: 600 [P.O.:600]  Out: 1550   In: 600   Out: 1550 [PPUBV:5240]    General appearance: NAD, conversant  HEENT: AT/NC, MMM  Neck: FROM, supple  Lungs: Diminished, 3 L O2 at baseline  CV: RRR, no MRGs  Vasc: Radial pulses 2+  Abdomen: Soft, non-tender; no masses or HSM  Extremities: No peripheral edema or digital cyanosis  Skin: no rash, lesions or ulcers  Psych: Alert and oriented to person, place and time  Neuro: Alert and interactive     Recent Labs     11/17/22  0608 11/17/22  0748 11/18/22  0708 11/18/22  1745 11/19/22  0602   WBC 6.5  --   --   --   --    HGB 6.8*   < > 7.1* 7.2* 7.3*   HCT 25.3*   < > 26.4* 27.3* 26.9*     --   --   --   --     < > = values in this interval not displayed. Recent Labs     11/17/22  0608      K 4.4   CL 99   CO2 38*   BUN 15   CREATININE 0.6   CALCIUM 10.4*       Assessment:    Principal Problem:    Acute exacerbation of chronic obstructive pulmonary disease (COPD) (HCC)  Active Problems:    UTI (urinary tract infection)    Decompensated chronic obstructive pulmonary disease with exacerbation (HCC)  Resolved Problems:    * No resolved hospital problems.  *      Plan:  69year old female with a history of COPD presents to the ED with complaints of chills and shortness of breath and is admitted to telemetry unit with     11/15/2022  Acute exacerbation of COPD  -Supplement O2 demands keeping oxygen saturation greater than 92%, currently utilizing 3 L which is baseline  -DuoNebs  Solu-Medrol 40 mg every 12 hours  -Consult Dr. Balaji Nguyễn known to him     Chronic UTI in patient with chronic indwelling catheter-most likely colonization  Monitor labs-WBC 5.2  Culture-growing gram-positive cocci greater than 100,000  Monitor for temps  Rocephin 1 g every 24 hours    11/16/2022  Continue Rocephin 1 g  Culture-Enterococcus AVIM >100,000  Pain has improved with addition of steroids  Patient remains on 3 L O2  Await pulmonary consult  IV fluid hydration 100 cc an hour, blood pressure a bit on the low side this evening  Check procalcitonin    11/17/2022  Culture continues to grow  Continue on Rocephin 1 g  SBP improved  Patient requiring 2 L O2  Appreciate pulmonary -input  P.o. prednisone  Will continue to monitor  Blood count is decreased to 6.8/25-check Hemoccult stool  Transfuse 1 unit PRBC  Hold offending agents  If hemoglobin continues to decline, surgical evaluation    11/18/2022  Urine culture still growing await sensitivity  Patient lethargic-requiring BiPAP secondary to hypercarbic respiratory failure  Pulmonology is following  Resume CPAP - at hs  Discontinue Valium at bedtime  Continue steroids and duo nebs  Steroids changed to p.o. No evidence of GI bleed  Hopeful for DC in a.m.-May need facility-not sure that she is safe enough for home    11/19/2022  Patient more alert today  Sitting up in chair at bedside  Patient currently at baseline for O2 needs of 3 L  ABGs improved  Continue Rocephin will transition to p.o. on discharge  H&H stable at 26.9/7.3  Transfsue If drops below 7   No evidence of gib       Code status: Full  Consultants: Pulmonary     DVT Prophylaxis -Lovenox  PT/OT  Discharge planning - tomorrow if remain alert     ANGELA Elise CNP  3:04 PM  11/19/2022       Above note edited to reflect my thoughts     I personally saw, examined and provided care for the patient. Radiographs, labs and medication list were reviewed by me independently. The case was discussed in detail and plans for care were established.  Review of Kendra MOTTA CNP, documentation was conducted and revisions were made as appropriate directly by me. I agree with the above documented exam, problem list, and plan of care.      Chandrika Booth MD  7:48 PM  11/19/2022

## 2022-11-20 LAB
ABO/RH: NORMAL
ANION GAP SERPL CALCULATED.3IONS-SCNC: 4 MMOL/L (ref 7–16)
ANTIBODY SCREEN: NORMAL
B.E.: 14.1 MMOL/L (ref -3–3)
BLOOD BANK DISPENSE STATUS: NORMAL
BLOOD BANK PRODUCT CODE: NORMAL
BPU ID: NORMAL
BUN BLDV-MCNC: 18 MG/DL (ref 6–23)
CALCIUM SERPL-MCNC: 10.3 MG/DL (ref 8.6–10.2)
CHLORIDE BLD-SCNC: 94 MMOL/L (ref 98–107)
CO2: 42 MMOL/L (ref 22–29)
CREAT SERPL-MCNC: 0.5 MG/DL (ref 0.5–1)
CRITICAL NOTIFICATION: YES
DESCRIPTION BLOOD BANK: NORMAL
DEVICE: ABNORMAL
GFR SERPL CREATININE-BSD FRML MDRD: >60 ML/MIN/1.73
GLUCOSE BLD-MCNC: 150 MG/DL (ref 74–99)
HCO3: 42.3 MMOL/L (ref 22–26)
HCT VFR BLD CALC: 24.7 % (ref 34–48)
HCT VFR BLD CALC: 24.9 % (ref 34–48)
HCT VFR BLD CALC: 32.2 % (ref 34–48)
HEMOGLOBIN: 6.7 G/DL (ref 11.5–15.5)
HEMOGLOBIN: 6.9 G/DL (ref 11.5–15.5)
HEMOGLOBIN: 9.4 G/DL (ref 11.5–15.5)
IRON SATURATION: 9 % (ref 15–50)
IRON: 30 MCG/DL (ref 37–145)
MCH RBC QN AUTO: 22 PG (ref 26–35)
MCHC RBC AUTO-ENTMCNC: 26.9 % (ref 32–34.5)
MCV RBC AUTO: 81.9 FL (ref 80–99.9)
METER GLUCOSE: 135 MG/DL (ref 74–99)
O2 SATURATION: 95.3 % (ref 92–98.5)
OPERATOR ID: 7291
PATIENT TEMP: 37
PCO2 (TEMP CORRECTED): 81.9 MMHG (ref 35–45)
PDW BLD-RTO: 15.8 FL (ref 11.5–15)
PH (TEMPERATURE CORRECTED): 7.32 (ref 7.35–7.45)
PLATELET # BLD: 228 E9/L (ref 130–450)
PMV BLD AUTO: 9.6 FL (ref 7–12)
PO2 (TEMP CORRECTED): 88.9 MMHG (ref 60–80)
POC SOURCE: ABNORMAL
POTASSIUM SERPL-SCNC: 3.9 MMOL/L (ref 3.5–5)
RBC # BLD: 3.04 E12/L (ref 3.5–5.5)
SODIUM BLD-SCNC: 140 MMOL/L (ref 132–146)
TOTAL IRON BINDING CAPACITY: 349 MCG/DL (ref 250–450)
WBC # BLD: 6.4 E9/L (ref 4.5–11.5)

## 2022-11-20 PROCEDURE — 2580000003 HC RX 258: Performed by: FAMILY MEDICINE

## 2022-11-20 PROCEDURE — 86900 BLOOD TYPING SEROLOGIC ABO: CPT

## 2022-11-20 PROCEDURE — P9016 RBC LEUKOCYTES REDUCED: HCPCS

## 2022-11-20 PROCEDURE — 82962 GLUCOSE BLOOD TEST: CPT

## 2022-11-20 PROCEDURE — 83550 IRON BINDING TEST: CPT

## 2022-11-20 PROCEDURE — 86923 COMPATIBILITY TEST ELECTRIC: CPT

## 2022-11-20 PROCEDURE — 94660 CPAP INITIATION&MGMT: CPT

## 2022-11-20 PROCEDURE — 82803 BLOOD GASES ANY COMBINATION: CPT

## 2022-11-20 PROCEDURE — 36415 COLL VENOUS BLD VENIPUNCTURE: CPT

## 2022-11-20 PROCEDURE — 94640 AIRWAY INHALATION TREATMENT: CPT

## 2022-11-20 PROCEDURE — 83540 ASSAY OF IRON: CPT

## 2022-11-20 PROCEDURE — 80048 BASIC METABOLIC PNL TOTAL CA: CPT

## 2022-11-20 PROCEDURE — 2060000000 HC ICU INTERMEDIATE R&B

## 2022-11-20 PROCEDURE — 6370000000 HC RX 637 (ALT 250 FOR IP): Performed by: INTERNAL MEDICINE

## 2022-11-20 PROCEDURE — 6360000002 HC RX W HCPCS: Performed by: FAMILY MEDICINE

## 2022-11-20 PROCEDURE — 36430 TRANSFUSION BLD/BLD COMPNT: CPT

## 2022-11-20 PROCEDURE — 86901 BLOOD TYPING SEROLOGIC RH(D): CPT

## 2022-11-20 PROCEDURE — 85027 COMPLETE CBC AUTOMATED: CPT

## 2022-11-20 PROCEDURE — 85018 HEMOGLOBIN: CPT

## 2022-11-20 PROCEDURE — 86850 RBC ANTIBODY SCREEN: CPT

## 2022-11-20 PROCEDURE — 36600 WITHDRAWAL OF ARTERIAL BLOOD: CPT

## 2022-11-20 PROCEDURE — 2700000000 HC OXYGEN THERAPY PER DAY

## 2022-11-20 PROCEDURE — 6370000000 HC RX 637 (ALT 250 FOR IP): Performed by: FAMILY MEDICINE

## 2022-11-20 PROCEDURE — 85014 HEMATOCRIT: CPT

## 2022-11-20 RX ORDER — SODIUM CHLORIDE 9 MG/ML
INJECTION, SOLUTION INTRAVENOUS PRN
Status: DISCONTINUED | OUTPATIENT
Start: 2022-11-20 | End: 2022-11-23 | Stop reason: HOSPADM

## 2022-11-20 RX ADMIN — Medication 10 ML: at 21:17

## 2022-11-20 RX ADMIN — ARFORMOTEROL TARTRATE 15 MCG: 15 SOLUTION RESPIRATORY (INHALATION) at 20:09

## 2022-11-20 RX ADMIN — GABAPENTIN 300 MG: 300 CAPSULE ORAL at 21:15

## 2022-11-20 RX ADMIN — QUETIAPINE FUMARATE 50 MG: 50 TABLET, EXTENDED RELEASE ORAL at 22:23

## 2022-11-20 RX ADMIN — FOLIC ACID 1 MG: 1 TABLET ORAL at 09:35

## 2022-11-20 RX ADMIN — PREDNISONE 40 MG: 20 TABLET ORAL at 09:35

## 2022-11-20 RX ADMIN — ARFORMOTEROL TARTRATE 15 MCG: 15 SOLUTION RESPIRATORY (INHALATION) at 08:18

## 2022-11-20 RX ADMIN — BUDESONIDE 500 MCG: 0.5 SUSPENSION RESPIRATORY (INHALATION) at 08:18

## 2022-11-20 RX ADMIN — PANTOPRAZOLE SODIUM 40 MG: 40 TABLET, DELAYED RELEASE ORAL at 06:06

## 2022-11-20 RX ADMIN — Medication 40 ML: at 09:35

## 2022-11-20 RX ADMIN — WATER 1000 MG: 1 INJECTION INTRAMUSCULAR; INTRAVENOUS; SUBCUTANEOUS at 21:14

## 2022-11-20 RX ADMIN — BUDESONIDE 500 MCG: 0.5 SUSPENSION RESPIRATORY (INHALATION) at 20:09

## 2022-11-20 RX ADMIN — OLANZAPINE 5 MG: 5 TABLET, FILM COATED ORAL at 22:23

## 2022-11-20 RX ADMIN — SERTRALINE 50 MG: 50 TABLET, FILM COATED ORAL at 21:15

## 2022-11-20 ASSESSMENT — PAIN SCALES - GENERAL: PAINLEVEL_OUTOF10: 0

## 2022-11-20 NOTE — PROGRESS NOTES
[facility] PROGRESS NOTE    Patient: Hope Miranda  MRN: [id number]  : 1955    Encounter Date: 2022  Encounter Time: 8:10 AM     Date of Admission: .2022 12:37 PM    Consulting Physician:  Primary Care Physician:     Daniela Frias MD     [phone]    Reason for Consultation: Dyspnea, COPD    Problem List:  Patient Active Problem List   Diagnosis    COPD (chronic obstructive pulmonary disease) ([facility] [address]    MDD (major depressive disorder)    Hematuria    Neurogenic bladder    Chronic respiratory failure with hypoxia (HCC)    Anemia    Gastric wall thickening    Bladder wall thickening    Difficulty in walking, not elsewhere classified    Moderate persistent asthma with (acute) exacerbation    Muscle weakness (generalized)    Need for assistance with personal care    Neuromuscular dysfunction of bladder, unspecified    Personal history of other malignant neoplasm of kidney    Breast pain    COPD exacerbation ([address]    New onset of congestive heart failure (HCC)    Prolonged Q-T interval on ECG    Severe protein-calorie malnutrition ([address]    Acute exacerbation of chronic obstructive pulmonary disease (COPD) ([address]    UTI (urinary tract infection)    Decompensated chronic obstructive pulmonary disease with exacerbation ([address]     SUBJECTIVE:   Sleepy this am - wearing NIV  RN states not sure if she wore NIV all night  Wakes with tactile stimulus - no sob, cough but falls back asleep    HOME MEDICATIONS:  Prior to Admission medications    Medication Sig Start Date End Date Taking?  Authorizing Provider   albuterol sulfate HFA (VENTOLIN HFA) 108 (90 Base) MCG/ACT inhaler Inhale 1 puff into the lungs every 4 hours as needed for Wheezing   Yes Historical Provider, MD   Arformoterol Tartrate (BROVANA) 15 MCG/2ML NEBU Take 1 ampule by nebulization 2 times daily   Yes Historical Provider, MD   budesonide (PULMICORT) 0.5 MG/2ML nebulizer suspension Take 1 ampule by nebulization 2 times daily   Yes Historical Provider, MD   gabapentin (NEURONTIN) 300 MG capsule Take 300 mg by mouth at bedtime. Yes Historical Provider, MD   OLANZapine (ZYPREXA) 5 MG tablet Take 5 mg by mouth nightly   Yes Historical Provider, MD   diazePAM (VALIUM) 10 MG tablet Take 10 mg by mouth nightly as needed for Sleep.    Yes Historical Provider, MD   omeprazole (PRILOSEC) 40 MG delayed release capsule Take 40 mg by mouth daily   Yes Historical Provider, MD   sertraline (ZOLOFT) 50 MG tablet Take 1 tablet by mouth nightly 10/19/22   ANGELA Perez - CNP   QUEtiapine (SEROQUEL XR) 50 MG extended release tablet Take 50 mg by mouth nightly    Historical Provider, MD   OXYGEN Inhale 3 L into the lungs continuous    Historical Provider, MD   vitamin D (ERGOCALCIFEROL) 13454 UNITS CAPS capsule Take 50,000 Units by mouth once a week Given Saturday    Historical Provider, MD   Multiple Vitamins-Iron (DAILY-FEDERICO/IRON) TABS Take 1 tablet by mouth daily     Historical Provider, MD   folic acid (FOLVITE) 1 MG tablet Take 1 mg by mouth daily     Historical Provider, MD   alendronate (FOSAMAX) 70 MG tablet Take 70 mg by mouth every 7 days Given Sunday    Historical Provider, MD       CURRENT MEDICATIONS:  Current Facility-Administered Medications: predniSONE (DELTASONE) tablet 40 mg, 40 mg, Oral, Daily  albuterol (PROVENTIL) nebulizer solution 2.5 mg, 2.5 mg, Nebulization, Q4H PRN  Arformoterol Tartrate (BROVANA) nebulizer solution 15 mcg, 15 mcg, Nebulization, BID  budesonide (PULMICORT) nebulizer suspension 500 mcg, 500 mcg, Nebulization, BID  folic acid (FOLVITE) tablet 1 mg, 1 mg, Oral, Daily  gabapentin (NEURONTIN) capsule 300 mg, 300 mg, Oral, Nightly  OLANZapine (ZYPREXA) tablet 5 mg, 5 mg, Oral, Nightly  pantoprazole (PROTONIX) tablet 40 mg, 40 mg, Oral, QAM AC  QUEtiapine (SEROQUEL XR) extended release tablet 50 mg, 50 mg, Oral, Nightly  sertraline (ZOLOFT) tablet 50 mg, 50 mg, Oral, Nightly  sodium chloride flush 0.9 % injection 5-40 mL, 5-40 mL, IntraVENous, 2 times per day  sodium chloride flush 0.9 % injection 5-40 mL, 5-40 mL, IntraVENous, PRN  0.9 % sodium chloride infusion, , IntraVENous, PRN  [Held by provider] enoxaparin (LOVENOX) injection 40 mg, 40 mg, SubCUTAneous, Daily  ondansetron (ZOFRAN-ODT) disintegrating tablet 4 mg, 4 mg, Oral, Q8H PRN **OR** ondansetron (ZOFRAN) injection 4 mg, 4 mg, IntraVENous, Q6H PRN  acetaminophen (TYLENOL) tablet 650 mg, 650 mg, Oral, Q6H PRN **OR** acetaminophen (TYLENOL) suppository 650 mg, 650 mg, Rectal, Q6H PRN  magnesium hydroxide (MILK OF MAGNESIA) 400 MG/5ML suspension 30 mL, 30 mL, Oral, Daily PRN  cefTRIAXone (ROCEPHIN) 1,000 mg in sterile water 10 mL IV syringe, 1,000 mg, IntraVENous, Q24H    ALLERGIES:  Allergies   Allergen Reactions    Sulfa Antibiotics Anaphylaxis           OBJECTIVE:  PHYSICAL EXAMINATION:     VITAL SIGNS:  BP (!) 100/52   Pulse 67   Temp 97.9 °F (36.6 °C) (Oral)   Resp 18   Ht 5' 7\" (1.702 m)   Wt 152 lb (68.9 kg)   LMP 10/21/1985   SpO2 97%   BMI 23.81 kg/m²   Wt Readings from Last 3 Encounters:   22 152 lb (68.9 kg)   10/19/22 156 lb 8 oz (71 kg)   22 159 lb (72.1 kg)     Temp Readings from Last 3 Encounters:   22 97.9 °F (36.6 °C) (Oral)   10/20/22 (!) 96.6 °F (35.9 °C) (Temporal)   22 97.2 °F (36.2 °C) (Temporal)     TMAX:  BP Readings from Last 3 Encounters:   22 (!) 100/52   10/20/22 (!) 102/57   22 (!) 140/71     Pulse Readings from Last 3 Encounters:   22 67   10/20/22 70   22 93       CURRENT PULSE OXIMETRY: SpO2: 97 %  24HR PULSE OXIMETRY RANGE: SpO2  Av.5 %  Min: 97 %  Max: 98 %  CVP:      ________________________________________________________________________    VENTILATOR SETTINGS (if applicable):             Additional Respiratory Assessments  Heart Rate: 67  Resp: 18  SpO2: 97 %  ETCO2:  Peak Inspiratory Pressure:  End-Inspiratory Plateau Pressure:    ABG:    Recent Labs     11/18/22  1201   PH 7.331*   PO2 72.1*   PCO2 83.5*   HCO3 43.1*   BE 14.7*   O2SAT 93.3   METHB 0.5   O2HB 92.0*   COHB 0.9   HHB 6.6*   THB 9.2*     FiO2 : 40 %     ________________________________________________________________________    IV ACCESS:    NUTRITION: ADULT DIET; Regular; Low Fat/Low Chol/High Fiber/TANA    INTAKE/OUTPUTS:  I/O last 3 completed shifts: In: 1320 [P.O.:1320]  Out: 1800 [Urine:1800]    Intake/Output Summary (Last 24 hours) at 11/20/2022 0810  Last data filed at 11/19/2022 2355  Gross per 24 hour   Intake 840 ml   Output 1000 ml   Net -160 ml         General Appearance: well-developed and well-nourished, in no acute distress. Sleepy. Eyes: perrla  Neck: neck supple and non tender without mass   Pulmonary/Chest: decreased breath sounds at bases, no accessory muscles of inspiration noted, diminished >R base  Cardiovascular: normal rate, regular rhythm, normal S1 and S2, no murmurs, rubs, clicks or gallops  Abdomen: soft, non-tender, non-distended, normal bowel sounds  Extremities: no cyanosis, no clubbing, no edema  Musculoskeletal: normal range of motion for age, no joint swelling, deformity or tenderness   Neurologic: DWYER, follows command appropriately.  A&O x3, sleepy        LABS/IMAGING:    CBC:  Lab Results   Component Value Date    WBC 6.5 11/17/2022    HGB 7.9 (L) 11/19/2022    HCT 29.1 (L) 11/19/2022    MCV 83.0 11/17/2022     11/17/2022    LYMPHOPCT 13.3 (L) 11/17/2022    RBC 3.05 (L) 11/17/2022    MCH 22.3 (L) 11/17/2022    MCHC 26.9 (L) 11/17/2022    RDW 15.9 (H) 11/17/2022    NEUTOPHILPCT 80.9 (H) 11/17/2022    MONOPCT 4.8 11/17/2022    BASOPCT 0.2 11/17/2022    NEUTROABS 5.24 11/17/2022    LYMPHSABS 0.86 (L) 11/17/2022    MONOSABS 0.31 11/17/2022    EOSABS 0.00 (L) 11/17/2022    BASOSABS 0.01 11/17/2022       Recent Labs     11/19/22  2024 11/19/22  0602 11/18/22  1745 11/17/22  6941 11/17/22  9820 11/16/22  0604 11/15/22  1310 WBC  --   --   --   --  6.5 6.1 5.2   HGB 7.9* 7.3* 7.2*   < > 6.8* 7.5* 7.5*   HCT 29.1* 26.9* 27.3*   < > 25.3* 27.5* 27.3*   MCV  --   --   --   --  83.0 82.8 81.3   PLT  --   --   --   --  278 279 296    < > = values in this interval not displayed. BMP:   No results for input(s): NA, K, CL, CO2, PHOS, BUN, CREATININE, CA in the last 72 hours. MG:   Lab Results   Component Value Date/Time    MG 2.6 10/11/2022 02:28 PM     Ca/Phos:   Lab Results   Component Value Date    CALCIUM 10.4 (H) 11/17/2022    PHOS 3.8 10/11/2022     Amylase:   Lab Results   Component Value Date/Time    AMYLASE 289 02/27/2021 01:48 AM     Lipase:   Lab Results   Component Value Date    LIPASE 42 10/12/2022     LIVER PROFILE:   No results for input(s): AST, ALT, LIPASE, BILIDIR, BILITOT, ALKPHOS in the last 72 hours. Invalid input(s): AMYLASE,  ALB      PT/INR: No results for input(s): PROTIME, INR in the last 72 hours. APTT: No results for input(s): APTT in the last 72 hours. Cardiac Enzymes:  Lab Results   Component Value Date    CKTOTAL 108 02/26/2021    CKMB 3.8 02/26/2021    TROPONINI <0.01 02/27/2021       Hgb A1C:   Lab Results   Component Value Date    LABA1C 6.0 (H) 02/19/2014     No results found for: EAG  KAYDEN: No results found for: KAYDEN  ESR: No results found for: SEDRATE  CRP: No results found for: CRP  D Dimer:   Lab Results   Component Value Date    DDIMER <200 11/14/2022     Folate and B12:   Lab Results   Component Value Date    AFMATDTP67 605 10/11/2022   ,   Lab Results   Component Value Date    FOLATE 12.1 10/11/2022       Lactic Acid:   Lab Results   Component Value Date    LACTA 1.4 10/11/2022     Ammonia:   Cortisol:  Thyroid Studies:  Lab Results   Component Value Date    TSH 0.140 (L) 10/11/2022    T7LGXVQ 100.00 02/18/2014    Y1MZBEN 5.5 02/18/2014     CT ABDOMEN PELVIS WO CONTRAST Additional Contrast? None   Final Result   1.  Generalized thickened appearance of urinary bladder wall related to nondistention or cystitis. 2. Diverticulosis. 3. No evidence of renal or ureteral calculus. 4. Numerous calcifications throughout the pancreas suggesting chronic   calcific pancreatitis. XR CHEST PORTABLE   Final Result   1. Stable chronic interstitial opacities bilaterally. No focal pneumonia or   evidence of pleural effusion. 2.  Emphysematous changes. CT Chest 3/4/2022:  Advanced centrilobular emphysema with bronchitis. There is no focal   consolidation. Annual surveillance is recommended. Diffuse wall thickening and haziness of the urinary bladder concerning for   cystitis as before. Chronic calcified pancreatitis with significant pancreatic ductal dilatation. Please correlate with pancreatic enzymes. Uncomplicated diverticulosis of the colon. ASSESSMENT:    75-year-old female with COPD on home oxygen  Recent admission  10/10 - 10/20/2022 with hypercapnic respiratory failure acute on chronic CHF. Patient was initially in ICU put on AVAPS. 11/14/2022 presented with shortness of breath  CT abdomen pelvis with generalized thickened gallbladder cystitis, diverticulosis chronic calcified pancreas  Chest x-ray with stable chronic interstitial opacities emphysematous changes  11/18/2022 ABGs with hypercapnia. Respiratory viral panel negative  11/19 on 3 L  11/20 on NIV - 3L during day with pox 98%.   Confused  ABGs with respiratory acidosis    Acute on chronic hypercapnic respiratory failure   Acute Exacerbation of COPD  Chronic anemia  Chronic [place] with colonization of bacteria  Chronic hypercapnic and hypoxic respiratory failure Baseline O2 3 L  History of asthma/COPD/advanced emphysema seen on CT 3/4/2022  Anemia  History of depression on Zoloft  History of calcified pancreatitis on Creon  Muscle weakness  Echo 10/12/22 EF 55%, previous 3/23/2022 stress test EF 75% no ischemia  History of kidney stones  History of recurrent UTIs  History of dysphagia EGD showing gastritis  DVT prophylaxis with Lovenox       PLAN:  Continue O2, at baseline  of 3L nc. Keep pox >90%. 11/18 ABG 7.33/83/72/43. Repeat abg with sleepiness showing acute on chronic hypercapnic respiratory failure. Wore NIV all night. States she has a NIV at home but does not know if it is a bipap/cpap or settings. Continue PO Prednisone   Continue Duoneb, Pulmicort, Brovana  On Seroquel AND Zyprexa - BOTH are atypical antipsychotics - probably only need 1. Ceftriaxone for UTI - procal 0.03 11/17 believes she is colonized  Ambulate    ANGELA Carias - CNP  I had a face-to-face encounter with the patient at the bedside. I agree with the nurse practitioner's note and assessment and plan as detailed above. Necessary editing and changes made to the note by myself. Hypochloremia  With drop of hemoglobin getting transfused packed RBC  Do not believe she needs antibiotics  Respiratory acidosis due to noncompliance with NIV she says that she was on noninvasive ventilator all night. Now taken off of BiPAP for short while and she is alert oriented x3.

## 2022-11-20 NOTE — PROGRESS NOTES
Date: 11/20/2022    Time: 10:22 AM    Patient Placed On BIPAP/CPAP/ Non-Invasive Ventilation? Yes    If no must comment. Facial area red/color change? No           If YES are Blister/Lesion present? No   If yes must notify nursing staff  BIPAP/CPAP skin barrier?   Yes    Skin barrier type:mepilexlite       Comments:    Placed back on due to critical CO2 on ABG    Reyes Miller RCP

## 2022-11-20 NOTE — PLAN OF CARE
Problem: Chronic Conditions and Co-morbidities  Goal: Patient's chronic conditions and co-morbidity symptoms are monitored and maintained or improved  Outcome: Progressing  Flowsheets (Taken 11/20/2022 0818)  Care Plan - Patient's Chronic Conditions and Co-Morbidity Symptoms are Monitored and Maintained or Improved: Monitor and assess patient's chronic conditions and comorbid symptoms for stability, deterioration, or improvement     Problem: Safety - Adult  Goal: Free from fall injury  Outcome: Progressing  Flowsheets (Taken 11/20/2022 0818)  Free From Fall Injury: Instruct family/caregiver on patient safety     Problem: ABCDS Injury Assessment  Goal: Absence of physical injury  Outcome: Progressing  Flowsheets (Taken 11/20/2022 0818)  Absence of Physical Injury: Implement safety measures based on patient assessment

## 2022-11-20 NOTE — PROGRESS NOTES
[facility]                                Progress note    Subjective: The patient is awake and alert. Lying in bed without complaints  Denies chest pain, angina,   Currently at baseline with 3 L  Objective:    BP (!) 100/52   Pulse 90   Temp 97.9 °F (36.6 °C) (Oral)   Resp 15   Ht 5' 7\" (1.702 m)   Wt 152 lb (68.9 kg)   LMP 10/21/1985   SpO2 98%   BMI 23.81 kg/m²     In: 1240 [P.O.:1200; I.V.:40]  Out: 1350   In: 1240   Out: 1350 [Urine:1350]    General appearance: NAD, conversant  HEENT: AT/NC, MMM  Neck: FROM, supple  Lungs: Diminished, 3 L O2 at baseline  CV: RRR, no MRGs  Vasc: Radial pulses 2+  Abdomen: Soft, non-tender; no masses or HSM  Extremities: No peripheral edema or digital cyanosis  Skin: no rash, lesions or ulcers  Psych: Alert and oriented to person, place and time  Neuro: Alert and interactive     Recent Labs     11/19/22  0602 11/19/22 2024 11/20/22  0734   HGB 7.3* 7.9* 6.9*   HCT 26.9* 29.1* 24.7*       No results for input(s): NA, K, CL, CO2, BUN, CREATININE, GLU, CALCIUM in the last 72 hours. Assessment:    Principal Problem:    Acute exacerbation of chronic obstructive pulmonary disease (COPD) ([facility])  Active Problems:    UTI (urinary tract infection)    Decompensated chronic obstructive pulmonary disease with exacerbation ([facility])  Resolved Problems:    * No resolved hospital problems.  *      Plan:  69year old female with a history of COPD presents to the ED with complaints of chills and shortness of breath and is admitted to telemetry unit with     11/15/2022  Acute exacerbation of COPD  -Supplement O2 demands keeping oxygen saturation greater than 92%, currently utilizing 3 L which is baseline  -DuoNebs  Solu-Medrol 40 mg every 12 hours  -Consult Dr. Nick Lynn known to him     Chronic UTI in patient with chronic indwelling catheter-most likely colonization  Monitor labs-WBC 5.2  Culture-growing gram-positive cocci greater than 100,000  Monitor for temps  Rocephin 1 g every 24 hours    11/16/2022  Continue Rocephin 1 g  Culture-Enterococcus AVIM >100,000  Pain has improved with addition of steroids  Patient remains on 3 L O2  Await pulmonary consult  IV fluid hydration 100 cc an hour, blood pressure a bit on the low side this evening  Check procalcitonin    11/17/2022  Culture continues to grow  Continue on Rocephin 1 g  SBP improved  Patient requiring 2 L O2  Appreciate pulmonary -input  P.o. prednisone  Will continue to monitor  Blood count is decreased to 6.8/25-check Hemoccult stool  Transfuse 1 unit PRBC  Hold offending agents  If hemoglobin continues to decline, surgical evaluation    11/18/2022  Urine culture still growing await sensitivity  Patient lethargic-requiring BiPAP secondary to hypercarbic respiratory failure  Pulmonology is following  Resume CPAP - at hs  Discontinue Valium at bedtime  Continue steroids and duo nebs  Steroids changed to p.o.   No evidence of GI bleed  Hopeful for DC in a.m.-May need facility-not sure that she is safe enough for home    11/19/2022  Patient more alert today  Sitting up in chair at bedside  Patient currently at baseline for O2 needs of 3 L  ABGs improved  Continue Rocephin will transition to p.o. on discharge  H&H stable at 26.9/7.3  Transfsue If drops below 7   No evidence of gib     11/20/2022  Hgb - 6.9 transfuse 1 unit  Please have patient up to chair for all meals  BMP results pending  PO sterids  Check iron studies      Code status: Full  Consultants: Pulmonary     DVT Prophylaxis -Lovenox  PT/OT  Discharge planning -     Meryle Silva, APRN - CNP  9:47 AM  11/20/2022     Above note edited to reflect my thoughts   Awaiting final plans by pulmonology for discharge  She has persistent elevation of PCO2 on ABG  No evidence of GI bleed  Discontinue any offending agents for now-Lovenox is currently on hold, PCD's to be placed for patient to be ambulated please    I personally saw, examined and provided care for the patient. Radiographs, labs and medication list were reviewed by me independently. The case was discussed in detail and plans for care were established. Review of Kendra MOTTA CNP, documentation was conducted and revisions were made as appropriate directly by me. I agree with the above documented exam, problem list, and plan of care.      Margi Serrano MD  3:09 PM  11/20/2022

## 2022-11-20 NOTE — PROGRESS NOTES
Blood bank notified of upcoming transfer to [facility] [phone] and that then upcoming nurse is not ready to administer blood at this time. Respiratory notified of transfer.

## 2022-11-21 LAB
ANISOCYTOSIS: ABNORMAL
BASOPHILS ABSOLUTE: 0.16 E9/L (ref 0–0.2)
BASOPHILS RELATIVE PERCENT: 1.7 % (ref 0–2)
EOSINOPHILS ABSOLUTE: 0 E9/L (ref 0.05–0.5)
EOSINOPHILS RELATIVE PERCENT: 0.5 % (ref 0–6)
HBA1C MFR BLD: 6.1 % (ref 4–5.6)
HCT VFR BLD CALC: 32.3 % (ref 34–48)
HEMOGLOBIN: 9.3 G/DL (ref 11.5–15.5)
HYPOCHROMIA: ABNORMAL
LYMPHOCYTES ABSOLUTE: 1.02 E9/L (ref 1.5–4)
LYMPHOCYTES RELATIVE PERCENT: 11.3 % (ref 20–42)
MCH RBC QN AUTO: 23.9 PG (ref 26–35)
MCHC RBC AUTO-ENTMCNC: 28.8 % (ref 32–34.5)
MCV RBC AUTO: 83 FL (ref 80–99.9)
MONOCYTES ABSOLUTE: 0.46 E9/L (ref 0.1–0.95)
MONOCYTES RELATIVE PERCENT: 5.2 % (ref 2–12)
NEUTROPHILS ABSOLUTE: 7.63 E9/L (ref 1.8–7.3)
NEUTROPHILS RELATIVE PERCENT: 81.8 % (ref 43–80)
NUCLEATED RED BLOOD CELLS: 0.9 /100 WBC
OVALOCYTES: ABNORMAL
PDW BLD-RTO: 15.3 FL (ref 11.5–15)
PLATELET # BLD: 230 E9/L (ref 130–450)
PMV BLD AUTO: 9.2 FL (ref 7–12)
POIKILOCYTES: ABNORMAL
POLYCHROMASIA: ABNORMAL
RBC # BLD: 3.89 E12/L (ref 3.5–5.5)
WBC # BLD: 9.3 E9/L (ref 4.5–11.5)

## 2022-11-21 PROCEDURE — 6370000000 HC RX 637 (ALT 250 FOR IP): Performed by: FAMILY MEDICINE

## 2022-11-21 PROCEDURE — 6370000000 HC RX 637 (ALT 250 FOR IP): Performed by: CLINICAL NURSE SPECIALIST

## 2022-11-21 PROCEDURE — 2700000000 HC OXYGEN THERAPY PER DAY

## 2022-11-21 PROCEDURE — 85025 COMPLETE CBC W/AUTO DIFF WBC: CPT

## 2022-11-21 PROCEDURE — 83036 HEMOGLOBIN GLYCOSYLATED A1C: CPT

## 2022-11-21 PROCEDURE — 36415 COLL VENOUS BLD VENIPUNCTURE: CPT

## 2022-11-21 PROCEDURE — 2060000000 HC ICU INTERMEDIATE R&B

## 2022-11-21 PROCEDURE — 94660 CPAP INITIATION&MGMT: CPT

## 2022-11-21 PROCEDURE — 2580000003 HC RX 258: Performed by: FAMILY MEDICINE

## 2022-11-21 PROCEDURE — 94640 AIRWAY INHALATION TREATMENT: CPT

## 2022-11-21 PROCEDURE — 6370000000 HC RX 637 (ALT 250 FOR IP): Performed by: INTERNAL MEDICINE

## 2022-11-21 RX ORDER — PREDNISONE 10 MG/1
10 TABLET ORAL DAILY
Status: DISCONTINUED | OUTPATIENT
Start: 2022-11-27 | End: 2022-11-23 | Stop reason: HOSPADM

## 2022-11-21 RX ORDER — PREDNISONE 20 MG/1
20 TABLET ORAL DAILY
Status: DISCONTINUED | OUTPATIENT
Start: 2022-11-24 | End: 2022-11-23 | Stop reason: HOSPADM

## 2022-11-21 RX ADMIN — ACETAMINOPHEN 650 MG: 325 TABLET, FILM COATED ORAL at 14:06

## 2022-11-21 RX ADMIN — OLANZAPINE 5 MG: 5 TABLET, FILM COATED ORAL at 20:51

## 2022-11-21 RX ADMIN — BUDESONIDE 500 MCG: 0.5 SUSPENSION RESPIRATORY (INHALATION) at 20:29

## 2022-11-21 RX ADMIN — ARFORMOTEROL TARTRATE 15 MCG: 15 SOLUTION RESPIRATORY (INHALATION) at 20:29

## 2022-11-21 RX ADMIN — ARFORMOTEROL TARTRATE 15 MCG: 15 SOLUTION RESPIRATORY (INHALATION) at 09:32

## 2022-11-21 RX ADMIN — Medication 10 ML: at 20:52

## 2022-11-21 RX ADMIN — PANTOPRAZOLE SODIUM 40 MG: 40 TABLET, DELAYED RELEASE ORAL at 06:15

## 2022-11-21 RX ADMIN — PREDNISONE 30 MG: 10 TABLET ORAL at 16:41

## 2022-11-21 RX ADMIN — QUETIAPINE FUMARATE 50 MG: 50 TABLET, EXTENDED RELEASE ORAL at 20:51

## 2022-11-21 RX ADMIN — SERTRALINE 50 MG: 50 TABLET, FILM COATED ORAL at 20:51

## 2022-11-21 RX ADMIN — FOLIC ACID 1 MG: 1 TABLET ORAL at 09:52

## 2022-11-21 RX ADMIN — BUDESONIDE 500 MCG: 0.5 SUSPENSION RESPIRATORY (INHALATION) at 09:33

## 2022-11-21 RX ADMIN — PREDNISONE 40 MG: 20 TABLET ORAL at 09:52

## 2022-11-21 RX ADMIN — Medication 10 ML: at 09:53

## 2022-11-21 RX ADMIN — GABAPENTIN 300 MG: 300 CAPSULE ORAL at 20:51

## 2022-11-21 ASSESSMENT — PAIN - FUNCTIONAL ASSESSMENT: PAIN_FUNCTIONAL_ASSESSMENT: PREVENTS OR INTERFERES SOME ACTIVE ACTIVITIES AND ADLS

## 2022-11-21 ASSESSMENT — PAIN SCALES - GENERAL
PAINLEVEL_OUTOF10: 8
PAINLEVEL_OUTOF10: 0

## 2022-11-21 ASSESSMENT — PAIN DESCRIPTION - ORIENTATION: ORIENTATION: RIGHT;LEFT

## 2022-11-21 ASSESSMENT — PAIN DESCRIPTION - DESCRIPTORS: DESCRIPTORS: ACHING;DISCOMFORT;DULL

## 2022-11-21 ASSESSMENT — PAIN DESCRIPTION - LOCATION: LOCATION: HEAD

## 2022-11-21 NOTE — PROGRESS NOTES
Pulmonary Progress Note    Admit Date: 2022                            PCP: Zhane Galvan MD  Principal Problem:    Acute exacerbation of chronic obstructive pulmonary disease (COPD) ([facility]  Active Problems:    UTI (urinary tract infection)    Decompensated chronic obstructive pulmonary disease with exacerbation ([facility]  Resolved Problems:    * No resolved hospital problems. *      Subjective:  Resting in bed on 4L nc, baseline is 3L. Waiting for nurse to place back on NIV, wants to take a nap. Denies shortness of breath, cough or wheezing. Medications:   sodium chloride      sodium chloride          predniSONE  40 mg Oral Daily    Arformoterol Tartrate  15 mcg Nebulization BID    budesonide  500 mcg Nebulization BID    folic acid  1 mg Oral Daily    gabapentin  300 mg Oral Nightly    OLANZapine  5 mg Oral Nightly    pantoprazole  40 mg Oral QAM AC    QUEtiapine  50 mg Oral Nightly    sertraline  50 mg Oral Nightly    sodium chloride flush  5-40 mL IntraVENous 2 times per day    [Held by provider] enoxaparin  40 mg SubCUTAneous Daily       Vitals:  VITALS:  BP (!) 103/52   Pulse 71   Temp 97 °F (36.1 °C) (Temporal)   Resp 16   Ht 5' 7\" (1.702 m)   Wt 152 lb (68.9 kg)   LMP 10/21/1985   SpO2 96%   BMI 23.81 kg/m²   24HR INTAKE/OUTPUT:    Intake/Output Summary (Last 24 hours) at 2022 1438  Last data filed at 2022 1432  Gross per 24 hour   Intake 564.58 ml   Output 3650 ml   Net -3085.42 ml     CURRENT PULSE OXIMETRY:  SpO2: 96 %  24HR PULSE OXIMETRY RANGE:  SpO2  Av.3 %  Min: 96 %  Max: 100 %  CVP:    VENT SETTINGS:      Additional Respiratory Assessments  Heart Rate: 71  Resp: 16  SpO2: 96 %      EXAM:  General: Alert, in NAD  ENT: No discharge. Pharynx clear. membranes moist   Neck: Supple, Trachea midline. Resp: No accessory muscle use. Non-labored. Lungs clear with No crackles. No wheezing. No rhonchi. Diminished   CV: Regular rate. Regular rhythm. No murmur . No edema. ABD: Non-tender. Non-distended. Soft, round . Normal bowel sounds. Skin: Warm and dry. M/S: No cyanosis. No joint deformity. No clubbing. Neuro: Awake. Follows commands. O x 3, DWYER, slurred speech, hard to understand at times   Psych:  calm and interactive     I/O: I/O last 3 completed shifts: In: 1084.6 [P.O.:540; I.V.:40; Blood:504.6]  Out: 3875 [Urine:3875]  I/O this shift:  In: -   Out: 775 [Urine:775]     Results:  CBC:   Recent Labs     11/20/22  1122 11/20/22  2056 11/21/22  1120   WBC 6.4  --  9.3   HGB 6.7* 9.4* 9.3*   HCT 24.9* 32.2* 32.3*   MCV 81.9  --  83.0     --  230     BMP:   Recent Labs     11/20/22  1122      K 3.9   CL 94*   CO2 42*   BUN 18   CREATININE 0.5     LFT: No results for input(s): ALKPHOS, ALT, AST, PROT, BILITOT, BILIDIR, LABALBU in the last 72 hours. PT/INR: No results for input(s): PROTIME, INR in the last 72 hours. Procalcitonin:    Latest Reference Range & Units 11/17/22 18:36   Procalcitonin 0.00 - 0.08 ng/mL 0.03     Cultures:  No results for input(s): CULTRESP in the last 72 hours.      Latest Reference Range & Units 11/18/22 12:00   Human Rhinovirus/Enterovirus by PCR Not Detected  Not Detected   Influenza A by PCR Not Detected  Not Detected   Influenza B by PCR Not Detected  Not Detected   Adenovirus by PCR Not Detected  Not Detected   Coronavirus 229E by PCR Not Detected  Not Detected   Coronavirus HKU1 by PCR Not Detected  Not Detected   Coronavirus NL63 by PCR Not Detected  Not Detected   Coronavirus OC43 by PCR Not Detected  Not Detected   Human Metapneumovirus by PCR Not Detected  Not Detected   Parainfluenza Virus 1 by PCR Not Detected  Not Detected   Parainfluenza Virus 2 by PCR Not Detected  Not Detected   Parainfluenza Virus 3 by PCR Not Detected  Not Detected   Parainfluenza Virus 4 by PCR Not Detected  Not Detected   Respiratory Syncytial Virus by PCR Not Detected  Not Detected   Bordetella parapertussis by PCR Not Detected  Not Detected Chlamydophilia pneumoniae by PCR Not Detected  Not Detected   Mycoplasma pneumoniae by PCR Not Detected  Not Detected   SARS-CoV-2, PCR Not Detected  Not Detected   Bordetella pertussis by PCR Not Detected  Not Detected     ABG:   Recent Labs     11/20/22  1004   HCO3 42.3*   BE 14.1*   O2SAT 95.3       Films:  CT ABDOMEN PELVIS WO CONTRAST Additional Contrast? None    Result Date: 11/14/2022  EXAMINATION: CT OF THE ABDOMEN AND PELVIS WITHOUT CONTRAST 11/14/2022 3:39 pm TECHNIQUE: CT of the abdomen and pelvis was performed without the administration of intravenous contrast. Multiplanar reformatted images are provided for review. Automated exposure control, iterative reconstruction, and/or weight based adjustment of the mA/kV was utilized to reduce the radiation dose to as low as reasonably achievable. COMPARISON: May 29, 2022 HISTORY: ORDERING SYSTEM PROVIDED HISTORY: hematuria, r/o stone TECHNOLOGIST PROVIDED HISTORY: Additional Contrast?->None Reason for exam:->hematuria, r/o stone Decision Support Exception - unselect if not a suspected or confirmed emergency medical condition->Emergency Medical Condition (MA) What reading provider will be dictating this exam?->CRC FINDINGS: No evidence of renal or ureteral calculus. No hydronephrosis or perinephric edema. Redemonstration of a cyst associated with the left kidney measuring up to 3 cm. Numerous phleboliths are present in the pelvis. [place] catheter is present in urinary bladder. Urinary bladder is nondistended. Generalized thickened appearance of wall of the urinary bladder. No bowel obstruction or free air. Multiple diverticula involving the sigmoid colon and left colon. Multiple diverticula also seen at level of the right colon. Postsurgical changes are present in the right lower quadrant. No intrahepatic or extrahepatic bile duct dilatation. Gallbladder is unremarkable. Numerous calcifications throughout the pancreas.   No evidence of acute pancreatitis. Spleen is normal in size. No retroperitoneal lymphadenopathy. No evidence of pneumonia in the visualized lung bases. 1. Generalized thickened appearance of urinary bladder wall related to nondistention or cystitis. 2. Diverticulosis. 3. No evidence of renal or ureteral calculus. 4. Numerous calcifications throughout the pancreas suggesting chronic calcific pancreatitis. XR CHEST PORTABLE    Result Date: 11/14/2022  EXAMINATION: ONE XRAY VIEW OF THE CHEST 11/14/2022 1:35 pm COMPARISON: October 12, 2022 HISTORY: ORDERING SYSTEM PROVIDED HISTORY: shortness of breath, COPD TECHNOLOGIST PROVIDED HISTORY: Reason for exam:->shortness of breath, COPD What reading provider will be dictating this exam?->CRC FINDINGS: Chronic irregular interstitial opacities bilaterally. No focal airspace opacity or evidence of pleural effusion. Subsegmental atelectasis or scarring again demonstrated in the lung bases. No pneumothorax. Hyperinflation of both lungs. Redemonstration of radiopaque material associated with midthoracic spine and visualized proximal right humerus. 1.  Stable chronic interstitial opacities bilaterally. No focal pneumonia or evidence of pleural effusion. 2.  Emphysematous changes. Assessment:  69-year-old female with COPD on home oxygen  Recent admission  10/10 - 10/20/2022 with hypercapnic respiratory failure acute on chronic CHF. Patient was initially in ICU put on AVAPS. 11/14/2022 presented with shortness of breath  CT abdomen pelvis with generalized thickened gallbladder cystitis, diverticulosis chronic calcified pancreas  Chest x-ray with stable chronic interstitial opacities emphysematous changes  11/18/2022 ABGs with hypercapnia. Respiratory viral panel negative  11/19 on 3 L  11/20 on NIV - 3L during day with pox 98%.   Confused, hgb 6.7 to receive 1u prbc  ABGs with respiratory acidosis  11/21: 4L     Acute on chronic hypercapnic respiratory failure   Acute Exacerbation of COPD  Chronic anemia  Chronic [place] with colonization of bacteria  Chronic hypercapnic and hypoxic respiratory failure Baseline O2 3 L  History of asthma/COPD/advanced emphysema seen on CT 3/4/2022  Anemia   History of depression on Zoloft  History of calcified pancreatitis on Creon  Muscle weakness  Echo 10/12/22 EF 55%, previous 3/23/2022 stress test EF 75% no ischemia  History of kidney stones  History of recurrent UTIs  History of dysphagia EGD showing gastritis  DVT prophylaxis with Lovenox         PLAN:  Continue O2, at baseline  of 3L nc. Keep pox >90%. 11/18 ABG 7.33/83/72/43. Repeat abg with sleepiness showing acute on chronic hypercapnic respiratory failure. States she has a bipap at home  Continue NIV HS and as needed  Continue PO Prednisone ok to decrease to 30 tomorrow and taper   Continue Duoneb, Pulmicort, Brovana  On Seroquel AND Zyprexa   Was on Ceftriaxone for UTI - procal 0.03 11/17 believes she is colonized, not on abx now   Hgb improving s/p transfusion PRBC-- 11/21/  hgb 9.3   Ambulate,oob able   PT/OT  Supportive care         Electronically signed by ANGELA Beatty on 11/21/2022 at 2:38 PM    Seen and examined, agree with above. Acute exac of copd is improving and agree with tapering prednisone while continuing nebs as is. Continue NIV as at home. Home soon.

## 2022-11-21 NOTE — PLAN OF CARE
Problem: Chronic Conditions and Co-morbidities  Goal: Patient's chronic conditions and co-morbidity symptoms are monitored and maintained or improved  Outcome: Progressing     Problem: Safety - Adult  Goal: Free from fall injury  Outcome: Progressing     Problem: Discharge Planning  Goal: Discharge to home or other facility with appropriate resources  Outcome: Progressing

## 2022-11-21 NOTE — CARE COORDINATION
SOCIAL WORK/CASEMANAGEMENT TRANSITION OF CARE PLANNINGBraveronica Rod Yeung, [address]):  met with pt and told her that pending pulmonary she may be going home later today. She said someone has to open the home. However pt lives with her brother and I called him and he is home but has no car. He said pt's cousin would pick her up. I called [facility] to have them deliver a portable to the room for pt to take home  since she said no one can bring one in. I encouraged pt to call family to have them setup to come and get her when she is discharged. Pt is active with [facility] and will need order for nsg  and PT . Note left in chart for rn.  EUGENE Persaud  11/21/2022

## 2022-11-21 NOTE — PROGRESS NOTES
Hospitalist Progress Note      PCP: Mike Albarado MD    Date of Admission: 11/14/2022        Hospital Course:   SOB, FOUND TO HAVE AN EXACERBATION OF COPD   ALSO HAS A CHRONIC GUTIÉRREZ         Subjective:  SOB           Medications:  Reviewed    Infusion Medications    sodium chloride      sodium chloride       Scheduled Medications    predniSONE  30 mg Oral Daily    Followed by    Meaghan Vale ON 11/24/2022] predniSONE  20 mg Oral Daily    Followed by    Meaghan Vale ON 11/27/2022] predniSONE  10 mg Oral Daily    Arformoterol Tartrate  15 mcg Nebulization BID    budesonide  500 mcg Nebulization BID    folic acid  1 mg Oral Daily    gabapentin  300 mg Oral Nightly    OLANZapine  5 mg Oral Nightly    pantoprazole  40 mg Oral QAM AC    QUEtiapine  50 mg Oral Nightly    sertraline  50 mg Oral Nightly    sodium chloride flush  5-40 mL IntraVENous 2 times per day    [Held by provider] enoxaparin  40 mg SubCUTAneous Daily     PRN Meds: sodium chloride, albuterol, sodium chloride flush, sodium chloride, ondansetron **OR** ondansetron, acetaminophen **OR** acetaminophen, magnesium hydroxide      Intake/Output Summary (Last 24 hours) at 11/21/2022 1543  Last data filed at 11/21/2022 1432  Gross per 24 hour   Intake 564.58 ml   Output 2450 ml   Net -1885.42 ml       Exam:    BP (!) 103/52   Pulse 71   Temp 97 °F (36.1 °C) (Temporal)   Resp 16   Ht 5' 7\" (1.702 m)   Wt 152 lb (68.9 kg)   LMP 10/21/1985   SpO2 96%   BMI 23.81 kg/m²           Gen: WELL DEVELOPED  HEENT: NC/AT, moist mucous membranes, no oropharyngeal erythema or exudate  Neck: supple, trachea midline, no anterior cervical or SC LAD  Heart:  Normal s1/s2, RRR,  Lungs:  DIMINISHED  bilaterally,  Abd: bowel sounds present, soft, nontender, nondistended, no masses  Extrem:  No clubbing, cyanosis,  NO edema  Skin: no rashes or lesions  Psych: A & O x3  Neuro: grossly intact, moves all four extremities.                   Labs:   Recent Labs     11/20/22  1122 11/20/22 2056 11/21/22  1120   WBC 6.4  --  9.3   HGB 6.7* 9.4* 9.3*   HCT 24.9* 32.2* 32.3*     --  230     Recent Labs     11/20/22  1122      K 3.9   CL 94*   CO2 42*   BUN 18   CREATININE 0.5   CALCIUM 10.3*     No results for input(s): AST, ALT, BILIDIR, BILITOT, ALKPHOS in the last 72 hours. No results for input(s): INR in the last 72 hours. No results for input(s): Hamp Memory in the last 72 hours. No results for input(s): AST, ALT, ALB, BILIDIR, BILITOT, ALKPHOS in the last 72 hours. No results for input(s): LACTA in the last 72 hours. No results found for: Alannah Tejeda  Lab Results   Component Value Date    AMMONIA 40.0 03/02/2021    AMMONIA 15.0 09/20/2013       Assessment:    Active Hospital Problems    Diagnosis Date Noted    Decompensated chronic obstructive pulmonary disease with exacerbation ([facility] [J44.1] 11/16/2022     Priority: Medium    UTI (urinary tract infection) [N39.0] 11/15/2022     Priority: Medium    Acute exacerbation of chronic obstructive pulmonary disease (COPD) ([facility] [J44.1] 11/14/2022     Priority: Medium   CHRONIC GUTIÉRREZ      Plan:  O2   PREDNISONE   AEROSOLS   STOP ABX      DVT Prophylaxis: LOVENOX  Diet: ADULT DIET;  Regular; Low Fat/Low Chol/High Fiber/TANA  Code Status: Full Code    PT/OT Eval Status:  ORDERED    Dispo -  HOME      Electronically signed by Maryam Diaz DO on 11/21/2022 at 3:43 PM [facility]

## 2022-11-22 LAB
B.E.: 9.2 MMOL/L (ref -3–3)
COHB: 0.9 % (ref 0–1.5)
CRITICAL: ABNORMAL
DATE ANALYZED: ABNORMAL
DATE OF COLLECTION: ABNORMAL
EKG ATRIAL RATE: 91 BPM
EKG P AXIS: 77 DEGREES
EKG P-R INTERVAL: 158 MS
EKG Q-T INTERVAL: 370 MS
EKG QRS DURATION: 74 MS
EKG QTC CALCULATION (BAZETT): 455 MS
EKG R AXIS: 75 DEGREES
EKG T AXIS: 70 DEGREES
EKG VENTRICULAR RATE: 91 BPM
HCO3: 36.5 MMOL/L (ref 22–26)
HHB: 12.3 % (ref 0–5)
LAB: ABNORMAL
Lab: ABNORMAL
METHB: 0.5 % (ref 0–1.5)
MODE: ABNORMAL
O2 SATURATION: 87.5 % (ref 92–98.5)
O2HB: 86.3 % (ref 94–97)
OPERATOR ID: 2860
PATIENT TEMP: 37 C
PCO2: 64.5 MMHG (ref 35–45)
PH BLOOD GAS: 7.37 (ref 7.35–7.45)
PO2: 53.8 MMHG (ref 75–100)
SOURCE, BLOOD GAS: ABNORMAL
THB: 11.1 G/DL (ref 11.5–16.5)
TIME ANALYZED: 1506

## 2022-11-22 PROCEDURE — 94640 AIRWAY INHALATION TREATMENT: CPT

## 2022-11-22 PROCEDURE — 36600 WITHDRAWAL OF ARTERIAL BLOOD: CPT

## 2022-11-22 PROCEDURE — 6370000000 HC RX 637 (ALT 250 FOR IP): Performed by: FAMILY MEDICINE

## 2022-11-22 PROCEDURE — 82805 BLOOD GASES W/O2 SATURATION: CPT

## 2022-11-22 PROCEDURE — 2700000000 HC OXYGEN THERAPY PER DAY

## 2022-11-22 PROCEDURE — 6370000000 HC RX 637 (ALT 250 FOR IP): Performed by: CLINICAL NURSE SPECIALIST

## 2022-11-22 PROCEDURE — 97535 SELF CARE MNGMENT TRAINING: CPT

## 2022-11-22 PROCEDURE — 94660 CPAP INITIATION&MGMT: CPT

## 2022-11-22 PROCEDURE — 97530 THERAPEUTIC ACTIVITIES: CPT

## 2022-11-22 PROCEDURE — 2580000003 HC RX 258: Performed by: FAMILY MEDICINE

## 2022-11-22 PROCEDURE — 2060000000 HC ICU INTERMEDIATE R&B

## 2022-11-22 PROCEDURE — 6370000000 HC RX 637 (ALT 250 FOR IP): Performed by: INTERNAL MEDICINE

## 2022-11-22 PROCEDURE — 2580000003 HC RX 258: Performed by: INTERNAL MEDICINE

## 2022-11-22 PROCEDURE — 51702 INSERT TEMP BLADDER CATH: CPT

## 2022-11-22 RX ORDER — TRAMADOL HYDROCHLORIDE 50 MG/1
50 TABLET ORAL EVERY 4 HOURS PRN
Status: DISCONTINUED | OUTPATIENT
Start: 2022-11-22 | End: 2022-11-23 | Stop reason: HOSPADM

## 2022-11-22 RX ORDER — SODIUM CHLORIDE 9 MG/ML
INJECTION, SOLUTION INTRAVENOUS CONTINUOUS
Status: ACTIVE | OUTPATIENT
Start: 2022-11-22 | End: 2022-11-23

## 2022-11-22 RX ADMIN — BUDESONIDE 500 MCG: 0.5 SUSPENSION RESPIRATORY (INHALATION) at 18:40

## 2022-11-22 RX ADMIN — FOLIC ACID 1 MG: 1 TABLET ORAL at 09:23

## 2022-11-22 RX ADMIN — ACETAMINOPHEN 650 MG: 325 TABLET, FILM COATED ORAL at 11:27

## 2022-11-22 RX ADMIN — PREDNISONE 30 MG: 10 TABLET ORAL at 09:22

## 2022-11-22 RX ADMIN — SERTRALINE 50 MG: 50 TABLET, FILM COATED ORAL at 20:58

## 2022-11-22 RX ADMIN — BUDESONIDE 500 MCG: 0.5 SUSPENSION RESPIRATORY (INHALATION) at 10:39

## 2022-11-22 RX ADMIN — TRAMADOL HYDROCHLORIDE 50 MG: 50 TABLET, COATED ORAL at 13:25

## 2022-11-22 RX ADMIN — ARFORMOTEROL TARTRATE 15 MCG: 15 SOLUTION RESPIRATORY (INHALATION) at 10:38

## 2022-11-22 RX ADMIN — ARFORMOTEROL TARTRATE 15 MCG: 15 SOLUTION RESPIRATORY (INHALATION) at 18:41

## 2022-11-22 RX ADMIN — Medication 10 ML: at 09:23

## 2022-11-22 RX ADMIN — SODIUM CHLORIDE: 9 INJECTION, SOLUTION INTRAVENOUS at 18:07

## 2022-11-22 RX ADMIN — GABAPENTIN 300 MG: 300 CAPSULE ORAL at 20:58

## 2022-11-22 RX ADMIN — PANTOPRAZOLE SODIUM 40 MG: 40 TABLET, DELAYED RELEASE ORAL at 05:51

## 2022-11-22 RX ADMIN — QUETIAPINE FUMARATE 50 MG: 50 TABLET, EXTENDED RELEASE ORAL at 20:58

## 2022-11-22 RX ADMIN — OLANZAPINE 5 MG: 5 TABLET, FILM COATED ORAL at 20:58

## 2022-11-22 ASSESSMENT — PAIN SCALES - GENERAL
PAINLEVEL_OUTOF10: 10
PAINLEVEL_OUTOF10: 0
PAINLEVEL_OUTOF10: 0
PAINLEVEL_OUTOF10: 10
PAINLEVEL_OUTOF10: 10

## 2022-11-22 ASSESSMENT — PAIN DESCRIPTION - ORIENTATION
ORIENTATION: LOWER
ORIENTATION: LOWER

## 2022-11-22 ASSESSMENT — PAIN DESCRIPTION - LOCATION
LOCATION: ABDOMEN
LOCATION: ABDOMEN

## 2022-11-22 ASSESSMENT — PAIN DESCRIPTION - FREQUENCY: FREQUENCY: CONTINUOUS

## 2022-11-22 ASSESSMENT — PAIN DESCRIPTION - PAIN TYPE: TYPE: ACUTE PAIN

## 2022-11-22 ASSESSMENT — PAIN SCALES - WONG BAKER: WONGBAKER_NUMERICALRESPONSE: 0

## 2022-11-22 ASSESSMENT — PAIN DESCRIPTION - DESCRIPTORS
DESCRIPTORS: SHARP;CRAMPING;ACHING
DESCRIPTORS: SHARP;CRAMPING;ACHING

## 2022-11-22 NOTE — PROGRESS NOTES
1. \"Have you been to the ER, urgent care clinic since your last visit? Hospitalized since your last visit? \" No    2. \"Have you seen or consulted any other health care providers outside of the [address] [place] [address] since your last visit? \" No     3. For patients aged 39-70: Has the patient had a colonoscopy / FIT/ Cologuard? Yes - no Care Gap present      If the patient is female:    4. For patients aged 41-77: Has the patient had a mammogram within the past 2 years? NA - based on age or sex      11. For patients aged 21-65: Has the patient had a pap smear?  NA - based on age or sex    Health Maintenance Due   Topic Date Due    COVID-19 Vaccine (1) Never done    Pneumococcal 65+ years (1 - PCV) Never done    DTaP/Tdap/Td series (1 - Tdap) Never done    Shingrix Vaccine Age 50> (1 of 2) Never done Hospitalist Progress Note      PCP: Michelle Koch MD    Date of Admission: 11/14/2022        Hospital Course:   SOB, FOUND TO HAVE AN EXACERBATION OF COPD   ALSO HAS A CHRONIC GUTIÉRREZ ** TOLD PATIENT SHE NEEDS A MARK, AND SHE CAN LEAVE WHEN SHE WANTS, HOWEVER, SHE NEEDS HELP , AGREES TO MARK TEMPORARILY            Subjective:  CONCERNED ABOUT BLOATING           Medications:  Reviewed    Infusion Medications    sodium chloride      sodium chloride       Scheduled Medications    predniSONE  30 mg Oral Daily    Followed by    Keenan River ON 11/24/2022] predniSONE  20 mg Oral Daily    Followed by    Keenan River ON 11/27/2022] predniSONE  10 mg Oral Daily    Arformoterol Tartrate  15 mcg Nebulization BID    budesonide  500 mcg Nebulization BID    folic acid  1 mg Oral Daily    gabapentin  300 mg Oral Nightly    OLANZapine  5 mg Oral Nightly    pantoprazole  40 mg Oral QAM AC    QUEtiapine  50 mg Oral Nightly    sertraline  50 mg Oral Nightly    sodium chloride flush  5-40 mL IntraVENous 2 times per day    [Held by provider] enoxaparin  40 mg SubCUTAneous Daily     PRN Meds: traMADol, sodium chloride, albuterol, sodium chloride flush, sodium chloride, ondansetron **OR** ondansetron, acetaminophen **OR** acetaminophen, magnesium hydroxide      Intake/Output Summary (Last 24 hours) at 11/22/2022 1628  Last data filed at 11/22/2022 1330  Gross per 24 hour   Intake 420 ml   Output 2325 ml   Net -1905 ml       Exam:    BP (!) 113/50   Pulse 60   Temp 97.7 °F (36.5 °C) (Temporal)   Resp 17   Ht 5' 7\" (1.702 m)   Wt 152 lb (68.9 kg)   LMP 10/21/1985   SpO2 97%   BMI 23.81 kg/m²       Gen: WELL DEVELOPED  HEENT: NC/AT, moist mucous membranes, no oropharyngeal erythema or exudate  Neck: supple, trachea midline, no anterior cervical or SC LAD  Heart:  Normal s1/s2, RRR,  Lungs:  DIMINISHED  bilaterally,  Abd: bowel sounds present, soft, nontender, nondistended, no masses  Extrem:  No clubbing, cyanosis,  NO edema  Skin: no rashes or lesions  Psych: A & O x3  Neuro: grossly intact, moves all four extremities. Labs:   Recent Labs     11/20/22  1122 11/20/22 2056 11/21/22  1120   WBC 6.4  --  9.3   HGB 6.7* 9.4* 9.3*   HCT 24.9* 32.2* 32.3*     --  230     Recent Labs     11/20/22  1122      K 3.9   CL 94*   CO2 42*   BUN 18   CREATININE 0.5   CALCIUM 10.3*     No results for input(s): AST, ALT, BILIDIR, BILITOT, ALKPHOS in the last 72 hours. No results for input(s): INR in the last 72 hours. No results for input(s): [employer]iant Medal in the last 72 hours. No results for input(s): AST, ALT, ALB, BILIDIR, BILITOT, ALKPHOS in the last 72 hours. No results for input(s): LACTA in the last 72 hours. No results found for: Radiance  Lab Results   Component Value Date    AMMONIA 40.0 03/02/2021    AMMONIA 15.0 09/20/2013       Assessment:    Active Hospital Problems    Diagnosis Date Noted    Decompensated chronic obstructive pulmonary disease with exacerbation ([facility] [J44.1] 11/16/2022     Priority: Medium    UTI (urinary tract infection) [N39.0] 11/15/2022     Priority: Medium    Acute exacerbation of chronic obstructive pulmonary disease (COPD) ([facility] [J44.1] 11/14/2022     Priority: Medium   CHRONIC GUTIÉRREZ   prediabetes(aic 6.1)     Plan:  O2   PREDNISONE   AEROSOLS   STOP ABX   CHANGE GUTIÉRREZ      DVT Prophylaxis: LOVENOX  Diet: ADULT DIET;  Regular; Low Fat/Low Chol/High Fiber/TANA  Code Status: Full Code     PT/OT Eval Status:  ORDERED     Dispo -  HOME      Electronically signed by Katie Ansari DO on 11/22/2022 at 4:28 PM [facility] Avila

## 2022-11-22 NOTE — PROGRESS NOTES
[address] CARE [place], [address] TREATMENT NOTE      Date:2022  Patient Name: Kp Vaca  MRN: [id number]  : 1955  Room: [address] [place] [address]     Per OT Eval:      Evaluating OT: Romero Booth OTR/L; [id number]        Referring Provider: ANGELA Cantu CNP    Specific Provider Orders/Date: OT Eval and Treat 22       Diagnosis: Acute exacerbation of chronic obstructive pulmonary disease (COPD). Surgery: None this admission     Pertinent Medical History:  has a past medical history of Asthma, COPD (chronic obstructive pulmonary disease) ([address] Depression, Dysphagia, Jarquin catheter in place, and Oxygen dependent.       Recommended Adaptive Equipment: TBD      Precautions:  Fall Risk, O2 3L baseline, chronic jarquin, +bed alarm      Assessment of current deficits    [x] Functional mobility            [x]ADLs           [x] Strength                  [x]Cognition    [x] Functional transfers          [x] IADLs         [x] Safety Awareness   [x]Endurance    [x] Fine Coordination                         [x] Balance      [] Vision/perception   []Sensation      []Gross Motor Coordination             [] ROM           [] Delirium                   [] Motor Control      OT PLAN OF CARE   OT POC based on physician orders, patient diagnosis and results of clinical assessment     Frequency/Duration 1-3 days/wk for 2 weeks PRN   Specific OT Treatment Interventions to include:   * Instruction/training on adapted ADL techniques and AE recommendations to increase functional independence within precautions       * Training on energy conservation strategies, correct breathing pattern and techniques to improve independence/tolerance for self-care routine  * Functional transfer/mobility training/DME recommendations for increased independence, safety, and fall prevention  * Patient/Family education to increase follow through with safety techniques and functional independence  * Recommendation of environmental modifications for increased safety with functional transfers/mobility and ADLs  * Therapeutic exercise to improve motor endurance, ROM, and functional strength for ADLs/functional transfers  * Therapeutic activities to facilitate/challenge dynamic balance, stand tolerance for increased safety and independence with ADLs  * Positioning to improve skin integrity, interaction with environment and functional independence     Home Living: Pt lives alone in 2 story home with 3 steps & 1 handrail to enter. Pt reports having aides 5 days/wk for 4hrs/day.     Bathroom setup: Tub/shower with shower chair   Equipment owned: Shower chair, rollator, 3L O2     Prior Level of Function: assist with ADLs , assist with IADLs; engaged in functional mobility with use of  rollator  Driving: No  Occupation: None reported     Pain Level: 8/10 headache, nsg already provided pain medication     Cognition: A&O: 3/4; Follows 1-2 step directions, pleasant & cooperative, motivated               Memory:  Fair              Sequencing:  Fair              Problem solving:  Fair              Judgement/safety:  Fair                Functional Assessment:  AM-PAC Daily Activity Raw Score: 15/24    Initial Eval Status  Date: 11/15/22 Treatment Status  Date: 11/22/22 STGs = LTGs  Time frame: 10-14 days   Feeding Setup   Mod Indep  Seated in chair to finish snacks    Independent    Grooming Minimal Assist   Min A  Simple grooming tasks  Modified Secor    UB Dressing Minimal Assist   Min A  Donning 2nd gown as a robe  Modified Secor    LB Dressing Moderate Assist  Max A  Simulated tasks due to jarquin  Modified Secor    Bathing Moderate Assist  Mod A  Simulated tasks with lotion  Modified Secor    Toileting Moderate Assist   Mod A  Jarquin present  Modified Secor    Bed Mobility Supine to sit: Minimal Assist   Sit to supine: Minimal Assist  SBA  Supine to sit  Supine to sit: Independent   Sit to supine: Independent    Functional Transfers Sit to stand:Minimal Assist   Stand to sit:Minimal Assist  Stand pivot: NT  Commode: NT Min A  Cues for hand placement & safety  Sit to stand:Modified [place]    Stand to sit:Modified [place]   Stand pivot: Modified [place]   Commode: Modified [place]     Functional Mobility Minimal Assist  Use of ww to take ~5 steps forward<>back. Min A  With walker, cues to manage walker  Modified [place] with use of Appropriate AD   Balance Sitting:     Static - Supervision     Dynamic - SBA  Standing: Minimal Assist Sitting: SBA  Standing: Min A  With walker  Sitting:     Static: Independent     Dynamic: Independent  Standing: Modified Red Bud    Activity Tolerance Fair Fair  Moderate tasks, O2 3L 94% through out session Good   Visual/  Perceptual Glasses: None reported  Appears [facility]        Safety Fair Fair   Good  during ADL completion   Vitals Pt on 3L O2 nasal cannula upon arrival. SpO2 96% & above throughout session. Pt on O2 via nasal cannula. O2 sat=^95% during session         Comments: Upon arrival pt lying in bed & agreeable for therapy, nsg approved. At end of session pt was seated in chair, waffle cushion in place all lines and tubes intact, call light within reach. Treatment: Facilitation of bed mobility (education/cues for body mechanics, safety, attention to tasks and sequencing) and unsupported sitting & standing balance during transfers & mobility (education/cues for safety, posture, to prep for ADL's). Therapist facilitated self-care retraining: grooming tasks while educating/cuing pt on modified techniques, posture, safety and energy conservation techniques. (cues provided for attention, safety and sequencing). Once in [facility] fully elevated, facilitated feeding task w/ cues for modified strategies and sequencing. Skilled monitoring of HR, O2 sats and pts response to treatment. Patient demonstrated decreased independence and safety during completion of ADL/functional transfer/mobility tasks. Pt would benefit from continued skilled OT to increase safety and independence with ADL/IADL tasks for functional independence and quality of life. Pt has made Good progress towards set goals. Treatment Time In: 1:55            Treatment Time Out: 2:20            Treatment Charges: Mins Units   Ther Ex  [id number]     Manual Therapy Milton Acosta [id number] [phone]   ADL/Home Mgt 14512 10 1   Neuro Re-ed [id number]     Group Therapy      Orthotic manage/training  [id number]     Non-Billable Time     Total Timed Treatment 25 2       Kathy BANKS.  [address] [place] [address], [address] [place] [address]

## 2022-11-22 NOTE — PROGRESS NOTES
Pulmonary Progress Note    Admit Date: 2022                            PCP: Tori Longoria MD  Principal Problem:    Acute exacerbation of chronic obstructive pulmonary disease (COPD) ([facility]  Active Problems:    UTI (urinary tract infection)    Decompensated chronic obstructive pulmonary disease with exacerbation ([facility]  Resolved Problems:    * No resolved hospital problems. *      Subjective:  Resting in bed on 3L nc, wore NIV with good benefit  Feeling better   Awaiting DC    Medications:   sodium chloride      sodium chloride          predniSONE  30 mg Oral Daily    Followed by    Lior Tilley ON 2022] predniSONE  20 mg Oral Daily    Followed by    Lior Tilley ON 2022] predniSONE  10 mg Oral Daily    Arformoterol Tartrate  15 mcg Nebulization BID    budesonide  500 mcg Nebulization BID    folic acid  1 mg Oral Daily    gabapentin  300 mg Oral Nightly    OLANZapine  5 mg Oral Nightly    pantoprazole  40 mg Oral QAM AC    QUEtiapine  50 mg Oral Nightly    sertraline  50 mg Oral Nightly    sodium chloride flush  5-40 mL IntraVENous 2 times per day    [Held by provider] enoxaparin  40 mg SubCUTAneous Daily       Vitals:  VITALS:  BP (!) 113/50   Pulse 60   Temp 97.7 °F (36.5 °C) (Temporal)   Resp 17   Ht 5' 7\" (1.702 m)   Wt 152 lb (68.9 kg)   LMP 10/21/1985   SpO2 97%   BMI 23.81 kg/m²   24HR INTAKE/OUTPUT:    Intake/Output Summary (Last 24 hours) at 2022 1436  Last data filed at 2022 1330  Gross per 24 hour   Intake 420 ml   Output 2325 ml   Net -1905 ml     CURRENT PULSE OXIMETRY:  SpO2: 97 %  24HR PULSE OXIMETRY RANGE:  SpO2  Av.8 %  Min: 95 %  Max: 100 %  CVP:    VENT SETTINGS:      Additional Respiratory Assessments  Heart Rate: 60  Resp: 17  SpO2: 97 %      EXAM:  General: Alert, in NAD  ENT: No discharge. Pharynx clear. membranes moist   Neck: Supple, Trachea midline. Resp: No accessory muscle use. Non-labored. Lungs clear with No crackles. No wheezing. No rhonchi. Diminished   CV: Regular rate. Regular rhythm. No murmur . No edema. ABD: Non-tender. Non-distended. Soft, round . Normal bowel sounds. Skin: Warm and dry. M/S: No cyanosis. No joint deformity. No clubbing. Neuro: Awake. Follows commands. O x 3, DWYER, slurred speech, hard to understand at times   Psych:  calm and interactive     I/O: I/O last 3 completed shifts: In: 564.6 [P.O.:60; Blood:504.6]  Out: 4450 [[id number]:[id number]]  I/O this shift: In: 420 [P.O.:420]  Out: 325 [Urine:325]     Results:  CBC:   Recent Labs     11/20/22  1122 11/20/22  2056 11/21/22  1120   WBC 6.4  --  9.3   HGB 6.7* 9.4* 9.3*   HCT 24.9* 32.2* 32.3*   MCV 81.9  --  83.0     --  230     BMP:   Recent Labs     11/20/22  1122      K 3.9   CL 94*   CO2 42*   BUN 18   CREATININE 0.5     LFT: No results for input(s): ALKPHOS, ALT, AST, PROT, BILITOT, BILIDIR, LABALBU in the last 72 hours. PT/INR: No results for input(s): PROTIME, INR in the last 72 hours. Procalcitonin:    Latest Reference Range & Units 11/17/22 18:36   Procalcitonin 0.00 - 0.08 ng/mL 0.03     Cultures:  No results for input(s): CULTRESP in the last 72 hours.      Latest Reference Range & Units 11/18/22 12:00   Human Rhinovirus/Enterovirus by PCR Not Detected  Not Detected   Influenza A by PCR Not Detected  Not Detected   Influenza B by PCR Not Detected  Not Detected   Adenovirus by PCR Not Detected  Not Detected   Coronavirus 229E by PCR Not Detected  Not Detected   Coronavirus HKU1 by PCR Not Detected  Not Detected   Coronavirus NL63 by PCR Not Detected  Not Detected   Coronavirus OC43 by PCR Not Detected  Not Detected   Human Metapneumovirus by PCR Not Detected  Not Detected   Parainfluenza Virus 1 by PCR Not Detected  Not Detected   Parainfluenza Virus 2 by PCR Not Detected  Not Detected   Parainfluenza Virus 3 by PCR Not Detected  Not Detected   Parainfluenza Virus 4 by PCR Not Detected  Not Detected   Respiratory Syncytial Virus by PCR Not Detected  Not Detected   Bordetella parapertussis by PCR Not Detected  Not Detected   Chlamydophilia pneumoniae by PCR Not Detected  Not Detected   Mycoplasma pneumoniae by PCR Not Detected  Not Detected   SARS-CoV-2, PCR Not Detected  Not Detected   Bordetella pertussis by PCR Not Detected  Not Detected     ABG:   Recent Labs     11/20/22  1004   HCO3 42.3*   BE 14.1*   O2SAT 95.3       Films:  CT ABDOMEN PELVIS WO CONTRAST Additional Contrast? None    Result Date: 11/14/2022  EXAMINATION: CT OF THE ABDOMEN AND PELVIS WITHOUT CONTRAST 11/14/2022 3:39 pm TECHNIQUE: CT of the abdomen and pelvis was performed without the administration of intravenous contrast. Multiplanar reformatted images are provided for review. Automated exposure control, iterative reconstruction, and/or weight based adjustment of the mA/kV was utilized to reduce the radiation dose to as low as reasonably achievable. COMPARISON: May 29, 2022 HISTORY: ORDERING SYSTEM PROVIDED HISTORY: hematuria, r/o stone TECHNOLOGIST PROVIDED HISTORY: Additional Contrast?->None Reason for exam:->hematuria, r/o stone Decision Support Exception - unselect if not a suspected or confirmed emergency medical condition->Emergency Medical Condition (MA) What reading provider will be dictating this exam?->CRC FINDINGS: No evidence of renal or ureteral calculus. No hydronephrosis or perinephric edema. Redemonstration of a cyst associated with the left kidney measuring up to 3 cm. Numerous phleboliths are present in the pelvis. Del [place] catheter is present in urinary bladder. Urinary bladder is nondistended. Generalized thickened appearance of wall of the urinary bladder. No bowel obstruction or free air. Multiple diverticula involving the sigmoid colon and left colon. Multiple diverticula also seen at level of the right colon. Postsurgical changes are present in the right lower quadrant. No intrahepatic or extrahepatic bile duct dilatation. Gallbladder is unremarkable.   Numerous calcifications throughout the pancreas. No evidence of acute pancreatitis. Spleen is normal in size. No retroperitoneal lymphadenopathy. No evidence of pneumonia in the visualized lung bases. 1. Generalized thickened appearance of urinary bladder wall related to nondistention or cystitis. 2. Diverticulosis. 3. No evidence of renal or ureteral calculus. 4. Numerous calcifications throughout the pancreas suggesting chronic calcific pancreatitis. XR CHEST PORTABLE    Result Date: 11/14/2022  EXAMINATION: ONE XRAY VIEW OF THE CHEST 11/14/2022 1:35 pm COMPARISON: October 12, 2022 HISTORY: ORDERING SYSTEM PROVIDED HISTORY: shortness of breath, COPD TECHNOLOGIST PROVIDED HISTORY: Reason for exam:->shortness of breath, COPD What reading provider will be dictating this exam?->CRC FINDINGS: Chronic irregular interstitial opacities bilaterally. No focal airspace opacity or evidence of pleural effusion. Subsegmental atelectasis or scarring again demonstrated in the lung bases. No pneumothorax. Hyperinflation of both lungs. Redemonstration of radiopaque material associated with midthoracic spine and visualized proximal right humerus. 1.  Stable chronic interstitial opacities bilaterally. No focal pneumonia or evidence of pleural effusion. 2.  Emphysematous changes. Assessment:  49-year-old female with COPD on home oxygen  Recent admission  10/10 - 10/20/2022 with hypercapnic respiratory failure acute on chronic CHF. Patient was initially in ICU put on AVAPS. 11/14/2022 presented with shortness of breath  CT abdomen pelvis with generalized thickened gallbladder cystitis, diverticulosis chronic calcified pancreas  Chest x-ray with stable chronic interstitial opacities emphysematous changes  11/18/2022 ABGs with hypercapnia. Respiratory viral panel negative  11/19 on 3 L  11/20 on NIV - 3L during day with pox 98%.   Confused, hgb 6.7 to receive 1u prbc  ABGs with respiratory acidosis  11/21: 4L  11/22: 3L, baseline. Wore NIV. 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 Keep pox >90%. 11/18 ABG 7.33/83/72/43. Continue NIV HS , states she has NIV at home   Continue PO Prednisone ok to decrease to 30 today and complete taper   Continue Duoneb, Pulmicort, Brovana  On Seroquel AND Zyprexa   Was on Ceftriaxone for UTI - procal 0.03 11/17 believes she is colonized, not on abx now   Hgb improving s/p transfusion PRBC-- 11/21/  hgb 9.3   Ambulate,oob able   PT/OT  Supportive care   Electronically signed by ANGELA Martinez on 11/22/2022 at 2:36 PM    I had a face-to-face encounter with the patient at the bedside. I agree with the nurse practitioner's note and assessment and plan as detailed above. Necessary editing and changes made to the note by myself. With low chloride. We will give her 2 L of fluid. For DC in a.m.

## 2022-11-22 NOTE — CARE COORDINATION
SOCIAL WORK/CASEMANAGEMENT TRANSITION OF CARE PLANNING( [address] [place] [address], [address] [facility]):  met with pt in the room this a.m. her portable o2 is in the room from [facility] who delivered it yesterday. Pt is active with [facility] and they are notified that pt most likely will go home today and cm to place [facility] orders. EUGENE Aguiar  11/22/2022  Suman orders for hhc in chart. EUGENE Enriquez  11/22/2022

## 2022-11-23 VITALS
OXYGEN SATURATION: 97 % | HEIGHT: 67 IN | WEIGHT: 152 LBS | HEART RATE: 79 BPM | RESPIRATION RATE: 18 BRPM | TEMPERATURE: 97.6 F | SYSTOLIC BLOOD PRESSURE: 124 MMHG | DIASTOLIC BLOOD PRESSURE: 50 MMHG | BODY MASS INDEX: 23.86 KG/M2

## 2022-11-23 PROBLEM — E43 SEVERE PROTEIN-CALORIE MALNUTRITION (HCC): Chronic | Status: ACTIVE | Noted: 2022-11-23

## 2022-11-23 LAB
ANION GAP SERPL CALCULATED.3IONS-SCNC: 9 MMOL/L (ref 7–16)
BUN BLDV-MCNC: 14 MG/DL (ref 6–23)
CALCIUM SERPL-MCNC: 10.2 MG/DL (ref 8.6–10.2)
CHLORIDE BLD-SCNC: 102 MMOL/L (ref 98–107)
CO2: 34 MMOL/L (ref 22–29)
CREAT SERPL-MCNC: 0.6 MG/DL (ref 0.5–1)
GFR SERPL CREATININE-BSD FRML MDRD: >60 ML/MIN/1.73
GLUCOSE BLD-MCNC: 184 MG/DL (ref 74–99)
POTASSIUM SERPL-SCNC: 3.9 MMOL/L (ref 3.5–5)
SARS-COV-2, NAAT: NOT DETECTED
SODIUM BLD-SCNC: 145 MMOL/L (ref 132–146)

## 2022-11-23 PROCEDURE — 94660 CPAP INITIATION&MGMT: CPT

## 2022-11-23 PROCEDURE — 6370000000 HC RX 637 (ALT 250 FOR IP): Performed by: CLINICAL NURSE SPECIALIST

## 2022-11-23 PROCEDURE — 36415 COLL VENOUS BLD VENIPUNCTURE: CPT

## 2022-11-23 PROCEDURE — 6370000000 HC RX 637 (ALT 250 FOR IP): Performed by: FAMILY MEDICINE

## 2022-11-23 PROCEDURE — 80048 BASIC METABOLIC PNL TOTAL CA: CPT

## 2022-11-23 PROCEDURE — 94640 AIRWAY INHALATION TREATMENT: CPT

## 2022-11-23 PROCEDURE — 2700000000 HC OXYGEN THERAPY PER DAY

## 2022-11-23 PROCEDURE — 87635 SARS-COV-2 COVID-19 AMP PRB: CPT

## 2022-11-23 RX ORDER — ALBUTEROL SULFATE 2.5 MG/3ML
2.5 SOLUTION RESPIRATORY (INHALATION) EVERY 4 HOURS PRN
Qty: 120 EACH | Refills: 3 | DISCHARGE
Start: 2022-11-23

## 2022-11-23 RX ORDER — TRAMADOL HYDROCHLORIDE 50 MG/1
50 TABLET ORAL EVERY 6 HOURS PRN
Qty: 12 TABLET | Refills: 0 | Status: SHIPPED | OUTPATIENT
Start: 2022-11-23 | End: 2022-11-26

## 2022-11-23 RX ORDER — PREDNISONE 10 MG/1
10 TABLET ORAL DAILY
Qty: 3 TABLET | Refills: 0 | DISCHARGE
Start: 2022-11-27 | End: 2022-11-30

## 2022-11-23 RX ORDER — PREDNISONE 20 MG/1
20 TABLET ORAL DAILY
Qty: 3 TABLET | Refills: 0 | DISCHARGE
Start: 2022-11-24 | End: 2022-11-27

## 2022-11-23 RX ADMIN — BUDESONIDE 500 MCG: 0.5 SUSPENSION RESPIRATORY (INHALATION) at 09:34

## 2022-11-23 RX ADMIN — FOLIC ACID 1 MG: 1 TABLET ORAL at 08:43

## 2022-11-23 RX ADMIN — ACETAMINOPHEN 650 MG: 325 TABLET, FILM COATED ORAL at 12:44

## 2022-11-23 RX ADMIN — ARFORMOTEROL TARTRATE 15 MCG: 15 SOLUTION RESPIRATORY (INHALATION) at 09:34

## 2022-11-23 RX ADMIN — PREDNISONE 30 MG: 10 TABLET ORAL at 08:44

## 2022-11-23 RX ADMIN — PANTOPRAZOLE SODIUM 40 MG: 40 TABLET, DELAYED RELEASE ORAL at 05:27

## 2022-11-23 NOTE — PROGRESS NOTES
Comprehensive Nutrition Assessment    Type and Reason for Visit:  Initial, Consult, RD Nutrition Re-Screen/LOS, Patient Education (LOS 7, elevated A1c consistent CHO DI)    Nutrition Recommendations/Plan:   Continue current diet. DI completed. No nutritional intervention indicated at this time. Will continue to monitor. Malnutrition Assessment:  Malnutrition Status:  No malnutrition (11/23/22 1440)    Context:  Chronic Illness     Findings of the 6 clinical characteristics of malnutrition:  Energy Intake:  75% or less estimated energy requirements for 1 month or longer  Weight Loss:  Unable to assess (2/2 CHF and possible fluid shifts/lack of wt hx on file to assess)     Body Fat Loss:  No significant body fat loss     Muscle Mass Loss:  No significant muscle mass loss    Fluid Accumulation:  No significant fluid accumulation     Strength:  Not Performed    Nutrition Assessment:    Pt. admit w/ Acute on chronic hypercapnic respiratory failure 2/2 Acute Exacerbation of COPD. Noted Chronic UTI. Hx of COPD,CHF. PO intake appears stable consuming % of most meals. Pt. reports excellent appetite. Will continue to monitor. Nutrition Related Findings:    A&OX4, -I/O(9.5L), hyperglycemia, elevated A1c (6.1), +BS, GI WDL, No edema, Pt. appears nourished w/ no significant muscle/fat wasting. Pt. reports she has gained unknown amount of weight/ unable to quantify timeline/amount. DI completed on Plate method and Consistent CHO diet for improved blood glucose control. Wound Type: None       Current Nutrition Intake & Therapies:    Average Meal Intake: %, 51-75% (most meals)  Average Supplements Intake: None Ordered  ADULT DIET;  Regular; Low Fat/Low Chol/High Fiber/TANA    Anthropometric Measures:  Height: 5' 7\" (170.2 cm)  Ideal Body Weight (IBW): 135 lbs (61 kg)    Admission Body Weight: 162 lb 6 oz (73.7 kg) (per RD BS 11/23 first measured)  Current Body Weight: 162 lb 6 oz (73.7 kg) (per RD BS 11/23), 120.3 % IBW. Weight Source: Bed Scale  Current BMI (kg/m2): 25.4  Usual Body Weight:  (unable to assess d/t CHF fluid shifts)     Weight Adjustment For: No Adjustment                 BMI Categories: Overweight (BMI 25.0-29. 9)    Nutrition Diagnosis:   No nutrition diagnosis at this time    Nutrition Interventions:   Food and/or Nutrient Delivery: Continue Current Diet  Nutrition Education/Counseling: Education completed (Plate method and Consistent CHO diet for improved blood glucose control. 11/23/22)  Coordination of Nutrition Care: Continue to monitor while inpatient       Goals:     Goals: PO intake 75% or greater, by next RD assessment (to continue)       Nutrition Monitoring and Evaluation:   Behavioral-Environmental Outcomes: None Identified  Food/Nutrient Intake Outcomes: Food and Nutrient Intake, Supplement Intake  Physical Signs/Symptoms Outcomes: Biochemical Data, Fluid Status or Edema, Weight, Skin, Nutrition Focused Physical Findings, GI Status    Discharge Planning:    No discharge needs at this time     Gita Reyes RD  Contact: ext [phone]

## 2022-11-23 NOTE — DISCHARGE INSTR - COC
Continuity of Care Form    Patient Name: Jesus Bhakta   :  1955  MRN:  [id number]    Admit date:  2022  Discharge date:  2022    Code Status Order: Full Code   Advance Directives:     Admitting Physician:  Kem Vera MD  PCP: Alex Aden MD    Discharging Nurse: [facility] Unit/Room#: 2833/9157-P  Discharging Unit Phone Number: [phone]    Emergency Contact:   Extended Emergency Contact Information  Primary Emergency Contact: Rebekah Camilot, [address] Phone: [phone]  Mobile Phone: [phone]  Relation: Brother/Sister  Preferred language: English   needed? No  Secondary Emergency Contact: ManeRishabh YENI  Address: [address] [place] [address], [address] Phone: [phone]  Work Phone: [phone]  Mobile Phone: [phone]  Relation: Brother/Sister  Preferred language: English   needed? No    Past Surgical History:  Past Surgical History:   Procedure Laterality Date    COLONOSCOPY      COLONOSCOPY N/A 2019    COLONOSCOPY DIAGNOSTIC performed by Keith Hayes MD at [facility] (CERVIX STATUS UNKNOWN)      KIDNEY SURGERY Left 2014    ABLATION PERFORMED UNDER CT SCAN    CA CYSTOURETHROSCOPY,BIOPSIES N/A 2018    CYSTOSCOPY RETROGRADE PYELOGRAM, CLOT EVACATION , POSS.   BLADDER BIOPSY ---DR Tata Devine 2 :30 performed by Alison Sue DO at [facility] N/A 5/10/2019    EGD BIOPSY performed by Keith Hayes MD at [address] [place] [address] 2019    EGD EUS performed by Gregg Nissen, DO at [place] [address] N/A 2019    EGD performed by Gregg Nissen, DO at [address] [place] [address] 2020    EGD DIAGNOSTIC ONLY performed by Rahel Julien MD at [address] [place] [address] 10/25/2021    EGD BIOPSY performed by Nnamdi Harrison MD at [facility] N/A 12/6/2021    EGD BIOPSY performed by Nnamdi Harrison MD at [address] [place] [address] History:   Immunization History   Administered Date(s) Administered    COVID-19, PFIZER PURPLE top, DILUTE for use, (age 15 y+), 30mcg/0.3mL 05/20/2021       Active Problems:  Patient Active Problem List   Diagnosis Code    COPD (chronic obstructive pulmonary disease) ([address] J44.9    MDD (major depressive disorder) F32.9    Hematuria R31.9    Neurogenic bladder N31.9    Chronic respiratory failure with hypoxia ([address] J96.11    Anemia D64.9    Gastric wall thickening K31.89    Bladder wall thickening N32.89    Difficulty in walking, not elsewhere classified R26.2    Moderate persistent asthma with (acute) exacerbation J45.41    Muscle weakness (generalized) M62.81    Need for assistance with personal care Z74.1    Neuromuscular dysfunction of bladder, unspecified N31.9    Personal history of other malignant neoplasm of kidney Z85.528    Breast pain N64.4    COPD exacerbation ([address] J44.1    New onset of congestive heart failure ([address] I50.9    Prolonged Q-T interval on ECG R94.31    Severe protein-calorie malnutrition (HCC) E43    Acute exacerbation of chronic obstructive pulmonary disease (COPD) ([address] J44.1    UTI (urinary tract infection) N39.0    Decompensated chronic obstructive pulmonary disease with exacerbation ([address] J44.1       Isolation/Infection:   Isolation            No Isolation          Patient Infection Status       Infection Onset Added Last Indicated Last Indicated By Review Planned Expiration Resolved Resolved By    None active    Resolved    COVID-19 (Rule Out) 11/18/22 11/18/22 11/18/22 Respiratory Panel, Molecular, with COVID-19 (Restricted: peds pts or suitable admitted adults) (Ordered)   11/18/22 Rule-Out Test Resulted    COVID-19 (Rule Out) 11/14/22 11/14/22 11/14/22 COVID-19, Rapid (Ordered)   11/14/22 Rule-Out Test Resulted    COVID-19 (Rule Out) 10/17/22 10/17/22 10/17/22 Respiratory Panel, Molecular, with COVID-19 (Restricted: peds pts or suitable admitted adults) (Ordered)   10/17/22 Rule-Out Test Resulted    COVID-19 (Rule Out) 10/11/22 10/11/22 10/12/22 Respiratory Panel, Molecular, with COVID-19 (Restricted: peds pts or suitable admitted adults) (Ordered)   10/13/22 Rule-Out Test Canceled    COVID-19 (Rule Out) 10/10/22 10/10/22 10/10/22 COVID-19, Rapid (Ordered)   10/10/22 Rule-Out Test Resulted    COVID-19 (Rule Out) 02/26/21 02/26/21 02/26/21 COVID-19, Rapid (Ordered)   02/26/21 Rule-Out Test Resulted    COVID-19 (Rule Out) 05/20/20 05/20/20 05/20/20 COVID-19 (Ordered)   05/20/20 Rule-Out Test Resulted    ESBL (Extended Spectrum Beta Lactamase)  08/14/14 08/14/14 Armen Sullivan, JUN   02/20/17 Tomeka Mosley, JUN    E coli esbl in urine 8/12/14            Nurse Assessment:  Last Vital Signs: BP (!) 124/50   Pulse 79   Temp 97.6 °F (36.4 °C) (Temporal)   Resp 18   Ht 5' 7\" (1.702 m)   Wt 152 lb (68.9 kg)   LMP 10/21/1985   SpO2 97%   BMI 23.81 kg/m²     Last documented pain score (0-10 scale): Pain Level: 0  Last Weight:   Wt Readings from Last 1 Encounters:   11/14/22 152 lb (68.9 kg)     Mental Status:  oriented, alert, coherent, logical, and thought processes intact    IV Access:  - None    Nursing Mobility/ADLs:  Walking   Assisted  Transfer  Assisted  Bathing  Assisted  Dressing  Assisted  Toileting  Assisted  Feeding  Assisted  Med Admin  Assisted  Med Delivery   whole    Wound Care Documentation and Therapy:        Elimination:  Continence: Bowel: No  Bladder: No  Urinary Catheter:  CHRONIC    Colostomy/Ileostomy/Ileal Conduit: No       Date of Last BM: 11/22/2022    Intake/Output Summary (Last 24 hours) at 11/23/2022 0936  Last data filed at 11/23/2022 0527  Gross per 24 hour   Intake 1310 ml   Output 2925 ml   Net -1615 ml     I/O last 3 completed shifts:   In: 0215 [P.O.:900; I.V.:830]  Out: 4925 [Urine:4925]    Safety Concerns: At Risk for Falls    Impairments/Disabilities:      None    Nutrition Therapy:  Current Nutrition Therapy:   - Oral Diet:  General    Routes of Feeding: Oral  Liquids: Thin Liquids  Daily Fluid Restriction: no  Last Modified Barium Swallow with Video (Video Swallowing Test): not done    Treatments at the Time of Hospital Discharge:   Respiratory Treatments: 3l at home and hospital  Oxygen Therapy:  is on oxygen at 3 L/min per nasal cannula. Ventilator:    - No ventilator support    Rehab Therapies: Physical Therapy and Occupational Therapy  Weight Bearing Status/Restrictions: No weight bearing restrictions  Other Medical Equipment (for information only, NOT a DME order):  hospital bed  Other Treatments: none    Patient's personal belongings (please select all that are sent with patient):  Money with purse, clothes, chronic jarquin     RN SIGNATURE:  Electronically signed by Killian Matias RN on 11/23/22 at 4:24 PM EST    CASE MANAGEMENT/SOCIAL WORK SECTION    Inpatient Status Date: ***    Readmission Risk Assessment Score:  Readmission Risk              Risk of Unplanned Readmission:  21           Discharging to Facility/ Agency   Name: [facility]   Address:  Phone:  Fax:    Dialysis Facility (if applicable)   Name:  Address:  Dialysis Schedule:  Phone:  Fax:    / signature: Electronically signed by EUGENE Aguiar on 11/23/2022 at 10:09 AM      PHYSICIAN SECTION    Prognosis: {Prognosis:9078761426}    Condition at Discharge: [address] Patient Condition:929471478}    Rehab Potential (if transferring to Rehab): {Prognosis:7641709966}    Recommended Labs or Other Treatments After Discharge: ***    Physician Certification: I certify the above information and transfer of Mindy Cervantes  is necessary for the continuing treatment of the diagnosis listed and that she requires Pedro Gibbonsuel for less 30 days.      Update Admission H&P: {CHP DME Changes in [facility]:060786569}    PHYSICIAN SIGNATURE:  Electronically signed by Magdalena Salcedo DO on 11/23/22 at 9:37 AM EST

## 2022-11-23 NOTE — CARE COORDINATION
SOCIAL WORK/CASEMANAGEMENT TRANSITION OF CARE PLANNINGMaple Marysol Yeung, [address]):  pcp here ok with down grade. Met with pt and she wants to go to [facility], referral made. Beverley Austin, EDITHW  11/23/2022  [facility] to [facility]. Precert started. All discharge paperwork is in place and pasrr done. Beverley Austin, EDITHW  11/23/2022  [facility] is able to take pt and is working on getting bipap to there. I called [facility] provide a ride and they said they do not have transportation available today. I called [facility] and they said they can setup w/c with o2 and bill [facility]. Pt is setup for  7p.m. they may send a ambulance for w/c billing. Snf;loc. Beverley Austin, W  11/23/2022          The Plan for Transition of Care is related to the following treatment goals: renetta   The Patient and/or patient representative  was provided with a choice of provider and agrees   with the discharge plan. [x] Yes [] No  Freedom of choice list was provided with basic dialogue that supports the patient's individualized plan of care/goals, treatment preferences and shares the quality data associated with the providers.  [x] Yes [] No

## 2022-11-23 NOTE — PLAN OF CARE
Problem: Chronic Conditions and Co-morbidities  Goal: Patient's chronic conditions and co-morbidity symptoms are monitored and maintained or improved  Outcome: Progressing     Problem: Safety - Adult  Goal: Free from fall injury  Outcome: Progressing     Problem: ABCDS Injury Assessment  Goal: Absence of physical injury  Outcome: Progressing     Problem: Pain  Goal: Verbalizes/displays adequate comfort level or baseline comfort level  Outcome: Progressing     Problem: Discharge Planning  Goal: Discharge to home or other facility with appropriate resources  Outcome: Progressing

## 2022-11-23 NOTE — PROGRESS NOTES
Pulmonary Progress Note  11/23/2022 9:19 AM  Subjective:   Admit Date: 11/14/2022  PCP: Manan Sutton MD  Interval History: Doing well at breakfast slept fine. Denies any pain or shortness of breath    Diet: ADULT DIET; Regular; Low Fat/Low Chol/High Fiber/TANA      Data:   Scheduled Meds:    [START ON 11/24/2022] predniSONE  20 mg Oral Daily    Followed by    Lawrence Burkling ON 11/27/2022] predniSONE  10 mg Oral Daily    Arformoterol Tartrate  15 mcg Nebulization BID    budesonide  500 mcg Nebulization BID    folic acid  1 mg Oral Daily    gabapentin  300 mg Oral Nightly    OLANZapine  5 mg Oral Nightly    pantoprazole  40 mg Oral QAM AC    QUEtiapine  50 mg Oral Nightly    sertraline  50 mg Oral Nightly    sodium chloride flush  5-40 mL IntraVENous 2 times per day    [Held by provider] enoxaparin  40 mg SubCUTAneous Daily       Continuous Infusions:    sodium chloride 75 mL/hr at 11/22/22 1807    sodium chloride      sodium chloride         PRN Meds: traMADol, sodium chloride, albuterol, sodium chloride flush, sodium chloride, ondansetron **OR** ondansetron, acetaminophen **OR** acetaminophen, magnesium hydroxide    I/O last 3 completed shifts: In: 0811 [P.O.:900; I.V.:830]  Out: 4925 [Urine:4925]    No intake/output data recorded. Intake/Output Summary (Last 24 hours) at 11/23/2022 0919  Last data filed at 11/23/2022 0527  Gross per 24 hour   Intake 1310 ml   Output 2925 ml   Net -1615 ml       No data found.       Recent Labs     11/20/22  1122 11/20/22 2056 11/21/22  1120   WBC 6.4  --  9.3   HGB 6.7* 9.4* 9.3*   HCT 24.9* 32.2* 32.3*   MCV 81.9  --  83.0     --  230     Recent Labs     11/20/22  1122      K 3.9   CL 94*   CO2 42*   BUN 18   CREATININE 0.5       CPK:  Lab Results   Component Value Date    CKTOTAL 108 02/26/2021          -----------------------------------------------------------------  RAD:   Results for orders placed during the hospital encounter of 11/14/22    XR CHEST PORTABLE    Narrative  EXAMINATION:  ONE XRAY VIEW OF THE CHEST    2022 1:35 pm    COMPARISON:  2022    HISTORY:  ORDERING SYSTEM PROVIDED HISTORY: shortness of breath, COPD  TECHNOLOGIST PROVIDED HISTORY:  Reason for exam:->shortness of breath, COPD  What reading provider will be dictating this exam?->CRC    FINDINGS:  Chronic irregular interstitial opacities bilaterally. No focal airspace  opacity or evidence of pleural effusion. Subsegmental atelectasis or scarring  again demonstrated in the lung bases. No pneumothorax. Hyperinflation of  both lungs. Redemonstration of radiopaque material associated with  midthoracic spine and visualized proximal right humerus. Impression  1. Stable chronic interstitial opacities bilaterally. No focal pneumonia or  evidence of pleural effusion. 2.  Emphysematous changes. Micro: Additional Respiratory Assessments  Heart Rate: 79  Resp: 18  SpO2: 97 %    Objective:   Vitals:   Vitals:    22 0719   BP: (!) 124/50   Pulse: 79   Resp: 18   Temp: 97.6 °F (36.4 °C)   SpO2: 97%      TEMP:Current: Temp: 97.6 °F (36.4 °C)  Max: Temp  Av.6 °F (36.4 °C)  Min: 97.6 °F (36.4 °C)  Max: 97.6 °F (36.4 °C)    BP Range: Systolic (13HFT), YHM:241 , Min:124 , BSR:987     Diastolic (69LUC), SLI:54, Min:50, Max:50      General: Alert, in NAD  ENT: No discharge. Pharynx clear. membranes moist   Neck: Supple, Trachea midline. Resp: No accessory muscle use. Non-labored. Lungs clear with No crackles. No wheezing. No rhonchi. Diminished   CV: Regular rate. Regular rhythm. No murmur . No edema. ABD: Non-tender. Non-distended. Soft, round . Normal bowel sounds. Skin: Warm and dry. M/S: No cyanosis. No joint deformity. No clubbing. Neuro: Awake. Follows commands.  O x 3, DWYER, slurred speech, hard to understand at times   Psych:  calm and interactive       Assessment:   Patient Active Problem List:  68-year-old female with COPD on home oxygen  Recent admission  10/10 - 10/20/2022 with hypercapnic respiratory failure acute on chronic CHF. Patient was initially in ICU put on AVAPS. 11/14/2022 presented with shortness of breath  CT abdomen pelvis with generalized thickened gallbladder cystitis, diverticulosis chronic calcified pancreas  Chest x-ray with stable chronic interstitial opacities emphysematous changes  11/18/2022 ABGs with hypercapnia. Respiratory viral panel negative  11/19 on 3 L  11/20 on NIV - 3L during day with pox 98%. Confused, hgb 6.7 to receive 1u prbc  ABGs with respiratory acidosis  11/21: 4L  11/22: 3L, baseline. Wore NIV. ABG showing hypercapnia with metabolic alkalosis due to low chloride. 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 at baseline  of 3L nc. Keep pox >90%.   NIV HS , states she has NIV at home   Prednisone complete taper   Duoneb, Pulmicort, Brovana  Ambulate, OOB   PT/OT  okay for DC  Follow-up with your PCP in 2 weeks    Krys Welch MD,[facility]

## 2022-11-23 NOTE — PROGRESS NOTES
IV removed and monitor removed cleaned and placed in storage. Nurse to nurse attempted. Aide at [employer] answered and will deliver information to nurse when available. NUrse to nurse to be given once celia emerson calls back.

## 2022-11-23 NOTE — PROGRESS NOTES
Physician Progress Note      PATIENT:               Ludy Fields  CSN #:                  [id number]  :                       1955  ADMIT DATE:       2022 12:37 PM  [address] [place] DATE:  Tilda Face  PROVIDER #:        Beatrice Phoenix DO          QUERY TEXT:    Pt admitted with COPD exacerbation. Pt noted to have UCx growing >100,000   CFU/ml of Corynebacterium species and a chronic indwelling jarquin. If possible,   please document in the progress notes and discharge summary if you are   evaluating and/or treating any of the following: The medical record reflects the following:  Risk Factors: chronic indwelling catheter, neurogenic bladder  Clinical Indicators: Per H&P \". Amelie Mueller Chronic UTI in patient with chronic   indwelling catheter-most likely colonization. Amelie Mueller \", UCx from  growing   >100,000 CFU/ml of Corynebacterium species and also evaluating for Strep   species  Treatment: IV Rocephin, serial CBC, UCx    Thank you,  Rashid Albarado RN, BSN, CDIS  Clinical Documentation Improvement  [email or web address]  Options provided:  -- Bacteriuria  -- UTI due to chronic indwelling urinary catheter  -- UTI not due to indwelling urinary catheter  -- Other - I will add my own diagnosis  -- Disagree - Not applicable / Not valid  -- Disagree - Clinically unable to determine / Unknown  -- Refer to Clinical Documentation Reviewer    PROVIDER RESPONSE TEXT:    UTI is due to the chronic indwelling urinary catheter.     Query created by: La Damon on 2022 10:51 AM      Electronically signed by:  Beatrice Phoenix DO 2022 8:37 PM

## 2022-11-23 NOTE — PROGRESS NOTES
Hospitalist Progress Note      PCP: Khoi Horner MD    Date of Admission: 11/14/2022        Hospital Course:  SOB, FOUND TO HAVE AN EXACERBATION OF COPD   ALSO HAS A CHRONIC GUTIÉRREZ ** TOLD PATIENT SHE NEEDS A MARK, AND SHE CAN LEAVE WHEN SHE WANTS, HOWEVER, SHE NEEDS HELP , AGREES TO MARK TEMPORARILY               Subjective:  NO COMPLAINTS           Medications:  Reviewed    Infusion Medications    sodium chloride      sodium chloride       Scheduled Medications    [START ON 11/24/2022] predniSONE  20 mg Oral Daily    Followed by    Carmen Yeh ON 11/27/2022] predniSONE  10 mg Oral Daily    Arformoterol Tartrate  15 mcg Nebulization BID    budesonide  500 mcg Nebulization BID    folic acid  1 mg Oral Daily    gabapentin  300 mg Oral Nightly    OLANZapine  5 mg Oral Nightly    pantoprazole  40 mg Oral QAM AC    QUEtiapine  50 mg Oral Nightly    sertraline  50 mg Oral Nightly    sodium chloride flush  5-40 mL IntraVENous 2 times per day    [Held by provider] enoxaparin  40 mg SubCUTAneous Daily     PRN Meds: traMADol, sodium chloride, albuterol, sodium chloride flush, sodium chloride, ondansetron **OR** ondansetron, acetaminophen **OR** acetaminophen, magnesium hydroxide      Intake/Output Summary (Last 24 hours) at 11/23/2022 1614  Last data filed at 11/23/2022 0527  Gross per 24 hour   Intake 1310 ml   Output 2600 ml   Net -1290 ml       Exam:    BP (!) 124/50   Pulse 79   Temp 97.6 °F (36.4 °C) (Temporal)   Resp 18   Ht 5' 7\" (1.702 m)   Wt 152 lb (68.9 kg)   LMP 10/21/1985   SpO2 97%   BMI 23.81 kg/m²     Gen: WELL DEVELOPED  HEENT: NC/AT, moist mucous membranes, no oropharyngeal erythema or exudate  Neck: supple, trachea midline, no anterior cervical or SC LAD  Heart:  Normal s1/s2, RRR,  Lungs:  DIMINISHED  bilaterally,  Abd: bowel sounds present, soft, nontender, nondistended, no masses  Extrem:  No clubbing, cyanosis,  NO edema  Skin: no rashes or lesions  Psych: A & O x3  Neuro: grossly intact, moves all four extremities. Labs:   Recent Labs     11/20/22 2056 11/21/22  1120   WBC  --  9.3   HGB 9.4* 9.3*   HCT 32.2* 32.3*   PLT  --  230     Recent Labs     11/23/22  1034      K 3.9      CO2 34*   BUN 14   CREATININE 0.6   CALCIUM 10.2     No results for input(s): AST, ALT, BILIDIR, BILITOT, ALKPHOS in the last 72 hours. No results for input(s): INR in the last 72 hours. No results for input(s): Ayana Rowels in the last 72 hours. No results for input(s): AST, ALT, ALB, BILIDIR, BILITOT, ALKPHOS in the last 72 hours. No results for input(s): LACTA in the last 72 hours.   No results found for: Mary Jo Ny  Lab Results   Component Value Date    AMMONIA 40.0 03/02/2021    AMMONIA 15.0 09/20/2013       Assessment:    Active Hospital Problems    Diagnosis Date Noted    Decompensated chronic obstructive pulmonary disease with exacerbation ([facility] [J44.1] 11/16/2022     Priority: Medium    UTI (urinary tract infection) [N39.0] 11/15/2022     Priority: Medium    Acute exacerbation of chronic obstructive pulmonary disease (COPD) ([facility] [J44.1] 11/14/2022     Priority: Medium   NEUROGENIC BLADDER  CHRONIC GUTIÉRREZ   prediabetes(aic 6.1)     Plan:  DC   TO MARK    Electronically signed by Tiny Springer DO on 11/23/2022 at [place] [address]

## 2022-11-23 NOTE — PROGRESS NOTES
Date: 11/23/2022    Time: 1:30 AM    Patient Placed On BIPAP/CPAP/ Non-Invasive Ventilation? No    If no must comment. Facial area red/color change? No           If YES are Blister/Lesion present? No   If yes must notify nursing staff  BIPAP/CPAP skin barrier? Yes    Skin barrier type:mepilexlite       Comments: remains on from previous shift.         Dani Justin RCP

## 2022-11-23 NOTE — PROGRESS NOTES
Nutrition Education    Educated on Consistent CHO diet for optimal blood glucose control /Plate method   Learners: Patient  Readiness: Eager  Method: Explanation and Handout  Response: Verbalizes Understanding  Contact name and number provided.     Estrella Meehan RD  Contact Number: ext [phone]

## 2022-12-08 NOTE — DISCHARGE SUMMARY
Hospitalist Discharge Summary    Patient ID: Chana Frankel   Patient : 1955  Patient's PCP: Chucho Bone MD    Admit Date: 2022   Admitting Physician: Mata Villasenor MD    Discharge Date:  2022   Discharge Physician: Guido Hernandez DO   Discharge Condition: Stable  Discharge Disposition: Skilled Facility      Discharge Diagnoses: Active Hospital Problems    Diagnosis Date Noted    Decompensated chronic obstructive pulmonary disease with exacerbation (HCC) [J44.1] 2022     Priority: Medium    UTI (urinary tract infection) [N39.0] 11/15/2022     Priority: Medium    Acute exacerbation of chronic obstructive pulmonary disease (COPD) ([address] [J44.1] 2022     Priority: Medium           Hospit SOB, FOUND TO HAVE AN EXACERBATION OF COPD   ALSO HAS A CHRONIC GUTIÉRREZ ** TOLD PATIENT SHE NEEDS A MARK, AND SHE CAN LEAVE WHEN SHE WANTS, HOWEVER, SHE NEEDS HELP , AGREES TO MARK TEMPORARILY al course in brief:        PHYSICAL EXAM:    BP (!) 124/50   Pulse 79   Temp 97.6 °F (36.4 °C) (Temporal)   Resp 18   Ht 5' 7\" (1.702 m)   Wt 152 lb (68.9 kg)   LMP 10/21/1985   SpO2 97%   BMI 23.81 kg/m²     Gen: WELL DEVELOPED  HEENT: NC/AT, moist mucous membranes, no oropharyngeal erythema or exudate  Neck: supple, trachea midline, no anterior cervical or SC LAD  Heart:  Normal s1/s2, RRR,  Lungs:  DIMINISHED  bilaterally,  Abd: bowel sounds present, soft, nontender, nondistended, no masses  Extrem:  No clubbing, cyanosis,  NO edema  Skin: no rashes or lesions  Psych: A & O x3  Neuro: grossly intact, moves all four extremities. Prior to Admission medications    Medication Sig Start Date End Date Taking?  Authorizing Provider   albuterol (PROVENTIL) (2.5 MG/3ML) 0.083% nebulizer solution Take 3 mLs by nebulization every 4 hours as needed for Wheezing 22  Yes Marie Hamilton, DO   albuterol sulfate HFA (VENTOLIN HFA) 108 (90 Base) MCG/ACT inhaler Inhale 1 puff into the lungs every 4 hours as needed for Wheezing   Yes Historical Provider, MD   Arformoterol Tartrate (BROVANA) 15 MCG/2ML NEBU Take 1 ampule by nebulization 2 times daily   Yes Historical Provider, MD   budesonide (PULMICORT) 0.5 MG/2ML nebulizer suspension Take 1 ampule by nebulization 2 times daily   Yes Historical Provider, MD   gabapentin (NEURONTIN) 300 MG capsule Take 300 mg by mouth at bedtime. Yes Historical Provider, MD   OLANZapine (ZYPREXA) 5 MG tablet Take 5 mg by mouth nightly   Yes Historical Provider, MD   omeprazole (PRILOSEC) 40 MG delayed release capsule Take 40 mg by mouth daily   Yes Historical Provider, MD   sertraline (ZOLOFT) 50 MG tablet Take 1 tablet by mouth nightly 10/19/22   ANGELA Quezada CNP   QUEtiapine (SEROQUEL XR) 50 MG extended release tablet Take 50 mg by mouth nightly    Historical Provider, MD   OXYGEN Inhale 3 L into the lungs continuous    Historical Provider, MD   vitamin D (ERGOCALCIFEROL) 69334 UNITS CAPS capsule Take 50,000 Units by mouth once a week Given Saturday    Historical Provider, MD   Multiple Vitamins-Iron (DAILY-FEDERICO/IRON) TABS Take 1 tablet by mouth daily     Historical Provider, MD   folic acid (FOLVITE) 1 MG tablet Take 1 mg by mouth daily     Historical Provider, MD       Consults:   Aristides Oglesby TO PULMONOLOGY  IP CONSULT TO DIETITIAN            Discharge Instructions / Follow up:    No future appointments. Continued appropriate risk factor modification of blood pressure, diabetes and serum lipids will remain essential to reducing risk of future atherosclerotic development    Activity: activity as tolerated    Significant labs:  CBC:   No results for input(s): WBC, RBC, HGB, HCT, MCV, RDW, PLT in the last 72 hours. BMP: No results for input(s): NA, K, CL, CO2, BUN, CREATININE, CA, MG, PHOS in the last 72 hours.   LFT:  No results for input(s): PROT, ALB, ALKPHOS, ALT, AST, BILITOT, AMYLASE, LIPASE in the last 72 hours.  PT/INR: No results for input(s): INR, APTT in the last 72 hours. BNP: No results for input(s): BNP in the last 72 hours. Hgb A1C:   Lab Results   Component Value Date    LABA1C 6.1 (H) 11/21/2022     Folate and B12:   Lab Results   Component Value Date    JKSLYBFS39 605 10/11/2022   ,   Lab Results   Component Value Date    FOLATE 12.1 10/11/2022     Thyroid Studies:   Lab Results   Component Value Date    TSH 0.140 (L) 10/11/2022    I2OURRL 100.00 02/18/2014    G4LBUMG 5.5 02/18/2014       Urinalysis:    Lab Results   Component Value Date/Time    NITRU Negative 11/14/2022 01:04 PM    WBCUA 1-3 11/14/2022 01:04 PM    BACTERIA MODERATE 11/14/2022 01:04 PM    RBCUA 1-3 11/14/2022 01:04 PM    RBCUA NONE 02/18/2014 07:23 PM    BLOODU TRACE-INTACT 11/14/2022 01:04 PM    SPECGRAV <=1.005 11/14/2022 01:04 PM    GLUCOSEU Negative 11/14/2022 01:04 PM       Imaging:  CT ABDOMEN PELVIS WO CONTRAST Additional Contrast? None    Result Date: 11/14/2022  EXAMINATION: CT OF THE ABDOMEN AND PELVIS WITHOUT CONTRAST 11/14/2022 3:39 pm TECHNIQUE: CT of the abdomen and pelvis was performed without the administration of intravenous contrast. Multiplanar reformatted images are provided for review. Automated exposure control, iterative reconstruction, and/or weight based adjustment of the mA/kV was utilized to reduce the radiation dose to as low as reasonably achievable. COMPARISON: May 29, 2022 HISTORY: ORDERING SYSTEM PROVIDED HISTORY: hematuria, r/o stone TECHNOLOGIST PROVIDED HISTORY: Additional Contrast?->None Reason for exam:->hematuria, r/o stone Decision Support Exception - unselect if not a suspected or confirmed emergency medical condition->Emergency Medical Condition (MA) What reading provider will be dictating this exam?->CRC FINDINGS: No evidence of renal or ureteral calculus. No hydronephrosis or perinephric edema. Redemonstration of a cyst associated with the left kidney measuring up to 3 cm.   Numerous phleboliths are present in the pelvis. Del Rio catheter is present in urinary bladder. Urinary bladder is nondistended. Generalized thickened appearance of wall of the urinary bladder. No bowel obstruction or free air. Multiple diverticula involving the sigmoid colon and left colon. Multiple diverticula also seen at level of the right colon. Postsurgical changes are present in the right lower quadrant. No intrahepatic or extrahepatic bile duct dilatation. Gallbladder is unremarkable. Numerous calcifications throughout the pancreas. No evidence of acute pancreatitis. Spleen is normal in size. No retroperitoneal lymphadenopathy. No evidence of pneumonia in the visualized lung bases. 1. Generalized thickened appearance of urinary bladder wall related to nondistention or cystitis. 2. Diverticulosis. 3. No evidence of renal or ureteral calculus. 4. Numerous calcifications throughout the pancreas suggesting chronic calcific pancreatitis. XR CHEST PORTABLE    Result Date: 11/14/2022  EXAMINATION: ONE XRAY VIEW OF THE CHEST 11/14/2022 1:35 pm COMPARISON: October 12, 2022 HISTORY: ORDERING SYSTEM PROVIDED HISTORY: shortness of breath, COPD TECHNOLOGIST PROVIDED HISTORY: Reason for exam:->shortness of breath, COPD What reading provider will be dictating this exam?->CRC FINDINGS: Chronic irregular interstitial opacities bilaterally. No focal airspace opacity or evidence of pleural effusion. Subsegmental atelectasis or scarring again demonstrated in the lung bases. No pneumothorax. Hyperinflation of both lungs. Redemonstration of radiopaque material associated with midthoracic spine and visualized proximal right humerus. 1.  Stable chronic interstitial opacities bilaterally. No focal pneumonia or evidence of pleural effusion. 2.  Emphysematous changes.        Discharge Medications:      Medication List        CHANGE how you take these medications      * Ventolin  (90 Base) MCG/ACT inhaler  Generic drug: albuterol sulfate HFA  What changed: Another medication with the same name was added. Make sure you understand how and when to take each. * albuterol (2.5 MG/3ML) 0.083% nebulizer solution  Commonly known as: PROVENTIL  Take 3 mLs by nebulization every 4 hours as needed for Wheezing  What changed: You were already taking a medication with the same name, and this prescription was added. Make sure you understand how and when to take each. * This list has 2 medication(s) that are the same as other medications prescribed for you. Read the directions carefully, and ask your doctor or other care provider to review them with you. CONTINUE taking these medications      Brovana 15 MCG/2ML Nebu  Generic drug: Arformoterol Tartrate     budesonide 0.5 MG/2ML nebulizer suspension  Commonly known as: PULMICORT     Daily-Fritz/Iron Tabs     folic acid 1 MG tablet  Commonly known as: FOLVITE     gabapentin 300 MG capsule  Commonly known as: NEURONTIN     OLANZapine 5 MG tablet  Commonly known as: ZYPREXA     omeprazole 40 MG delayed release capsule  Commonly known as: PRILOSEC     OXYGEN     QUEtiapine 50 MG extended release tablet  Commonly known as: SEROQUEL XR     sertraline 50 MG tablet  Commonly known as: ZOLOFT  Take 1 tablet by mouth nightly     vitamin D 1.25 MG (50483 UT) Caps capsule  Commonly known as: ERGOCALCIFEROL            STOP taking these medications      alendronate 70 MG tablet  Commonly known as: FOSAMAX     diazePAM 10 MG tablet  Commonly known as: VALIUM            ASK your doctor about these medications      * predniSONE 20 MG tablet  Commonly known as: DELTASONE  Take 1 tablet by mouth daily for 3 doses  Ask about: Should I take this medication?      * predniSONE 10 MG tablet  Commonly known as: DELTASONE  Take 1 tablet by mouth daily for 3 doses  Ask about: Should I take this medication?     traMADol 50 MG tablet  Commonly known as: ULTRAM  Take 1 tablet by mouth every 6 hours as needed for Pain for up to 3 days. Ask about: Should I take this medication? * This list has 2 medication(s) that are the same as other medications prescribed for you. Read the directions carefully, and ask your doctor or other care provider to review them with you. Where to Get Your Medications        You can get these medications from any pharmacy    Bring a paper prescription for each of these medications  traMADol 50 MG tablet       Information about where to get these medications is not yet available    Ask your nurse or doctor about these medications  albuterol (2.5 MG/3ML) 0.083% nebulizer solution  predniSONE 10 MG tablet  predniSONE 20 MG tablet         Time Spent on discharge is 45 minutes in the review of medications and discharge plan  and exam.    +++++++++++++++++++++++++++++++++++++++++++++++++  Jef Gibson, DO  [address], [place]  +++++++++++++++++++++++++++++++++++++++++++++++++  NOTE: This report was transcribed using voice recognition software. Every effort was made to ensure accuracy; however, inadvertent computerized transcription errors may be present.

## 2022-12-15 PROBLEM — N39.0 UTI (URINARY TRACT INFECTION): Status: RESOLVED | Noted: 2022-11-15 | Resolved: 2022-12-15

## 2022-12-22 ENCOUNTER — TELEPHONE (OUTPATIENT)
Dept: BREAST CENTER | Age: 67
End: 2022-12-22

## 2022-12-22 NOTE — TELEPHONE ENCOUNTER
Anshu and spoke with [employer]. Patient now a resident. Checking to see if patient is still needed to be seen by our providers. Message left with patient to call our office. Patient was previously referred for left breast pain in September.     Electronically signed by Pretty Cook RN on 12/22/22 at 11:27 AM EST

## 2023-01-11 NOTE — PROGRESS NOTES
Date: 10/19/2022    Time: 10:08 PM    Patient Placed On BIPAP/CPAP/ Non-Invasive Ventilation? Yes    If no must comment. Facial area red/color change? No           If YES are Blister/Lesion present? No   If yes must notify nursing staff  BIPAP/CPAP skin barrier?   Yes    Skin barrier type:mepilexlite     Comments:     10/19/22 2201   NIV Type   $NIV $Daily Charge   Skin Assessment Clean, dry, & intact   Skin Protection for O2 Device Yes   Orientation Middle   Location Nose   Intervention(s) Skin Barrier   NIV Started/Stopped On   Equipment Type v60   Mode Bilevel   Mask Type Full face mask   Mask Size Small   Settings/Measurements   PIP Observed 15 cm H20   IPAP 15 cmH20   CPAP/EPAP 5 cmH2O   Vt (Measured) 506 mL   Rate Ordered 18   Resp 18   Insp Rise Time (%) 3 %   FiO2  40 %   I Time/ I Time % 0.9 s   Minute Volume (L/min) 9.1 Liters   Mask Leak (lpm) 25 lpm   Comfort Level Good   Using Accessory Muscles No   SpO2 97   Patient's Home Machine No   Breath Sounds   Right Upper Lobe Clear   Right Middle Lobe Diminished   Right Lower Lobe Diminished   Left Upper Lobe Clear   Left Lower Lobe Diminished   Patient Observation   Observations red cord in wall alarm   Alarm Settings   Alarms On Y   Low Pressure (cmH2O) 8 cmH2O   High Pressure (cmH2O) 40 cmH2O   Apnea (secs) 20 secs   RR Low (bpm) 14   RR High (bpm) 45 br/min           Isidoro Burton RCP Patient called again

## 2023-01-31 ENCOUNTER — TELEPHONE (OUTPATIENT)
Dept: BREAST CENTER | Age: 68
End: 2023-01-31

## 2023-01-31 DIAGNOSIS — N64.4 BREAST PAIN, LEFT: Primary | ICD-10-CM

## 2023-01-31 NOTE — TELEPHONE ENCOUNTER
JUN Mcmullen received a call from patient wanting to schedule an appointment. RN scheduled pt for 3/1/2023 @ 11am with . Patient to have left diagnostic mammogram prior @ 10am. Patient verbalized appointment date, time and location. RN verified number that was good to do reminder call on was the one listed in chart. RN advised patient where to park and enter in the building for the appointment.       Electronically signed by Pauline Saini RN on 1/31/23 at 1:20 PM EST

## 2023-02-27 ENCOUNTER — HOSPITAL ENCOUNTER (OUTPATIENT)
Age: 68
Discharge: HOME | End: 2023-02-27
Payer: COMMERCIAL

## 2023-02-27 LAB
ALBUMIN SERPL-MCNC: 4.4 G/DL (ref 3.5–5.2)
ALP BLD-CCNC: 94 U/L (ref 35–104)
ALT SERPL-CCNC: 8 U/L (ref 0–32)
ANION GAP SERPL CALCULATED.3IONS-SCNC: 11 MMOL/L (ref 7–16)
AST SERPL-CCNC: 12 U/L (ref 0–31)
BASOPHILS ABSOLUTE: 0.03 E9/L (ref 0–0.2)
BASOPHILS RELATIVE PERCENT: 0.7 % (ref 0–2)
BILIRUB SERPL-MCNC: <0.2 MG/DL (ref 0–1.2)
BUN BLDV-MCNC: 6 MG/DL (ref 6–23)
CALCIUM SERPL-MCNC: 9.7 MG/DL (ref 8.6–10.2)
CHLORIDE BLD-SCNC: 99 MMOL/L (ref 98–107)
CHOLESTEROL, TOTAL: 184 MG/DL (ref 0–199)
CO2: 34 MMOL/L (ref 22–29)
CREAT SERPL-MCNC: 0.6 MG/DL (ref 0.5–1)
CREATININE URINE: 164 MG/DL (ref 29–226)
EOSINOPHILS ABSOLUTE: 0.06 E9/L (ref 0.05–0.5)
EOSINOPHILS RELATIVE PERCENT: 1.4 % (ref 0–6)
GFR SERPL CREATININE-BSD FRML MDRD: >60 ML/MIN/1.73
GLUCOSE BLD-MCNC: 122 MG/DL (ref 74–99)
HBA1C MFR BLD: 6.1 % (ref 4–5.6)
HCT VFR BLD CALC: 34.9 % (ref 34–48)
HDLC SERPL-MCNC: 85 MG/DL
HEMOGLOBIN: 10.2 G/DL (ref 11.5–15.5)
IMMATURE GRANULOCYTES #: 0.01 E9/L
IMMATURE GRANULOCYTES %: 0.2 % (ref 0–5)
LDL CHOLESTEROL CALCULATED: 81 MG/DL (ref 0–99)
LYMPHOCYTES ABSOLUTE: 1.14 E9/L (ref 1.5–4)
LYMPHOCYTES RELATIVE PERCENT: 25.9 % (ref 20–42)
MCH RBC QN AUTO: 24.9 PG (ref 26–35)
MCHC RBC AUTO-ENTMCNC: 29.2 % (ref 32–34.5)
MCV RBC AUTO: 85.3 FL (ref 80–99.9)
MICROALBUMIN UR-MCNC: 198.8 MG/L
MICROALBUMIN/CREAT UR-RTO: 121.2 (ref 0–30)
MONOCYTES ABSOLUTE: 0.22 E9/L (ref 0.1–0.95)
MONOCYTES RELATIVE PERCENT: 5 % (ref 2–12)
NEUTROPHILS ABSOLUTE: 2.95 E9/L (ref 1.8–7.3)
NEUTROPHILS RELATIVE PERCENT: 66.8 % (ref 43–80)
PDW BLD-RTO: 15 FL (ref 11.5–15)
PLATELET # BLD: 267 E9/L (ref 130–450)
PMV BLD AUTO: 9.6 FL (ref 7–12)
POTASSIUM SERPL-SCNC: 3.9 MMOL/L (ref 3.5–5)
RBC # BLD: 4.09 E12/L (ref 3.5–5.5)
SODIUM BLD-SCNC: 144 MMOL/L (ref 132–146)
TOTAL PROTEIN: 7.2 G/DL (ref 6.4–8.3)
TRIGL SERPL-MCNC: 90 MG/DL (ref 0–149)
VLDLC SERPL CALC-MCNC: 18 MG/DL
WBC # BLD: 4.4 E9/L (ref 4.5–11.5)

## 2023-02-27 PROCEDURE — 82570 ASSAY OF URINE CREATININE: CPT

## 2023-02-27 PROCEDURE — 36415 COLL VENOUS BLD VENIPUNCTURE: CPT

## 2023-02-27 PROCEDURE — 80061 LIPID PANEL: CPT

## 2023-02-27 PROCEDURE — 82652 VIT D 1 25-DIHYDROXY: CPT

## 2023-02-27 PROCEDURE — 80053 COMPREHEN METABOLIC PANEL: CPT

## 2023-02-27 PROCEDURE — 82044 UR ALBUMIN SEMIQUANTITATIVE: CPT

## 2023-02-27 PROCEDURE — 83036 HEMOGLOBIN GLYCOSYLATED A1C: CPT

## 2023-02-27 PROCEDURE — 85025 COMPLETE CBC W/AUTO DIFF WBC: CPT

## 2023-03-01 ENCOUNTER — OFFICE VISIT (OUTPATIENT)
Dept: BREAST CENTER | Age: 68
End: 2023-03-01
Payer: COMMERCIAL

## 2023-03-01 ENCOUNTER — HOSPITAL ENCOUNTER (OUTPATIENT)
Dept: GENERAL RADIOLOGY | Age: 68
Discharge: HOME | End: 2023-03-03
Payer: COMMERCIAL

## 2023-03-01 VITALS
BODY MASS INDEX: 22.29 KG/M2 | HEART RATE: 49 BPM | SYSTOLIC BLOOD PRESSURE: 142 MMHG | OXYGEN SATURATION: 99 % | RESPIRATION RATE: 14 BRPM | DIASTOLIC BLOOD PRESSURE: 86 MMHG | WEIGHT: 142 LBS | TEMPERATURE: 97.9 F | HEIGHT: 67 IN

## 2023-03-01 DIAGNOSIS — Z12.31 ENCOUNTER FOR SCREENING MAMMOGRAM FOR MALIGNANT NEOPLASM OF BREAST: Primary | ICD-10-CM

## 2023-03-01 DIAGNOSIS — N64.4 BREAST PAIN, LEFT: ICD-10-CM

## 2023-03-01 DIAGNOSIS — N64.4 BREAST PAIN, LEFT: Primary | ICD-10-CM

## 2023-03-01 LAB — VITAMIN D 1,25-DIHYDROXY: 127 PG/ML (ref 19.9–79.3)

## 2023-03-01 PROCEDURE — G8420 CALC BMI NORM PARAMETERS: HCPCS | Performed by: SURGERY

## 2023-03-01 PROCEDURE — 99203 OFFICE O/P NEW LOW 30 MIN: CPT | Performed by: SURGERY

## 2023-03-01 PROCEDURE — G0279 TOMOSYNTHESIS, MAMMO: HCPCS

## 2023-03-01 PROCEDURE — 1090F PRES/ABSN URINE INCON ASSESS: CPT | Performed by: SURGERY

## 2023-03-01 PROCEDURE — G8428 CUR MEDS NOT DOCUMENT: HCPCS | Performed by: SURGERY

## 2023-03-01 PROCEDURE — G8399 PT W/DXA RESULTS DOCUMENT: HCPCS | Performed by: SURGERY

## 2023-03-01 PROCEDURE — 3017F COLORECTAL CA SCREEN DOC REV: CPT | Performed by: SURGERY

## 2023-03-01 PROCEDURE — 1036F TOBACCO NON-USER: CPT | Performed by: SURGERY

## 2023-03-01 PROCEDURE — 1123F ACP DISCUSS/DSCN MKR DOCD: CPT | Performed by: SURGERY

## 2023-03-01 PROCEDURE — G8484 FLU IMMUNIZE NO ADMIN: HCPCS | Performed by: SURGERY

## 2023-03-01 NOTE — PROGRESS NOTES
Date of Visit: 3/1/2023  New Patient    10/04/22-NS  10/12/22-NS  03/01/23      DIAGNOSIS:  1. (03/01/23) LEFT breast pain, non-focal  2. Family history of cancer  * Father/prostate  3. History of LEFT breast image guided biopsy x2    IMAGING/PROCEDURES:  1. (09/28/22) BILATERAL d-mammogram: BIRADS-0  DK-clips, poss thickening of NAC compared to RIGHT    HISTORY OF PRESENT ILLNESS  Ayesha Arriola was in the office today for consultation regarding breast discomfort as well is what I believed to be irregularities on her mammogram.    Ayesha was initially scheduled to see us back in October 2022. She had just undergone a mammogram at that time which reported possible thickening of the left nipple areolar complex. The patient had significant medical issues and now recovering from those medical issues as the office to discuss the above and receive any additional recommendations. BREAST SYMPTOMS  Ayesha has no palpable masses to report. She does note that the left breast is very uncomfortable in comparison to the right. She reports no obvious skin retraction or discoloration. She reports no nipple retraction or discoloration. It is acknowledged that she is somewhat of a limited historian. PAST BREAST HISTORY  Ayesha has undergone image biopsy in the past however it does not appear that she has had any breast surgeries.     PAST MEDICAL/SURGICAL HISTORY  Past Medical History:   Diagnosis Date    Asthma     COPD (chronic obstructive pulmonary disease) ([facility] [address]     uses )3 prn daily and nightly / Dr. Es Ramsey F/U monthly    Depression     Dysphagia 2021    Del Rio catheter in place     continuous since 2013, changed monthly at [facility]    Oxygen dependent 2017     Past Surgical History:   Procedure Laterality Date    COLONOSCOPY      COLONOSCOPY N/A 9/4/2019    COLONOSCOPY DIAGNOSTIC performed by Jg Tamez MD at [address] [place] [address] ([facility])      KIDNEY SURGERY Left 06/16/2014 ABLATION PERFORMED UNDER CT SCAN    [place] CYSTOURETHROSCOPY WITH BIOPSY N/A 9/14/2018    CYSTOSCOPY RETROGRADE PYELOGRAM, CLOT EVACATION , POSS. BLADDER BIOPSY ---DR [place] 2 :30 performed by Luzmaria Sue DO at [address] N/A 5/10/2019    EGD BIOPSY [facility] by Kavita Caldwell MD at [facility] 6/28/2019    EGD EUS performed by Wilmer Dewitt DO at [address] [facility] N/A 6/28/2019    EGD performed by Wilmer Dewitt DO at [address] 5/19/2020    EGD DIAGNOSTIC ONLY performed by Chuckie Fuentes MD at [address] [place] [address] 10/25/2021    EGD BIOPSY performed by Chuckie Fuentes MD at [address] [facility] N/A 12/6/2021    EGD BIOPSY performed by Chuckie Fuentes MD at [address] [place] [address]    Current Outpatient Medications:     albuterol (PROVENTIL) (2.5 MG/3ML) 0.083% nebulizer solution, Take 3 mLs by nebulization every 4 hours as needed for Wheezing, Disp: 120 each, Rfl: 3    albuterol sulfate HFA (VENTOLIN HFA) 108 (90 Base) MCG/ACT inhaler, Inhale 1 puff into the lungs every 4 hours as needed for Wheezing, Disp: , Rfl:     Arformoterol Tartrate (BROVANA) 15 MCG/2ML NEBU, Take 1 ampule by nebulization 2 times daily, Disp: , Rfl:     budesonide (PULMICORT) 0.5 MG/2ML nebulizer suspension, Take 1 ampule by nebulization 2 times daily, Disp: , Rfl:     gabapentin (NEURONTIN) 300 MG capsule, Take 300 mg by mouth at bedtime. , Disp: , Rfl:     OLANZapine (ZYPREXA) 5 MG tablet, Take 5 mg by mouth nightly, Disp: , Rfl:     omeprazole (PRILOSEC) 40 MG delayed release capsule, Take 40 mg by mouth daily, Disp: , Rfl:     sertraline (ZOLOFT) 50 MG tablet, Take 1 tablet by mouth nightly, Disp: 30 tablet, Rfl: 0    QUEtiapine (SEROQUEL XR) 50 MG extended release tablet, Take 50 mg by mouth nightly, Disp: , Rfl: OXYGEN, Inhale 3 L into the lungs continuous, Disp: , Rfl:     vitamin D (ERGOCALCIFEROL) 66431 UNITS CAPS capsule, Take 50,000 Units by mouth once a week Given Saturday, Disp: , Rfl:     Multiple Vitamins-Iron (DAILY-FEDERICO/IRON) TABS, Take 1 tablet by mouth daily , Disp: , Rfl:     folic acid (FOLVITE) 1 MG tablet, Take 1 mg by mouth daily , Disp: , Rfl:     ALLERGIES  Allergies   Allergen Reactions    Sulfa Antibiotics Anaphylaxis       SOCIAL HISTORY   reports that she quit smoking about 3 years ago. Her smoking use included cigarettes. She has never used smokeless tobacco. She reports that she does not drink alcohol and does not use drugs. PHYSICAL EXAMINATION  BP (!) 142/86 (Site: Left Upper Arm, Position: Sitting, Cuff Size: Medium Adult)   Pulse (!) 49   Temp 97.9 °F (36.6 °C) (Temporal)   Resp 14   Ht 5' 7\" (1.702 m)   Wt 142 lb (64.4 kg)   LMP 10/21/1985   SpO2 99%   BMI 22.24 kg/m²     COMPREHENSIVE BREAST EXAMINATION  There are no cervical, supraclavicular, or infraclavicular lymph nodes that are palpable. Inspection of the breast bilaterally demonstrates that the left breast may be slightly larger than the right. There are however no secondary signs of malignancy. The nipple areola bilaterally appeared normal.  There are no intrinsic lesions of the nipple or areola on either side. No spontaneous discharges witnessed. Palpation of the axilla bilaterally is without adenopathy. Palpation of the breast bilaterally demonstrates there to be no masses. I performed a very focused examination around the left nipple areolar complex. The skin at the nipple and areola are not thickened or discolored. I can see or feel no irregularities. BREAST IMAGING STUDIES  The patient underwent bilateral mammography in September and had follow-up on the left side today.   A prior biopsy clip is noted in the left breast.  The prominence and thickening in the left nipple areolar complex are noted both on today's film and from the last imaging in September. PATHOLOGY  None    ASSESSMENT AND PLAN  Ayesha Barry was in the office today for her consultation regarding breast discomfort as well as finding on imaging. I had a discussion today with Ayesha regarding the diagnosis as well as recommended treatment options. I did spend 30 minutes on the visit over half of which was dedicated education counseling and decision making. With respect to her case specifically I informed Ayesha that I believe the symptoms and findings are benign. The thickening of the nipple areolar complex, while occasionally associated with an underlying malignancy, can also be a nonspecific finding related to nonbreast medical issues. It is notable that a very focused exam of the left nipple areolar complex today demonstrates no visible or palpable findings. With respect to the breast discomfort I informed her that that is also a very nonspecific finding and rarely associated with an underlying malignancy in the setting of normal imaging and clinical breast examination. I recommendation is that the patient see us again in 6 months for clinical stability and examination. It is notable that at that time she will be due for her annual bilateral mammogram.    We encouraged the patient to contact us with any new concerns. This note was created with voice recognition software. Please excuse any grammatical errors that were not corrected and please contact me if there are any questions. [email or web address]  [phone]

## 2023-04-28 ENCOUNTER — TELEPHONE (OUTPATIENT)
Dept: HEMATOLOGY | Age: 68
End: 2023-04-28

## 2023-04-28 DIAGNOSIS — K86.89 PANCREATIC DUCT DILATED: Primary | ICD-10-CM

## 2023-04-28 DIAGNOSIS — K86.1 CHRONIC CALCIFIC PANCREATITIS (HCC): ICD-10-CM

## 2023-05-08 ENCOUNTER — OFFICE VISIT (OUTPATIENT)
Dept: PODIATRY | Age: 68
End: 2023-05-08
Payer: COMMERCIAL

## 2023-05-08 VITALS — BODY MASS INDEX: 22.29 KG/M2 | WEIGHT: 142 LBS | HEIGHT: 67 IN

## 2023-05-08 DIAGNOSIS — G60.8 HEREDITARY SENSORY NEUROPATHY: ICD-10-CM

## 2023-05-08 DIAGNOSIS — L85.3 XEROSIS CUTIS: ICD-10-CM

## 2023-05-08 DIAGNOSIS — L84 CORNS AND CALLOSITIES: ICD-10-CM

## 2023-05-08 DIAGNOSIS — B35.1 TINEA UNGUIUM: Primary | ICD-10-CM

## 2023-05-08 PROCEDURE — 1090F PRES/ABSN URINE INCON ASSESS: CPT | Performed by: PODIATRIST

## 2023-05-08 PROCEDURE — 3017F COLORECTAL CA SCREEN DOC REV: CPT | Performed by: PODIATRIST

## 2023-05-08 PROCEDURE — G8427 DOCREV CUR MEDS BY ELIG CLIN: HCPCS | Performed by: PODIATRIST

## 2023-05-08 PROCEDURE — G8399 PT W/DXA RESULTS DOCUMENT: HCPCS | Performed by: PODIATRIST

## 2023-05-08 PROCEDURE — 1123F ACP DISCUSS/DSCN MKR DOCD: CPT | Performed by: PODIATRIST

## 2023-05-08 PROCEDURE — 99203 OFFICE O/P NEW LOW 30 MIN: CPT | Performed by: PODIATRIST

## 2023-05-08 PROCEDURE — 11056 PARNG/CUTG B9 HYPRKR LES 2-4: CPT | Performed by: PODIATRIST

## 2023-05-08 PROCEDURE — G8420 CALC BMI NORM PARAMETERS: HCPCS | Performed by: PODIATRIST

## 2023-05-08 PROCEDURE — 11721 DEBRIDE NAIL 6 OR MORE: CPT | Performed by: PODIATRIST

## 2023-05-08 PROCEDURE — 1036F TOBACCO NON-USER: CPT | Performed by: PODIATRIST

## 2023-05-08 RX ORDER — AMMONIUM LACTATE 12 G/100G
LOTION TOPICAL
Qty: 400 G | Refills: 2 | Status: SHIPPED | OUTPATIENT
Start: 2023-05-08

## 2023-05-08 NOTE — PROGRESS NOTES
Melly Davis is here today as a new patient for nail care. her PCP is Mary Alfonso MD last OV was 2023. 23  Ayesha Mills : 1955 Sex: female  Age: 77 y.o. Patient was referred by Mary Alfonso MD    CC:   Painful elongated toenails 1-5 right and left    HPI  This pleasant 20-year-old female patient referred me today foot ankle exam.  Difficulty managing toenails. History gabapentin. No history of diabetes. No open wounds. Here is also dry skin on bottom both feet. No recent formal treatment therapy. Here today for painful elongated toenails 1-5 right and left. No additional pedal complaints at this time. ROS:  Const: Denies constitutional symptoms  Musculo: Denies symptoms other than stated above  Skin: Denies symptoms other than stated above      Current Outpatient Medications:     albuterol (PROVENTIL) (2.5 MG/3ML) 0.083% nebulizer solution, Take 3 mLs by nebulization every 4 hours as needed for Wheezing, Disp: 120 each, Rfl: 3    albuterol sulfate HFA (VENTOLIN HFA) 108 (90 Base) MCG/ACT inhaler, Inhale 1 puff into the lungs every 4 hours as needed for Wheezing, Disp: , Rfl:     Arformoterol Tartrate (BROVANA) 15 MCG/2ML NEBU, Take 1 ampule by nebulization 2 times daily, Disp: , Rfl:     budesonide (PULMICORT) 0.5 MG/2ML nebulizer suspension, Take 1 ampule by nebulization 2 times daily, Disp: , Rfl:     gabapentin (NEURONTIN) 300 MG capsule, Take 300 mg by mouth at bedtime. , Disp: , Rfl:     OLANZapine (ZYPREXA) 5 MG tablet, Take 5 mg by mouth nightly, Disp: , Rfl:     omeprazole (PRILOSEC) 40 MG delayed release capsule, Take 40 mg by mouth daily, Disp: , Rfl:     sertraline (ZOLOFT) 50 MG tablet, Take 1 tablet by mouth nightly, Disp: 30 tablet, Rfl: 0    QUEtiapine (SEROQUEL XR) 50 MG extended release tablet, Take 50 mg by mouth nightly, Disp: , Rfl:     OXYGEN, Inhale 3 L into the lungs continuous, Disp: , Rfl:     vitamin D (ERGOCALCIFEROL) 41344 UNITS CAPS

## 2023-05-12 ENCOUNTER — HOSPITAL ENCOUNTER (OUTPATIENT)
Age: 68
Discharge: HOME | End: 2023-05-12
Payer: COMMERCIAL

## 2023-05-12 DIAGNOSIS — K86.89 PANCREATIC DUCT DILATED: ICD-10-CM

## 2023-05-12 DIAGNOSIS — K86.1 CHRONIC CALCIFIC PANCREATITIS (HCC): ICD-10-CM

## 2023-05-12 LAB
BUN SERPL-MCNC: 8 MG/DL (ref 6–23)
CREAT SERPL-MCNC: 0.6 MG/DL (ref 0.5–1)

## 2023-05-12 PROCEDURE — 36415 COLL VENOUS BLD VENIPUNCTURE: CPT

## 2023-05-12 PROCEDURE — 84520 ASSAY OF UREA NITROGEN: CPT

## 2023-05-12 PROCEDURE — 82565 ASSAY OF CREATININE: CPT

## 2023-05-24 ENCOUNTER — HOSPITAL ENCOUNTER (OUTPATIENT)
Dept: CT IMAGING | Age: 68
Discharge: HOME | End: 2023-05-26
Payer: COMMERCIAL

## 2023-05-24 DIAGNOSIS — K86.1 CHRONIC CALCIFIC PANCREATITIS (HCC): ICD-10-CM

## 2023-05-24 DIAGNOSIS — K86.89 PANCREATIC DUCT DILATED: ICD-10-CM

## 2023-05-24 PROCEDURE — 6360000004 HC RX CONTRAST MEDICATION: Performed by: RADIOLOGY

## 2023-05-24 PROCEDURE — 74160 CT ABDOMEN W/CONTRAST: CPT

## 2023-05-24 RX ADMIN — IOPAMIDOL 75 ML: 755 INJECTION, SOLUTION INTRAVENOUS at 10:33

## 2023-06-01 ENCOUNTER — OFFICE VISIT (OUTPATIENT)
Dept: HEMATOLOGY | Age: 68
End: 2023-06-01
Payer: COMMERCIAL

## 2023-06-01 VITALS
BODY MASS INDEX: 22.13 KG/M2 | RESPIRATION RATE: 16 BRPM | WEIGHT: 141 LBS | TEMPERATURE: 97.2 F | HEART RATE: 78 BPM | HEIGHT: 67 IN | DIASTOLIC BLOOD PRESSURE: 49 MMHG | SYSTOLIC BLOOD PRESSURE: 131 MMHG | OXYGEN SATURATION: 96 %

## 2023-06-01 DIAGNOSIS — K86.1 CHRONIC CALCIFIC PANCREATITIS (HCC): Primary | ICD-10-CM

## 2023-06-01 PROCEDURE — 1123F ACP DISCUSS/DSCN MKR DOCD: CPT | Performed by: CLINICAL NURSE SPECIALIST

## 2023-06-01 PROCEDURE — 99213 OFFICE O/P EST LOW 20 MIN: CPT | Performed by: CLINICAL NURSE SPECIALIST

## 2023-06-01 PROCEDURE — G8420 CALC BMI NORM PARAMETERS: HCPCS | Performed by: CLINICAL NURSE SPECIALIST

## 2023-06-01 PROCEDURE — 99212 OFFICE O/P EST SF 10 MIN: CPT | Performed by: CLINICAL NURSE SPECIALIST

## 2023-06-01 PROCEDURE — 1090F PRES/ABSN URINE INCON ASSESS: CPT | Performed by: CLINICAL NURSE SPECIALIST

## 2023-06-01 PROCEDURE — G8399 PT W/DXA RESULTS DOCUMENT: HCPCS | Performed by: CLINICAL NURSE SPECIALIST

## 2023-06-01 PROCEDURE — G8427 DOCREV CUR MEDS BY ELIG CLIN: HCPCS | Performed by: CLINICAL NURSE SPECIALIST

## 2023-06-01 PROCEDURE — 3017F COLORECTAL CA SCREEN DOC REV: CPT | Performed by: CLINICAL NURSE SPECIALIST

## 2023-06-01 PROCEDURE — 1036F TOBACCO NON-USER: CPT | Performed by: CLINICAL NURSE SPECIALIST

## 2023-06-01 RX ORDER — PANCRELIPASE 36000; 180000; 114000 [USP'U]/1; [USP'U]/1; [USP'U]/1
36000 CAPSULE, DELAYED RELEASE PELLETS ORAL
Qty: 180 CAPSULE | Refills: 3 | Status: SHIPPED | OUTPATIENT
Start: 2023-06-01

## 2023-06-01 NOTE — PROGRESS NOTES
[id number] Units TID with meals   - follow up in 1 month for weight monitoring, lost 15#-20# in past 6 months per pt  - CT scan in May 2024     20 Minutes of which greater than 50% was spent counseling or coordinating her care. Thank you for the consultation and allowing me to take part in Ms. Keane's care.     Please send a copy of my note to Dr. Lorenzo MILLER-ACNS-BC, FNP-BC 6/1/2023 1:47 PM

## 2023-07-06 ENCOUNTER — OFFICE VISIT (OUTPATIENT)
Dept: HEMATOLOGY | Age: 68
End: 2023-07-06
Payer: COMMERCIAL

## 2023-07-06 VITALS
BODY MASS INDEX: 21.97 KG/M2 | TEMPERATURE: 97.2 F | HEART RATE: 88 BPM | DIASTOLIC BLOOD PRESSURE: 68 MMHG | RESPIRATION RATE: 16 BRPM | OXYGEN SATURATION: 98 % | HEIGHT: 67 IN | WEIGHT: 140 LBS | SYSTOLIC BLOOD PRESSURE: 156 MMHG

## 2023-07-06 DIAGNOSIS — K86.1 PANCREATIC DUCT HYPERTENSION WITH CHRONIC PANCREATITIS (HCC): Primary | ICD-10-CM

## 2023-07-06 DIAGNOSIS — K86.81 EXOCRINE PANCREATIC INSUFFICIENCY: ICD-10-CM

## 2023-07-06 DIAGNOSIS — Z12.11 ENCOUNTER FOR SCREENING COLONOSCOPY: ICD-10-CM

## 2023-07-06 DIAGNOSIS — K86.1 CHRONIC CALCIFIC PANCREATITIS (HCC): ICD-10-CM

## 2023-07-06 DIAGNOSIS — R63.4 WEIGHT LOSS: ICD-10-CM

## 2023-07-06 PROCEDURE — 99213 OFFICE O/P EST LOW 20 MIN: CPT | Performed by: TRANSPLANT SURGERY

## 2023-07-06 PROCEDURE — G8420 CALC BMI NORM PARAMETERS: HCPCS | Performed by: TRANSPLANT SURGERY

## 2023-07-06 PROCEDURE — 3017F COLORECTAL CA SCREEN DOC REV: CPT | Performed by: TRANSPLANT SURGERY

## 2023-07-06 PROCEDURE — 1036F TOBACCO NON-USER: CPT | Performed by: TRANSPLANT SURGERY

## 2023-07-06 PROCEDURE — 1123F ACP DISCUSS/DSCN MKR DOCD: CPT | Performed by: TRANSPLANT SURGERY

## 2023-07-06 PROCEDURE — G8399 PT W/DXA RESULTS DOCUMENT: HCPCS | Performed by: TRANSPLANT SURGERY

## 2023-07-06 PROCEDURE — 1090F PRES/ABSN URINE INCON ASSESS: CPT | Performed by: TRANSPLANT SURGERY

## 2023-07-06 PROCEDURE — G8427 DOCREV CUR MEDS BY ELIG CLIN: HCPCS | Performed by: TRANSPLANT SURGERY

## 2023-07-24 ENCOUNTER — PROCEDURE VISIT (OUTPATIENT)
Dept: PODIATRY | Age: 68
End: 2023-07-24
Payer: COMMERCIAL

## 2023-07-24 VITALS — WEIGHT: 140 LBS | BODY MASS INDEX: 21.97 KG/M2 | HEIGHT: 67 IN

## 2023-07-24 DIAGNOSIS — L85.3 XEROSIS CUTIS: ICD-10-CM

## 2023-07-24 DIAGNOSIS — B35.1 TINEA UNGUIUM: Primary | ICD-10-CM

## 2023-07-24 DIAGNOSIS — L84 CORNS AND CALLOSITIES: ICD-10-CM

## 2023-07-24 DIAGNOSIS — G60.8 HEREDITARY SENSORY NEUROPATHY: ICD-10-CM

## 2023-07-24 PROCEDURE — 11056 PARNG/CUTG B9 HYPRKR LES 2-4: CPT | Performed by: PODIATRIST

## 2023-07-24 PROCEDURE — 11721 DEBRIDE NAIL 6 OR MORE: CPT | Performed by: PODIATRIST

## 2023-07-24 NOTE — PROGRESS NOTES
Bryant Burrell is here today as a new patient for nail care. her PCP is Mick Thomas MD last OV was 03/13/2023. CC:   Painful elongated toenails 1-5 right and left    HPI  Follow-up elongated toenails both feet. Has progressed very well since last visit. Does have history of gabapentin 300 mg nightly. Does have ammonium lactate at home which she has noticed improvement with. No additional pedal complaints. ROS:  Const: Denies constitutional symptoms  Musculo: Denies symptoms other than stated above  Skin: Denies symptoms other than stated above      Current Outpatient Medications:     lipase-protease-amylase (CREON) 46927-543352 units CPEP delayed release capsule, Take 36,000 capsules by mouth 3 times daily (with meals), Disp: 180 capsule, Rfl: 3    ammonium lactate (LAC-HYDRIN) 12 % lotion, Apply topically twice daily, Disp: 400 g, Rfl: 2    albuterol (PROVENTIL) (2.5 MG/3ML) 0.083% nebulizer solution, Take 3 mLs by nebulization every 4 hours as needed for Wheezing, Disp: 120 each, Rfl: 3    albuterol sulfate HFA (PROVENTIL;VENTOLIN;PROAIR) 108 (90 Base) MCG/ACT inhaler, Inhale 1 puff into the lungs every 4 hours as needed for Wheezing, Disp: , Rfl:     arformoterol tartrate (BROVANA) 15 MCG/2ML NEBU, Take 1 ampule by nebulization 2 times daily, Disp: , Rfl:     budesonide (PULMICORT) 0.5 MG/2ML nebulizer suspension, Take 2 mLs by nebulization 2 times daily, Disp: , Rfl:     gabapentin (NEURONTIN) 300 MG capsule, Take 1 capsule by mouth at bedtime. , Disp: , Rfl:     OLANZapine (ZYPREXA) 5 MG tablet, Take 1 tablet by mouth nightly, Disp: , Rfl:     omeprazole (PRILOSEC) 40 MG delayed release capsule, Take 1 capsule by mouth daily, Disp: , Rfl:     sertraline (ZOLOFT) 50 MG tablet, Take 1 tablet by mouth nightly, Disp: 30 tablet, Rfl: 0    QUEtiapine (SEROQUEL XR) 50 MG extended release tablet, Take 1 tablet by mouth nightly, Disp: , Rfl:     OXYGEN, Inhale 3 L into the lungs continuous, Disp: , Rfl:

## 2023-08-01 ENCOUNTER — PREP FOR PROCEDURE (OUTPATIENT)
Dept: SURGERY | Age: 68
End: 2023-08-01

## 2023-08-01 RX ORDER — SODIUM CHLORIDE 9 MG/ML
INJECTION, SOLUTION INTRAVENOUS CONTINUOUS
Status: CANCELLED | OUTPATIENT
Start: 2023-08-01

## 2023-08-01 NOTE — H&P
Patient: Antonia Bowman EMR#: [id number]: 1955Acct:[id number]/Sex: 77 / FADM Date: 07/26/23Loc: [facility]          Provider Location:cc: ~        Intake  Vital Signs      07/26/23  11:37  Height   5 ft 7 in  Weight   144 lb  BMI   22.5  BP   123/53  Mean Arterial Pressure   76  Pulse Rate   77    Intake  Visit Reasons: Colonoscopy  Current Pain: No  Screening Declined: Colorectal Cancer (CANT REMEMBER IF SHE HAS EVER HAD ONE)  Allergies    Sulfa (Sulfonamide Antibiotics) Allergy (Verified 07/26/23 11:37)  Unknown      Safety Concerns: Feels Safe At This Time    [address]  Medical History (Updated 07/26/23 @ 12:07 by Sandra Torres MD)    Anemia  Anxiety  Asthma  Bipolar 1 disorder  Colon polyps  COPD (chronic obstructive pulmonary disease)  oxygen qhs and during the day if needed  Depression  Dyspnea  with exertion  Del Rio catheter in place  GERD (gastroesophageal reflux disease)  H/O acute pancreatitis  Non-insulin dependent type 2 diabetes mellitus  h/o, diet controlled; pcp stop meds      Surgical History (Updated 07/18/23 @ 15:23 by Marisol Napier)    History of biopsy  kidney biopsy  History of colonoscopy  History of cystoscopy  3/30/20-Dr. Monty Betts  History of esophagogastroduodenoscopy (EGD)  3/28/22-[facility]  History of hysterectomy      Family History (Reviewed 03/30/20 @ 06:30 by Katina Waggoner DO)  Prostate cancer  Father      Social History (Updated 07/18/23 @ 15:20 by Marisol Napier)  Have You Traveled Out of the [facility] ([facility]) States in the Last Month:  No  Types:  None  Smoking Status:  Former Smoker  Smoking Start Date:  01/01/70  Smoking Cessation Date:  01/01/17  Alcohol Intake:  Never  Recreational Drug Type:  None        HPI  History of Present Illness:  70-year-old female presents for screening for colon cancer. No blood in stools, change in bowel habits, weight loss. No family history of colon cancer. No history of anticoagulation use.   Patient has a history of chronic

## 2023-08-02 NOTE — PROGRESS NOTES
[address] PRE-ADMISSION TESTING INSTRUCTIONS    The Preadmission Testing patient is instructed accordingly using the following criteria (check applicable):    ARRIVAL INSTRUCTIONS:  [x] Parking the day of surgery is located in the Main Entrance lot. Upon entering the door make an immediate right to the surgery reception desk. [x] Bring photo ID and insurance card. [] Bring in a copy of Living will or Durable Power of  papers. [x] Please be sure to arrange for responsible adult to provide transportation to and from the hospital.    [x] Please arrange for responsible adult to be with you for the 24 hour period post procedure due to having anesthesia. [x] If you awake am of surgery not feeling well or have temperature >100 please call [phone]. GENERAL INSTRUCTIONS:    [x] Nothing by mouth after midnight, including gum, candy, mints or water. [x] You may brush your teeth, but do not swallow any water. [x] Take medications as instructed with 1-2 oz of water. [x] Stop herbal supplements and vitamins 5 days prior to procedure. [] Follow preop dosing of blood thinners per physician instructions. [] Take 1/2 dose of evening insulin, but no insulin after midnight.    [] No oral diabetic medications after midnight.    [] If diabetic and have low blood sugar or feel symptomatic, take 1-2oz apple juice only. [x] Bring inhalers day of surgery. [] Bring urine specimen day of surgery. [x] Shower or bath with soap, lather and rinse well AM of surgery. No lotion, powders or creams on your body. [x] Follow bowel prep as instructed per surgeon. [x] No tobacco products within 24 hours of surgery . [x] No alcohol or illegal drug use within 24 hours of surgery. [x] Jewelry, body piercings , eyeglasses, contact lenses and dentures are not permitted into surgery (bring cases).       [x] Please do not wear any nail polish, make up or hair products on

## 2023-08-03 ENCOUNTER — ANESTHESIA EVENT (OUTPATIENT)
Dept: ENDOSCOPY | Age: 68
End: 2023-08-03
Payer: COMMERCIAL

## 2023-08-03 NOTE — ANESTHESIA PRE PROCEDURE
Department of Anesthesiology  Preprocedure Note       Name:  Damien Ken   Age:  77 y.o.  :  1955                                          MRN:  [id number]         Date:  8/3/2023      Surgeon: Deborah Nash):  Hank Haider MD    Procedure: Procedure(s):  COLORECTAL CANCER SCREENING, NOT HIGH RISK   (HOLD TIME 0900)    Medications prior to admission:   Prior to Admission medications    Medication Sig Start Date End Date Taking? Authorizing Provider   lipase-protease-amylase (CREON) 77683-977116 units CPEP delayed release capsule Take 36,000 capsules by mouth 3 times daily (with meals) 23   ANGELA Vazquez CNP   ammonium lactate (LAC-HYDRIN) 12 % lotion Apply topically twice daily 23   Jessica Roberts DPM   albuterol (PROVENTIL) (2.5 MG/3ML) 0.083% nebulizer solution Take 3 mLs by nebulization every 4 hours as needed for Wheezing 22   Tima Robertson DO   albuterol sulfate HFA (PROVENTIL;VENTOLIN;PROAIR) 108 (90 Base) MCG/ACT inhaler Inhale 1 puff into the lungs every 4 hours as needed for Wheezing    Historical Provider, MD   arformoterol tartrate (BROVANA) 15 MCG/2ML NEBU Take 1 ampule by nebulization 2 times daily    Historical Provider, MD   budesonide (PULMICORT) 0.5 MG/2ML nebulizer suspension Take 2 mLs by nebulization 2 times daily    Historical Provider, MD   gabapentin (NEURONTIN) 300 MG capsule Take 1 capsule by mouth at bedtime.     Historical Provider, MD   OLANZapine (ZYPREXA) 5 MG tablet Take 1 tablet by mouth nightly    Historical Provider, MD   omeprazole (PRILOSEC) 40 MG delayed release capsule Take 1 capsule by mouth daily    Historical Provider, MD   sertraline (ZOLOFT) 50 MG tablet Take 1 tablet by mouth nightly 10/19/22   ANGELA Lopez CNP   QUEtiapine (SEROQUEL XR) 50 MG extended release tablet Take 1 tablet by mouth nightly    Historical Provider, MD   OXYGEN Inhale 3 L into the lungs continuous    Historical Provider, MD   vitamin D

## 2023-08-04 ENCOUNTER — HOSPITAL ENCOUNTER (OUTPATIENT)
Age: 68
Setting detail: OUTPATIENT SURGERY
Discharge: HOME | End: 2023-08-04
Attending: SURGERY | Admitting: SURGERY
Payer: COMMERCIAL

## 2023-08-04 ENCOUNTER — ANESTHESIA (OUTPATIENT)
Dept: ENDOSCOPY | Age: 68
End: 2023-08-04
Payer: COMMERCIAL

## 2023-08-04 VITALS
TEMPERATURE: 97.2 F | DIASTOLIC BLOOD PRESSURE: 66 MMHG | RESPIRATION RATE: 16 BRPM | SYSTOLIC BLOOD PRESSURE: 128 MMHG | OXYGEN SATURATION: 96 % | BODY MASS INDEX: 21.97 KG/M2 | HEART RATE: 88 BPM | WEIGHT: 140 LBS | HEIGHT: 67 IN

## 2023-08-04 PROCEDURE — 2580000003 HC RX 258

## 2023-08-04 PROCEDURE — 7100000011 HC PHASE II RECOVERY - ADDTL 15 MIN: Performed by: SURGERY

## 2023-08-04 PROCEDURE — 6360000002 HC RX W HCPCS

## 2023-08-04 PROCEDURE — 7100000010 HC PHASE II RECOVERY - FIRST 15 MIN: Performed by: SURGERY

## 2023-08-04 PROCEDURE — 3700000001 HC ADD 15 MINUTES (ANESTHESIA): Performed by: SURGERY

## 2023-08-04 PROCEDURE — 3609027000 HC COLONOSCOPY: Performed by: SURGERY

## 2023-08-04 PROCEDURE — 2709999900 HC NON-CHARGEABLE SUPPLY: Performed by: SURGERY

## 2023-08-04 PROCEDURE — 3700000000 HC ANESTHESIA ATTENDED CARE: Performed by: SURGERY

## 2023-08-04 RX ORDER — PROPOFOL 10 MG/ML
INJECTION, EMULSION INTRAVENOUS PRN
Status: DISCONTINUED | OUTPATIENT
Start: 2023-08-04 | End: 2023-08-04 | Stop reason: SDUPTHER

## 2023-08-04 RX ORDER — SODIUM CHLORIDE 9 MG/ML
INJECTION, SOLUTION INTRAVENOUS CONTINUOUS
Status: DISCONTINUED | OUTPATIENT
Start: 2023-08-04 | End: 2023-08-04 | Stop reason: HOSPADM

## 2023-08-04 RX ORDER — SODIUM CHLORIDE 9 MG/ML
INJECTION, SOLUTION INTRAVENOUS CONTINUOUS PRN
Status: DISCONTINUED | OUTPATIENT
Start: 2023-08-04 | End: 2023-08-04 | Stop reason: SDUPTHER

## 2023-08-04 RX ADMIN — PROPOFOL 220 MG: 10 INJECTION, EMULSION INTRAVENOUS at 08:34

## 2023-08-04 RX ADMIN — SODIUM CHLORIDE: 9 INJECTION, SOLUTION INTRAVENOUS at 08:33

## 2023-08-04 ASSESSMENT — PAIN SCALES - GENERAL
PAINLEVEL_OUTOF10: 0
PAINLEVEL_OUTOF10: 0

## 2023-08-04 ASSESSMENT — PAIN - FUNCTIONAL ASSESSMENT: PAIN_FUNCTIONAL_ASSESSMENT: NONE - DENIES PAIN

## 2023-08-04 ASSESSMENT — LIFESTYLE VARIABLES: SMOKING_STATUS: 1

## 2023-08-04 NOTE — H&P
Patient: Purnima Elizabeth EMR#: [id number]: 1955Acct:[id number]/Sex: 77 / FADM Date: 07/26/23Loc: [facility]          Provider Location:cc: ~        Intake  Vital Signs      07/26/23  11:37  Height   5 ft 7 in  Weight   144 lb  BMI   22.5  BP   123/53  Mean Arterial Pressure   76  Pulse Rate   77    Intake  Visit Reasons: Colonoscopy  Current Pain: No  Screening Declined: Colorectal Cancer (CANT REMEMBER IF SHE HAS EVER HAD ONE)  Allergies    Sulfa (Sulfonamide Antibiotics) Allergy (Verified 07/26/23 11:37)  Unknown      Safety Concerns: Feels Safe At This Time    [address]  Medical History (Updated 07/26/23 @ 12:07 by Miguel Angel Ross MD)    Anemia  Anxiety  Asthma  Bipolar 1 disorder  Colon polyps  COPD (chronic obstructive pulmonary disease)  oxygen qhs and during the day if needed  Depression  Dyspnea  with exertion  Del Rio catheter in place  GERD (gastroesophageal reflux disease)  H/O acute pancreatitis  Non-insulin dependent type 2 diabetes mellitus  h/o, diet controlled; pcp stop meds      Surgical History (Updated 07/18/23 @ 15:23 by Anahi Fairbanks)    History of biopsy  kidney biopsy  History of colonoscopy  History of cystoscopy  3/30/20-Dr. Andreia Betts  History of esophagogastroduodenoscopy (EGD)  3/28/22-[facility]  History of hysterectomy      Family History (Reviewed 03/30/20 @ 06:30 by Jimbo Goldstein DO)  Prostate cancer  Father      Social History (Updated 07/18/23 @ 15:20 by Anahi Fairbanks)  Have You Traveled Out of the [facility] ([facility] States in the Last Month:  No  Types:  None  Smoking Status:  Former Smoker  Smoking Start Date:  01/01/70  Smoking Cessation Date:  01/01/17  Alcohol Intake:  Never  Recreational Drug Type:  None        HPI  History of Present Illness:  68-year-old female presents for screening for colon cancer. No blood in stools, change in bowel habits, weight loss. No family history of colon cancer. No history of anticoagulation use.   Patient has a history of chronic

## 2023-08-04 NOTE — OP NOTE
Operative Note      Patient: Cherly Nissen  YOB: 1955  MRN: [id number]    Date of Procedure: 8/4/2023    Pre-Op Diagnosis Codes:     * Screening for colon cancer     Post-Op Diagnosis: Pandiverticulosis, prior tattoo at 25 cm       Procedure(s):  Colonoscopy    Surgeon(s):  Lucho Montemayor MD    Assistant:   * No surgical staff found *    Anesthesia: Monitor Anesthesia Care    Estimated Blood Loss (mL): 0    Complications: none    Specimens:   * No specimens in log *    Implants:  * No implants in log *      Drains: * No LDAs found *        BRIEF HISTORY:  This is a 77 y.o. female who presents for screening for colon cancer. It was recommended the patient undergo a colonoscopy. The risks/benefits/alternatives/expected outcomes were explained the the patient which include, but are not limited, bleeding, perforation, aspiration and further procedures. The patient understands and agreed to proceed. PROCEDURE:  The patient was brought into the endoscopy suite and placed in the left lateral decubitus position. A digital rectal exam was performed after the initiation of LMAC anesthesia and failed to reveal any obstructing masses or lesions. A colonoscope was inserted into the patient's anus and passed through the rectum, sigmoid, descending, transverse, and ascending colon all the way to the level of the cecum. Visualization of the cecum was confirmed by visualization of the ileo-cecal valve, confluence of the tinea, and by visualization of the light in the RLQ on the anterior abdominal wall. The scope was then withdrawn the entire length of the colon. Pandiverticulosis was identified. There were no masses, polyps, or lesions noted. Upon reaching the anus, the scope was retroflexed. There were no significant hemorrhoids noted. The scope was straightened and withdrawn entirely. The patient tolerated the procedure well and there were no complications.             Electronically signed by

## 2023-08-04 NOTE — ANESTHESIA POSTPROCEDURE EVALUATION
Department of Anesthesiology  Postprocedure Note    Patient: Stephany Watters  MRN: [id number]  YOB: 1955  Date of evaluation: 8/4/2023      Procedure Summary     Date: 08/04/23 Room / Location: [facility] / [facility]    Anesthesia Start: 9263 Anesthesia Stop: 6290    Procedure: COLORECTAL CANCER SCREENING, NOT HIGH RISK   (HOLD TIME 0900) Diagnosis:       Screening for cancer      (Screening for cancer [Z12.9])    Surgeons: Meka Wesley MD Responsible Provider: Matt Alvarado DO    Anesthesia Type: MAC ASA Status: 3          Anesthesia Type: MAC    Vielka Phase I:      Vielka Phase II:        Anesthesia Post Evaluation    Patient location during evaluation: PACU  Patient participation: complete - patient participated  Level of consciousness: awake and alert  Nausea & Vomiting: no vomiting and no nausea  Complications: no  Cardiovascular status: hemodynamically stable  Respiratory status: acceptable and spontaneous ventilation  Hydration status: stable  Pain management: adequate

## 2023-08-16 ENCOUNTER — TELEPHONE (OUTPATIENT)
Dept: BREAST CENTER | Age: 68
End: 2023-08-16

## 2023-08-16 NOTE — TELEPHONE ENCOUNTER
Attempted to call and reschedule appointment due to change in schedule, line busy. Will try again another time.

## 2023-09-06 ENCOUNTER — TELEPHONE (OUTPATIENT)
Dept: BREAST CENTER | Age: 68
End: 2023-09-06

## 2023-09-06 DIAGNOSIS — N64.59 ABNORMAL BREAST FINDING: Primary | ICD-10-CM

## 2023-09-06 NOTE — TELEPHONE ENCOUNTER
Notified by schedulers that patient is complaining of worsening left breast pain. Touched base with patient about her symptoms. She reports ongoing and worsening breast pain to the entire left breast. Patient isn't entirely sure if she will be able to tolerate a bilateral diagnostic mammogram but is willing to try. Order taken to [address] [place] [address]. to call patient so she can have it completed at some point prior to her appointment on 10/4 with Dr. Maico Underwood.

## 2023-09-08 ENCOUNTER — HOSPITAL ENCOUNTER (OUTPATIENT)
Dept: GENERAL RADIOLOGY | Age: 68
End: 2023-09-08
Attending: SURGERY
Payer: COMMERCIAL

## 2023-09-08 VITALS — HEIGHT: 67 IN | WEIGHT: 150 LBS | BODY MASS INDEX: 23.54 KG/M2

## 2023-09-08 DIAGNOSIS — N64.59 ABNORMAL BREAST FINDING: ICD-10-CM

## 2023-09-08 PROCEDURE — G0279 TOMOSYNTHESIS, MAMMO: HCPCS

## 2023-09-25 ENCOUNTER — PROCEDURE VISIT (OUTPATIENT)
Dept: PODIATRY | Age: 68
End: 2023-09-25
Payer: COMMERCIAL

## 2023-09-25 VITALS — HEIGHT: 67 IN | BODY MASS INDEX: 23.54 KG/M2 | WEIGHT: 150 LBS

## 2023-09-25 DIAGNOSIS — L85.3 XEROSIS CUTIS: ICD-10-CM

## 2023-09-25 DIAGNOSIS — G60.8 HEREDITARY SENSORY NEUROPATHY: ICD-10-CM

## 2023-09-25 DIAGNOSIS — L84 CORNS AND CALLOSITIES: ICD-10-CM

## 2023-09-25 DIAGNOSIS — B35.1 TINEA UNGUIUM: Primary | ICD-10-CM

## 2023-09-25 PROCEDURE — 11056 PARNG/CUTG B9 HYPRKR LES 2-4: CPT | Performed by: PODIATRIST

## 2023-09-25 PROCEDURE — 11721 DEBRIDE NAIL 6 OR MORE: CPT | Performed by: PODIATRIST

## 2023-09-25 NOTE — PROGRESS NOTES
manual debridement for thickness and overall length. Paring hyperkeratotic tissue plantar second and fifth metatarsal bilateral.  Avoid barefoot  Follow-up 2 months        Return in about 2 months (around 11/25/2023). Seen By:  Gilles Walter DPM      Document was created using voice recognition software. Note was reviewed however may contain grammatical errors.

## 2023-10-09 ENCOUNTER — OFFICE VISIT (OUTPATIENT)
Dept: BREAST CENTER | Age: 68
End: 2023-10-09
Payer: COMMERCIAL

## 2023-10-09 VITALS
OXYGEN SATURATION: 99 % | SYSTOLIC BLOOD PRESSURE: 122 MMHG | DIASTOLIC BLOOD PRESSURE: 60 MMHG | BODY MASS INDEX: 22.6 KG/M2 | HEART RATE: 91 BPM | HEIGHT: 67 IN | TEMPERATURE: 97.7 F | WEIGHT: 144 LBS | RESPIRATION RATE: 14 BRPM

## 2023-10-09 DIAGNOSIS — N63.0 BREAST SWELLING: Primary | ICD-10-CM

## 2023-10-09 PROCEDURE — 1123F ACP DISCUSS/DSCN MKR DOCD: CPT | Performed by: SURGERY

## 2023-10-09 PROCEDURE — G8427 DOCREV CUR MEDS BY ELIG CLIN: HCPCS | Performed by: SURGERY

## 2023-10-09 PROCEDURE — G8484 FLU IMMUNIZE NO ADMIN: HCPCS | Performed by: SURGERY

## 2023-10-09 PROCEDURE — 3017F COLORECTAL CA SCREEN DOC REV: CPT | Performed by: SURGERY

## 2023-10-09 PROCEDURE — 1036F TOBACCO NON-USER: CPT | Performed by: SURGERY

## 2023-10-09 PROCEDURE — 1090F PRES/ABSN URINE INCON ASSESS: CPT | Performed by: SURGERY

## 2023-10-09 PROCEDURE — 99213 OFFICE O/P EST LOW 20 MIN: CPT | Performed by: SURGERY

## 2023-10-09 PROCEDURE — G8399 PT W/DXA RESULTS DOCUMENT: HCPCS | Performed by: SURGERY

## 2023-10-09 PROCEDURE — 99212 OFFICE O/P EST SF 10 MIN: CPT | Performed by: SURGERY

## 2023-10-09 PROCEDURE — G8420 CALC BMI NORM PARAMETERS: HCPCS | Performed by: SURGERY

## 2023-10-09 NOTE — PROGRESS NOTES
Date of visit: 10/9/2023    10/04/22: NS  10/12/22: NS  03/01/23  10/09/23      DIAGNOSIS:  1. (03/01/23) LEFT breast pain, non-focal  2. Family history of cancer  * Father/prostate  3. History of LEFT breast image guided biopsy x2    IMAGING/PROCEDURES:  1. (09/28/22) BILATERAL d-mammogram: BIRADS-0  DK-clips, poss thickening of NAC compared to RIGHT  2. (09/08/23) BILATERAL st-mammogram: BIRADS-2    Breast History  Ayesha Dukes was in the office for follow-up regarding left breast pain    Ayesha was first evaluated earlier this year. She described breast discomfort. At the time of her initial visit it was notable that her most recent imaging was negative other than for some potential skin thickening. Her clinical breast exam was negative. At that time I discussed with her the often frustrating issue of breast discomfort. I asked that she return for a 6-month follow-up. Breast Symptoms  Ayesha has ongoing discomfort. She reports her breast to be swollen. She notes that it is difficult for her to sleep on her left side. She reports no palpable masses. She reports no skin discoloration or retraction. She reports no nipple discharge or retraction. Breast Examination  The patient today was unable to move up to the exam table. She was examined sitting on an office chair. Inspection of the breast bilaterally demonstrates only minimal size discrepancy between the left and right with the left being slightly larger. There were no secondary signs of malignancy. There are no lesions of the nipple or areola on the left side. No spontaneous discharges witnessed. Palpation of the left axilla demonstrates no adenopathy. Palpation of the left breast demonstrates no masses. It is noted that that is moderately tender to exam on the left. There is no clinical skin or nipple or areola thickening. Breast Imaging  Ayesha underwent mammography in September. This was read as a benign film.   I did

## 2023-11-16 ENCOUNTER — HOSPITAL ENCOUNTER (EMERGENCY)
Age: 68
Discharge: HOME | End: 2023-11-16
Attending: EMERGENCY MEDICINE
Payer: MEDICARE

## 2023-11-16 ENCOUNTER — APPOINTMENT (OUTPATIENT)
Dept: CT IMAGING | Age: 68
End: 2023-11-16
Payer: MEDICARE

## 2023-11-16 VITALS
HEART RATE: 85 BPM | WEIGHT: 145.5 LBS | OXYGEN SATURATION: 95 % | HEIGHT: 67 IN | BODY MASS INDEX: 22.84 KG/M2 | TEMPERATURE: 98 F | RESPIRATION RATE: 16 BRPM | DIASTOLIC BLOOD PRESSURE: 74 MMHG | SYSTOLIC BLOOD PRESSURE: 130 MMHG

## 2023-11-16 DIAGNOSIS — N39.0 COMPLICATED UTI (URINARY TRACT INFECTION): Primary | ICD-10-CM

## 2023-11-16 LAB
ALBUMIN SERPL-MCNC: 4.5 G/DL (ref 3.5–5.2)
ALP SERPL-CCNC: 74 U/L (ref 35–104)
ALT SERPL-CCNC: 7 U/L (ref 0–32)
ANION GAP SERPL CALCULATED.3IONS-SCNC: 9 MMOL/L (ref 7–16)
AST SERPL-CCNC: 14 U/L (ref 0–31)
BACTERIA URNS QL MICRO: ABNORMAL
BASOPHILS # BLD: 0.03 K/UL (ref 0–0.2)
BASOPHILS NFR BLD: 1 % (ref 0–2)
BILIRUB SERPL-MCNC: <0.2 MG/DL (ref 0–1.2)
BILIRUB UR QL STRIP: NEGATIVE
BUN SERPL-MCNC: 16 MG/DL (ref 6–23)
CALCIUM SERPL-MCNC: 10.5 MG/DL (ref 8.6–10.2)
CHLORIDE SERPL-SCNC: 98 MMOL/L (ref 98–107)
CLARITY UR: ABNORMAL
CO2 SERPL-SCNC: 37 MMOL/L (ref 22–29)
COLOR UR: YELLOW
CREAT SERPL-MCNC: 0.6 MG/DL (ref 0.5–1)
CRYSTALS URNS MICRO: ABNORMAL /HPF
EOSINOPHIL # BLD: 0.04 K/UL (ref 0.05–0.5)
EOSINOPHILS RELATIVE PERCENT: 1 % (ref 0–6)
EPI CELLS #/AREA URNS HPF: ABNORMAL /HPF
ERYTHROCYTE [DISTWIDTH] IN BLOOD BY AUTOMATED COUNT: 15.4 % (ref 11.5–15)
GFR SERPL CREATININE-BSD FRML MDRD: >60 ML/MIN/1.73M2
GLUCOSE SERPL-MCNC: 100 MG/DL (ref 74–99)
GLUCOSE UR STRIP-MCNC: NEGATIVE MG/DL
HCT VFR BLD AUTO: 30.3 % (ref 34–48)
HGB BLD-MCNC: 8.3 G/DL (ref 11.5–15.5)
HGB UR QL STRIP.AUTO: NEGATIVE
KETONES UR STRIP-MCNC: NEGATIVE MG/DL
LEUKOCYTE ESTERASE UR QL STRIP: ABNORMAL
LYMPHOCYTES NFR BLD: 1 K/UL (ref 1.5–4)
LYMPHOCYTES RELATIVE PERCENT: 20 % (ref 20–42)
MCH RBC QN AUTO: 22.6 PG (ref 26–35)
MCHC RBC AUTO-ENTMCNC: 27.4 G/DL (ref 32–34.5)
MCV RBC AUTO: 82.3 FL (ref 80–99.9)
MONOCYTES NFR BLD: 0.24 K/UL (ref 0.1–0.95)
MONOCYTES NFR BLD: 5 % (ref 2–12)
NEUTROPHILS NFR BLD: 73 % (ref 43–80)
NEUTS SEG NFR BLD: 3.61 K/UL (ref 1.8–7.3)
NITRITE UR QL STRIP: NEGATIVE
PH UR STRIP: 8.5 [PH] (ref 5–9)
PLATELET # BLD AUTO: 289 K/UL (ref 130–450)
PMV BLD AUTO: 9.2 FL (ref 7–12)
POTASSIUM SERPL-SCNC: 4 MMOL/L (ref 3.5–5)
PROT SERPL-MCNC: 7.2 G/DL (ref 6.4–8.3)
PROT UR STRIP-MCNC: NEGATIVE MG/DL
RBC # BLD AUTO: 3.68 M/UL (ref 3.5–5.5)
RBC # BLD: ABNORMAL 10*6/UL
RBC #/AREA URNS HPF: ABNORMAL /HPF
SODIUM SERPL-SCNC: 144 MMOL/L (ref 132–146)
SP GR UR STRIP: 1.01 (ref 1–1.03)
UROBILINOGEN UR STRIP-ACNC: 0.2 EU/DL (ref 0–1)
WBC #/AREA URNS HPF: ABNORMAL /HPF
WBC OTHER # BLD: 4.9 K/UL (ref 4.5–11.5)

## 2023-11-16 PROCEDURE — 87077 CULTURE AEROBIC IDENTIFY: CPT

## 2023-11-16 PROCEDURE — 80053 COMPREHEN METABOLIC PANEL: CPT

## 2023-11-16 PROCEDURE — 85025 COMPLETE CBC W/AUTO DIFF WBC: CPT

## 2023-11-16 PROCEDURE — 74176 CT ABD & PELVIS W/O CONTRAST: CPT

## 2023-11-16 PROCEDURE — 99284 EMERGENCY DEPT VISIT MOD MDM: CPT

## 2023-11-16 PROCEDURE — 87086 URINE CULTURE/COLONY COUNT: CPT

## 2023-11-16 PROCEDURE — 81001 URINALYSIS AUTO W/SCOPE: CPT

## 2023-11-16 PROCEDURE — 6370000000 HC RX 637 (ALT 250 FOR IP): Performed by: EMERGENCY MEDICINE

## 2023-11-16 RX ORDER — CEFDINIR 300 MG/1
300 CAPSULE ORAL 2 TIMES DAILY
Qty: 20 CAPSULE | Refills: 0 | Status: SHIPPED | OUTPATIENT
Start: 2023-11-16 | End: 2023-11-16 | Stop reason: SDUPTHER

## 2023-11-16 RX ORDER — CEFDINIR 300 MG/1
300 CAPSULE ORAL ONCE
Status: COMPLETED | OUTPATIENT
Start: 2023-11-16 | End: 2023-11-16

## 2023-11-16 RX ORDER — CEFDINIR 300 MG/1
300 CAPSULE ORAL 2 TIMES DAILY
Qty: 20 CAPSULE | Refills: 0 | Status: SHIPPED | OUTPATIENT
Start: 2023-11-16 | End: 2023-11-26

## 2023-11-16 RX ADMIN — CEFDINIR 300 MG: 300 CAPSULE ORAL at 15:35

## 2023-11-16 ASSESSMENT — ENCOUNTER SYMPTOMS
VOMITING: 0
SHORTNESS OF BREATH: 0
ABDOMINAL DISTENTION: 0
EYE DISCHARGE: 0
NAUSEA: 0
EYE PAIN: 0
CONSTIPATION: 0
BACK PAIN: 0
WHEEZING: 0
SINUS PRESSURE: 0
COUGH: 0
EYE REDNESS: 0
SORE THROAT: 0
ABDOMINAL PAIN: 1
DIARRHEA: 0

## 2023-11-16 ASSESSMENT — PAIN - FUNCTIONAL ASSESSMENT: PAIN_FUNCTIONAL_ASSESSMENT: 0-10

## 2023-11-16 ASSESSMENT — PAIN DESCRIPTION - DESCRIPTORS: DESCRIPTORS: ACHING

## 2023-11-16 NOTE — ED PROVIDER NOTES
70-year-old female presents to the emergency department with pain in the lower abdomen and burning with urination. She is concerned that her catheter is leaking. She also reports a dysuria. She states no fevers or chills nausea vomiting diarrhea abdominal pain or other complaints she chronically wears 3 L of oxygen due to a history of COPD but states no shortness of breath worse than usual.  She states no other symptoms at this time she has had a chronic Del Rio i in place for several years. The history is provided by the patient. Abdominal Pain  Pain location:  Suprapubic, LLQ and RLQ  Pain quality: aching    Pain radiates to:  Does not radiate  Pain severity:  Mild  Onset quality:  Gradual  Duration:  3 days  Timing:  Intermittent  Progression:  Waxing and waning  Chronicity:  New  Relieved by:  Nothing  Worsened by:  Nothing  Ineffective treatments:  None tried  Associated symptoms: dysuria    Associated symptoms: no anorexia, no chest pain, no chills, no constipation, no cough, no diarrhea, no fatigue, no fever, no nausea, no shortness of breath, no sore throat and no vomiting         Review of Systems   Constitutional:  Negative for chills, fatigue and fever. HENT:  Negative for ear pain, sinus pressure and sore throat. Eyes:  Negative for pain, discharge and redness. Respiratory:  Negative for cough, shortness of breath and wheezing. Cardiovascular:  Negative for chest pain. Gastrointestinal:  Positive for abdominal pain. Negative for abdominal distention, anorexia, constipation, diarrhea, nausea and vomiting. Genitourinary:  Positive for dysuria. Negative for frequency. Musculoskeletal:  Negative for arthralgias and back pain. Skin:  Negative for rash and wound. Neurological:  Negative for weakness and headaches. Hematological:  Negative for adenopathy. All other systems reviewed and are negative. Physical Exam  Constitutional:       Appearance: Normal appearance.

## 2023-11-16 NOTE — CARE COORDINATION
Social Work/Transition of Care:     Pt in need of transportation Home RIC called pt Insurance ETA between [phone] Confirmation Code       1376 Provide a ride did not show up for pt , RIC spoke with [employer] and provided a Taxi Voucher due to pt having no money, no family and uses a walker and is on 3Ls oxygen

## 2023-11-16 NOTE — ED NOTES
Pt came to the er for jarquin catheter change. 16 Serbian jarquin replaced. Leg bag to right leg. Pt tolerated well.       Samina Quach RN  11/16/23 1366

## 2023-11-18 LAB
MICROORGANISM SPEC CULT: ABNORMAL
MICROORGANISM SPEC CULT: ABNORMAL
SPECIMEN DESCRIPTION: ABNORMAL

## 2024-01-04 ENCOUNTER — APPOINTMENT (OUTPATIENT)
Dept: GENERAL RADIOLOGY | Age: 69
End: 2024-01-04
Payer: MEDICARE

## 2024-01-04 ENCOUNTER — HOSPITAL ENCOUNTER (EMERGENCY)
Age: 69
Discharge: HOME | End: 2024-01-04
Attending: STUDENT IN AN ORGANIZED HEALTH CARE EDUCATION/TRAINING PROGRAM
Payer: MEDICARE

## 2024-01-04 VITALS
HEIGHT: 67 IN | DIASTOLIC BLOOD PRESSURE: 65 MMHG | RESPIRATION RATE: 16 BRPM | BODY MASS INDEX: 22.76 KG/M2 | SYSTOLIC BLOOD PRESSURE: 122 MMHG | OXYGEN SATURATION: 94 % | HEART RATE: 78 BPM | WEIGHT: 145 LBS | TEMPERATURE: 97.9 F

## 2024-01-04 DIAGNOSIS — R07.9 CHEST PAIN, UNSPECIFIED TYPE: Primary | ICD-10-CM

## 2024-01-04 DIAGNOSIS — N64.4 BREAST PAIN, LEFT: ICD-10-CM

## 2024-01-04 LAB
ALBUMIN SERPL-MCNC: 4.4 G/DL (ref 3.5–5.2)
ALP SERPL-CCNC: 97 U/L (ref 35–104)
ALT SERPL-CCNC: 8 U/L (ref 0–32)
ANION GAP SERPL CALCULATED.3IONS-SCNC: 5 MMOL/L (ref 7–16)
AST SERPL-CCNC: 13 U/L (ref 0–31)
BASOPHILS # BLD: 0.03 K/UL (ref 0–0.2)
BASOPHILS NFR BLD: 1 % (ref 0–2)
BILIRUB SERPL-MCNC: <0.2 MG/DL (ref 0–1.2)
BNP SERPL-MCNC: 95 PG/ML (ref 0–125)
BUN SERPL-MCNC: 16 MG/DL (ref 6–23)
CALCIUM SERPL-MCNC: 10 MG/DL (ref 8.6–10.2)
CHLORIDE SERPL-SCNC: 103 MMOL/L (ref 98–107)
CO2 SERPL-SCNC: 38 MMOL/L (ref 22–29)
CREAT SERPL-MCNC: 0.6 MG/DL (ref 0.5–1)
EKG ATRIAL RATE: 79 BPM
EKG P AXIS: 91 DEGREES
EKG P-R INTERVAL: 188 MS
EKG Q-T INTERVAL: 382 MS
EKG QRS DURATION: 76 MS
EKG QTC CALCULATION (BAZETT): 438 MS
EKG R AXIS: 80 DEGREES
EKG T AXIS: 91 DEGREES
EKG VENTRICULAR RATE: 79 BPM
EOSINOPHIL # BLD: 0.06 K/UL (ref 0.05–0.5)
EOSINOPHILS RELATIVE PERCENT: 1 % (ref 0–6)
ERYTHROCYTE [DISTWIDTH] IN BLOOD BY AUTOMATED COUNT: 16.4 % (ref 11.5–15)
GFR SERPL CREATININE-BSD FRML MDRD: >60 ML/MIN/1.73M2
GLUCOSE SERPL-MCNC: 103 MG/DL (ref 74–99)
HCT VFR BLD AUTO: 29.3 % (ref 34–48)
HGB BLD-MCNC: 7.9 G/DL (ref 11.5–15.5)
IMM GRANULOCYTES # BLD AUTO: <0.03 K/UL (ref 0–0.58)
IMM GRANULOCYTES NFR BLD: 0 % (ref 0–5)
INFLUENZA A BY PCR: NOT DETECTED
INFLUENZA B BY PCR: NOT DETECTED
INR PPP: 1
LIPASE SERPL-CCNC: 80 U/L (ref 13–60)
LYMPHOCYTES NFR BLD: 0.99 K/UL (ref 1.5–4)
LYMPHOCYTES RELATIVE PERCENT: 23 % (ref 20–42)
MAGNESIUM SERPL-MCNC: 1.9 MG/DL (ref 1.6–2.6)
MCH RBC QN AUTO: 21.9 PG (ref 26–35)
MCHC RBC AUTO-ENTMCNC: 27 G/DL (ref 32–34.5)
MCV RBC AUTO: 81.2 FL (ref 80–99.9)
MONOCYTES NFR BLD: 0.22 K/UL (ref 0.1–0.95)
MONOCYTES NFR BLD: 5 % (ref 2–12)
NEUTROPHILS NFR BLD: 70 % (ref 43–80)
NEUTS SEG NFR BLD: 3.07 K/UL (ref 1.8–7.3)
PLATELET # BLD AUTO: 255 K/UL (ref 130–450)
PMV BLD AUTO: 9.7 FL (ref 7–12)
POTASSIUM SERPL-SCNC: 4.3 MMOL/L (ref 3.5–5)
PROT SERPL-MCNC: 7 G/DL (ref 6.4–8.3)
PROTHROMBIN TIME: 11.1 SEC (ref 9.3–12.4)
RBC # BLD AUTO: 3.61 M/UL (ref 3.5–5.5)
RBC # BLD: ABNORMAL 10*6/UL
SARS-COV-2 RDRP RESP QL NAA+PROBE: NOT DETECTED
SODIUM SERPL-SCNC: 146 MMOL/L (ref 132–146)
SPECIMEN DESCRIPTION: NORMAL
TROPONIN I SERPL HS-MCNC: 11 NG/L (ref 0–9)
TROPONIN I SERPL HS-MCNC: 9 NG/L (ref 0–9)
WBC OTHER # BLD: 4.4 K/UL (ref 4.5–11.5)

## 2024-01-04 PROCEDURE — 83690 ASSAY OF LIPASE: CPT

## 2024-01-04 PROCEDURE — 83735 ASSAY OF MAGNESIUM: CPT

## 2024-01-04 PROCEDURE — 94640 AIRWAY INHALATION TREATMENT: CPT

## 2024-01-04 PROCEDURE — 84484 ASSAY OF TROPONIN QUANT: CPT

## 2024-01-04 PROCEDURE — 87635 SARS-COV-2 COVID-19 AMP PRB: CPT

## 2024-01-04 PROCEDURE — 93005 ELECTROCARDIOGRAM TRACING: CPT | Performed by: STUDENT IN AN ORGANIZED HEALTH CARE EDUCATION/TRAINING PROGRAM

## 2024-01-04 PROCEDURE — 87502 INFLUENZA DNA AMP PROBE: CPT

## 2024-01-04 PROCEDURE — 2580000003 HC RX 258: Performed by: STUDENT IN AN ORGANIZED HEALTH CARE EDUCATION/TRAINING PROGRAM

## 2024-01-04 PROCEDURE — 99285 EMERGENCY DEPT VISIT HI MDM: CPT

## 2024-01-04 PROCEDURE — 96374 THER/PROPH/DIAG INJ IV PUSH: CPT

## 2024-01-04 PROCEDURE — 6360000002 HC RX W HCPCS: Performed by: STUDENT IN AN ORGANIZED HEALTH CARE EDUCATION/TRAINING PROGRAM

## 2024-01-04 PROCEDURE — 85610 PROTHROMBIN TIME: CPT

## 2024-01-04 PROCEDURE — 96375 TX/PRO/DX INJ NEW DRUG ADDON: CPT

## 2024-01-04 PROCEDURE — 85025 COMPLETE CBC W/AUTO DIFF WBC: CPT

## 2024-01-04 PROCEDURE — 80053 COMPREHEN METABOLIC PANEL: CPT

## 2024-01-04 PROCEDURE — 6370000000 HC RX 637 (ALT 250 FOR IP): Performed by: STUDENT IN AN ORGANIZED HEALTH CARE EDUCATION/TRAINING PROGRAM

## 2024-01-04 PROCEDURE — 83880 ASSAY OF NATRIURETIC PEPTIDE: CPT

## 2024-01-04 PROCEDURE — 93010 ELECTROCARDIOGRAM REPORT: CPT | Performed by: INTERNAL MEDICINE

## 2024-01-04 PROCEDURE — 71046 X-RAY EXAM CHEST 2 VIEWS: CPT

## 2024-01-04 RX ORDER — DIPHENHYDRAMINE HYDROCHLORIDE 50 MG/ML
12.5 INJECTION INTRAMUSCULAR; INTRAVENOUS ONCE
Status: COMPLETED | OUTPATIENT
Start: 2024-01-04 | End: 2024-01-04

## 2024-01-04 RX ORDER — 0.9 % SODIUM CHLORIDE 0.9 %
1000 INTRAVENOUS SOLUTION INTRAVENOUS ONCE
Status: COMPLETED | OUTPATIENT
Start: 2024-01-04 | End: 2024-01-04

## 2024-01-04 RX ORDER — ACETAMINOPHEN 500 MG
1000 TABLET ORAL ONCE
Status: COMPLETED | OUTPATIENT
Start: 2024-01-04 | End: 2024-01-04

## 2024-01-04 RX ORDER — IPRATROPIUM BROMIDE AND ALBUTEROL SULFATE 2.5; .5 MG/3ML; MG/3ML
1 SOLUTION RESPIRATORY (INHALATION) ONCE
Status: COMPLETED | OUTPATIENT
Start: 2024-01-04 | End: 2024-01-04

## 2024-01-04 RX ORDER — LIDOCAINE HYDROCHLORIDE 20 MG/ML
SOLUTION OROPHARYNGEAL
Status: DISCONTINUED
Start: 2024-01-04 | End: 2024-01-04 | Stop reason: HOSPADM

## 2024-01-04 RX ORDER — MAGNESIUM HYDROXIDE/ALUMINUM HYDROXICE/SIMETHICONE 120; 1200; 1200 MG/30ML; MG/30ML; MG/30ML
SUSPENSION ORAL
Status: DISCONTINUED
Start: 2024-01-04 | End: 2024-01-04 | Stop reason: HOSPADM

## 2024-01-04 RX ORDER — PROCHLORPERAZINE EDISYLATE 5 MG/ML
10 INJECTION INTRAMUSCULAR; INTRAVENOUS ONCE
Status: COMPLETED | OUTPATIENT
Start: 2024-01-04 | End: 2024-01-04

## 2024-01-04 RX ADMIN — IPRATROPIUM BROMIDE AND ALBUTEROL SULFATE 1 DOSE: .5; 2.5 SOLUTION RESPIRATORY (INHALATION) at 12:53

## 2024-01-04 RX ADMIN — SODIUM CHLORIDE 1000 ML: 9 INJECTION, SOLUTION INTRAVENOUS at 13:36

## 2024-01-04 RX ADMIN — DIPHENHYDRAMINE HYDROCHLORIDE 12.5 MG: 50 INJECTION INTRAMUSCULAR; INTRAVENOUS at 13:36

## 2024-01-04 RX ADMIN — PROCHLORPERAZINE EDISYLATE 10 MG: 5 INJECTION INTRAMUSCULAR; INTRAVENOUS at 13:36

## 2024-01-04 RX ADMIN — ACETAMINOPHEN 1000 MG: 500 TABLET ORAL at 13:36

## 2024-01-04 RX ADMIN — ALUMINUM HYDROXIDE, MAGNESIUM HYDROXIDE, AND SIMETHICONE: 200; 200; 20 SUSPENSION ORAL at 17:10

## 2024-01-04 ASSESSMENT — PAIN DESCRIPTION - ONSET: ONSET: ON-GOING

## 2024-01-04 ASSESSMENT — ENCOUNTER SYMPTOMS
SHORTNESS OF BREATH: 0
BLOOD IN STOOL: 0
COLOR CHANGE: 0
DIARRHEA: 0
COUGH: 1
VOMITING: 0
BACK PAIN: 0
NAUSEA: 0
ABDOMINAL PAIN: 1
CONSTIPATION: 0

## 2024-01-04 ASSESSMENT — PAIN DESCRIPTION - PAIN TYPE: TYPE: ACUTE PAIN

## 2024-01-04 ASSESSMENT — PAIN SCALES - GENERAL: PAINLEVEL_OUTOF10: 9

## 2024-01-04 ASSESSMENT — PAIN DESCRIPTION - LOCATION: LOCATION: CHEST;ARM

## 2024-01-04 ASSESSMENT — PAIN DESCRIPTION - FREQUENCY: FREQUENCY: CONTINUOUS

## 2024-01-04 ASSESSMENT — PAIN - FUNCTIONAL ASSESSMENT: PAIN_FUNCTIONAL_ASSESSMENT: 0-10

## 2024-01-04 NOTE — ED PROVIDER NOTES
is ill-appearing (chronically). She is not toxic-appearing.   HENT:      Head: Normocephalic and atraumatic.      Right Ear: External ear normal.      Left Ear: External ear normal.      Nose: Nose normal. No rhinorrhea.      Comments: Nasal cannula oxygen present       Mouth/Throat:      Mouth: Mucous membranes are moist.      Pharynx: Oropharynx is clear.   Eyes:      Extraocular Movements: Extraocular movements intact.      Conjunctiva/sclera: Conjunctivae normal.      Pupils: Pupils are equal, round, and reactive to light.   Cardiovascular:      Rate and Rhythm: Normal rate and regular rhythm.      Pulses: Normal pulses.      Heart sounds: Normal heart sounds.   Pulmonary:      Effort: Pulmonary effort is normal. No respiratory distress.      Breath sounds: Normal breath sounds. No wheezing or rales.   Chest:          Comments: Diffuse tenderness to palpation to the upper inner and upper outer quadrant of the left breast; no redness warmth or overlying skin changes, no fluctuance or induration, no palpable \"lump\" or deformity  Abdominal:      Palpations: Abdomen is soft.      Tenderness: There is abdominal tenderness (trace, epigastric). There is no guarding.   Musculoskeletal:         General: Normal range of motion.      Cervical back: Normal range of motion and neck supple.      Right lower leg: No edema.      Left lower leg: No edema.   Skin:     General: Skin is warm and dry.      Capillary Refill: Capillary refill takes less than 2 seconds.      Coloration: Skin is not jaundiced or pale.      Findings: No bruising, erythema or rash.   Neurological:      General: No focal deficit present.      Mental Status: She is alert and oriented to person, place, and time.   Psychiatric:         Mood and Affect: Mood normal.         Behavior: Behavior normal.          ED Triage Vitals   BP Temp Temp src Pulse Resp SpO2 Height Weight   -- -- -- -- -- -- -- --           DIAGNOSTIC RESULTS   LABS: Interpreted by Azul  were discussed, patient voiced understanding and is amenable.  Strict return precautions were discussed.      While not exhaustive, the following diagnoses and their severity were considered: ACS, musculoskeletal chest pain, viral illness, COPD exacerbation, PNA, GERD, PUD.      Independent interpretation of Laboratory tests by Azul Webb DO: Rapid COVID and flu are negative.  proBNP is 95.  Troponins are 9 and 11, delta 2+, and stable compared to previous.  CMP shows bicarb 38, stable.  CBC shows chronic anemia near baseline.  Leukopenia 4.4, stable.  Magnesium 1.9, lipase 80.    Independent interpretation of Radiology tests by Azul Webb DO: CXR shows no acute process.        Non-plain film images such as CT, Xray, Ultrasound and MRI are read by the radiologist. Plain radiographic images are visualized and preliminarily interpreted by the ED Provider with the above-noted findings. Interpretation per the Radiologist below, if available at the time of this note are included below.      EKG reviewed and interpreted by me, TIME:  This EKG is signed by me, Azul Webb DO.     See ED course for EKG documentation.    All labs & imaging were reviewed and interpreted by me, see RESULTS. I have personally reviewed all laboratory and imaging results for this patient.       Are there any additional factors to consider that affect care (uninsured, homeless, illiterate, history from another source, etc.) (If yes, which ones).  No       This patient's ED course included: a personal history and physicial examination, re-evaluation prior to disposition, multiple bedside re-evaluations, IV medications, and complex medical decision making and emergency management    This patient has remained hemodynamically stable during their ED course.    Counseling:   The emergency provider has spoken with the patient and discussed today’s results, in addition to providing specific details for the plan of care and

## 2024-01-04 NOTE — CARE COORDINATION
Social Work/Transition of Care:      Pt in need of transportation Home RIC called pt Insurance ETA between [phone] Confirmation Code        1830 Provide a ride did not show up for pt , RIC spoke with [employer] and provided a Taxi Voucher due to pt having no money, no family and uses a walker and is on 3Ls oxygen             Electronically signed by EUGENE wolfe on 1/4/2024 at 7:51 PM

## 2024-01-04 NOTE — DISCHARGE INSTRUCTIONS
Please return to the ER for any new or worsening symptoms including but not limited to Fever or Chest pain or difficulty breathing  If prescribed, please be sure to  your prescriptions from the pharmacy  Please follow-up with Primary care provider as instructed

## 2024-03-07 ENCOUNTER — OFFICE VISIT (OUTPATIENT)
Dept: HEMATOLOGY | Age: 69
End: 2024-03-07
Payer: MEDICARE

## 2024-03-07 VITALS
BODY MASS INDEX: 22.93 KG/M2 | HEART RATE: 93 BPM | SYSTOLIC BLOOD PRESSURE: 146 MMHG | HEIGHT: 67 IN | TEMPERATURE: 98 F | WEIGHT: 146.1 LBS | OXYGEN SATURATION: 100 % | DIASTOLIC BLOOD PRESSURE: 68 MMHG | RESPIRATION RATE: 16 BRPM

## 2024-03-07 DIAGNOSIS — Z09 FOLLOW-UP EXAM: ICD-10-CM

## 2024-03-07 DIAGNOSIS — K86.1 CHRONIC CALCIFIC PANCREATITIS (HCC): Primary | ICD-10-CM

## 2024-03-07 PROCEDURE — 3017F COLORECTAL CA SCREEN DOC REV: CPT | Performed by: TRANSPLANT SURGERY

## 2024-03-07 PROCEDURE — G8484 FLU IMMUNIZE NO ADMIN: HCPCS | Performed by: TRANSPLANT SURGERY

## 2024-03-07 PROCEDURE — 99212 OFFICE O/P EST SF 10 MIN: CPT | Performed by: TRANSPLANT SURGERY

## 2024-03-07 PROCEDURE — 1090F PRES/ABSN URINE INCON ASSESS: CPT | Performed by: TRANSPLANT SURGERY

## 2024-03-07 PROCEDURE — G8420 CALC BMI NORM PARAMETERS: HCPCS | Performed by: TRANSPLANT SURGERY

## 2024-03-07 PROCEDURE — 1036F TOBACCO NON-USER: CPT | Performed by: TRANSPLANT SURGERY

## 2024-03-07 PROCEDURE — 99213 OFFICE O/P EST LOW 20 MIN: CPT | Performed by: TRANSPLANT SURGERY

## 2024-03-07 PROCEDURE — 1123F ACP DISCUSS/DSCN MKR DOCD: CPT | Performed by: TRANSPLANT SURGERY

## 2024-03-07 PROCEDURE — G8399 PT W/DXA RESULTS DOCUMENT: HCPCS | Performed by: TRANSPLANT SURGERY

## 2024-03-07 PROCEDURE — G8427 DOCREV CUR MEDS BY ELIG CLIN: HCPCS | Performed by: TRANSPLANT SURGERY

## 2024-03-07 NOTE — PROGRESS NOTES
Hepatobiliary and Pancreatic Surgery Progress Note    CC: follow up     Subjective: Patient is no longer having diarrhea and is now having constipation.  She is taking colace.  She did have a colonoscopy with Dr. Loredo in August.  She still has a jarquin catheter in place.  She states that the creon is helping her.        OBJECTIVE      Physical    BP (!) 146/68   Pulse 93   Temp 98 °F (36.7 °C) (Temporal)   Resp 16   Ht 1.702 m (5' 7\")   Wt 66.3 kg (146 lb 1.6 oz)   LMP 10/21/1985   SpO2 100%   BMI 22.88 kg/m²       General appearance: appears in no acute distress  Lungs:CTABL  Heart: RRR  Abdomen: soft, very distended and hard, nontympanic, no guarding, no peritoneal signs, normoactive bowel sounds  Extremities:no peripheral edema    ASSESSMENT: Chronic calcific pancreatitis with pancreatic duct hypertension, exocrine pancreatic insufficiency    PLAN:    - I reviewed their images prior to our office visit and we also reviewed them together today.  - instructed her to take creon with meals as she is has been taking it differently.  - repeat CT due to her 7% risk of developing Pancreatic cancer    20 Minutes of which greater than 50% was spent counseling or coordinating her care.    Thank you for the consultation and allowing me to take part in Ms. Keane's care.    Please send a copy of my note to Dr. HAKAN Vides    Electronically signed by Winston Oconnor MD on 3/7/2024 at 1:28 PM

## 2024-03-11 ENCOUNTER — TELEPHONE (OUTPATIENT)
Dept: HEMATOLOGY | Age: 69
End: 2024-03-11

## 2024-03-11 DIAGNOSIS — K86.81 EXOCRINE PANCREATIC INSUFFICIENCY: ICD-10-CM

## 2024-03-11 DIAGNOSIS — K86.89 PANCREATIC DUCT DILATED: ICD-10-CM

## 2024-03-11 DIAGNOSIS — K86.1 CHRONIC CALCIFIC PANCREATITIS (HCC): Primary | ICD-10-CM

## 2024-03-11 NOTE — TELEPHONE ENCOUNTER
Prior auth for cpt code 72406 was done online and no prior auth is needed. The patient is scheduled for her CT scan on 03/20/2024 [facility]  Arrive 12:00 pm (pt needs labs drawn prior)  Scan 1:30 pm  NPO starting at 10:00 am    I called the patient sister Nelly (per patients request) and left a voicemail to call the office and get the above information. The patient will also need a follow up appt scheduled  Electronically signed by Nieves Clayton MA on 3/11/2024 at 11:29 AM      The patient called back and I gave her all of the above information. I also made sure to tell her when she goes to registration to make sure she tells them she is registering for her ct scan and labs. While on the phone I also made the patient her follow up for [facility] on 03/28/24 at 1:45 pm. The patient confirmed all of the above and all questions were answered at this time  Electronically signed by Nieves Clayton MA on 3/11/2024 at 2:56 PM

## 2024-03-20 ENCOUNTER — HOSPITAL ENCOUNTER (OUTPATIENT)
Dept: CT IMAGING | Age: 69
Discharge: HOME | End: 2024-03-22
Attending: TRANSPLANT SURGERY
Payer: MEDICARE

## 2024-03-20 ENCOUNTER — HOSPITAL ENCOUNTER (OUTPATIENT)
Age: 69
Discharge: HOME | End: 2024-03-20
Attending: TRANSPLANT SURGERY
Payer: MEDICARE

## 2024-03-20 DIAGNOSIS — K86.1 CHRONIC CALCIFIC PANCREATITIS (HCC): ICD-10-CM

## 2024-03-20 DIAGNOSIS — K86.89 PANCREATIC DUCT DILATED: ICD-10-CM

## 2024-03-20 DIAGNOSIS — K86.81 EXOCRINE PANCREATIC INSUFFICIENCY: ICD-10-CM

## 2024-03-20 LAB
BUN SERPL-MCNC: 14 MG/DL (ref 6–23)
CREAT SERPL-MCNC: 0.5 MG/DL (ref 0.5–1)
GFR SERPL CREATININE-BSD FRML MDRD: >60 ML/MIN/1.73M2

## 2024-03-20 PROCEDURE — 6360000004 HC RX CONTRAST MEDICATION: Performed by: RADIOLOGY

## 2024-03-20 PROCEDURE — 82565 ASSAY OF CREATININE: CPT

## 2024-03-20 PROCEDURE — 84520 ASSAY OF UREA NITROGEN: CPT

## 2024-03-20 PROCEDURE — 74160 CT ABDOMEN W/CONTRAST: CPT

## 2024-03-20 PROCEDURE — 36415 COLL VENOUS BLD VENIPUNCTURE: CPT

## 2024-03-20 RX ADMIN — IOPAMIDOL 75 ML: 755 INJECTION, SOLUTION INTRAVENOUS at 12:24

## 2024-03-28 ENCOUNTER — OFFICE VISIT (OUTPATIENT)
Dept: HEMATOLOGY | Age: 69
End: 2024-03-28
Payer: MEDICARE

## 2024-03-28 VITALS
WEIGHT: 143 LBS | HEART RATE: 93 BPM | OXYGEN SATURATION: 100 % | RESPIRATION RATE: 16 BRPM | SYSTOLIC BLOOD PRESSURE: 148 MMHG | HEIGHT: 67 IN | DIASTOLIC BLOOD PRESSURE: 62 MMHG | BODY MASS INDEX: 22.44 KG/M2 | TEMPERATURE: 98 F

## 2024-03-28 DIAGNOSIS — Z85.528 PERSONAL HISTORY OF OTHER MALIGNANT NEOPLASM OF KIDNEY: ICD-10-CM

## 2024-03-28 DIAGNOSIS — K86.1 CHRONIC CALCIFIC PANCREATITIS (HCC): Primary | ICD-10-CM

## 2024-03-28 PROCEDURE — 1036F TOBACCO NON-USER: CPT | Performed by: TRANSPLANT SURGERY

## 2024-03-28 PROCEDURE — 99213 OFFICE O/P EST LOW 20 MIN: CPT | Performed by: TRANSPLANT SURGERY

## 2024-03-28 PROCEDURE — 1090F PRES/ABSN URINE INCON ASSESS: CPT | Performed by: TRANSPLANT SURGERY

## 2024-03-28 PROCEDURE — 1123F ACP DISCUSS/DSCN MKR DOCD: CPT | Performed by: TRANSPLANT SURGERY

## 2024-03-28 PROCEDURE — G8399 PT W/DXA RESULTS DOCUMENT: HCPCS | Performed by: TRANSPLANT SURGERY

## 2024-03-28 PROCEDURE — G8484 FLU IMMUNIZE NO ADMIN: HCPCS | Performed by: TRANSPLANT SURGERY

## 2024-03-28 PROCEDURE — G8427 DOCREV CUR MEDS BY ELIG CLIN: HCPCS | Performed by: TRANSPLANT SURGERY

## 2024-03-28 PROCEDURE — G8420 CALC BMI NORM PARAMETERS: HCPCS | Performed by: TRANSPLANT SURGERY

## 2024-03-28 PROCEDURE — 3017F COLORECTAL CA SCREEN DOC REV: CPT | Performed by: TRANSPLANT SURGERY

## 2024-03-28 RX ORDER — METRONIDAZOLE 500 MG/1
500 TABLET ORAL 2 TIMES DAILY
Qty: 14 TABLET | Refills: 0 | Status: SHIPPED | OUTPATIENT
Start: 2024-03-28 | End: 2024-04-04

## 2024-03-28 RX ORDER — CEFDINIR 300 MG/1
300 CAPSULE ORAL 2 TIMES DAILY
Qty: 20 CAPSULE | Refills: 0 | Status: SHIPPED | OUTPATIENT
Start: 2024-03-28 | End: 2024-04-07

## 2024-03-28 NOTE — PROGRESS NOTES
Hepatobiliary and Pancreatic Surgery Progress Note    CC: follow up     Subjective: Patient is no longer having diarrhea and is now having constipation.  She is taking colace.  She did have a colonoscopy with Dr. Loredo in August 2023.  She still has a jarquin catheter in place.  She states that the creon is helping her.  She is always bloated.  She had a CT scan and is seeing me in followup.      OBJECTIVE      Physical    BP (!) 148/62 (Site: Right Upper Arm, Position: Sitting, Cuff Size: Large Adult)   Pulse 93   Temp 98 °F (36.7 °C)   Resp 16   Ht 1.702 m (5' 7\")   Wt 64.9 kg (143 lb)   LMP 10/21/1985   SpO2 100%   BMI 22.40 kg/m²     General appearance: appears in no acute distress  Lungs:CTABL  Heart: RRR  Abdomen: soft, very distended and hard, nontympanic, no guarding, no peritoneal signs, normoactive bowel sounds  Extremities:no peripheral edema    ASSESSMENT: Chronic calcific pancreatitis with pancreatic duct hypertension, exocrine pancreatic insufficiency, SIBO    PLAN:    - I reviewed their images prior to our office visit and we also reviewed them together today.  - instructed her to take creon with meals as she is has been taking it differently.  - repeat CT due to her 7% risk of developing Pancreatic cancer  - omnicef and flagyl for SIBO  - follow up with me in 2 weeks.    20 Minutes of which greater than 50% was spent counseling or coordinating her care.    Thank you for the consultation and allowing me to take part in Ms. Keane's care.    Please send a copy of my note to Dr. HAKAN Vides    Electronically signed by Winston Oconnor MD on 3/28/2024 at 1:46 PM

## 2024-04-11 ENCOUNTER — HOSPITAL ENCOUNTER (INPATIENT)
Age: 69
LOS: 4 days | Discharge: HOME | DRG: 189 | End: 2024-04-16
Attending: EMERGENCY MEDICINE | Admitting: INTERNAL MEDICINE
Payer: MEDICARE

## 2024-04-11 ENCOUNTER — APPOINTMENT (OUTPATIENT)
Dept: GENERAL RADIOLOGY | Age: 69
DRG: 189 | End: 2024-04-11
Payer: MEDICARE

## 2024-04-11 DIAGNOSIS — R07.9 CHEST PAIN, UNSPECIFIED TYPE: Primary | ICD-10-CM

## 2024-04-11 LAB
ALBUMIN SERPL-MCNC: 4.7 G/DL (ref 3.5–5.2)
ALP SERPL-CCNC: 80 U/L (ref 35–104)
ALT SERPL-CCNC: 9 U/L (ref 0–32)
ANION GAP SERPL CALCULATED.3IONS-SCNC: 7 MMOL/L (ref 7–16)
AST SERPL-CCNC: 15 U/L (ref 0–31)
BASOPHILS # BLD: 0.03 K/UL (ref 0–0.2)
BASOPHILS NFR BLD: 1 % (ref 0–2)
BILIRUB SERPL-MCNC: <0.2 MG/DL (ref 0–1.2)
BUN SERPL-MCNC: 11 MG/DL (ref 6–23)
CALCIUM SERPL-MCNC: 10.1 MG/DL (ref 8.6–10.2)
CHLORIDE SERPL-SCNC: 96 MMOL/L (ref 98–107)
CO2 SERPL-SCNC: 36 MMOL/L (ref 22–29)
CREAT SERPL-MCNC: 0.6 MG/DL (ref 0.5–1)
EOSINOPHIL # BLD: 0.05 K/UL (ref 0.05–0.5)
EOSINOPHILS RELATIVE PERCENT: 1 % (ref 0–6)
ERYTHROCYTE [DISTWIDTH] IN BLOOD BY AUTOMATED COUNT: 17.5 % (ref 11.5–15)
GFR SERPL CREATININE-BSD FRML MDRD: >90 ML/MIN/1.73M2
GLUCOSE SERPL-MCNC: 106 MG/DL (ref 74–99)
HCT VFR BLD AUTO: 27.4 % (ref 34–48)
HGB BLD-MCNC: 7.6 G/DL (ref 11.5–15.5)
IMM GRANULOCYTES # BLD AUTO: <0.03 K/UL (ref 0–0.58)
IMM GRANULOCYTES NFR BLD: 0 % (ref 0–5)
LACTATE BLDV-SCNC: 2 MMOL/L (ref 0.5–2.2)
LIPASE SERPL-CCNC: 166 U/L (ref 13–60)
LYMPHOCYTES NFR BLD: 1.1 K/UL (ref 1.5–4)
LYMPHOCYTES RELATIVE PERCENT: 18 % (ref 20–42)
MCH RBC QN AUTO: 22.3 PG (ref 26–35)
MCHC RBC AUTO-ENTMCNC: 27.7 G/DL (ref 32–34.5)
MCV RBC AUTO: 80.4 FL (ref 80–99.9)
MONOCYTES NFR BLD: 0.37 K/UL (ref 0.1–0.95)
MONOCYTES NFR BLD: 6 % (ref 2–12)
NEUTROPHILS NFR BLD: 75 % (ref 43–80)
NEUTS SEG NFR BLD: 4.56 K/UL (ref 1.8–7.3)
PLATELET # BLD AUTO: 197 K/UL (ref 130–450)
PMV BLD AUTO: 9.5 FL (ref 7–12)
POTASSIUM SERPL-SCNC: 4.5 MMOL/L (ref 3.5–5)
PROT SERPL-MCNC: 7.4 G/DL (ref 6.4–8.3)
RBC # BLD AUTO: 3.41 M/UL (ref 3.5–5.5)
RBC # BLD: ABNORMAL 10*6/UL
SODIUM SERPL-SCNC: 139 MMOL/L (ref 132–146)
TROPONIN I SERPL HS-MCNC: 10 NG/L (ref 0–9)
TROPONIN I SERPL HS-MCNC: 11 NG/L (ref 0–9)
WBC OTHER # BLD: 6.1 K/UL (ref 4.5–11.5)

## 2024-04-11 PROCEDURE — 80053 COMPREHEN METABOLIC PANEL: CPT

## 2024-04-11 PROCEDURE — 6370000000 HC RX 637 (ALT 250 FOR IP)

## 2024-04-11 PROCEDURE — 83605 ASSAY OF LACTIC ACID: CPT

## 2024-04-11 PROCEDURE — G0378 HOSPITAL OBSERVATION PER HR: HCPCS

## 2024-04-11 PROCEDURE — 71045 X-RAY EXAM CHEST 1 VIEW: CPT

## 2024-04-11 PROCEDURE — 93005 ELECTROCARDIOGRAM TRACING: CPT

## 2024-04-11 PROCEDURE — 85025 COMPLETE CBC W/AUTO DIFF WBC: CPT

## 2024-04-11 PROCEDURE — 99285 EMERGENCY DEPT VISIT HI MDM: CPT

## 2024-04-11 PROCEDURE — 84484 ASSAY OF TROPONIN QUANT: CPT

## 2024-04-11 PROCEDURE — 83690 ASSAY OF LIPASE: CPT

## 2024-04-11 RX ORDER — ASPIRIN 81 MG/1
324 TABLET, CHEWABLE ORAL ONCE
Status: COMPLETED | OUTPATIENT
Start: 2024-04-11 | End: 2024-04-11

## 2024-04-11 RX ADMIN — ASPIRIN 324 MG: 81 TABLET, CHEWABLE ORAL at 15:45

## 2024-04-11 ASSESSMENT — PAIN DESCRIPTION - LOCATION: LOCATION: CHEST

## 2024-04-11 ASSESSMENT — PAIN DESCRIPTION - ORIENTATION: ORIENTATION: MID

## 2024-04-11 ASSESSMENT — PAIN SCALES - GENERAL: PAINLEVEL_OUTOF10: 8

## 2024-04-11 ASSESSMENT — PAIN - FUNCTIONAL ASSESSMENT: PAIN_FUNCTIONAL_ASSESSMENT: 0-10

## 2024-04-11 ASSESSMENT — PAIN DESCRIPTION - DIRECTION: RADIATING_TOWARDS: BILATERAL ARMS.

## 2024-04-11 ASSESSMENT — PAIN DESCRIPTION - DESCRIPTORS: DESCRIPTORS: ACHING;DULL

## 2024-04-11 NOTE — ED NOTES
Department of Emergency Medicine  FIRST PROVIDER TRIAGE NOTE             Independent MLP           4/11/24  2:47 PM EDT    Date of Encounter: 4/11/24   MRN: [id number]      HPI: Ayesha Keane is a 69 y.o. female who presents to the ED for No chief complaint on file.     Patient is a 69-year-old stating that her chest pain started this morning at 9 AM.  Patient was seen in Dr. Pham's office and states that she having shortness of breath and nausea and pain down both arms.  Patient does have a history of CAD, new onset of congestive heart failure    ROS: Negative for abd pain, back pain, or fever.    PE: Gen Appearance/Constitutional: alert  HEENT: NC/NT. PERRLA,  Airway patent.  Neck: supple     Initial Plan of Care: All treatment areas with department are currently occupied. Plan to order/Initiate the following while awaiting opening in ED: labs, EKG, and imaging studies.  Initiate Treatment-Testing, Proceed toTreatment Area When Bed Available for ED Attending/MLP to Continue Care    Electronically signed by Ban Rosen PA-C   DD: 4/11/24       Ban Rosen PA-C  04/11/24 1993

## 2024-04-11 NOTE — ED PROVIDER NOTES
[facility] EMERGENCY DEPARTMENT  EMERGENCY DEPARTMENT ENCOUNTER        Pt Name: Ayesha Keane  MRN: [id number]  Birthdate 1955  Date of evaluation: 4/11/2024  Provider: Angela Ngo MD  PCP: Lee Vides MD  Note Started: 3:35 PM EDT 4/11/24    CHIEF COMPLAINT       Chief Complaint   Patient presents with    Chest Pain     Midsternal chest pain radiating down bilateral arms that started this am at 0900, constant. Aaox4.        HISTORY OF PRESENT ILLNESS: 1 or more Elements   History From: Patient    Limitations to history : None    Ayesha Keane is a 69 y.o. female who presents for midsternal/chest pain under bilateral breasts radiating to bilateral arms starting at 9 AM this morning while in hepatobiliary surgery office for follow-up.  Patient has a history of COPD and is on 3 L of oxygen at baseline.  She does endorse some worsening of shortness of breath.  She also endorses some associated diaphoresis but states this has been intermittent over the past month.  Denies recent long travels, recent surgeries, active cancer diagnosis at this time.  Denies fever, productive cough, nausea, vomiting.  The patient does endorse some generalized abdominal pain and distention however was seen and evaluated by Dr. Oconnor in office today and had a CT scan done.  She reports her last bowel movement was on Monday and she is passing some gas intermittently.  Denies urinary symptoms and states she has a urinary catheter in place.  No reported history of HTN, HLD, DM.  She was a former smoker.    Nursing Notes were all reviewed and agreed with or any disagreements were addressed in the HPI.        REVIEW OF EXTERNAL NOTE :       Patient's last echo on 10/10/2022  Summary   Normal left ventricular chamber size.   Normal left ventricular systolic function.   Concentric LV remodeling.   Visually estimated LVEF is 55-60 %.   No wall motion abnormalities.   Indeterminate

## 2024-04-12 ENCOUNTER — APPOINTMENT (OUTPATIENT)
Age: 69
DRG: 189 | End: 2024-04-12
Payer: MEDICARE

## 2024-04-12 PROBLEM — D63.8 ANEMIA OF CHRONIC DISEASE: Status: ACTIVE | Noted: 2020-05-17

## 2024-04-12 PROBLEM — K86.89 PANCREATIC INSUFFICIENCY: Status: ACTIVE | Noted: 2024-04-12

## 2024-04-12 LAB
ALBUMIN SERPL-MCNC: 4.2 G/DL (ref 3.5–5.2)
ALP SERPL-CCNC: 69 U/L (ref 35–104)
ALT SERPL-CCNC: 9 U/L (ref 0–32)
ANION GAP SERPL CALCULATED.3IONS-SCNC: 4 MMOL/L (ref 7–16)
AST SERPL-CCNC: 16 U/L (ref 0–31)
BILIRUB SERPL-MCNC: 0.2 MG/DL (ref 0–1.2)
BUN SERPL-MCNC: 11 MG/DL (ref 6–23)
CALCIUM SERPL-MCNC: 9.9 MG/DL (ref 8.6–10.2)
CHLORIDE SERPL-SCNC: 97 MMOL/L (ref 98–107)
CHOLEST SERPL-MCNC: 168 MG/DL
CO2 SERPL-SCNC: 37 MMOL/L (ref 22–29)
CREAT SERPL-MCNC: 0.6 MG/DL (ref 0.5–1)
EKG ATRIAL RATE: 92 BPM
EKG P AXIS: 90 DEGREES
EKG P-R INTERVAL: 168 MS
EKG Q-T INTERVAL: 340 MS
EKG QRS DURATION: 70 MS
EKG QTC CALCULATION (BAZETT): 420 MS
EKG R AXIS: 75 DEGREES
EKG T AXIS: 95 DEGREES
EKG VENTRICULAR RATE: 92 BPM
ERYTHROCYTE [DISTWIDTH] IN BLOOD BY AUTOMATED COUNT: 17.5 % (ref 11.5–15)
GFR SERPL CREATININE-BSD FRML MDRD: >90 ML/MIN/1.73M2
GLUCOSE SERPL-MCNC: 115 MG/DL (ref 74–99)
HBA1C MFR BLD: 6.2 % (ref 4–5.6)
HCT VFR BLD AUTO: 25 % (ref 34–48)
HCT VFR BLD AUTO: 32.4 % (ref 34–48)
HDLC SERPL-MCNC: 88 MG/DL
HGB BLD-MCNC: 7 G/DL (ref 11.5–15.5)
HGB BLD-MCNC: 9.3 G/DL (ref 11.5–15.5)
LDLC SERPL CALC-MCNC: 69 MG/DL
MCH RBC QN AUTO: 22.2 PG (ref 26–35)
MCHC RBC AUTO-ENTMCNC: 28 G/DL (ref 32–34.5)
MCV RBC AUTO: 79.1 FL (ref 80–99.9)
PLATELET # BLD AUTO: 189 K/UL (ref 130–450)
PMV BLD AUTO: 9.3 FL (ref 7–12)
POTASSIUM SERPL-SCNC: 4.3 MMOL/L (ref 3.5–5)
PROT SERPL-MCNC: 6.8 G/DL (ref 6.4–8.3)
RBC # BLD AUTO: 3.16 M/UL (ref 3.5–5.5)
SODIUM SERPL-SCNC: 138 MMOL/L (ref 132–146)
TRIGL SERPL-MCNC: 55 MG/DL
TROPONIN I SERPL HS-MCNC: 11 NG/L (ref 0–9)
TROPONIN I SERPL HS-MCNC: 11 NG/L (ref 0–9)
TROPONIN I SERPL HS-MCNC: 12 NG/L (ref 0–9)
TROPONIN I SERPL HS-MCNC: NORMAL NG/L (ref 0–14)
TROPONIN INTERP: NORMAL
TROPONIN T SERPL-MCNC: NORMAL NG/ML
VLDLC SERPL CALC-MCNC: 11 MG/DL
WBC OTHER # BLD: 5.3 K/UL (ref 4.5–11.5)

## 2024-04-12 PROCEDURE — 85027 COMPLETE CBC AUTOMATED: CPT

## 2024-04-12 PROCEDURE — 86901 BLOOD TYPING SEROLOGIC RH(D): CPT

## 2024-04-12 PROCEDURE — 6360000002 HC RX W HCPCS: Performed by: INTERNAL MEDICINE

## 2024-04-12 PROCEDURE — 36415 COLL VENOUS BLD VENIPUNCTURE: CPT

## 2024-04-12 PROCEDURE — G0378 HOSPITAL OBSERVATION PER HR: HCPCS

## 2024-04-12 PROCEDURE — 1200000000 HC SEMI PRIVATE

## 2024-04-12 PROCEDURE — 86900 BLOOD TYPING SEROLOGIC ABO: CPT

## 2024-04-12 PROCEDURE — 2580000003 HC RX 258: Performed by: NURSE PRACTITIONER

## 2024-04-12 PROCEDURE — 86923 COMPATIBILITY TEST ELECTRIC: CPT

## 2024-04-12 PROCEDURE — 6370000000 HC RX 637 (ALT 250 FOR IP): Performed by: NURSE PRACTITIONER

## 2024-04-12 PROCEDURE — 36430 TRANSFUSION BLD/BLD COMPNT: CPT

## 2024-04-12 PROCEDURE — 80053 COMPREHEN METABOLIC PANEL: CPT

## 2024-04-12 PROCEDURE — 2700000000 HC OXYGEN THERAPY PER DAY

## 2024-04-12 PROCEDURE — 85014 HEMATOCRIT: CPT

## 2024-04-12 PROCEDURE — 6370000000 HC RX 637 (ALT 250 FOR IP): Performed by: INTERNAL MEDICINE

## 2024-04-12 PROCEDURE — 83036 HEMOGLOBIN GLYCOSYLATED A1C: CPT

## 2024-04-12 PROCEDURE — 80061 LIPID PANEL: CPT

## 2024-04-12 PROCEDURE — P9016 RBC LEUKOCYTES REDUCED: HCPCS

## 2024-04-12 PROCEDURE — 94640 AIRWAY INHALATION TREATMENT: CPT

## 2024-04-12 PROCEDURE — 93010 ELECTROCARDIOGRAM REPORT: CPT | Performed by: INTERNAL MEDICINE

## 2024-04-12 PROCEDURE — 6360000002 HC RX W HCPCS: Performed by: NURSE PRACTITIONER

## 2024-04-12 PROCEDURE — 93005 ELECTROCARDIOGRAM TRACING: CPT | Performed by: NURSE PRACTITIONER

## 2024-04-12 PROCEDURE — 84484 ASSAY OF TROPONIN QUANT: CPT

## 2024-04-12 PROCEDURE — 86850 RBC ANTIBODY SCREEN: CPT

## 2024-04-12 PROCEDURE — 85018 HEMOGLOBIN: CPT

## 2024-04-12 RX ORDER — PANTOPRAZOLE SODIUM 40 MG/1
40 TABLET, DELAYED RELEASE ORAL
Status: DISCONTINUED | OUTPATIENT
Start: 2024-04-12 | End: 2024-04-16 | Stop reason: HOSPADM

## 2024-04-12 RX ORDER — SODIUM CHLORIDE 0.9 % (FLUSH) 0.9 %
5-40 SYRINGE (ML) INJECTION PRN
Status: DISCONTINUED | OUTPATIENT
Start: 2024-04-12 | End: 2024-04-16 | Stop reason: HOSPADM

## 2024-04-12 RX ORDER — ENOXAPARIN SODIUM 100 MG/ML
40 INJECTION SUBCUTANEOUS DAILY
Status: DISCONTINUED | OUTPATIENT
Start: 2024-04-12 | End: 2024-04-16 | Stop reason: HOSPADM

## 2024-04-12 RX ORDER — GABAPENTIN 300 MG/1
300 CAPSULE ORAL NIGHTLY
Status: DISCONTINUED | OUTPATIENT
Start: 2024-04-12 | End: 2024-04-16 | Stop reason: HOSPADM

## 2024-04-12 RX ORDER — ONDANSETRON 2 MG/ML
4 INJECTION INTRAMUSCULAR; INTRAVENOUS EVERY 6 HOURS PRN
Status: DISCONTINUED | OUTPATIENT
Start: 2024-04-12 | End: 2024-04-16 | Stop reason: HOSPADM

## 2024-04-12 RX ORDER — POTASSIUM CHLORIDE 20 MEQ/1
40 TABLET, EXTENDED RELEASE ORAL PRN
Status: DISCONTINUED | OUTPATIENT
Start: 2024-04-12 | End: 2024-04-13

## 2024-04-12 RX ORDER — POLYETHYLENE GLYCOL 3350 17 G/17G
17 POWDER, FOR SOLUTION ORAL DAILY PRN
Status: DISCONTINUED | OUTPATIENT
Start: 2024-04-12 | End: 2024-04-16 | Stop reason: HOSPADM

## 2024-04-12 RX ORDER — SODIUM CHLORIDE 9 MG/ML
INJECTION, SOLUTION INTRAVENOUS PRN
Status: DISCONTINUED | OUTPATIENT
Start: 2024-04-12 | End: 2024-04-16 | Stop reason: HOSPADM

## 2024-04-12 RX ORDER — ASPIRIN 81 MG/1
81 TABLET, CHEWABLE ORAL DAILY
Status: DISCONTINUED | OUTPATIENT
Start: 2024-04-12 | End: 2024-04-16 | Stop reason: HOSPADM

## 2024-04-12 RX ORDER — ACETAMINOPHEN 650 MG/1
650 SUPPOSITORY RECTAL EVERY 6 HOURS PRN
Status: DISCONTINUED | OUTPATIENT
Start: 2024-04-12 | End: 2024-04-16 | Stop reason: HOSPADM

## 2024-04-12 RX ORDER — REGADENOSON 0.08 MG/ML
0.4 INJECTION, SOLUTION INTRAVENOUS
Status: COMPLETED | OUTPATIENT
Start: 2024-04-12 | End: 2024-04-13

## 2024-04-12 RX ORDER — BUDESONIDE 0.5 MG/2ML
500 INHALANT ORAL 2 TIMES DAILY
Status: DISCONTINUED | OUTPATIENT
Start: 2024-04-12 | End: 2024-04-16 | Stop reason: HOSPADM

## 2024-04-12 RX ORDER — OLANZAPINE 10 MG/1
5 TABLET ORAL NIGHTLY
Status: DISCONTINUED | OUTPATIENT
Start: 2024-04-12 | End: 2024-04-16 | Stop reason: HOSPADM

## 2024-04-12 RX ORDER — ARFORMOTEROL TARTRATE 15 UG/2ML
15 SOLUTION RESPIRATORY (INHALATION) 2 TIMES DAILY
Status: DISCONTINUED | OUTPATIENT
Start: 2024-04-12 | End: 2024-04-16 | Stop reason: HOSPADM

## 2024-04-12 RX ORDER — POTASSIUM CHLORIDE 7.45 MG/ML
10 INJECTION INTRAVENOUS PRN
Status: DISCONTINUED | OUTPATIENT
Start: 2024-04-12 | End: 2024-04-13

## 2024-04-12 RX ORDER — FUROSEMIDE 10 MG/ML
20 INJECTION INTRAMUSCULAR; INTRAVENOUS ONCE
Status: COMPLETED | OUTPATIENT
Start: 2024-04-12 | End: 2024-04-12

## 2024-04-12 RX ORDER — SODIUM CHLORIDE 0.9 % (FLUSH) 0.9 %
5-40 SYRINGE (ML) INJECTION EVERY 12 HOURS SCHEDULED
Status: DISCONTINUED | OUTPATIENT
Start: 2024-04-12 | End: 2024-04-16 | Stop reason: HOSPADM

## 2024-04-12 RX ORDER — SODIUM CHLORIDE 9 MG/ML
INJECTION, SOLUTION INTRAVENOUS PRN
Status: DISCONTINUED | OUTPATIENT
Start: 2024-04-12 | End: 2024-04-15

## 2024-04-12 RX ORDER — ATORVASTATIN CALCIUM 40 MG/1
40 TABLET, FILM COATED ORAL NIGHTLY
Status: DISCONTINUED | OUTPATIENT
Start: 2024-04-12 | End: 2024-04-16 | Stop reason: HOSPADM

## 2024-04-12 RX ORDER — ACETAMINOPHEN 325 MG/1
650 TABLET ORAL EVERY 6 HOURS PRN
Status: DISCONTINUED | OUTPATIENT
Start: 2024-04-12 | End: 2024-04-16 | Stop reason: HOSPADM

## 2024-04-12 RX ORDER — LACTULOSE 10 G/15ML
20 SOLUTION ORAL 2 TIMES DAILY
Status: DISCONTINUED | OUTPATIENT
Start: 2024-04-12 | End: 2024-04-16 | Stop reason: HOSPADM

## 2024-04-12 RX ORDER — FOLIC ACID 1 MG/1
1 TABLET ORAL DAILY
Status: DISCONTINUED | OUTPATIENT
Start: 2024-04-12 | End: 2024-04-16 | Stop reason: HOSPADM

## 2024-04-12 RX ORDER — M-VIT,TX,IRON,MINS/CALC/FOLIC 27MG-0.4MG
1 TABLET ORAL DAILY
Status: DISCONTINUED | OUTPATIENT
Start: 2024-04-12 | End: 2024-04-16 | Stop reason: HOSPADM

## 2024-04-12 RX ORDER — ALBUTEROL SULFATE 2.5 MG/3ML
2.5 SOLUTION RESPIRATORY (INHALATION) EVERY 6 HOURS PRN
Status: DISCONTINUED | OUTPATIENT
Start: 2024-04-12 | End: 2024-04-14

## 2024-04-12 RX ORDER — ONDANSETRON 4 MG/1
4 TABLET, ORALLY DISINTEGRATING ORAL EVERY 8 HOURS PRN
Status: DISCONTINUED | OUTPATIENT
Start: 2024-04-12 | End: 2024-04-16 | Stop reason: HOSPADM

## 2024-04-12 RX ORDER — QUETIAPINE FUMARATE 50 MG/1
50 TABLET, EXTENDED RELEASE ORAL NIGHTLY
Status: DISCONTINUED | OUTPATIENT
Start: 2024-04-12 | End: 2024-04-16 | Stop reason: HOSPADM

## 2024-04-12 RX ORDER — MAGNESIUM SULFATE IN WATER 40 MG/ML
2000 INJECTION, SOLUTION INTRAVENOUS PRN
Status: DISCONTINUED | OUTPATIENT
Start: 2024-04-12 | End: 2024-04-13

## 2024-04-12 RX ADMIN — BUDESONIDE 500 MCG: 0.5 INHALANT RESPIRATORY (INHALATION) at 09:05

## 2024-04-12 RX ADMIN — LACTULOSE 20 G: 20 SOLUTION ORAL at 16:41

## 2024-04-12 RX ADMIN — ATORVASTATIN CALCIUM 40 MG: 40 TABLET, FILM COATED ORAL at 20:33

## 2024-04-12 RX ADMIN — ARFORMOTEROL TARTRATE 15 MCG: 15 SOLUTION RESPIRATORY (INHALATION) at 09:05

## 2024-04-12 RX ADMIN — BUDESONIDE 500 MCG: 0.5 INHALANT RESPIRATORY (INHALATION) at 20:05

## 2024-04-12 RX ADMIN — Medication 1 TABLET: at 16:40

## 2024-04-12 RX ADMIN — ASPIRIN 81 MG: 81 TABLET, CHEWABLE ORAL at 16:39

## 2024-04-12 RX ADMIN — ALBUTEROL SULFATE 2.5 MG: 2.5 SOLUTION RESPIRATORY (INHALATION) at 21:25

## 2024-04-12 RX ADMIN — PANCRELIPASE LIPASE, PANCRELIPASE PROTEASE, PANCRELIPASE AMYLASE 15000 UNITS: 15000; 47000; 63000 CAPSULE, DELAYED RELEASE ORAL at 16:39

## 2024-04-12 RX ADMIN — ARFORMOTEROL TARTRATE 15 MCG: 15 SOLUTION RESPIRATORY (INHALATION) at 20:05

## 2024-04-12 RX ADMIN — SODIUM CHLORIDE, PRESERVATIVE FREE 10 ML: 5 INJECTION INTRAVENOUS at 20:33

## 2024-04-12 RX ADMIN — PANCRELIPASE LIPASE, PANCRELIPASE PROTEASE, PANCRELIPASE AMYLASE 20000 UNITS: 20000; 63000; 84000 CAPSULE, DELAYED RELEASE ORAL at 17:14

## 2024-04-12 RX ADMIN — FUROSEMIDE 20 MG: 10 INJECTION, SOLUTION INTRAMUSCULAR; INTRAVENOUS at 16:41

## 2024-04-12 RX ADMIN — ENOXAPARIN SODIUM 40 MG: 100 INJECTION SUBCUTANEOUS at 16:42

## 2024-04-12 RX ADMIN — FOLIC ACID 1 MG: 1 TABLET ORAL at 16:40

## 2024-04-12 NOTE — CONSENT
Informed Consent for Blood Component Transfusion Note    I have discussed with the patient the rationale for blood component transfusion; its benefits in treating or preventing fatigue, organ damage, or death; and its risk which includes mild transfusion reactions, rare risk of blood borne infection, or more serious but rare reactions. I have discussed the alternatives to transfusion, including the risk and consequences of not receiving transfusion. The patient had an opportunity to ask questions and had agreed to proceed with transfusion of blood components.    Electronically signed by Jef Hamilton DO on 4/12/24 at 2:07 PM EDT       SHE IS HAVING CHEST PAIN, NEEDS A STRESS , HOWEVER HER HGB IS 7 AND CARD WANTS IT TO BE 8

## 2024-04-12 NOTE — CARE COORDINATION
Patient with PMH Asthma, COPD (chronic obstructive pulmonary disease), Depression, Dysphagia, [place] catheter in place-continuous since 2013, changed monthly at [facility]. Oxygen dependent is admitted with Acute chest pain. Patient is for a stress test today. Await results and input & plan. Hgb 7.0 today. One unit PRBC's ordered. I called and spoke with the patent's sister Nelly to explain role and discuss transition of care. Patient lives alone in a 2 story home with bed and bath on the second floor. She has a walker at home and home oxygen 3 liters continuous through [employer] which I confirmed with Asim from [employer]. Patient is currently on O2 3 liters nasal cannula with O2 sat 98%. PCP is Lee Vides MD and pharmacy is [facility] on [address]. Patient will need WC transport home. Ambulette formin envelope in soft chart will need to be signed and dated by nursing on day of discharge. PT/OT evals ordered to make sure patient is safe to return home.   Deepali Shay RN   [phone]

## 2024-04-12 NOTE — PLAN OF CARE
Problem: Safety - Adult  Goal: Free from fall injury  4/12/2024 1311 by Tonya Starks, RN  Outcome: Progressing  4/12/2024 0308 by Aga Alvarado, RN  Outcome: Progressing

## 2024-04-12 NOTE — H&P
04/12/24  1314   AST 15 16   ALT 9 9   BILITOT <0.2 0.2   ALKPHOS 80 69     No results for input(s): \"INR\" in the last 72 hours.  No results for input(s): \"CKTOTAL\", \"TROPONINI\" in the last 72 hours.    Urinalysis:      Lab Results   Component Value Date/Time    NITRU NEGATIVE 11/16/2023 12:19 PM    WBCUA 0 TO 5 11/16/2023 12:19 PM    BACTERIA 3+ 11/16/2023 12:19 PM    RBCUA 0 TO 2 11/16/2023 12:19 PM    RBCUA NONE 02/18/2014 07:23 PM    BLOODU TRACE-INTACT 11/14/2022 01:04 PM    SPECGRAV 1.015 11/16/2023 12:19 PM    GLUCOSEU NEGATIVE 11/16/2023 12:19 PM       Radiology:           XR CHEST PORTABLE   Final Result   Background coarsened interstitial markings and lung hyperinflation.   Atherosclerotic disease.  No acute cardiopulmonary pathology seen.             ASSESSMENT:    Active Hospital Problems    Diagnosis Date Noted    Acute chest pain [R07.9] 04/11/2024   COPD WITHOUT EXACERBATION   SOB   CHRONIC PANCREATITIS   PRE DIABETES (AIC 6.32)   PANCREATIC INSUF   CHRONIC GUTIÉRREZ DUE TO ATONIC BLADDER  CHRONIC RESPIRATORY FAILURE  ANEMIA OF CHRONIC DISEASE  CONSTIPATION  PLAN:  CARD  CONT AEROSOLS   CONT CREON   LACTULOSE   TRANSFUSE FOR STRESS      DVT Prophylaxis: LOVENOX  Diet: Diet NPO  Code Status: Full Code    PT/OT Eval Status:  ORDERED    Dispo -  HOME    Electronically signed by Jef Hamilton DO on 4/12/2024 at 2:10 PM FACOI       Thank you Lee Vides MD for the opportunity to be involved in this patient's care. If you have any questions or concerns please feel free to contact me at [phone]

## 2024-04-13 ENCOUNTER — APPOINTMENT (OUTPATIENT)
Dept: NUCLEAR MEDICINE | Age: 69
DRG: 189 | End: 2024-04-13
Payer: MEDICARE

## 2024-04-13 ENCOUNTER — APPOINTMENT (OUTPATIENT)
Age: 69
DRG: 189 | End: 2024-04-13
Payer: MEDICARE

## 2024-04-13 LAB
ABO/RH: NORMAL
ALBUMIN SERPL-MCNC: 4.1 G/DL (ref 3.5–5.2)
ALP SERPL-CCNC: 67 U/L (ref 35–104)
ALT SERPL-CCNC: 9 U/L (ref 0–32)
ANION GAP SERPL CALCULATED.3IONS-SCNC: 4 MMOL/L (ref 7–16)
ANTIBODY SCREEN: NEGATIVE
ARM BAND NUMBER: NORMAL
AST SERPL-CCNC: 17 U/L (ref 0–31)
BILIRUB SERPL-MCNC: 0.2 MG/DL (ref 0–1.2)
BLOOD BANK BLOOD PRODUCT EXPIRATION DATE: NORMAL
BLOOD BANK DISPENSE STATUS: NORMAL
BLOOD BANK ISBT PRODUCT BLOOD TYPE: 5100
BLOOD BANK PRODUCT CODE: NORMAL
BLOOD BANK SAMPLE EXPIRATION: NORMAL
BLOOD BANK UNIT TYPE AND RH: NORMAL
BPU ID: NORMAL
BUN SERPL-MCNC: 10 MG/DL (ref 6–23)
CALCIUM SERPL-MCNC: 9.7 MG/DL (ref 8.6–10.2)
CHLORIDE SERPL-SCNC: 96 MMOL/L (ref 98–107)
CO2 SERPL-SCNC: 36 MMOL/L (ref 22–29)
COMPONENT: NORMAL
CREAT SERPL-MCNC: 0.5 MG/DL (ref 0.5–1)
CROSSMATCH RESULT: NORMAL
ECHO BSA: 1.78 M2
EKG ATRIAL RATE: 88 BPM
EKG P AXIS: 85 DEGREES
EKG P-R INTERVAL: 182 MS
EKG Q-T INTERVAL: 396 MS
EKG QRS DURATION: 68 MS
EKG QTC CALCULATION (BAZETT): 479 MS
EKG R AXIS: 72 DEGREES
EKG T AXIS: 99 DEGREES
EKG VENTRICULAR RATE: 88 BPM
GFR SERPL CREATININE-BSD FRML MDRD: >90 ML/MIN/1.73M2
GLUCOSE BLD-MCNC: 140 MG/DL (ref 74–99)
GLUCOSE SERPL-MCNC: 126 MG/DL (ref 74–99)
HCT VFR BLD AUTO: 32.8 % (ref 34–48)
HGB BLD-MCNC: 9.4 G/DL (ref 11.5–15.5)
NUC STRESS EJECTION FRACTION: 70 %
POTASSIUM SERPL-SCNC: 4.6 MMOL/L (ref 3.5–5)
PROT SERPL-MCNC: 6.6 G/DL (ref 6.4–8.3)
SODIUM SERPL-SCNC: 136 MMOL/L (ref 132–146)
STRESS BASELINE DIAS BP: 70 MMHG
STRESS BASELINE HR: 96 BPM
STRESS BASELINE SYS BP: 140 MMHG
STRESS ESTIMATED WORKLOAD: 1 METS
STRESS PEAK DIAS BP: 56 MMHG
STRESS PEAK SYS BP: 150 MMHG
STRESS PERCENT HR ACHIEVED: 78 %
STRESS POST PEAK HR: 116 BPM
STRESS RATE PRESSURE PRODUCT: NORMAL BPM*MMHG
STRESS TARGET HR: 149 BPM
TRANSFUSION STATUS: NORMAL
UNIT DIVISION: 0
UNIT ISSUE DATE/TIME: NORMAL

## 2024-04-13 PROCEDURE — 36415 COLL VENOUS BLD VENIPUNCTURE: CPT

## 2024-04-13 PROCEDURE — 6360000002 HC RX W HCPCS: Performed by: NURSE PRACTITIONER

## 2024-04-13 PROCEDURE — 2700000000 HC OXYGEN THERAPY PER DAY

## 2024-04-13 PROCEDURE — 80053 COMPREHEN METABOLIC PANEL: CPT

## 2024-04-13 PROCEDURE — 82962 GLUCOSE BLOOD TEST: CPT

## 2024-04-13 PROCEDURE — 6370000000 HC RX 637 (ALT 250 FOR IP): Performed by: INTERNAL MEDICINE

## 2024-04-13 PROCEDURE — 6370000000 HC RX 637 (ALT 250 FOR IP): Performed by: NURSE PRACTITIONER

## 2024-04-13 PROCEDURE — 93016 CV STRESS TEST SUPVJ ONLY: CPT | Performed by: INTERNAL MEDICINE

## 2024-04-13 PROCEDURE — 78452 HT MUSCLE IMAGE SPECT MULT: CPT | Performed by: INTERNAL MEDICINE

## 2024-04-13 PROCEDURE — 78452 HT MUSCLE IMAGE SPECT MULT: CPT

## 2024-04-13 PROCEDURE — 85018 HEMOGLOBIN: CPT

## 2024-04-13 PROCEDURE — 85014 HEMATOCRIT: CPT

## 2024-04-13 PROCEDURE — 1200000000 HC SEMI PRIVATE

## 2024-04-13 PROCEDURE — A9500 TC99M SESTAMIBI: HCPCS | Performed by: RADIOLOGY

## 2024-04-13 PROCEDURE — 93017 CV STRESS TEST TRACING ONLY: CPT

## 2024-04-13 PROCEDURE — 93018 CV STRESS TEST I&R ONLY: CPT | Performed by: INTERNAL MEDICINE

## 2024-04-13 PROCEDURE — 94640 AIRWAY INHALATION TREATMENT: CPT

## 2024-04-13 PROCEDURE — 3430000000 HC RX DIAGNOSTIC RADIOPHARMACEUTICAL: Performed by: RADIOLOGY

## 2024-04-13 PROCEDURE — 2580000003 HC RX 258: Performed by: NURSE PRACTITIONER

## 2024-04-13 RX ORDER — TETRAKIS(2-METHOXYISOBUTYLISOCYANIDE)COPPER(I) TETRAFLUOROBORATE 1 MG/ML
10.5 INJECTION, POWDER, LYOPHILIZED, FOR SOLUTION INTRAVENOUS
Status: COMPLETED | OUTPATIENT
Start: 2024-04-13 | End: 2024-04-13

## 2024-04-13 RX ORDER — TETRAKIS(2-METHOXYISOBUTYLISOCYANIDE)COPPER(I) TETRAFLUOROBORATE 1 MG/ML
30 INJECTION, POWDER, LYOPHILIZED, FOR SOLUTION INTRAVENOUS
Status: COMPLETED | OUTPATIENT
Start: 2024-04-13 | End: 2024-04-13

## 2024-04-13 RX ADMIN — SODIUM CHLORIDE, PRESERVATIVE FREE 10 ML: 5 INJECTION INTRAVENOUS at 21:09

## 2024-04-13 RX ADMIN — PANCRELIPASE LIPASE, PANCRELIPASE PROTEASE, PANCRELIPASE AMYLASE 15000 UNITS: 15000; 47000; 63000 CAPSULE, DELAYED RELEASE ORAL at 13:10

## 2024-04-13 RX ADMIN — LACTULOSE 20 G: 20 SOLUTION ORAL at 21:09

## 2024-04-13 RX ADMIN — Medication 10.5 MILLICURIE: at 09:08

## 2024-04-13 RX ADMIN — QUETIAPINE FUMARATE 50 MG: 50 TABLET, EXTENDED RELEASE ORAL at 21:08

## 2024-04-13 RX ADMIN — ACETAMINOPHEN 650 MG: 650 SUPPOSITORY RECTAL at 23:40

## 2024-04-13 RX ADMIN — ASPIRIN 81 MG: 81 TABLET, CHEWABLE ORAL at 07:56

## 2024-04-13 RX ADMIN — ARFORMOTEROL TARTRATE 15 MCG: 15 SOLUTION RESPIRATORY (INHALATION) at 19:11

## 2024-04-13 RX ADMIN — SODIUM CHLORIDE, PRESERVATIVE FREE 10 ML: 5 INJECTION INTRAVENOUS at 07:57

## 2024-04-13 RX ADMIN — ONDANSETRON 4 MG: 2 INJECTION INTRAMUSCULAR; INTRAVENOUS at 18:48

## 2024-04-13 RX ADMIN — OLANZAPINE 5 MG: 5 TABLET, FILM COATED ORAL at 21:08

## 2024-04-13 RX ADMIN — BUDESONIDE 500 MCG: 0.5 INHALANT RESPIRATORY (INHALATION) at 19:11

## 2024-04-13 RX ADMIN — REGADENOSON 0.4 MG: 0.08 INJECTION, SOLUTION INTRAVENOUS at 10:37

## 2024-04-13 RX ADMIN — SERTRALINE HYDROCHLORIDE 50 MG: 50 TABLET ORAL at 21:08

## 2024-04-13 RX ADMIN — FOLIC ACID 1 MG: 1 TABLET ORAL at 07:57

## 2024-04-13 RX ADMIN — Medication 1 TABLET: at 07:57

## 2024-04-13 RX ADMIN — ENOXAPARIN SODIUM 40 MG: 100 INJECTION SUBCUTANEOUS at 07:56

## 2024-04-13 RX ADMIN — GABAPENTIN 300 MG: 300 CAPSULE ORAL at 21:08

## 2024-04-13 RX ADMIN — Medication 35 MILLICURIE: at 10:40

## 2024-04-13 RX ADMIN — LACTULOSE 20 G: 20 SOLUTION ORAL at 13:00

## 2024-04-13 RX ADMIN — ALBUTEROL SULFATE 2.5 MG: 2.5 SOLUTION RESPIRATORY (INHALATION) at 19:11

## 2024-04-13 RX ADMIN — PANCRELIPASE LIPASE, PANCRELIPASE PROTEASE, PANCRELIPASE AMYLASE 20000 UNITS: 20000; 63000; 84000 CAPSULE, DELAYED RELEASE ORAL at 17:33

## 2024-04-13 RX ADMIN — ATORVASTATIN CALCIUM 40 MG: 40 TABLET, FILM COATED ORAL at 21:08

## 2024-04-13 RX ADMIN — PANCRELIPASE LIPASE, PANCRELIPASE PROTEASE, PANCRELIPASE AMYLASE 15000 UNITS: 15000; 47000; 63000 CAPSULE, DELAYED RELEASE ORAL at 17:33

## 2024-04-13 RX ADMIN — PANCRELIPASE LIPASE, PANCRELIPASE PROTEASE, PANCRELIPASE AMYLASE 20000 UNITS: 20000; 63000; 84000 CAPSULE, DELAYED RELEASE ORAL at 11:26

## 2024-04-13 RX ADMIN — PANTOPRAZOLE SODIUM 40 MG: 40 TABLET, DELAYED RELEASE ORAL at 05:19

## 2024-04-13 ASSESSMENT — PAIN SCALES - GENERAL: PAINLEVEL_OUTOF10: 0

## 2024-04-13 NOTE — PLAN OF CARE
Problem: Safety - Adult  Goal: Free from fall injury  4/13/2024 1522 by Arely Jarquin, RN  Outcome: Progressing

## 2024-04-14 ENCOUNTER — APPOINTMENT (OUTPATIENT)
Dept: CT IMAGING | Age: 69
DRG: 189 | End: 2024-04-14
Payer: MEDICARE

## 2024-04-14 ENCOUNTER — APPOINTMENT (OUTPATIENT)
Dept: GENERAL RADIOLOGY | Age: 69
DRG: 189 | End: 2024-04-14
Payer: MEDICARE

## 2024-04-14 PROBLEM — J96.22 ACUTE ON CHRONIC RESPIRATORY FAILURE WITH HYPERCAPNIA (HCC): Status: ACTIVE | Noted: 2024-04-14

## 2024-04-14 LAB
AADO2: 100.3 MMHG
AADO2: 135.1 MMHG
AADO2: 80 MMHG
ALBUMIN SERPL-MCNC: 4.1 G/DL (ref 3.5–5.2)
ALBUMIN SERPL-MCNC: 4.5 G/DL (ref 3.5–5.2)
ALP SERPL-CCNC: 61 U/L (ref 35–104)
ALP SERPL-CCNC: 69 U/L (ref 35–104)
ALT SERPL-CCNC: 9 U/L (ref 0–32)
ALT SERPL-CCNC: 9 U/L (ref 0–32)
AMMONIA PLAS-SCNC: 86 UMOL/L (ref 11–51)
ANION GAP SERPL CALCULATED.3IONS-SCNC: 8 MMOL/L (ref 7–16)
ANION GAP SERPL CALCULATED.3IONS-SCNC: 8 MMOL/L (ref 7–16)
AST SERPL-CCNC: 13 U/L (ref 0–31)
AST SERPL-CCNC: 14 U/L (ref 0–31)
B PARAP IS1001 DNA NPH QL NAA+NON-PROBE: NOT DETECTED
B PERT DNA SPEC QL NAA+PROBE: NOT DETECTED
B.E.: 10.2 MMOL/L (ref -3–3)
B.E.: 10.8 MMOL/L (ref -3–3)
B.E.: 9.7 MMOL/L (ref -3–3)
B.E.: 9.9 MMOL/L (ref -3–3)
BASOPHILS # BLD: 0.03 K/UL (ref 0–0.2)
BASOPHILS NFR BLD: 0 % (ref 0–2)
BILIRUB DIRECT SERPL-MCNC: <0.2 MG/DL (ref 0–0.3)
BILIRUB INDIRECT SERPL-MCNC: NORMAL MG/DL (ref 0–1)
BILIRUB SERPL-MCNC: 0.2 MG/DL (ref 0–1.2)
BILIRUB SERPL-MCNC: <0.2 MG/DL (ref 0–1.2)
BNP SERPL-MCNC: 80 PG/ML (ref 0–125)
BUN SERPL-MCNC: 11 MG/DL (ref 6–23)
BUN SERPL-MCNC: 15 MG/DL (ref 6–23)
C PNEUM DNA NPH QL NAA+NON-PROBE: NOT DETECTED
CALCIUM SERPL-MCNC: 10.1 MG/DL (ref 8.6–10.2)
CALCIUM SERPL-MCNC: 9.7 MG/DL (ref 8.6–10.2)
CHLORIDE SERPL-SCNC: 93 MMOL/L (ref 98–107)
CHLORIDE SERPL-SCNC: 95 MMOL/L (ref 98–107)
CO2 SERPL-SCNC: 35 MMOL/L (ref 22–29)
CO2 SERPL-SCNC: 36 MMOL/L (ref 22–29)
COHB: 0.4 % (ref 0–1.5)
COHB: 0.4 % (ref 0–1.5)
COHB: 0.5 % (ref 0–1.5)
COHB: 0.7 % (ref 0–1.5)
CREAT SERPL-MCNC: 0.6 MG/DL (ref 0.5–1)
CREAT SERPL-MCNC: 0.7 MG/DL (ref 0.5–1)
CRITICAL: ABNORMAL
DATE ANALYZED: ABNORMAL
DATE OF COLLECTION: ABNORMAL
EOSINOPHIL # BLD: 0 K/UL (ref 0.05–0.5)
EOSINOPHILS RELATIVE PERCENT: 0 % (ref 0–6)
ERYTHROCYTE [DISTWIDTH] IN BLOOD BY AUTOMATED COUNT: 17.3 % (ref 11.5–15)
FIO2: 40 %
FIO2: 40 %
FIO2: 60 %
FLUAV RNA NPH QL NAA+NON-PROBE: NOT DETECTED
FLUBV RNA NPH QL NAA+NON-PROBE: NOT DETECTED
GFR SERPL CREATININE-BSD FRML MDRD: >90 ML/MIN/1.73M2
GFR SERPL CREATININE-BSD FRML MDRD: >90 ML/MIN/1.73M2
GLUCOSE SERPL-MCNC: 128 MG/DL (ref 74–99)
GLUCOSE SERPL-MCNC: 159 MG/DL (ref 74–99)
HADV DNA NPH QL NAA+NON-PROBE: NOT DETECTED
HCO3: 36.6 MMOL/L (ref 22–26)
HCO3: 37.9 MMOL/L (ref 22–26)
HCO3: 38.5 MMOL/L (ref 22–26)
HCO3: 38.7 MMOL/L (ref 22–26)
HCOV 229E RNA NPH QL NAA+NON-PROBE: NOT DETECTED
HCOV HKU1 RNA NPH QL NAA+NON-PROBE: NOT DETECTED
HCOV NL63 RNA NPH QL NAA+NON-PROBE: NOT DETECTED
HCOV OC43 RNA NPH QL NAA+NON-PROBE: NOT DETECTED
HCT VFR BLD AUTO: 32 % (ref 34–48)
HGB BLD-MCNC: 8.9 G/DL (ref 11.5–15.5)
HHB: 0.6 % (ref 0–5)
HHB: 1.8 % (ref 0–5)
HHB: 2 % (ref 0–5)
HHB: 3.4 % (ref 0–5)
HMPV RNA NPH QL NAA+NON-PROBE: NOT DETECTED
HPIV1 RNA NPH QL NAA+NON-PROBE: NOT DETECTED
HPIV2 RNA NPH QL NAA+NON-PROBE: NOT DETECTED
HPIV3 RNA NPH QL NAA+NON-PROBE: NOT DETECTED
HPIV4 RNA NPH QL NAA+NON-PROBE: NOT DETECTED
IMM GRANULOCYTES # BLD AUTO: 0.05 K/UL (ref 0–0.58)
IMM GRANULOCYTES NFR BLD: 1 % (ref 0–5)
LAB: ABNORMAL
LACTATE BLDV-SCNC: 1.3 MMOL/L (ref 0.5–2.2)
LYMPHOCYTES NFR BLD: 0.77 K/UL (ref 1.5–4)
LYMPHOCYTES RELATIVE PERCENT: 9 % (ref 20–42)
Lab: 1034
Lab: 1314
Lab: 1700
Lab: 2000
M PNEUMO DNA NPH QL NAA+NON-PROBE: NOT DETECTED
MAGNESIUM SERPL-MCNC: 2.1 MG/DL (ref 1.6–2.6)
MCH RBC QN AUTO: 23.3 PG (ref 26–35)
MCHC RBC AUTO-ENTMCNC: 27.8 G/DL (ref 32–34.5)
MCV RBC AUTO: 83.8 FL (ref 80–99.9)
METHB: 0.4 % (ref 0–1.5)
METHB: 0.4 % (ref 0–1.5)
METHB: 0.5 % (ref 0–1.5)
METHB: 0.5 % (ref 0–1.5)
MODE: ABNORMAL
MONOCYTES NFR BLD: 0.12 K/UL (ref 0.1–0.95)
MONOCYTES NFR BLD: 1 % (ref 2–12)
NEUTROPHILS NFR BLD: 89 % (ref 43–80)
NEUTS SEG NFR BLD: 7.64 K/UL (ref 1.8–7.3)
O2 CONTENT: 12.5 ML/DL
O2 CONTENT: 12.8 ML/DL
O2 CONTENT: 12.8 ML/DL
O2 CONTENT: 13.8 ML/DL
O2 SATURATION: 96.6 % (ref 92–98.5)
O2 SATURATION: 98 % (ref 92–98.5)
O2 SATURATION: 98.2 % (ref 92–98.5)
O2 SATURATION: 99.4 % (ref 92–98.5)
O2HB: 95.5 % (ref 94–97)
O2HB: 97.1 % (ref 94–97)
O2HB: 97.4 % (ref 94–97)
O2HB: 98.4 % (ref 94–97)
OPERATOR ID: 1210
OPERATOR ID: 1210
OPERATOR ID: 1768
OPERATOR ID: 1768
PATIENT TEMP: 37 C
PCO2: 64.4 MMHG (ref 35–45)
PCO2: 74.9 MMHG (ref 35–45)
PCO2: 75.5 MMHG (ref 35–45)
PCO2: 78 MMHG (ref 35–45)
PEEP/CPAP: 5 CMH2O
PEEP/CPAP: 6 CMH2O
PEEP/CPAP: 8 CMH2O
PFO2: 2.77 MMHG/%
PFO2: 2.96 MMHG/%
PFO2: 3.45 MMHG/%
PH BLOOD GAS: 7.31 (ref 7.35–7.45)
PH BLOOD GAS: 7.32 (ref 7.35–7.45)
PH BLOOD GAS: 7.33 (ref 7.35–7.45)
PH BLOOD GAS: 7.37 (ref 7.35–7.45)
PHOSPHATE SERPL-MCNC: 3.2 MG/DL (ref 2.5–4.5)
PLATELET # BLD AUTO: 217 K/UL (ref 130–450)
PMV BLD AUTO: 9.8 FL (ref 7–12)
PO2: 110.8 MMHG (ref 75–100)
PO2: 118.4 MMHG (ref 75–100)
PO2: 206.9 MMHG (ref 75–100)
PO2: 87.7 MMHG (ref 75–100)
POTASSIUM SERPL-SCNC: 4.4 MMOL/L (ref 3.5–5)
POTASSIUM SERPL-SCNC: 4.7 MMOL/L (ref 3.5–5)
PROCALCITONIN SERPL-MCNC: 0.04 NG/ML (ref 0–0.08)
PROT SERPL-MCNC: 6.6 G/DL (ref 6.4–8.3)
PROT SERPL-MCNC: 7.4 G/DL (ref 6.4–8.3)
PS: 20 CMH20
PS: 22 CMH20
PS: 22 CMH20
RBC # BLD AUTO: 3.82 M/UL (ref 3.5–5.5)
RBC # BLD: ABNORMAL 10*6/UL
RI(T): 0.65
RI(T): 0.68
RI(T): 0.91
RSV RNA NPH QL NAA+NON-PROBE: NOT DETECTED
RV+EV RNA NPH QL NAA+NON-PROBE: NOT DETECTED
SARS-COV-2 RNA NPH QL NAA+NON-PROBE: NOT DETECTED
SODIUM SERPL-SCNC: 137 MMOL/L (ref 132–146)
SODIUM SERPL-SCNC: 138 MMOL/L (ref 132–146)
SOURCE, BLOOD GAS: ABNORMAL
SPECIMEN DESCRIPTION: NORMAL
T4 FREE SERPL-MCNC: 1.1 NG/DL (ref 0.9–1.7)
THB: 9.2 G/DL (ref 11.5–16.5)
THB: 9.6 G/DL (ref 11.5–16.5)
TIME ANALYZED: 1035
TIME ANALYZED: 1317
TIME ANALYZED: 1705
TIME ANALYZED: 2001
TSH SERPL DL<=0.05 MIU/L-ACNC: 0.18 UIU/ML (ref 0.27–4.2)
WBC OTHER # BLD: 8.6 K/UL (ref 4.5–11.5)

## 2024-04-14 PROCEDURE — 2580000003 HC RX 258: Performed by: NURSE PRACTITIONER

## 2024-04-14 PROCEDURE — 6370000000 HC RX 637 (ALT 250 FOR IP): Performed by: STUDENT IN AN ORGANIZED HEALTH CARE EDUCATION/TRAINING PROGRAM

## 2024-04-14 PROCEDURE — 5A09457 ASSISTANCE WITH RESPIRATORY VENTILATION, 24-96 CONSECUTIVE HOURS, CONTINUOUS POSITIVE AIRWAY PRESSURE: ICD-10-PCS | Performed by: STUDENT IN AN ORGANIZED HEALTH CARE EDUCATION/TRAINING PROGRAM

## 2024-04-14 PROCEDURE — 87070 CULTURE OTHR SPECIMN AEROBIC: CPT

## 2024-04-14 PROCEDURE — 85025 COMPLETE CBC W/AUTO DIFF WBC: CPT

## 2024-04-14 PROCEDURE — 82805 BLOOD GASES W/O2 SATURATION: CPT

## 2024-04-14 PROCEDURE — 84443 ASSAY THYROID STIM HORMONE: CPT

## 2024-04-14 PROCEDURE — 83735 ASSAY OF MAGNESIUM: CPT

## 2024-04-14 PROCEDURE — 80053 COMPREHEN METABOLIC PANEL: CPT

## 2024-04-14 PROCEDURE — 83880 ASSAY OF NATRIURETIC PEPTIDE: CPT

## 2024-04-14 PROCEDURE — 2580000003 HC RX 258: Performed by: INTERNAL MEDICINE

## 2024-04-14 PROCEDURE — 6370000000 HC RX 637 (ALT 250 FOR IP): Performed by: INTERNAL MEDICINE

## 2024-04-14 PROCEDURE — 87449 NOS EACH ORGANISM AG IA: CPT

## 2024-04-14 PROCEDURE — 87205 SMEAR GRAM STAIN: CPT

## 2024-04-14 PROCEDURE — 84439 ASSAY OF FREE THYROXINE: CPT

## 2024-04-14 PROCEDURE — 36415 COLL VENOUS BLD VENIPUNCTURE: CPT

## 2024-04-14 PROCEDURE — 6360000002 HC RX W HCPCS: Performed by: INTERNAL MEDICINE

## 2024-04-14 PROCEDURE — 71250 CT THORAX DX C-: CPT

## 2024-04-14 PROCEDURE — 71045 X-RAY EXAM CHEST 1 VIEW: CPT

## 2024-04-14 PROCEDURE — 87077 CULTURE AEROBIC IDENTIFY: CPT

## 2024-04-14 PROCEDURE — 2000000000 HC ICU R&B

## 2024-04-14 PROCEDURE — 87899 AGENT NOS ASSAY W/OPTIC: CPT

## 2024-04-14 PROCEDURE — 84145 PROCALCITONIN (PCT): CPT

## 2024-04-14 PROCEDURE — 6370000000 HC RX 637 (ALT 250 FOR IP): Performed by: NURSE PRACTITIONER

## 2024-04-14 PROCEDURE — 0202U NFCT DS 22 TRGT SARS-COV-2: CPT

## 2024-04-14 PROCEDURE — 83605 ASSAY OF LACTIC ACID: CPT

## 2024-04-14 PROCEDURE — 6360000002 HC RX W HCPCS: Performed by: NURSE PRACTITIONER

## 2024-04-14 PROCEDURE — 82248 BILIRUBIN DIRECT: CPT

## 2024-04-14 PROCEDURE — 2700000000 HC OXYGEN THERAPY PER DAY

## 2024-04-14 PROCEDURE — 36600 WITHDRAWAL OF ARTERIAL BLOOD: CPT

## 2024-04-14 PROCEDURE — 94640 AIRWAY INHALATION TREATMENT: CPT

## 2024-04-14 PROCEDURE — 87081 CULTURE SCREEN ONLY: CPT

## 2024-04-14 PROCEDURE — 94660 CPAP INITIATION&MGMT: CPT

## 2024-04-14 PROCEDURE — 84100 ASSAY OF PHOSPHORUS: CPT

## 2024-04-14 PROCEDURE — 82140 ASSAY OF AMMONIA: CPT

## 2024-04-14 RX ORDER — IPRATROPIUM BROMIDE AND ALBUTEROL SULFATE 2.5; .5 MG/3ML; MG/3ML
1 SOLUTION RESPIRATORY (INHALATION)
Status: DISCONTINUED | OUTPATIENT
Start: 2024-04-14 | End: 2024-04-16 | Stop reason: HOSPADM

## 2024-04-14 RX ADMIN — PANCRELIPASE LIPASE, PANCRELIPASE PROTEASE, PANCRELIPASE AMYLASE 20000 UNITS: 20000; 63000; 84000 CAPSULE, DELAYED RELEASE ORAL at 11:44

## 2024-04-14 RX ADMIN — BUDESONIDE 500 MCG: 0.5 INHALANT RESPIRATORY (INHALATION) at 21:13

## 2024-04-14 RX ADMIN — SODIUM CHLORIDE, PRESERVATIVE FREE 10 ML: 5 INJECTION INTRAVENOUS at 23:10

## 2024-04-14 RX ADMIN — FOLIC ACID 1 MG: 1 TABLET ORAL at 09:10

## 2024-04-14 RX ADMIN — LACTULOSE 20 G: 20 SOLUTION ORAL at 09:09

## 2024-04-14 RX ADMIN — ENOXAPARIN SODIUM 40 MG: 100 INJECTION SUBCUTANEOUS at 09:11

## 2024-04-14 RX ADMIN — PANCRELIPASE LIPASE, PANCRELIPASE PROTEASE, PANCRELIPASE AMYLASE 15000 UNITS: 15000; 47000; 63000 CAPSULE, DELAYED RELEASE ORAL at 09:10

## 2024-04-14 RX ADMIN — ACETAMINOPHEN 650 MG: 325 TABLET ORAL at 09:10

## 2024-04-14 RX ADMIN — ASPIRIN 81 MG: 81 TABLET, CHEWABLE ORAL at 09:10

## 2024-04-14 RX ADMIN — SODIUM CHLORIDE, PRESERVATIVE FREE 10 ML: 5 INJECTION INTRAVENOUS at 09:11

## 2024-04-14 RX ADMIN — ARFORMOTEROL TARTRATE 15 MCG: 15 SOLUTION RESPIRATORY (INHALATION) at 08:48

## 2024-04-14 RX ADMIN — PANCRELIPASE LIPASE, PANCRELIPASE PROTEASE, PANCRELIPASE AMYLASE 20000 UNITS: 20000; 63000; 84000 CAPSULE, DELAYED RELEASE ORAL at 09:10

## 2024-04-14 RX ADMIN — Medication 1 TABLET: at 09:09

## 2024-04-14 RX ADMIN — PANCRELIPASE LIPASE, PANCRELIPASE PROTEASE, PANCRELIPASE AMYLASE 15000 UNITS: 15000; 47000; 63000 CAPSULE, DELAYED RELEASE ORAL at 11:44

## 2024-04-14 RX ADMIN — IPRATROPIUM BROMIDE AND ALBUTEROL SULFATE 1 DOSE: 2.5; .5 SOLUTION RESPIRATORY (INHALATION) at 21:13

## 2024-04-14 RX ADMIN — PANTOPRAZOLE SODIUM 40 MG: 40 TABLET, DELAYED RELEASE ORAL at 09:10

## 2024-04-14 RX ADMIN — WATER 40 MG: 1 INJECTION INTRAMUSCULAR; INTRAVENOUS; SUBCUTANEOUS at 23:08

## 2024-04-14 RX ADMIN — WATER 40 MG: 1 INJECTION INTRAMUSCULAR; INTRAVENOUS; SUBCUTANEOUS at 11:44

## 2024-04-14 RX ADMIN — ALBUTEROL SULFATE 2.5 MG: 2.5 SOLUTION RESPIRATORY (INHALATION) at 17:32

## 2024-04-14 RX ADMIN — ARFORMOTEROL TARTRATE 15 MCG: 15 SOLUTION RESPIRATORY (INHALATION) at 21:13

## 2024-04-14 RX ADMIN — BUDESONIDE 500 MCG: 0.5 INHALANT RESPIRATORY (INHALATION) at 08:48

## 2024-04-14 ASSESSMENT — PAIN SCALES - GENERAL: PAINLEVEL_OUTOF10: 0

## 2024-04-14 NOTE — PLAN OF CARE
Problem: Safety - Adult  Goal: Free from fall injury  4/14/2024 0302 by Jada Delgado, RN  Outcome: Progressing  4/13/2024 1522 by Arely Jarquin, RN  Outcome: Progressing

## 2024-04-15 ENCOUNTER — APPOINTMENT (OUTPATIENT)
Dept: GENERAL RADIOLOGY | Age: 69
DRG: 189 | End: 2024-04-15
Payer: MEDICARE

## 2024-04-15 LAB
AADO2: 61.2 MMHG
AMMONIA PLAS-SCNC: 19 UMOL/L (ref 11–51)
ANION GAP SERPL CALCULATED.3IONS-SCNC: 7 MMOL/L (ref 7–16)
B.E.: 8.4 MMOL/L (ref -3–3)
BASOPHILS # BLD: 0 K/UL (ref 0–0.2)
BASOPHILS NFR BLD: 0 % (ref 0–2)
BUN SERPL-MCNC: 19 MG/DL (ref 6–23)
CALCIUM SERPL-MCNC: 9.7 MG/DL (ref 8.6–10.2)
CHLORIDE SERPL-SCNC: 94 MMOL/L (ref 98–107)
CO2 SERPL-SCNC: 36 MMOL/L (ref 22–29)
COHB: 0.5 % (ref 0–1.5)
CREAT SERPL-MCNC: 0.5 MG/DL (ref 0.5–1)
CRITICAL: ABNORMAL
DATE ANALYZED: ABNORMAL
DATE OF COLLECTION: ABNORMAL
EOSINOPHIL # BLD: 0 K/UL (ref 0.05–0.5)
EOSINOPHILS RELATIVE PERCENT: 0 % (ref 0–6)
ERYTHROCYTE [DISTWIDTH] IN BLOOD BY AUTOMATED COUNT: 17.3 % (ref 11.5–15)
FIO2: 35 %
GFR SERPL CREATININE-BSD FRML MDRD: >90 ML/MIN/1.73M2
GLUCOSE SERPL-MCNC: 178 MG/DL (ref 74–99)
HCO3: 34.3 MMOL/L (ref 22–26)
HCT VFR BLD AUTO: 27.5 % (ref 34–48)
HGB BLD-MCNC: 7.9 G/DL (ref 11.5–15.5)
HHB: 1.6 % (ref 0–5)
L PNEUMO1 AG UR QL IA.RAPID: NEGATIVE
LAB: ABNORMAL
LYMPHOCYTES NFR BLD: 0.23 K/UL (ref 1.5–4)
LYMPHOCYTES RELATIVE PERCENT: 4 % (ref 20–42)
Lab: 530
MCH RBC QN AUTO: 23.5 PG (ref 26–35)
MCHC RBC AUTO-ENTMCNC: 28.7 G/DL (ref 32–34.5)
MCV RBC AUTO: 81.8 FL (ref 80–99.9)
METHB: 0.3 % (ref 0–1.5)
MODE: ABNORMAL
MONOCYTES NFR BLD: 0 % (ref 2–12)
MONOCYTES NFR BLD: 0 K/UL (ref 0.1–0.95)
NEUTROPHILS NFR BLD: 97 % (ref 43–80)
NEUTS SEG NFR BLD: 6.27 K/UL (ref 1.8–7.3)
O2 CONTENT: 11.9 ML/DL
O2 SATURATION: 98.4 % (ref 92–98.5)
O2HB: 97.6 % (ref 94–97)
OPERATOR ID: 2962
PATIENT TEMP: 37 C
PCO2: 55.8 MMHG (ref 35–45)
PEEP/CPAP: 5 CMH2O
PFO2: 3.53 MMHG/%
PH BLOOD GAS: 7.41 (ref 7.35–7.45)
PLATELET # BLD AUTO: 206 K/UL (ref 130–450)
PMV BLD AUTO: 10.1 FL (ref 7–12)
PO2: 123.5 MMHG (ref 75–100)
POTASSIUM SERPL-SCNC: 4.5 MMOL/L (ref 3.5–5)
PS: 18 CMH20
RBC # BLD AUTO: 3.36 M/UL (ref 3.5–5.5)
RBC # BLD: ABNORMAL 10*6/UL
RI(T): 0.5
S PNEUM AG SPEC QL: NEGATIVE
SODIUM SERPL-SCNC: 137 MMOL/L (ref 132–146)
SOURCE, BLOOD GAS: ABNORMAL
SPECIMEN SOURCE: NORMAL
THB: 8.5 G/DL (ref 11.5–16.5)
TIME ANALYZED: 537
WBC OTHER # BLD: 6.5 K/UL (ref 4.5–11.5)

## 2024-04-15 PROCEDURE — 6360000002 HC RX W HCPCS: Performed by: NURSE PRACTITIONER

## 2024-04-15 PROCEDURE — 94660 CPAP INITIATION&MGMT: CPT

## 2024-04-15 PROCEDURE — 6370000000 HC RX 637 (ALT 250 FOR IP): Performed by: NURSE PRACTITIONER

## 2024-04-15 PROCEDURE — 6360000002 HC RX W HCPCS: Performed by: INTERNAL MEDICINE

## 2024-04-15 PROCEDURE — 6370000000 HC RX 637 (ALT 250 FOR IP): Performed by: INTERNAL MEDICINE

## 2024-04-15 PROCEDURE — 2580000003 HC RX 258: Performed by: INTERNAL MEDICINE

## 2024-04-15 PROCEDURE — 2700000000 HC OXYGEN THERAPY PER DAY

## 2024-04-15 PROCEDURE — 2580000003 HC RX 258: Performed by: STUDENT IN AN ORGANIZED HEALTH CARE EDUCATION/TRAINING PROGRAM

## 2024-04-15 PROCEDURE — 82805 BLOOD GASES W/O2 SATURATION: CPT

## 2024-04-15 PROCEDURE — 2580000003 HC RX 258: Performed by: NURSE PRACTITIONER

## 2024-04-15 PROCEDURE — 6360000002 HC RX W HCPCS: Performed by: STUDENT IN AN ORGANIZED HEALTH CARE EDUCATION/TRAINING PROGRAM

## 2024-04-15 PROCEDURE — 85025 COMPLETE CBC W/AUTO DIFF WBC: CPT

## 2024-04-15 PROCEDURE — 99233 SBSQ HOSP IP/OBS HIGH 50: CPT | Performed by: INTERNAL MEDICINE

## 2024-04-15 PROCEDURE — 97530 THERAPEUTIC ACTIVITIES: CPT

## 2024-04-15 PROCEDURE — 6370000000 HC RX 637 (ALT 250 FOR IP): Performed by: STUDENT IN AN ORGANIZED HEALTH CARE EDUCATION/TRAINING PROGRAM

## 2024-04-15 PROCEDURE — 97162 PT EVAL MOD COMPLEX 30 MIN: CPT

## 2024-04-15 PROCEDURE — 71045 X-RAY EXAM CHEST 1 VIEW: CPT

## 2024-04-15 PROCEDURE — 2060000000 HC ICU INTERMEDIATE R&B

## 2024-04-15 PROCEDURE — 80048 BASIC METABOLIC PNL TOTAL CA: CPT

## 2024-04-15 PROCEDURE — 97535 SELF CARE MNGMENT TRAINING: CPT

## 2024-04-15 PROCEDURE — 82140 ASSAY OF AMMONIA: CPT

## 2024-04-15 PROCEDURE — 94640 AIRWAY INHALATION TREATMENT: CPT

## 2024-04-15 PROCEDURE — 97166 OT EVAL MOD COMPLEX 45 MIN: CPT

## 2024-04-15 RX ADMIN — PANCRELIPASE LIPASE, PANCRELIPASE PROTEASE, PANCRELIPASE AMYLASE 20000 UNITS: 20000; 63000; 84000 CAPSULE, DELAYED RELEASE ORAL at 12:01

## 2024-04-15 RX ADMIN — IPRATROPIUM BROMIDE AND ALBUTEROL SULFATE 1 DOSE: 2.5; .5 SOLUTION RESPIRATORY (INHALATION) at 08:35

## 2024-04-15 RX ADMIN — PANTOPRAZOLE SODIUM 40 MG: 40 TABLET, DELAYED RELEASE ORAL at 08:29

## 2024-04-15 RX ADMIN — BUDESONIDE 500 MCG: 0.5 INHALANT RESPIRATORY (INHALATION) at 08:35

## 2024-04-15 RX ADMIN — PANCRELIPASE LIPASE, PANCRELIPASE PROTEASE, PANCRELIPASE AMYLASE 15000 UNITS: 15000; 47000; 63000 CAPSULE, DELAYED RELEASE ORAL at 12:01

## 2024-04-15 RX ADMIN — PANCRELIPASE LIPASE, PANCRELIPASE PROTEASE, PANCRELIPASE AMYLASE 15000 UNITS: 15000; 47000; 63000 CAPSULE, DELAYED RELEASE ORAL at 08:20

## 2024-04-15 RX ADMIN — ENOXAPARIN SODIUM 40 MG: 100 INJECTION SUBCUTANEOUS at 10:15

## 2024-04-15 RX ADMIN — WATER 40 MG: 1 INJECTION INTRAMUSCULAR; INTRAVENOUS; SUBCUTANEOUS at 05:39

## 2024-04-15 RX ADMIN — ARFORMOTEROL TARTRATE 15 MCG: 15 SOLUTION RESPIRATORY (INHALATION) at 08:35

## 2024-04-15 RX ADMIN — SODIUM CHLORIDE, PRESERVATIVE FREE 10 ML: 5 INJECTION INTRAVENOUS at 10:15

## 2024-04-15 RX ADMIN — IPRATROPIUM BROMIDE AND ALBUTEROL SULFATE 1 DOSE: 2.5; .5 SOLUTION RESPIRATORY (INHALATION) at 12:27

## 2024-04-15 RX ADMIN — IPRATROPIUM BROMIDE AND ALBUTEROL SULFATE 1 DOSE: 2.5; .5 SOLUTION RESPIRATORY (INHALATION) at 20:39

## 2024-04-15 RX ADMIN — WATER 40 MG: 1 INJECTION INTRAMUSCULAR; INTRAVENOUS; SUBCUTANEOUS at 10:15

## 2024-04-15 RX ADMIN — FOLIC ACID 1 MG: 1 TABLET ORAL at 10:14

## 2024-04-15 RX ADMIN — Medication 1 TABLET: at 08:21

## 2024-04-15 RX ADMIN — PANCRELIPASE LIPASE, PANCRELIPASE PROTEASE, PANCRELIPASE AMYLASE 20000 UNITS: 20000; 63000; 84000 CAPSULE, DELAYED RELEASE ORAL at 18:05

## 2024-04-15 RX ADMIN — WATER 40 MG: 1 INJECTION INTRAMUSCULAR; INTRAVENOUS; SUBCUTANEOUS at 18:12

## 2024-04-15 RX ADMIN — PANCRELIPASE LIPASE, PANCRELIPASE PROTEASE, PANCRELIPASE AMYLASE 20000 UNITS: 20000; 63000; 84000 CAPSULE, DELAYED RELEASE ORAL at 08:20

## 2024-04-15 RX ADMIN — AZITHROMYCIN MONOHYDRATE 500 MG: 500 INJECTION, POWDER, LYOPHILIZED, FOR SOLUTION INTRAVENOUS at 21:59

## 2024-04-15 RX ADMIN — GABAPENTIN 300 MG: 300 CAPSULE ORAL at 20:33

## 2024-04-15 RX ADMIN — SERTRALINE HYDROCHLORIDE 50 MG: 50 TABLET ORAL at 20:32

## 2024-04-15 RX ADMIN — IPRATROPIUM BROMIDE AND ALBUTEROL SULFATE 1 DOSE: 2.5; .5 SOLUTION RESPIRATORY (INHALATION) at 15:45

## 2024-04-15 RX ADMIN — QUETIAPINE FUMARATE 50 MG: 50 TABLET, EXTENDED RELEASE ORAL at 20:32

## 2024-04-15 RX ADMIN — PANCRELIPASE LIPASE, PANCRELIPASE PROTEASE, PANCRELIPASE AMYLASE 15000 UNITS: 15000; 47000; 63000 CAPSULE, DELAYED RELEASE ORAL at 18:05

## 2024-04-15 RX ADMIN — ATORVASTATIN CALCIUM 40 MG: 40 TABLET, FILM COATED ORAL at 20:33

## 2024-04-15 RX ADMIN — AZITHROMYCIN MONOHYDRATE 500 MG: 500 INJECTION, POWDER, LYOPHILIZED, FOR SOLUTION INTRAVENOUS at 01:15

## 2024-04-15 RX ADMIN — OLANZAPINE 5 MG: 5 TABLET, FILM COATED ORAL at 20:32

## 2024-04-15 RX ADMIN — LACTULOSE 20 G: 20 SOLUTION ORAL at 10:14

## 2024-04-15 RX ADMIN — ASPIRIN 81 MG: 81 TABLET, CHEWABLE ORAL at 10:14

## 2024-04-15 RX ADMIN — BUDESONIDE 500 MCG: 0.5 INHALANT RESPIRATORY (INHALATION) at 20:39

## 2024-04-15 RX ADMIN — LACTULOSE 20 G: 20 SOLUTION ORAL at 20:33

## 2024-04-15 RX ADMIN — SODIUM CHLORIDE, PRESERVATIVE FREE 10 ML: 5 INJECTION INTRAVENOUS at 20:33

## 2024-04-15 RX ADMIN — ARFORMOTEROL TARTRATE 15 MCG: 15 SOLUTION RESPIRATORY (INHALATION) at 20:39

## 2024-04-15 ASSESSMENT — PAIN SCALES - GENERAL
PAINLEVEL_OUTOF10: 0

## 2024-04-15 NOTE — PLAN OF CARE
Problem: Discharge Planning  Goal: Discharge to home or other facility with appropriate resources  4/15/2024 0754 by Karma Amaya RN  Outcome: Not Progressing  4/15/2024 0551 by Irais Kinney RN  Outcome: Progressing     Problem: Safety - Adult  Goal: Free from fall injury  4/15/2024 0754 by Karma Amaya RN  Outcome: Progressing  4/15/2024 0551 by Irais Kinney RN  Outcome: Progressing     Problem: Chronic Conditions and Co-morbidities  Goal: Patient's chronic conditions and co-morbidity symptoms are monitored and maintained or improved  4/15/2024 0754 by Karma Amaya RN  Outcome: Progressing  4/15/2024 0551 by Irais Kinney RN  Outcome: Progressing     Problem: Skin/Tissue Integrity  Goal: Absence of new skin breakdown  Description: 1.  Monitor for areas of redness and/or skin breakdown  2.  Assess vascular access sites hourly  3.  Every 4-6 hours minimum:  Change oxygen saturation probe site  4.  Every 4-6 hours:  If on nasal continuous positive airway pressure, respiratory therapy assess nares and determine need for appliance change or resting period.  4/15/2024 0754 by Karma Amaya RN  Outcome: Progressing  4/15/2024 0551 by Irais Kinney RN  Outcome: Progressing     Problem: Discharge Planning  Goal: Discharge to home or other facility with appropriate resources  4/15/2024 0754 by Karma Amaya RN  Outcome: Not Progressing  4/15/2024 0551 by Irais Kinney RN  Outcome: Progressing

## 2024-04-15 NOTE — PLAN OF CARE
Problem: Safety - Adult  Goal: Free from fall injury  4/15/2024 1703 by Devi Cordova RN  Outcome: Progressing  Flowsheets (Taken 4/15/2024 0757 by Karma Amaya RN)  Free From Fall Injury: Instruct family/caregiver on patient safety  4/15/2024 0754 by Karma Amaya RN  Outcome: Progressing  4/15/2024 0551 by Irais Kinney RN  Outcome: Progressing     Problem: Chronic Conditions and Co-morbidities  Goal: Patient's chronic conditions and co-morbidity symptoms are monitored and maintained or improved  4/15/2024 1703 by Devi Cordova RN  Outcome: Progressing  4/15/2024 0754 by Karma Amaya RN  Outcome: Progressing  4/15/2024 0551 by Irais Kinney RN  Outcome: Progressing     Problem: Discharge Planning  Goal: Discharge to home or other facility with appropriate resources  4/15/2024 1703 by Devi Cordova RN  Outcome: Progressing  4/15/2024 0754 by Karma Amaya RN  Outcome: Not Progressing  4/15/2024 0551 by Irais Kinney RN  Outcome: Progressing     Problem: Skin/Tissue Integrity  Goal: Absence of new skin breakdown  Description: 1.  Monitor for areas of redness and/or skin breakdown  2.  Assess vascular access sites hourly  3.  Every 4-6 hours minimum:  Change oxygen saturation probe site  4.  Every 4-6 hours:  If on nasal continuous positive airway pressure, respiratory therapy assess nares and determine need for appliance change or resting period.  4/15/2024 0754 by Karma Aamya RN  Outcome: Progressing  4/15/2024 0551 by Irais Kinney RN  Outcome: Progressing     Problem: Discharge Planning  Goal: Discharge to home or other facility with appropriate resources  4/15/2024 1703 by Devi Cordova RN  Outcome: Progressing  4/15/2024 0754 by Karma Amaya RN  Outcome: Not Progressing  4/15/2024 0551 by Irais Kinney RN  Outcome: Progressing

## 2024-04-15 NOTE — PLAN OF CARE
Problem: Safety - Adult  Goal: Free from fall injury  Outcome: Progressing     Problem: Chronic Conditions and Co-morbidities  Goal: Patient's chronic conditions and co-morbidity symptoms are monitored and maintained or improved  Outcome: Progressing     Problem: Discharge Planning  Goal: Discharge to home or other facility with appropriate resources  Outcome: Progressing     Problem: Skin/Tissue Integrity  Goal: Absence of new skin breakdown  Description: 1.  Monitor for areas of redness and/or skin breakdown  2.  Assess vascular access sites hourly  3.  Every 4-6 hours minimum:  Change oxygen saturation probe site  4.  Every 4-6 hours:  If on nasal continuous positive airway pressure, respiratory therapy assess nares and determine need for appliance change or resting period.  Outcome: Progressing

## 2024-04-15 NOTE — CARE COORDINATION
Care Coordination: Tx from 82 on 4/14/24 for acute on chronic hypercapnic respiratory failure. Currently on 4 ltr nc.  Has transfer orders. Per previous CM, pt lives alone at home She has walker and home 02 3 ltr through [employer]. . Will need WC transport home, ambulette form completed.    Electronically signed by Marcia Gramajo RN on 4/15/2024 at 2:22 PM

## 2024-04-15 NOTE — DISCHARGE INSTRUCTIONS
Foods with carbohydrates make your blood glucose level go up. Learning how to count carbohydrates can help you control your blood glucose levels. First, identify the foods you eat that contain carbohydrates. Then, using the Foods with Carbohydrates chart, determine about how much carbohydrates are in your meals and snacks. Make sure you are eating foods with fiber, protein, and healthy fat along with your carbohydrate foods.      Foods with Carbohydrates  The following table shows carbohydrate foods that have about 15 grams of carbohydrate each. Using measuring cups, spoons, or a food scale when you first begin learning about carbohydrate counting can help you learn about the portion sizes you typically eat.  The following foods have 15 grams carbohydrate each:  Grains  1 slice bread (1 ounce)  1 small tortilla (6-inch size)  ¼ large bagel (1 ounce)  1/3 cup pasta or rice (cooked)  ½ hamburger or hot dog bun (¾ ounce)  ½ cup cooked cereal  ½ to ¾ cup ready-to-eat cereal  2 taco shells (5-inch size) Fruit  1 small fresh fruit (¾ to 1 cup)  ½ medium banana  17 small grapes (3 ounces)  1 cup melon or berries  ½ cup canned or frozen fruit  2 tablespoons dried fruit (blueberries, cherries, cranberries, raisins)  ½ cup unsweetened fruit juice   Starchy Vegetables  ½ cup cooked beans, peas, corn, potatoes/sweet potatoes  ¼ large baked potato (3 ounces)  1 cup acorn or butternut squash Snack Foods  3 to 6 crackers  8 potato chips or 13 tortilla chips (¾ ounce to 1 ounce)  3 cups popped popcorn   Dairy  3/4 cup (6 ounces) nonfat plain yogurt, or yogurt with sugar-free sweetener  1 cup milk  1 cup plain rice, soy, coconut or flavored almond milk Sweets and Desserts  ½ cup ice cream or frozen yogurt  1 tablespoon jam, jelly, pancake syrup, table sugar, or honey  2 tablespoons light pancake syrup  1 inch square of frosted cake or 2 inch square of unfrosted cake  2 small cookies (2/3 ounce each) or ¼ large cookie   Sometimes

## 2024-04-16 ENCOUNTER — APPOINTMENT (OUTPATIENT)
Dept: GENERAL RADIOLOGY | Age: 69
DRG: 189 | End: 2024-04-16
Payer: MEDICARE

## 2024-04-16 VITALS
HEIGHT: 67 IN | SYSTOLIC BLOOD PRESSURE: 145 MMHG | TEMPERATURE: 97.3 F | BODY MASS INDEX: 23.01 KG/M2 | HEART RATE: 83 BPM | WEIGHT: 146.6 LBS | DIASTOLIC BLOOD PRESSURE: 64 MMHG | RESPIRATION RATE: 16 BRPM | OXYGEN SATURATION: 100 %

## 2024-04-16 LAB
ANION GAP SERPL CALCULATED.3IONS-SCNC: 9 MMOL/L (ref 7–16)
BASOPHILS # BLD: 0 K/UL (ref 0–0.2)
BASOPHILS NFR BLD: 0 % (ref 0–2)
BUN SERPL-MCNC: 21 MG/DL (ref 6–23)
CALCIUM SERPL-MCNC: 10 MG/DL (ref 8.6–10.2)
CHLORIDE SERPL-SCNC: 99 MMOL/L (ref 98–107)
CO2 SERPL-SCNC: 33 MMOL/L (ref 22–29)
CREAT SERPL-MCNC: 0.6 MG/DL (ref 0.5–1)
EOSINOPHIL # BLD: 0 K/UL (ref 0.05–0.5)
EOSINOPHILS RELATIVE PERCENT: 0 % (ref 0–6)
ERYTHROCYTE [DISTWIDTH] IN BLOOD BY AUTOMATED COUNT: 18 % (ref 11.5–15)
GFR SERPL CREATININE-BSD FRML MDRD: >90 ML/MIN/1.73M2
GLUCOSE SERPL-MCNC: 180 MG/DL (ref 74–99)
HCT VFR BLD AUTO: 27 % (ref 34–48)
HGB BLD-MCNC: 8 G/DL (ref 11.5–15.5)
LYMPHOCYTES NFR BLD: 0.13 K/UL (ref 1.5–4)
LYMPHOCYTES RELATIVE PERCENT: 2 % (ref 20–42)
MCH RBC QN AUTO: 23.9 PG (ref 26–35)
MCHC RBC AUTO-ENTMCNC: 29.6 G/DL (ref 32–34.5)
MCV RBC AUTO: 80.6 FL (ref 80–99.9)
MICROORGANISM SPEC CULT: ABNORMAL
MICROORGANISM SPEC CULT: NORMAL
MICROORGANISM/AGENT SPEC: ABNORMAL
MONOCYTES NFR BLD: 0.19 K/UL (ref 0.1–0.95)
MONOCYTES NFR BLD: 3 % (ref 2–12)
NEUTROPHILS NFR BLD: 96 % (ref 43–80)
NEUTS SEG NFR BLD: 7.08 K/UL (ref 1.8–7.3)
NUCLEATED RED BLOOD CELLS: 2 PER 100 WBC
PLATELET # BLD AUTO: 235 K/UL (ref 130–450)
PMV BLD AUTO: 10 FL (ref 7–12)
POTASSIUM SERPL-SCNC: 4.6 MMOL/L (ref 3.5–5)
RBC # BLD AUTO: 3.35 M/UL (ref 3.5–5.5)
RBC # BLD: ABNORMAL 10*6/UL
SODIUM SERPL-SCNC: 141 MMOL/L (ref 132–146)
SPECIMEN DESCRIPTION: ABNORMAL
SPECIMEN DESCRIPTION: NORMAL
WBC OTHER # BLD: 7.4 K/UL (ref 4.5–11.5)

## 2024-04-16 PROCEDURE — 71045 X-RAY EXAM CHEST 1 VIEW: CPT

## 2024-04-16 PROCEDURE — 6370000000 HC RX 637 (ALT 250 FOR IP): Performed by: NURSE PRACTITIONER

## 2024-04-16 PROCEDURE — 85025 COMPLETE CBC W/AUTO DIFF WBC: CPT

## 2024-04-16 PROCEDURE — 80048 BASIC METABOLIC PNL TOTAL CA: CPT

## 2024-04-16 PROCEDURE — 2580000003 HC RX 258: Performed by: NURSE PRACTITIONER

## 2024-04-16 PROCEDURE — 2700000000 HC OXYGEN THERAPY PER DAY

## 2024-04-16 PROCEDURE — 94660 CPAP INITIATION&MGMT: CPT

## 2024-04-16 PROCEDURE — 36415 COLL VENOUS BLD VENIPUNCTURE: CPT

## 2024-04-16 PROCEDURE — 2580000003 HC RX 258: Performed by: INTERNAL MEDICINE

## 2024-04-16 PROCEDURE — 6360000002 HC RX W HCPCS: Performed by: NURSE PRACTITIONER

## 2024-04-16 PROCEDURE — 94640 AIRWAY INHALATION TREATMENT: CPT

## 2024-04-16 PROCEDURE — 6360000002 HC RX W HCPCS: Performed by: INTERNAL MEDICINE

## 2024-04-16 PROCEDURE — 6370000000 HC RX 637 (ALT 250 FOR IP): Performed by: STUDENT IN AN ORGANIZED HEALTH CARE EDUCATION/TRAINING PROGRAM

## 2024-04-16 RX ORDER — LACTULOSE 10 G/15ML
20 SOLUTION ORAL NIGHTLY
Qty: 900 EACH | Refills: 1 | Status: SHIPPED | OUTPATIENT
Start: 2024-04-16

## 2024-04-16 RX ORDER — ASPIRIN 81 MG/1
81 TABLET, CHEWABLE ORAL DAILY
Qty: 30 TABLET | Refills: 3 | Status: SHIPPED | OUTPATIENT
Start: 2024-04-17

## 2024-04-16 RX ORDER — ATORVASTATIN CALCIUM 40 MG/1
40 TABLET, FILM COATED ORAL NIGHTLY
Qty: 30 TABLET | Refills: 3 | Status: SHIPPED | OUTPATIENT
Start: 2024-04-16

## 2024-04-16 RX ORDER — PREDNISONE 10 MG/1
TABLET ORAL
Qty: 30 TABLET | Refills: 0 | Status: SHIPPED | OUTPATIENT
Start: 2024-04-16

## 2024-04-16 RX ADMIN — IPRATROPIUM BROMIDE AND ALBUTEROL SULFATE 1 DOSE: 2.5; .5 SOLUTION RESPIRATORY (INHALATION) at 13:28

## 2024-04-16 RX ADMIN — WATER 40 MG: 1 INJECTION INTRAMUSCULAR; INTRAVENOUS; SUBCUTANEOUS at 01:50

## 2024-04-16 RX ADMIN — ENOXAPARIN SODIUM 40 MG: 100 INJECTION SUBCUTANEOUS at 08:56

## 2024-04-16 RX ADMIN — PANCRELIPASE LIPASE, PANCRELIPASE PROTEASE, PANCRELIPASE AMYLASE 20000 UNITS: 20000; 63000; 84000 CAPSULE, DELAYED RELEASE ORAL at 08:57

## 2024-04-16 RX ADMIN — SODIUM CHLORIDE, PRESERVATIVE FREE 10 ML: 5 INJECTION INTRAVENOUS at 08:57

## 2024-04-16 RX ADMIN — ASPIRIN 81 MG: 81 TABLET, CHEWABLE ORAL at 08:57

## 2024-04-16 RX ADMIN — Medication 1 TABLET: at 08:57

## 2024-04-16 RX ADMIN — PANCRELIPASE LIPASE, PANCRELIPASE PROTEASE, PANCRELIPASE AMYLASE 15000 UNITS: 15000; 47000; 63000 CAPSULE, DELAYED RELEASE ORAL at 12:02

## 2024-04-16 RX ADMIN — FOLIC ACID 1 MG: 1 TABLET ORAL at 08:57

## 2024-04-16 RX ADMIN — IPRATROPIUM BROMIDE AND ALBUTEROL SULFATE 1 DOSE: 2.5; .5 SOLUTION RESPIRATORY (INHALATION) at 09:37

## 2024-04-16 RX ADMIN — PANCRELIPASE LIPASE, PANCRELIPASE PROTEASE, PANCRELIPASE AMYLASE 20000 UNITS: 20000; 63000; 84000 CAPSULE, DELAYED RELEASE ORAL at 12:02

## 2024-04-16 RX ADMIN — BUDESONIDE 500 MCG: 0.5 INHALANT RESPIRATORY (INHALATION) at 09:37

## 2024-04-16 RX ADMIN — PANTOPRAZOLE SODIUM 40 MG: 40 TABLET, DELAYED RELEASE ORAL at 05:09

## 2024-04-16 RX ADMIN — PANCRELIPASE LIPASE, PANCRELIPASE PROTEASE, PANCRELIPASE AMYLASE 15000 UNITS: 15000; 47000; 63000 CAPSULE, DELAYED RELEASE ORAL at 08:57

## 2024-04-16 RX ADMIN — ARFORMOTEROL TARTRATE 15 MCG: 15 SOLUTION RESPIRATORY (INHALATION) at 09:37

## 2024-04-16 RX ADMIN — SODIUM CHLORIDE, PRESERVATIVE FREE 10 ML: 5 INJECTION INTRAVENOUS at 12:04

## 2024-04-16 RX ADMIN — WATER 40 MG: 1 INJECTION INTRAMUSCULAR; INTRAVENOUS; SUBCUTANEOUS at 12:02

## 2024-04-16 ASSESSMENT — PAIN SCALES - GENERAL
PAINLEVEL_OUTOF10: 0

## 2024-04-16 NOTE — PROGRESS NOTES
Hospitalist Progress Note      PCP: Lee Vides MD    Date of Admission: 4/11/2024        Hospital Course:   69 y.o. female presented with CHEST PAIN,   ONSET SEVERAL DAYS AGO, PRESSURE IN CHARACTER, LOCATED ACW , RADIATING DOWN BOTH ARMS. NO NV,  POS DIAPHORESIS, SHE IS ALWAYS SOB ON HOME O2 DUE TO COPD      4/13  HGB 9 TODAY, HAD STRESS, RESULTS PENDING         4/14   STRESS IS NEG,  SHE IS LETHARGIC, FOUND TO HAVE A CO2 > 70 ON ABG,  STARED ON BIPAP. HAD TO BE TRANSFERRED TO MICU      4/15 CO2 NARCOSIS HAS RESOLVED, AND CO2 IS DECREASED.  SITTING UP IN MED , MORE ALERT       Subjective: WANTS TO GO HOME           Medications:  Reviewed    Infusion Medications    sodium chloride       Scheduled Medications    methylPREDNISolone  40 mg IntraVENous Q8H    ipratropium 0.5 mg-albuterol 2.5 mg  1 Dose Inhalation 4x Daily RT    azithromycin (ZITHROMAX) 500 mg in sodium chloride 0.9 % 250 mL IVPB  500 mg IntraVENous Q24H    arformoterol tartrate  15 mcg Nebulization BID    budesonide  500 mcg Nebulization BID    folic acid  1 mg Oral Daily    gabapentin  300 mg Oral Nightly    lipase-protease-amylas  15,000 Units Oral TID WC    lipase-protease-amylase  20,000 Units Oral TID WC    therapeutic multivitamin-minerals  1 tablet Oral Daily    OLANZapine  5 mg Oral Nightly    pantoprazole  40 mg Oral QAM AC    QUEtiapine  50 mg Oral Nightly    sertraline  50 mg Oral Nightly    sodium chloride flush  5-40 mL IntraVENous 2 times per day    aspirin  81 mg Oral Daily    atorvastatin  40 mg Oral Nightly    enoxaparin  40 mg SubCUTAneous Daily    lactulose  20 g Oral BID     PRN Meds: sodium chloride flush, sodium chloride, ondansetron **OR** ondansetron, acetaminophen **OR** acetaminophen, polyethylene glycol      Intake/Output Summary (Last 24 hours) at 4/15/2024 1158  Last data filed at 4/15/2024 1000  Gross per 24 hour   Intake 670.31 ml   Output 710 ml   Net -39.69 ml       Exam:    /69   Pulse 87   Temp 98.2 
      Hospitalist Progress Note      PCP: Lee Vides MD    Date of Admission: 4/11/2024        Hospital Course:  69 y.o. female presented with CHEST PAIN,   ONSET SEVERAL DAYS AGO, PRESSURE IN CHARACTER, LOCATED ACW , RADIATING DOWN BOTH ARMS. NO NV,  POS DIAPHORESIS, SHE IS ALWAYS SOB ON HOME O2 DUE TO COPD      4/13  HGB 9 TODAY, HAD STRESS, RESULTS PENDING         4/14   STRESS IS NEG,  SHE IS LETHARGIC, FOUND TO HAVE A CO2 > 70 ON ABG,  STARED ON BIPAP. HAD TO BE TRANSFERRED TO MICU        4/15 CO2 NARCOSIS HAS RESOLVED, AND CO2 IS DECREASED.  SITTING UP IN MED , MORE ALERT         Subjective:   WANTS TO GO HOME           Medications:  Reviewed    Infusion Medications    sodium chloride       Scheduled Medications    methylPREDNISolone  40 mg IntraVENous Q8H    ipratropium 0.5 mg-albuterol 2.5 mg  1 Dose Inhalation 4x Daily RT    azithromycin (ZITHROMAX) 500 mg in sodium chloride 0.9 % 250 mL IVPB  500 mg IntraVENous Q24H    arformoterol tartrate  15 mcg Nebulization BID    budesonide  500 mcg Nebulization BID    folic acid  1 mg Oral Daily    gabapentin  300 mg Oral Nightly    lipase-protease-amylas  15,000 Units Oral TID WC    lipase-protease-amylase  20,000 Units Oral TID WC    therapeutic multivitamin-minerals  1 tablet Oral Daily    OLANZapine  5 mg Oral Nightly    pantoprazole  40 mg Oral QAM AC    QUEtiapine  50 mg Oral Nightly    sertraline  50 mg Oral Nightly    sodium chloride flush  5-40 mL IntraVENous 2 times per day    aspirin  81 mg Oral Daily    atorvastatin  40 mg Oral Nightly    enoxaparin  40 mg SubCUTAneous Daily    lactulose  20 g Oral BID     PRN Meds: sodium chloride flush, sodium chloride, ondansetron **OR** ondansetron, acetaminophen **OR** acetaminophen, polyethylene glycol      Intake/Output Summary (Last 24 hours) at 4/16/2024 1410  Last data filed at 4/16/2024 0939  Gross per 24 hour   Intake 180 ml   Output 1830 ml   Net -1650 ml       Exam:    BP (!) 145/64   Pulse 83   Temp 
      Hospitalist Progress Note      PCP: Lee Vides MD    Date of Admission: 4/11/2024        Hospital Course:  69 y.o. female presented with CHEST PAIN,   ONSET SEVERAL DAYS AGO, PRESSURE IN CHARACTER, LOCATED ACW , RADIATING DOWN BOTH ARMS. NO NV,  POS DIAPHORESIS, SHE IS ALWAYS SOB ON HOME O2 DUE TO COPD      4/13  HGB 9 TODAY, HAD STRESS, RESULTS PENDING        4/14   STRESS IS NEG,  SHE IS LETHARGIC, FOUND TO HAVE A CO2 > 70 ON ABG,  STARED ON BIPAP    Subjective:  DROWSY           Medications:  Reviewed    Infusion Medications    sodium chloride      sodium chloride       Scheduled Medications    methylPREDNISolone  40 mg IntraVENous Q8H    arformoterol tartrate  15 mcg Nebulization BID    budesonide  500 mcg Nebulization BID    folic acid  1 mg Oral Daily    gabapentin  300 mg Oral Nightly    lipase-protease-amylas  15,000 Units Oral TID WC    lipase-protease-amylase  20,000 Units Oral TID WC    therapeutic multivitamin-minerals  1 tablet Oral Daily    OLANZapine  5 mg Oral Nightly    pantoprazole  40 mg Oral QAM AC    QUEtiapine  50 mg Oral Nightly    sertraline  50 mg Oral Nightly    sodium chloride flush  5-40 mL IntraVENous 2 times per day    aspirin  81 mg Oral Daily    atorvastatin  40 mg Oral Nightly    enoxaparin  40 mg SubCUTAneous Daily    lactulose  20 g Oral BID     PRN Meds: albuterol, sodium chloride flush, sodium chloride, ondansetron **OR** ondansetron, acetaminophen **OR** acetaminophen, polyethylene glycol, sodium chloride      Intake/Output Summary (Last 24 hours) at 4/14/2024 1311  Last data filed at 4/14/2024 0935  Gross per 24 hour   Intake 360 ml   Output 975 ml   Net -615 ml       Exam:    BP (!) 143/58   Pulse (!) 103   Temp 99.4 °F (37.4 °C) (Oral)   Resp 22   Ht 1.702 m (5' 7\")   Wt 67.1 kg (147 lb 14.9 oz)   LMP 10/21/1985   SpO2 98%   BMI 23.17 kg/m²       General appearance:  No apparent distress, appears stated age.  HEENT:  Normal cephalic,  Neck: Supple, with 
    Pulmonary Progress Note    Admit Date: 4/11/2024    Requesting Physician: Lee Vides MD    CC: Acute on chronic hypercapnic respiratory failure    HPI:  Ayesha Keane 69 y.o. female known to [facility] with PMH of end-stage COPD, chronic hypoxic/hypercapnic respiratory failure noncompliant with NIV, CAD, CHF who presented to the ED 4/11 with complaints of chest pain.  Upon arrival, she was hypertensive 171/79 EKG NSR, H/H 7.6/27.4, troponin 11> 11> 11; x-ray chest negative.  On 4/13 underwent nuc med stress which was negative for ischemia.    On 4/14, patient had increased altered mental status therefore ABG obtained, 7.331/74.9/87.7/38.7/96% on 3 L nasal cannula and was placed on BiPAP 20/8.  Pulmonary consulted for hypercapnic respiratory failure    Patient seen on BiPAP 20/8 minimally responsive to voice.  Unable to assess ROS.  HPI obtained from chart review and discussion with RT/bedside RN.  Patient has been on BiPAP since 11 AM, to obtain ABG now.     4/15/24: feeling better. Having lunch while sitting on chair, at bedside.    PMedications:     sodium chloride        methylPREDNISolone  40 mg IntraVENous Q8H    ipratropium 0.5 mg-albuterol 2.5 mg  1 Dose Inhalation 4x Daily RT    azithromycin (ZITHROMAX) 500 mg in sodium chloride 0.9 % 250 mL IVPB  500 mg IntraVENous Q24H    arformoterol tartrate  15 mcg Nebulization BID    budesonide  500 mcg Nebulization BID    folic acid  1 mg Oral Daily    gabapentin  300 mg Oral Nightly    lipase-protease-amylas  15,000 Units Oral TID WC    lipase-protease-amylase  20,000 Units Oral TID     therapeutic multivitamin-minerals  1 tablet Oral Daily    OLANZapine  5 mg Oral Nightly    pantoprazole  40 mg Oral QAM AC    QUEtiapine  50 mg Oral Nightly    sertraline  50 mg Oral Nightly    sodium chloride flush  5-40 mL IntraVENous 2 times per day    aspirin  81 mg Oral Daily    atorvastatin  40 mg Oral Nightly    enoxaparin  40 mg SubCUTAneous Daily    lactulose  20 g 
4 Eyes Skin Assessment     NAME:  Ayesha Keane  YOB: 1955  MEDICAL RECORD NUMBER:  [id number]    The patient is being assessed for  Transfer to New Unit transfer from  to MICU    I agree that at least one RN has performed a thorough Head to Toe Skin Assessment on the patient. ALL assessment sites listed below have been assessed.      Areas assessed by both nurses:    Head, Face, Ears, Shoulders, Back, Chest, Arms, Elbows, Hands, Sacrum. Buttock, Coccyx, Ischium, Legs. Feet and Heels, and Under Medical Devices         Does the Patient have a Wound? No noted wound(s)       Harrison Prevention initiated by RN: Yes  Wound Care Orders initiated by RN: No    Pressure Injury (Stage 3,4, Unstageable, DTI, NWPT, and Complex wounds) if present, place Wound referral order by RN under : No    New Ostomies, if present place, Ostomy referral order under : No     Nurse 1 eSignature: Electronically signed by Karma Amaya RN on 4/14/24 at 7:27 PM EDT    **SHARE this note so that the co-signing nurse can place an eSignature**    Nurse 2 eSignature: Electronically signed by Charissa Iyer RN on 4/14/24 at 5:13 PM EDT  
4 Eyes Skin Assessment     NAME:  Ayesha Keane  YOB: 1955  MEDICAL RECORD NUMBER:  [id number]    The patient is being assessed for  Admission    I agree that at least one RN has performed a thorough Head to Toe Skin Assessment on the patient. ALL assessment sites listed below have been assessed.      Areas assessed by both nurses:    Head, Face, Ears, Shoulders, Back, Chest, Arms, Elbows, Hands, Sacrum. Buttock, Coccyx, Ischium, Legs. Feet and Heels, and Under Medical Devices         Does the Patient have a Wound? No noted wound(s)       Harrison Prevention initiated by RN: Yes  Wound Care Orders initiated by RN: Yes    Pressure Injury (Stage 3,4, Unstageable, DTI, NWPT, and Complex wounds) if present, place Wound referral order by RN under : Yes    New Ostomies, if present place, Ostomy referral order under : No     Nurse 1 eSignature: Electronically signed by Aga Alvarado RN on 4/12/24 at 2:02 AM EDT    **SHARE this note so that the co-signing nurse can place an eSignature**    Nurse 2 eSignature: Electronically signed by Luis Felipe Montgomery RN on 4/12/24 at 3:43 AM EDT   
4 Eyes Skin Assessment     NAME:  Ayesha Keane  YOB: 1955  MEDICAL RECORD NUMBER:  [id number]    The patient is being assessed for  Admission    I agree that at least one RN has performed a thorough Head to Toe Skin Assessment on the patient. ALL assessment sites listed below have been assessed.      Areas assessed by both nurses:    Head, Face, Ears, Shoulders, Back, Chest, Arms, Elbows, Hands, Sacrum. Buttock, Coccyx, Ischium, Legs. Feet and Heels, and Under Medical Devices         Does the Patient have a Wound? No noted wound(s)       Harrison Prevention initiated by RN: No  Wound Care Orders initiated by RN: No    Pressure Injury (Stage 3,4, Unstageable, DTI, NWPT, and Complex wounds) if present, place Wound referral order by RN under : No    New Ostomies, if present place, Ostomy referral order under : No     Nurse 1 eSignature: Electronically signed by Devi Boyd RN on 4/15/24 at 5:03 PM EDT    **SHARE this note so that the co-signing nurse can place an eSignature**    Nurse 2 eSignature: Electronically signed by Gaby Taylor RN on 4/15/24 at 5:52 PM EDT    
[facility] health officer called to lock up pt's money ID and CC- pt arouse able and answers questions appropriately- when asked if it was ok to lock up her money and id with the hospital police she said ok because there is no one in town to pick it up and it's her bill money.  
CLINICAL PHARMACY NOTE: MEDS TO BEDS    Total # of Prescriptions Filled: 4   The following medications were delivered to the patient:  Aspirin 81 mg chewable  Enulose 10 gm/15 ml soln  Atorvastatin calcium 40 mg  Prednisone 10 mg    Additional Documentation:   RN picked up in the pharmacy  
Consult for Pre-Diabetes diet education (A1C 6.3)    CHO control handouts reviewing carb containing foods, serving sizes, & sample menu provided in discharge instructions with RD contact number for further questions. Off note pt on general diet at this time.    Electronically signed by Toyin Dailey RD, LD on 4/15/2024 at 4:05 PM     
Date: 4/15/2024    Time: 1:34 AM    Patient Placed On BIPAP/CPAP/ Non-Invasive Ventilation?  Patient continues on bipap    If no must comment.  Facial area red/color change? No           If YES are Blister/Lesion present?No   If yes must notify nursing staff  BIPAP/CPAP skin barrier?  Yes    Skin barrier type:mepilexlite       Comments:        Neelam Lemons RCP  
Left a message for  about patients change in status, awaiting  call back or new orders   
N2N given to ICU RN. All questions answered.   
Notified Dr. Hamilton of critical PCO2 level - bipap ordered  
Notified Respiratory of stat ABGs   
OCCUPATIONAL THERAPY INITIAL EVALUATION    [facility]  [address], [place]          Date:4/15/2024                                                   Patient Name: Ayesha Keane     MRN: [id number]     : 1955     Room: [address] [place] [address]       Evaluating OT: Milla Palacios OTD, OTR/L, [id number]      Referring Provider: Jef Hamilton DO     Specific Provider Orders/Date: OT eval and treat (24)    Diagnosis: Acute chest pain [R07.9]  Chest pain, unspecified type [R07.9]    Surgeries/Procedures: None this admission       Pt admitted with acute chest pain, SOB, COPD.      Pertinent Medical History:       has a past medical history of Asthma, Chronic pancreatitis (HCC), COPD (chronic obstructive pulmonary disease) (HCC), Depression, Dysphagia, Emphysema lung (HCC), Del Rio catheter in place, and Oxygen dependent.         Precautions:  Fall Risk, 3L O2 baseline, alarms+     Assessment of current deficits    [x] Functional mobility  [x]ADLs  [x] Strength               [x]Cognition    [x] Functional transfers   [x] IADLs         [x] Safety Awareness   [x]Endurance    [x] Fine Coordination  [x] Balance      [] Vision/perception   [x]Sensation     []Gross Motor Coordination [] ROM  [] Delirium                   [] Motor Control     OT PLAN OF CARE   OT POC based on physician orders, patient diagnosis and results of clinical assessment    Frequency/Duration 1-3 days/wk for 2 weeks PRN   Specific OT Treatment Interventions to include:   * Instruction/training on adapted ADL techniques and AE recommendations to increase functional independence within precautions       * Training on energy conservation strategies, correct breathing pattern and techniques to improve independence/tolerance for self-care routine  * Functional transfer/mobility training/DME recommendations for increased independence, safety, and fall prevention  * Patient/Family education 
Patient transferred to MICU from  with these following belongings: oxygen , albuterol, keys, coins, phone,wallet, pants, undergarments, tshirt, socks, oxygen, sweater, oxygen go tank, socks, and seated rollator.  
Patient transported to and from CT on BiPAP with RT, back to  
Physical Therapy    Physical Therapy Initial Assessment     Name: Ayesha Keane  : 1955  MRN: [id number]      Date of Service: 4/15/2024    Evaluating PT:  Rishabh Kirby PT, DPT  [id number]     Room #:  4424/4424-A  Diagnosis:  Acute chest pain [R07.9]  Chest pain, unspecified type [R07.9]  PMHx/PSHx:   has a past medical history of Asthma, Chronic pancreatitis (HCC), COPD (chronic obstructive pulmonary disease) (HCC), Depression, Dysphagia, Emphysema lung (HCC), Del Rio catheter in place, and Oxygen dependent.   Procedure/Surgery:  None   Precautions:  Falls, O2, Alarms   Equipment Needs:  TBD     SUBJECTIVE:    Pt lives alone in a 2 story home with 3 stairs to enter and 1 rail from front and 3 stairs and 0 rail to enter from back (pt prefers). Bed is on 2 floor and bath is on 2 floor with 14 stairs and 1 rail to access with chair lift. Pt ambulated with rollator outside and in community PTA. Pt is on 3L/min O2 at baseline. Pt has home health aide 5x/week from 9am-1pm.     OBJECTIVE:   Initial Evaluation  Date: 4/15/24 Treatment Short Term/ Long Term   Goals   AM-PAC 6 Clicks      Was pt agreeable to Eval/treatment? Yes      Does pt have pain? No c/o pain      Bed Mobility  Rolling: SBA  Supine to sit: SBA  Sit to supine: NT  Scooting: SBA  Rolling: Independent   Supine to sit: Independent   Sit to supine: Independent   Scooting: Independent    Transfers Sit to stand: SBA  Stand to sit: SBA  Stand pivot: Min A  Sit to stand: Independent   Stand to sit: Independent   Stand pivot: Modified Independent with AAD   Ambulation    85 feet with no AD Min A  >250 feet with AAD Modified Independent     Stair negotiation: ascended and descended  NT  >3 steps with 1 rail Modified Independent     ROM BUE:  Per OT eval   BLE:  WFL     Strength BUE:  Per OT eval   BLE:  WFL     Balance Sitting EOB:  SBA  Dynamic Standing:  Min A with no AD  Sitting EOB:  Independent    Dynamic Standing:  Modified Independent       Pt 
Pt has a hx of dysphagia. Pt has had in poor intake.  Consider a bedside swallow  
Pt's sister, Nelly updated.   
Pulm consult called   
Report called to Enma mercer. Pt placed in transport.  
Updated patients sister ravi of new room assignment [id number]   
Updated patients sister ravi on patients condition    
50 mg Oral Nightly    sertraline  50 mg Oral Nightly    sodium chloride flush  5-40 mL IntraVENous 2 times per day    aspirin  81 mg Oral Daily    atorvastatin  40 mg Oral Nightly    enoxaparin  40 mg SubCUTAneous Daily    lactulose  20 g Oral BID     PRN Meds: sodium chloride flush, sodium chloride, ondansetron **OR** ondansetron, acetaminophen **OR** acetaminophen, polyethylene glycol    Labs and Imaging Studies     Labs    CBC with Differential:    Lab Results   Component Value Date/Time    WBC 6.5 04/15/2024 05:22 AM    RBC 3.36 04/15/2024 05:22 AM    HGB 7.9 04/15/2024 05:22 AM    HCT 27.5 04/15/2024 05:22 AM     04/15/2024 05:22 AM    MCV 81.8 04/15/2024 05:22 AM    MCH 23.5 04/15/2024 05:22 AM    MCHC 28.7 04/15/2024 05:22 AM    RDW 17.3 04/15/2024 05:22 AM    NRBC 0.9 11/21/2022 11:20 AM    SEGSPCT 26 02/16/2014 06:30 PM    METASPCT 2.6 10/11/2022 05:41 AM    LYMPHOPCT 4 04/15/2024 05:22 AM    PROMYELOPCT 0.9 05/18/2020 03:48 AM    MONOPCT 0 04/15/2024 05:22 AM    MYELOPCT 1.7 10/18/2022 05:55 AM    BASOPCT 0 04/15/2024 05:22 AM    MONOSABS 0.00 04/15/2024 05:22 AM    LYMPHSABS 0.23 04/15/2024 05:22 AM    EOSABS 0.00 04/15/2024 05:22 AM    BASOSABS 0.00 04/15/2024 05:22 AM     BMP:    Lab Results   Component Value Date/Time     04/15/2024 05:22 AM    K 4.5 04/15/2024 05:22 AM    K 4.4 11/17/2022 06:08 AM    CL 94 04/15/2024 05:22 AM    CO2 36 04/15/2024 05:22 AM    BUN 19 04/15/2024 05:22 AM    LABALBU 4.5 04/14/2024 06:11 PM    CREATININE 0.5 04/15/2024 05:22 AM    CALCIUM 9.7 04/15/2024 05:22 AM    GFRAA >60 10/17/2022 05:59 AM    LABGLOM >90 04/15/2024 05:22 AM    GLUCOSE 178 04/15/2024 05:22 AM       Imaging Studies:    XR CHEST PORTABLE   Final Result   Mild increased interstitial markings. No focal infiltrate.         CT CHEST WO CONTRAST   Final Result   1. Emphysema with no acute intrathoracic abnormality.  COPD findings without   overt edema or acute airspace disease.  No significant 
DISEASE  CONSTIPATION  PLAN:  CARD  CONT AEROSOLS   CONT CREON   LACTULOSE   TRANSFUSE FOR STRESS        DVT Prophylaxis: LOVENOX  Diet: Diet NPO  Code Status: Full Code     PT/OT Eval Status:  ORDERED     Dispo -  HOME  Electronically signed by Jef Hamilton DO on 4/13/2024 at 3:27 PM FACOI    
ONE XRAY VIEW OF THE CHEST 4/11/2024 6:18 pm COMPARISON: Chest series from November 14, 2022 HISTORY: ORDERING SYSTEM PROVIDED HISTORY: chest pain under bilateral breasts TECHNOLOGIST PROVIDED HISTORY: Reason for exam:->chest pain under bilateral breasts FINDINGS: Symmetric lung hyperinflation.  Background coarsened interstitial markings. No airspace disease, pleural effusion, or pneumothorax.  Atherosclerotic disease in the aortic arch.  Cardiomediastinal silhouette otherwise unremarkable.  Normal pulmonary vascularity.  Foreign bodies project over the central upper thorax and over the right humerus.  Osseous and thoracic soft tissue structures demonstrate no acute findings.     Background coarsened interstitial markings and lung hyperinflation. Atherosclerotic disease.  No acute cardiopulmonary pathology seen.     Echo 10/10/2022   Normal left ventricular chamber size.   Normal left ventricular systolic function.   Concentric LV remodeling.   Visually estimated LVEF is 55-60 %.   No wall motion abnormalities.   Indeterminate diastolic function.   Normal right ventricle structure and function.   Normal left atrial size.   Normal right atrial size   Unable to estimate PA pressure.      Signature      ----------------------------------------------------------------   Electronically signed by Charles Sanchez MD(Interpreting   physician) on 10/12/2022 02:35 PM      Summary of Events:  69 y.o. female known to [facility] with PMH of end-stage COPD, chronic hypoxic/hypercapnic respiratory failure noncompliant with NIV, CAD, CHF   4/11 ED c/o chest pain.    /79 EKG NSR, H/H 7.6/27.4, troponin 11> 11> 11; x-ray chest negative.    4/13 nuc med stress which was negative for ischemia.    4/14 increased AMS therefore   ABG 7.331/74.9/87.7/38.7/96% on 3 L nasal cannula and was placed on BiPAP 20/8.    Pulmonary consulted    Assessment/Plan  Acute on chronic hypoxic/hypercapnic respiratory failure  Down to 4 lts o2, On 3 L

## 2024-04-16 NOTE — CARE COORDINATION
Patient needs transportation home today. 3 steps to enter the home, but she utilizes chair lift to enter her home. Has oxygen at home already. Physicians ambulette arranged for 1600. Patient notified of discharge time.  at [facility] also notified of discharge time to restart aide services.     For questions I can be reached at [phone]. EUGENE Uribe

## 2024-04-17 NOTE — DISCHARGE SUMMARY
Hospitalist Discharge Summary    Patient ID: Ayesha Keane   Patient : 1955  Patient's PCP: Lee Vides MD    Admit Date: 2024   Admitting Physician: Jef Hamilton DO    Discharge Date:  2024  Discharge Physician: Jef Hamilton DO   Discharge Condition: Stable  Discharge Disposition: Home with Home Health Care      Discharge Diagnoses:   Active Hospital Problems    Diagnosis Date Noted    COPD (chronic obstructive pulmonary disease) (HCC) [J44.9] 2013     Priority: Medium    Acute on chronic respiratory failure with hypercapnia (HCC) [J96.22] 2024    Pancreatic insufficiency [K86.89] 2024    Chest pain [R07.9] 2024    Personal history of other malignant neoplasm of kidney [Z85.528] 2020    Neuromuscular dysfunction of bladder, unspecified [N31.9] 2020    Anemia of chronic disease [D63.8] 2020    Neurogenic bladder [N31.9] 2018    MDD (major depressive disorder) [F32.9] 2014   CHRONIC PANCREATITIS   PRE DIABETES (AIC 6.32)   PANCREATIC INSUF   CHRONIC GUTIÉRREZ DUE TO ATONIC BLADDER  CHRONIC RESPIRATORY FAILURE  ANEMIA OF CHRONIC DISEASE  CONSTIPATION  CO2 NARCOSIS RESOLVED        Hospital course in brief:    69 y.o. female presented with CHEST PAIN,   ONSET SEVERAL DAYS AGO, PRESSURE IN CHARACTER, LOCATED ACW , RADIATING DOWN BOTH ARMS. NO NV,  POS DIAPHORESIS, SHE IS ALWAYS SOB ON HOME O2 DUE TO COPD        HGB 9 TODAY, HAD STRESS, RESULTS PENDING            STRESS IS NEG,  SHE IS LETHARGIC, FOUND TO HAVE A CO2 > 70 ON ABG,  STARED ON BIPAP. HAD TO BE TRANSFERRED TO MICU        4/15 CO2 NARCOSIS HAS RESOLVED, AND CO2 IS DECREASED.  SITTING UP IN MED , MORE ALERT        PHYSICAL EXAM:    BP (!) 145/64   Pulse 83   Temp 97.3 °F (36.3 °C) (Temporal)   Resp 16   Ht 1.702 m (5' 7\")   Wt 66.5 kg (146 lb 9.6 oz)   LMP 10/21/1985   SpO2 100%   BMI 22.96 kg/m²     General appearance:  No apparent distress,  HEENT:

## 2024-04-25 ENCOUNTER — APPOINTMENT (OUTPATIENT)
Dept: GENERAL RADIOLOGY | Age: 69
DRG: 208 | End: 2024-04-25
Payer: MEDICARE

## 2024-04-25 ENCOUNTER — HOSPITAL ENCOUNTER (INPATIENT)
Age: 69
LOS: 6 days | Discharge: HOME | DRG: 208 | End: 2024-05-01
Attending: EMERGENCY MEDICINE | Admitting: INTERNAL MEDICINE
Payer: MEDICARE

## 2024-04-25 DIAGNOSIS — J96.22 ACUTE ON CHRONIC RESPIRATORY FAILURE WITH HYPERCAPNIA (HCC): Primary | ICD-10-CM

## 2024-04-25 DIAGNOSIS — R06.03 RESPIRATORY DISTRESS: ICD-10-CM

## 2024-04-25 DIAGNOSIS — J12.3 HUMAN METAPNEUMOVIRUS (HMPV) PNEUMONIA: ICD-10-CM

## 2024-04-25 DIAGNOSIS — I47.10 SUPRAVENTRICULAR TACHYCARDIA (HCC): ICD-10-CM

## 2024-04-25 LAB
AADO2: 119.8 MMHG
AADO2: 132.9 MMHG
AADO2: 190.5 MMHG
ALBUMIN SERPL-MCNC: 3.8 G/DL (ref 3.5–5.2)
ALP SERPL-CCNC: 58 U/L (ref 35–104)
ALT SERPL-CCNC: 12 U/L (ref 0–32)
ANION GAP SERPL CALCULATED.3IONS-SCNC: 9 MMOL/L (ref 7–16)
AST SERPL-CCNC: 15 U/L (ref 0–31)
B PARAP IS1001 DNA NPH QL NAA+NON-PROBE: NOT DETECTED
B PERT DNA SPEC QL NAA+PROBE: NOT DETECTED
B.E.: 1.6 MMOL/L (ref -3–3)
B.E.: 5.5 MMOL/L (ref -3–3)
B.E.: 8.2 MMOL/L (ref -3–3)
B.E.: 9.8 MMOL/L (ref -3–3)
BACTERIA URNS QL MICRO: ABNORMAL
BASOPHILS # BLD: 0 K/UL (ref 0–0.2)
BASOPHILS NFR BLD: 0 % (ref 0–2)
BILIRUB SERPL-MCNC: <0.2 MG/DL (ref 0–1.2)
BILIRUB UR QL STRIP: NEGATIVE
BNP SERPL-MCNC: 124 PG/ML (ref 0–125)
BUN SERPL-MCNC: 13 MG/DL (ref 6–23)
C PNEUM DNA NPH QL NAA+NON-PROBE: NOT DETECTED
CALCIUM SERPL-MCNC: 8.8 MG/DL (ref 8.6–10.2)
CHLORIDE SERPL-SCNC: 95 MMOL/L (ref 98–107)
CLARITY UR: ABNORMAL
CO2 SERPL-SCNC: 36 MMOL/L (ref 22–29)
COHB: 0.1 % (ref 0–1.5)
COHB: 0.3 % (ref 0–1.5)
COHB: 0.6 % (ref 0–1.5)
COHB: 0.7 % (ref 0–1.5)
COLOR UR: YELLOW
CREAT SERPL-MCNC: 0.6 MG/DL (ref 0.5–1)
CRITICAL: ABNORMAL
DATE ANALYZED: ABNORMAL
DATE OF COLLECTION: ABNORMAL
EOSINOPHIL # BLD: 0 K/UL (ref 0.05–0.5)
EOSINOPHILS RELATIVE PERCENT: 0 % (ref 0–6)
EPI CELLS #/AREA URNS HPF: ABNORMAL /HPF
ERYTHROCYTE [DISTWIDTH] IN BLOOD BY AUTOMATED COUNT: 19.1 % (ref 11.5–15)
FIO2: 40 %
FIO2: 55 %
FIO2: 60 %
FLUAV RNA NPH QL NAA+NON-PROBE: NOT DETECTED
FLUBV RNA NPH QL NAA+NON-PROBE: NOT DETECTED
GFR SERPL CREATININE-BSD FRML MDRD: >90 ML/MIN/1.73M2
GLUCOSE SERPL-MCNC: 180 MG/DL (ref 74–99)
GLUCOSE UR STRIP-MCNC: NEGATIVE MG/DL
HADV DNA NPH QL NAA+NON-PROBE: NOT DETECTED
HCO3: 29.5 MMOL/L (ref 22–26)
HCO3: 31.3 MMOL/L (ref 22–26)
HCO3: 33.1 MMOL/L (ref 22–26)
HCO3: 39 MMOL/L (ref 22–26)
HCOV 229E RNA NPH QL NAA+NON-PROBE: NOT DETECTED
HCOV HKU1 RNA NPH QL NAA+NON-PROBE: NOT DETECTED
HCOV NL63 RNA NPH QL NAA+NON-PROBE: NOT DETECTED
HCOV OC43 RNA NPH QL NAA+NON-PROBE: NOT DETECTED
HCT VFR BLD AUTO: 31.1 % (ref 34–48)
HGB BLD-MCNC: 8.9 G/DL (ref 11.5–15.5)
HGB UR QL STRIP.AUTO: ABNORMAL
HHB: 0.6 % (ref 0–5)
HHB: 2.1 % (ref 0–5)
HHB: 2.7 % (ref 0–5)
HHB: 63.5 % (ref 0–5)
HMPV RNA NPH QL NAA+NON-PROBE: DETECTED
HPIV1 RNA NPH QL NAA+NON-PROBE: NOT DETECTED
HPIV2 RNA NPH QL NAA+NON-PROBE: NOT DETECTED
HPIV3 RNA NPH QL NAA+NON-PROBE: NOT DETECTED
HPIV4 RNA NPH QL NAA+NON-PROBE: NOT DETECTED
KETONES UR STRIP-MCNC: >80 MG/DL
LAB: ABNORMAL
LACTATE BLDV-SCNC: 1.2 MMOL/L (ref 0.5–1.9)
LEUKOCYTE ESTERASE UR QL STRIP: NEGATIVE
LYMPHOCYTES NFR BLD: 0.61 K/UL (ref 1.5–4)
LYMPHOCYTES RELATIVE PERCENT: 6 % (ref 20–42)
Lab: 1223
Lab: 1425
Lab: 2004
Lab: 2317
M PNEUMO DNA NPH QL NAA+NON-PROBE: NOT DETECTED
MAGNESIUM SERPL-MCNC: 2.1 MG/DL (ref 1.6–2.6)
MCH RBC QN AUTO: 24.3 PG (ref 26–35)
MCHC RBC AUTO-ENTMCNC: 28.6 G/DL (ref 32–34.5)
MCV RBC AUTO: 84.7 FL (ref 80–99.9)
METHB: 0.4 % (ref 0–1.5)
METHB: 0.5 % (ref 0–1.5)
METHB: 0.6 % (ref 0–1.5)
METHB: 0.8 % (ref 0–1.5)
MODE: ABNORMAL
MODE: ABNORMAL
MODE: AC
MODE: AC
MONOCYTES NFR BLD: 0.43 K/UL (ref 0.1–0.95)
MONOCYTES NFR BLD: 4 % (ref 2–12)
NEUTROPHILS NFR BLD: 90 % (ref 43–80)
NEUTS SEG NFR BLD: 8.96 K/UL (ref 1.8–7.3)
NITRITE UR QL STRIP: NEGATIVE
NUCLEATED RED BLOOD CELLS: 1 PER 100 WBC
O2 CONTENT: 11.4 ML/DL
O2 CONTENT: 12.1 ML/DL
O2 SATURATION: 35.8 % (ref 92–98.5)
O2 SATURATION: 97.3 % (ref 92–98.5)
O2 SATURATION: 97.9 % (ref 92–98.5)
O2 SATURATION: 99.4 % (ref 92–98.5)
O2HB: 35.4 % (ref 94–97)
O2HB: 96.1 % (ref 94–97)
O2HB: 97.2 % (ref 94–97)
O2HB: 98.4 % (ref 94–97)
OPERATOR ID: 1768
OPERATOR ID: 2067
OPERATOR ID: 2067
OPERATOR ID: 2593
PATIENT TEMP: 37 C
PCO2: 115 MMHG (ref 35–45)
PCO2: 37 MMHG (ref 35–45)
PCO2: 40.8 MMHG (ref 35–45)
PCO2: 87.4 MMHG (ref 35–45)
PEEP/CPAP: 5 CMH2O
PEEP/CPAP: 5 CMH2O
PEEP/CPAP: 8 CMH2O
PFO2: 2.38 MMHG/%
PFO2: 2.63 MMHG/%
PFO2: 3.28 MMHG/%
PH BLOOD GAS: 7.08 (ref 7.35–7.45)
PH BLOOD GAS: 7.27 (ref 7.35–7.45)
PH BLOOD GAS: 7.48 (ref 7.35–7.45)
PH BLOOD GAS: 7.54 (ref 7.35–7.45)
PH UR STRIP: 7 [PH] (ref 5–9)
PHOSPHATE SERPL-MCNC: 3.4 MG/DL (ref 2.5–4.5)
PLATELET # BLD AUTO: 295 K/UL (ref 130–450)
PMV BLD AUTO: 9.7 FL (ref 7–12)
PO2: 105.4 MMHG (ref 75–100)
PO2: 130.7 MMHG (ref 75–100)
PO2: 196.6 MMHG (ref 75–100)
PO2: 25.9 MMHG (ref 75–100)
POTASSIUM SERPL-SCNC: 3.5 MMOL/L (ref 3.5–5)
PROCALCITONIN SERPL-MCNC: 0.03 NG/ML (ref 0–0.08)
PROT SERPL-MCNC: 6.2 G/DL (ref 6.4–8.3)
PROT UR STRIP-MCNC: 100 MG/DL
RBC # BLD AUTO: 3.67 M/UL (ref 3.5–5.5)
RBC # BLD: ABNORMAL 10*6/UL
RBC #/AREA URNS HPF: ABNORMAL /HPF
RI(T): 0.92
RI(T): 0.97
RI(T): 1.26
RR MECHANICAL: 16 B/MIN
RR MECHANICAL: 16 B/MIN
RR MECHANICAL: 20 B/MIN
RSV RNA NPH QL NAA+NON-PROBE: NOT DETECTED
RV+EV RNA NPH QL NAA+NON-PROBE: NOT DETECTED
SARS-COV-2 RNA NPH QL NAA+NON-PROBE: NOT DETECTED
SODIUM SERPL-SCNC: 140 MMOL/L (ref 132–146)
SOURCE, BLOOD GAS: ABNORMAL
SP GR UR STRIP: 1.02 (ref 1–1.03)
SPECIMEN DESCRIPTION: ABNORMAL
THB: 8.2 G/DL (ref 11.5–16.5)
THB: 8.4 G/DL (ref 11.5–16.5)
THB: 8.7 G/DL (ref 11.5–16.5)
THB: 9.5 G/DL (ref 11.5–16.5)
TIME ANALYZED: 1230
TIME ANALYZED: 1432
TIME ANALYZED: 2009
TIME ANALYZED: 2319
TROPONIN I SERPL HS-MCNC: 17 NG/L (ref 0–9)
UROBILINOGEN UR STRIP-ACNC: 0.2 EU/DL (ref 0–1)
VT MECHANICAL: 400 ML
VT MECHANICAL: 450 ML
VT MECHANICAL: 450 ML
WBC #/AREA URNS HPF: ABNORMAL /HPF
WBC OTHER # BLD: 10 K/UL (ref 4.5–11.5)

## 2024-04-25 PROCEDURE — 81001 URINALYSIS AUTO W/SCOPE: CPT

## 2024-04-25 PROCEDURE — 87086 URINE CULTURE/COLONY COUNT: CPT

## 2024-04-25 PROCEDURE — 87077 CULTURE AEROBIC IDENTIFY: CPT

## 2024-04-25 PROCEDURE — A4216 STERILE WATER/SALINE, 10 ML: HCPCS | Performed by: EMERGENCY MEDICINE

## 2024-04-25 PROCEDURE — 94664 DEMO&/EVAL PT USE INHALER: CPT

## 2024-04-25 PROCEDURE — 0BH17EZ INSERTION OF ENDOTRACHEAL AIRWAY INTO TRACHEA, VIA NATURAL OR ARTIFICIAL OPENING: ICD-10-PCS | Performed by: INTERNAL MEDICINE

## 2024-04-25 PROCEDURE — 6360000002 HC RX W HCPCS: Performed by: STUDENT IN AN ORGANIZED HEALTH CARE EDUCATION/TRAINING PROGRAM

## 2024-04-25 PROCEDURE — 87205 SMEAR GRAM STAIN: CPT

## 2024-04-25 PROCEDURE — 87070 CULTURE OTHR SPECIMN AEROBIC: CPT

## 2024-04-25 PROCEDURE — 85025 COMPLETE CBC W/AUTO DIFF WBC: CPT

## 2024-04-25 PROCEDURE — 6370000000 HC RX 637 (ALT 250 FOR IP)

## 2024-04-25 PROCEDURE — 2580000003 HC RX 258: Performed by: EMERGENCY MEDICINE

## 2024-04-25 PROCEDURE — 71045 X-RAY EXAM CHEST 1 VIEW: CPT

## 2024-04-25 PROCEDURE — 0202U NFCT DS 22 TRGT SARS-COV-2: CPT

## 2024-04-25 PROCEDURE — 2580000003 HC RX 258: Performed by: STUDENT IN AN ORGANIZED HEALTH CARE EDUCATION/TRAINING PROGRAM

## 2024-04-25 PROCEDURE — 99291 CRITICAL CARE FIRST HOUR: CPT | Performed by: INTERNAL MEDICINE

## 2024-04-25 PROCEDURE — 80053 COMPREHEN METABOLIC PANEL: CPT

## 2024-04-25 PROCEDURE — 99285 EMERGENCY DEPT VISIT HI MDM: CPT

## 2024-04-25 PROCEDURE — 94002 VENT MGMT INPAT INIT DAY: CPT

## 2024-04-25 PROCEDURE — 84145 PROCALCITONIN (PCT): CPT

## 2024-04-25 PROCEDURE — 6370000000 HC RX 637 (ALT 250 FOR IP): Performed by: EMERGENCY MEDICINE

## 2024-04-25 PROCEDURE — 84484 ASSAY OF TROPONIN QUANT: CPT

## 2024-04-25 PROCEDURE — 87040 BLOOD CULTURE FOR BACTERIA: CPT

## 2024-04-25 PROCEDURE — 83605 ASSAY OF LACTIC ACID: CPT

## 2024-04-25 PROCEDURE — 93005 ELECTROCARDIOGRAM TRACING: CPT | Performed by: EMERGENCY MEDICINE

## 2024-04-25 PROCEDURE — 96374 THER/PROPH/DIAG INJ IV PUSH: CPT

## 2024-04-25 PROCEDURE — 2000000000 HC ICU R&B

## 2024-04-25 PROCEDURE — 87899 AGENT NOS ASSAY W/OPTIC: CPT

## 2024-04-25 PROCEDURE — 6370000000 HC RX 637 (ALT 250 FOR IP): Performed by: STUDENT IN AN ORGANIZED HEALTH CARE EDUCATION/TRAINING PROGRAM

## 2024-04-25 PROCEDURE — 94640 AIRWAY INHALATION TREATMENT: CPT

## 2024-04-25 PROCEDURE — 84100 ASSAY OF PHOSPHORUS: CPT

## 2024-04-25 PROCEDURE — 31500 INSERT EMERGENCY AIRWAY: CPT

## 2024-04-25 PROCEDURE — 5A1935Z RESPIRATORY VENTILATION, LESS THAN 24 CONSECUTIVE HOURS: ICD-10-PCS | Performed by: INTERNAL MEDICINE

## 2024-04-25 PROCEDURE — 83735 ASSAY OF MAGNESIUM: CPT

## 2024-04-25 PROCEDURE — 82805 BLOOD GASES W/O2 SATURATION: CPT

## 2024-04-25 PROCEDURE — 93005 ELECTROCARDIOGRAM TRACING: CPT | Performed by: STUDENT IN AN ORGANIZED HEALTH CARE EDUCATION/TRAINING PROGRAM

## 2024-04-25 PROCEDURE — 83880 ASSAY OF NATRIURETIC PEPTIDE: CPT

## 2024-04-25 PROCEDURE — 6360000002 HC RX W HCPCS: Performed by: EMERGENCY MEDICINE

## 2024-04-25 PROCEDURE — 2500000003 HC RX 250 WO HCPCS: Performed by: STUDENT IN AN ORGANIZED HEALTH CARE EDUCATION/TRAINING PROGRAM

## 2024-04-25 PROCEDURE — 2500000003 HC RX 250 WO HCPCS: Performed by: EMERGENCY MEDICINE

## 2024-04-25 PROCEDURE — 94660 CPAP INITIATION&MGMT: CPT

## 2024-04-25 PROCEDURE — 2500000003 HC RX 250 WO HCPCS

## 2024-04-25 PROCEDURE — 87449 NOS EACH ORGANISM AG IA: CPT

## 2024-04-25 PROCEDURE — 74018 RADEX ABDOMEN 1 VIEW: CPT

## 2024-04-25 PROCEDURE — 87081 CULTURE SCREEN ONLY: CPT

## 2024-04-25 RX ORDER — ACETAMINOPHEN 325 MG/1
650 TABLET ORAL EVERY 6 HOURS PRN
Status: DISCONTINUED | OUTPATIENT
Start: 2024-04-25 | End: 2024-05-01 | Stop reason: HOSPADM

## 2024-04-25 RX ORDER — ROCURONIUM BROMIDE 10 MG/ML
100 INJECTION, SOLUTION INTRAVENOUS ONCE
Status: COMPLETED | OUTPATIENT
Start: 2024-04-25 | End: 2024-04-25

## 2024-04-25 RX ORDER — 0.9 % SODIUM CHLORIDE 0.9 %
500 INTRAVENOUS SOLUTION INTRAVENOUS ONCE
Status: COMPLETED | OUTPATIENT
Start: 2024-04-25 | End: 2024-04-25

## 2024-04-25 RX ORDER — DEXAMETHASONE SODIUM PHOSPHATE 10 MG/ML
10 INJECTION INTRAMUSCULAR; INTRAVENOUS ONCE
Status: COMPLETED | OUTPATIENT
Start: 2024-04-25 | End: 2024-04-25

## 2024-04-25 RX ORDER — KETAMINE HYDROCHLORIDE 10 MG/ML
200 INJECTION, SOLUTION INTRAMUSCULAR; INTRAVENOUS ONCE
Status: DISCONTINUED | OUTPATIENT
Start: 2024-04-25 | End: 2024-04-25

## 2024-04-25 RX ORDER — SODIUM CHLORIDE 0.9 % (FLUSH) 0.9 %
5-40 SYRINGE (ML) INJECTION PRN
Status: DISCONTINUED | OUTPATIENT
Start: 2024-04-25 | End: 2024-05-01 | Stop reason: HOSPADM

## 2024-04-25 RX ORDER — ASPIRIN 81 MG/1
81 TABLET, CHEWABLE ORAL DAILY
Status: DISCONTINUED | OUTPATIENT
Start: 2024-04-25 | End: 2024-05-01 | Stop reason: HOSPADM

## 2024-04-25 RX ORDER — SODIUM CHLORIDE 9 MG/ML
INJECTION, SOLUTION INTRAVENOUS PRN
Status: DISCONTINUED | OUTPATIENT
Start: 2024-04-25 | End: 2024-05-01 | Stop reason: HOSPADM

## 2024-04-25 RX ORDER — ARFORMOTEROL TARTRATE 15 UG/2ML
15 SOLUTION RESPIRATORY (INHALATION)
Status: DISCONTINUED | OUTPATIENT
Start: 2024-04-25 | End: 2024-05-01 | Stop reason: HOSPADM

## 2024-04-25 RX ORDER — MAGNESIUM SULFATE IN WATER 40 MG/ML
INJECTION, SOLUTION INTRAVENOUS
Status: DISCONTINUED
Start: 2024-04-25 | End: 2024-04-25 | Stop reason: WASHOUT

## 2024-04-25 RX ORDER — FENTANYL CITRATE-0.9 % NACL/PF 10 MCG/ML
25-200 PLASTIC BAG, INJECTION (ML) INTRAVENOUS CONTINUOUS
Status: DISCONTINUED | OUTPATIENT
Start: 2024-04-25 | End: 2024-04-25

## 2024-04-25 RX ORDER — ONDANSETRON 2 MG/ML
4 INJECTION INTRAMUSCULAR; INTRAVENOUS EVERY 6 HOURS PRN
Status: DISCONTINUED | OUTPATIENT
Start: 2024-04-25 | End: 2024-05-01 | Stop reason: HOSPADM

## 2024-04-25 RX ORDER — MIDAZOLAM HYDROCHLORIDE 2 MG/2ML
2 INJECTION, SOLUTION INTRAMUSCULAR; INTRAVENOUS EVERY 30 MIN PRN
Status: DISCONTINUED | OUTPATIENT
Start: 2024-04-25 | End: 2024-04-26

## 2024-04-25 RX ORDER — ONDANSETRON 4 MG/1
4 TABLET, ORALLY DISINTEGRATING ORAL EVERY 8 HOURS PRN
Status: DISCONTINUED | OUTPATIENT
Start: 2024-04-25 | End: 2024-05-01 | Stop reason: HOSPADM

## 2024-04-25 RX ORDER — IPRATROPIUM BROMIDE AND ALBUTEROL SULFATE 2.5; .5 MG/3ML; MG/3ML
1 SOLUTION RESPIRATORY (INHALATION) EVERY 8 HOURS
Status: DISCONTINUED | OUTPATIENT
Start: 2024-04-25 | End: 2024-05-01 | Stop reason: HOSPADM

## 2024-04-25 RX ORDER — BUDESONIDE 0.5 MG/2ML
0.5 INHALANT ORAL
Status: DISCONTINUED | OUTPATIENT
Start: 2024-04-25 | End: 2024-05-01 | Stop reason: HOSPADM

## 2024-04-25 RX ORDER — FENTANYL CITRATE-0.9 % NACL/PF 10 MCG/ML
25-200 PLASTIC BAG, INJECTION (ML) INTRAVENOUS CONTINUOUS
Status: DISCONTINUED | OUTPATIENT
Start: 2024-04-25 | End: 2024-04-26

## 2024-04-25 RX ORDER — ATORVASTATIN CALCIUM 40 MG/1
40 TABLET, FILM COATED ORAL NIGHTLY
Status: DISCONTINUED | OUTPATIENT
Start: 2024-04-25 | End: 2024-05-01 | Stop reason: HOSPADM

## 2024-04-25 RX ORDER — ACETAMINOPHEN 650 MG/1
650 SUPPOSITORY RECTAL EVERY 6 HOURS PRN
Status: DISCONTINUED | OUTPATIENT
Start: 2024-04-25 | End: 2024-05-01 | Stop reason: HOSPADM

## 2024-04-25 RX ORDER — HYDRALAZINE HYDROCHLORIDE 20 MG/ML
10 INJECTION INTRAMUSCULAR; INTRAVENOUS EVERY 4 HOURS PRN
Status: DISCONTINUED | OUTPATIENT
Start: 2024-04-25 | End: 2024-05-01 | Stop reason: HOSPADM

## 2024-04-25 RX ORDER — FENTANYL CITRATE-0.9 % NACL/PF 10 MCG/ML
PLASTIC BAG, INJECTION (ML) INTRAVENOUS
Status: DISCONTINUED
Start: 2024-04-25 | End: 2024-04-26

## 2024-04-25 RX ORDER — FENTANYL CITRATE-0.9 % NACL/PF 20 MCG/2ML
50 SYRINGE (ML) INTRAVENOUS EVERY 30 MIN PRN
Status: DISCONTINUED | OUTPATIENT
Start: 2024-04-25 | End: 2024-04-25

## 2024-04-25 RX ORDER — LABETALOL HYDROCHLORIDE 5 MG/ML
10 INJECTION, SOLUTION INTRAVENOUS EVERY 6 HOURS PRN
Status: DISCONTINUED | OUTPATIENT
Start: 2024-04-25 | End: 2024-05-01 | Stop reason: HOSPADM

## 2024-04-25 RX ORDER — ACETAMINOPHEN 500 MG
1000 TABLET ORAL ONCE
Status: COMPLETED | OUTPATIENT
Start: 2024-04-25 | End: 2024-04-25

## 2024-04-25 RX ORDER — MINERAL OIL AND WHITE PETROLATUM 150; 830 MG/G; MG/G
OINTMENT OPHTHALMIC EVERY 4 HOURS
Status: DISCONTINUED | OUTPATIENT
Start: 2024-04-25 | End: 2024-05-01 | Stop reason: HOSPADM

## 2024-04-25 RX ORDER — POLYETHYLENE GLYCOL 3350 17 G/17G
17 POWDER, FOR SOLUTION ORAL DAILY PRN
Status: DISCONTINUED | OUTPATIENT
Start: 2024-04-25 | End: 2024-05-01 | Stop reason: HOSPADM

## 2024-04-25 RX ORDER — CHLORHEXIDINE GLUCONATE ORAL RINSE 1.2 MG/ML
15 SOLUTION DENTAL 2 TIMES DAILY
Status: DISCONTINUED | OUTPATIENT
Start: 2024-04-25 | End: 2024-04-25

## 2024-04-25 RX ORDER — KETAMINE HYDROCHLORIDE 100 MG/ML
150 INJECTION, SOLUTION INTRAMUSCULAR; INTRAVENOUS ONCE
Status: COMPLETED | OUTPATIENT
Start: 2024-04-25 | End: 2024-04-25

## 2024-04-25 RX ORDER — SODIUM CHLORIDE 0.9 % (FLUSH) 0.9 %
5-40 SYRINGE (ML) INJECTION EVERY 12 HOURS SCHEDULED
Status: DISCONTINUED | OUTPATIENT
Start: 2024-04-25 | End: 2024-05-01 | Stop reason: HOSPADM

## 2024-04-25 RX ORDER — IPRATROPIUM BROMIDE AND ALBUTEROL SULFATE 2.5; .5 MG/3ML; MG/3ML
3 SOLUTION RESPIRATORY (INHALATION) ONCE
Status: COMPLETED | OUTPATIENT
Start: 2024-04-25 | End: 2024-04-25

## 2024-04-25 RX ADMIN — DEXAMETHASONE SODIUM PHOSPHATE 10 MG: 10 INJECTION INTRAMUSCULAR; INTRAVENOUS at 15:28

## 2024-04-25 RX ADMIN — IPRATROPIUM BROMIDE AND ALBUTEROL SULFATE 1 DOSE: 2.5; .5 SOLUTION RESPIRATORY (INHALATION) at 20:20

## 2024-04-25 RX ADMIN — ASPIRIN 81 MG: 81 TABLET, CHEWABLE ORAL at 19:19

## 2024-04-25 RX ADMIN — PANCRELIPASE LIPASE, PANCRELIPASE PROTEASE, PANCRELIPASE AMYLASE 15000 UNITS: 15000; 47000; 63000 CAPSULE, DELAYED RELEASE ORAL at 19:19

## 2024-04-25 RX ADMIN — ROCURONIUM BROMIDE 100 MG: 10 INJECTION, SOLUTION INTRAVENOUS at 14:50

## 2024-04-25 RX ADMIN — DOXYCYCLINE 100 MG: 100 INJECTION, POWDER, LYOPHILIZED, FOR SOLUTION INTRAVENOUS at 15:28

## 2024-04-25 RX ADMIN — POLYVINYL ALCOHOL, POVIDONE 1 DROP: 14; 6 SOLUTION/ DROPS OPHTHALMIC at 23:19

## 2024-04-25 RX ADMIN — IPRATROPIUM BROMIDE AND ALBUTEROL SULFATE 3 DOSE: 2.5; .5 SOLUTION RESPIRATORY (INHALATION) at 12:53

## 2024-04-25 RX ADMIN — ARFORMOTEROL TARTRATE 15 MCG: 15 SOLUTION RESPIRATORY (INHALATION) at 20:20

## 2024-04-25 RX ADMIN — WATER 2000 MG: 1 INJECTION INTRAMUSCULAR; INTRAVENOUS; SUBCUTANEOUS at 15:27

## 2024-04-25 RX ADMIN — KETAMINE HYDROCHLORIDE 150 MG: 100 INJECTION, SOLUTION, CONCENTRATE INTRAMUSCULAR; INTRAVENOUS at 14:49

## 2024-04-25 RX ADMIN — Medication 50 MCG: at 14:56

## 2024-04-25 RX ADMIN — ATORVASTATIN CALCIUM 40 MG: 40 TABLET, FILM COATED ORAL at 19:19

## 2024-04-25 RX ADMIN — PANCRELIPASE LIPASE, PANCRELIPASE PROTEASE, PANCRELIPASE AMYLASE 20000 UNITS: 20000; 63000; 84000 CAPSULE, DELAYED RELEASE ORAL at 19:19

## 2024-04-25 RX ADMIN — ACETAMINOPHEN 1000 MG: 500 TABLET ORAL at 13:06

## 2024-04-25 RX ADMIN — BUDESONIDE INHALATION 500 MCG: 0.5 SUSPENSION RESPIRATORY (INHALATION) at 20:19

## 2024-04-25 RX ADMIN — SODIUM CHLORIDE 500 ML: 9 INJECTION, SOLUTION INTRAVENOUS at 12:45

## 2024-04-25 RX ADMIN — Medication 75 MCG/HR: at 18:18

## 2024-04-25 RX ADMIN — PIPERACILLIN AND TAZOBACTAM 3375 MG: 3; .375 INJECTION, POWDER, LYOPHILIZED, FOR SOLUTION INTRAVENOUS at 18:21

## 2024-04-25 RX ADMIN — FAMOTIDINE 20 MG: 10 INJECTION, SOLUTION INTRAVENOUS at 15:27

## 2024-04-25 RX ADMIN — Medication 50 MCG/HR: at 14:56

## 2024-04-25 RX ADMIN — SODIUM CHLORIDE, PRESERVATIVE FREE 10 ML: 5 INJECTION INTRAVENOUS at 19:18

## 2024-04-25 RX ADMIN — MINERAL OIL, WHITE PETROLATUM: .03; .94 OINTMENT OPHTHALMIC at 19:18

## 2024-04-25 ASSESSMENT — PULMONARY FUNCTION TESTS
PIF_VALUE: 33
PIF_VALUE: 34
PIF_VALUE: 31
PIF_VALUE: 34
PIF_VALUE: 35
PIF_VALUE: 29
PIF_VALUE: 35
PIF_VALUE: 37
PIF_VALUE: 35
PIF_VALUE: 40

## 2024-04-25 ASSESSMENT — PAIN SCALES - GENERAL
PAINLEVEL_OUTOF10: 0
PAINLEVEL_OUTOF10: 0

## 2024-04-25 ASSESSMENT — LIFESTYLE VARIABLES
HOW MANY STANDARD DRINKS CONTAINING ALCOHOL DO YOU HAVE ON A TYPICAL DAY: PATIENT DOES NOT DRINK
HOW OFTEN DO YOU HAVE A DRINK CONTAINING ALCOHOL: NEVER

## 2024-04-25 NOTE — ED PROVIDER NOTES
mis-transcribed.)    Saran Nguyen DO (electronically signed)            Saran Nguyen,   04/25/24 1640

## 2024-04-25 NOTE — ED NOTES
1430 Dr. Nguyen at bedside preparing to intubate.  1449 Ketamine 150 mg given  1450 Rocuronium 100 mg given  1454 Intubated w 7.5 ETT, 23 @ lip    1456 Fentanyl 50 mcg bolus given then infusion of Fentanyl 50 mcg started @ 1456

## 2024-04-25 NOTE — FLOWSHEET NOTE
Patient continues to reach for ETT despite re-orientation when unrestrained. Bilateral soft wrist restraints applied

## 2024-04-26 ENCOUNTER — APPOINTMENT (OUTPATIENT)
Dept: GENERAL RADIOLOGY | Age: 69
DRG: 208 | End: 2024-04-26
Payer: MEDICARE

## 2024-04-26 LAB
25(OH)D3 SERPL-MCNC: 37.2 NG/ML (ref 30–100)
AADO2: 132.6 MMHG
AADO2: 180.1 MMHG
ANION GAP SERPL CALCULATED.3IONS-SCNC: 12 MMOL/L (ref 7–16)
ANION GAP SERPL CALCULATED.3IONS-SCNC: 18 MMOL/L (ref 7–16)
B.E.: 4.8 MMOL/L (ref -3–3)
B.E.: 5.7 MMOL/L (ref -3–3)
BASOPHILS # BLD: 0 K/UL (ref 0–0.2)
BASOPHILS NFR BLD: 0 % (ref 0–2)
BUN SERPL-MCNC: 19 MG/DL (ref 6–23)
BUN SERPL-MCNC: 22 MG/DL (ref 6–23)
CALCIUM SERPL-MCNC: 8.6 MG/DL (ref 8.6–10.2)
CALCIUM SERPL-MCNC: 9 MG/DL (ref 8.6–10.2)
CHLORIDE SERPL-SCNC: 94 MMOL/L (ref 98–107)
CHLORIDE SERPL-SCNC: 96 MMOL/L (ref 98–107)
CO2 SERPL-SCNC: 27 MMOL/L (ref 22–29)
CO2 SERPL-SCNC: 32 MMOL/L (ref 22–29)
COHB: 0.1 % (ref 0–1.5)
COHB: 0.3 % (ref 0–1.5)
CREAT SERPL-MCNC: 0.6 MG/DL (ref 0.5–1)
CREAT SERPL-MCNC: 0.6 MG/DL (ref 0.5–1)
CRITICAL: ABNORMAL
CRITICAL: ABNORMAL
DATE ANALYZED: ABNORMAL
DATE ANALYZED: ABNORMAL
DATE OF COLLECTION: ABNORMAL
DATE OF COLLECTION: ABNORMAL
EKG ATRIAL RATE: 118 BPM
EKG ATRIAL RATE: 133 BPM
EKG P AXIS: 85 DEGREES
EKG P-R INTERVAL: 136 MS
EKG Q-T INTERVAL: 314 MS
EKG Q-T INTERVAL: 384 MS
EKG QRS DURATION: 70 MS
EKG QRS DURATION: 72 MS
EKG QTC CALCULATION (BAZETT): 440 MS
EKG QTC CALCULATION (BAZETT): 568 MS
EKG R AXIS: 69 DEGREES
EKG R AXIS: 71 DEGREES
EKG T AXIS: 106 DEGREES
EKG T AXIS: 115 DEGREES
EKG VENTRICULAR RATE: 118 BPM
EKG VENTRICULAR RATE: 132 BPM
EOSINOPHIL # BLD: 0 K/UL (ref 0.05–0.5)
EOSINOPHILS RELATIVE PERCENT: 0 % (ref 0–6)
ERYTHROCYTE [DISTWIDTH] IN BLOOD BY AUTOMATED COUNT: 18.8 % (ref 11.5–15)
FIO2: 40 %
FIO2: 40 %
GFR SERPL CREATININE-BSD FRML MDRD: >90 ML/MIN/1.73M2
GFR SERPL CREATININE-BSD FRML MDRD: >90 ML/MIN/1.73M2
GLUCOSE BLD-MCNC: 118 MG/DL (ref 74–99)
GLUCOSE BLD-MCNC: 124 MG/DL (ref 74–99)
GLUCOSE BLD-MCNC: 269 MG/DL (ref 74–99)
GLUCOSE BLD-MCNC: 273 MG/DL (ref 74–99)
GLUCOSE SERPL-MCNC: 223 MG/DL (ref 74–99)
GLUCOSE SERPL-MCNC: 272 MG/DL (ref 74–99)
HCO3: 29.1 MMOL/L (ref 22–26)
HCO3: 29.9 MMOL/L (ref 22–26)
HCT VFR BLD AUTO: 26 % (ref 34–48)
HCT VFR BLD AUTO: 29.5 % (ref 34–48)
HGB BLD-MCNC: 7.6 G/DL (ref 11.5–15.5)
HGB BLD-MCNC: 8.4 G/DL (ref 11.5–15.5)
HHB: 11 % (ref 0–5)
HHB: 2.2 % (ref 0–5)
L PNEUMO1 AG UR QL IA.RAPID: NEGATIVE
LAB: ABNORMAL
LAB: ABNORMAL
LYMPHOCYTES NFR BLD: 0.49 K/UL (ref 1.5–4)
LYMPHOCYTES RELATIVE PERCENT: 8 % (ref 20–42)
Lab: 45
Lab: 455
MAGNESIUM SERPL-MCNC: 1.7 MG/DL (ref 1.6–2.6)
MAGNESIUM SERPL-MCNC: 2.2 MG/DL (ref 1.6–2.6)
MCH RBC QN AUTO: 23.6 PG (ref 26–35)
MCHC RBC AUTO-ENTMCNC: 29.2 G/DL (ref 32–34.5)
MCV RBC AUTO: 80.7 FL (ref 80–99.9)
METAMYELOCYTES ABSOLUTE COUNT: 0.05 K/UL (ref 0–0.12)
METAMYELOCYTES: 1 % (ref 0–1)
METHB: 0.4 % (ref 0–1.5)
METHB: 0.5 % (ref 0–1.5)
MODE: AC
MODE: AC
MONOCYTES NFR BLD: 0.11 K/UL (ref 0.1–0.95)
MONOCYTES NFR BLD: 2 % (ref 2–12)
NEUTROPHILS NFR BLD: 90 % (ref 43–80)
NEUTS SEG NFR BLD: 5.64 K/UL (ref 1.8–7.3)
O2 CONTENT: 10.5 ML/DL
O2 CONTENT: 12.6 ML/DL
O2 SATURATION: 88.9 % (ref 92–98.5)
O2 SATURATION: 97.8 % (ref 92–98.5)
O2HB: 88.2 % (ref 94–97)
O2HB: 97.3 % (ref 94–97)
OPERATOR ID: 2593
OPERATOR ID: 3342
PATIENT TEMP: 37 C
PATIENT TEMP: 37 C
PCO2: 42 MMHG (ref 35–45)
PCO2: 42.1 MMHG (ref 35–45)
PEEP/CPAP: 5 CMH2O
PEEP/CPAP: 5 CMH2O
PFO2: 1.42 MMHG/%
PFO2: 2.61 MMHG/%
PH BLOOD GAS: 7.46 (ref 7.35–7.45)
PH BLOOD GAS: 7.47 (ref 7.35–7.45)
PHOSPHATE SERPL-MCNC: 2.5 MG/DL (ref 2.5–4.5)
PHOSPHATE SERPL-MCNC: 3 MG/DL (ref 2.5–4.5)
PLATELET # BLD AUTO: 254 K/UL (ref 130–450)
PMV BLD AUTO: 9.4 FL (ref 7–12)
PO2: 104.3 MMHG (ref 75–100)
PO2: 56.7 MMHG (ref 75–100)
POTASSIUM SERPL-SCNC: 3.6 MMOL/L (ref 3.5–5)
POTASSIUM SERPL-SCNC: 4 MMOL/L (ref 3.5–5)
RBC # BLD AUTO: 3.22 M/UL (ref 3.5–5.5)
RBC # BLD: ABNORMAL 10*6/UL
RI(T): 1.27
RI(T): 3.18
RR MECHANICAL: 14 B/MIN
RR MECHANICAL: 14 B/MIN
S PNEUM AG SPEC QL: NEGATIVE
SODIUM SERPL-SCNC: 139 MMOL/L (ref 132–146)
SODIUM SERPL-SCNC: 140 MMOL/L (ref 132–146)
SOURCE, BLOOD GAS: ABNORMAL
SOURCE, BLOOD GAS: ABNORMAL
SPECIMEN SOURCE: NORMAL
THB: 8.4 G/DL (ref 11.5–16.5)
THB: 9.1 G/DL (ref 11.5–16.5)
TIME ANALYZED: 459
TIME ANALYZED: 49
TSH SERPL DL<=0.05 MIU/L-ACNC: 0.45 UIU/ML (ref 0.27–4.2)
VT MECHANICAL: 450 ML
VT MECHANICAL: 450 ML
WBC OTHER # BLD: 6.3 K/UL (ref 4.5–11.5)

## 2024-04-26 PROCEDURE — 82962 GLUCOSE BLOOD TEST: CPT

## 2024-04-26 PROCEDURE — 85018 HEMOGLOBIN: CPT

## 2024-04-26 PROCEDURE — A4216 STERILE WATER/SALINE, 10 ML: HCPCS | Performed by: STUDENT IN AN ORGANIZED HEALTH CARE EDUCATION/TRAINING PROGRAM

## 2024-04-26 PROCEDURE — 84439 ASSAY OF FREE THYROXINE: CPT

## 2024-04-26 PROCEDURE — 94640 AIRWAY INHALATION TREATMENT: CPT

## 2024-04-26 PROCEDURE — 93010 ELECTROCARDIOGRAM REPORT: CPT | Performed by: INTERNAL MEDICINE

## 2024-04-26 PROCEDURE — 82805 BLOOD GASES W/O2 SATURATION: CPT

## 2024-04-26 PROCEDURE — 2580000003 HC RX 258

## 2024-04-26 PROCEDURE — 6370000000 HC RX 637 (ALT 250 FOR IP)

## 2024-04-26 PROCEDURE — 94003 VENT MGMT INPAT SUBQ DAY: CPT

## 2024-04-26 PROCEDURE — 71045 X-RAY EXAM CHEST 1 VIEW: CPT

## 2024-04-26 PROCEDURE — 6370000000 HC RX 637 (ALT 250 FOR IP): Performed by: INTERNAL MEDICINE

## 2024-04-26 PROCEDURE — 85014 HEMATOCRIT: CPT

## 2024-04-26 PROCEDURE — 2500000003 HC RX 250 WO HCPCS: Performed by: STUDENT IN AN ORGANIZED HEALTH CARE EDUCATION/TRAINING PROGRAM

## 2024-04-26 PROCEDURE — 82306 VITAMIN D 25 HYDROXY: CPT

## 2024-04-26 PROCEDURE — 80048 BASIC METABOLIC PNL TOTAL CA: CPT

## 2024-04-26 PROCEDURE — 6360000002 HC RX W HCPCS: Performed by: STUDENT IN AN ORGANIZED HEALTH CARE EDUCATION/TRAINING PROGRAM

## 2024-04-26 PROCEDURE — 99291 CRITICAL CARE FIRST HOUR: CPT | Performed by: INTERNAL MEDICINE

## 2024-04-26 PROCEDURE — 83735 ASSAY OF MAGNESIUM: CPT

## 2024-04-26 PROCEDURE — 6370000000 HC RX 637 (ALT 250 FOR IP): Performed by: STUDENT IN AN ORGANIZED HEALTH CARE EDUCATION/TRAINING PROGRAM

## 2024-04-26 PROCEDURE — 2500000003 HC RX 250 WO HCPCS

## 2024-04-26 PROCEDURE — 6360000002 HC RX W HCPCS

## 2024-04-26 PROCEDURE — 2000000000 HC ICU R&B

## 2024-04-26 PROCEDURE — 84443 ASSAY THYROID STIM HORMONE: CPT

## 2024-04-26 PROCEDURE — C9113 INJ PANTOPRAZOLE SODIUM, VIA: HCPCS | Performed by: STUDENT IN AN ORGANIZED HEALTH CARE EDUCATION/TRAINING PROGRAM

## 2024-04-26 PROCEDURE — 2580000003 HC RX 258: Performed by: STUDENT IN AN ORGANIZED HEALTH CARE EDUCATION/TRAINING PROGRAM

## 2024-04-26 PROCEDURE — 85025 COMPLETE CBC W/AUTO DIFF WBC: CPT

## 2024-04-26 PROCEDURE — 84100 ASSAY OF PHOSPHORUS: CPT

## 2024-04-26 PROCEDURE — 2700000000 HC OXYGEN THERAPY PER DAY

## 2024-04-26 RX ORDER — MAGNESIUM SULFATE 1 G/100ML
1000 INJECTION INTRAVENOUS ONCE
Status: COMPLETED | OUTPATIENT
Start: 2024-04-26 | End: 2024-04-26

## 2024-04-26 RX ORDER — POTASSIUM CHLORIDE 7.45 MG/ML
10 INJECTION INTRAVENOUS
Status: COMPLETED | OUTPATIENT
Start: 2024-04-26 | End: 2024-04-26

## 2024-04-26 RX ORDER — INSULIN LISPRO 100 [IU]/ML
0-4 INJECTION, SOLUTION INTRAVENOUS; SUBCUTANEOUS EVERY 4 HOURS
Status: DISCONTINUED | OUTPATIENT
Start: 2024-04-26 | End: 2024-04-26

## 2024-04-26 RX ORDER — INSULIN LISPRO 100 [IU]/ML
0-4 INJECTION, SOLUTION INTRAVENOUS; SUBCUTANEOUS
Status: DISCONTINUED | OUTPATIENT
Start: 2024-04-26 | End: 2024-04-26

## 2024-04-26 RX ORDER — INSULIN LISPRO 100 [IU]/ML
0-4 INJECTION, SOLUTION INTRAVENOUS; SUBCUTANEOUS NIGHTLY
Status: DISCONTINUED | OUTPATIENT
Start: 2024-04-26 | End: 2024-04-26

## 2024-04-26 RX ORDER — DEXTROSE MONOHYDRATE 100 MG/ML
INJECTION, SOLUTION INTRAVENOUS CONTINUOUS PRN
Status: DISCONTINUED | OUTPATIENT
Start: 2024-04-26 | End: 2024-05-01 | Stop reason: HOSPADM

## 2024-04-26 RX ORDER — SODIUM CHLORIDE FOR INHALATION 3 %
4 VIAL, NEBULIZER (ML) INHALATION DAILY
Status: DISCONTINUED | OUTPATIENT
Start: 2024-04-26 | End: 2024-05-01 | Stop reason: HOSPADM

## 2024-04-26 RX ORDER — GLUCAGON 1 MG/ML
1 KIT INJECTION PRN
Status: DISCONTINUED | OUTPATIENT
Start: 2024-04-26 | End: 2024-05-01 | Stop reason: HOSPADM

## 2024-04-26 RX ORDER — METOPROLOL TARTRATE 1 MG/ML
5 INJECTION, SOLUTION INTRAVENOUS ONCE
Status: COMPLETED | OUTPATIENT
Start: 2024-04-26 | End: 2024-04-26

## 2024-04-26 RX ORDER — ENOXAPARIN SODIUM 100 MG/ML
40 INJECTION SUBCUTANEOUS DAILY
Status: DISCONTINUED | OUTPATIENT
Start: 2024-04-26 | End: 2024-05-01 | Stop reason: HOSPADM

## 2024-04-26 RX ORDER — INSULIN LISPRO 100 [IU]/ML
0-4 INJECTION, SOLUTION INTRAVENOUS; SUBCUTANEOUS
Status: DISCONTINUED | OUTPATIENT
Start: 2024-04-26 | End: 2024-05-01 | Stop reason: HOSPADM

## 2024-04-26 RX ADMIN — POTASSIUM CHLORIDE 10 MEQ: 10 INJECTION, SOLUTION INTRAVENOUS at 09:08

## 2024-04-26 RX ADMIN — WATER 40 MG: 1 INJECTION INTRAMUSCULAR; INTRAVENOUS; SUBCUTANEOUS at 08:29

## 2024-04-26 RX ADMIN — POTASSIUM CHLORIDE 10 MEQ: 10 INJECTION, SOLUTION INTRAVENOUS at 10:08

## 2024-04-26 RX ADMIN — BUDESONIDE INHALATION 500 MCG: 0.5 SUSPENSION RESPIRATORY (INHALATION) at 08:46

## 2024-04-26 RX ADMIN — Medication 4 ML: at 11:26

## 2024-04-26 RX ADMIN — Medication 75 MCG/HR: at 01:12

## 2024-04-26 RX ADMIN — DOXYCYCLINE 100 MG: 100 INJECTION, POWDER, LYOPHILIZED, FOR SOLUTION INTRAVENOUS at 15:32

## 2024-04-26 RX ADMIN — PANTOPRAZOLE SODIUM 40 MG: 40 INJECTION, POWDER, FOR SOLUTION INTRAVENOUS at 08:29

## 2024-04-26 RX ADMIN — POTASSIUM CHLORIDE 10 MEQ: 10 INJECTION, SOLUTION INTRAVENOUS at 14:00

## 2024-04-26 RX ADMIN — SODIUM CHLORIDE: 9 INJECTION, SOLUTION INTRAVENOUS at 01:30

## 2024-04-26 RX ADMIN — SODIUM PHOSPHATE, MONOBASIC, MONOHYDRATE AND SODIUM PHOSPHATE, DIBASIC, ANHYDROUS 15 MMOL: 142; 276 INJECTION, SOLUTION INTRAVENOUS at 18:30

## 2024-04-26 RX ADMIN — HYDRALAZINE HYDROCHLORIDE 10 MG: 20 INJECTION, SOLUTION INTRAMUSCULAR; INTRAVENOUS at 09:24

## 2024-04-26 RX ADMIN — ASPIRIN 81 MG: 81 TABLET, CHEWABLE ORAL at 08:30

## 2024-04-26 RX ADMIN — PIPERACILLIN AND TAZOBACTAM 3375 MG: 3; .375 INJECTION, POWDER, LYOPHILIZED, FOR SOLUTION INTRAVENOUS at 08:40

## 2024-04-26 RX ADMIN — ARFORMOTEROL TARTRATE 15 MCG: 15 SOLUTION RESPIRATORY (INHALATION) at 08:46

## 2024-04-26 RX ADMIN — IPRATROPIUM BROMIDE AND ALBUTEROL SULFATE 1 DOSE: 2.5; .5 SOLUTION RESPIRATORY (INHALATION) at 20:18

## 2024-04-26 RX ADMIN — IPRATROPIUM BROMIDE AND ALBUTEROL SULFATE 1 DOSE: 2.5; .5 SOLUTION RESPIRATORY (INHALATION) at 04:27

## 2024-04-26 RX ADMIN — ENOXAPARIN SODIUM 40 MG: 100 INJECTION SUBCUTANEOUS at 12:24

## 2024-04-26 RX ADMIN — PANCRELIPASE LIPASE, PANCRELIPASE PROTEASE, PANCRELIPASE AMYLASE 15000 UNITS: 15000; 47000; 63000 CAPSULE, DELAYED RELEASE ORAL at 19:10

## 2024-04-26 RX ADMIN — POLYVINYL ALCOHOL, POVIDONE 1 DROP: 14; 6 SOLUTION/ DROPS OPHTHALMIC at 12:14

## 2024-04-26 RX ADMIN — MINERAL OIL, WHITE PETROLATUM: .03; .94 OINTMENT OPHTHALMIC at 03:17

## 2024-04-26 RX ADMIN — INSULIN LISPRO 2 UNITS: 100 INJECTION, SOLUTION INTRAVENOUS; SUBCUTANEOUS at 12:13

## 2024-04-26 RX ADMIN — POTASSIUM CHLORIDE 10 MEQ: 10 INJECTION, SOLUTION INTRAVENOUS at 12:13

## 2024-04-26 RX ADMIN — DOXYCYCLINE 100 MG: 100 INJECTION, POWDER, LYOPHILIZED, FOR SOLUTION INTRAVENOUS at 05:34

## 2024-04-26 RX ADMIN — POLYVINYL ALCOHOL, POVIDONE 1 DROP: 14; 6 SOLUTION/ DROPS OPHTHALMIC at 08:45

## 2024-04-26 RX ADMIN — METOPROLOL TARTRATE 5 MG: 1 INJECTION, SOLUTION INTRAVENOUS at 14:19

## 2024-04-26 RX ADMIN — MAGNESIUM SULFATE HEPTAHYDRATE 1000 MG: 1 INJECTION, SOLUTION INTRAVENOUS at 09:11

## 2024-04-26 RX ADMIN — IPRATROPIUM BROMIDE AND ALBUTEROL SULFATE 1 DOSE: 2.5; .5 SOLUTION RESPIRATORY (INHALATION) at 11:27

## 2024-04-26 RX ADMIN — SODIUM CHLORIDE, PRESERVATIVE FREE 10 ML: 5 INJECTION INTRAVENOUS at 08:28

## 2024-04-26 RX ADMIN — ATORVASTATIN CALCIUM 40 MG: 40 TABLET, FILM COATED ORAL at 20:51

## 2024-04-26 RX ADMIN — BUDESONIDE INHALATION 500 MCG: 0.5 SUSPENSION RESPIRATORY (INHALATION) at 20:18

## 2024-04-26 RX ADMIN — PIPERACILLIN AND TAZOBACTAM 3375 MG: 3; .375 INJECTION, POWDER, LYOPHILIZED, FOR SOLUTION INTRAVENOUS at 01:31

## 2024-04-26 RX ADMIN — ARFORMOTEROL TARTRATE 15 MCG: 15 SOLUTION RESPIRATORY (INHALATION) at 20:18

## 2024-04-26 RX ADMIN — PIPERACILLIN AND TAZOBACTAM 3375 MG: 3; .375 INJECTION, POWDER, LYOPHILIZED, FOR SOLUTION INTRAVENOUS at 18:21

## 2024-04-26 ASSESSMENT — PULMONARY FUNCTION TESTS
PIF_VALUE: 13
PIF_VALUE: 32
PIF_VALUE: 14
PIF_VALUE: 14
PIF_VALUE: 34
PIF_VALUE: 32
PIF_VALUE: 14
PIF_VALUE: 31
PIF_VALUE: 13
PIF_VALUE: 14
PIF_VALUE: 14
PIF_VALUE: 13
PIF_VALUE: 31
PIF_VALUE: 32
PIF_VALUE: 34
PIF_VALUE: 34
PIF_VALUE: 33
PIF_VALUE: 32
PIF_VALUE: 33
PIF_VALUE: 31

## 2024-04-26 ASSESSMENT — PAIN SCALES - GENERAL
PAINLEVEL_OUTOF10: 0

## 2024-04-26 NOTE — CARE COORDINATION
Care coordination:  69 yr old presented to ER in respiratory distress.   Hx of COPD , recently hospitalized and discharged on 4/17.  3L baseline.  Radu is provider. Placed on vent.  Extubated to 3 L today. IV steroids and doxy and Zosyn. Reviewed chart and met briefly with patient.  Alert and verbal since extubated.   She lives alone in 2 story home with 3 entry steps. States her brother sometimes stays with her.  Her primary contact is sister who lives of [place] [facility] the [facility].    SW updated her on patient status .  Has chronic Del Rio .  Receives home health aid  services through  a Waiver directed by [facility]   Sandra Reed [phone]..  Aid 7 days a week for 4 hours.   RN visits monthly for cath change.  Patient also receives physical therapy 3 time per week at [facility].  They provide transportation.  PCP is  Lee Vides.  Pharmacy is [facility] on [address] .  Has previous hx of stay at [facility] and  previous services from [facility] .   Her plan is to return home.  She will need transportation.  She has her portable o2 here but states she forgot the cord so will digna a tank to get home.  Follow clinical course for discharge needs.  PT OT orders when appropriate.     Case Management Assessment  Initial Evaluation    Date/Time of Evaluation: 4/26/2024 3:29 PM  Assessment Completed by: EUGENE Delcid    If patient is discharged prior to next notation, then this note serves as note for discharge by case management.    Patient Name: Ayesha Keane                   YOB: 1955  Diagnosis: Respiratory distress [R06.03]  Acute on chronic respiratory failure with hypercapnia (HCC) [J96.22]  Human metapneumovirus (hMPV) pneumonia [J12.3]                   Date / Time: 4/25/2024 12:16 PM    Patient Admission Status: Inpatient   Readmission Risk (Low < 19, Mod (19-27), High > 27): Readmission Risk Score: 18.9    Current PCP: eLe Vides MD  PCP verified by CM?      Chart

## 2024-04-26 NOTE — ACP (ADVANCE CARE PLANNING)
Advance Care Planning   The patient has the following advanced directives on file:  Advance Directives       Power of  Living Will ACP-Advance Directive ACP-Power of     Not on File Filed on 09/22/13 Filed Not on File            The patient has appointed the following active healthcare agents:    Primary Decision Maker: Nelly mock - Brother/Sister - [phone]    The Patient has the following current code status:    Code Status: Full Code    Visit Documentation:  verified    EUGENE Delcid  4/26/2024

## 2024-04-26 NOTE — H&P
[facility]  History and Physical      CHIEF COMPLAINT:    Chief Complaint   Patient presents with    Shortness of Breath     SOB worsening yesterday. Hx COPD. Wears 3L NC at baseline. 2 duoneb and 125mg solumedrol given by EMS>         Patient of Lee Vides MD presents with:  Acute on chronic respiratory failure with hypercapnia (HCC)    History of Present Illness:   Patient is a 69-year-old female with a past medical history of asthma, COPD, chronic pancreatitis, depression, dysphagia, emphysema lung, oxygen dependent on 3 L.  Patient presented to the ED with complaints of respiratory distress.  Patient's baseline O2 needs is 3 L at home.  Patient reported she was hypoxic and the EMS was called for worsening shortness of breath.  Patient was given 125 mg of Solu-Medrol as well as DuoNebs and route to the ED.  Upon arrival she is speaking 2-3 word sentences using accessory muscle and she was in obvious respiratory distress.  Patient did have fever upon arrival as well.  Patient was intubated in the ED, respiratory panel positive for human metapneumovirus, urinalysis negative, pH on ABG is 7.077, pCO2 of 115.  Patient is admitted to the ICU.  On evaluation patient is resting comfortably in no acute distress.  She indicates she feels much better.  Her oxygen saturation was down in the low 80s upper 70s at home at which point her home nursing aide directed her to the ER.  On my evaluation she is extubated and comfortable on about 5 L by nasal cannula.    REVIEW OF SYSTEMS:  Pertinent negatives are above in HPI.  10 point ROS otherwise negative.      Past Medical History:   Diagnosis Date    Asthma     Chronic pancreatitis (HCC)     COPD (chronic obstructive pulmonary disease) ([facility])     uses )3 prn daily and nightly / Dr. Blackwell F/U monthly    Depression     Dysphagia 2021    Emphysema lung ([facility])     Del Rio catheter in place     continuous since 2013, changed monthly at [facility]    Oxygen dependent

## 2024-04-26 NOTE — PLAN OF CARE
Problem: Safety - Medical Restraint  Goal: Remains free of injury from restraints (Restraint for Interference with Medical Device)  Description: INTERVENTIONS:  1. Determine that other, less restrictive measures have been tried or would not be effective before applying the restraint  2. Evaluate the patient's condition at the time of restraint application  3. Inform patient/family regarding the reason for restraint  4. Q2H: Monitor safety, psychosocial status, comfort, nutrition and hydration  Outcome: Completed  Flowsheets  Taken 4/26/2024 0830 by Sarita Davis RN  Remains free of injury from restraints (restraint for interference with medical device):   Determine that other, less restrictive measures have been tried or would not be effective before applying the restraint   Evaluate the patient's condition at the time of restraint application   Inform patient/family regarding the reason for restraint   Every 2 hours: Monitor safety, psychosocial status, comfort, nutrition and hydration  Taken 4/26/2024 0600 by Guillermo Garay RN  Remains free of injury from restraints (restraint for interference with medical device): Every 2 hours: Monitor safety, psychosocial status, comfort, nutrition and hydration  Taken 4/26/2024 0400 by Guillermo Garay RN  Remains free of injury from restraints (restraint for interference with medical device): Every 2 hours: Monitor safety, psychosocial status, comfort, nutrition and hydration  Taken 4/26/2024 0200 by Guillermo Garay RN  Remains free of injury from restraints (restraint for interference with medical device): Every 2 hours: Monitor safety, psychosocial status, comfort, nutrition and hydration

## 2024-04-27 LAB
ANION GAP SERPL CALCULATED.3IONS-SCNC: 15 MMOL/L (ref 7–16)
BASOPHILS # BLD: 0 K/UL (ref 0–0.2)
BASOPHILS NFR BLD: 0 % (ref 0–2)
BUN SERPL-MCNC: 14 MG/DL (ref 6–23)
CALCIUM SERPL-MCNC: 8.8 MG/DL (ref 8.6–10.2)
CHLORIDE SERPL-SCNC: 100 MMOL/L (ref 98–107)
CO2 SERPL-SCNC: 27 MMOL/L (ref 22–29)
CREAT SERPL-MCNC: 0.6 MG/DL (ref 0.5–1)
EOSINOPHIL # BLD: 0 K/UL (ref 0.05–0.5)
EOSINOPHILS RELATIVE PERCENT: 0 % (ref 0–6)
ERYTHROCYTE [DISTWIDTH] IN BLOOD BY AUTOMATED COUNT: 19.8 % (ref 11.5–15)
GFR SERPL CREATININE-BSD FRML MDRD: >90 ML/MIN/1.73M2
GLUCOSE BLD-MCNC: 171 MG/DL (ref 74–99)
GLUCOSE BLD-MCNC: 229 MG/DL (ref 74–99)
GLUCOSE BLD-MCNC: 232 MG/DL (ref 74–99)
GLUCOSE SERPL-MCNC: 109 MG/DL (ref 74–99)
HCT VFR BLD AUTO: 26.4 % (ref 34–48)
HGB BLD-MCNC: 7.4 G/DL (ref 11.5–15.5)
LYMPHOCYTES NFR BLD: 1.71 K/UL (ref 1.5–4)
LYMPHOCYTES RELATIVE PERCENT: 13 % (ref 20–42)
MAGNESIUM SERPL-MCNC: 2.3 MG/DL (ref 1.6–2.6)
MCH RBC QN AUTO: 23.8 PG (ref 26–35)
MCHC RBC AUTO-ENTMCNC: 28 G/DL (ref 32–34.5)
MCV RBC AUTO: 84.9 FL (ref 80–99.9)
MICROORGANISM SPEC CULT: NORMAL
MICROORGANISM SPEC CULT: NORMAL
MICROORGANISM/AGENT SPEC: NORMAL
MONOCYTES NFR BLD: 0.68 K/UL (ref 0.1–0.95)
MONOCYTES NFR BLD: 5 % (ref 2–12)
NEUTROPHILS NFR BLD: 82 % (ref 43–80)
NEUTS SEG NFR BLD: 10.71 K/UL (ref 1.8–7.3)
PHOSPHATE SERPL-MCNC: 4.3 MG/DL (ref 2.5–4.5)
PLATELET # BLD AUTO: 265 K/UL (ref 130–450)
PMV BLD AUTO: 9.7 FL (ref 7–12)
POTASSIUM SERPL-SCNC: 3.9 MMOL/L (ref 3.5–5)
RBC # BLD AUTO: 3.11 M/UL (ref 3.5–5.5)
RBC # BLD: ABNORMAL 10*6/UL
SODIUM SERPL-SCNC: 142 MMOL/L (ref 132–146)
SPECIMEN DESCRIPTION: NORMAL
SPECIMEN DESCRIPTION: NORMAL
T4 FREE SERPL-MCNC: 1.1 NG/DL (ref 0.9–1.7)
WBC OTHER # BLD: 13.1 K/UL (ref 4.5–11.5)

## 2024-04-27 PROCEDURE — 6370000000 HC RX 637 (ALT 250 FOR IP)

## 2024-04-27 PROCEDURE — 6370000000 HC RX 637 (ALT 250 FOR IP): Performed by: STUDENT IN AN ORGANIZED HEALTH CARE EDUCATION/TRAINING PROGRAM

## 2024-04-27 PROCEDURE — 84100 ASSAY OF PHOSPHORUS: CPT

## 2024-04-27 PROCEDURE — 94640 AIRWAY INHALATION TREATMENT: CPT

## 2024-04-27 PROCEDURE — 2060000000 HC ICU INTERMEDIATE R&B

## 2024-04-27 PROCEDURE — 82962 GLUCOSE BLOOD TEST: CPT

## 2024-04-27 PROCEDURE — 2500000003 HC RX 250 WO HCPCS: Performed by: STUDENT IN AN ORGANIZED HEALTH CARE EDUCATION/TRAINING PROGRAM

## 2024-04-27 PROCEDURE — 6360000002 HC RX W HCPCS: Performed by: STUDENT IN AN ORGANIZED HEALTH CARE EDUCATION/TRAINING PROGRAM

## 2024-04-27 PROCEDURE — 6360000002 HC RX W HCPCS

## 2024-04-27 PROCEDURE — 85025 COMPLETE CBC W/AUTO DIFF WBC: CPT

## 2024-04-27 PROCEDURE — 2580000003 HC RX 258: Performed by: STUDENT IN AN ORGANIZED HEALTH CARE EDUCATION/TRAINING PROGRAM

## 2024-04-27 PROCEDURE — 80048 BASIC METABOLIC PNL TOTAL CA: CPT

## 2024-04-27 PROCEDURE — 99232 SBSQ HOSP IP/OBS MODERATE 35: CPT | Performed by: INTERNAL MEDICINE

## 2024-04-27 PROCEDURE — 83735 ASSAY OF MAGNESIUM: CPT

## 2024-04-27 PROCEDURE — 2700000000 HC OXYGEN THERAPY PER DAY

## 2024-04-27 RX ORDER — PREDNISONE 20 MG/1
40 TABLET ORAL DAILY
Status: DISCONTINUED | OUTPATIENT
Start: 2024-04-27 | End: 2024-04-30

## 2024-04-27 RX ORDER — CEFDINIR 300 MG/1
300 CAPSULE ORAL EVERY 12 HOURS SCHEDULED
Status: DISCONTINUED | OUTPATIENT
Start: 2024-04-27 | End: 2024-05-01 | Stop reason: HOSPADM

## 2024-04-27 RX ORDER — DOXYCYCLINE HYCLATE 100 MG/1
100 CAPSULE ORAL EVERY 12 HOURS SCHEDULED
Status: COMPLETED | OUTPATIENT
Start: 2024-04-27 | End: 2024-05-01

## 2024-04-27 RX ORDER — POTASSIUM CHLORIDE 750 MG/1
10 TABLET, EXTENDED RELEASE ORAL ONCE
Status: COMPLETED | OUTPATIENT
Start: 2024-04-27 | End: 2024-04-27

## 2024-04-27 RX ADMIN — INSULIN LISPRO 1 UNITS: 100 INJECTION, SOLUTION INTRAVENOUS; SUBCUTANEOUS at 12:17

## 2024-04-27 RX ADMIN — PREDNISONE 40 MG: 20 TABLET ORAL at 15:08

## 2024-04-27 RX ADMIN — BUDESONIDE INHALATION 500 MCG: 0.5 SUSPENSION RESPIRATORY (INHALATION) at 08:38

## 2024-04-27 RX ADMIN — PIPERACILLIN AND TAZOBACTAM 3375 MG: 3; .375 INJECTION, POWDER, LYOPHILIZED, FOR SOLUTION INTRAVENOUS at 01:20

## 2024-04-27 RX ADMIN — ACETAMINOPHEN 650 MG: 325 TABLET ORAL at 16:45

## 2024-04-27 RX ADMIN — PANCRELIPASE LIPASE, PANCRELIPASE PROTEASE, PANCRELIPASE AMYLASE 15000 UNITS: 15000; 47000; 63000 CAPSULE, DELAYED RELEASE ORAL at 16:46

## 2024-04-27 RX ADMIN — ARFORMOTEROL TARTRATE 15 MCG: 15 SOLUTION RESPIRATORY (INHALATION) at 20:57

## 2024-04-27 RX ADMIN — DOXYCYCLINE 100 MG: 100 INJECTION, POWDER, LYOPHILIZED, FOR SOLUTION INTRAVENOUS at 05:37

## 2024-04-27 RX ADMIN — IPRATROPIUM BROMIDE AND ALBUTEROL SULFATE 1 DOSE: 2.5; .5 SOLUTION RESPIRATORY (INHALATION) at 20:57

## 2024-04-27 RX ADMIN — IPRATROPIUM BROMIDE AND ALBUTEROL SULFATE 1 DOSE: 2.5; .5 SOLUTION RESPIRATORY (INHALATION) at 03:42

## 2024-04-27 RX ADMIN — ACETAMINOPHEN 650 MG: 325 TABLET ORAL at 05:30

## 2024-04-27 RX ADMIN — WATER 40 MG: 1 INJECTION INTRAMUSCULAR; INTRAVENOUS; SUBCUTANEOUS at 08:47

## 2024-04-27 RX ADMIN — IPRATROPIUM BROMIDE AND ALBUTEROL SULFATE 1 DOSE: 2.5; .5 SOLUTION RESPIRATORY (INHALATION) at 13:15

## 2024-04-27 RX ADMIN — Medication 4 ML: at 13:15

## 2024-04-27 RX ADMIN — DOXYCYCLINE HYCLATE 100 MG: 100 CAPSULE ORAL at 16:46

## 2024-04-27 RX ADMIN — SODIUM CHLORIDE, PRESERVATIVE FREE 10 ML: 5 INJECTION INTRAVENOUS at 08:48

## 2024-04-27 RX ADMIN — ARFORMOTEROL TARTRATE 15 MCG: 15 SOLUTION RESPIRATORY (INHALATION) at 08:38

## 2024-04-27 RX ADMIN — PANCRELIPASE LIPASE, PANCRELIPASE PROTEASE, PANCRELIPASE AMYLASE 15000 UNITS: 15000; 47000; 63000 CAPSULE, DELAYED RELEASE ORAL at 12:11

## 2024-04-27 RX ADMIN — INSULIN LISPRO 1 UNITS: 100 INJECTION, SOLUTION INTRAVENOUS; SUBCUTANEOUS at 17:29

## 2024-04-27 RX ADMIN — CEFDINIR 300 MG: 300 CAPSULE ORAL at 21:20

## 2024-04-27 RX ADMIN — POTASSIUM CHLORIDE 10 MEQ: 750 TABLET, EXTENDED RELEASE ORAL at 08:48

## 2024-04-27 RX ADMIN — PANCRELIPASE LIPASE, PANCRELIPASE PROTEASE, PANCRELIPASE AMYLASE 15000 UNITS: 15000; 47000; 63000 CAPSULE, DELAYED RELEASE ORAL at 08:48

## 2024-04-27 RX ADMIN — ENOXAPARIN SODIUM 40 MG: 100 INJECTION SUBCUTANEOUS at 08:48

## 2024-04-27 RX ADMIN — BUDESONIDE INHALATION 500 MCG: 0.5 SUSPENSION RESPIRATORY (INHALATION) at 20:57

## 2024-04-27 RX ADMIN — ASPIRIN 81 MG: 81 TABLET, CHEWABLE ORAL at 08:48

## 2024-04-27 RX ADMIN — ATORVASTATIN CALCIUM 40 MG: 40 TABLET, FILM COATED ORAL at 21:20

## 2024-04-27 RX ADMIN — MINERAL OIL, WHITE PETROLATUM: .03; .94 OINTMENT OPHTHALMIC at 12:11

## 2024-04-27 ASSESSMENT — PAIN SCALES - GENERAL
PAINLEVEL_OUTOF10: 0
PAINLEVEL_OUTOF10: 2

## 2024-04-27 ASSESSMENT — PAIN DESCRIPTION - LOCATION: LOCATION: HEAD

## 2024-04-27 NOTE — CONSULTS
[facility]  Internal Medicine Residency Program  CONSULT NOTE  MICU    Patient:  Ayesha Keane 69 y.o. female MRN: [id number]     Date of Service: 4/25/2024    Hospital Day: 1      Chief complaint: Shortness of breath  History of Present Illness   The patient is a 69 y.o. 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  History obtained from chart review as patient is intubated.  The patient was recently admitted from 4/11/2024 to 4/16/2024 for acute on chronic hypercapnic respiratory insufficiency and was in ICU on BiPAP.  The patient's shortness of breath started to worsen yesterday.  EMS was called and patient was found to be saturating 70% on 3 L oxygen.  Patient was given 125 Solu-Medrol and DuoNebs and route to ED.  Patient was brought to the ED for further management.    ED Course: In the ED the patient was speaking in 2-3 word sentences, accessory muscle usage.  Denied any chest pain or hemoptysis or nausea vomiting.  She was febrile on arrival with temperature 100.3 °F.  Patient was tachycardic, normotensive.  ABG showed respiratory acidosis with pH 7.267, pCO2 87.4, pO2 25.9, bicarb 39.0.  Lactate 1.2.  CBC showed hemoglobin 8.9, no leukocytosis.  Mag 2.1, proBNP 124, Pro-Richy 0.03.  Respiratory panel came positive for human metapneumovirus.  Blood culture was sent.  Patient got nebulization with DuoNeb.  Chest x-ray showed no acute process.  CMP done, troponin 17.  Repeat ABG showed pH 7.077, pCO2 115, pO2 234 and 7, bicarb 33.1.  Patient was intubated for impending respiratory failure.  Patient was transferred to MICU for further management.  ED Meds: Patient was given DuoNeb nebulization, fentanyl, ketamine, rocuronium.  Ceftriaxone, famotidine, doxycycline, Decadron 10 mg  ED Fluids: Patient was given sodium chloride 500 bolus,    Past 
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  Patient was initially in ICU put on AVAPS.   11/14/2022-12/8/2022 acute exacerbation of COPD with hypercapnic respiratory failure requiring NIV.  She had UTI  CT abdomen pelvis with generalized thickened gallbladder cystitis, diverticulosis chronic calcified pancreas  Chest x-ray with stable chronic interstitial opacities emphysematous changes  11/16/2023 was in ER for UTI was given cefdinir  1/4/2024 was in ER with chest pain             Thank you for allowing us to see this patient in consultation.  Please contact us with any questions.      Electronically signed by ANGELA Mchugh NP on 4/27/2024 at 1:47 PM    I have personally participated in the history, exam, medical decision making with the NP on the date of service and I agree with all of the pertinent clinical information unless otherwise noted. I have

## 2024-04-27 NOTE — FLOWSHEET NOTE
Unable to obtain new IV site. IV team unable to place IV. Medical resident updated. No need for PICC line at present. See new med administration orders.

## 2024-04-28 LAB
ANION GAP SERPL CALCULATED.3IONS-SCNC: 9 MMOL/L (ref 7–16)
BASOPHILS # BLD: 0.01 K/UL (ref 0–0.2)
BASOPHILS NFR BLD: 0 % (ref 0–2)
BUN SERPL-MCNC: 19 MG/DL (ref 6–23)
CALCIUM SERPL-MCNC: 9.1 MG/DL (ref 8.6–10.2)
CHLORIDE SERPL-SCNC: 100 MMOL/L (ref 98–107)
CO2 SERPL-SCNC: 33 MMOL/L (ref 22–29)
CREAT SERPL-MCNC: 0.6 MG/DL (ref 0.5–1)
EOSINOPHIL # BLD: 0 K/UL (ref 0.05–0.5)
EOSINOPHILS RELATIVE PERCENT: 0 % (ref 0–6)
ERYTHROCYTE [DISTWIDTH] IN BLOOD BY AUTOMATED COUNT: 19.6 % (ref 11.5–15)
GFR SERPL CREATININE-BSD FRML MDRD: >90 ML/MIN/1.73M2
GLUCOSE BLD-MCNC: 104 MG/DL (ref 74–99)
GLUCOSE BLD-MCNC: 194 MG/DL (ref 74–99)
GLUCOSE BLD-MCNC: 216 MG/DL (ref 74–99)
GLUCOSE SERPL-MCNC: 183 MG/DL (ref 74–99)
HCT VFR BLD AUTO: 27.3 % (ref 34–48)
HGB BLD-MCNC: 7.7 G/DL (ref 11.5–15.5)
IMM GRANULOCYTES # BLD AUTO: 0.08 K/UL (ref 0–0.58)
IMM GRANULOCYTES NFR BLD: 1 % (ref 0–5)
LYMPHOCYTES NFR BLD: 1.02 K/UL (ref 1.5–4)
LYMPHOCYTES RELATIVE PERCENT: 9 % (ref 20–42)
MAGNESIUM SERPL-MCNC: 2.3 MG/DL (ref 1.6–2.6)
MCH RBC QN AUTO: 24 PG (ref 26–35)
MCHC RBC AUTO-ENTMCNC: 28.2 G/DL (ref 32–34.5)
MCV RBC AUTO: 85 FL (ref 80–99.9)
MICROORGANISM SPEC CULT: ABNORMAL
MONOCYTES NFR BLD: 0.29 K/UL (ref 0.1–0.95)
MONOCYTES NFR BLD: 3 % (ref 2–12)
NEUTROPHILS NFR BLD: 88 % (ref 43–80)
NEUTS SEG NFR BLD: 10.08 K/UL (ref 1.8–7.3)
PHOSPHATE SERPL-MCNC: 3.2 MG/DL (ref 2.5–4.5)
PLATELET # BLD AUTO: 278 K/UL (ref 130–450)
PMV BLD AUTO: 9.7 FL (ref 7–12)
POTASSIUM SERPL-SCNC: 4.5 MMOL/L (ref 3.5–5)
RBC # BLD AUTO: 3.21 M/UL (ref 3.5–5.5)
RBC # BLD: ABNORMAL 10*6/UL
SODIUM SERPL-SCNC: 142 MMOL/L (ref 132–146)
SPECIMEN DESCRIPTION: ABNORMAL
WBC OTHER # BLD: 11.5 K/UL (ref 4.5–11.5)

## 2024-04-28 PROCEDURE — 6360000002 HC RX W HCPCS

## 2024-04-28 PROCEDURE — 6360000002 HC RX W HCPCS: Performed by: STUDENT IN AN ORGANIZED HEALTH CARE EDUCATION/TRAINING PROGRAM

## 2024-04-28 PROCEDURE — 80048 BASIC METABOLIC PNL TOTAL CA: CPT

## 2024-04-28 PROCEDURE — 6370000000 HC RX 637 (ALT 250 FOR IP): Performed by: STUDENT IN AN ORGANIZED HEALTH CARE EDUCATION/TRAINING PROGRAM

## 2024-04-28 PROCEDURE — 2580000003 HC RX 258: Performed by: STUDENT IN AN ORGANIZED HEALTH CARE EDUCATION/TRAINING PROGRAM

## 2024-04-28 PROCEDURE — 94660 CPAP INITIATION&MGMT: CPT

## 2024-04-28 PROCEDURE — 94640 AIRWAY INHALATION TREATMENT: CPT

## 2024-04-28 PROCEDURE — 83735 ASSAY OF MAGNESIUM: CPT

## 2024-04-28 PROCEDURE — 36415 COLL VENOUS BLD VENIPUNCTURE: CPT

## 2024-04-28 PROCEDURE — 6370000000 HC RX 637 (ALT 250 FOR IP)

## 2024-04-28 PROCEDURE — 2060000000 HC ICU INTERMEDIATE R&B

## 2024-04-28 PROCEDURE — 5A09357 ASSISTANCE WITH RESPIRATORY VENTILATION, LESS THAN 24 CONSECUTIVE HOURS, CONTINUOUS POSITIVE AIRWAY PRESSURE: ICD-10-PCS | Performed by: INTERNAL MEDICINE

## 2024-04-28 PROCEDURE — 2700000000 HC OXYGEN THERAPY PER DAY

## 2024-04-28 PROCEDURE — 82962 GLUCOSE BLOOD TEST: CPT

## 2024-04-28 PROCEDURE — 85025 COMPLETE CBC W/AUTO DIFF WBC: CPT

## 2024-04-28 PROCEDURE — 84100 ASSAY OF PHOSPHORUS: CPT

## 2024-04-28 RX ADMIN — POLYVINYL ALCOHOL, POVIDONE 1 DROP: 14; 6 SOLUTION/ DROPS OPHTHALMIC at 17:11

## 2024-04-28 RX ADMIN — DOXYCYCLINE HYCLATE 100 MG: 100 CAPSULE ORAL at 20:38

## 2024-04-28 RX ADMIN — ARFORMOTEROL TARTRATE 15 MCG: 15 SOLUTION RESPIRATORY (INHALATION) at 10:10

## 2024-04-28 RX ADMIN — BUDESONIDE INHALATION 500 MCG: 0.5 SUSPENSION RESPIRATORY (INHALATION) at 19:57

## 2024-04-28 RX ADMIN — INSULIN LISPRO 1 UNITS: 100 INJECTION, SOLUTION INTRAVENOUS; SUBCUTANEOUS at 17:11

## 2024-04-28 RX ADMIN — IPRATROPIUM BROMIDE AND ALBUTEROL SULFATE 1 DOSE: 2.5; .5 SOLUTION RESPIRATORY (INHALATION) at 19:57

## 2024-04-28 RX ADMIN — BUDESONIDE INHALATION 500 MCG: 0.5 SUSPENSION RESPIRATORY (INHALATION) at 10:10

## 2024-04-28 RX ADMIN — PANCRELIPASE LIPASE, PANCRELIPASE PROTEASE, PANCRELIPASE AMYLASE 15000 UNITS: 15000; 47000; 63000 CAPSULE, DELAYED RELEASE ORAL at 12:50

## 2024-04-28 RX ADMIN — Medication 4 ML: at 10:11

## 2024-04-28 RX ADMIN — CEFDINIR 300 MG: 300 CAPSULE ORAL at 20:38

## 2024-04-28 RX ADMIN — ENOXAPARIN SODIUM 40 MG: 100 INJECTION SUBCUTANEOUS at 10:10

## 2024-04-28 RX ADMIN — PANCRELIPASE LIPASE, PANCRELIPASE PROTEASE, PANCRELIPASE AMYLASE 15000 UNITS: 15000; 47000; 63000 CAPSULE, DELAYED RELEASE ORAL at 17:11

## 2024-04-28 RX ADMIN — ASPIRIN 81 MG: 81 TABLET, CHEWABLE ORAL at 10:10

## 2024-04-28 RX ADMIN — DOXYCYCLINE HYCLATE 100 MG: 100 CAPSULE ORAL at 10:10

## 2024-04-28 RX ADMIN — CEFDINIR 300 MG: 300 CAPSULE ORAL at 10:11

## 2024-04-28 RX ADMIN — IPRATROPIUM BROMIDE AND ALBUTEROL SULFATE 1 DOSE: 2.5; .5 SOLUTION RESPIRATORY (INHALATION) at 13:54

## 2024-04-28 RX ADMIN — IPRATROPIUM BROMIDE AND ALBUTEROL SULFATE 1 DOSE: 2.5; .5 SOLUTION RESPIRATORY (INHALATION) at 04:43

## 2024-04-28 RX ADMIN — POLYVINYL ALCOHOL, POVIDONE 1 DROP: 14; 6 SOLUTION/ DROPS OPHTHALMIC at 10:11

## 2024-04-28 RX ADMIN — ATORVASTATIN CALCIUM 40 MG: 40 TABLET, FILM COATED ORAL at 20:39

## 2024-04-28 RX ADMIN — ARFORMOTEROL TARTRATE 15 MCG: 15 SOLUTION RESPIRATORY (INHALATION) at 19:57

## 2024-04-28 RX ADMIN — PREDNISONE 40 MG: 20 TABLET ORAL at 10:10

## 2024-04-28 RX ADMIN — PANCRELIPASE LIPASE, PANCRELIPASE PROTEASE, PANCRELIPASE AMYLASE 15000 UNITS: 15000; 47000; 63000 CAPSULE, DELAYED RELEASE ORAL at 10:10

## 2024-04-28 NOTE — PLAN OF CARE
Problem: Safety - Adult  Goal: Free from fall injury  Outcome: Progressing     Problem: Chronic Conditions and Co-morbidities  Goal: Patient's chronic conditions and co-morbidity symptoms are monitored and maintained or improved  Outcome: Progressing  Flowsheets (Taken 4/27/2024 2030)  Care Plan - Patient's Chronic Conditions and Co-Morbidity Symptoms are Monitored and Maintained or Improved: Monitor and assess patient's chronic conditions and comorbid symptoms for stability, deterioration, or improvement     Problem: Discharge Planning  Goal: Discharge to home or other facility with appropriate resources  Outcome: Progressing  Flowsheets (Taken 4/27/2024 2030)  Discharge to home or other facility with appropriate resources:   Identify barriers to discharge with patient and caregiver   Identify discharge learning needs (meds, wound care, etc)

## 2024-04-29 ENCOUNTER — APPOINTMENT (OUTPATIENT)
Age: 69
DRG: 208 | End: 2024-04-29
Payer: MEDICARE

## 2024-04-29 LAB
ANION GAP SERPL CALCULATED.3IONS-SCNC: 9 MMOL/L (ref 7–16)
BASOPHILS # BLD: 0 K/UL (ref 0–0.2)
BASOPHILS NFR BLD: 0 % (ref 0–2)
BUN SERPL-MCNC: 16 MG/DL (ref 6–23)
CALCIUM SERPL-MCNC: 10 MG/DL (ref 8.6–10.2)
CHLORIDE SERPL-SCNC: 101 MMOL/L (ref 98–107)
CO2 SERPL-SCNC: 33 MMOL/L (ref 22–29)
CREAT SERPL-MCNC: 0.6 MG/DL (ref 0.5–1)
EOSINOPHIL # BLD: 0 K/UL (ref 0.05–0.5)
EOSINOPHILS RELATIVE PERCENT: 0 % (ref 0–6)
ERYTHROCYTE [DISTWIDTH] IN BLOOD BY AUTOMATED COUNT: 19.5 % (ref 11.5–15)
GFR SERPL CREATININE-BSD FRML MDRD: >90 ML/MIN/1.73M2
GLUCOSE BLD-MCNC: 237 MG/DL (ref 74–99)
GLUCOSE BLD-MCNC: 246 MG/DL (ref 74–99)
GLUCOSE BLD-MCNC: 356 MG/DL (ref 74–99)
GLUCOSE SERPL-MCNC: 90 MG/DL (ref 74–99)
HCT VFR BLD AUTO: 31.2 % (ref 34–48)
HGB BLD-MCNC: 8.7 G/DL (ref 11.5–15.5)
LYMPHOCYTES NFR BLD: 1.98 K/UL (ref 1.5–4)
LYMPHOCYTES RELATIVE PERCENT: 21 % (ref 20–42)
MAGNESIUM SERPL-MCNC: 2.1 MG/DL (ref 1.6–2.6)
MCH RBC QN AUTO: 23.8 PG (ref 26–35)
MCHC RBC AUTO-ENTMCNC: 27.9 G/DL (ref 32–34.5)
MCV RBC AUTO: 85.2 FL (ref 80–99.9)
MONOCYTES NFR BLD: 0.08 K/UL (ref 0.1–0.95)
MONOCYTES NFR BLD: 1 % (ref 2–12)
NEUTROPHILS NFR BLD: 78 % (ref 43–80)
NEUTS SEG NFR BLD: 7.43 K/UL (ref 1.8–7.3)
PHOSPHATE SERPL-MCNC: 3.3 MG/DL (ref 2.5–4.5)
PLATELET # BLD AUTO: 276 K/UL (ref 130–450)
PMV BLD AUTO: 9.7 FL (ref 7–12)
POTASSIUM SERPL-SCNC: 3.8 MMOL/L (ref 3.5–5)
RBC # BLD AUTO: 3.66 M/UL (ref 3.5–5.5)
RBC # BLD: ABNORMAL 10*6/UL
SODIUM SERPL-SCNC: 143 MMOL/L (ref 132–146)
WBC OTHER # BLD: 9.5 K/UL (ref 4.5–11.5)

## 2024-04-29 PROCEDURE — 85025 COMPLETE CBC W/AUTO DIFF WBC: CPT

## 2024-04-29 PROCEDURE — 2700000000 HC OXYGEN THERAPY PER DAY

## 2024-04-29 PROCEDURE — 2580000003 HC RX 258: Performed by: STUDENT IN AN ORGANIZED HEALTH CARE EDUCATION/TRAINING PROGRAM

## 2024-04-29 PROCEDURE — 6360000002 HC RX W HCPCS: Performed by: STUDENT IN AN ORGANIZED HEALTH CARE EDUCATION/TRAINING PROGRAM

## 2024-04-29 PROCEDURE — 80048 BASIC METABOLIC PNL TOTAL CA: CPT

## 2024-04-29 PROCEDURE — 6370000000 HC RX 637 (ALT 250 FOR IP): Performed by: STUDENT IN AN ORGANIZED HEALTH CARE EDUCATION/TRAINING PROGRAM

## 2024-04-29 PROCEDURE — 6370000000 HC RX 637 (ALT 250 FOR IP)

## 2024-04-29 PROCEDURE — 94660 CPAP INITIATION&MGMT: CPT

## 2024-04-29 PROCEDURE — 83735 ASSAY OF MAGNESIUM: CPT

## 2024-04-29 PROCEDURE — 84100 ASSAY OF PHOSPHORUS: CPT

## 2024-04-29 PROCEDURE — 94640 AIRWAY INHALATION TREATMENT: CPT

## 2024-04-29 PROCEDURE — 2060000000 HC ICU INTERMEDIATE R&B

## 2024-04-29 PROCEDURE — 36415 COLL VENOUS BLD VENIPUNCTURE: CPT

## 2024-04-29 PROCEDURE — 82962 GLUCOSE BLOOD TEST: CPT

## 2024-04-29 PROCEDURE — 6360000002 HC RX W HCPCS

## 2024-04-29 RX ADMIN — CEFDINIR 300 MG: 300 CAPSULE ORAL at 09:42

## 2024-04-29 RX ADMIN — DOXYCYCLINE HYCLATE 100 MG: 100 CAPSULE ORAL at 09:41

## 2024-04-29 RX ADMIN — Medication 4 ML: at 14:03

## 2024-04-29 RX ADMIN — IPRATROPIUM BROMIDE AND ALBUTEROL SULFATE 1 DOSE: 2.5; .5 SOLUTION RESPIRATORY (INHALATION) at 04:25

## 2024-04-29 RX ADMIN — INSULIN LISPRO 4 UNITS: 100 INJECTION, SOLUTION INTRAVENOUS; SUBCUTANEOUS at 20:05

## 2024-04-29 RX ADMIN — ATORVASTATIN CALCIUM 40 MG: 40 TABLET, FILM COATED ORAL at 20:05

## 2024-04-29 RX ADMIN — IPRATROPIUM BROMIDE AND ALBUTEROL SULFATE 1 DOSE: 2.5; .5 SOLUTION RESPIRATORY (INHALATION) at 14:01

## 2024-04-29 RX ADMIN — INSULIN LISPRO 1 UNITS: 100 INJECTION, SOLUTION INTRAVENOUS; SUBCUTANEOUS at 17:17

## 2024-04-29 RX ADMIN — ENOXAPARIN SODIUM 40 MG: 100 INJECTION SUBCUTANEOUS at 09:41

## 2024-04-29 RX ADMIN — INSULIN LISPRO 1 UNITS: 100 INJECTION, SOLUTION INTRAVENOUS; SUBCUTANEOUS at 09:54

## 2024-04-29 RX ADMIN — PANCRELIPASE LIPASE, PANCRELIPASE PROTEASE, PANCRELIPASE AMYLASE 15000 UNITS: 15000; 47000; 63000 CAPSULE, DELAYED RELEASE ORAL at 17:16

## 2024-04-29 RX ADMIN — ARFORMOTEROL TARTRATE 15 MCG: 15 SOLUTION RESPIRATORY (INHALATION) at 21:12

## 2024-04-29 RX ADMIN — ARFORMOTEROL TARTRATE 15 MCG: 15 SOLUTION RESPIRATORY (INHALATION) at 10:59

## 2024-04-29 RX ADMIN — CEFDINIR 300 MG: 300 CAPSULE ORAL at 20:05

## 2024-04-29 RX ADMIN — IPRATROPIUM BROMIDE AND ALBUTEROL SULFATE 1 DOSE: 2.5; .5 SOLUTION RESPIRATORY (INHALATION) at 21:12

## 2024-04-29 RX ADMIN — POLYVINYL ALCOHOL, POVIDONE 1 DROP: 14; 6 SOLUTION/ DROPS OPHTHALMIC at 13:05

## 2024-04-29 RX ADMIN — ACETAMINOPHEN 650 MG: 325 TABLET ORAL at 14:24

## 2024-04-29 RX ADMIN — PANCRELIPASE LIPASE, PANCRELIPASE PROTEASE, PANCRELIPASE AMYLASE 15000 UNITS: 15000; 47000; 63000 CAPSULE, DELAYED RELEASE ORAL at 13:05

## 2024-04-29 RX ADMIN — BUDESONIDE INHALATION 500 MCG: 0.5 SUSPENSION RESPIRATORY (INHALATION) at 21:12

## 2024-04-29 RX ADMIN — PANCRELIPASE LIPASE, PANCRELIPASE PROTEASE, PANCRELIPASE AMYLASE 15000 UNITS: 15000; 47000; 63000 CAPSULE, DELAYED RELEASE ORAL at 09:42

## 2024-04-29 RX ADMIN — BUDESONIDE INHALATION 500 MCG: 0.5 SUSPENSION RESPIRATORY (INHALATION) at 10:59

## 2024-04-29 RX ADMIN — DOXYCYCLINE HYCLATE 100 MG: 100 CAPSULE ORAL at 20:05

## 2024-04-29 RX ADMIN — ASPIRIN 81 MG: 81 TABLET, CHEWABLE ORAL at 09:41

## 2024-04-29 RX ADMIN — PREDNISONE 40 MG: 20 TABLET ORAL at 09:41

## 2024-04-29 RX ADMIN — POLYVINYL ALCOHOL, POVIDONE 1 DROP: 14; 6 SOLUTION/ DROPS OPHTHALMIC at 14:05

## 2024-04-29 ASSESSMENT — PAIN SCALES - GENERAL
PAINLEVEL_OUTOF10: 0
PAINLEVEL_OUTOF10: 0

## 2024-04-29 NOTE — PLAN OF CARE
Problem: Safety - Adult  Goal: Free from fall injury  4/29/2024 1148 by Laverne Curtis RN  Outcome: Progressing  4/28/2024 2236 by Tia Antunez RN  Outcome: Progressing     Problem: Chronic Conditions and Co-morbidities  Goal: Patient's chronic conditions and co-morbidity symptoms are monitored and maintained or improved  4/29/2024 1148 by Laverne Curtis RN  Outcome: Progressing  Flowsheets (Taken 4/29/2024 0800)  Care Plan - Patient's Chronic Conditions and Co-Morbidity Symptoms are Monitored and Maintained or Improved: Monitor and assess patient's chronic conditions and comorbid symptoms for stability, deterioration, or improvement  4/28/2024 2236 by Tia Antunez RN  Outcome: Progressing  Flowsheets (Taken 4/28/2024 2037)  Care Plan - Patient's Chronic Conditions and Co-Morbidity Symptoms are Monitored and Maintained or Improved: Monitor and assess patient's chronic conditions and comorbid symptoms for stability, deterioration, or improvement     Problem: Discharge Planning  Goal: Discharge to home or other facility with appropriate resources  Recent Flowsheet Documentation  Taken 4/29/2024 0800 by Laverne Curtis RN  Discharge to home or other facility with appropriate resources: Identify barriers to discharge with patient and caregiver  4/28/2024 2236 by Tia Antunez RN  Outcome: Progressing  Flowsheets (Taken 4/28/2024 2037)  Discharge to home or other facility with appropriate resources:   Identify barriers to discharge with patient and caregiver   Identify discharge learning needs (meds, wound care, etc)     Problem: Pain  Goal: Verbalizes/displays adequate comfort level or baseline comfort level  4/28/2024 2236 by Tia Antunez RN  Outcome: Progressing     Problem: Skin/Tissue Integrity  Goal: Absence of new skin breakdown  Description: 1.  Monitor for areas of redness and/or skin breakdown  2.  Assess vascular access sites hourly  3.  Every 4-6 hours minimum:  Change oxygen saturation

## 2024-04-29 NOTE — PLAN OF CARE
Problem: Safety - Adult  Goal: Free from fall injury  Outcome: Progressing     Problem: Chronic Conditions and Co-morbidities  Goal: Patient's chronic conditions and co-morbidity symptoms are monitored and maintained or improved  Outcome: Progressing  Flowsheets (Taken 4/28/2024 2037)  Care Plan - Patient's Chronic Conditions and Co-Morbidity Symptoms are Monitored and Maintained or Improved: Monitor and assess patient's chronic conditions and comorbid symptoms for stability, deterioration, or improvement

## 2024-04-30 ENCOUNTER — APPOINTMENT (OUTPATIENT)
Age: 69
DRG: 208 | End: 2024-04-30
Payer: MEDICARE

## 2024-04-30 LAB
ANION GAP SERPL CALCULATED.3IONS-SCNC: 12 MMOL/L (ref 7–16)
BASOPHILS # BLD: 0 K/UL (ref 0–0.2)
BASOPHILS NFR BLD: 0 % (ref 0–2)
BUN SERPL-MCNC: 20 MG/DL (ref 6–23)
CALCIUM SERPL-MCNC: 9.8 MG/DL (ref 8.6–10.2)
CHLORIDE SERPL-SCNC: 101 MMOL/L (ref 98–107)
CO2 SERPL-SCNC: 31 MMOL/L (ref 22–29)
CREAT SERPL-MCNC: 0.6 MG/DL (ref 0.5–1)
ECHO AV AREA PEAK VELOCITY: 3 CM2
ECHO AV AREA VTI: 2.7 CM2
ECHO AV AREA/BSA PEAK VELOCITY: 1.6 CM2/M2
ECHO AV AREA/BSA VTI: 1.5 CM2/M2
ECHO AV CUSP MM: 2 CM
ECHO AV MEAN GRADIENT: 3 MMHG
ECHO AV MEAN VELOCITY: 0.8 M/S
ECHO AV PEAK GRADIENT: 5 MMHG
ECHO AV PEAK VELOCITY: 1.1 M/S
ECHO AV VELOCITY RATIO: 0.91
ECHO AV VTI: 25.2 CM
ECHO BSA: 1.77 M2
ECHO LA DIAMETER INDEX: 1.7 CM/M2
ECHO LA DIAMETER: 3.1 CM
ECHO LA VOL A-L A2C: 37 ML (ref 22–52)
ECHO LA VOL A-L A4C: 31 ML (ref 22–52)
ECHO LA VOL MOD A2C: 35 ML (ref 22–52)
ECHO LA VOL MOD A4C: 28 ML (ref 22–52)
ECHO LA VOLUME AREA LENGTH: 34 ML
ECHO LA VOLUME INDEX A-L A2C: 20 ML/M2 (ref 16–34)
ECHO LA VOLUME INDEX A-L A4C: 17 ML/M2 (ref 16–34)
ECHO LA VOLUME INDEX AREA LENGTH: 19 ML/M2 (ref 16–34)
ECHO LA VOLUME INDEX MOD A2C: 19 ML/M2 (ref 16–34)
ECHO LA VOLUME INDEX MOD A4C: 15 ML/M2 (ref 16–34)
ECHO LV FRACTIONAL SHORTENING: 37 % (ref 28–44)
ECHO LV INTERNAL DIMENSION DIASTOLE INDEX: 1.92 CM/M2
ECHO LV INTERNAL DIMENSION DIASTOLIC: 3.5 CM (ref 3.9–5.3)
ECHO LV INTERNAL DIMENSION SYSTOLIC INDEX: 1.21 CM/M2
ECHO LV INTERNAL DIMENSION SYSTOLIC: 2.2 CM
ECHO LV ISOVOLUMETRIC RELAXATION TIME (IVRT): 78.4 MS
ECHO LV IVSD: 0.9 CM (ref 0.6–0.9)
ECHO LV IVSS: 1.1 CM
ECHO LV MASS 2D: 88.8 G (ref 67–162)
ECHO LV MASS INDEX 2D: 48.8 G/M2 (ref 43–95)
ECHO LV POSTERIOR WALL DIASTOLIC: 0.9 CM (ref 0.6–0.9)
ECHO LV POSTERIOR WALL SYSTOLIC: 1.5 CM
ECHO LV RELATIVE WALL THICKNESS RATIO: 0.51
ECHO LVOT AREA: 3.1 CM2
ECHO LVOT AV VTI INDEX: 0.85
ECHO LVOT DIAM: 2 CM
ECHO LVOT MEAN GRADIENT: 2 MMHG
ECHO LVOT PEAK GRADIENT: 4 MMHG
ECHO LVOT PEAK VELOCITY: 1 M/S
ECHO LVOT STROKE VOLUME INDEX: 36.7 ML/M2
ECHO LVOT SV: 66.9 ML
ECHO LVOT VTI: 21.3 CM
ECHO MV A VELOCITY: 0.78 M/S
ECHO MV AREA PHT: 3.4 CM2
ECHO MV AREA VTI: 2.3 CM2
ECHO MV E DECELERATION TIME (DT): 212.5 MS
ECHO MV E VELOCITY: 0.81 M/S
ECHO MV E/A RATIO: 1.04
ECHO MV LVOT VTI INDEX: 1.34
ECHO MV MAX VELOCITY: 1 M/S
ECHO MV MEAN GRADIENT: 2 MMHG
ECHO MV MEAN VELOCITY: 0.7 M/S
ECHO MV PEAK GRADIENT: 4 MMHG
ECHO MV PRESSURE HALF TIME (PHT): 64.2 MS
ECHO MV VTI: 28.6 CM
ECHO PV MAX VELOCITY: 1 M/S
ECHO PV MEAN GRADIENT: 2 MMHG
ECHO PV MEAN VELOCITY: 0.6 M/S
ECHO PV PEAK GRADIENT: 4 MMHG
ECHO PV VTI: 18.1 CM
ECHO RV INTERNAL DIMENSION: 3.5 CM
ECHO RVOT MEAN GRADIENT: 1 MMHG
ECHO RVOT PEAK GRADIENT: 2 MMHG
ECHO RVOT PEAK VELOCITY: 0.7 M/S
ECHO RVOT VTI: 16.4 CM
EKG ATRIAL RATE: 106 BPM
EKG P AXIS: 86 DEGREES
EKG P-R INTERVAL: 118 MS
EKG Q-T INTERVAL: 344 MS
EKG QRS DURATION: 72 MS
EKG QTC CALCULATION (BAZETT): 456 MS
EKG R AXIS: 72 DEGREES
EKG T AXIS: 160 DEGREES
EKG VENTRICULAR RATE: 106 BPM
EOSINOPHIL # BLD: 0 K/UL (ref 0.05–0.5)
EOSINOPHILS RELATIVE PERCENT: 0 % (ref 0–6)
ERYTHROCYTE [DISTWIDTH] IN BLOOD BY AUTOMATED COUNT: 19 % (ref 11.5–15)
GFR SERPL CREATININE-BSD FRML MDRD: >90 ML/MIN/1.73M2
GLUCOSE BLD-MCNC: 176 MG/DL (ref 74–99)
GLUCOSE BLD-MCNC: 237 MG/DL (ref 74–99)
GLUCOSE SERPL-MCNC: 92 MG/DL (ref 74–99)
HCT VFR BLD AUTO: 28 % (ref 34–48)
HGB BLD-MCNC: 7.7 G/DL (ref 11.5–15.5)
LYMPHOCYTES NFR BLD: 0.84 K/UL (ref 1.5–4)
LYMPHOCYTES RELATIVE PERCENT: 11 % (ref 20–42)
MAGNESIUM SERPL-MCNC: 2 MG/DL (ref 1.6–2.6)
MCH RBC QN AUTO: 23.3 PG (ref 26–35)
MCHC RBC AUTO-ENTMCNC: 27.5 G/DL (ref 32–34.5)
MCV RBC AUTO: 84.8 FL (ref 80–99.9)
MICROORGANISM SPEC CULT: NORMAL
MICROORGANISM SPEC CULT: NORMAL
MONOCYTES NFR BLD: 0.32 K/UL (ref 0.1–0.95)
MONOCYTES NFR BLD: 4 % (ref 2–12)
NEUTROPHILS NFR BLD: 84 % (ref 43–80)
NEUTS SEG NFR BLD: 6.24 K/UL (ref 1.8–7.3)
PHOSPHATE SERPL-MCNC: 3.7 MG/DL (ref 2.5–4.5)
PLATELET # BLD AUTO: 242 K/UL (ref 130–450)
PMV BLD AUTO: 9.6 FL (ref 7–12)
POTASSIUM SERPL-SCNC: 4.3 MMOL/L (ref 3.5–5)
RBC # BLD AUTO: 3.3 M/UL (ref 3.5–5.5)
RBC # BLD: ABNORMAL 10*6/UL
SERVICE CMNT-IMP: NORMAL
SERVICE CMNT-IMP: NORMAL
SODIUM SERPL-SCNC: 144 MMOL/L (ref 132–146)
SPECIMEN DESCRIPTION: NORMAL
SPECIMEN DESCRIPTION: NORMAL
WBC OTHER # BLD: 7.4 K/UL (ref 4.5–11.5)

## 2024-04-30 PROCEDURE — 6370000000 HC RX 637 (ALT 250 FOR IP)

## 2024-04-30 PROCEDURE — 84100 ASSAY OF PHOSPHORUS: CPT

## 2024-04-30 PROCEDURE — 2060000000 HC ICU INTERMEDIATE R&B

## 2024-04-30 PROCEDURE — 83735 ASSAY OF MAGNESIUM: CPT

## 2024-04-30 PROCEDURE — 2700000000 HC OXYGEN THERAPY PER DAY

## 2024-04-30 PROCEDURE — 36415 COLL VENOUS BLD VENIPUNCTURE: CPT

## 2024-04-30 PROCEDURE — 6370000000 HC RX 637 (ALT 250 FOR IP): Performed by: STUDENT IN AN ORGANIZED HEALTH CARE EDUCATION/TRAINING PROGRAM

## 2024-04-30 PROCEDURE — 80048 BASIC METABOLIC PNL TOTAL CA: CPT

## 2024-04-30 PROCEDURE — 6360000002 HC RX W HCPCS: Performed by: STUDENT IN AN ORGANIZED HEALTH CARE EDUCATION/TRAINING PROGRAM

## 2024-04-30 PROCEDURE — 82962 GLUCOSE BLOOD TEST: CPT

## 2024-04-30 PROCEDURE — 94660 CPAP INITIATION&MGMT: CPT

## 2024-04-30 PROCEDURE — 6360000002 HC RX W HCPCS

## 2024-04-30 PROCEDURE — 93306 TTE W/DOPPLER COMPLETE: CPT

## 2024-04-30 PROCEDURE — 93306 TTE W/DOPPLER COMPLETE: CPT | Performed by: INTERNAL MEDICINE

## 2024-04-30 PROCEDURE — 85025 COMPLETE CBC W/AUTO DIFF WBC: CPT

## 2024-04-30 PROCEDURE — 94640 AIRWAY INHALATION TREATMENT: CPT

## 2024-04-30 RX ORDER — PREDNISONE 20 MG/1
40 TABLET ORAL DAILY
Status: DISCONTINUED | OUTPATIENT
Start: 2024-05-01 | End: 2024-05-01 | Stop reason: HOSPADM

## 2024-04-30 RX ADMIN — ATORVASTATIN CALCIUM 40 MG: 40 TABLET, FILM COATED ORAL at 19:50

## 2024-04-30 RX ADMIN — ASPIRIN 81 MG: 81 TABLET, CHEWABLE ORAL at 08:17

## 2024-04-30 RX ADMIN — IPRATROPIUM BROMIDE AND ALBUTEROL SULFATE 1 DOSE: 2.5; .5 SOLUTION RESPIRATORY (INHALATION) at 10:51

## 2024-04-30 RX ADMIN — CEFDINIR 300 MG: 300 CAPSULE ORAL at 19:50

## 2024-04-30 RX ADMIN — PREDNISONE 40 MG: 20 TABLET ORAL at 08:16

## 2024-04-30 RX ADMIN — DOXYCYCLINE HYCLATE 100 MG: 100 CAPSULE ORAL at 19:50

## 2024-04-30 RX ADMIN — DOXYCYCLINE HYCLATE 100 MG: 100 CAPSULE ORAL at 08:17

## 2024-04-30 RX ADMIN — ARFORMOTEROL TARTRATE 15 MCG: 15 SOLUTION RESPIRATORY (INHALATION) at 21:05

## 2024-04-30 RX ADMIN — IPRATROPIUM BROMIDE AND ALBUTEROL SULFATE 1 DOSE: 2.5; .5 SOLUTION RESPIRATORY (INHALATION) at 04:51

## 2024-04-30 RX ADMIN — ACETAMINOPHEN 650 MG: 325 TABLET ORAL at 08:16

## 2024-04-30 RX ADMIN — PANCRELIPASE LIPASE, PANCRELIPASE PROTEASE, PANCRELIPASE AMYLASE 15000 UNITS: 15000; 47000; 63000 CAPSULE, DELAYED RELEASE ORAL at 08:16

## 2024-04-30 RX ADMIN — INSULIN LISPRO 1 UNITS: 100 INJECTION, SOLUTION INTRAVENOUS; SUBCUTANEOUS at 17:35

## 2024-04-30 RX ADMIN — CEFDINIR 300 MG: 300 CAPSULE ORAL at 08:18

## 2024-04-30 RX ADMIN — ARFORMOTEROL TARTRATE 15 MCG: 15 SOLUTION RESPIRATORY (INHALATION) at 10:51

## 2024-04-30 RX ADMIN — PANCRELIPASE LIPASE, PANCRELIPASE PROTEASE, PANCRELIPASE AMYLASE 15000 UNITS: 15000; 47000; 63000 CAPSULE, DELAYED RELEASE ORAL at 17:09

## 2024-04-30 RX ADMIN — BUDESONIDE INHALATION 500 MCG: 0.5 SUSPENSION RESPIRATORY (INHALATION) at 10:51

## 2024-04-30 RX ADMIN — BUDESONIDE INHALATION 500 MCG: 0.5 SUSPENSION RESPIRATORY (INHALATION) at 21:05

## 2024-04-30 RX ADMIN — IPRATROPIUM BROMIDE AND ALBUTEROL SULFATE 1 DOSE: 2.5; .5 SOLUTION RESPIRATORY (INHALATION) at 21:05

## 2024-04-30 RX ADMIN — ENOXAPARIN SODIUM 40 MG: 100 INJECTION SUBCUTANEOUS at 08:17

## 2024-04-30 ASSESSMENT — PAIN SCALES - GENERAL
PAINLEVEL_OUTOF10: 8
PAINLEVEL_OUTOF10: 0

## 2024-04-30 NOTE — PLAN OF CARE
Problem: Safety - Adult  Goal: Free from fall injury  4/29/2024 2142 by Nani Cross, RN  Outcome: Progressing  4/29/2024 1148 by Laverne Curtis RN  Outcome: Progressing     Problem: Chronic Conditions and Co-morbidities  Goal: Patient's chronic conditions and co-morbidity symptoms are monitored and maintained or improved  4/29/2024 2142 by Nani Cross RN  Outcome: Progressing  4/29/2024 1148 by Laverne Curtis RN  Outcome: Progressing  Flowsheets (Taken 4/29/2024 0800)  Care Plan - Patient's Chronic Conditions and Co-Morbidity Symptoms are Monitored and Maintained or Improved: Monitor and assess patient's chronic conditions and comorbid symptoms for stability, deterioration, or improvement     Problem: Pain  Goal: Verbalizes/displays adequate comfort level or baseline comfort level  Outcome: Progressing     Problem: Skin/Tissue Integrity  Goal: Absence of new skin breakdown  Description: 1.  Monitor for areas of redness and/or skin breakdown  2.  Assess vascular access sites hourly  3.  Every 4-6 hours minimum:  Change oxygen saturation probe site  4.  Every 4-6 hours:  If on nasal continuous positive airway pressure, respiratory therapy assess nares and determine need for appliance change or resting period.  Outcome: Progressing     Problem: ABCDS Injury Assessment  Goal: Absence of physical injury  Outcome: Progressing

## 2024-04-30 NOTE — CARE COORDINATION
Called [facility] and requested that they bring a portable tank to the room for discharge. Plan is home when released with aide services and outpatient therapy at [facility]. Therapy ordered today to confirm plan.     For questions I can be reached at [phone]. EUGENE Uribe

## 2024-05-01 VITALS
TEMPERATURE: 98 F | BODY MASS INDEX: 24.39 KG/M2 | DIASTOLIC BLOOD PRESSURE: 67 MMHG | HEART RATE: 102 BPM | SYSTOLIC BLOOD PRESSURE: 160 MMHG | WEIGHT: 155.42 LBS | HEIGHT: 67 IN | RESPIRATION RATE: 16 BRPM | OXYGEN SATURATION: 98 %

## 2024-05-01 LAB
ANION GAP SERPL CALCULATED.3IONS-SCNC: 9 MMOL/L (ref 7–16)
BASOPHILS # BLD: 0 K/UL (ref 0–0.2)
BASOPHILS NFR BLD: 0 % (ref 0–2)
BUN SERPL-MCNC: 16 MG/DL (ref 6–23)
CALCIUM SERPL-MCNC: 9.8 MG/DL (ref 8.6–10.2)
CHLORIDE SERPL-SCNC: 102 MMOL/L (ref 98–107)
CO2 SERPL-SCNC: 35 MMOL/L (ref 22–29)
CREAT SERPL-MCNC: 0.6 MG/DL (ref 0.5–1)
EOSINOPHIL # BLD: 0.02 K/UL (ref 0.05–0.5)
EOSINOPHILS RELATIVE PERCENT: 0 % (ref 0–6)
ERYTHROCYTE [DISTWIDTH] IN BLOOD BY AUTOMATED COUNT: 18.8 % (ref 11.5–15)
GFR, ESTIMATED: >90 ML/MIN/1.73M2
GLUCOSE BLD-MCNC: 233 MG/DL (ref 74–99)
GLUCOSE SERPL-MCNC: 107 MG/DL (ref 74–99)
HCT VFR BLD AUTO: 27.4 % (ref 34–48)
HGB BLD-MCNC: 7.9 G/DL (ref 11.5–15.5)
IMM GRANULOCYTES # BLD AUTO: 0.04 K/UL (ref 0–0.58)
IMM GRANULOCYTES NFR BLD: 1 % (ref 0–5)
LYMPHOCYTES NFR BLD: 1.68 K/UL (ref 1.5–4)
LYMPHOCYTES RELATIVE PERCENT: 23 % (ref 20–42)
MAGNESIUM SERPL-MCNC: 1.7 MG/DL (ref 1.6–2.6)
MCH RBC QN AUTO: 24.1 PG (ref 26–35)
MCHC RBC AUTO-ENTMCNC: 28.8 G/DL (ref 32–34.5)
MCV RBC AUTO: 83.5 FL (ref 80–99.9)
MONOCYTES NFR BLD: 0.42 K/UL (ref 0.1–0.95)
MONOCYTES NFR BLD: 6 % (ref 2–12)
NEUTROPHILS NFR BLD: 71 % (ref 43–80)
NEUTS SEG NFR BLD: 5.18 K/UL (ref 1.8–7.3)
PHOSPHATE SERPL-MCNC: 3.6 MG/DL (ref 2.5–4.5)
PLATELET # BLD AUTO: 221 K/UL (ref 130–450)
PMV BLD AUTO: 9.6 FL (ref 7–12)
POTASSIUM SERPL-SCNC: 4 MMOL/L (ref 3.5–5)
RBC # BLD AUTO: 3.28 M/UL (ref 3.5–5.5)
RBC # BLD: ABNORMAL 10*6/UL
SODIUM SERPL-SCNC: 146 MMOL/L (ref 132–146)
WBC OTHER # BLD: 7.3 K/UL (ref 4.5–11.5)

## 2024-05-01 PROCEDURE — 84100 ASSAY OF PHOSPHORUS: CPT

## 2024-05-01 PROCEDURE — 83735 ASSAY OF MAGNESIUM: CPT

## 2024-05-01 PROCEDURE — 85025 COMPLETE CBC W/AUTO DIFF WBC: CPT

## 2024-05-01 PROCEDURE — 97165 OT EVAL LOW COMPLEX 30 MIN: CPT

## 2024-05-01 PROCEDURE — 97161 PT EVAL LOW COMPLEX 20 MIN: CPT

## 2024-05-01 PROCEDURE — 94640 AIRWAY INHALATION TREATMENT: CPT

## 2024-05-01 PROCEDURE — 6370000000 HC RX 637 (ALT 250 FOR IP)

## 2024-05-01 PROCEDURE — 97535 SELF CARE MNGMENT TRAINING: CPT

## 2024-05-01 PROCEDURE — 82962 GLUCOSE BLOOD TEST: CPT

## 2024-05-01 PROCEDURE — 6370000000 HC RX 637 (ALT 250 FOR IP): Performed by: STUDENT IN AN ORGANIZED HEALTH CARE EDUCATION/TRAINING PROGRAM

## 2024-05-01 PROCEDURE — 6360000002 HC RX W HCPCS: Performed by: STUDENT IN AN ORGANIZED HEALTH CARE EDUCATION/TRAINING PROGRAM

## 2024-05-01 PROCEDURE — 2580000003 HC RX 258: Performed by: STUDENT IN AN ORGANIZED HEALTH CARE EDUCATION/TRAINING PROGRAM

## 2024-05-01 PROCEDURE — 94660 CPAP INITIATION&MGMT: CPT

## 2024-05-01 PROCEDURE — 97530 THERAPEUTIC ACTIVITIES: CPT

## 2024-05-01 PROCEDURE — 36415 COLL VENOUS BLD VENIPUNCTURE: CPT

## 2024-05-01 PROCEDURE — 80048 BASIC METABOLIC PNL TOTAL CA: CPT

## 2024-05-01 PROCEDURE — 2700000000 HC OXYGEN THERAPY PER DAY

## 2024-05-01 RX ORDER — PREDNISONE 10 MG/1
TABLET ORAL
Qty: 30 TABLET | Refills: 0 | Status: SHIPPED | OUTPATIENT
Start: 2024-05-01

## 2024-05-01 RX ORDER — CEFDINIR 300 MG/1
300 CAPSULE ORAL EVERY 12 HOURS SCHEDULED
Qty: 2 CAPSULE | Refills: 0 | Status: SHIPPED | OUTPATIENT
Start: 2024-05-01 | End: 2024-05-02

## 2024-05-01 RX ADMIN — CEFDINIR 300 MG: 300 CAPSULE ORAL at 19:53

## 2024-05-01 RX ADMIN — CEFDINIR 300 MG: 300 CAPSULE ORAL at 09:22

## 2024-05-01 RX ADMIN — ARFORMOTEROL TARTRATE 15 MCG: 15 SOLUTION RESPIRATORY (INHALATION) at 09:45

## 2024-05-01 RX ADMIN — IPRATROPIUM BROMIDE AND ALBUTEROL SULFATE 1 DOSE: 2.5; .5 SOLUTION RESPIRATORY (INHALATION) at 04:56

## 2024-05-01 RX ADMIN — DOXYCYCLINE HYCLATE 100 MG: 100 CAPSULE ORAL at 09:22

## 2024-05-01 RX ADMIN — Medication 4 ML: at 09:50

## 2024-05-01 RX ADMIN — ACETAMINOPHEN 650 MG: 325 TABLET ORAL at 19:51

## 2024-05-01 RX ADMIN — ACETAMINOPHEN 650 MG: 325 TABLET ORAL at 09:22

## 2024-05-01 RX ADMIN — INSULIN LISPRO 1 UNITS: 100 INJECTION, SOLUTION INTRAVENOUS; SUBCUTANEOUS at 19:58

## 2024-05-01 RX ADMIN — PREDNISONE 40 MG: 20 TABLET ORAL at 09:22

## 2024-05-01 RX ADMIN — PANCRELIPASE LIPASE, PANCRELIPASE PROTEASE, PANCRELIPASE AMYLASE 15000 UNITS: 15000; 47000; 63000 CAPSULE, DELAYED RELEASE ORAL at 09:22

## 2024-05-01 RX ADMIN — PANCRELIPASE LIPASE, PANCRELIPASE PROTEASE, PANCRELIPASE AMYLASE 15000 UNITS: 15000; 47000; 63000 CAPSULE, DELAYED RELEASE ORAL at 12:40

## 2024-05-01 RX ADMIN — ASPIRIN 81 MG: 81 TABLET, CHEWABLE ORAL at 09:23

## 2024-05-01 RX ADMIN — ATORVASTATIN CALCIUM 40 MG: 40 TABLET, FILM COATED ORAL at 19:53

## 2024-05-01 RX ADMIN — BUDESONIDE INHALATION 500 MCG: 0.5 SUSPENSION RESPIRATORY (INHALATION) at 09:46

## 2024-05-01 ASSESSMENT — PAIN SCALES - GENERAL
PAINLEVEL_OUTOF10: 0
PAINLEVEL_OUTOF10: 8
PAINLEVEL_OUTOF10: 0
PAINLEVEL_OUTOF10: 0

## 2024-05-01 ASSESSMENT — PAIN DESCRIPTION - ORIENTATION: ORIENTATION: MID

## 2024-05-01 ASSESSMENT — PAIN - FUNCTIONAL ASSESSMENT: PAIN_FUNCTIONAL_ASSESSMENT: ACTIVITIES ARE NOT PREVENTED

## 2024-05-01 ASSESSMENT — PAIN DESCRIPTION - LOCATION: LOCATION: VAGINA

## 2024-05-01 ASSESSMENT — PAIN DESCRIPTION - DESCRIPTORS: DESCRIPTORS: ACHING;DISCOMFORT

## 2024-05-01 NOTE — PLAN OF CARE
Problem: Safety - Adult  Goal: Free from fall injury  Outcome: Adequate for Discharge     Problem: Chronic Conditions and Co-morbidities  Goal: Patient's chronic conditions and co-morbidity symptoms are monitored and maintained or improved  Outcome: Adequate for Discharge     Problem: Discharge Planning  Goal: Discharge to home or other facility with appropriate resources  5/1/2024 1629 by Ted Teresa, RN  Outcome: Adequate for Discharge  5/1/2024 1309 by Ted Teresa, RN  Outcome: Progressing     Problem: Pain  Goal: Verbalizes/displays adequate comfort level or baseline comfort level  Outcome: Adequate for Discharge     Problem: Skin/Tissue Integrity  Goal: Absence of new skin breakdown  Description: 1.  Monitor for areas of redness and/or skin breakdown  2.  Assess vascular access sites hourly  3.  Every 4-6 hours minimum:  Change oxygen saturation probe site  4.  Every 4-6 hours:  If on nasal continuous positive airway pressure, respiratory therapy assess nares and determine need for appliance change or resting period.  Outcome: Adequate for Discharge     Problem: ABCDS Injury Assessment  Goal: Absence of physical injury  Outcome: Adequate for Discharge

## 2024-05-01 NOTE — CARE COORDINATION
Spoke with patient. Discussed how she is feeling weaker than normal. Suggested she go for [facility] short term. She is not willing to do this. Only agreeable to home with aide services continuing. Discussed with her if she changes her mind I can make a referral to [facility] for her. She also goes to outpatient [facility]. Received call from [facility], patient's NIV was approved by insurance. They will work on scheduling delivery once patient is home. Device does need to be shipped from  as well. Patient already has oxygen at home through [employer].     1238 Patient for discharge. Her brother can be there this afternoon to unlock door. Bird Gonzalez. [phone]. Patient asked me about assisted living. She does currently own her own home. Discussed that she would have to liquidate assets for [employer] to cover assisted living. I also left message with her  Sandra Reed [phone] regarding our discussion. Also placed her contact information on AVS. [facility] ambulette arranged for 1730 as patient did not have anyone to transport her home. She does have 67% left on her portable concentrator.     For questions I can be reached at [phone]. EUGENE Uribe

## 2024-05-02 NOTE — DISCHARGE SUMMARY
[facility]   Discharge summary   Patient ID:  Ayesha Keane  [id number]  69 y.o.  1955    Admit date: 4/25/2024    Discharge date and time: 5/1/24    Admission Diagnoses:   Patient Active Problem List   Diagnosis    COPD (chronic obstructive pulmonary disease) (HCC)    MDD (major depressive disorder)    Hematuria    Neurogenic bladder    Chronic respiratory failure with hypoxia (HCC)    Anemia of chronic disease    Gastric wall thickening    Bladder wall thickening    Difficulty in walking, not elsewhere classified    Moderate persistent asthma with (acute) exacerbation    Muscle weakness (generalized)    Need for assistance with personal care    Neuromuscular dysfunction of bladder, unspecified    Personal history of other malignant neoplasm of kidney    Breast pain    COPD exacerbation (HCC)    New onset of congestive heart failure (HCC)    Prolonged Q-T interval on ECG    Acute exacerbation of chronic obstructive pulmonary disease (COPD) (HCC)    Decompensated chronic obstructive pulmonary disease with exacerbation (HCC)    Chest pain    Pancreatic insufficiency    Acute on chronic respiratory failure with hypercapnia (HCC)       Discharge Diagnoses: Acute on chronic resp failure     Consults: pulmonary/intensive care    Procedures: none    Hospital Course: The patient is a 69 y.o. female of Lee Vides MD     69-year-old female with a history of asthma and COPD  presents to the ED with complaints of respiratory distress and is admitted to the ICU with     Acute on chronic respiratory failure with hypercapnia  -Supplement O2 demands keeping oxygen saturation greater than 92%.  Patient currently intubated on ventilator at 40% FiO2  -IV doxycycline/Zosyn  Solu-Medrol 40 mg daily, diuresis if warranted  -DuoNebs   Follow imaging of chest  Respiratory culture-negative  -Swallow study questionable aspiration pneumonia once able  -Strep/Legionella   Respiratory panel-positive for human

## 2024-05-02 NOTE — PROGRESS NOTES
[facility]   Progress note      Subjective:    Patient looks much better on assessment today  States that her breathing is improving and she is getting more energy  Agree to sit up in a chair for dinner today    Objective:    /60   Pulse 97   Temp 97.8 °F (36.6 °C)   Resp 16   Ht 1.702 m (5' 7\")   Wt 70.5 kg (155 lb 6.8 oz)   LMP 10/21/1985   SpO2 97%   BMI 24.34 kg/m²     In: -   Out: 725   In: -   Out: 725 [Urine:725]    General appearance: NAD, conversant, pleasant  HEENT: AT/NC, MMM  Neck: FROM, supple  Lungs: Diminished in the lower bases,3 L NC  CV: RRR, no MRGs  Vasc: Radial pulses 2+  Abdomen: Soft, non-tender; no masses or HSM  Extremities: No peripheral edema or digital cyanosis  Skin: no rash, lesions or ulcers  Psych: Alert and oriented to person, place and time  Neuro: Alert and interactive     Recent Labs     04/28/24  0423 04/29/24  0851 04/30/24  0531   WBC 11.5 9.5 7.4   HGB 7.7* 8.7* 7.7*   HCT 27.3* 31.2* 28.0*    276 242         Recent Labs     04/28/24  0423 04/29/24  0851 04/30/24  0531    143 144   K 4.5 3.8 4.3    101 101   CO2 33* 33* 31*   BUN 19 16 20   CREATININE 0.6 0.6 0.6   CALCIUM 9.1 10.0 9.8         Assessment:    Principal Problem:    Acute on chronic respiratory failure with hypercapnia (HCC)  Resolved Problems:    * No resolved hospital problems. *      Plan:    69-year-old female with a history of asthma and COPD  presents to the ED with complaints of respiratory distress and is admitted to the ICU with     Acute on chronic respiratory failure with hypercapnia  -Supplement O2 demands keeping oxygen saturation greater than 92%.  Patient currently intubated on ventilator at 40% FiO2  -IV doxycycline/Zosyn  Solu-Medrol 40 mg daily, diuresis if warranted  -DuoNebs   Follow imaging of chest  Respiratory culture-negative  -Swallow study questionable aspiration pneumonia once able  -Strep/Legionella   Respiratory panel-positive for 
    [facility]   Progress note      Subjective:    Resting comfortably in bed  States that her breathing is back to her baseline  Just finished with a breathing treatment    Objective:    /61   Pulse 85   Temp 97.8 °F (36.6 °C) (Temporal)   Resp 18   Ht 1.702 m (5' 7\")   Wt 70.5 kg (155 lb 6.8 oz)   LMP 10/21/1985   SpO2 97%   BMI 24.34 kg/m²     In: -   Out: 950   In: -   Out: 950 [Urine:950]    General appearance: NAD, conversant, pleasant  HEENT: AT/NC, MMM  Neck: FROM, supple  Lungs: Diminished in the lower bases,3 L NC  CV: RRR, no MRGs  Vasc: Radial pulses 2+  Abdomen: Soft, non-tender; no masses or HSM  Extremities: No peripheral edema or digital cyanosis  Skin: no rash, lesions or ulcers  Psych: Alert and oriented to person, place and time  Neuro: Alert and interactive     Recent Labs     04/27/24 0448 04/28/24  0423 04/29/24  0851   WBC 13.1* 11.5 9.5   HGB 7.4* 7.7* 8.7*   HCT 26.4* 27.3* 31.2*    278 276         Recent Labs     04/27/24 0448 04/28/24  0423 04/29/24  0851    142 143   K 3.9 4.5 3.8    100 101   CO2 27 33* 33*   BUN 14 19 16   CREATININE 0.6 0.6 0.6   CALCIUM 8.8 9.1 10.0         Assessment:    Principal Problem:    Acute on chronic respiratory failure with hypercapnia (HCC)  Resolved Problems:    * No resolved hospital problems. *      Plan:    69-year-old female with a history of asthma and COPD  presents to the ED with complaints of respiratory distress and is admitted to the ICU with     Acute on chronic respiratory failure with hypercapnia  -Supplement O2 demands keeping oxygen saturation greater than 92%.  Patient currently intubated on ventilator at 40% FiO2  -IV doxycycline/Zosyn  Solu-Medrol 40 mg daily, diuresis if warranted  -DuoNebs   Follow imaging of chest  Respiratory culture-negative  -Swallow study questionable aspiration pneumonia once able  -Strep/Legionella   Respiratory panel-positive for human 
    Pulmonary Progress Note    Admit Date: 2024                            PCP: Lee Vides MD  Principal Problem:    Acute on chronic respiratory failure with hypercapnia (HCC)  Resolved Problems:    * No resolved hospital problems. *      Subjective:  Resting on 3lnc getting a breathing treatment  Breathing is better, denies chest pain, cough or wheezing  Educated on importance of wearing NIV at night     Medications:   dextrose      sodium chloride Stopped (24 1821)        doxycycline hyclate  100 mg Oral 2 times per day    cefdinir  300 mg Oral 2 times per day    predniSONE  40 mg Oral Daily    enoxaparin  40 mg SubCUTAneous Daily    sodium chloride (Inhalant)  4 mL Nebulization Daily    insulin lispro  0-4 Units SubCUTAneous 4x Daily AC & HS    sodium chloride flush  5-40 mL IntraVENous 2 times per day    Polyvinyl Alcohol-Povidone PF  1 drop Both Eyes Q4H    Or    artificial tears   Both Eyes Q4H    arformoterol tartrate  15 mcg Nebulization BID RT    budesonide  0.5 mg Nebulization BID RT    ipratropium 0.5 mg-albuterol 2.5 mg  1 Dose Inhalation q8h    aspirin  81 mg Oral Daily    atorvastatin  40 mg Oral Nightly    lipase-protease-amylas  15,000 Units Oral TID WC    [Held by provider] lipase-protease-amylase  20,000 Units Oral TID WC       Vitals:  VITALS:  BP (!) 120/46   Pulse 87   Temp 97.4 °F (36.3 °C) (Temporal)   Resp 16   Ht 1.702 m (5' 7\")   Wt 70.4 kg (155 lb 1.6 oz)   LMP 10/21/1985   SpO2 100%   BMI 24.29 kg/m²   24HR INTAKE/OUTPUT:    Intake/Output Summary (Last 24 hours) at 2024 1051  Last data filed at 2024 0606  Gross per 24 hour   Intake --   Output 550 ml   Net -550 ml     CURRENT PULSE OXIMETRY:  SpO2: 100 %  24HR PULSE OXIMETRY RANGE:  SpO2  Av.2 %  Min: 95 %  Max: 100 %  CVP:    VENT SETTINGS:   Vent Information  Ventilator Day(s): 1  Ventilator ID: [id number]  Ventilator Safety Check Performed Pre-Use: Yes  Equipment Changed: Humidification  Ventilator 
   04/25/24 1246   NIV Type   NIV Started/Stopped On   Mode AVAPS   Mask Type Full face mask   Mask Size Small   Assessment   Pulse (!) 150   Respirations 20   SpO2 100 %   Settings/Measurements   PIP Observed 26 cm H20   CPAP/EPAP 6 cmH2O   IPAP Min 22 cmH2O   IPAP Max 26 cmH2O   Vt (Measured) 421 mL   Rate Ordered 16   Insp Rise Time (%) 3 %   FiO2  60 %   I Time/ I Time % 0.9 s   Minute Volume (L/min) 8.4 Liters   Mask Leak (lpm) 41 lpm   Patient's Home Machine No   Alarm Settings   Alarms On Y   Low Pressure (cmH2O) 20 cmH2O   Apnea (secs) 20 secs   RR Low (bpm) 14   RR High (bpm) 40 br/min     Date: 4/25/2024    Time: 1:08 PM    Patient Placed On BIPAP/CPAP/ Non-Invasive Ventilation?  Yes    If no must comment.  Facial area red/color change? No           If YES are Blister/Lesion present?No   If yes must notify nursing staff  BIPAP/CPAP skin barrier?  Yes    Skin barrier type:mepilexlite       Comments:        Elizabeth Huffman RCP    
   04/25/24 1627   Patient Observation   Pulse 94   Respirations 18   Vent Information   Ventilator Day(s) 1   Ventilator ID [id number]   Ventilator Safety Check Performed Pre-Use Yes   Equipment Changed Humidification   Vent Mode AC/VC   Ventilator Settings   Vt (Set, mL) 450 mL   FiO2  60 %   Resp Rate (Set) 20 bpm   PEEP/CPAP (cmH2O) 5   Peak Inspiratory Flow (Set) 60 L/sec   Vent Patient Data (Readings)   Vt (Measured) 437 mL   Peak Inspiratory Pressure (cmH2O) 35 cmH2O   Rate Measured 20 br/min   Minute Volume (L/min) 8.71 Liters   Mean Airway Pressure (cmH2O) 12 cmH20   Plateau Pressure (cm H2O) 0 cm H2O   Driving Pressure -5   I:E Ratio 1:2.70   Backup Apnea On   Backup Rate 20 Breaths Per Minute   Backup Vt 450   Vent Alarm Settings   High Pressure (cmH2O) 40 cmH2O   Low Minute Volume (lpm) 5 L/min   High Minute Volume (lpm) 16 L/min   Low Exhaled Vt (ml) 350 mL   High Exhaled Vt (ml) 800 mL   RR Low (bpm) 20   RR High (bpm) 35 br/min   Apnea (secs) 20 secs   Additional Respiratoray Assessments   Humidification Source Heated wire   Humidification Temp 37   Circuit Condensation Drained   Ambu Bag With Mask At Bedside Yes   Non-Surgical Airway 04/25/24 Endotracheal tube   Placement Date/Time: 04/25/24 1454   Present on Admission/Arrival: No  Placed By: In ED  Placement Verified By: Auscultation;Capnometry  Insertion attempts: 1  Location: Oral  Airway Device: Endotracheal tube  Size: 7.5   Secured At 23 cm   Measured From Lips   Secured By Commercial tube quispe       
   04/25/24 1741   Patient Observation   Pulse (!) 102   Respirations 25   SpO2 100 %   Ventilator Settings   Vt (Set, mL) 450 mL   FiO2  60 %   Resp Rate (Set) 20 bpm   PEEP/CPAP (cmH2O) 5   Vent Patient Data (Readings)   Vt (Measured) 254 mL   Peak Inspiratory Pressure (cmH2O) 40 cmH2O   Rate Measured 21 br/min   Minute Volume (L/min) 6.17 Liters   Mean Airway Pressure (cmH2O) 11 cmH20   Plateau Pressure (cm H2O) 0 cm H2O   Driving Pressure -5   I:E Ratio 1:18.0   Vent Alarm Settings   High Pressure (cmH2O) 40 cmH2O   Non-Surgical Airway 04/25/24 Endotracheal tube   Placement Date/Time: 04/25/24 1454   Present on Admission/Arrival: No  Placed By: In ED  Placement Verified By: Auscultation;Capnometry  Insertion attempts: 1  Location: Oral  Airway Device: Endotracheal tube  Size: 7.5   Secured At (S)  26 cm  (advanced 3cm per order)   Measured From Lips   Secured Location Right   Secured By Commercial tube quispe       
Breathing a little better. Coughing white sputum. On baseline 3 liters. Tolerating NIV at hs.        Current Facility-Administered Medications:     doxycycline hyclate (VIBRAMYCIN) capsule 100 mg, 100 mg, Oral, 2 times per day, Allen Rodgers MD, 100 mg at 04/29/24 0941    cefdinir (OMNICEF) capsule 300 mg, 300 mg, Oral, 2 times per day, Breana Burns MD, 300 mg at 04/29/24 0942    predniSONE (DELTASONE) tablet 40 mg, 40 mg, Oral, Daily, Breana Burns MD, 40 mg at 04/29/24 0941    glucose chewable tablet 16 g, 4 tablet, Oral, PRN, Allen Rodgers MD    dextrose bolus 10% 125 mL, 125 mL, IntraVENous, PRN **OR** dextrose bolus 10% 250 mL, 250 mL, IntraVENous, PRN, Allen Rodgers MD    glucagon injection 1 mg, 1 mg, SubCUTAneous, PRN, Allen Rodgers MD    dextrose 10 % infusion, , IntraVENous, Continuous PRN, Allen Rodgers MD    enoxaparin (LOVENOX) injection 40 mg, 40 mg, SubCUTAneous, Daily, Allen Rodgers MD, 40 mg at 04/29/24 0941    sodium chloride (Inhalant) 3 % nebulizer solution 4 mL, 4 mL, Nebulization, Daily, Asmita Bedolla MD, 4 mL at 04/28/24 1011    insulin lispro (HUMALOG) injection vial 0-4 Units, 0-4 Units, SubCUTAneous, 4x Daily AC & HS, Asmita Bedolla MD, 1 Units at 04/29/24 0954    sodium chloride flush 0.9 % injection 5-40 mL, 5-40 mL, IntraVENous, 2 times per day, Asmita Bedolla MD, 10 mL at 04/27/24 0848    sodium chloride flush 0.9 % injection 5-40 mL, 5-40 mL, IntraVENous, PRN, Asmita Bedolla MD    0.9 % sodium chloride infusion, , IntraVENous, PRN, Asmita Bedolla MD, Stopped at 04/26/24 1821    ondansetron (ZOFRAN-ODT) disintegrating tablet 4 mg, 4 mg, Oral, Q8H PRN **OR** ondansetron (ZOFRAN) injection 4 mg, 4 mg, IntraVENous, Q6H PRN, Asmita Bedolla MD    polyethylene glycol (GLYCOLAX) packet 17 g, 17 g, Oral, Daily PRN, Asmita Bedolla MD    acetaminophen (TYLENOL) tablet 650 mg, 650 mg, Oral, Q6H PRN, 650 mg at 04/27/24 6521 **OR** 
Date: 4/29/2024    Time: 0000    Patient Placed On BIPAP/CPAP/ Non-Invasive Ventilation?  Yes    If no must comment.  Facial area red/color change? No           If YES are Blister/Lesion present?No   If yes must notify nursing staff  BIPAP/CPAP skin barrier?  Yes    Skin barrier type:mepilexlite       Comments:        Neelam Lemons RCP  
[facility]   Department of Internal Medicine   Internal Medicine Residency  MICU Progress Note    Patient:  Ayesha Keane 69 y.o. female   MRN: [id number]       Date of Service: 4/26/2024    Allergy: Sulfa antibiotics    Subjective     Patient was seen and examined this morning at bedside intubated, awake, following commands.    Overnight, ET tube 2.5        Objective       I & O - 24hr:    Intake/Output Summary (Last 24 hours) at 4/26/2024 1831  Last data filed at 4/26/2024 1800  Gross per 24 hour   Intake 460.36 ml   Output 847 ml   Net -386.64 ml       Physical Exam  Vitals: BP (!) 141/60   Pulse 88   Temp 99.9 °F (37.7 °C) (Bladder)   Resp 24   Ht 1.702 m (5' 7\")   Wt 65.3 kg (143 lb 15.4 oz)   LMP 10/21/1985   SpO2 99%   BMI 22.55 kg/m²     I & O - 24hr: I/O this shift:  In: 20.4 [I.V.:20.4]  Out: 660 [Urine:660]   General Appearance: Intubated, awake,  follow commands.  Head: normocephalic and atraumatic  Eyes: pupils equal, round, and reactive to light  Pulmonary/Chest: Clear to auscultation bilaterally, mechanical breath sounds  Cardiovascular: Regular rate, irregular rhythm  Abdomen: soft, non-tender and bowel sounds normal  Extremities: no cyanosis, clubbing or edema  Neurologic: Intubated, awake, follow commands.  pupils equal, round, and reactive to light.       Medications     Continuous Infusions:   dextrose      sodium chloride 10 mL/hr at 04/26/24 0130     Scheduled Meds:   enoxaparin  40 mg SubCUTAneous Daily    sodium chloride (Inhalant)  4 mL Nebulization Daily    insulin lispro  0-4 Units SubCUTAneous 4x Daily AC & HS    sodium phosphate IVPB (PERIPHERAL line)  15 mmol IntraVENous Once    sodium chloride flush  5-40 mL IntraVENous 2 times per day    piperacillin-tazobactam  3,375 mg IntraVENous Q8H    doxycycline (VIBRAMYCIN) IV  100 mg IntraVENous Q12H    methylPREDNISolone  40 mg IntraVENous Daily    Polyvinyl Alcohol-Povidone PF  1 drop Both Eyes Q4H    Or    
OCCUPATIONAL THERAPY INITIAL EVALUATION    [facility]  [address], [place]      Date:2024                                                  Patient Name: Ayesha Keane  MRN: [id number]  : 1955  Room: [address] [place] [address]    Evaluating OT: Elizabeth Motta, MOT, OTR/L  # [id number]    Referring Provider:  Radha Rao APRN - CNP, Rex Blackwell MD   Specific Provider Orders:  \"OT Eval and Treat\"  24    Diagnosis: Respiratory distress [R06.03]  Acute on chronic respiratory failure with hypercapnia (HCC) [J96.22]  Human metapneumovirus (hMPV) pneumonia [J12.3]    Pt was admitted to ICU w/ SOB, hypoxia O2 sats 79% - intubated/extubated to 5L.      Pertinent Medical History:  Pt has a past medical history of Asthma, Chronic pancreatitis (HCC), COPD (chronic obstructive pulmonary disease) (HCC), Depression, Dysphagia, Emphysema lung (HCC), Del Rio catheter in place, and Oxygen dependent.,  has a past surgical history that includes Hysterectomy; Kidney surgery (Left, 2014); pr cystourethroscopy with biopsy (N/A, 2018); Colonoscopy; Upper gastrointestinal endoscopy (N/A, 5/10/2019); Upper gastrointestinal endoscopy (N/A, 2019); Upper gastrointestinal endoscopy (N/A, 2019); Colonoscopy (N/A, 2019); Upper gastrointestinal endoscopy (N/A, 2020); Upper gastrointestinal endoscopy (N/A, 10/25/2021); Upper gastrointestinal endoscopy (N/A, 2021); and Colonoscopy (N/A, 2023).    Surgeries this admission:  No    Precautions:  Fall Risk  Droplet/Contact Isolation - Metapneumovirus  Chronic O2 4L  Chronic Del Rio Catheter    Assessment of current deficits   [x] Functional mobility  [x]ADLs  [x] Strength               []Cognition   [x] Functional transfers   [x] IADLs         [x] Safety Awareness   [x]Endurance   [] Fine Coordination              [x] Balance     [] Vision/perception   []Sensation    []Gross Motor 
Patient admitted to MICU with the following belongings:  Wallet, Pants, Shirt, Socks, Shoes, and Other Home Oxygen and notebook . The following belongings admitted with the patient, None, were sent home with the patient's family. 4 Eyes Skin Assessment     NAME:  Ayesha Keane  YOB: 1955  MEDICAL RECORD NUMBER:  [id number]    The patient is being assessed for  Admission    I agree that at least one RN has performed a thorough Head to Toe Skin Assessment on the patient. ALL assessment sites listed below have been assessed.      Areas assessed by both nurses:    Head, Face, Ears, Shoulders, Back, Chest, Arms, Elbows, Hands, Sacrum. Buttock, Coccyx, Ischium, Legs. Feet and Heels, and Under Medical Devices         Does the Patient have a Wound? No noted wound(s)  Boggy, blanchable Bilateral Heels         Harrison Prevention initiated by RN: Yes  Wound Care Orders initiated by RN: No    Pressure Injury (Stage 3,4, Unstageable, DTI, NWPT, and Complex wounds) if present, place Wound referral order by RN under : No    New Ostomies, if present place, Ostomy referral order under : No     Nurse 1 eSignature: Electronically signed by Siva Aguero RN on 4/25/24 at 6:42 PM EDT    **SHARE this note so that the co-signing nurse can place an eSignature**    Nurse 2 eSignature: Electronically signed by Natalee Scherer RN on 4/26/24 at 8:41 AM EDT  
Patient was extubated to 3 liters/min via nasal cannula. Breath Sounds post extubation were clear/diminished. Stridor was not present post extubation. SPO2 was 100%.      Performed by  Stephany Alcaraz RCP  
Physical Therapy  Physical Therapy Initial Assessment     Name: Ayesha Keane  : 1955  MRN: [id number]      Date of Service: 2024    Evaluating PT:  Zenia Rosa PT, DPT [id number]    Room #:  4515/4515-A  Diagnosis:  Respiratory distress [R06.03]  Acute on chronic respiratory failure with hypercapnia (HCC) [J96.22]  Human metapneumovirus (hMPV) pneumonia [J12.3]  PMHx/PSHx:   has a past medical history of Asthma, Chronic pancreatitis (HCC), COPD (chronic obstructive pulmonary disease) (HCC), Depression, Dysphagia, Emphysema lung (HCC), Del Rio catheter in place, and Oxygen dependent.  Precautions:  Fall risk, O2, continuous pulse ox, contact precautions, droplet precautions  Equipment Needs:  TBD    SUBJECTIVE:    Pt lives alone in a 2 story home with 3 steps to enter and no rail(s). Pt's bed and bath are on the second floor with 14 steps, one HR, and a chair life upstairs. Pt ambulated with a rollator PTA. She wears 3L/min O2 at baseline. Pt has assistance from a home health aide 5 days/week from 9AM-1PM.    OBJECTIVE:   Initial Evaluation  Date: 24 Treatment Short Term/ Long Term   Goals   AM-PAC 6 Clicks 15/24     Was pt agreeable to Eval/treatment? Yes     Does pt have pain? Denied     Bed Mobility  Rolling: NT  Supine to sit: SBA  Sit to supine: NT  Scooting: SBA  Rolling: Independent  Supine to sit: Independent  Sit to supine: Independent  Scooting: Independent   Transfers Sit to stand: SBA  Stand to sit: SBA  Stand pivot: SBA with no AD  Sit to stand: Independent  Stand to sit: Independent  Stand pivot: Modified Independent with AAD   Ambulation    Few feet with no AD and SBA  >50 feet with AAD and Modified Independent    Stair negotiation: ascended and descended  NT  >3 steps with unilateral rail and Modified Independent    ROM BUE:  Refer to OT  BLE:  WFL     Strength BUE:  Refer to OT  BLE:  5/5 knee extension, ankle DF, ankle PF     Balance Sitting EOB:  SBA  Dynamic Standing:  SBA with no 
Placed patient on SBT with PSV 8. Patient passed SAT and is tolerating PSV well.        04/26/24 0847   Patient Observation   Pulse 96   Respirations 12   SpO2 100 %   Vent Information   Vent Mode (S)  CPAP/PS   Ventilator Settings   FiO2  40 %   PEEP/CPAP (cmH2O) 5   Pressure Support (cm H2O) (S)  8 cm H2O   Vent Patient Data (Readings)   Vt (Measured) 298 mL   Peak Inspiratory Pressure (cmH2O) 13 cmH2O   Rate Measured 12 br/min   Minute Volume (L/min) 3.83 Liters   Mean Airway Pressure (cmH2O) 7 cmH20   Plateau Pressure (cm H2O) 20 cm H2O   Driving Pressure 15   I:E Ratio 1:4.30   Backup Apnea On   Backup Rate 14 Breaths Per Minute   Backup Vt 450   Vent Alarm Settings   High Pressure (cmH2O) 45 cmH2O   Low Minute Volume (lpm) 3.5 L/min   High Minute Volume (lpm) 16 L/min   Low Exhaled Vt (ml) 300 mL   High Exhaled Vt (ml) 800 mL   RR High (bpm) 35 br/min   Apnea (secs) 20 secs   Additional Respiratoray Assessments   Humidification Source Heated wire   Humidification Temp 37   Circuit Condensation Not drained   Ambu Bag With Mask At Bedside Yes   Non-Surgical Airway 04/25/24 Endotracheal tube   Placement Date/Time: 04/25/24 1454   Present on Admission/Arrival: No  Placed By: In ED  Placement Verified By: Auscultation;Capnometry  Insertion attempts: 1  Location: Oral  Airway Device: Endotracheal tube  Size: 7.5   Secured At 26 cm   Measured From Lips       
Pt's jarquin leg bag has been put on.  
Wean parameter done on PSV 5 and PEEP 5.    VT= 322 mls  F= 14 B/M  V= 6.21 l/m  NIF= -31 cmH2O  VC= .88 L  RSBI= 30    Leak is audibly heard when cuff is fully deflated.   
panel-positive for human metapneumovirus    4/27/2024  Out of ICU  Resting comfortably in no acute distress  White appears less short of breath today  Continue supportive care for metapneumovirus-likely etiology of acute respiratory decline  broad-spectrum IV therapy  transitioned to cefdinir and Doxy p.o.  Ambulating pulse ox as status improves  Unclear etiology of blurry vision-blood pressures are well-controlled  Anemia with H&H 7.4/26-monitor, transfuse if drops below 7     Code Status: Full code  Consultants: Neurology  DVT Prophylaxis   PT/OT  Discharge planning         I have spent a total time of 25 minutes of this patient encounter reviewing chart, labs, radiological reports coordinating care with interdisciplinary teams, face to face encounter with patient, providing counseling/education to patient/family, and formulating plan.       Elsa Zuluaga MD  7:27 PM  4/27/2024  
study questionable aspiration pneumonia once able  -Strep/Legionella   Respiratory panel-positive for human metapneumovirus    4/27/2024  Out of ICU  Resting comfortably in no acute distress  White appears less short of breath today  Continue supportive care for metapneumovirus-likely etiology of acute respiratory decline  broad-spectrum IV therapy  transitioned to cefdinir and Doxy p.o.  Ambulating pulse ox as status improves  Unclear etiology of blurry vision-blood pressures are well-controlled  Anemia with H&H 7.4/26-monitor, transfuse if drops below 7    4/28/2024  Patient transferred out of ICU overnight  Patient currently on 3 L O2 for saturation of 100% which is baseline.  H&H-7.7/27.3  Obtain walking pulse ox today in preparation for discharge  Continue prednisone 40 mg  Doxy 100 mg twice daily     Code Status: Full code  Consultants: Neurology, pulmonary    DVT Prophylaxis   PT/OT  Discharge planning     I have spent a total time of 25 minutes of this patient encounter reviewing chart, labs, radiological reports coordinating care with interdisciplinary teams, face to face encounter with patient, providing counseling/education to patient/family, and formulating plan.       Alexus Knott, APRN - CNP  4:17 PM  4/28/2024  
times per day    Polyvinyl Alcohol-Povidone PF  1 drop Both Eyes Q4H    Or    artificial tears   Both Eyes Q4H    arformoterol tartrate  15 mcg Nebulization BID RT    budesonide  0.5 mg Nebulization BID RT    ipratropium 0.5 mg-albuterol 2.5 mg  1 Dose Inhalation q8h    aspirin  81 mg Oral Daily    atorvastatin  40 mg Oral Nightly    lipase-protease-amylas  15,000 Units Oral TID WC    [Held by provider] lipase-protease-amylase  20,000 Units Oral TID WC     PRN Meds: glucose, dextrose bolus **OR** dextrose bolus, glucagon (rDNA), dextrose, sodium chloride flush, sodium chloride, ondansetron **OR** ondansetron, polyethylene glycol, acetaminophen **OR** acetaminophen, labetalol, hydrALAZINE      Labs and Imaging Studies     Labs    Recent Labs     04/25/24  1245 04/26/24  0427 04/26/24  1814 04/27/24  0448   WBC 10.0 6.3  --  13.1*   RBC 3.67 3.22*  --  3.11*   HGB 8.9* 7.6* 8.4* 7.4*   HCT 31.1* 26.0* 29.5* 26.4*   MCV 84.7 80.7  --  84.9   MCH 24.3* 23.6*  --  23.8*   MCHC 28.6* 29.2*  --  28.0*   RDW 19.1* 18.8*  --  19.8*    254  --  265   MPV 9.7 9.4  --  9.7     Recent Labs     04/25/24  1245 04/25/24  1425 04/25/24  1706 04/26/24  0427 04/26/24  1346 04/27/24  0448   NA  --  140  --  139 140 142   K  --  3.5  --  3.6 4.0 3.9   CL  --  95*  --  94* 96* 100   MG 2.1  --   --  1.7 2.2 2.3   PHOS  --   --  3.4 3.0 2.5 4.3   CO2  --  36*  --  27 32* 27   BUN  --  13  --  22 19 14   CREATININE  --  0.6  --  0.6 0.6 0.6   ANIONGAP  --  9  --  18* 12 15   GLUCOSE  --  180*  --  272* 223* 109*   CALCIUM  --  8.8  --  8.6 9.0 8.8   PROT  --  6.2*  --   --   --   --    BILITOT  --  <0.2  --   --   --   --    ALKPHOS  --  58  --   --   --   --    AST  --  15  --   --   --   --    ALT  --  12  --   --   --   --      No results for input(s): \"LACTA\" in the last 72 hours.  Recent Labs     04/25/24  1245 04/25/24  1425   TROPHS  --  17*   PROBNP 124  --      Recent Labs     04/25/24  2317 04/26/24  0045 04/26/24  0455 
as described. 4. Metallic artifact at the level of the aortic arch.      XR CHEST PORTABLE  Result Date: 4/25/2024  ET tube tip 2.5 cm from the bobbi.      XR ABDOMEN FOR NG/OG/NE TUBE PLACEMENT  Result Date: 4/25/2024  Tip of the NG tube in the region of the stomach.      XR CHEST PORTABLE  Result Date: 4/25/2024  . Tip of ETT approximately 6.2 cm above the bobbi. 2. Tip of the NG tube below the diaphragms.      XR CHEST PORTABLE  Result Date: 4/25/2024  No acute process.      Echo 10/10/2022   Normal left ventricular chamber size.   Normal left ventricular systolic function.   Concentric LV remodeling.   Visually estimated LVEF is 55-60 %.   No wall motion abnormalities.   Indeterminate diastolic function.   Normal right ventricle structure and function.   Normal left atrial size.   Normal right atrial size   Unable to estimate PA pressure.       Constitutional:  68 yo F with h/o with COPD on home oxygen 3 L, chronic hypercapnic respiratory failure has NIV at home but not very compliant chronic pancreatitis chronic Del Rio due to atonic bladder, diverticulosis,  CAD, CHF, circumferential thickening of distal esophagus with hiatal hernia with history of noncompliance to NIV     Recent admission  4/11 - 4/16/2024 presented with acute on chronic hypercapnic respiratory failure CT chest showing emphysema COPD.  Patient was treated with BiPAP  4/13 stress test showing EF 70%  4/14 CT chest with emphysema chronic pancreatitis      4/25 presents with respiratory distress.  With severe acute hypercapnic respiratory failure failed AVAPS was intubated  Chest film with lungs clear endotracheal tube in place  viral panel positive for human meta pneumovirus  Urine culture positive Enterococcus  4/26 extubated on 3 L of oxygen ABGs improved back to baseline  Chest x-ray with interstitial prominence left lower lobe  4/27 transferred out of ICU, pulm consulted on 2lnc pox 100%  4/28 3lnc pox 100%   4/30 3L 98%. NIV overnight      
4L, NIV over night        Assessment/plan:  S/p acute on Chronic hypoxic and hypercapnic respiratory failure  s/p intubation 4/25-4/26  back to baseline 2-3Lnc  Continue NIV hs and prn- noncompliant at home  Wean as able for pulse ox greater than 90%  Human Metapneumovirus  isolation precautions    CAP left lower lobe  On doxy and cefdinir through 5/2  Severe asthma/severe advanced emphysema seen on CT with exacerbation   On Brovana and budesonide at home, continue here  Continue prednisone 40mg day 4/5  CAD   4/13/2024 Lexiscan stress test negative for ischemia  HFpEF 55-60% on echo 10/2022  4/25 Echo EF 65-70%, grade I DD, trace MR  10/12/22 Echo EF 55%, negative stress test 4/13/2024   3/23/2022 stress test EF 75% no ischemia  DVT prophylaxis with Lovenox       ANGELA Mchugh - NP     Seen and examined, agree with above.  LLL pneumonia to complete doxy/ cefdinir tomorrow.  Acute on chronic hypoxic and hypercapnic resp failure back to baseline. My office is calling [facility] to make sure they check on her machine and have O2 bled in as well as an easy to place interface.  FU my office 2 weeks or so.

## 2024-05-06 LAB
EKG ATRIAL RATE: 106 BPM
EKG P AXIS: 86 DEGREES
EKG P-R INTERVAL: 118 MS
EKG Q-T INTERVAL: 344 MS
EKG QRS DURATION: 72 MS
EKG QTC CALCULATION (BAZETT): 456 MS
EKG R AXIS: 72 DEGREES
EKG T AXIS: 160 DEGREES
EKG VENTRICULAR RATE: 106 BPM

## 2024-05-28 ENCOUNTER — TELEPHONE (OUTPATIENT)
Dept: BREAST CENTER | Age: 69
End: 2024-05-28

## 2024-05-28 NOTE — TELEPHONE ENCOUNTER
----- Message from Angela Flores RN sent at 4/15/2024  1:21 PM EDT -----  Patient had appt on 4/15/24 with  that was canceled D/T patient being in hospital. Check to see if patient is discharged. If so contact patient to reschedule appointment in not already rescheduled.       Electronically signed by Angela Flores RN on 4/15/24 at 1:22 PM EDT

## 2024-05-28 NOTE — TELEPHONE ENCOUNTER
JUN Flores contacted patient to schedule an appointment for follow up. RN scheduled pt for 7/22/2024 @ 2pm with . Patient requested a afternoon appointment and this was first avialable at the time.  Patient verbalized appointment date, time and location. RN verified number that was good to do reminder call was the one listed in chart. RN advised patient where to park and enter building for appointment.         Electronically signed by Angela Flores RN on 5/28/24 at 10:09 AM EDT

## 2024-06-18 ENCOUNTER — HOSPITAL ENCOUNTER (OUTPATIENT)
Age: 69
Discharge: HOME | End: 2024-06-20
Payer: MEDICARE

## 2024-06-18 ENCOUNTER — HOSPITAL ENCOUNTER (OUTPATIENT)
Dept: GENERAL RADIOLOGY | Age: 69
Discharge: HOME | End: 2024-06-20
Payer: MEDICARE

## 2024-06-18 DIAGNOSIS — J18.9 PNEUMONIA OF LEFT LOWER LOBE DUE TO INFECTIOUS ORGANISM: ICD-10-CM

## 2024-06-18 PROCEDURE — 71046 X-RAY EXAM CHEST 2 VIEWS: CPT

## 2024-07-17 ENCOUNTER — PREP FOR PROCEDURE (OUTPATIENT)
Dept: HEMATOLOGY | Age: 69
End: 2024-07-17

## 2024-07-17 DIAGNOSIS — K86.89 PANCREATIC DUCT DILATED: ICD-10-CM

## 2024-07-17 DIAGNOSIS — K86.1 PANCREATIC DUCT HYPERTENSION WITH CHRONIC PANCREATITIS (HCC): ICD-10-CM

## 2024-07-17 DIAGNOSIS — K86.1 CHRONIC CALCIFIC PANCREATITIS (HCC): Primary | ICD-10-CM

## 2024-07-17 DIAGNOSIS — K86.81 EXOCRINE PANCREATIC INSUFFICIENCY: Primary | ICD-10-CM

## 2024-07-22 ENCOUNTER — OFFICE VISIT (OUTPATIENT)
Dept: BREAST CENTER | Age: 69
End: 2024-07-22
Payer: MEDICARE

## 2024-07-22 VITALS
HEART RATE: 97 BPM | RESPIRATION RATE: 24 BRPM | TEMPERATURE: 98.2 F | OXYGEN SATURATION: 92 % | HEIGHT: 67 IN | BODY MASS INDEX: 24.34 KG/M2 | SYSTOLIC BLOOD PRESSURE: 112 MMHG | DIASTOLIC BLOOD PRESSURE: 64 MMHG

## 2024-07-22 DIAGNOSIS — Z12.31 ENCOUNTER FOR SCREENING MAMMOGRAM FOR MALIGNANT NEOPLASM OF BREAST: ICD-10-CM

## 2024-07-22 DIAGNOSIS — N64.4 BREAST PAIN: Primary | ICD-10-CM

## 2024-07-22 PROCEDURE — 99213 OFFICE O/P EST LOW 20 MIN: CPT | Performed by: SURGERY

## 2024-07-22 PROCEDURE — 1090F PRES/ABSN URINE INCON ASSESS: CPT | Performed by: SURGERY

## 2024-07-22 PROCEDURE — 1123F ACP DISCUSS/DSCN MKR DOCD: CPT | Performed by: SURGERY

## 2024-07-22 PROCEDURE — G8420 CALC BMI NORM PARAMETERS: HCPCS | Performed by: SURGERY

## 2024-07-22 PROCEDURE — 1036F TOBACCO NON-USER: CPT | Performed by: SURGERY

## 2024-07-22 PROCEDURE — 99212 OFFICE O/P EST SF 10 MIN: CPT | Performed by: SURGERY

## 2024-07-22 PROCEDURE — G8399 PT W/DXA RESULTS DOCUMENT: HCPCS | Performed by: SURGERY

## 2024-07-22 PROCEDURE — G8427 DOCREV CUR MEDS BY ELIG CLIN: HCPCS | Performed by: SURGERY

## 2024-07-22 PROCEDURE — 3017F COLORECTAL CA SCREEN DOC REV: CPT | Performed by: SURGERY

## 2024-07-22 NOTE — PATIENT INSTRUCTIONS
Will do mammogram in September no office visit.  Will order voltaren gel  Call office with pharmacy number

## 2024-07-22 NOTE — PROGRESS NOTES
Date of visit: 7/22/2024    10/04/22: NS  10/12/22: NS  03/01/23  10/09/23  04/15/24: Not seen  07/22/24:      DIAGNOSIS:  1. (03/01/23) LEFT breast pain, non-focal  * Also reports swelling and difficulty sleeping on LEFT side  * CBE DK: NAC thickening  2. Family history of cancer  * Father/prostate  3. History of LEFT breast image guided biopsy x2    IMAGING/PROCEDURES:  1. (09/28/22) BILATERAL d-mammogram: BIRADS-0  DK-clips, poss thickening of NAC compared to RIGHT  2. (09/08/23) BILATERAL st-mammogram: BIRADS-2    Due for mammo sept    Breast History  Ayesha Keane was in the office today for a scheduled follow-up.    Ayesha was first evaluated here in March 2023.  At that time she noticed breast swelling and discomfort that affected her sleep.  On imaging there were no parenchymal issues but there was some thickening of the left nipple areolar complex.  With her full workup showing benign underlying etiology the patient was scheduled for follow-up.    Breast Symptoms  Ayesha reports ongoing stable left breast swelling and discomfort.    She reports no focal masses.  She has had no skin retraction or discoloration.  She notes no nipple discharge or retraction.    Breast Examination  There are no cervical, supraclavicular, or infraclavicular lymph nodes palpable.    Inspection of the breast bilaterally does demonstrate the left breast to be slightly larger than the right.  This is unchanged over time.  While there has been thickening of the nipple areolar on imaging today in the office I see no clinical skin thickening.  In particular there is no pitting or peau d'orange.    Palpation of the axilla bilaterally is without adenopathy.    Palpation of the breast bilaterally demonstrate there to be no masses.    Breast Imaging  Ayesha last underwent breast imaging in September 2023.  The findings were all stable and this was read as BI-RADS 2.    Assessment/Plan  Ayesha Keane was in the office today for

## 2024-07-22 NOTE — PROGRESS NOTES
Arrived by transport. She became SOB with the walk from door to the inside lobby. Remains on O2 3lnc. O2 sat down to 80 %. It did rise to 89-90 % while resting. Patient kept in wheelchair for visit. She states the walking causes her oxygen to decrease. Dr fontenot aware.    Electronically signed by Jazz Hunter RN on 7/22/24 at 2:11 PM EDT

## 2024-08-07 ENCOUNTER — TELEPHONE (OUTPATIENT)
Dept: BREAST CENTER | Age: 69
End: 2024-08-07

## 2024-08-09 ENCOUNTER — HOSPITAL ENCOUNTER (OUTPATIENT)
Age: 69
Discharge: HOME | End: 2024-08-09
Payer: MEDICARE

## 2024-08-09 ENCOUNTER — HOSPITAL ENCOUNTER (OUTPATIENT)
Dept: CT IMAGING | Age: 69
End: 2024-08-09
Payer: MEDICARE

## 2024-08-09 DIAGNOSIS — K86.1 PANCREATIC DUCT HYPERTENSION WITH CHRONIC PANCREATITIS (HCC): ICD-10-CM

## 2024-08-09 DIAGNOSIS — K86.89 PANCREATIC DUCT DILATED: ICD-10-CM

## 2024-08-09 DIAGNOSIS — K86.81 EXOCRINE PANCREATIC INSUFFICIENCY: ICD-10-CM

## 2024-08-09 DIAGNOSIS — K86.1 CHRONIC CALCIFIC PANCREATITIS (HCC): ICD-10-CM

## 2024-08-09 LAB
BUN SERPL-MCNC: 16 MG/DL (ref 6–23)
CREAT SERPL-MCNC: 0.6 MG/DL (ref 0.5–1)
GFR, ESTIMATED: >90 ML/MIN/1.73M2

## 2024-08-09 PROCEDURE — 84520 ASSAY OF UREA NITROGEN: CPT

## 2024-08-09 PROCEDURE — 82565 ASSAY OF CREATININE: CPT

## 2024-08-09 PROCEDURE — 74160 CT ABDOMEN W/CONTRAST: CPT

## 2024-08-09 PROCEDURE — 36415 COLL VENOUS BLD VENIPUNCTURE: CPT

## 2024-08-09 PROCEDURE — 6360000004 HC RX CONTRAST MEDICATION: Performed by: RADIOLOGY

## 2024-08-09 RX ADMIN — IOPAMIDOL 75 ML: 755 INJECTION, SOLUTION INTRAVENOUS at 14:30

## 2024-08-16 DIAGNOSIS — N64.4 BREAST PAIN: ICD-10-CM

## 2024-08-16 DIAGNOSIS — Z12.31 VISIT FOR SCREENING MAMMOGRAM: Primary | ICD-10-CM

## 2024-08-22 ENCOUNTER — SCHEDULED TELEPHONE ENCOUNTER (OUTPATIENT)
Dept: HEMATOLOGY | Age: 69
End: 2024-08-22

## 2024-08-22 DIAGNOSIS — K86.1 CHRONIC CALCIFIC PANCREATITIS (HCC): Primary | ICD-10-CM

## 2024-08-22 DIAGNOSIS — K86.1 PANCREATIC DUCT HYPERTENSION WITH CHRONIC PANCREATITIS (HCC): ICD-10-CM

## 2024-08-22 NOTE — PROGRESS NOTES
Hepatobiliary and Pancreatic Surgery Progress Note    CC: follow up     Subjective: Patient is no longer having diarrhea and is now having constipation.  She is taking colace.  She did have a colonoscopy with Dr. Loredo in August 2023.  She still has a jarquin catheter in place.  She states that the creon is helping her.  She is always bloated.  She had a CT scan and is seeing me in followup.       OBJECTIVE      Physical    LMP 10/21/1985     General appearance: appears in no acute distress  Lungs:CTABL  Heart: RRR  Abdomen: soft, very distended and hard, nontympanic, no guarding, no peritoneal signs, normoactive bowel sounds  Extremities:no peripheral edema    ASSESSMENT: Chronic calcific pancreatitis with pancreatic duct hypertension, exocrine pancreatic insufficiency, SIBO    PLAN:    - I reviewed their images prior to our office visit and we also reviewed them together today.  - instructed her to take creon with meals as she is has been taking it differently.  - repeat CT due to her 7% risk of developing Pancreatic cancer  - repeat CT in 1 year    Thank you for the consultation and allowing me to take part in Ms. Keane's care.    Please send a copy of my note to Dr. HAKAN Vides    Electronically signed by Winston Oconnor MD on 8/22/2024 at 4:21 PM

## 2024-09-09 ENCOUNTER — HOSPITAL ENCOUNTER (INPATIENT)
Age: 69
LOS: 15 days | Discharge: HOME HEALTH | DRG: 811 | End: 2024-09-24
Attending: STUDENT IN AN ORGANIZED HEALTH CARE EDUCATION/TRAINING PROGRAM | Admitting: INTERNAL MEDICINE
Payer: MEDICARE

## 2024-09-09 ENCOUNTER — APPOINTMENT (OUTPATIENT)
Dept: GENERAL RADIOLOGY | Age: 69
DRG: 811 | End: 2024-09-09
Payer: MEDICARE

## 2024-09-09 ENCOUNTER — HOSPITAL ENCOUNTER (OUTPATIENT)
Dept: GENERAL RADIOLOGY | Age: 69
Discharge: HOME | End: 2024-09-11
Attending: SURGERY

## 2024-09-09 DIAGNOSIS — D64.9 ACUTE ON CHRONIC ANEMIA: ICD-10-CM

## 2024-09-09 DIAGNOSIS — R06.00 DYSPNEA AND RESPIRATORY ABNORMALITIES: ICD-10-CM

## 2024-09-09 DIAGNOSIS — R06.89 DYSPNEA AND RESPIRATORY ABNORMALITIES: ICD-10-CM

## 2024-09-09 DIAGNOSIS — R19.5 OCCULT BLOOD POSITIVE STOOL: ICD-10-CM

## 2024-09-09 DIAGNOSIS — D64.9 ANEMIA REQUIRING TRANSFUSIONS: Primary | ICD-10-CM

## 2024-09-09 DIAGNOSIS — R06.02 SHORTNESS OF BREATH: ICD-10-CM

## 2024-09-09 DIAGNOSIS — R53.83 FATIGUE, UNSPECIFIED TYPE: ICD-10-CM

## 2024-09-09 DIAGNOSIS — Z12.31 VISIT FOR SCREENING MAMMOGRAM: ICD-10-CM

## 2024-09-09 PROBLEM — D62 ACUTE BLOOD LOSS ANEMIA: Status: ACTIVE | Noted: 2024-09-09

## 2024-09-09 LAB
ALBUMIN SERPL-MCNC: 4.1 G/DL (ref 3.5–5.2)
ALP SERPL-CCNC: 62 U/L (ref 35–104)
ALT SERPL-CCNC: 7 U/L (ref 0–32)
AMORPH SED URNS QL MICRO: PRESENT
AMPHET UR QL SCN: NEGATIVE
ANION GAP SERPL CALCULATED.3IONS-SCNC: 13 MMOL/L (ref 7–16)
AST SERPL-CCNC: 11 U/L (ref 0–31)
B PARAP IS1001 DNA NPH QL NAA+NON-PROBE: NOT DETECTED
B PERT DNA SPEC QL NAA+PROBE: NOT DETECTED
BARBITURATES UR QL SCN: NEGATIVE
BASOPHILS # BLD: 0.02 K/UL (ref 0–0.2)
BASOPHILS NFR BLD: 0 % (ref 0–2)
BENZODIAZ UR QL: NEGATIVE
BILIRUB SERPL-MCNC: 0.3 MG/DL (ref 0–1.2)
BILIRUB UR QL STRIP: NEGATIVE
BNP SERPL-MCNC: 439 PG/ML (ref 0–125)
BUN SERPL-MCNC: 12 MG/DL (ref 6–23)
BUPRENORPHINE UR QL: NEGATIVE
C PNEUM DNA NPH QL NAA+NON-PROBE: NOT DETECTED
CALCIUM SERPL-MCNC: 9.5 MG/DL (ref 8.6–10.2)
CANNABINOIDS UR QL SCN: NEGATIVE
CHLORIDE SERPL-SCNC: 97 MMOL/L (ref 98–107)
CLARITY UR: ABNORMAL
CO2 SERPL-SCNC: 31 MMOL/L (ref 22–29)
COCAINE UR QL SCN: NEGATIVE
COLOR UR: YELLOW
COMMENT: ABNORMAL
CREAT SERPL-MCNC: 0.6 MG/DL (ref 0.5–1)
CRITICAL: ABNORMAL
CRYSTALS URNS MICRO: ABNORMAL /HPF
DATE ANALYZED: ABNORMAL
DATE OF COLLECTION: ABNORMAL
EKG ATRIAL RATE: 91 BPM
EKG P AXIS: 79 DEGREES
EKG P-R INTERVAL: 140 MS
EKG Q-T INTERVAL: 346 MS
EKG QRS DURATION: 70 MS
EKG QTC CALCULATION (BAZETT): 425 MS
EKG R AXIS: 77 DEGREES
EKG T AXIS: -175 DEGREES
EKG VENTRICULAR RATE: 91 BPM
EOSINOPHIL # BLD: 0 K/UL (ref 0.05–0.5)
EOSINOPHILS RELATIVE PERCENT: 0 % (ref 0–6)
EPI CELLS #/AREA URNS HPF: ABNORMAL /HPF
ERYTHROCYTE [DISTWIDTH] IN BLOOD BY AUTOMATED COUNT: 21.6 % (ref 11.5–15)
FENTANYL UR QL: NEGATIVE
FERRITIN SERPL-MCNC: 6 NG/ML
FLUAV RNA NPH QL NAA+NON-PROBE: NOT DETECTED
FLUBV RNA NPH QL NAA+NON-PROBE: NOT DETECTED
GFR, ESTIMATED: >90 ML/MIN/1.73M2
GLUCOSE SERPL-MCNC: 145 MG/DL (ref 74–99)
GLUCOSE UR STRIP-MCNC: NEGATIVE MG/DL
HADV DNA NPH QL NAA+NON-PROBE: NOT DETECTED
HCO3: 33.9 MMOL/L (ref 22–26)
HCOV 229E RNA NPH QL NAA+NON-PROBE: NOT DETECTED
HCOV HKU1 RNA NPH QL NAA+NON-PROBE: NOT DETECTED
HCOV NL63 RNA NPH QL NAA+NON-PROBE: NOT DETECTED
HCOV OC43 RNA NPH QL NAA+NON-PROBE: NOT DETECTED
HCT VFR BLD AUTO: 15.3 % (ref 34–48)
HGB BLD-MCNC: 3.7 G/DL (ref 11.5–15.5)
HGB UR QL STRIP.AUTO: NEGATIVE
HMPV RNA NPH QL NAA+NON-PROBE: NOT DETECTED
HPIV1 RNA NPH QL NAA+NON-PROBE: NOT DETECTED
HPIV2 RNA NPH QL NAA+NON-PROBE: NOT DETECTED
HPIV3 RNA NPH QL NAA+NON-PROBE: NOT DETECTED
HPIV4 RNA NPH QL NAA+NON-PROBE: NOT DETECTED
IMM GRANULOCYTES # BLD AUTO: <0.03 K/UL (ref 0–0.58)
IMM GRANULOCYTES NFR BLD: 0 % (ref 0–5)
INR PPP: 1.1
IRON SATN MFR SERPL: 5 % (ref 15–50)
IRON SERPL-MCNC: 20 UG/DL (ref 37–145)
KETONES UR STRIP-MCNC: NEGATIVE MG/DL
LAB: ABNORMAL
LEUKOCYTE ESTERASE UR QL STRIP: ABNORMAL
LYMPHOCYTES NFR BLD: 0.42 K/UL (ref 1.5–4)
LYMPHOCYTES RELATIVE PERCENT: 6 % (ref 20–42)
Lab: 1540
M PNEUMO DNA NPH QL NAA+NON-PROBE: NOT DETECTED
MAGNESIUM SERPL-MCNC: 2.1 MG/DL (ref 1.6–2.6)
MCH RBC QN AUTO: 18.8 PG (ref 26–35)
MCHC RBC AUTO-ENTMCNC: 24.2 G/DL (ref 32–34.5)
MCV RBC AUTO: 77.7 FL (ref 80–99.9)
METHADONE UR QL: NEGATIVE
MONOCYTES NFR BLD: 0.13 K/UL (ref 0.1–0.95)
MONOCYTES NFR BLD: 2 % (ref 2–12)
NEUTROPHILS NFR BLD: 92 % (ref 43–80)
NEUTS SEG NFR BLD: 7.01 K/UL (ref 1.8–7.3)
NITRITE UR QL STRIP: NEGATIVE
OPERATOR ID: 1023
OPIATES UR QL SCN: NEGATIVE
OXYCODONE UR QL SCN: NEGATIVE
PATIENT TEMP: 37 C
PCO2: 54.3 MMHG (ref 35–45)
PCP UR QL SCN: NEGATIVE
PH BLOOD GAS: 7.41 (ref 7.35–7.45)
PH UR STRIP: 8.5 [PH] (ref 5–9)
PLATELET # BLD AUTO: 188 K/UL (ref 130–450)
PMV BLD AUTO: 9.5 FL (ref 7–12)
PO2: 71.7 MMHG (ref 75–100)
POTASSIUM SERPL-SCNC: 4.4 MMOL/L (ref 3.5–5)
PROT SERPL-MCNC: 6.8 G/DL (ref 6.4–8.3)
PROT UR STRIP-MCNC: ABNORMAL MG/DL
PROTHROMBIN TIME: 11.6 SEC (ref 9.3–12.4)
RBC # BLD AUTO: 1.97 M/UL (ref 3.5–5.5)
RBC # BLD: ABNORMAL 10*6/UL
RBC #/AREA URNS HPF: ABNORMAL /HPF
RSV RNA NPH QL NAA+NON-PROBE: NOT DETECTED
RV+EV RNA NPH QL NAA+NON-PROBE: NOT DETECTED
SARS-COV-2 RNA NPH QL NAA+NON-PROBE: NOT DETECTED
SODIUM SERPL-SCNC: 141 MMOL/L (ref 132–146)
SOURCE, BLOOD GAS: ABNORMAL
SP GR UR STRIP: 1.01 (ref 1–1.03)
SPECIMEN DESCRIPTION: NORMAL
T4 FREE SERPL-MCNC: 1.2 NG/DL (ref 0.9–1.7)
TEST INFORMATION: NORMAL
THB: <5 G/DL (ref 11.5–16.5)
TIBC SERPL-MCNC: 435 UG/DL (ref 250–450)
TIME ANALYZED: 1559
TRANSFERRIN SERPL-MCNC: 358 MG/DL (ref 200–360)
TROPONIN I SERPL HS-MCNC: 11 NG/L (ref 0–9)
TSH SERPL DL<=0.05 MIU/L-ACNC: 0.2 UIU/ML (ref 0.27–4.2)
UROBILINOGEN UR STRIP-ACNC: 0.2 EU/DL (ref 0–1)
WBC # BLD: ABNORMAL 10*3/UL
WBC #/AREA URNS HPF: ABNORMAL /HPF
WBC OTHER # BLD: 7.6 K/UL (ref 4.5–11.5)

## 2024-09-09 PROCEDURE — 83880 ASSAY OF NATRIURETIC PEPTIDE: CPT

## 2024-09-09 PROCEDURE — 93010 ELECTROCARDIOGRAM REPORT: CPT | Performed by: INTERNAL MEDICINE

## 2024-09-09 PROCEDURE — 0202U NFCT DS 22 TRGT SARS-COV-2: CPT

## 2024-09-09 PROCEDURE — 84484 ASSAY OF TROPONIN QUANT: CPT

## 2024-09-09 PROCEDURE — 94664 DEMO&/EVAL PT USE INHALER: CPT

## 2024-09-09 PROCEDURE — 99285 EMERGENCY DEPT VISIT HI MDM: CPT

## 2024-09-09 PROCEDURE — 84439 ASSAY OF FREE THYROXINE: CPT

## 2024-09-09 PROCEDURE — 86923 COMPATIBILITY TEST ELECTRIC: CPT

## 2024-09-09 PROCEDURE — 86901 BLOOD TYPING SEROLOGIC RH(D): CPT

## 2024-09-09 PROCEDURE — 30233N1 TRANSFUSION OF NONAUTOLOGOUS RED BLOOD CELLS INTO PERIPHERAL VEIN, PERCUTANEOUS APPROACH: ICD-10-PCS | Performed by: INTERNAL MEDICINE

## 2024-09-09 PROCEDURE — 85025 COMPLETE CBC W/AUTO DIFF WBC: CPT

## 2024-09-09 PROCEDURE — 71046 X-RAY EXAM CHEST 2 VIEWS: CPT

## 2024-09-09 PROCEDURE — 83540 ASSAY OF IRON: CPT

## 2024-09-09 PROCEDURE — 81001 URINALYSIS AUTO W/SCOPE: CPT

## 2024-09-09 PROCEDURE — 80053 COMPREHEN METABOLIC PANEL: CPT

## 2024-09-09 PROCEDURE — 93005 ELECTROCARDIOGRAM TRACING: CPT | Performed by: STUDENT IN AN ORGANIZED HEALTH CARE EDUCATION/TRAINING PROGRAM

## 2024-09-09 PROCEDURE — 85610 PROTHROMBIN TIME: CPT

## 2024-09-09 PROCEDURE — 86900 BLOOD TYPING SEROLOGIC ABO: CPT

## 2024-09-09 PROCEDURE — 83735 ASSAY OF MAGNESIUM: CPT

## 2024-09-09 PROCEDURE — 84443 ASSAY THYROID STIM HORMONE: CPT

## 2024-09-09 PROCEDURE — 82728 ASSAY OF FERRITIN: CPT

## 2024-09-09 PROCEDURE — 80307 DRUG TEST PRSMV CHEM ANLYZR: CPT

## 2024-09-09 PROCEDURE — P9016 RBC LEUKOCYTES REDUCED: HCPCS

## 2024-09-09 PROCEDURE — 86850 RBC ANTIBODY SCREEN: CPT

## 2024-09-09 PROCEDURE — 82803 BLOOD GASES ANY COMBINATION: CPT

## 2024-09-09 PROCEDURE — 2060000000 HC ICU INTERMEDIATE R&B

## 2024-09-09 PROCEDURE — 84466 ASSAY OF TRANSFERRIN: CPT

## 2024-09-09 PROCEDURE — 36430 TRANSFUSION BLD/BLD COMPNT: CPT

## 2024-09-09 PROCEDURE — 6370000000 HC RX 637 (ALT 250 FOR IP): Performed by: STUDENT IN AN ORGANIZED HEALTH CARE EDUCATION/TRAINING PROGRAM

## 2024-09-09 RX ORDER — SODIUM CHLORIDE 9 MG/ML
INJECTION, SOLUTION INTRAVENOUS PRN
Status: DISCONTINUED | OUTPATIENT
Start: 2024-09-09 | End: 2024-09-24 | Stop reason: HOSPADM

## 2024-09-09 RX ORDER — ONDANSETRON 4 MG/1
4 TABLET, ORALLY DISINTEGRATING ORAL EVERY 8 HOURS PRN
Status: DISCONTINUED | OUTPATIENT
Start: 2024-09-09 | End: 2024-09-10

## 2024-09-09 RX ORDER — ONDANSETRON 2 MG/ML
4 INJECTION INTRAMUSCULAR; INTRAVENOUS EVERY 6 HOURS PRN
Status: DISCONTINUED | OUTPATIENT
Start: 2024-09-09 | End: 2024-09-10

## 2024-09-09 RX ORDER — SODIUM CHLORIDE 0.9 % (FLUSH) 0.9 %
5-40 SYRINGE (ML) INJECTION PRN
Status: DISCONTINUED | OUTPATIENT
Start: 2024-09-09 | End: 2024-09-24 | Stop reason: HOSPADM

## 2024-09-09 RX ORDER — SODIUM CHLORIDE 9 MG/ML
INJECTION, SOLUTION INTRAVENOUS CONTINUOUS
Status: DISCONTINUED | OUTPATIENT
Start: 2024-09-09 | End: 2024-09-13

## 2024-09-09 RX ORDER — IPRATROPIUM BROMIDE AND ALBUTEROL SULFATE 2.5; .5 MG/3ML; MG/3ML
1 SOLUTION RESPIRATORY (INHALATION) ONCE
Status: COMPLETED | OUTPATIENT
Start: 2024-09-09 | End: 2024-09-09

## 2024-09-09 RX ORDER — SODIUM CHLORIDE 0.9 % (FLUSH) 0.9 %
5-40 SYRINGE (ML) INJECTION EVERY 12 HOURS SCHEDULED
Status: DISCONTINUED | OUTPATIENT
Start: 2024-09-09 | End: 2024-09-24 | Stop reason: HOSPADM

## 2024-09-09 RX ORDER — ACETAMINOPHEN 325 MG/1
650 TABLET ORAL EVERY 6 HOURS PRN
Status: DISCONTINUED | OUTPATIENT
Start: 2024-09-09 | End: 2024-09-24 | Stop reason: HOSPADM

## 2024-09-09 RX ORDER — ACETAMINOPHEN 650 MG/1
650 SUPPOSITORY RECTAL EVERY 6 HOURS PRN
Status: DISCONTINUED | OUTPATIENT
Start: 2024-09-09 | End: 2024-09-24 | Stop reason: HOSPADM

## 2024-09-09 RX ADMIN — IPRATROPIUM BROMIDE AND ALBUTEROL SULFATE 1 DOSE: 2.5; .5 SOLUTION RESPIRATORY (INHALATION) at 12:12

## 2024-09-09 ASSESSMENT — PAIN - FUNCTIONAL ASSESSMENT: PAIN_FUNCTIONAL_ASSESSMENT: NONE - DENIES PAIN

## 2024-09-10 ENCOUNTER — APPOINTMENT (OUTPATIENT)
Dept: GENERAL RADIOLOGY | Age: 69
DRG: 811 | End: 2024-09-10
Payer: MEDICARE

## 2024-09-10 LAB
AADO2: 123.9 MMHG
AADO2: 167 MMHG
ALBUMIN SERPL-MCNC: 4.1 G/DL (ref 3.5–5.2)
ALP SERPL-CCNC: 65 U/L (ref 35–104)
ALT SERPL-CCNC: 10 U/L (ref 0–32)
AMMONIA PLAS-SCNC: 32 UMOL/L (ref 11–51)
ANION GAP SERPL CALCULATED.3IONS-SCNC: 7 MMOL/L (ref 7–16)
ANION GAP SERPL CALCULATED.3IONS-SCNC: 9 MMOL/L (ref 7–16)
AST SERPL-CCNC: 16 U/L (ref 0–31)
B.E.: 6 MMOL/L (ref -3–3)
B.E.: 6.4 MMOL/L (ref -3–3)
B.E.: 9.4 MMOL/L (ref -3–3)
BASOPHILS # BLD: 0.04 K/UL (ref 0–0.2)
BASOPHILS # BLD: 0.07 K/UL (ref 0–0.2)
BASOPHILS NFR BLD: 0 % (ref 0–2)
BASOPHILS NFR BLD: 1 % (ref 0–2)
BILIRUB SERPL-MCNC: 0.3 MG/DL (ref 0–1.2)
BNP SERPL-MCNC: 541 PG/ML (ref 0–125)
BUN SERPL-MCNC: 13 MG/DL (ref 6–23)
BUN SERPL-MCNC: 14 MG/DL (ref 6–23)
CALCIUM SERPL-MCNC: 9.1 MG/DL (ref 8.6–10.2)
CALCIUM SERPL-MCNC: 9.4 MG/DL (ref 8.6–10.2)
CHLORIDE SERPL-SCNC: 100 MMOL/L (ref 98–107)
CHLORIDE SERPL-SCNC: 98 MMOL/L (ref 98–107)
CO2 SERPL-SCNC: 34 MMOL/L (ref 22–29)
CO2 SERPL-SCNC: 35 MMOL/L (ref 22–29)
COHB: 1 % (ref 0–1.5)
COHB: 1.2 % (ref 0–1.5)
COHB: 1.4 % (ref 0–1.5)
COMMENT: ABNORMAL
CREAT SERPL-MCNC: 0.6 MG/DL (ref 0.5–1)
CREAT SERPL-MCNC: 0.6 MG/DL (ref 0.5–1)
CRITICAL: ABNORMAL
DATE ANALYZED: ABNORMAL
DATE OF COLLECTION: ABNORMAL
EOSINOPHIL # BLD: 0 K/UL (ref 0.05–0.5)
EOSINOPHIL # BLD: 0.03 K/UL (ref 0.05–0.5)
EOSINOPHILS RELATIVE PERCENT: 0 % (ref 0–6)
EOSINOPHILS RELATIVE PERCENT: 0 % (ref 0–6)
ERYTHROCYTE [DISTWIDTH] IN BLOOD BY AUTOMATED COUNT: 19.2 % (ref 11.5–15)
ERYTHROCYTE [DISTWIDTH] IN BLOOD BY AUTOMATED COUNT: 19.4 % (ref 11.5–15)
FIBRINOGEN PPP-MCNC: 375 MG/DL (ref 200–400)
FIO2: 50 %
FIO2: 50 %
GFR, ESTIMATED: >90 ML/MIN/1.73M2
GFR, ESTIMATED: >90 ML/MIN/1.73M2
GLUCOSE BLD-MCNC: 170 MG/DL (ref 74–99)
GLUCOSE SERPL-MCNC: 177 MG/DL (ref 74–99)
GLUCOSE SERPL-MCNC: 204 MG/DL (ref 74–99)
HCO3: 34.8 MMOL/L (ref 22–26)
HCO3: 36.5 MMOL/L (ref 22–26)
HCO3: 36.9 MMOL/L (ref 22–26)
HCT VFR BLD AUTO: 24.4 % (ref 34–48)
HCT VFR BLD AUTO: 24.6 % (ref 34–48)
HCT VFR BLD AUTO: 26.7 % (ref 34–48)
HCT VFR BLD AUTO: 27.2 % (ref 34–48)
HCT VFR BLD AUTO: 27.3 % (ref 34–48)
HGB BLD-MCNC: 6.9 G/DL (ref 11.5–15.5)
HGB BLD-MCNC: 6.9 G/DL (ref 11.5–15.5)
HGB BLD-MCNC: 7.5 G/DL (ref 11.5–15.5)
HGB BLD-MCNC: 7.5 G/DL (ref 11.5–15.5)
HGB BLD-MCNC: 7.6 G/DL (ref 11.5–15.5)
HHB: 0.9 % (ref 0–5)
HHB: 1.1 % (ref 0–5)
HHB: 3.3 % (ref 0–5)
IMM GRANULOCYTES # BLD AUTO: 0.06 K/UL (ref 0–0.58)
IMM GRANULOCYTES # BLD AUTO: 0.07 K/UL (ref 0–0.58)
IMM GRANULOCYTES NFR BLD: 0 % (ref 0–5)
IMM GRANULOCYTES NFR BLD: 1 % (ref 0–5)
LAB: ABNORMAL
LACTATE BLDV-SCNC: 1 MMOL/L (ref 0.5–2.2)
LYMPHOCYTES NFR BLD: 0.43 K/UL (ref 1.5–4)
LYMPHOCYTES NFR BLD: 2.57 K/UL (ref 1.5–4)
LYMPHOCYTES RELATIVE PERCENT: 18 % (ref 20–42)
LYMPHOCYTES RELATIVE PERCENT: 3 % (ref 20–42)
Lab: 155
Lab: 355
Lab: 835
MAGNESIUM SERPL-MCNC: 2.2 MG/DL (ref 1.6–2.6)
MCH RBC QN AUTO: 22.7 PG (ref 26–35)
MCH RBC QN AUTO: 23.6 PG (ref 26–35)
MCHC RBC AUTO-ENTMCNC: 27.6 G/DL (ref 32–34.5)
MCHC RBC AUTO-ENTMCNC: 28.1 G/DL (ref 32–34.5)
MCV RBC AUTO: 82.4 FL (ref 80–99.9)
MCV RBC AUTO: 84 FL (ref 80–99.9)
METHB: 0.4 % (ref 0–1.5)
METHB: 0.6 % (ref 0–1.5)
METHB: 0.6 % (ref 0–1.5)
MODE: ABNORMAL
MONOCYTES NFR BLD: 0.77 K/UL (ref 0.1–0.95)
MONOCYTES NFR BLD: 0.83 K/UL (ref 0.1–0.95)
MONOCYTES NFR BLD: 6 % (ref 2–12)
MONOCYTES NFR BLD: 6 % (ref 2–12)
NEUTROPHILS NFR BLD: 75 % (ref 43–80)
NEUTROPHILS NFR BLD: 91 % (ref 43–80)
NEUTS SEG NFR BLD: 10.94 K/UL (ref 1.8–7.3)
NEUTS SEG NFR BLD: 12.79 K/UL (ref 1.8–7.3)
O2 SATURATION: 96.6 % (ref 92–98.5)
O2 SATURATION: 98.9 % (ref 92–98.5)
O2 SATURATION: 99.1 % (ref 92–98.5)
O2HB: 94.9 % (ref 94–97)
O2HB: 97.1 % (ref 94–97)
O2HB: 97.5 % (ref 94–97)
OPERATOR ID: 7221
OPERATOR ID: 8215
OPERATOR ID: ABNORMAL
PARTIAL THROMBOPLASTIN TIME: 28.2 SEC (ref 24.5–35.1)
PATIENT TEMP: 37 C
PCO2: 104.3 MMHG (ref 35–45)
PCO2: 73.7 MMHG (ref 35–45)
PCO2: 78.7 MMHG (ref 35–45)
PEEP/CPAP: 8 CMH2O
PEEP/CPAP: 8 CMH2O
PFO2: 2.02 MMHG/%
PFO2: 2.99 MMHG/%
PH BLOOD GAS: 7.16 (ref 7.35–7.45)
PH BLOOD GAS: 7.26 (ref 7.35–7.45)
PH BLOOD GAS: 7.32 (ref 7.35–7.45)
PHOSPHATE SERPL-MCNC: 4.7 MG/DL (ref 2.5–4.5)
PIP: 16 CMH2O
PLATELET # BLD AUTO: 167 K/UL (ref 130–450)
PLATELET # BLD AUTO: 196 K/UL (ref 130–450)
PMV BLD AUTO: 9.4 FL (ref 7–12)
PMV BLD AUTO: 9.4 FL (ref 7–12)
PO2: 101 MMHG (ref 75–100)
PO2: 149.7 MMHG (ref 75–100)
PO2: 174.8 MMHG (ref 75–100)
POTASSIUM SERPL-SCNC: 3.8 MMOL/L (ref 3.5–5)
POTASSIUM SERPL-SCNC: 3.9 MMOL/L (ref 3.5–5)
POTASSIUM SERPL-SCNC: 4.5 MMOL/L (ref 3.5–5)
POTASSIUM SERPL-SCNC: 4.54 MMOL/L (ref 3.5–5)
PROCALCITONIN SERPL-MCNC: <0.02 NG/ML (ref 0–0.08)
PROT SERPL-MCNC: 6.7 G/DL (ref 6.4–8.3)
PS: 16 CMH20
RBC # BLD AUTO: 3.18 M/UL (ref 3.5–5.5)
RBC # BLD AUTO: 3.3 M/UL (ref 3.5–5.5)
RBC # BLD: ABNORMAL 10*6/UL
RI(T): 0.83
RI(T): 1.65
SODIUM SERPL-SCNC: 141 MMOL/L (ref 132–146)
SODIUM SERPL-SCNC: 142 MMOL/L (ref 132–146)
SOURCE, BLOOD GAS: ABNORMAL
THB: 7.8 G/DL (ref 11.5–16.5)
THB: 8.3 G/DL (ref 11.5–16.5)
THB: 8.8 G/DL (ref 11.5–16.5)
TIME ANALYZED: 157
TIME ANALYZED: 404
TIME ANALYZED: 840
TROPONIN I SERPL HS-MCNC: 15 NG/L (ref 0–9)
TROPONIN I SERPL HS-MCNC: 28 NG/L (ref 0–9)
WBC OTHER # BLD: 14.1 K/UL (ref 4.5–11.5)
WBC OTHER # BLD: 14.5 K/UL (ref 4.5–11.5)

## 2024-09-10 PROCEDURE — 2060000000 HC ICU INTERMEDIATE R&B

## 2024-09-10 PROCEDURE — 2580000003 HC RX 258: Performed by: NURSE PRACTITIONER

## 2024-09-10 PROCEDURE — 5A0935A ASSISTANCE WITH RESPIRATORY VENTILATION, LESS THAN 24 CONSECUTIVE HOURS, HIGH NASAL FLOW/VELOCITY: ICD-10-PCS | Performed by: INTERNAL MEDICINE

## 2024-09-10 PROCEDURE — 2500000003 HC RX 250 WO HCPCS

## 2024-09-10 PROCEDURE — 6360000002 HC RX W HCPCS: Performed by: NURSE PRACTITIONER

## 2024-09-10 PROCEDURE — 36415 COLL VENOUS BLD VENIPUNCTURE: CPT

## 2024-09-10 PROCEDURE — 5A09457 ASSISTANCE WITH RESPIRATORY VENTILATION, 24-96 CONSECUTIVE HOURS, CONTINUOUS POSITIVE AIRWAY PRESSURE: ICD-10-PCS | Performed by: INTERNAL MEDICINE

## 2024-09-10 PROCEDURE — 80048 BASIC METABOLIC PNL TOTAL CA: CPT

## 2024-09-10 PROCEDURE — 99222 1ST HOSP IP/OBS MODERATE 55: CPT | Performed by: INTERNAL MEDICINE

## 2024-09-10 PROCEDURE — APPSS60 APP SPLIT SHARED TIME 46-60 MINUTES

## 2024-09-10 PROCEDURE — 93005 ELECTROCARDIOGRAM TRACING: CPT | Performed by: INTERNAL MEDICINE

## 2024-09-10 PROCEDURE — 84484 ASSAY OF TROPONIN QUANT: CPT

## 2024-09-10 PROCEDURE — 83880 ASSAY OF NATRIURETIC PEPTIDE: CPT

## 2024-09-10 PROCEDURE — 99223 1ST HOSP IP/OBS HIGH 75: CPT | Performed by: INTERNAL MEDICINE

## 2024-09-10 PROCEDURE — 94640 AIRWAY INHALATION TREATMENT: CPT

## 2024-09-10 PROCEDURE — 71045 X-RAY EXAM CHEST 1 VIEW: CPT

## 2024-09-10 PROCEDURE — 85018 HEMOGLOBIN: CPT

## 2024-09-10 PROCEDURE — 36430 TRANSFUSION BLD/BLD COMPNT: CPT

## 2024-09-10 PROCEDURE — 87040 BLOOD CULTURE FOR BACTERIA: CPT

## 2024-09-10 PROCEDURE — 2700000000 HC OXYGEN THERAPY PER DAY

## 2024-09-10 PROCEDURE — 84145 PROCALCITONIN (PCT): CPT

## 2024-09-10 PROCEDURE — 83605 ASSAY OF LACTIC ACID: CPT

## 2024-09-10 PROCEDURE — 85014 HEMATOCRIT: CPT

## 2024-09-10 PROCEDURE — P9016 RBC LEUKOCYTES REDUCED: HCPCS

## 2024-09-10 PROCEDURE — 84132 ASSAY OF SERUM POTASSIUM: CPT

## 2024-09-10 PROCEDURE — 85730 THROMBOPLASTIN TIME PARTIAL: CPT

## 2024-09-10 PROCEDURE — 2580000003 HC RX 258: Performed by: STUDENT IN AN ORGANIZED HEALTH CARE EDUCATION/TRAINING PROGRAM

## 2024-09-10 PROCEDURE — 82805 BLOOD GASES W/O2 SATURATION: CPT

## 2024-09-10 PROCEDURE — 82962 GLUCOSE BLOOD TEST: CPT

## 2024-09-10 PROCEDURE — 83735 ASSAY OF MAGNESIUM: CPT

## 2024-09-10 PROCEDURE — 80053 COMPREHEN METABOLIC PANEL: CPT

## 2024-09-10 PROCEDURE — 6360000002 HC RX W HCPCS: Performed by: STUDENT IN AN ORGANIZED HEALTH CARE EDUCATION/TRAINING PROGRAM

## 2024-09-10 PROCEDURE — 85384 FIBRINOGEN ACTIVITY: CPT

## 2024-09-10 PROCEDURE — 82140 ASSAY OF AMMONIA: CPT

## 2024-09-10 PROCEDURE — 6370000000 HC RX 637 (ALT 250 FOR IP): Performed by: NURSE PRACTITIONER

## 2024-09-10 PROCEDURE — 94660 CPAP INITIATION&MGMT: CPT

## 2024-09-10 PROCEDURE — 85025 COMPLETE CBC W/AUTO DIFF WBC: CPT

## 2024-09-10 PROCEDURE — 84100 ASSAY OF PHOSPHORUS: CPT

## 2024-09-10 RX ORDER — ALBUTEROL SULFATE 0.83 MG/ML
2.5 SOLUTION RESPIRATORY (INHALATION) EVERY 4 HOURS PRN
Status: DISCONTINUED | OUTPATIENT
Start: 2024-09-10 | End: 2024-09-24 | Stop reason: HOSPADM

## 2024-09-10 RX ORDER — METOPROLOL TARTRATE 1 MG/ML
INJECTION, SOLUTION INTRAVENOUS
Status: COMPLETED
Start: 2024-09-10 | End: 2024-09-10

## 2024-09-10 RX ORDER — IPRATROPIUM BROMIDE AND ALBUTEROL SULFATE 2.5; .5 MG/3ML; MG/3ML
1 SOLUTION RESPIRATORY (INHALATION)
Status: DISCONTINUED | OUTPATIENT
Start: 2024-09-10 | End: 2024-09-24 | Stop reason: HOSPADM

## 2024-09-10 RX ORDER — ARFORMOTEROL TARTRATE 15 UG/2ML
15 SOLUTION RESPIRATORY (INHALATION) 2 TIMES DAILY
Status: DISCONTINUED | OUTPATIENT
Start: 2024-09-10 | End: 2024-09-24 | Stop reason: HOSPADM

## 2024-09-10 RX ORDER — SODIUM CHLORIDE 9 MG/ML
INJECTION, SOLUTION INTRAVENOUS PRN
Status: DISCONTINUED | OUTPATIENT
Start: 2024-09-10 | End: 2024-09-24 | Stop reason: HOSPADM

## 2024-09-10 RX ORDER — BUDESONIDE 0.5 MG/2ML
500 INHALANT ORAL 2 TIMES DAILY
Status: DISCONTINUED | OUTPATIENT
Start: 2024-09-10 | End: 2024-09-24 | Stop reason: HOSPADM

## 2024-09-10 RX ADMIN — ALBUTEROL SULFATE 2.5 MG: 2.5 SOLUTION RESPIRATORY (INHALATION) at 08:00

## 2024-09-10 RX ADMIN — PANTOPRAZOLE SODIUM 80 MG: 40 INJECTION, POWDER, FOR SOLUTION INTRAVENOUS at 01:16

## 2024-09-10 RX ADMIN — SODIUM CHLORIDE, PRESERVATIVE FREE 10 ML: 5 INJECTION INTRAVENOUS at 01:16

## 2024-09-10 RX ADMIN — SODIUM CHLORIDE, PRESERVATIVE FREE 10 ML: 5 INJECTION INTRAVENOUS at 04:33

## 2024-09-10 RX ADMIN — ARFORMOTEROL TARTRATE 15 MCG: 15 SOLUTION RESPIRATORY (INHALATION) at 00:31

## 2024-09-10 RX ADMIN — SODIUM CHLORIDE: 9 INJECTION, SOLUTION INTRAVENOUS at 11:39

## 2024-09-10 RX ADMIN — SODIUM CHLORIDE, PRESERVATIVE FREE 40 MG: 5 INJECTION INTRAVENOUS at 11:32

## 2024-09-10 RX ADMIN — SODIUM CHLORIDE, PRESERVATIVE FREE 10 ML: 5 INJECTION INTRAVENOUS at 11:33

## 2024-09-10 RX ADMIN — WATER 40 MG: 1 INJECTION INTRAMUSCULAR; INTRAVENOUS; SUBCUTANEOUS at 21:36

## 2024-09-10 RX ADMIN — WATER 40 MG: 1 INJECTION INTRAMUSCULAR; INTRAVENOUS; SUBCUTANEOUS at 04:33

## 2024-09-10 RX ADMIN — BUDESONIDE 500 MCG: 0.5 INHALANT RESPIRATORY (INHALATION) at 08:00

## 2024-09-10 RX ADMIN — IPRATROPIUM BROMIDE AND ALBUTEROL SULFATE 1 DOSE: 2.5; .5 SOLUTION RESPIRATORY (INHALATION) at 20:50

## 2024-09-10 RX ADMIN — ALBUTEROL SULFATE 2.5 MG: 2.5 SOLUTION RESPIRATORY (INHALATION) at 00:31

## 2024-09-10 RX ADMIN — WATER 40 MG: 1 INJECTION INTRAMUSCULAR; INTRAVENOUS; SUBCUTANEOUS at 11:30

## 2024-09-10 RX ADMIN — BUDESONIDE 500 MCG: 0.5 INHALANT RESPIRATORY (INHALATION) at 20:50

## 2024-09-10 RX ADMIN — BUDESONIDE 500 MCG: 0.5 INHALANT RESPIRATORY (INHALATION) at 00:31

## 2024-09-10 RX ADMIN — ARFORMOTEROL TARTRATE 15 MCG: 15 SOLUTION RESPIRATORY (INHALATION) at 08:00

## 2024-09-10 RX ADMIN — ARFORMOTEROL TARTRATE 15 MCG: 15 SOLUTION RESPIRATORY (INHALATION) at 20:50

## 2024-09-10 RX ADMIN — AZITHROMYCIN MONOHYDRATE 500 MG: 500 INJECTION, POWDER, LYOPHILIZED, FOR SOLUTION INTRAVENOUS at 04:36

## 2024-09-10 RX ADMIN — METOPROLOL TARTRATE 5 MG: 1 INJECTION, SOLUTION INTRAVENOUS at 01:55

## 2024-09-10 RX ADMIN — SODIUM CHLORIDE, PRESERVATIVE FREE 40 MG: 5 INJECTION INTRAVENOUS at 21:36

## 2024-09-11 ENCOUNTER — APPOINTMENT (OUTPATIENT)
Age: 69
DRG: 811 | End: 2024-09-11
Payer: MEDICARE

## 2024-09-11 ENCOUNTER — ANESTHESIA (OUTPATIENT)
Dept: ENDOSCOPY | Age: 69
End: 2024-09-11
Payer: MEDICARE

## 2024-09-11 ENCOUNTER — ANESTHESIA EVENT (OUTPATIENT)
Dept: ENDOSCOPY | Age: 69
End: 2024-09-11
Payer: MEDICARE

## 2024-09-11 LAB
AADO2: 79.7 MMHG
ABO/RH: NORMAL
ALBUMIN SERPL-MCNC: 3.6 G/DL (ref 3.5–5.2)
ALP SERPL-CCNC: 52 U/L (ref 35–104)
ALT SERPL-CCNC: 10 U/L (ref 0–32)
ANION GAP SERPL CALCULATED.3IONS-SCNC: 10 MMOL/L (ref 7–16)
ANTIBODY SCREEN: NEGATIVE
ARM BAND NUMBER: NORMAL
AST SERPL-CCNC: 13 U/L (ref 0–31)
B.E.: 5.1 MMOL/L (ref -3–3)
BASOPHILS # BLD: 0 K/UL (ref 0–0.2)
BASOPHILS NFR BLD: 0 % (ref 0–2)
BILIRUB SERPL-MCNC: 0.4 MG/DL (ref 0–1.2)
BLOOD BANK BLOOD PRODUCT EXPIRATION DATE: NORMAL
BLOOD BANK DISPENSE STATUS: NORMAL
BLOOD BANK ISBT PRODUCT BLOOD TYPE: 5100
BLOOD BANK PRODUCT CODE: NORMAL
BLOOD BANK SAMPLE EXPIRATION: NORMAL
BLOOD BANK UNIT TYPE AND RH: NORMAL
BPU ID: NORMAL
BUN SERPL-MCNC: 14 MG/DL (ref 6–23)
CALCIUM SERPL-MCNC: 8.6 MG/DL (ref 8.6–10.2)
CHLORIDE SERPL-SCNC: 101 MMOL/L (ref 98–107)
CO2 SERPL-SCNC: 30 MMOL/L (ref 22–29)
COHB: 0.9 % (ref 0–1.5)
COMPONENT: NORMAL
CREAT SERPL-MCNC: 0.5 MG/DL (ref 0.5–1)
CRITICAL: ABNORMAL
CROSSMATCH RESULT: NORMAL
DATE ANALYZED: ABNORMAL
DATE OF COLLECTION: ABNORMAL
ECHO BSA: 1.81 M2
ECHO EST RA PRESSURE: 3 MMHG
ECHO LV EF PHYSICIAN: 64 %
ECHO LV FRACTIONAL SHORTENING: 31 % (ref 28–44)
ECHO LV INTERNAL DIMENSION DIASTOLE INDEX: 2.15 CM/M2
ECHO LV INTERNAL DIMENSION DIASTOLIC: 3.9 CM (ref 3.9–5.3)
ECHO LV INTERNAL DIMENSION SYSTOLIC INDEX: 1.49 CM/M2
ECHO LV INTERNAL DIMENSION SYSTOLIC: 2.7 CM
ECHO LV IVSD: 1.1 CM (ref 0.6–0.9)
ECHO LV IVSS: 1.3 CM
ECHO LV MASS 2D: 140.1 G (ref 67–162)
ECHO LV MASS INDEX 2D: 77.4 G/M2 (ref 43–95)
ECHO LV POSTERIOR WALL DIASTOLIC: 1.1 CM (ref 0.6–0.9)
ECHO LV POSTERIOR WALL SYSTOLIC: 1.3 CM
ECHO LV RELATIVE WALL THICKNESS RATIO: 0.56
ECHO MV REGURGITANT PEAK GRADIENT: 96 MMHG
ECHO MV REGURGITANT PEAK VELOCITY: 4.9 M/S
EKG ATRIAL RATE: 119 BPM
EKG P AXIS: 83 DEGREES
EKG P-R INTERVAL: 146 MS
EKG Q-T INTERVAL: 300 MS
EKG QRS DURATION: 76 MS
EKG QTC CALCULATION (BAZETT): 422 MS
EKG R AXIS: 68 DEGREES
EKG T AXIS: 131 DEGREES
EKG VENTRICULAR RATE: 119 BPM
EOSINOPHIL # BLD: 0 K/UL (ref 0.05–0.5)
EOSINOPHILS RELATIVE PERCENT: 0 % (ref 0–6)
ERYTHROCYTE [DISTWIDTH] IN BLOOD BY AUTOMATED COUNT: 19.3 % (ref 11.5–15)
FIO2: 40 %
FOLATE SERPL-MCNC: 12.9 NG/ML (ref 4.8–24.2)
GFR, ESTIMATED: >90 ML/MIN/1.73M2
GLUCOSE SERPL-MCNC: 169 MG/DL (ref 74–99)
HCO3: 31.6 MMOL/L (ref 22–26)
HCT VFR BLD AUTO: 29.7 % (ref 34–48)
HCT VFR BLD AUTO: 30.6 % (ref 34–48)
HCT VFR BLD AUTO: 31.5 % (ref 34–48)
HGB BLD-MCNC: 8.4 G/DL (ref 11.5–15.5)
HGB BLD-MCNC: 8.5 G/DL (ref 11.5–15.5)
HGB BLD-MCNC: 8.7 G/DL (ref 11.5–15.5)
HHB: 1.4 % (ref 0–5)
IMM RETICS NFR: 21.2 % (ref 3–15.9)
LAB: ABNORMAL
LYMPHOCYTES NFR BLD: 0.15 K/UL (ref 1.5–4)
LYMPHOCYTES RELATIVE PERCENT: 4 % (ref 20–42)
Lab: 615
MCH RBC QN AUTO: 23.5 PG (ref 26–35)
MCHC RBC AUTO-ENTMCNC: 28.3 G/DL (ref 32–34.5)
MCV RBC AUTO: 83 FL (ref 80–99.9)
METHB: 0.5 % (ref 0–1.5)
MODE: ABNORMAL
MONOCYTES NFR BLD: 0 % (ref 2–12)
MONOCYTES NFR BLD: 0 K/UL (ref 0.1–0.95)
NEUTROPHILS NFR BLD: 97 % (ref 43–80)
NEUTS SEG NFR BLD: 4.15 K/UL (ref 1.8–7.3)
O2 SATURATION: 98.6 % (ref 92–98.5)
O2HB: 97.2 % (ref 94–97)
OPERATOR ID: ABNORMAL
PATH REV BLD -IMP: NORMAL
PATIENT TEMP: 37 C
PCO2: 57.9 MMHG (ref 35–45)
PEEP/CPAP: 8 CMH2O
PFO2: 3.47 MMHG/%
PH BLOOD GAS: 7.36 (ref 7.35–7.45)
PLATELET CONFIRMATION: NORMAL
PLATELET, FLUORESCENCE: 78 K/UL (ref 130–450)
PMV BLD AUTO: 10.7 FL (ref 7–12)
PO2: 138.9 MMHG (ref 75–100)
POTASSIUM SERPL-SCNC: 4.4 MMOL/L (ref 3.5–5)
PROT SERPL-MCNC: 6 G/DL (ref 6.4–8.3)
RBC # BLD AUTO: 3.58 M/UL (ref 3.5–5.5)
RBC # BLD: ABNORMAL 10*6/UL
RETIC HEMOGLOBIN: 17.1 PG (ref 28.2–36.6)
RETICS # AUTO: 0.1 M/UL
RETICS/RBC NFR AUTO: 2 % (ref 0.4–1.9)
RI(T): 0.57
RR MECHANICAL: 160 B/MIN
SODIUM SERPL-SCNC: 141 MMOL/L (ref 132–146)
SOURCE, BLOOD GAS: ABNORMAL
THB: 9.1 G/DL (ref 11.5–16.5)
TIME ANALYZED: 619
TRANSFUSION STATUS: NORMAL
UNIT DIVISION: 0
UNIT ISSUE DATE/TIME: NORMAL
VIT B12 SERPL-MCNC: 520 PG/ML (ref 211–946)
VT MECHANICAL: 450 ML
WBC OTHER # BLD: 4.3 K/UL (ref 4.5–11.5)

## 2024-09-11 PROCEDURE — 82525 ASSAY OF COPPER: CPT

## 2024-09-11 PROCEDURE — 6360000002 HC RX W HCPCS: Performed by: NURSE PRACTITIONER

## 2024-09-11 PROCEDURE — 2709999900 HC NON-CHARGEABLE SUPPLY: Performed by: SURGERY

## 2024-09-11 PROCEDURE — 2580000003 HC RX 258: Performed by: SURGERY

## 2024-09-11 PROCEDURE — 0DJ08ZZ INSPECTION OF UPPER INTESTINAL TRACT, VIA NATURAL OR ARTIFICIAL OPENING ENDOSCOPIC: ICD-10-PCS | Performed by: SURGERY

## 2024-09-11 PROCEDURE — 6370000000 HC RX 637 (ALT 250 FOR IP): Performed by: SURGERY

## 2024-09-11 PROCEDURE — 84155 ASSAY OF PROTEIN SERUM: CPT

## 2024-09-11 PROCEDURE — 99222 1ST HOSP IP/OBS MODERATE 55: CPT

## 2024-09-11 PROCEDURE — 2580000003 HC RX 258: Performed by: INTERNAL MEDICINE

## 2024-09-11 PROCEDURE — 93308 TTE F-UP OR LMTD: CPT | Performed by: INTERNAL MEDICINE

## 2024-09-11 PROCEDURE — 82668 ASSAY OF ERYTHROPOIETIN: CPT

## 2024-09-11 PROCEDURE — 2060000000 HC ICU INTERMEDIATE R&B

## 2024-09-11 PROCEDURE — 2580000003 HC RX 258: Performed by: NURSE PRACTITIONER

## 2024-09-11 PROCEDURE — 6360000002 HC RX W HCPCS: Performed by: NURSE ANESTHETIST, CERTIFIED REGISTERED

## 2024-09-11 PROCEDURE — 82607 VITAMIN B-12: CPT

## 2024-09-11 PROCEDURE — 82805 BLOOD GASES W/O2 SATURATION: CPT

## 2024-09-11 PROCEDURE — 84630 ASSAY OF ZINC: CPT

## 2024-09-11 PROCEDURE — 3700000001 HC ADD 15 MINUTES (ANESTHESIA): Performed by: SURGERY

## 2024-09-11 PROCEDURE — 3609017100 HC EGD: Performed by: SURGERY

## 2024-09-11 PROCEDURE — 84165 PROTEIN E-PHORESIS SERUM: CPT

## 2024-09-11 PROCEDURE — 94660 CPAP INITIATION&MGMT: CPT

## 2024-09-11 PROCEDURE — 82746 ASSAY OF FOLIC ACID SERUM: CPT

## 2024-09-11 PROCEDURE — 6360000002 HC RX W HCPCS: Performed by: INTERNAL MEDICINE

## 2024-09-11 PROCEDURE — 93325 DOPPLER ECHO COLOR FLOW MAPG: CPT | Performed by: INTERNAL MEDICINE

## 2024-09-11 PROCEDURE — 85025 COMPLETE CBC W/AUTO DIFF WBC: CPT

## 2024-09-11 PROCEDURE — 7100000001 HC PACU RECOVERY - ADDTL 15 MIN: Performed by: SURGERY

## 2024-09-11 PROCEDURE — 7100000000 HC PACU RECOVERY - FIRST 15 MIN: Performed by: SURGERY

## 2024-09-11 PROCEDURE — 3700000000 HC ANESTHESIA ATTENDED CARE: Performed by: SURGERY

## 2024-09-11 PROCEDURE — 85045 AUTOMATED RETICULOCYTE COUNT: CPT

## 2024-09-11 PROCEDURE — 93308 TTE F-UP OR LMTD: CPT

## 2024-09-11 PROCEDURE — 6360000002 HC RX W HCPCS: Performed by: SURGERY

## 2024-09-11 PROCEDURE — 93321 DOPPLER ECHO F-UP/LMTD STD: CPT | Performed by: INTERNAL MEDICINE

## 2024-09-11 PROCEDURE — 85014 HEMATOCRIT: CPT

## 2024-09-11 PROCEDURE — 36415 COLL VENOUS BLD VENIPUNCTURE: CPT

## 2024-09-11 PROCEDURE — 80053 COMPREHEN METABOLIC PANEL: CPT

## 2024-09-11 PROCEDURE — 2700000000 HC OXYGEN THERAPY PER DAY

## 2024-09-11 PROCEDURE — 86334 IMMUNOFIX E-PHORESIS SERUM: CPT

## 2024-09-11 PROCEDURE — 94640 AIRWAY INHALATION TREATMENT: CPT

## 2024-09-11 PROCEDURE — 85018 HEMOGLOBIN: CPT

## 2024-09-11 PROCEDURE — 99232 SBSQ HOSP IP/OBS MODERATE 35: CPT | Performed by: INTERNAL MEDICINE

## 2024-09-11 PROCEDURE — 6370000000 HC RX 637 (ALT 250 FOR IP): Performed by: NURSE PRACTITIONER

## 2024-09-11 PROCEDURE — 99232 SBSQ HOSP IP/OBS MODERATE 35: CPT | Performed by: NURSE PRACTITIONER

## 2024-09-11 RX ORDER — PREDNISONE 20 MG/1
40 TABLET ORAL DAILY
Status: DISCONTINUED | OUTPATIENT
Start: 2024-09-12 | End: 2024-09-24 | Stop reason: HOSPADM

## 2024-09-11 RX ORDER — PROPOFOL 10 MG/ML
INJECTION, EMULSION INTRAVENOUS PRN
Status: DISCONTINUED | OUTPATIENT
Start: 2024-09-11 | End: 2024-09-11 | Stop reason: SDUPTHER

## 2024-09-11 RX ADMIN — WATER 40 MG: 1 INJECTION INTRAMUSCULAR; INTRAVENOUS; SUBCUTANEOUS at 09:43

## 2024-09-11 RX ADMIN — PROPOFOL 100 MG: 10 INJECTION, EMULSION INTRAVENOUS at 15:39

## 2024-09-11 RX ADMIN — BUDESONIDE 500 MCG: 0.5 INHALANT RESPIRATORY (INHALATION) at 20:44

## 2024-09-11 RX ADMIN — ARFORMOTEROL TARTRATE 15 MCG: 15 SOLUTION RESPIRATORY (INHALATION) at 20:44

## 2024-09-11 RX ADMIN — IPRATROPIUM BROMIDE AND ALBUTEROL SULFATE 1 DOSE: 2.5; .5 SOLUTION RESPIRATORY (INHALATION) at 07:27

## 2024-09-11 RX ADMIN — SODIUM CHLORIDE, PRESERVATIVE FREE 40 MG: 5 INJECTION INTRAVENOUS at 21:54

## 2024-09-11 RX ADMIN — WATER 40 MG: 1 INJECTION INTRAMUSCULAR; INTRAVENOUS; SUBCUTANEOUS at 21:54

## 2024-09-11 RX ADMIN — SODIUM CHLORIDE, PRESERVATIVE FREE 10 ML: 5 INJECTION INTRAVENOUS at 09:51

## 2024-09-11 RX ADMIN — SODIUM CHLORIDE 125 MG: 9 INJECTION, SOLUTION INTRAVENOUS at 09:41

## 2024-09-11 RX ADMIN — SODIUM CHLORIDE: 9 INJECTION, SOLUTION INTRAVENOUS at 21:55

## 2024-09-11 RX ADMIN — BUDESONIDE 500 MCG: 0.5 INHALANT RESPIRATORY (INHALATION) at 07:27

## 2024-09-11 RX ADMIN — ARFORMOTEROL TARTRATE 15 MCG: 15 SOLUTION RESPIRATORY (INHALATION) at 07:27

## 2024-09-11 RX ADMIN — IPRATROPIUM BROMIDE AND ALBUTEROL SULFATE 1 DOSE: 2.5; .5 SOLUTION RESPIRATORY (INHALATION) at 11:32

## 2024-09-11 RX ADMIN — SODIUM CHLORIDE: 9 INJECTION, SOLUTION INTRAVENOUS at 01:04

## 2024-09-11 RX ADMIN — SODIUM CHLORIDE, PRESERVATIVE FREE 10 ML: 5 INJECTION INTRAVENOUS at 01:11

## 2024-09-11 RX ADMIN — SODIUM CHLORIDE, PRESERVATIVE FREE 10 ML: 5 INJECTION INTRAVENOUS at 21:54

## 2024-09-11 RX ADMIN — SODIUM CHLORIDE, PRESERVATIVE FREE 40 MG: 5 INJECTION INTRAVENOUS at 09:42

## 2024-09-11 RX ADMIN — IPRATROPIUM BROMIDE AND ALBUTEROL SULFATE 1 DOSE: 2.5; .5 SOLUTION RESPIRATORY (INHALATION) at 20:44

## 2024-09-11 RX ADMIN — AZITHROMYCIN MONOHYDRATE 500 MG: 500 INJECTION, POWDER, LYOPHILIZED, FOR SOLUTION INTRAVENOUS at 01:07

## 2024-09-11 ASSESSMENT — PAIN - FUNCTIONAL ASSESSMENT: PAIN_FUNCTIONAL_ASSESSMENT: 0-10

## 2024-09-11 ASSESSMENT — LIFESTYLE VARIABLES: SMOKING_STATUS: 1

## 2024-09-12 LAB
ALBUMIN SERPL-MCNC: 3.6 G/DL (ref 3.5–5.2)
ALP SERPL-CCNC: 50 U/L (ref 35–104)
ALT SERPL-CCNC: 13 U/L (ref 0–32)
ANION GAP SERPL CALCULATED.3IONS-SCNC: 9 MMOL/L (ref 7–16)
AST SERPL-CCNC: 12 U/L (ref 0–31)
BASOPHILS # BLD: 0 K/UL (ref 0–0.2)
BASOPHILS NFR BLD: 0 % (ref 0–2)
BILIRUB SERPL-MCNC: 0.3 MG/DL (ref 0–1.2)
BUN SERPL-MCNC: 14 MG/DL (ref 6–23)
CALCIUM SERPL-MCNC: 8.7 MG/DL (ref 8.6–10.2)
CHLORIDE SERPL-SCNC: 107 MMOL/L (ref 98–107)
CO2 SERPL-SCNC: 30 MMOL/L (ref 22–29)
CREAT SERPL-MCNC: 0.6 MG/DL (ref 0.5–1)
EOSINOPHIL # BLD: 0 K/UL (ref 0.05–0.5)
EOSINOPHILS RELATIVE PERCENT: 0 % (ref 0–6)
ERYTHROCYTE [DISTWIDTH] IN BLOOD BY AUTOMATED COUNT: 19.3 % (ref 11.5–15)
GFR, ESTIMATED: >90 ML/MIN/1.73M2
GLUCOSE SERPL-MCNC: 176 MG/DL (ref 74–99)
HCT VFR BLD AUTO: 28.2 % (ref 34–48)
HCT VFR BLD AUTO: 29.5 % (ref 34–48)
HCT VFR BLD AUTO: 31.8 % (ref 34–48)
HCT VFR BLD AUTO: 31.9 % (ref 34–48)
HGB BLD-MCNC: 7.8 G/DL (ref 11.5–15.5)
HGB BLD-MCNC: 8.1 G/DL (ref 11.5–15.5)
HGB BLD-MCNC: 8.6 G/DL (ref 11.5–15.5)
HGB BLD-MCNC: 8.7 G/DL (ref 11.5–15.5)
IMM GRANULOCYTES # BLD AUTO: 0.05 K/UL (ref 0–0.58)
IMM GRANULOCYTES NFR BLD: 1 % (ref 0–5)
LYMPHOCYTES NFR BLD: 0.28 K/UL (ref 1.5–4)
LYMPHOCYTES RELATIVE PERCENT: 4 % (ref 20–42)
MCH RBC QN AUTO: 23.7 PG (ref 26–35)
MCHC RBC AUTO-ENTMCNC: 27.7 G/DL (ref 32–34.5)
MCV RBC AUTO: 85.7 FL (ref 80–99.9)
MONOCYTES NFR BLD: 0.09 K/UL (ref 0.1–0.95)
MONOCYTES NFR BLD: 1 % (ref 2–12)
NEUTROPHILS NFR BLD: 94 % (ref 43–80)
NEUTS SEG NFR BLD: 6.33 K/UL (ref 1.8–7.3)
PLATELET # BLD AUTO: 51 K/UL (ref 130–450)
PLATELET CONFIRMATION: NORMAL
PMV BLD AUTO: 11.1 FL (ref 7–12)
POTASSIUM SERPL-SCNC: 4.5 MMOL/L (ref 3.5–5)
PROT SERPL-MCNC: 5.6 G/DL (ref 6.4–8.3)
RBC # BLD AUTO: 3.29 M/UL (ref 3.5–5.5)
SODIUM SERPL-SCNC: 146 MMOL/L (ref 132–146)
WBC OTHER # BLD: 6.8 K/UL (ref 4.5–11.5)

## 2024-09-12 PROCEDURE — 85014 HEMATOCRIT: CPT

## 2024-09-12 PROCEDURE — 94660 CPAP INITIATION&MGMT: CPT

## 2024-09-12 PROCEDURE — 6370000000 HC RX 637 (ALT 250 FOR IP): Performed by: SURGERY

## 2024-09-12 PROCEDURE — 6360000002 HC RX W HCPCS: Performed by: NURSE PRACTITIONER

## 2024-09-12 PROCEDURE — 2580000003 HC RX 258: Performed by: NURSE PRACTITIONER

## 2024-09-12 PROCEDURE — 6360000002 HC RX W HCPCS: Performed by: SURGERY

## 2024-09-12 PROCEDURE — 36415 COLL VENOUS BLD VENIPUNCTURE: CPT

## 2024-09-12 PROCEDURE — 80053 COMPREHEN METABOLIC PANEL: CPT

## 2024-09-12 PROCEDURE — 85025 COMPLETE CBC W/AUTO DIFF WBC: CPT

## 2024-09-12 PROCEDURE — 85018 HEMOGLOBIN: CPT

## 2024-09-12 PROCEDURE — 6370000000 HC RX 637 (ALT 250 FOR IP): Performed by: NURSE PRACTITIONER

## 2024-09-12 PROCEDURE — 2580000003 HC RX 258: Performed by: SURGERY

## 2024-09-12 PROCEDURE — 2700000000 HC OXYGEN THERAPY PER DAY

## 2024-09-12 PROCEDURE — 2060000000 HC ICU INTERMEDIATE R&B

## 2024-09-12 PROCEDURE — 94640 AIRWAY INHALATION TREATMENT: CPT

## 2024-09-12 RX ORDER — SENNA AND DOCUSATE SODIUM 50; 8.6 MG/1; MG/1
2 TABLET, FILM COATED ORAL DAILY PRN
Status: DISCONTINUED | OUTPATIENT
Start: 2024-09-12 | End: 2024-09-24 | Stop reason: HOSPADM

## 2024-09-12 RX ORDER — SIMETHICONE 80 MG
80 TABLET,CHEWABLE ORAL EVERY 6 HOURS PRN
Status: DISCONTINUED | OUTPATIENT
Start: 2024-09-12 | End: 2024-09-24 | Stop reason: HOSPADM

## 2024-09-12 RX ADMIN — PANCRELIPASE LIPASE, PANCRELIPASE PROTEASE, PANCRELIPASE AMYLASE 20000 UNITS: 20000; 63000; 84000 CAPSULE, DELAYED RELEASE ORAL at 08:29

## 2024-09-12 RX ADMIN — IPRATROPIUM BROMIDE AND ALBUTEROL SULFATE 1 DOSE: 2.5; .5 SOLUTION RESPIRATORY (INHALATION) at 08:16

## 2024-09-12 RX ADMIN — AZITHROMYCIN MONOHYDRATE 500 MG: 500 INJECTION, POWDER, LYOPHILIZED, FOR SOLUTION INTRAVENOUS at 06:09

## 2024-09-12 RX ADMIN — ARFORMOTEROL TARTRATE 15 MCG: 15 SOLUTION RESPIRATORY (INHALATION) at 08:15

## 2024-09-12 RX ADMIN — SODIUM CHLORIDE: 9 INJECTION, SOLUTION INTRAVENOUS at 06:07

## 2024-09-12 RX ADMIN — PANCRELIPASE LIPASE, PANCRELIPASE PROTEASE, PANCRELIPASE AMYLASE 15000 UNITS: 15000; 47000; 63000 CAPSULE, DELAYED RELEASE ORAL at 08:29

## 2024-09-12 RX ADMIN — SODIUM CHLORIDE, PRESERVATIVE FREE 40 MG: 5 INJECTION INTRAVENOUS at 20:41

## 2024-09-12 RX ADMIN — BUDESONIDE 500 MCG: 0.5 INHALANT RESPIRATORY (INHALATION) at 20:10

## 2024-09-12 RX ADMIN — SODIUM CHLORIDE, PRESERVATIVE FREE 10 ML: 5 INJECTION INTRAVENOUS at 08:31

## 2024-09-12 RX ADMIN — PANCRELIPASE LIPASE, PANCRELIPASE PROTEASE, PANCRELIPASE AMYLASE 20000 UNITS: 20000; 63000; 84000 CAPSULE, DELAYED RELEASE ORAL at 17:47

## 2024-09-12 RX ADMIN — SODIUM CHLORIDE: 9 INJECTION, SOLUTION INTRAVENOUS at 16:04

## 2024-09-12 RX ADMIN — ARFORMOTEROL TARTRATE 15 MCG: 15 SOLUTION RESPIRATORY (INHALATION) at 20:10

## 2024-09-12 RX ADMIN — BUDESONIDE 500 MCG: 0.5 INHALANT RESPIRATORY (INHALATION) at 08:15

## 2024-09-12 RX ADMIN — SODIUM CHLORIDE, PRESERVATIVE FREE 40 MG: 5 INJECTION INTRAVENOUS at 08:31

## 2024-09-12 RX ADMIN — IPRATROPIUM BROMIDE AND ALBUTEROL SULFATE 1 DOSE: 2.5; .5 SOLUTION RESPIRATORY (INHALATION) at 20:10

## 2024-09-12 RX ADMIN — SODIUM CHLORIDE, PRESERVATIVE FREE 10 ML: 5 INJECTION INTRAVENOUS at 19:57

## 2024-09-12 RX ADMIN — PANCRELIPASE LIPASE, PANCRELIPASE PROTEASE, PANCRELIPASE AMYLASE 20000 UNITS: 20000; 63000; 84000 CAPSULE, DELAYED RELEASE ORAL at 13:07

## 2024-09-12 RX ADMIN — PANCRELIPASE LIPASE, PANCRELIPASE PROTEASE, PANCRELIPASE AMYLASE 15000 UNITS: 15000; 47000; 63000 CAPSULE, DELAYED RELEASE ORAL at 17:47

## 2024-09-12 RX ADMIN — IPRATROPIUM BROMIDE AND ALBUTEROL SULFATE 1 DOSE: 2.5; .5 SOLUTION RESPIRATORY (INHALATION) at 16:45

## 2024-09-12 RX ADMIN — PREDNISONE 40 MG: 20 TABLET ORAL at 08:29

## 2024-09-12 RX ADMIN — PANCRELIPASE LIPASE, PANCRELIPASE PROTEASE, PANCRELIPASE AMYLASE 15000 UNITS: 15000; 47000; 63000 CAPSULE, DELAYED RELEASE ORAL at 13:07

## 2024-09-12 ASSESSMENT — PAIN SCALES - GENERAL
PAINLEVEL_OUTOF10: 0

## 2024-09-13 ENCOUNTER — APPOINTMENT (OUTPATIENT)
Dept: GENERAL RADIOLOGY | Age: 69
DRG: 811 | End: 2024-09-13
Payer: MEDICARE

## 2024-09-13 LAB
ALBUMIN SERPL-MCNC: 3 G/DL (ref 3.5–4.7)
ALPHA1 GLOB SERPL ELPH-MCNC: 0.3 G/DL (ref 0.2–0.4)
ALPHA2 GLOB SERPL ELPH-MCNC: 0.9 G/DL (ref 0.5–1)
ANION GAP SERPL CALCULATED.3IONS-SCNC: 8 MMOL/L (ref 7–16)
B-GLOBULIN SERPL ELPH-MCNC: 0.8 G/DL (ref 0.8–1.3)
BASOPHILS # BLD: 0 K/UL (ref 0–0.2)
BASOPHILS NFR BLD: 0 % (ref 0–2)
BUN SERPL-MCNC: 10 MG/DL (ref 6–23)
CALCIUM SERPL-MCNC: 8.8 MG/DL (ref 8.6–10.2)
CHLORIDE SERPL-SCNC: 107 MMOL/L (ref 98–107)
CO2 SERPL-SCNC: 29 MMOL/L (ref 22–29)
CREAT SERPL-MCNC: 0.6 MG/DL (ref 0.5–1)
EOSINOPHIL # BLD: 0 K/UL (ref 0.05–0.5)
EOSINOPHILS RELATIVE PERCENT: 0 % (ref 0–6)
ERYTHROCYTE [DISTWIDTH] IN BLOOD BY AUTOMATED COUNT: 19.5 % (ref 11.5–15)
GAMMA GLOB SERPL ELPH-MCNC: 0.5 G/DL (ref 0.7–1.6)
GFR, ESTIMATED: >90 ML/MIN/1.73M2
GLUCOSE SERPL-MCNC: 105 MG/DL (ref 74–99)
HCT VFR BLD AUTO: 27.8 % (ref 34–48)
HGB BLD-MCNC: 7.6 G/DL (ref 11.5–15.5)
INTERPRETATION SERPL IFE-IMP: NORMAL
LYMPHOCYTES NFR BLD: 0.62 K/UL (ref 1.5–4)
LYMPHOCYTES RELATIVE PERCENT: 10 % (ref 20–42)
MCH RBC QN AUTO: 23.5 PG (ref 26–35)
MCHC RBC AUTO-ENTMCNC: 27.3 G/DL (ref 32–34.5)
MCV RBC AUTO: 86.1 FL (ref 80–99.9)
MONOCYTES NFR BLD: 0.21 K/UL (ref 0.1–0.95)
MONOCYTES NFR BLD: 4 % (ref 2–12)
NEUTROPHILS NFR BLD: 86 % (ref 43–80)
NEUTS SEG NFR BLD: 5.08 K/UL (ref 1.8–7.3)
PATH REV: NORMAL
PATHOLOGIST: ABNORMAL
PLATELET CONFIRMATION: NORMAL
PLATELET, FLUORESCENCE: 34 K/UL (ref 130–450)
PMV BLD AUTO: ABNORMAL FL (ref 7–12)
POTASSIUM SERPL-SCNC: 3.8 MMOL/L (ref 3.5–5)
PROT PATTERN SERPL ELPH-IMP: ABNORMAL
PROT SERPL-MCNC: 5.5 G/DL (ref 6.4–8.3)
RBC # BLD AUTO: 3.23 M/UL (ref 3.5–5.5)
RBC # BLD: ABNORMAL 10*6/UL
SODIUM SERPL-SCNC: 144 MMOL/L (ref 132–146)
WBC OTHER # BLD: 5.9 K/UL (ref 4.5–11.5)

## 2024-09-13 PROCEDURE — 6370000000 HC RX 637 (ALT 250 FOR IP): Performed by: NURSE PRACTITIONER

## 2024-09-13 PROCEDURE — 80048 BASIC METABOLIC PNL TOTAL CA: CPT

## 2024-09-13 PROCEDURE — 2580000003 HC RX 258: Performed by: SURGERY

## 2024-09-13 PROCEDURE — 85025 COMPLETE CBC W/AUTO DIFF WBC: CPT

## 2024-09-13 PROCEDURE — 94660 CPAP INITIATION&MGMT: CPT

## 2024-09-13 PROCEDURE — 2060000000 HC ICU INTERMEDIATE R&B

## 2024-09-13 PROCEDURE — 2700000000 HC OXYGEN THERAPY PER DAY

## 2024-09-13 PROCEDURE — 99232 SBSQ HOSP IP/OBS MODERATE 35: CPT | Performed by: INTERNAL MEDICINE

## 2024-09-13 PROCEDURE — 94640 AIRWAY INHALATION TREATMENT: CPT

## 2024-09-13 PROCEDURE — 6360000002 HC RX W HCPCS: Performed by: SURGERY

## 2024-09-13 PROCEDURE — 6370000000 HC RX 637 (ALT 250 FOR IP): Performed by: SURGERY

## 2024-09-13 PROCEDURE — 74018 RADEX ABDOMEN 1 VIEW: CPT

## 2024-09-13 PROCEDURE — 36415 COLL VENOUS BLD VENIPUNCTURE: CPT

## 2024-09-13 RX ORDER — CALCIUM CARBONATE 500 MG/1
500 TABLET, CHEWABLE ORAL 3 TIMES DAILY PRN
Status: DISCONTINUED | OUTPATIENT
Start: 2024-09-13 | End: 2024-09-24 | Stop reason: HOSPADM

## 2024-09-13 RX ADMIN — PANCRELIPASE LIPASE, PANCRELIPASE PROTEASE, PANCRELIPASE AMYLASE 15000 UNITS: 15000; 47000; 63000 CAPSULE, DELAYED RELEASE ORAL at 09:06

## 2024-09-13 RX ADMIN — BUDESONIDE 500 MCG: 0.5 INHALANT RESPIRATORY (INHALATION) at 19:26

## 2024-09-13 RX ADMIN — IPRATROPIUM BROMIDE AND ALBUTEROL SULFATE 1 DOSE: 2.5; .5 SOLUTION RESPIRATORY (INHALATION) at 12:26

## 2024-09-13 RX ADMIN — SODIUM CHLORIDE, PRESERVATIVE FREE 40 MG: 5 INJECTION INTRAVENOUS at 20:38

## 2024-09-13 RX ADMIN — BUDESONIDE 500 MCG: 0.5 INHALANT RESPIRATORY (INHALATION) at 08:36

## 2024-09-13 RX ADMIN — PANCRELIPASE LIPASE, PANCRELIPASE PROTEASE, PANCRELIPASE AMYLASE 15000 UNITS: 15000; 47000; 63000 CAPSULE, DELAYED RELEASE ORAL at 13:48

## 2024-09-13 RX ADMIN — SODIUM CHLORIDE, PRESERVATIVE FREE 40 MG: 5 INJECTION INTRAVENOUS at 09:07

## 2024-09-13 RX ADMIN — PANCRELIPASE LIPASE, PANCRELIPASE PROTEASE, PANCRELIPASE AMYLASE 15000 UNITS: 15000; 47000; 63000 CAPSULE, DELAYED RELEASE ORAL at 17:51

## 2024-09-13 RX ADMIN — IPRATROPIUM BROMIDE AND ALBUTEROL SULFATE 1 DOSE: 2.5; .5 SOLUTION RESPIRATORY (INHALATION) at 08:36

## 2024-09-13 RX ADMIN — ACETAMINOPHEN 650 MG: 325 TABLET ORAL at 13:48

## 2024-09-13 RX ADMIN — IPRATROPIUM BROMIDE AND ALBUTEROL SULFATE 1 DOSE: 2.5; .5 SOLUTION RESPIRATORY (INHALATION) at 16:21

## 2024-09-13 RX ADMIN — ARFORMOTEROL TARTRATE 15 MCG: 15 SOLUTION RESPIRATORY (INHALATION) at 19:26

## 2024-09-13 RX ADMIN — PREDNISONE 40 MG: 20 TABLET ORAL at 09:07

## 2024-09-13 RX ADMIN — PANCRELIPASE LIPASE, PANCRELIPASE PROTEASE, PANCRELIPASE AMYLASE 20000 UNITS: 20000; 63000; 84000 CAPSULE, DELAYED RELEASE ORAL at 09:06

## 2024-09-13 RX ADMIN — PANCRELIPASE LIPASE, PANCRELIPASE PROTEASE, PANCRELIPASE AMYLASE 20000 UNITS: 20000; 63000; 84000 CAPSULE, DELAYED RELEASE ORAL at 14:19

## 2024-09-13 RX ADMIN — SODIUM CHLORIDE, PRESERVATIVE FREE 10 ML: 5 INJECTION INTRAVENOUS at 20:38

## 2024-09-13 RX ADMIN — ARFORMOTEROL TARTRATE 15 MCG: 15 SOLUTION RESPIRATORY (INHALATION) at 08:35

## 2024-09-13 RX ADMIN — PANCRELIPASE LIPASE, PANCRELIPASE PROTEASE, PANCRELIPASE AMYLASE 20000 UNITS: 20000; 63000; 84000 CAPSULE, DELAYED RELEASE ORAL at 17:51

## 2024-09-13 RX ADMIN — IPRATROPIUM BROMIDE AND ALBUTEROL SULFATE 1 DOSE: 2.5; .5 SOLUTION RESPIRATORY (INHALATION) at 19:26

## 2024-09-13 RX ADMIN — SIMETHICONE 80 MG: 80 TABLET, CHEWABLE ORAL at 09:09

## 2024-09-13 RX ADMIN — SODIUM CHLORIDE, PRESERVATIVE FREE 10 ML: 5 INJECTION INTRAVENOUS at 09:07

## 2024-09-13 ASSESSMENT — PAIN SCALES - GENERAL
PAINLEVEL_OUTOF10: 0
PAINLEVEL_OUTOF10: 0

## 2024-09-14 LAB
BASOPHILS # BLD: 0 K/UL (ref 0–0.2)
BASOPHILS NFR BLD: 0 % (ref 0–2)
EOSINOPHIL # BLD: 0.05 K/UL (ref 0.05–0.5)
EOSINOPHILS RELATIVE PERCENT: 1 % (ref 0–6)
ERYTHROCYTE [DISTWIDTH] IN BLOOD BY AUTOMATED COUNT: 19.5 % (ref 11.5–15)
HCT VFR BLD AUTO: 29.6 % (ref 34–48)
HGB BLD-MCNC: 8.2 G/DL (ref 11.5–15.5)
LYMPHOCYTES NFR BLD: 0.92 K/UL (ref 1.5–4)
LYMPHOCYTES RELATIVE PERCENT: 16 % (ref 20–42)
MCH RBC QN AUTO: 23.6 PG (ref 26–35)
MCHC RBC AUTO-ENTMCNC: 27.7 G/DL (ref 32–34.5)
MCV RBC AUTO: 85.1 FL (ref 80–99.9)
MONOCYTES NFR BLD: 0.21 K/UL (ref 0.1–0.95)
MONOCYTES NFR BLD: 4 % (ref 2–12)
MYELOCYTES ABSOLUTE COUNT: 0.21 K/UL
MYELOCYTES: 4 %
NEUTROPHILS NFR BLD: 76 % (ref 43–80)
NEUTS SEG NFR BLD: 4.41 K/UL (ref 1.8–7.3)
PLATELET CONFIRMATION: NORMAL
PLATELET, FLUORESCENCE: 29 K/UL (ref 130–450)
PMV BLD AUTO: ABNORMAL FL (ref 7–12)
RBC # BLD AUTO: 3.48 M/UL (ref 3.5–5.5)
RBC # BLD: ABNORMAL 10*6/UL
WBC OTHER # BLD: 5.8 K/UL (ref 4.5–11.5)
ZINC SERPL-MCNC: 58.3 UG/DL (ref 60–120)

## 2024-09-14 PROCEDURE — 36415 COLL VENOUS BLD VENIPUNCTURE: CPT

## 2024-09-14 PROCEDURE — 6370000000 HC RX 637 (ALT 250 FOR IP): Performed by: NURSE PRACTITIONER

## 2024-09-14 PROCEDURE — 6360000002 HC RX W HCPCS: Performed by: SURGERY

## 2024-09-14 PROCEDURE — 85025 COMPLETE CBC W/AUTO DIFF WBC: CPT

## 2024-09-14 PROCEDURE — 6370000000 HC RX 637 (ALT 250 FOR IP): Performed by: SURGERY

## 2024-09-14 PROCEDURE — 94640 AIRWAY INHALATION TREATMENT: CPT

## 2024-09-14 PROCEDURE — 2700000000 HC OXYGEN THERAPY PER DAY

## 2024-09-14 PROCEDURE — 94660 CPAP INITIATION&MGMT: CPT

## 2024-09-14 PROCEDURE — 2060000000 HC ICU INTERMEDIATE R&B

## 2024-09-14 PROCEDURE — 2580000003 HC RX 258: Performed by: SURGERY

## 2024-09-14 PROCEDURE — 99232 SBSQ HOSP IP/OBS MODERATE 35: CPT | Performed by: INTERNAL MEDICINE

## 2024-09-14 RX ADMIN — PREDNISONE 40 MG: 20 TABLET ORAL at 08:58

## 2024-09-14 RX ADMIN — IPRATROPIUM BROMIDE AND ALBUTEROL SULFATE 1 DOSE: 2.5; .5 SOLUTION RESPIRATORY (INHALATION) at 15:50

## 2024-09-14 RX ADMIN — PANCRELIPASE LIPASE, PANCRELIPASE PROTEASE, PANCRELIPASE AMYLASE 20000 UNITS: 20000; 63000; 84000 CAPSULE, DELAYED RELEASE ORAL at 13:50

## 2024-09-14 RX ADMIN — PANCRELIPASE LIPASE, PANCRELIPASE PROTEASE, PANCRELIPASE AMYLASE 15000 UNITS: 15000; 47000; 63000 CAPSULE, DELAYED RELEASE ORAL at 13:50

## 2024-09-14 RX ADMIN — SODIUM CHLORIDE, PRESERVATIVE FREE 40 MG: 5 INJECTION INTRAVENOUS at 21:09

## 2024-09-14 RX ADMIN — ACETAMINOPHEN 650 MG: 325 TABLET ORAL at 15:25

## 2024-09-14 RX ADMIN — IPRATROPIUM BROMIDE AND ALBUTEROL SULFATE 1 DOSE: 2.5; .5 SOLUTION RESPIRATORY (INHALATION) at 09:14

## 2024-09-14 RX ADMIN — BUDESONIDE 500 MCG: 0.5 INHALANT RESPIRATORY (INHALATION) at 20:32

## 2024-09-14 RX ADMIN — IPRATROPIUM BROMIDE AND ALBUTEROL SULFATE 1 DOSE: 2.5; .5 SOLUTION RESPIRATORY (INHALATION) at 20:32

## 2024-09-14 RX ADMIN — ACETAMINOPHEN 650 MG: 325 TABLET ORAL at 06:45

## 2024-09-14 RX ADMIN — SODIUM CHLORIDE, PRESERVATIVE FREE 10 ML: 5 INJECTION INTRAVENOUS at 08:59

## 2024-09-14 RX ADMIN — SENNOSIDES AND DOCUSATE SODIUM 2 TABLET: 50; 8.6 TABLET ORAL at 20:08

## 2024-09-14 RX ADMIN — SODIUM CHLORIDE, PRESERVATIVE FREE 40 MG: 5 INJECTION INTRAVENOUS at 08:58

## 2024-09-14 RX ADMIN — BUDESONIDE 500 MCG: 0.5 INHALANT RESPIRATORY (INHALATION) at 09:14

## 2024-09-14 RX ADMIN — PANCRELIPASE LIPASE, PANCRELIPASE PROTEASE, PANCRELIPASE AMYLASE 15000 UNITS: 15000; 47000; 63000 CAPSULE, DELAYED RELEASE ORAL at 08:58

## 2024-09-14 RX ADMIN — PANCRELIPASE LIPASE, PANCRELIPASE PROTEASE, PANCRELIPASE AMYLASE 20000 UNITS: 20000; 63000; 84000 CAPSULE, DELAYED RELEASE ORAL at 08:58

## 2024-09-14 RX ADMIN — PANCRELIPASE LIPASE, PANCRELIPASE PROTEASE, PANCRELIPASE AMYLASE 15000 UNITS: 15000; 47000; 63000 CAPSULE, DELAYED RELEASE ORAL at 17:17

## 2024-09-14 RX ADMIN — ARFORMOTEROL TARTRATE 15 MCG: 15 SOLUTION RESPIRATORY (INHALATION) at 20:32

## 2024-09-14 RX ADMIN — IPRATROPIUM BROMIDE AND ALBUTEROL SULFATE 1 DOSE: 2.5; .5 SOLUTION RESPIRATORY (INHALATION) at 12:11

## 2024-09-14 RX ADMIN — PANCRELIPASE LIPASE, PANCRELIPASE PROTEASE, PANCRELIPASE AMYLASE 20000 UNITS: 20000; 63000; 84000 CAPSULE, DELAYED RELEASE ORAL at 17:16

## 2024-09-14 RX ADMIN — SODIUM CHLORIDE, PRESERVATIVE FREE 10 ML: 5 INJECTION INTRAVENOUS at 20:24

## 2024-09-14 RX ADMIN — ARFORMOTEROL TARTRATE 15 MCG: 15 SOLUTION RESPIRATORY (INHALATION) at 09:14

## 2024-09-14 ASSESSMENT — PAIN SCALES - GENERAL
PAINLEVEL_OUTOF10: 3
PAINLEVEL_OUTOF10: 0
PAINLEVEL_OUTOF10: 10

## 2024-09-14 ASSESSMENT — PAIN DESCRIPTION - LOCATION
LOCATION: CHEST
LOCATION: ABDOMEN

## 2024-09-14 ASSESSMENT — PAIN DESCRIPTION - DESCRIPTORS
DESCRIPTORS: BURNING;DISCOMFORT
DESCRIPTORS: ACHING;DISCOMFORT;GNAWING

## 2024-09-14 ASSESSMENT — PAIN - FUNCTIONAL ASSESSMENT: PAIN_FUNCTIONAL_ASSESSMENT: ACTIVITIES ARE NOT PREVENTED

## 2024-09-15 LAB
BASOPHILS # BLD: 0.12 K/UL (ref 0–0.2)
BASOPHILS NFR BLD: 2 % (ref 0–2)
EOSINOPHIL # BLD: 0 K/UL (ref 0.05–0.5)
EOSINOPHILS RELATIVE PERCENT: 0 % (ref 0–6)
ERYTHROCYTE [DISTWIDTH] IN BLOOD BY AUTOMATED COUNT: 19.3 % (ref 11.5–15)
HCT VFR BLD AUTO: 30.5 % (ref 34–48)
HGB BLD-MCNC: 8.4 G/DL (ref 11.5–15.5)
LIPASE SERPL-CCNC: 99 U/L (ref 13–60)
LYMPHOCYTES NFR BLD: 0.88 K/UL (ref 1.5–4)
LYMPHOCYTES RELATIVE PERCENT: 13 % (ref 20–42)
MCH RBC QN AUTO: 23.5 PG (ref 26–35)
MCHC RBC AUTO-ENTMCNC: 27.5 G/DL (ref 32–34.5)
MCV RBC AUTO: 85.4 FL (ref 80–99.9)
METAMYELOCYTES ABSOLUTE COUNT: 0.18 K/UL (ref 0–0.12)
METAMYELOCYTES: 3 % (ref 0–1)
MICROORGANISM SPEC CULT: NORMAL
MICROORGANISM SPEC CULT: NORMAL
MONOCYTES NFR BLD: 0.06 K/UL (ref 0.1–0.95)
MONOCYTES NFR BLD: 1 % (ref 2–12)
MYELOCYTES ABSOLUTE COUNT: 0.06 K/UL
MYELOCYTES: 1 %
NEUTROPHILS NFR BLD: 81 % (ref 43–80)
NEUTS SEG NFR BLD: 5.41 K/UL (ref 1.8–7.3)
PLATELET CONFIRMATION: NORMAL
PLATELET, FLUORESCENCE: 25 K/UL (ref 130–450)
PMV BLD AUTO: ABNORMAL FL (ref 7–12)
RBC # BLD AUTO: 3.57 M/UL (ref 3.5–5.5)
RBC # BLD: ABNORMAL 10*6/UL
SERVICE CMNT-IMP: NORMAL
SERVICE CMNT-IMP: NORMAL
SPECIMEN DESCRIPTION: NORMAL
SPECIMEN DESCRIPTION: NORMAL
WBC OTHER # BLD: 6.7 K/UL (ref 4.5–11.5)

## 2024-09-15 PROCEDURE — 85025 COMPLETE CBC W/AUTO DIFF WBC: CPT

## 2024-09-15 PROCEDURE — 6370000000 HC RX 637 (ALT 250 FOR IP): Performed by: INTERNAL MEDICINE

## 2024-09-15 PROCEDURE — 83690 ASSAY OF LIPASE: CPT

## 2024-09-15 PROCEDURE — 6370000000 HC RX 637 (ALT 250 FOR IP): Performed by: SURGERY

## 2024-09-15 PROCEDURE — 2580000003 HC RX 258: Performed by: SURGERY

## 2024-09-15 PROCEDURE — 6360000002 HC RX W HCPCS: Performed by: INTERNAL MEDICINE

## 2024-09-15 PROCEDURE — 6360000002 HC RX W HCPCS: Performed by: SURGERY

## 2024-09-15 PROCEDURE — 6370000000 HC RX 637 (ALT 250 FOR IP): Performed by: NURSE PRACTITIONER

## 2024-09-15 PROCEDURE — 99232 SBSQ HOSP IP/OBS MODERATE 35: CPT | Performed by: INTERNAL MEDICINE

## 2024-09-15 PROCEDURE — 36415 COLL VENOUS BLD VENIPUNCTURE: CPT

## 2024-09-15 PROCEDURE — 2060000000 HC ICU INTERMEDIATE R&B

## 2024-09-15 PROCEDURE — 2580000003 HC RX 258: Performed by: INTERNAL MEDICINE

## 2024-09-15 PROCEDURE — 94640 AIRWAY INHALATION TREATMENT: CPT

## 2024-09-15 PROCEDURE — 2700000000 HC OXYGEN THERAPY PER DAY

## 2024-09-15 PROCEDURE — 94660 CPAP INITIATION&MGMT: CPT

## 2024-09-15 RX ORDER — ZINC SULFATE 50(220)MG
50 CAPSULE ORAL DAILY
Status: DISCONTINUED | OUTPATIENT
Start: 2024-09-15 | End: 2024-09-24 | Stop reason: HOSPADM

## 2024-09-15 RX ADMIN — ARFORMOTEROL TARTRATE 15 MCG: 15 SOLUTION RESPIRATORY (INHALATION) at 21:36

## 2024-09-15 RX ADMIN — SODIUM CHLORIDE 125 MG: 9 INJECTION, SOLUTION INTRAVENOUS at 09:04

## 2024-09-15 RX ADMIN — SODIUM CHLORIDE, PRESERVATIVE FREE 40 MG: 5 INJECTION INTRAVENOUS at 08:52

## 2024-09-15 RX ADMIN — SODIUM CHLORIDE, PRESERVATIVE FREE 10 ML: 5 INJECTION INTRAVENOUS at 20:38

## 2024-09-15 RX ADMIN — PANCRELIPASE LIPASE, PANCRELIPASE PROTEASE, PANCRELIPASE AMYLASE 20000 UNITS: 20000; 63000; 84000 CAPSULE, DELAYED RELEASE ORAL at 08:53

## 2024-09-15 RX ADMIN — PANCRELIPASE LIPASE, PANCRELIPASE PROTEASE, PANCRELIPASE AMYLASE 15000 UNITS: 15000; 47000; 63000 CAPSULE, DELAYED RELEASE ORAL at 12:38

## 2024-09-15 RX ADMIN — PANCRELIPASE LIPASE, PANCRELIPASE PROTEASE, PANCRELIPASE AMYLASE 20000 UNITS: 20000; 63000; 84000 CAPSULE, DELAYED RELEASE ORAL at 17:30

## 2024-09-15 RX ADMIN — SODIUM CHLORIDE, PRESERVATIVE FREE 40 MG: 5 INJECTION INTRAVENOUS at 20:38

## 2024-09-15 RX ADMIN — SODIUM CHLORIDE, PRESERVATIVE FREE 10 ML: 5 INJECTION INTRAVENOUS at 20:21

## 2024-09-15 RX ADMIN — BUDESONIDE 500 MCG: 0.5 INHALANT RESPIRATORY (INHALATION) at 21:36

## 2024-09-15 RX ADMIN — BUDESONIDE 500 MCG: 0.5 INHALANT RESPIRATORY (INHALATION) at 07:43

## 2024-09-15 RX ADMIN — SODIUM CHLORIDE, PRESERVATIVE FREE 10 ML: 5 INJECTION INTRAVENOUS at 08:54

## 2024-09-15 RX ADMIN — IPRATROPIUM BROMIDE AND ALBUTEROL SULFATE 1 DOSE: 2.5; .5 SOLUTION RESPIRATORY (INHALATION) at 15:41

## 2024-09-15 RX ADMIN — Medication 50 MG: at 18:45

## 2024-09-15 RX ADMIN — ACETAMINOPHEN 650 MG: 325 TABLET ORAL at 08:52

## 2024-09-15 RX ADMIN — PANCRELIPASE LIPASE, PANCRELIPASE PROTEASE, PANCRELIPASE AMYLASE 15000 UNITS: 15000; 47000; 63000 CAPSULE, DELAYED RELEASE ORAL at 08:53

## 2024-09-15 RX ADMIN — ARFORMOTEROL TARTRATE 15 MCG: 15 SOLUTION RESPIRATORY (INHALATION) at 07:43

## 2024-09-15 RX ADMIN — IPRATROPIUM BROMIDE AND ALBUTEROL SULFATE 1 DOSE: 2.5; .5 SOLUTION RESPIRATORY (INHALATION) at 07:43

## 2024-09-15 RX ADMIN — IPRATROPIUM BROMIDE AND ALBUTEROL SULFATE 1 DOSE: 2.5; .5 SOLUTION RESPIRATORY (INHALATION) at 21:36

## 2024-09-15 RX ADMIN — PANCRELIPASE LIPASE, PANCRELIPASE PROTEASE, PANCRELIPASE AMYLASE 20000 UNITS: 20000; 63000; 84000 CAPSULE, DELAYED RELEASE ORAL at 12:38

## 2024-09-15 RX ADMIN — PREDNISONE 40 MG: 20 TABLET ORAL at 08:52

## 2024-09-15 RX ADMIN — PANCRELIPASE LIPASE, PANCRELIPASE PROTEASE, PANCRELIPASE AMYLASE 15000 UNITS: 15000; 47000; 63000 CAPSULE, DELAYED RELEASE ORAL at 17:30

## 2024-09-15 ASSESSMENT — PAIN DESCRIPTION - LOCATION
LOCATION: ABDOMEN
LOCATION: CHEST
LOCATION: CHEST

## 2024-09-15 ASSESSMENT — PAIN DESCRIPTION - DESCRIPTORS
DESCRIPTORS: ACHING;DISCOMFORT;THROBBING
DESCRIPTORS: ACHING;DISCOMFORT;THROBBING
DESCRIPTORS: ACHING;DISCOMFORT;SORE

## 2024-09-15 ASSESSMENT — PAIN SCALES - GENERAL
PAINLEVEL_OUTOF10: 7
PAINLEVEL_OUTOF10: 0
PAINLEVEL_OUTOF10: 9
PAINLEVEL_OUTOF10: 9

## 2024-09-15 ASSESSMENT — PAIN DESCRIPTION - ORIENTATION
ORIENTATION: UPPER
ORIENTATION: MID;LEFT
ORIENTATION: MID;LEFT

## 2024-09-16 ENCOUNTER — APPOINTMENT (OUTPATIENT)
Dept: GENERAL RADIOLOGY | Age: 69
DRG: 811 | End: 2024-09-16
Payer: MEDICARE

## 2024-09-16 LAB
BASOPHILS # BLD: 0.02 K/UL (ref 0–0.2)
BASOPHILS NFR BLD: 0 % (ref 0–2)
EOSINOPHIL # BLD: 0.05 K/UL (ref 0.05–0.5)
EOSINOPHILS RELATIVE PERCENT: 1 % (ref 0–6)
ERYTHROCYTE [DISTWIDTH] IN BLOOD BY AUTOMATED COUNT: 19.1 % (ref 11.5–15)
HAV IGM SERPL QL IA: NONREACTIVE
HBV CORE IGM SERPL QL IA: NONREACTIVE
HBV SURFACE AG SERPL QL IA: NONREACTIVE
HCT VFR BLD AUTO: 27.2 % (ref 34–48)
HCV AB SERPL QL IA: NONREACTIVE
HGB BLD-MCNC: 7.7 G/DL (ref 11.5–15.5)
HIV 1+2 AB+HIV1 P24 AG SERPL QL IA: NONREACTIVE
IMM GRANULOCYTES # BLD AUTO: <0.03 K/UL (ref 0–0.58)
IMM GRANULOCYTES NFR BLD: 0 % (ref 0–5)
LYMPHOCYTES NFR BLD: 1.32 K/UL (ref 1.5–4)
LYMPHOCYTES RELATIVE PERCENT: 21 % (ref 20–42)
MCH RBC QN AUTO: 23.5 PG (ref 26–35)
MCHC RBC AUTO-ENTMCNC: 28.3 G/DL (ref 32–34.5)
MCV RBC AUTO: 82.9 FL (ref 80–99.9)
MONOCYTES NFR BLD: 0.3 K/UL (ref 0.1–0.95)
MONOCYTES NFR BLD: 5 % (ref 2–12)
NEUTROPHILS NFR BLD: 73 % (ref 43–80)
NEUTS SEG NFR BLD: 4.64 K/UL (ref 1.8–7.3)
PLATELET CONFIRMATION: NORMAL
PLATELET, FLUORESCENCE: 20 K/UL (ref 130–450)
PMV BLD AUTO: ABNORMAL FL (ref 7–12)
RBC # BLD AUTO: 3.28 M/UL (ref 3.5–5.5)
RBC # BLD: ABNORMAL 10*6/UL
WBC OTHER # BLD: 6.3 K/UL (ref 4.5–11.5)

## 2024-09-16 PROCEDURE — 94660 CPAP INITIATION&MGMT: CPT

## 2024-09-16 PROCEDURE — 6370000000 HC RX 637 (ALT 250 FOR IP): Performed by: NURSE PRACTITIONER

## 2024-09-16 PROCEDURE — 2580000003 HC RX 258: Performed by: SURGERY

## 2024-09-16 PROCEDURE — 6360000002 HC RX W HCPCS: Performed by: SURGERY

## 2024-09-16 PROCEDURE — 6370000000 HC RX 637 (ALT 250 FOR IP): Performed by: SURGERY

## 2024-09-16 PROCEDURE — 87389 HIV-1 AG W/HIV-1&-2 AB AG IA: CPT

## 2024-09-16 PROCEDURE — 99232 SBSQ HOSP IP/OBS MODERATE 35: CPT | Performed by: CLINICAL NURSE SPECIALIST

## 2024-09-16 PROCEDURE — 6370000000 HC RX 637 (ALT 250 FOR IP): Performed by: INTERNAL MEDICINE

## 2024-09-16 PROCEDURE — 87086 URINE CULTURE/COLONY COUNT: CPT

## 2024-09-16 PROCEDURE — 2700000000 HC OXYGEN THERAPY PER DAY

## 2024-09-16 PROCEDURE — 2580000003 HC RX 258: Performed by: INTERNAL MEDICINE

## 2024-09-16 PROCEDURE — 80074 ACUTE HEPATITIS PANEL: CPT

## 2024-09-16 PROCEDURE — 36415 COLL VENOUS BLD VENIPUNCTURE: CPT

## 2024-09-16 PROCEDURE — 6370000000 HC RX 637 (ALT 250 FOR IP): Performed by: PHYSICIAN ASSISTANT

## 2024-09-16 PROCEDURE — 6360000002 HC RX W HCPCS: Performed by: INTERNAL MEDICINE

## 2024-09-16 PROCEDURE — 2060000000 HC ICU INTERMEDIATE R&B

## 2024-09-16 PROCEDURE — 94640 AIRWAY INHALATION TREATMENT: CPT

## 2024-09-16 PROCEDURE — 2500000003 HC RX 250 WO HCPCS: Performed by: PHYSICIAN ASSISTANT

## 2024-09-16 PROCEDURE — 74240 X-RAY XM UPR GI TRC 1CNTRST: CPT

## 2024-09-16 PROCEDURE — 85025 COMPLETE CBC W/AUTO DIFF WBC: CPT

## 2024-09-16 RX ORDER — POLYETHYLENE GLYCOL 3350 17 G/17G
17 POWDER, FOR SOLUTION ORAL DAILY
Status: DISCONTINUED | OUTPATIENT
Start: 2024-09-16 | End: 2024-09-24 | Stop reason: HOSPADM

## 2024-09-16 RX ADMIN — BUDESONIDE 500 MCG: 0.5 INHALANT RESPIRATORY (INHALATION) at 19:33

## 2024-09-16 RX ADMIN — ANTACID/ANTIFLATULENT 1 EACH: 380; 550; 10; 10 GRANULE, EFFERVESCENT ORAL at 09:32

## 2024-09-16 RX ADMIN — PREDNISONE 40 MG: 20 TABLET ORAL at 10:54

## 2024-09-16 RX ADMIN — IPRATROPIUM BROMIDE AND ALBUTEROL SULFATE 1 DOSE: 2.5; .5 SOLUTION RESPIRATORY (INHALATION) at 16:58

## 2024-09-16 RX ADMIN — ARFORMOTEROL TARTRATE 15 MCG: 15 SOLUTION RESPIRATORY (INHALATION) at 19:33

## 2024-09-16 RX ADMIN — POLYETHYLENE GLYCOL 3350 17 GRAM ORAL POWDER PACKET 17 G: at 10:56

## 2024-09-16 RX ADMIN — SODIUM CHLORIDE, PRESERVATIVE FREE 40 MG: 5 INJECTION INTRAVENOUS at 10:55

## 2024-09-16 RX ADMIN — SODIUM CHLORIDE, PRESERVATIVE FREE 10 ML: 5 INJECTION INTRAVENOUS at 10:56

## 2024-09-16 RX ADMIN — Medication 50 MG: at 10:56

## 2024-09-16 RX ADMIN — BARIUM SULFATE 140 ML: 980 POWDER, FOR SUSPENSION ORAL at 09:33

## 2024-09-16 RX ADMIN — SODIUM CHLORIDE, PRESERVATIVE FREE 10 ML: 5 INJECTION INTRAVENOUS at 20:22

## 2024-09-16 RX ADMIN — IPRATROPIUM BROMIDE AND ALBUTEROL SULFATE 1 DOSE: 2.5; .5 SOLUTION RESPIRATORY (INHALATION) at 14:21

## 2024-09-16 RX ADMIN — BARIUM SULFATE 176 G: 960 POWDER, FOR SUSPENSION ORAL at 09:32

## 2024-09-16 RX ADMIN — PANCRELIPASE LIPASE, PANCRELIPASE PROTEASE, PANCRELIPASE AMYLASE 20000 UNITS: 20000; 63000; 84000 CAPSULE, DELAYED RELEASE ORAL at 10:55

## 2024-09-16 RX ADMIN — ACETAMINOPHEN 650 MG: 325 TABLET ORAL at 17:24

## 2024-09-16 RX ADMIN — IPRATROPIUM BROMIDE AND ALBUTEROL SULFATE 1 DOSE: 2.5; .5 SOLUTION RESPIRATORY (INHALATION) at 19:33

## 2024-09-16 RX ADMIN — SODIUM CHLORIDE 125 MG: 9 INJECTION, SOLUTION INTRAVENOUS at 11:03

## 2024-09-16 RX ADMIN — PANCRELIPASE LIPASE, PANCRELIPASE PROTEASE, PANCRELIPASE AMYLASE 15000 UNITS: 15000; 47000; 63000 CAPSULE, DELAYED RELEASE ORAL at 17:25

## 2024-09-16 RX ADMIN — SODIUM CHLORIDE, PRESERVATIVE FREE 40 MG: 5 INJECTION INTRAVENOUS at 20:22

## 2024-09-16 RX ADMIN — PANCRELIPASE LIPASE, PANCRELIPASE PROTEASE, PANCRELIPASE AMYLASE 15000 UNITS: 15000; 47000; 63000 CAPSULE, DELAYED RELEASE ORAL at 10:55

## 2024-09-16 RX ADMIN — PANCRELIPASE LIPASE, PANCRELIPASE PROTEASE, PANCRELIPASE AMYLASE 20000 UNITS: 20000; 63000; 84000 CAPSULE, DELAYED RELEASE ORAL at 17:25

## 2024-09-16 ASSESSMENT — PAIN - FUNCTIONAL ASSESSMENT: PAIN_FUNCTIONAL_ASSESSMENT: ACTIVITIES ARE NOT PREVENTED

## 2024-09-16 ASSESSMENT — PAIN SCALES - GENERAL: PAINLEVEL_OUTOF10: 8

## 2024-09-16 ASSESSMENT — PAIN DESCRIPTION - LOCATION: LOCATION: HEAD

## 2024-09-16 ASSESSMENT — PAIN DESCRIPTION - DESCRIPTORS: DESCRIPTORS: ACHING;DISCOMFORT;SORE

## 2024-09-16 ASSESSMENT — PAIN DESCRIPTION - ORIENTATION: ORIENTATION: MID

## 2024-09-17 LAB
BASOPHILS # BLD: 0.03 K/UL (ref 0–0.2)
BASOPHILS NFR BLD: 0 % (ref 0–2)
COPPER SERPL-MCNC: 174.9 UG/DL (ref 80–155)
EOSINOPHIL # BLD: 0.11 K/UL (ref 0.05–0.5)
EOSINOPHILS RELATIVE PERCENT: 1 % (ref 0–6)
ERYTHROCYTE [DISTWIDTH] IN BLOOD BY AUTOMATED COUNT: 19.6 % (ref 11.5–15)
HCT VFR BLD AUTO: 31.5 % (ref 34–48)
HGB BLD-MCNC: 8.7 G/DL (ref 11.5–15.5)
IMM GRANULOCYTES # BLD AUTO: 0.11 K/UL (ref 0–0.58)
IMM GRANULOCYTES NFR BLD: 1 % (ref 0–5)
LYMPHOCYTES NFR BLD: 2.34 K/UL (ref 1.5–4)
LYMPHOCYTES RELATIVE PERCENT: 16 % (ref 20–42)
MCH RBC QN AUTO: 23.5 PG (ref 26–35)
MCHC RBC AUTO-ENTMCNC: 27.6 G/DL (ref 32–34.5)
MCV RBC AUTO: 84.9 FL (ref 80–99.9)
MONOCYTES NFR BLD: 0.47 K/UL (ref 0.1–0.95)
MONOCYTES NFR BLD: 3 % (ref 2–12)
NEUTROPHILS NFR BLD: 79 % (ref 43–80)
NEUTS SEG NFR BLD: 11.43 K/UL (ref 1.8–7.3)
PLATELET CONFIRMATION: NORMAL
PLATELET, FLUORESCENCE: 24 K/UL (ref 130–450)
PMV BLD AUTO: ABNORMAL FL (ref 7–12)
RBC # BLD AUTO: 3.71 M/UL (ref 3.5–5.5)
WBC OTHER # BLD: 14.5 K/UL (ref 4.5–11.5)

## 2024-09-17 PROCEDURE — 85025 COMPLETE CBC W/AUTO DIFF WBC: CPT

## 2024-09-17 PROCEDURE — 6370000000 HC RX 637 (ALT 250 FOR IP): Performed by: INTERNAL MEDICINE

## 2024-09-17 PROCEDURE — 6370000000 HC RX 637 (ALT 250 FOR IP): Performed by: SURGERY

## 2024-09-17 PROCEDURE — 2580000003 HC RX 258: Performed by: SURGERY

## 2024-09-17 PROCEDURE — 6360000002 HC RX W HCPCS: Performed by: NURSE PRACTITIONER

## 2024-09-17 PROCEDURE — 2580000003 HC RX 258: Performed by: INTERNAL MEDICINE

## 2024-09-17 PROCEDURE — 94640 AIRWAY INHALATION TREATMENT: CPT

## 2024-09-17 PROCEDURE — 94660 CPAP INITIATION&MGMT: CPT

## 2024-09-17 PROCEDURE — 6360000002 HC RX W HCPCS: Performed by: SURGERY

## 2024-09-17 PROCEDURE — 99232 SBSQ HOSP IP/OBS MODERATE 35: CPT | Performed by: INTERNAL MEDICINE

## 2024-09-17 PROCEDURE — 88185 FLOWCYTOMETRY/TC ADD-ON: CPT

## 2024-09-17 PROCEDURE — 2060000000 HC ICU INTERMEDIATE R&B

## 2024-09-17 PROCEDURE — 2700000000 HC OXYGEN THERAPY PER DAY

## 2024-09-17 PROCEDURE — 36415 COLL VENOUS BLD VENIPUNCTURE: CPT

## 2024-09-17 PROCEDURE — 6370000000 HC RX 637 (ALT 250 FOR IP): Performed by: NURSE PRACTITIONER

## 2024-09-17 PROCEDURE — 6360000002 HC RX W HCPCS: Performed by: INTERNAL MEDICINE

## 2024-09-17 PROCEDURE — 88184 FLOWCYTOMETRY/ TC 1 MARKER: CPT

## 2024-09-17 RX ORDER — FUROSEMIDE 10 MG/ML
20 INJECTION INTRAMUSCULAR; INTRAVENOUS ONCE
Status: COMPLETED | OUTPATIENT
Start: 2024-09-17 | End: 2024-09-17

## 2024-09-17 RX ORDER — ONDANSETRON 2 MG/ML
4 INJECTION INTRAMUSCULAR; INTRAVENOUS EVERY 6 HOURS PRN
Status: DISCONTINUED | OUTPATIENT
Start: 2024-09-17 | End: 2024-09-24 | Stop reason: HOSPADM

## 2024-09-17 RX ADMIN — ONDANSETRON 4 MG: 2 INJECTION INTRAMUSCULAR; INTRAVENOUS at 15:15

## 2024-09-17 RX ADMIN — PANCRELIPASE LIPASE, PANCRELIPASE PROTEASE, PANCRELIPASE AMYLASE 20000 UNITS: 20000; 63000; 84000 CAPSULE, DELAYED RELEASE ORAL at 08:47

## 2024-09-17 RX ADMIN — PANCRELIPASE LIPASE, PANCRELIPASE PROTEASE, PANCRELIPASE AMYLASE 15000 UNITS: 15000; 47000; 63000 CAPSULE, DELAYED RELEASE ORAL at 08:46

## 2024-09-17 RX ADMIN — SODIUM CHLORIDE 125 MG: 9 INJECTION, SOLUTION INTRAVENOUS at 10:00

## 2024-09-17 RX ADMIN — SODIUM CHLORIDE, PRESERVATIVE FREE 10 ML: 5 INJECTION INTRAVENOUS at 20:46

## 2024-09-17 RX ADMIN — SODIUM CHLORIDE, PRESERVATIVE FREE 40 MG: 5 INJECTION INTRAVENOUS at 08:47

## 2024-09-17 RX ADMIN — ACETAMINOPHEN 650 MG: 325 TABLET ORAL at 06:50

## 2024-09-17 RX ADMIN — IPRATROPIUM BROMIDE AND ALBUTEROL SULFATE 1 DOSE: 2.5; .5 SOLUTION RESPIRATORY (INHALATION) at 20:30

## 2024-09-17 RX ADMIN — PANCRELIPASE LIPASE, PANCRELIPASE PROTEASE, PANCRELIPASE AMYLASE 20000 UNITS: 20000; 63000; 84000 CAPSULE, DELAYED RELEASE ORAL at 16:32

## 2024-09-17 RX ADMIN — BUDESONIDE 500 MCG: 0.5 INHALANT RESPIRATORY (INHALATION) at 20:30

## 2024-09-17 RX ADMIN — PREDNISONE 40 MG: 20 TABLET ORAL at 08:40

## 2024-09-17 RX ADMIN — FUROSEMIDE 20 MG: 10 INJECTION, SOLUTION INTRAMUSCULAR; INTRAVENOUS at 17:13

## 2024-09-17 RX ADMIN — SODIUM CHLORIDE, PRESERVATIVE FREE 10 ML: 5 INJECTION INTRAVENOUS at 08:50

## 2024-09-17 RX ADMIN — SODIUM CHLORIDE, PRESERVATIVE FREE 40 MG: 5 INJECTION INTRAVENOUS at 20:46

## 2024-09-17 RX ADMIN — PANCRELIPASE LIPASE, PANCRELIPASE PROTEASE, PANCRELIPASE AMYLASE 15000 UNITS: 15000; 47000; 63000 CAPSULE, DELAYED RELEASE ORAL at 12:19

## 2024-09-17 RX ADMIN — PANCRELIPASE LIPASE, PANCRELIPASE PROTEASE, PANCRELIPASE AMYLASE 20000 UNITS: 20000; 63000; 84000 CAPSULE, DELAYED RELEASE ORAL at 12:19

## 2024-09-17 RX ADMIN — Medication 50 MG: at 08:40

## 2024-09-17 RX ADMIN — PANCRELIPASE LIPASE, PANCRELIPASE PROTEASE, PANCRELIPASE AMYLASE 15000 UNITS: 15000; 47000; 63000 CAPSULE, DELAYED RELEASE ORAL at 16:32

## 2024-09-17 RX ADMIN — POLYETHYLENE GLYCOL 3350 17 GRAM ORAL POWDER PACKET 17 G: at 09:42

## 2024-09-17 RX ADMIN — ARFORMOTEROL TARTRATE 15 MCG: 15 SOLUTION RESPIRATORY (INHALATION) at 20:30

## 2024-09-17 ASSESSMENT — PAIN DESCRIPTION - DESCRIPTORS: DESCRIPTORS: ACHING;THROBBING;DISCOMFORT

## 2024-09-17 ASSESSMENT — PAIN DESCRIPTION - LOCATION: LOCATION: HEAD

## 2024-09-17 ASSESSMENT — PAIN SCALES - GENERAL
PAINLEVEL_OUTOF10: 0
PAINLEVEL_OUTOF10: 2
PAINLEVEL_OUTOF10: 0
PAINLEVEL_OUTOF10: 5

## 2024-09-18 ENCOUNTER — APPOINTMENT (OUTPATIENT)
Dept: CT IMAGING | Age: 69
DRG: 811 | End: 2024-09-18
Attending: INTERNAL MEDICINE
Payer: MEDICARE

## 2024-09-18 LAB
BASOPHILS # BLD: 0.03 K/UL (ref 0–0.2)
BASOPHILS NFR BLD: 0 % (ref 0–2)
EOSINOPHIL # BLD: 0.09 K/UL (ref 0.05–0.5)
EOSINOPHILS RELATIVE PERCENT: 1 % (ref 0–6)
ERYTHROCYTE [DISTWIDTH] IN BLOOD BY AUTOMATED COUNT: 20.3 % (ref 11.5–15)
HCT VFR BLD AUTO: 29.8 % (ref 34–48)
HGB BLD-MCNC: 8.3 G/DL (ref 11.5–15.5)
IMM GRANULOCYTES # BLD AUTO: 0.08 K/UL (ref 0–0.58)
IMM GRANULOCYTES NFR BLD: 1 % (ref 0–5)
LYMPHOCYTES NFR BLD: 2.18 K/UL (ref 1.5–4)
LYMPHOCYTES RELATIVE PERCENT: 22 % (ref 20–42)
MCH RBC QN AUTO: 23.9 PG (ref 26–35)
MCHC RBC AUTO-ENTMCNC: 27.9 G/DL (ref 32–34.5)
MCV RBC AUTO: 85.6 FL (ref 80–99.9)
MICROORGANISM SPEC CULT: ABNORMAL
MONOCYTES NFR BLD: 0.47 K/UL (ref 0.1–0.95)
MONOCYTES NFR BLD: 5 % (ref 2–12)
NEUTROPHILS NFR BLD: 71 % (ref 43–80)
NEUTS SEG NFR BLD: 7 K/UL (ref 1.8–7.3)
PLATELET CONFIRMATION: NORMAL
PLATELET, FLUORESCENCE: 29 K/UL (ref 130–450)
PMV BLD AUTO: ABNORMAL FL (ref 7–12)
RBC # BLD AUTO: 3.48 M/UL (ref 3.5–5.5)
RBC # BLD: ABNORMAL 10*6/UL
SERVICE CMNT-IMP: ABNORMAL
SPECIMEN DESCRIPTION: ABNORMAL
WBC OTHER # BLD: 9.9 K/UL (ref 4.5–11.5)

## 2024-09-18 PROCEDURE — 94660 CPAP INITIATION&MGMT: CPT

## 2024-09-18 PROCEDURE — 2580000003 HC RX 258: Performed by: SURGERY

## 2024-09-18 PROCEDURE — 6360000002 HC RX W HCPCS: Performed by: SURGERY

## 2024-09-18 PROCEDURE — 6370000000 HC RX 637 (ALT 250 FOR IP): Performed by: SURGERY

## 2024-09-18 PROCEDURE — 6360000004 HC RX CONTRAST MEDICATION: Performed by: RADIOLOGY

## 2024-09-18 PROCEDURE — 6370000000 HC RX 637 (ALT 250 FOR IP): Performed by: NURSE PRACTITIONER

## 2024-09-18 PROCEDURE — 6360000002 HC RX W HCPCS: Performed by: NURSE PRACTITIONER

## 2024-09-18 PROCEDURE — 94640 AIRWAY INHALATION TREATMENT: CPT

## 2024-09-18 PROCEDURE — 85025 COMPLETE CBC W/AUTO DIFF WBC: CPT

## 2024-09-18 PROCEDURE — 36415 COLL VENOUS BLD VENIPUNCTURE: CPT

## 2024-09-18 PROCEDURE — 2700000000 HC OXYGEN THERAPY PER DAY

## 2024-09-18 PROCEDURE — 2580000003 HC RX 258: Performed by: INTERNAL MEDICINE

## 2024-09-18 PROCEDURE — 74178 CT ABD&PLV WO CNTR FLWD CNTR: CPT

## 2024-09-18 PROCEDURE — 6370000000 HC RX 637 (ALT 250 FOR IP): Performed by: INTERNAL MEDICINE

## 2024-09-18 PROCEDURE — 2060000000 HC ICU INTERMEDIATE R&B

## 2024-09-18 PROCEDURE — 6360000002 HC RX W HCPCS: Performed by: INTERNAL MEDICINE

## 2024-09-18 RX ORDER — METOCLOPRAMIDE HYDROCHLORIDE 5 MG/ML
5 INJECTION INTRAMUSCULAR; INTRAVENOUS EVERY 6 HOURS
Status: COMPLETED | OUTPATIENT
Start: 2024-09-18 | End: 2024-09-18

## 2024-09-18 RX ORDER — IOPAMIDOL 755 MG/ML
75 INJECTION, SOLUTION INTRAVASCULAR
Status: COMPLETED | OUTPATIENT
Start: 2024-09-18 | End: 2024-09-18

## 2024-09-18 RX ADMIN — SODIUM CHLORIDE, PRESERVATIVE FREE 10 ML: 5 INJECTION INTRAVENOUS at 07:49

## 2024-09-18 RX ADMIN — ARFORMOTEROL TARTRATE 15 MCG: 15 SOLUTION RESPIRATORY (INHALATION) at 07:31

## 2024-09-18 RX ADMIN — SODIUM CHLORIDE, PRESERVATIVE FREE 40 MG: 5 INJECTION INTRAVENOUS at 20:28

## 2024-09-18 RX ADMIN — PANCRELIPASE LIPASE, PANCRELIPASE PROTEASE, PANCRELIPASE AMYLASE 15000 UNITS: 15000; 47000; 63000 CAPSULE, DELAYED RELEASE ORAL at 07:38

## 2024-09-18 RX ADMIN — PANCRELIPASE LIPASE, PANCRELIPASE PROTEASE, PANCRELIPASE AMYLASE 20000 UNITS: 20000; 63000; 84000 CAPSULE, DELAYED RELEASE ORAL at 11:42

## 2024-09-18 RX ADMIN — PREDNISONE 40 MG: 20 TABLET ORAL at 07:39

## 2024-09-18 RX ADMIN — SODIUM CHLORIDE, PRESERVATIVE FREE 10 ML: 5 INJECTION INTRAVENOUS at 20:29

## 2024-09-18 RX ADMIN — PANCRELIPASE LIPASE, PANCRELIPASE PROTEASE, PANCRELIPASE AMYLASE 15000 UNITS: 15000; 47000; 63000 CAPSULE, DELAYED RELEASE ORAL at 11:42

## 2024-09-18 RX ADMIN — METOCLOPRAMIDE 5 MG: 5 INJECTION, SOLUTION INTRAMUSCULAR; INTRAVENOUS at 23:13

## 2024-09-18 RX ADMIN — METOCLOPRAMIDE 5 MG: 5 INJECTION, SOLUTION INTRAMUSCULAR; INTRAVENOUS at 13:40

## 2024-09-18 RX ADMIN — PANCRELIPASE LIPASE, PANCRELIPASE PROTEASE, PANCRELIPASE AMYLASE 15000 UNITS: 15000; 47000; 63000 CAPSULE, DELAYED RELEASE ORAL at 17:05

## 2024-09-18 RX ADMIN — Medication 50 MG: at 07:39

## 2024-09-18 RX ADMIN — BUDESONIDE 500 MCG: 0.5 INHALANT RESPIRATORY (INHALATION) at 07:31

## 2024-09-18 RX ADMIN — ARFORMOTEROL TARTRATE 15 MCG: 15 SOLUTION RESPIRATORY (INHALATION) at 19:57

## 2024-09-18 RX ADMIN — SODIUM CHLORIDE, PRESERVATIVE FREE 40 MG: 5 INJECTION INTRAVENOUS at 07:44

## 2024-09-18 RX ADMIN — IPRATROPIUM BROMIDE AND ALBUTEROL SULFATE 1 DOSE: 2.5; .5 SOLUTION RESPIRATORY (INHALATION) at 07:31

## 2024-09-18 RX ADMIN — METOCLOPRAMIDE 5 MG: 5 INJECTION, SOLUTION INTRAMUSCULAR; INTRAVENOUS at 17:37

## 2024-09-18 RX ADMIN — BUDESONIDE 500 MCG: 0.5 INHALANT RESPIRATORY (INHALATION) at 19:57

## 2024-09-18 RX ADMIN — IPRATROPIUM BROMIDE AND ALBUTEROL SULFATE 1 DOSE: 2.5; .5 SOLUTION RESPIRATORY (INHALATION) at 19:57

## 2024-09-18 RX ADMIN — IOPAMIDOL 75 ML: 755 INJECTION, SOLUTION INTRAVENOUS at 06:43

## 2024-09-18 RX ADMIN — PANCRELIPASE LIPASE, PANCRELIPASE PROTEASE, PANCRELIPASE AMYLASE 20000 UNITS: 20000; 63000; 84000 CAPSULE, DELAYED RELEASE ORAL at 17:05

## 2024-09-18 RX ADMIN — PANCRELIPASE LIPASE, PANCRELIPASE PROTEASE, PANCRELIPASE AMYLASE 20000 UNITS: 20000; 63000; 84000 CAPSULE, DELAYED RELEASE ORAL at 07:38

## 2024-09-18 RX ADMIN — ACETAMINOPHEN 650 MG: 325 TABLET ORAL at 17:05

## 2024-09-18 RX ADMIN — SODIUM CHLORIDE 125 MG: 9 INJECTION, SOLUTION INTRAVENOUS at 07:50

## 2024-09-18 ASSESSMENT — PAIN SCALES - GENERAL
PAINLEVEL_OUTOF10: 0
PAINLEVEL_OUTOF10: 0

## 2024-09-19 LAB
ALBUMIN SERPL-MCNC: 3.8 G/DL (ref 3.5–5.2)
ALP SERPL-CCNC: 83 U/L (ref 35–104)
ALT SERPL-CCNC: 20 U/L (ref 0–32)
ANION GAP SERPL CALCULATED.3IONS-SCNC: 12 MMOL/L (ref 7–16)
AST SERPL-CCNC: 21 U/L (ref 0–31)
BASOPHILS # BLD: 0 K/UL (ref 0–0.2)
BASOPHILS NFR BLD: 0 % (ref 0–2)
BILIRUB SERPL-MCNC: 0.2 MG/DL (ref 0–1.2)
BUN SERPL-MCNC: 15 MG/DL (ref 6–23)
CALCIUM SERPL-MCNC: 9.5 MG/DL (ref 8.6–10.2)
CHLORIDE SERPL-SCNC: 92 MMOL/L (ref 98–107)
CO2 SERPL-SCNC: 35 MMOL/L (ref 22–29)
CREAT SERPL-MCNC: 0.7 MG/DL (ref 0.5–1)
EOSINOPHIL # BLD: 0 K/UL (ref 0.05–0.5)
EOSINOPHILS RELATIVE PERCENT: 0 % (ref 0–6)
ERYTHROCYTE [DISTWIDTH] IN BLOOD BY AUTOMATED COUNT: 19.5 % (ref 11.5–15)
ERYTHROCYTE [DISTWIDTH] IN BLOOD BY AUTOMATED COUNT: 20.3 % (ref 11.5–15)
GFR, ESTIMATED: >90 ML/MIN/1.73M2
GLUCOSE SERPL-MCNC: 301 MG/DL (ref 74–99)
HCT VFR BLD AUTO: 28.9 % (ref 34–48)
HCT VFR BLD AUTO: 29.9 % (ref 34–48)
HGB BLD-MCNC: 8 G/DL (ref 11.5–15.5)
HGB BLD-MCNC: 8.4 G/DL (ref 11.5–15.5)
LYMPHOCYTES NFR BLD: 1.05 K/UL (ref 1.5–4)
LYMPHOCYTES RELATIVE PERCENT: 10 % (ref 20–42)
MCH RBC QN AUTO: 23.6 PG (ref 26–35)
MCH RBC QN AUTO: 23.9 PG (ref 26–35)
MCHC RBC AUTO-ENTMCNC: 27.7 G/DL (ref 32–34.5)
MCHC RBC AUTO-ENTMCNC: 28.1 G/DL (ref 32–34.5)
MCV RBC AUTO: 85.2 FL (ref 80–99.9)
MCV RBC AUTO: 85.3 FL (ref 80–99.9)
MONOCYTES NFR BLD: 0.29 K/UL (ref 0.1–0.95)
MONOCYTES NFR BLD: 3 % (ref 2–12)
NEUTROPHILS NFR BLD: 88 % (ref 43–80)
NEUTS SEG NFR BLD: 9.66 K/UL (ref 1.8–7.3)
NUCLEATED RED BLOOD CELLS: 1 PER 100 WBC
PLATELET CONFIRMATION: NORMAL
PLATELET CONFIRMATION: NORMAL
PLATELET, FLUORESCENCE: 50 K/UL (ref 130–450)
PLATELET, FLUORESCENCE: 70 K/UL (ref 130–450)
PMV BLD AUTO: ABNORMAL FL (ref 7–12)
PMV BLD AUTO: ABNORMAL FL (ref 7–12)
POTASSIUM SERPL-SCNC: 4 MMOL/L (ref 3.5–5)
PROT SERPL-MCNC: 5.8 G/DL (ref 6.4–8.3)
RBC # BLD AUTO: 3.39 M/UL (ref 3.5–5.5)
RBC # BLD AUTO: 3.51 M/UL (ref 3.5–5.5)
RBC # BLD: ABNORMAL 10*6/UL
SODIUM SERPL-SCNC: 139 MMOL/L (ref 132–146)
WBC OTHER # BLD: 11 K/UL (ref 4.5–11.5)
WBC OTHER # BLD: 13.6 K/UL (ref 4.5–11.5)

## 2024-09-19 PROCEDURE — 6370000000 HC RX 637 (ALT 250 FOR IP): Performed by: INTERNAL MEDICINE

## 2024-09-19 PROCEDURE — 6370000000 HC RX 637 (ALT 250 FOR IP): Performed by: SURGERY

## 2024-09-19 PROCEDURE — 6360000002 HC RX W HCPCS: Performed by: INTERNAL MEDICINE

## 2024-09-19 PROCEDURE — 97165 OT EVAL LOW COMPLEX 30 MIN: CPT

## 2024-09-19 PROCEDURE — 6370000000 HC RX 637 (ALT 250 FOR IP): Performed by: NURSE PRACTITIONER

## 2024-09-19 PROCEDURE — 85027 COMPLETE CBC AUTOMATED: CPT

## 2024-09-19 PROCEDURE — 94660 CPAP INITIATION&MGMT: CPT

## 2024-09-19 PROCEDURE — 80053 COMPREHEN METABOLIC PANEL: CPT

## 2024-09-19 PROCEDURE — 97530 THERAPEUTIC ACTIVITIES: CPT

## 2024-09-19 PROCEDURE — 97535 SELF CARE MNGMENT TRAINING: CPT

## 2024-09-19 PROCEDURE — 6360000002 HC RX W HCPCS: Performed by: SURGERY

## 2024-09-19 PROCEDURE — 97161 PT EVAL LOW COMPLEX 20 MIN: CPT

## 2024-09-19 PROCEDURE — 99232 SBSQ HOSP IP/OBS MODERATE 35: CPT | Performed by: STUDENT IN AN ORGANIZED HEALTH CARE EDUCATION/TRAINING PROGRAM

## 2024-09-19 PROCEDURE — 2700000000 HC OXYGEN THERAPY PER DAY

## 2024-09-19 PROCEDURE — 36415 COLL VENOUS BLD VENIPUNCTURE: CPT

## 2024-09-19 PROCEDURE — 85025 COMPLETE CBC W/AUTO DIFF WBC: CPT

## 2024-09-19 PROCEDURE — 94640 AIRWAY INHALATION TREATMENT: CPT

## 2024-09-19 PROCEDURE — 2580000003 HC RX 258: Performed by: INTERNAL MEDICINE

## 2024-09-19 PROCEDURE — 2580000003 HC RX 258: Performed by: SURGERY

## 2024-09-19 PROCEDURE — 1200000000 HC SEMI PRIVATE

## 2024-09-19 RX ORDER — METOCLOPRAMIDE 5 MG/1
5 TABLET ORAL 2 TIMES DAILY
Status: DISCONTINUED | OUTPATIENT
Start: 2024-09-19 | End: 2024-09-24 | Stop reason: HOSPADM

## 2024-09-19 RX ADMIN — SODIUM CHLORIDE, PRESERVATIVE FREE 10 ML: 5 INJECTION INTRAVENOUS at 08:29

## 2024-09-19 RX ADMIN — PANCRELIPASE LIPASE, PANCRELIPASE PROTEASE, PANCRELIPASE AMYLASE 20000 UNITS: 20000; 63000; 84000 CAPSULE, DELAYED RELEASE ORAL at 11:56

## 2024-09-19 RX ADMIN — PANCRELIPASE LIPASE, PANCRELIPASE PROTEASE, PANCRELIPASE AMYLASE 20000 UNITS: 20000; 63000; 84000 CAPSULE, DELAYED RELEASE ORAL at 17:22

## 2024-09-19 RX ADMIN — POLYETHYLENE GLYCOL 3350 17 GRAM ORAL POWDER PACKET 17 G: at 08:27

## 2024-09-19 RX ADMIN — PANCRELIPASE LIPASE, PANCRELIPASE PROTEASE, PANCRELIPASE AMYLASE 15000 UNITS: 15000; 47000; 63000 CAPSULE, DELAYED RELEASE ORAL at 08:28

## 2024-09-19 RX ADMIN — ACETAMINOPHEN 650 MG: 325 TABLET ORAL at 11:56

## 2024-09-19 RX ADMIN — IPRATROPIUM BROMIDE AND ALBUTEROL SULFATE 1 DOSE: 2.5; .5 SOLUTION RESPIRATORY (INHALATION) at 19:49

## 2024-09-19 RX ADMIN — METOCLOPRAMIDE 5 MG: 5 TABLET ORAL at 20:44

## 2024-09-19 RX ADMIN — BUDESONIDE 500 MCG: 0.5 INHALANT RESPIRATORY (INHALATION) at 08:02

## 2024-09-19 RX ADMIN — IPRATROPIUM BROMIDE AND ALBUTEROL SULFATE 1 DOSE: 2.5; .5 SOLUTION RESPIRATORY (INHALATION) at 08:02

## 2024-09-19 RX ADMIN — SODIUM CHLORIDE, PRESERVATIVE FREE 40 MG: 5 INJECTION INTRAVENOUS at 20:44

## 2024-09-19 RX ADMIN — SODIUM CHLORIDE, PRESERVATIVE FREE 40 MG: 5 INJECTION INTRAVENOUS at 08:28

## 2024-09-19 RX ADMIN — BUDESONIDE 500 MCG: 0.5 INHALANT RESPIRATORY (INHALATION) at 19:50

## 2024-09-19 RX ADMIN — IPRATROPIUM BROMIDE AND ALBUTEROL SULFATE 1 DOSE: 2.5; .5 SOLUTION RESPIRATORY (INHALATION) at 15:22

## 2024-09-19 RX ADMIN — Medication 50 MG: at 08:28

## 2024-09-19 RX ADMIN — SODIUM CHLORIDE 125 MG: 9 INJECTION, SOLUTION INTRAVENOUS at 09:14

## 2024-09-19 RX ADMIN — PREDNISONE 40 MG: 20 TABLET ORAL at 08:27

## 2024-09-19 RX ADMIN — PANCRELIPASE LIPASE, PANCRELIPASE PROTEASE, PANCRELIPASE AMYLASE 15000 UNITS: 15000; 47000; 63000 CAPSULE, DELAYED RELEASE ORAL at 17:22

## 2024-09-19 RX ADMIN — PANCRELIPASE LIPASE, PANCRELIPASE PROTEASE, PANCRELIPASE AMYLASE 15000 UNITS: 15000; 47000; 63000 CAPSULE, DELAYED RELEASE ORAL at 11:56

## 2024-09-19 RX ADMIN — ARFORMOTEROL TARTRATE 15 MCG: 15 SOLUTION RESPIRATORY (INHALATION) at 08:02

## 2024-09-19 RX ADMIN — PANCRELIPASE LIPASE, PANCRELIPASE PROTEASE, PANCRELIPASE AMYLASE 20000 UNITS: 20000; 63000; 84000 CAPSULE, DELAYED RELEASE ORAL at 08:29

## 2024-09-19 RX ADMIN — ARFORMOTEROL TARTRATE 15 MCG: 15 SOLUTION RESPIRATORY (INHALATION) at 19:50

## 2024-09-19 RX ADMIN — SODIUM CHLORIDE, PRESERVATIVE FREE 10 ML: 5 INJECTION INTRAVENOUS at 20:46

## 2024-09-19 RX ADMIN — IPRATROPIUM BROMIDE AND ALBUTEROL SULFATE 1 DOSE: 2.5; .5 SOLUTION RESPIRATORY (INHALATION) at 11:37

## 2024-09-19 ASSESSMENT — PAIN SCALES - WONG BAKER: WONGBAKER_NUMERICALRESPONSE: NO HURT

## 2024-09-20 ENCOUNTER — APPOINTMENT (OUTPATIENT)
Dept: CT IMAGING | Age: 69
DRG: 811 | End: 2024-09-20
Payer: MEDICARE

## 2024-09-20 LAB
ALBUMIN SERPL-MCNC: 3.5 G/DL (ref 3.5–5.2)
ALP SERPL-CCNC: 77 U/L (ref 35–104)
ALT SERPL-CCNC: 17 U/L (ref 0–32)
ANION GAP SERPL CALCULATED.3IONS-SCNC: 5 MMOL/L (ref 7–16)
AST SERPL-CCNC: 15 U/L (ref 0–31)
BASOPHILS # BLD: 0.1 K/UL (ref 0–0.2)
BASOPHILS NFR BLD: 1 % (ref 0–2)
BILIRUB SERPL-MCNC: 0.2 MG/DL (ref 0–1.2)
BUN SERPL-MCNC: 16 MG/DL (ref 6–23)
CALCIUM SERPL-MCNC: 9.7 MG/DL (ref 8.6–10.2)
CHLORIDE SERPL-SCNC: 96 MMOL/L (ref 98–107)
CO2 SERPL-SCNC: 42 MMOL/L (ref 22–29)
CREAT SERPL-MCNC: 0.6 MG/DL (ref 0.5–1)
EOSINOPHIL # BLD: 0.1 K/UL (ref 0.05–0.5)
EOSINOPHILS RELATIVE PERCENT: 1 % (ref 0–6)
ERYTHROCYTE [DISTWIDTH] IN BLOOD BY AUTOMATED COUNT: 20.3 % (ref 11.5–15)
GFR, ESTIMATED: >90 ML/MIN/1.73M2
GLUCOSE SERPL-MCNC: 132 MG/DL (ref 74–99)
HAPTOGLOB SERPL-MCNC: 171 MG/DL (ref 30–200)
HCT VFR BLD AUTO: 27.7 % (ref 34–48)
HGB BLD-MCNC: 7.9 G/DL (ref 11.5–15.5)
LDH SERPL-CCNC: 303 U/L (ref 135–214)
LYMPHOCYTES NFR BLD: 1.92 K/UL (ref 1.5–4)
LYMPHOCYTES RELATIVE PERCENT: 17 % (ref 20–42)
MAGNESIUM SERPL-MCNC: 1.8 MG/DL (ref 1.6–2.6)
MCH RBC QN AUTO: 24.5 PG (ref 26–35)
MCHC RBC AUTO-ENTMCNC: 28.5 G/DL (ref 32–34.5)
MCV RBC AUTO: 85.8 FL (ref 80–99.9)
METAMYELOCYTES ABSOLUTE COUNT: 0.2 K/UL (ref 0–0.12)
METAMYELOCYTES: 2 % (ref 0–1)
MONOCYTES NFR BLD: 0 % (ref 2–12)
MONOCYTES NFR BLD: 0 K/UL (ref 0.1–0.95)
MYELOCYTES ABSOLUTE COUNT: 0.2 K/UL
MYELOCYTES: 2 %
NEUTROPHILS NFR BLD: 78 % (ref 43–80)
NEUTS SEG NFR BLD: 8.98 K/UL (ref 1.8–7.3)
NUCLEATED RED BLOOD CELLS: 1 PER 100 WBC
PLATELET CONFIRMATION: NORMAL
PLATELET, FLUORESCENCE: 96 K/UL (ref 130–450)
PMV BLD AUTO: ABNORMAL FL (ref 7–12)
POTASSIUM SERPL-SCNC: 3.5 MMOL/L (ref 3.5–5)
PROT SERPL-MCNC: 5.2 G/DL (ref 6.4–8.3)
RBC # BLD AUTO: 3.23 M/UL (ref 3.5–5.5)
RBC # BLD: ABNORMAL 10*6/UL
SEND OUT REPORT: NORMAL
SODIUM SERPL-SCNC: 143 MMOL/L (ref 132–146)
TEST NAME: NORMAL
WBC OTHER # BLD: 11.5 K/UL (ref 4.5–11.5)

## 2024-09-20 PROCEDURE — 99232 SBSQ HOSP IP/OBS MODERATE 35: CPT | Performed by: INTERNAL MEDICINE

## 2024-09-20 PROCEDURE — 6370000000 HC RX 637 (ALT 250 FOR IP): Performed by: NURSE PRACTITIONER

## 2024-09-20 PROCEDURE — 6360000002 HC RX W HCPCS: Performed by: SURGERY

## 2024-09-20 PROCEDURE — 2700000000 HC OXYGEN THERAPY PER DAY

## 2024-09-20 PROCEDURE — 94660 CPAP INITIATION&MGMT: CPT

## 2024-09-20 PROCEDURE — 74177 CT ABD & PELVIS W/CONTRAST: CPT

## 2024-09-20 PROCEDURE — 6370000000 HC RX 637 (ALT 250 FOR IP): Performed by: INTERNAL MEDICINE

## 2024-09-20 PROCEDURE — 80053 COMPREHEN METABOLIC PANEL: CPT

## 2024-09-20 PROCEDURE — 83010 ASSAY OF HAPTOGLOBIN QUANT: CPT

## 2024-09-20 PROCEDURE — 85025 COMPLETE CBC W/AUTO DIFF WBC: CPT

## 2024-09-20 PROCEDURE — 6360000004 HC RX CONTRAST MEDICATION: Performed by: RADIOLOGY

## 2024-09-20 PROCEDURE — 2580000003 HC RX 258: Performed by: SURGERY

## 2024-09-20 PROCEDURE — 6360000002 HC RX W HCPCS: Performed by: INTERNAL MEDICINE

## 2024-09-20 PROCEDURE — 83615 LACTATE (LD) (LDH) ENZYME: CPT

## 2024-09-20 PROCEDURE — 36415 COLL VENOUS BLD VENIPUNCTURE: CPT

## 2024-09-20 PROCEDURE — 1200000000 HC SEMI PRIVATE

## 2024-09-20 PROCEDURE — 6370000000 HC RX 637 (ALT 250 FOR IP): Performed by: SURGERY

## 2024-09-20 PROCEDURE — 83735 ASSAY OF MAGNESIUM: CPT

## 2024-09-20 PROCEDURE — 2580000003 HC RX 258: Performed by: INTERNAL MEDICINE

## 2024-09-20 PROCEDURE — 94640 AIRWAY INHALATION TREATMENT: CPT

## 2024-09-20 RX ORDER — SODIUM CHLORIDE 0.9 % (FLUSH) 0.9 %
10 SYRINGE (ML) INJECTION
Status: ACTIVE | OUTPATIENT
Start: 2024-09-20 | End: 2024-09-21

## 2024-09-20 RX ORDER — IOPAMIDOL 755 MG/ML
75 INJECTION, SOLUTION INTRAVASCULAR
Status: COMPLETED | OUTPATIENT
Start: 2024-09-20 | End: 2024-09-20

## 2024-09-20 RX ORDER — IOPAMIDOL 755 MG/ML
18 INJECTION, SOLUTION INTRAVASCULAR
Status: COMPLETED | OUTPATIENT
Start: 2024-09-20 | End: 2024-09-20

## 2024-09-20 RX ADMIN — BUDESONIDE 500 MCG: 0.5 INHALANT RESPIRATORY (INHALATION) at 07:18

## 2024-09-20 RX ADMIN — IOPAMIDOL 18 ML: 755 INJECTION, SOLUTION INTRAVENOUS at 10:06

## 2024-09-20 RX ADMIN — IPRATROPIUM BROMIDE AND ALBUTEROL SULFATE 1 DOSE: 2.5; .5 SOLUTION RESPIRATORY (INHALATION) at 19:09

## 2024-09-20 RX ADMIN — METOCLOPRAMIDE 5 MG: 5 TABLET ORAL at 20:51

## 2024-09-20 RX ADMIN — BUDESONIDE 500 MCG: 0.5 INHALANT RESPIRATORY (INHALATION) at 19:09

## 2024-09-20 RX ADMIN — ACETAMINOPHEN 650 MG: 325 TABLET ORAL at 00:03

## 2024-09-20 RX ADMIN — ARFORMOTEROL TARTRATE 15 MCG: 15 SOLUTION RESPIRATORY (INHALATION) at 19:09

## 2024-09-20 RX ADMIN — ARFORMOTEROL TARTRATE 15 MCG: 15 SOLUTION RESPIRATORY (INHALATION) at 07:18

## 2024-09-20 RX ADMIN — IOPAMIDOL 75 ML: 755 INJECTION, SOLUTION INTRAVENOUS at 10:08

## 2024-09-20 RX ADMIN — ACETAMINOPHEN 650 MG: 325 TABLET ORAL at 12:54

## 2024-09-20 RX ADMIN — IPRATROPIUM BROMIDE AND ALBUTEROL SULFATE 1 DOSE: 2.5; .5 SOLUTION RESPIRATORY (INHALATION) at 07:18

## 2024-09-20 RX ADMIN — PANCRELIPASE LIPASE, PANCRELIPASE PROTEASE, PANCRELIPASE AMYLASE 15000 UNITS: 15000; 47000; 63000 CAPSULE, DELAYED RELEASE ORAL at 16:55

## 2024-09-20 RX ADMIN — SODIUM CHLORIDE, PRESERVATIVE FREE 10 ML: 5 INJECTION INTRAVENOUS at 20:51

## 2024-09-20 RX ADMIN — PANCRELIPASE LIPASE, PANCRELIPASE PROTEASE, PANCRELIPASE AMYLASE 15000 UNITS: 15000; 47000; 63000 CAPSULE, DELAYED RELEASE ORAL at 12:54

## 2024-09-20 RX ADMIN — SODIUM CHLORIDE, PRESERVATIVE FREE 10 ML: 5 INJECTION INTRAVENOUS at 10:43

## 2024-09-20 RX ADMIN — METOCLOPRAMIDE 5 MG: 5 TABLET ORAL at 10:41

## 2024-09-20 RX ADMIN — SODIUM CHLORIDE, PRESERVATIVE FREE 40 MG: 5 INJECTION INTRAVENOUS at 20:51

## 2024-09-20 RX ADMIN — Medication 50 MG: at 10:43

## 2024-09-20 RX ADMIN — PANCRELIPASE LIPASE, PANCRELIPASE PROTEASE, PANCRELIPASE AMYLASE 20000 UNITS: 20000; 63000; 84000 CAPSULE, DELAYED RELEASE ORAL at 16:55

## 2024-09-20 RX ADMIN — PREDNISONE 40 MG: 20 TABLET ORAL at 10:41

## 2024-09-20 RX ADMIN — SODIUM CHLORIDE, PRESERVATIVE FREE 40 MG: 5 INJECTION INTRAVENOUS at 10:42

## 2024-09-20 RX ADMIN — IPRATROPIUM BROMIDE AND ALBUTEROL SULFATE 1 DOSE: 2.5; .5 SOLUTION RESPIRATORY (INHALATION) at 15:56

## 2024-09-20 RX ADMIN — PANCRELIPASE LIPASE, PANCRELIPASE PROTEASE, PANCRELIPASE AMYLASE 20000 UNITS: 20000; 63000; 84000 CAPSULE, DELAYED RELEASE ORAL at 12:54

## 2024-09-20 RX ADMIN — SODIUM CHLORIDE 125 MG: 9 INJECTION, SOLUTION INTRAVENOUS at 10:51

## 2024-09-20 ASSESSMENT — PAIN SCALES - GENERAL
PAINLEVEL_OUTOF10: 8
PAINLEVEL_OUTOF10: 5
PAINLEVEL_OUTOF10: 0

## 2024-09-21 ENCOUNTER — APPOINTMENT (OUTPATIENT)
Dept: GENERAL RADIOLOGY | Age: 69
DRG: 811 | End: 2024-09-21
Payer: MEDICARE

## 2024-09-21 LAB
ALBUMIN SERPL-MCNC: 3.5 G/DL (ref 3.5–5.2)
ALP SERPL-CCNC: 65 U/L (ref 35–104)
ALT SERPL-CCNC: 18 U/L (ref 0–32)
ANION GAP SERPL CALCULATED.3IONS-SCNC: 5 MMOL/L (ref 7–16)
AST SERPL-CCNC: 15 U/L (ref 0–31)
BASOPHILS # BLD: 0 K/UL (ref 0–0.2)
BASOPHILS NFR BLD: 0 % (ref 0–2)
BILIRUB SERPL-MCNC: 0.2 MG/DL (ref 0–1.2)
BUN SERPL-MCNC: 11 MG/DL (ref 6–23)
CALCIUM SERPL-MCNC: 9.8 MG/DL (ref 8.6–10.2)
CHLORIDE SERPL-SCNC: 95 MMOL/L (ref 98–107)
CO2 SERPL-SCNC: 45 MMOL/L (ref 22–29)
CREAT SERPL-MCNC: 0.6 MG/DL (ref 0.5–1)
EOSINOPHIL # BLD: 0 K/UL (ref 0.05–0.5)
EOSINOPHILS RELATIVE PERCENT: 0 % (ref 0–6)
ERYTHROCYTE [DISTWIDTH] IN BLOOD BY AUTOMATED COUNT: 21.1 % (ref 11.5–15)
GFR, ESTIMATED: >90 ML/MIN/1.73M2
GLUCOSE SERPL-MCNC: 123 MG/DL (ref 74–99)
HCT VFR BLD AUTO: 28.5 % (ref 34–48)
HGB BLD-MCNC: 7.8 G/DL (ref 11.5–15.5)
LYMPHOCYTES NFR BLD: 0.69 K/UL (ref 1.5–4)
LYMPHOCYTES RELATIVE PERCENT: 6 % (ref 20–42)
MCH RBC QN AUTO: 23.9 PG (ref 26–35)
MCHC RBC AUTO-ENTMCNC: 27.4 G/DL (ref 32–34.5)
MCV RBC AUTO: 87.2 FL (ref 80–99.9)
MONOCYTES NFR BLD: 0.29 K/UL (ref 0.1–0.95)
MONOCYTES NFR BLD: 3 % (ref 2–12)
NEUTROPHILS NFR BLD: 91 % (ref 43–80)
NEUTS SEG NFR BLD: 10.32 K/UL (ref 1.8–7.3)
NUCLEATED RED BLOOD CELLS: 2 PER 100 WBC
PLATELET # BLD AUTO: 151 K/UL (ref 130–450)
PMV BLD AUTO: 9.6 FL (ref 7–12)
POTASSIUM SERPL-SCNC: 3.6 MMOL/L (ref 3.5–5)
PROT SERPL-MCNC: 5.3 G/DL (ref 6.4–8.3)
RBC # BLD AUTO: 3.27 M/UL (ref 3.5–5.5)
RBC # BLD: ABNORMAL 10*6/UL
SODIUM SERPL-SCNC: 145 MMOL/L (ref 132–146)
WBC OTHER # BLD: 11.3 K/UL (ref 4.5–11.5)

## 2024-09-21 PROCEDURE — 94660 CPAP INITIATION&MGMT: CPT

## 2024-09-21 PROCEDURE — 6360000002 HC RX W HCPCS: Performed by: SURGERY

## 2024-09-21 PROCEDURE — 6370000000 HC RX 637 (ALT 250 FOR IP): Performed by: INTERNAL MEDICINE

## 2024-09-21 PROCEDURE — 6370000000 HC RX 637 (ALT 250 FOR IP): Performed by: SURGERY

## 2024-09-21 PROCEDURE — 85025 COMPLETE CBC W/AUTO DIFF WBC: CPT

## 2024-09-21 PROCEDURE — 6370000000 HC RX 637 (ALT 250 FOR IP): Performed by: NURSE PRACTITIONER

## 2024-09-21 PROCEDURE — 74018 RADEX ABDOMEN 1 VIEW: CPT

## 2024-09-21 PROCEDURE — 2580000003 HC RX 258: Performed by: INTERNAL MEDICINE

## 2024-09-21 PROCEDURE — 94640 AIRWAY INHALATION TREATMENT: CPT

## 2024-09-21 PROCEDURE — 2700000000 HC OXYGEN THERAPY PER DAY

## 2024-09-21 PROCEDURE — 6360000002 HC RX W HCPCS: Performed by: INTERNAL MEDICINE

## 2024-09-21 PROCEDURE — 1200000000 HC SEMI PRIVATE

## 2024-09-21 PROCEDURE — 2580000003 HC RX 258: Performed by: SURGERY

## 2024-09-21 PROCEDURE — 36415 COLL VENOUS BLD VENIPUNCTURE: CPT

## 2024-09-21 PROCEDURE — 80053 COMPREHEN METABOLIC PANEL: CPT

## 2024-09-21 RX ADMIN — PANCRELIPASE LIPASE, PANCRELIPASE PROTEASE, PANCRELIPASE AMYLASE 15000 UNITS: 15000; 47000; 63000 CAPSULE, DELAYED RELEASE ORAL at 12:10

## 2024-09-21 RX ADMIN — METOCLOPRAMIDE 5 MG: 5 TABLET ORAL at 08:39

## 2024-09-21 RX ADMIN — IPRATROPIUM BROMIDE AND ALBUTEROL SULFATE 1 DOSE: 2.5; .5 SOLUTION RESPIRATORY (INHALATION) at 08:15

## 2024-09-21 RX ADMIN — BUDESONIDE 500 MCG: 0.5 INHALANT RESPIRATORY (INHALATION) at 19:29

## 2024-09-21 RX ADMIN — SODIUM CHLORIDE, PRESERVATIVE FREE 40 MG: 5 INJECTION INTRAVENOUS at 20:01

## 2024-09-21 RX ADMIN — PANCRELIPASE LIPASE, PANCRELIPASE PROTEASE, PANCRELIPASE AMYLASE 20000 UNITS: 20000; 63000; 84000 CAPSULE, DELAYED RELEASE ORAL at 17:27

## 2024-09-21 RX ADMIN — SODIUM CHLORIDE, PRESERVATIVE FREE 10 ML: 5 INJECTION INTRAVENOUS at 08:39

## 2024-09-21 RX ADMIN — SODIUM CHLORIDE, PRESERVATIVE FREE 10 ML: 5 INJECTION INTRAVENOUS at 20:01

## 2024-09-21 RX ADMIN — ARFORMOTEROL TARTRATE 15 MCG: 15 SOLUTION RESPIRATORY (INHALATION) at 08:15

## 2024-09-21 RX ADMIN — IPRATROPIUM BROMIDE AND ALBUTEROL SULFATE 1 DOSE: 2.5; .5 SOLUTION RESPIRATORY (INHALATION) at 11:47

## 2024-09-21 RX ADMIN — PANCRELIPASE LIPASE, PANCRELIPASE PROTEASE, PANCRELIPASE AMYLASE 15000 UNITS: 15000; 47000; 63000 CAPSULE, DELAYED RELEASE ORAL at 17:27

## 2024-09-21 RX ADMIN — IPRATROPIUM BROMIDE AND ALBUTEROL SULFATE 1 DOSE: 2.5; .5 SOLUTION RESPIRATORY (INHALATION) at 19:29

## 2024-09-21 RX ADMIN — ACETAMINOPHEN 650 MG: 325 TABLET ORAL at 13:58

## 2024-09-21 RX ADMIN — PANCRELIPASE LIPASE, PANCRELIPASE PROTEASE, PANCRELIPASE AMYLASE 15000 UNITS: 15000; 47000; 63000 CAPSULE, DELAYED RELEASE ORAL at 08:40

## 2024-09-21 RX ADMIN — IPRATROPIUM BROMIDE AND ALBUTEROL SULFATE 1 DOSE: 2.5; .5 SOLUTION RESPIRATORY (INHALATION) at 15:41

## 2024-09-21 RX ADMIN — SODIUM CHLORIDE 125 MG: 9 INJECTION, SOLUTION INTRAVENOUS at 08:46

## 2024-09-21 RX ADMIN — PREDNISONE 40 MG: 20 TABLET ORAL at 08:38

## 2024-09-21 RX ADMIN — Medication 50 MG: at 08:40

## 2024-09-21 RX ADMIN — PANCRELIPASE LIPASE, PANCRELIPASE PROTEASE, PANCRELIPASE AMYLASE 20000 UNITS: 20000; 63000; 84000 CAPSULE, DELAYED RELEASE ORAL at 12:10

## 2024-09-21 RX ADMIN — ACETAMINOPHEN 650 MG: 325 TABLET ORAL at 08:12

## 2024-09-21 RX ADMIN — PANCRELIPASE LIPASE, PANCRELIPASE PROTEASE, PANCRELIPASE AMYLASE 20000 UNITS: 20000; 63000; 84000 CAPSULE, DELAYED RELEASE ORAL at 08:39

## 2024-09-21 RX ADMIN — SODIUM CHLORIDE, PRESERVATIVE FREE 40 MG: 5 INJECTION INTRAVENOUS at 08:38

## 2024-09-21 RX ADMIN — METOCLOPRAMIDE 5 MG: 5 TABLET ORAL at 20:01

## 2024-09-21 RX ADMIN — BUDESONIDE 500 MCG: 0.5 INHALANT RESPIRATORY (INHALATION) at 08:15

## 2024-09-21 RX ADMIN — ARFORMOTEROL TARTRATE 15 MCG: 15 SOLUTION RESPIRATORY (INHALATION) at 19:28

## 2024-09-21 ASSESSMENT — PAIN SCALES - GENERAL
PAINLEVEL_OUTOF10: 5
PAINLEVEL_OUTOF10: 6
PAINLEVEL_OUTOF10: 5
PAINLEVEL_OUTOF10: 0
PAINLEVEL_OUTOF10: 5
PAINLEVEL_OUTOF10: 7

## 2024-09-21 ASSESSMENT — PAIN SCALES - WONG BAKER
WONGBAKER_NUMERICALRESPONSE: NO HURT
WONGBAKER_NUMERICALRESPONSE: HURTS A LITTLE BIT
WONGBAKER_NUMERICALRESPONSE: NO HURT
WONGBAKER_NUMERICALRESPONSE: HURTS A LITTLE BIT

## 2024-09-21 ASSESSMENT — PAIN DESCRIPTION - DESCRIPTORS
DESCRIPTORS: ACHING;CRAMPING;DISCOMFORT;DULL
DESCRIPTORS: ACHING;DULL;DISCOMFORT;CRAMPING

## 2024-09-21 ASSESSMENT — PAIN - FUNCTIONAL ASSESSMENT: PAIN_FUNCTIONAL_ASSESSMENT: ACTIVITIES ARE NOT PREVENTED

## 2024-09-21 ASSESSMENT — PAIN DESCRIPTION - LOCATION
LOCATION: ABDOMEN

## 2024-09-21 ASSESSMENT — PAIN DESCRIPTION - ORIENTATION
ORIENTATION: RIGHT;LOWER;MID
ORIENTATION: RIGHT;LEFT;MID

## 2024-09-22 LAB
BASOPHILS # BLD: 0.09 K/UL (ref 0–0.2)
BASOPHILS NFR BLD: 1 % (ref 0–2)
EOSINOPHIL # BLD: 0 K/UL (ref 0.05–0.5)
EOSINOPHILS RELATIVE PERCENT: 0 % (ref 0–6)
ERYTHROCYTE [DISTWIDTH] IN BLOOD BY AUTOMATED COUNT: 22 % (ref 11.5–15)
HCT VFR BLD AUTO: 28.6 % (ref 34–48)
HGB BLD-MCNC: 7.9 G/DL (ref 11.5–15.5)
LYMPHOCYTES NFR BLD: 0.82 K/UL (ref 1.5–4)
LYMPHOCYTES RELATIVE PERCENT: 8 % (ref 20–42)
MCH RBC QN AUTO: 24.3 PG (ref 26–35)
MCHC RBC AUTO-ENTMCNC: 27.6 G/DL (ref 32–34.5)
MCV RBC AUTO: 88 FL (ref 80–99.9)
MONOCYTES NFR BLD: 0.46 K/UL (ref 0.1–0.95)
MONOCYTES NFR BLD: 4 % (ref 2–12)
MYELOCYTES ABSOLUTE COUNT: 0.18 K/UL
MYELOCYTES: 2 %
NEUTROPHILS NFR BLD: 85 % (ref 43–80)
NEUTS SEG NFR BLD: 8.75 K/UL (ref 1.8–7.3)
NUCLEATED RED BLOOD CELLS: 4 PER 100 WBC
PLATELET # BLD AUTO: 213 K/UL (ref 130–450)
PMV BLD AUTO: 9.6 FL (ref 7–12)
RBC # BLD AUTO: 3.25 M/UL (ref 3.5–5.5)
RBC # BLD: ABNORMAL 10*6/UL
WBC OTHER # BLD: 10.3 K/UL (ref 4.5–11.5)

## 2024-09-22 PROCEDURE — 2580000003 HC RX 258: Performed by: SURGERY

## 2024-09-22 PROCEDURE — 85025 COMPLETE CBC W/AUTO DIFF WBC: CPT

## 2024-09-22 PROCEDURE — 6370000000 HC RX 637 (ALT 250 FOR IP): Performed by: INTERNAL MEDICINE

## 2024-09-22 PROCEDURE — 6360000002 HC RX W HCPCS: Performed by: SURGERY

## 2024-09-22 PROCEDURE — 6370000000 HC RX 637 (ALT 250 FOR IP): Performed by: SURGERY

## 2024-09-22 PROCEDURE — 6370000000 HC RX 637 (ALT 250 FOR IP): Performed by: NURSE PRACTITIONER

## 2024-09-22 PROCEDURE — 1200000000 HC SEMI PRIVATE

## 2024-09-22 PROCEDURE — 2700000000 HC OXYGEN THERAPY PER DAY

## 2024-09-22 PROCEDURE — 94660 CPAP INITIATION&MGMT: CPT

## 2024-09-22 PROCEDURE — 94640 AIRWAY INHALATION TREATMENT: CPT

## 2024-09-22 PROCEDURE — 36415 COLL VENOUS BLD VENIPUNCTURE: CPT

## 2024-09-22 RX ADMIN — Medication 50 MG: at 08:03

## 2024-09-22 RX ADMIN — PANCRELIPASE LIPASE, PANCRELIPASE PROTEASE, PANCRELIPASE AMYLASE 15000 UNITS: 15000; 47000; 63000 CAPSULE, DELAYED RELEASE ORAL at 12:01

## 2024-09-22 RX ADMIN — SODIUM CHLORIDE, PRESERVATIVE FREE 40 MG: 5 INJECTION INTRAVENOUS at 08:02

## 2024-09-22 RX ADMIN — IPRATROPIUM BROMIDE AND ALBUTEROL SULFATE 1 DOSE: 2.5; .5 SOLUTION RESPIRATORY (INHALATION) at 12:42

## 2024-09-22 RX ADMIN — PANCRELIPASE LIPASE, PANCRELIPASE PROTEASE, PANCRELIPASE AMYLASE 15000 UNITS: 15000; 47000; 63000 CAPSULE, DELAYED RELEASE ORAL at 08:03

## 2024-09-22 RX ADMIN — ARFORMOTEROL TARTRATE 15 MCG: 15 SOLUTION RESPIRATORY (INHALATION) at 19:31

## 2024-09-22 RX ADMIN — METOCLOPRAMIDE 5 MG: 5 TABLET ORAL at 08:03

## 2024-09-22 RX ADMIN — METOCLOPRAMIDE 5 MG: 5 TABLET ORAL at 20:02

## 2024-09-22 RX ADMIN — SODIUM CHLORIDE, PRESERVATIVE FREE 10 ML: 5 INJECTION INTRAVENOUS at 20:02

## 2024-09-22 RX ADMIN — IPRATROPIUM BROMIDE AND ALBUTEROL SULFATE 1 DOSE: 2.5; .5 SOLUTION RESPIRATORY (INHALATION) at 15:59

## 2024-09-22 RX ADMIN — IPRATROPIUM BROMIDE AND ALBUTEROL SULFATE 1 DOSE: 2.5; .5 SOLUTION RESPIRATORY (INHALATION) at 08:33

## 2024-09-22 RX ADMIN — ARFORMOTEROL TARTRATE 15 MCG: 15 SOLUTION RESPIRATORY (INHALATION) at 08:33

## 2024-09-22 RX ADMIN — SODIUM CHLORIDE, PRESERVATIVE FREE 10 ML: 5 INJECTION INTRAVENOUS at 08:04

## 2024-09-22 RX ADMIN — IPRATROPIUM BROMIDE AND ALBUTEROL SULFATE 1 DOSE: 2.5; .5 SOLUTION RESPIRATORY (INHALATION) at 19:31

## 2024-09-22 RX ADMIN — BUDESONIDE 500 MCG: 0.5 INHALANT RESPIRATORY (INHALATION) at 19:31

## 2024-09-22 RX ADMIN — PREDNISONE 40 MG: 20 TABLET ORAL at 08:02

## 2024-09-22 RX ADMIN — PANCRELIPASE LIPASE, PANCRELIPASE PROTEASE, PANCRELIPASE AMYLASE 20000 UNITS: 20000; 63000; 84000 CAPSULE, DELAYED RELEASE ORAL at 08:03

## 2024-09-22 RX ADMIN — SODIUM CHLORIDE, PRESERVATIVE FREE 40 MG: 5 INJECTION INTRAVENOUS at 20:02

## 2024-09-22 RX ADMIN — PANCRELIPASE LIPASE, PANCRELIPASE PROTEASE, PANCRELIPASE AMYLASE 15000 UNITS: 15000; 47000; 63000 CAPSULE, DELAYED RELEASE ORAL at 16:48

## 2024-09-22 RX ADMIN — BUDESONIDE 500 MCG: 0.5 INHALANT RESPIRATORY (INHALATION) at 08:33

## 2024-09-22 RX ADMIN — PANCRELIPASE LIPASE, PANCRELIPASE PROTEASE, PANCRELIPASE AMYLASE 20000 UNITS: 20000; 63000; 84000 CAPSULE, DELAYED RELEASE ORAL at 12:01

## 2024-09-22 RX ADMIN — PANCRELIPASE LIPASE, PANCRELIPASE PROTEASE, PANCRELIPASE AMYLASE 20000 UNITS: 20000; 63000; 84000 CAPSULE, DELAYED RELEASE ORAL at 16:48

## 2024-09-22 RX ADMIN — POLYETHYLENE GLYCOL 3350 17 GRAM ORAL POWDER PACKET 17 G: at 08:03

## 2024-09-22 RX ADMIN — ACETAMINOPHEN 650 MG: 325 TABLET ORAL at 12:01

## 2024-09-22 ASSESSMENT — PAIN SCALES - WONG BAKER
WONGBAKER_NUMERICALRESPONSE: NO HURT
WONGBAKER_NUMERICALRESPONSE: HURTS A LITTLE BIT
WONGBAKER_NUMERICALRESPONSE: HURTS A LITTLE BIT

## 2024-09-22 ASSESSMENT — PAIN DESCRIPTION - DESCRIPTORS: DESCRIPTORS: ACHING;DISCOMFORT;CRAMPING;DULL

## 2024-09-22 ASSESSMENT — PAIN - FUNCTIONAL ASSESSMENT: PAIN_FUNCTIONAL_ASSESSMENT: ACTIVITIES ARE NOT PREVENTED

## 2024-09-22 ASSESSMENT — PAIN SCALES - GENERAL
PAINLEVEL_OUTOF10: 0
PAINLEVEL_OUTOF10: 5
PAINLEVEL_OUTOF10: 4
PAINLEVEL_OUTOF10: 6

## 2024-09-22 ASSESSMENT — PAIN DESCRIPTION - LOCATION
LOCATION: ABDOMEN
LOCATION: ABDOMEN

## 2024-09-22 ASSESSMENT — PAIN DESCRIPTION - ORIENTATION: ORIENTATION: RIGHT;LEFT;LOWER;MID

## 2024-09-23 ENCOUNTER — APPOINTMENT (OUTPATIENT)
Dept: GENERAL RADIOLOGY | Age: 69
DRG: 811 | End: 2024-09-23
Payer: MEDICARE

## 2024-09-23 LAB
ANION GAP SERPL CALCULATED.3IONS-SCNC: 9 MMOL/L (ref 7–16)
B.E.: 7.5 MMOL/L (ref -3–3)
BASOPHILS # BLD: 0.03 K/UL (ref 0–0.2)
BASOPHILS NFR BLD: 0 % (ref 0–2)
BUN SERPL-MCNC: 12 MG/DL (ref 6–23)
CALCIUM SERPL-MCNC: 10.2 MG/DL (ref 8.6–10.2)
CHLORIDE SERPL-SCNC: 94 MMOL/L (ref 98–107)
CO2 SERPL-SCNC: 41 MMOL/L (ref 22–29)
COHB: 0.9 % (ref 0–1.5)
CREAT SERPL-MCNC: 0.6 MG/DL (ref 0.5–1)
CRITICAL: ABNORMAL
DATE ANALYZED: ABNORMAL
DATE OF COLLECTION: ABNORMAL
EOSINOPHIL # BLD: 0.05 K/UL (ref 0.05–0.5)
EOSINOPHILS RELATIVE PERCENT: 1 % (ref 0–6)
ERYTHROCYTE [DISTWIDTH] IN BLOOD BY AUTOMATED COUNT: 23 % (ref 11.5–15)
GFR, ESTIMATED: >90 ML/MIN/1.73M2
GLUCOSE SERPL-MCNC: 120 MG/DL (ref 74–99)
HCO3: 34.4 MMOL/L (ref 22–26)
HCT VFR BLD AUTO: 29.9 % (ref 34–48)
HGB BLD-MCNC: 8.3 G/DL (ref 11.5–15.5)
HHB: 3.4 % (ref 0–5)
IMM GRANULOCYTES # BLD AUTO: 0.29 K/UL (ref 0–0.58)
IMM GRANULOCYTES NFR BLD: 3 % (ref 0–5)
LAB: ABNORMAL
LYMPHOCYTES NFR BLD: 1.46 K/UL (ref 1.5–4)
LYMPHOCYTES RELATIVE PERCENT: 15 % (ref 20–42)
Lab: 1505
MCH RBC QN AUTO: 24.6 PG (ref 26–35)
MCHC RBC AUTO-ENTMCNC: 27.8 G/DL (ref 32–34.5)
MCV RBC AUTO: 88.7 FL (ref 80–99.9)
METHB: 0.5 % (ref 0–1.5)
MODE: ABNORMAL
MONOCYTES NFR BLD: 0.61 K/UL (ref 0.1–0.95)
MONOCYTES NFR BLD: 6 % (ref 2–12)
NEUTROPHILS NFR BLD: 76 % (ref 43–80)
NEUTS SEG NFR BLD: 7.48 K/UL (ref 1.8–7.3)
O2 SATURATION: 96.6 % (ref 92–98.5)
O2HB: 95.2 % (ref 94–97)
OPERATOR ID: 2067
PATIENT TEMP: 37 C
PCO2: 62.6 MMHG (ref 35–45)
PH BLOOD GAS: 7.36 (ref 7.35–7.45)
PLATELET # BLD AUTO: 287 K/UL (ref 130–450)
PMV BLD AUTO: 10.1 FL (ref 7–12)
PO2: 92.1 MMHG (ref 75–100)
POTASSIUM SERPL-SCNC: 3.7 MMOL/L (ref 3.5–5)
RBC # BLD AUTO: 3.37 M/UL (ref 3.5–5.5)
RBC # BLD: ABNORMAL 10*6/UL
SODIUM SERPL-SCNC: 144 MMOL/L (ref 132–146)
SOURCE, BLOOD GAS: ABNORMAL
THB: 9.8 G/DL (ref 11.5–16.5)
TIME ANALYZED: 1508
WBC OTHER # BLD: 9.9 K/UL (ref 4.5–11.5)

## 2024-09-23 PROCEDURE — 6370000000 HC RX 637 (ALT 250 FOR IP): Performed by: SURGERY

## 2024-09-23 PROCEDURE — 6360000002 HC RX W HCPCS: Performed by: SURGERY

## 2024-09-23 PROCEDURE — 6370000000 HC RX 637 (ALT 250 FOR IP): Performed by: NURSE PRACTITIONER

## 2024-09-23 PROCEDURE — 1200000000 HC SEMI PRIVATE

## 2024-09-23 PROCEDURE — 36415 COLL VENOUS BLD VENIPUNCTURE: CPT

## 2024-09-23 PROCEDURE — 2700000000 HC OXYGEN THERAPY PER DAY

## 2024-09-23 PROCEDURE — 6370000000 HC RX 637 (ALT 250 FOR IP): Performed by: INTERNAL MEDICINE

## 2024-09-23 PROCEDURE — 80048 BASIC METABOLIC PNL TOTAL CA: CPT

## 2024-09-23 PROCEDURE — 82805 BLOOD GASES W/O2 SATURATION: CPT

## 2024-09-23 PROCEDURE — 74018 RADEX ABDOMEN 1 VIEW: CPT

## 2024-09-23 PROCEDURE — 74430 CONTRAST X-RAY BLADDER: CPT

## 2024-09-23 PROCEDURE — 85025 COMPLETE CBC W/AUTO DIFF WBC: CPT

## 2024-09-23 PROCEDURE — 2580000003 HC RX 258: Performed by: SURGERY

## 2024-09-23 PROCEDURE — 6360000004 HC RX CONTRAST MEDICATION: Performed by: PHYSICIAN ASSISTANT

## 2024-09-23 PROCEDURE — 94640 AIRWAY INHALATION TREATMENT: CPT

## 2024-09-23 PROCEDURE — 94660 CPAP INITIATION&MGMT: CPT

## 2024-09-23 RX ADMIN — SODIUM CHLORIDE, PRESERVATIVE FREE 40 MG: 5 INJECTION INTRAVENOUS at 20:01

## 2024-09-23 RX ADMIN — IOTHALAMATE MEGLUMINE 250 ML: 172 INJECTION URETERAL at 14:51

## 2024-09-23 RX ADMIN — BUDESONIDE 500 MCG: 0.5 INHALANT RESPIRATORY (INHALATION) at 20:22

## 2024-09-23 RX ADMIN — METOCLOPRAMIDE 5 MG: 5 TABLET ORAL at 19:59

## 2024-09-23 RX ADMIN — IPRATROPIUM BROMIDE AND ALBUTEROL SULFATE 1 DOSE: 2.5; .5 SOLUTION RESPIRATORY (INHALATION) at 15:53

## 2024-09-23 RX ADMIN — IPRATROPIUM BROMIDE AND ALBUTEROL SULFATE 1 DOSE: 2.5; .5 SOLUTION RESPIRATORY (INHALATION) at 12:18

## 2024-09-23 RX ADMIN — Medication 50 MG: at 08:11

## 2024-09-23 RX ADMIN — PANCRELIPASE LIPASE, PANCRELIPASE PROTEASE, PANCRELIPASE AMYLASE 20000 UNITS: 20000; 63000; 84000 CAPSULE, DELAYED RELEASE ORAL at 13:49

## 2024-09-23 RX ADMIN — SODIUM CHLORIDE, PRESERVATIVE FREE 10 ML: 5 INJECTION INTRAVENOUS at 08:12

## 2024-09-23 RX ADMIN — PANCRELIPASE LIPASE, PANCRELIPASE PROTEASE, PANCRELIPASE AMYLASE 15000 UNITS: 15000; 47000; 63000 CAPSULE, DELAYED RELEASE ORAL at 13:49

## 2024-09-23 RX ADMIN — SODIUM CHLORIDE, PRESERVATIVE FREE 40 MG: 5 INJECTION INTRAVENOUS at 08:12

## 2024-09-23 RX ADMIN — METOCLOPRAMIDE 5 MG: 5 TABLET ORAL at 08:11

## 2024-09-23 RX ADMIN — SODIUM CHLORIDE, PRESERVATIVE FREE 10 ML: 5 INJECTION INTRAVENOUS at 20:01

## 2024-09-23 RX ADMIN — PREDNISONE 40 MG: 20 TABLET ORAL at 08:11

## 2024-09-23 RX ADMIN — PANCRELIPASE LIPASE, PANCRELIPASE PROTEASE, PANCRELIPASE AMYLASE 15000 UNITS: 15000; 47000; 63000 CAPSULE, DELAYED RELEASE ORAL at 08:11

## 2024-09-23 RX ADMIN — IPRATROPIUM BROMIDE AND ALBUTEROL SULFATE 1 DOSE: 2.5; .5 SOLUTION RESPIRATORY (INHALATION) at 20:22

## 2024-09-23 RX ADMIN — PANCRELIPASE LIPASE, PANCRELIPASE PROTEASE, PANCRELIPASE AMYLASE 20000 UNITS: 20000; 63000; 84000 CAPSULE, DELAYED RELEASE ORAL at 08:11

## 2024-09-23 RX ADMIN — ARFORMOTEROL TARTRATE 15 MCG: 15 SOLUTION RESPIRATORY (INHALATION) at 10:11

## 2024-09-23 RX ADMIN — PANCRELIPASE LIPASE, PANCRELIPASE PROTEASE, PANCRELIPASE AMYLASE 15000 UNITS: 15000; 47000; 63000 CAPSULE, DELAYED RELEASE ORAL at 17:13

## 2024-09-23 RX ADMIN — ACETAMINOPHEN 650 MG: 325 TABLET ORAL at 12:20

## 2024-09-23 RX ADMIN — BUDESONIDE 500 MCG: 0.5 INHALANT RESPIRATORY (INHALATION) at 10:11

## 2024-09-23 RX ADMIN — IPRATROPIUM BROMIDE AND ALBUTEROL SULFATE 1 DOSE: 2.5; .5 SOLUTION RESPIRATORY (INHALATION) at 10:11

## 2024-09-23 RX ADMIN — PANCRELIPASE LIPASE, PANCRELIPASE PROTEASE, PANCRELIPASE AMYLASE 20000 UNITS: 20000; 63000; 84000 CAPSULE, DELAYED RELEASE ORAL at 17:13

## 2024-09-23 RX ADMIN — ARFORMOTEROL TARTRATE 15 MCG: 15 SOLUTION RESPIRATORY (INHALATION) at 20:22

## 2024-09-23 ASSESSMENT — PAIN SCALES - GENERAL: PAINLEVEL_OUTOF10: 3

## 2024-09-24 VITALS
RESPIRATION RATE: 18 BRPM | HEART RATE: 90 BPM | WEIGHT: 153.5 LBS | OXYGEN SATURATION: 97 % | TEMPERATURE: 97.6 F | BODY MASS INDEX: 24.09 KG/M2 | DIASTOLIC BLOOD PRESSURE: 68 MMHG | HEIGHT: 67 IN | SYSTOLIC BLOOD PRESSURE: 133 MMHG

## 2024-09-24 LAB
BASOPHILS # BLD: 0 K/UL (ref 0–0.2)
BASOPHILS NFR BLD: 0 % (ref 0–2)
EOSINOPHIL # BLD: 0 K/UL (ref 0.05–0.5)
EOSINOPHILS RELATIVE PERCENT: 0 % (ref 0–6)
ERYTHROCYTE [DISTWIDTH] IN BLOOD BY AUTOMATED COUNT: 23.8 % (ref 11.5–15)
HCT VFR BLD AUTO: 29.9 % (ref 34–48)
HGB BLD-MCNC: 8.1 G/DL (ref 11.5–15.5)
LYMPHOCYTES NFR BLD: 0.95 K/UL (ref 1.5–4)
LYMPHOCYTES RELATIVE PERCENT: 8 % (ref 20–42)
MCH RBC QN AUTO: 24.4 PG (ref 26–35)
MCHC RBC AUTO-ENTMCNC: 27.1 G/DL (ref 32–34.5)
MCV RBC AUTO: 90.1 FL (ref 80–99.9)
MONOCYTES NFR BLD: 0.63 K/UL (ref 0.1–0.95)
MONOCYTES NFR BLD: 5 % (ref 2–12)
NEUTROPHILS NFR BLD: 87 % (ref 43–80)
NEUTS SEG NFR BLD: 10.32 K/UL (ref 1.8–7.3)
NUCLEATED RED BLOOD CELLS: 1 PER 100 WBC
PLATELET # BLD AUTO: 344 K/UL (ref 130–450)
PMV BLD AUTO: 9.5 FL (ref 7–12)
RBC # BLD AUTO: 3.32 M/UL (ref 3.5–5.5)
RBC # BLD: ABNORMAL 10*6/UL
WBC OTHER # BLD: 11.9 K/UL (ref 4.5–11.5)

## 2024-09-24 PROCEDURE — 94640 AIRWAY INHALATION TREATMENT: CPT

## 2024-09-24 PROCEDURE — 6360000002 HC RX W HCPCS: Performed by: SURGERY

## 2024-09-24 PROCEDURE — 94660 CPAP INITIATION&MGMT: CPT

## 2024-09-24 PROCEDURE — 2700000000 HC OXYGEN THERAPY PER DAY

## 2024-09-24 PROCEDURE — 85025 COMPLETE CBC W/AUTO DIFF WBC: CPT

## 2024-09-24 PROCEDURE — 6370000000 HC RX 637 (ALT 250 FOR IP): Performed by: INTERNAL MEDICINE

## 2024-09-24 PROCEDURE — 99232 SBSQ HOSP IP/OBS MODERATE 35: CPT | Performed by: INTERNAL MEDICINE

## 2024-09-24 PROCEDURE — 6370000000 HC RX 637 (ALT 250 FOR IP): Performed by: SURGERY

## 2024-09-24 PROCEDURE — 6370000000 HC RX 637 (ALT 250 FOR IP): Performed by: NURSE PRACTITIONER

## 2024-09-24 PROCEDURE — 2580000003 HC RX 258: Performed by: SURGERY

## 2024-09-24 PROCEDURE — 36415 COLL VENOUS BLD VENIPUNCTURE: CPT

## 2024-09-24 RX ORDER — METOCLOPRAMIDE 5 MG/1
5 TABLET ORAL 2 TIMES DAILY
Qty: 120 TABLET | Refills: 0 | Status: SHIPPED | OUTPATIENT
Start: 2024-09-24

## 2024-09-24 RX ORDER — POLYETHYLENE GLYCOL 3350 17 G/17G
17 POWDER, FOR SOLUTION ORAL DAILY
Qty: 30 PACKET | Refills: 0 | Status: SHIPPED | OUTPATIENT
Start: 2024-09-25 | End: 2024-10-25

## 2024-09-24 RX ADMIN — Medication 50 MG: at 09:43

## 2024-09-24 RX ADMIN — PREDNISONE 40 MG: 20 TABLET ORAL at 09:43

## 2024-09-24 RX ADMIN — ARFORMOTEROL TARTRATE 15 MCG: 15 SOLUTION RESPIRATORY (INHALATION) at 18:49

## 2024-09-24 RX ADMIN — SODIUM CHLORIDE, PRESERVATIVE FREE 10 ML: 5 INJECTION INTRAVENOUS at 09:45

## 2024-09-24 RX ADMIN — ACETAMINOPHEN 650 MG: 325 TABLET ORAL at 12:00

## 2024-09-24 RX ADMIN — METOCLOPRAMIDE 5 MG: 5 TABLET ORAL at 09:43

## 2024-09-24 RX ADMIN — ARFORMOTEROL TARTRATE 15 MCG: 15 SOLUTION RESPIRATORY (INHALATION) at 08:04

## 2024-09-24 RX ADMIN — IPRATROPIUM BROMIDE AND ALBUTEROL SULFATE 1 DOSE: 2.5; .5 SOLUTION RESPIRATORY (INHALATION) at 08:03

## 2024-09-24 RX ADMIN — PANCRELIPASE LIPASE, PANCRELIPASE PROTEASE, PANCRELIPASE AMYLASE 15000 UNITS: 15000; 47000; 63000 CAPSULE, DELAYED RELEASE ORAL at 12:03

## 2024-09-24 RX ADMIN — BUDESONIDE 500 MCG: 0.5 INHALANT RESPIRATORY (INHALATION) at 18:49

## 2024-09-24 RX ADMIN — IPRATROPIUM BROMIDE AND ALBUTEROL SULFATE 1 DOSE: 2.5; .5 SOLUTION RESPIRATORY (INHALATION) at 15:38

## 2024-09-24 RX ADMIN — PANCRELIPASE LIPASE, PANCRELIPASE PROTEASE, PANCRELIPASE AMYLASE 15000 UNITS: 15000; 47000; 63000 CAPSULE, DELAYED RELEASE ORAL at 09:44

## 2024-09-24 RX ADMIN — PANCRELIPASE LIPASE, PANCRELIPASE PROTEASE, PANCRELIPASE AMYLASE 20000 UNITS: 20000; 63000; 84000 CAPSULE, DELAYED RELEASE ORAL at 12:03

## 2024-09-24 RX ADMIN — PANCRELIPASE LIPASE, PANCRELIPASE PROTEASE, PANCRELIPASE AMYLASE 20000 UNITS: 20000; 63000; 84000 CAPSULE, DELAYED RELEASE ORAL at 09:44

## 2024-09-24 RX ADMIN — BUDESONIDE 500 MCG: 0.5 INHALANT RESPIRATORY (INHALATION) at 08:04

## 2024-09-24 RX ADMIN — SODIUM CHLORIDE, PRESERVATIVE FREE 40 MG: 5 INJECTION INTRAVENOUS at 09:43

## 2024-09-24 RX ADMIN — IPRATROPIUM BROMIDE AND ALBUTEROL SULFATE 1 DOSE: 2.5; .5 SOLUTION RESPIRATORY (INHALATION) at 18:49

## 2024-09-24 RX ADMIN — PANCRELIPASE LIPASE, PANCRELIPASE PROTEASE, PANCRELIPASE AMYLASE 20000 UNITS: 20000; 63000; 84000 CAPSULE, DELAYED RELEASE ORAL at 17:17

## 2024-09-24 RX ADMIN — PANCRELIPASE LIPASE, PANCRELIPASE PROTEASE, PANCRELIPASE AMYLASE 15000 UNITS: 15000; 47000; 63000 CAPSULE, DELAYED RELEASE ORAL at 17:17

## 2024-09-24 RX ADMIN — IPRATROPIUM BROMIDE AND ALBUTEROL SULFATE 1 DOSE: 2.5; .5 SOLUTION RESPIRATORY (INHALATION) at 11:17

## 2024-09-24 ASSESSMENT — PAIN DESCRIPTION - DESCRIPTORS: DESCRIPTORS: SORE;DISCOMFORT;PRESSURE

## 2024-09-24 ASSESSMENT — PAIN SCALES - GENERAL
PAINLEVEL_OUTOF10: 0
PAINLEVEL_OUTOF10: 8
PAINLEVEL_OUTOF10: 0
PAINLEVEL_OUTOF10: 0

## 2024-09-24 ASSESSMENT — PAIN DESCRIPTION - LOCATION: LOCATION: ABDOMEN

## 2024-10-04 LAB
ALBUMIN SERPL-MCNC: 4.2 G/DL (ref 3.5–5.2)
ALP SERPL-CCNC: 60 U/L (ref 35–104)
ALT SERPL-CCNC: 15 U/L (ref 0–32)
ANION GAP SERPL CALCULATED.3IONS-SCNC: 6 MMOL/L (ref 7–16)
AST SERPL-CCNC: 12 U/L (ref 0–31)
BACTERIA URNS QL MICRO: ABNORMAL
BASOPHILS # BLD: 0.07 K/UL (ref 0–0.2)
BASOPHILS NFR BLD: 1 % (ref 0–2)
BILIRUB SERPL-MCNC: 0.2 MG/DL (ref 0–1.2)
BILIRUB UR QL STRIP: NEGATIVE
BUN SERPL-MCNC: 20 MG/DL (ref 6–23)
CALCIUM SERPL-MCNC: 10.2 MG/DL (ref 8.6–10.2)
CHLORIDE SERPL-SCNC: 98 MMOL/L (ref 98–107)
CHOLEST SERPL-MCNC: 218 MG/DL
CLARITY UR: ABNORMAL
CO2 SERPL-SCNC: 40 MMOL/L (ref 22–29)
COLOR UR: YELLOW
CREAT SERPL-MCNC: 0.6 MG/DL (ref 0.5–1)
CRYSTALS URNS MICRO: ABNORMAL /HPF
EOSINOPHIL # BLD: 0 K/UL (ref 0.05–0.5)
EOSINOPHILS RELATIVE PERCENT: 0 % (ref 0–6)
ERYTHROCYTE [DISTWIDTH] IN BLOOD BY AUTOMATED COUNT: 23.4 % (ref 11.5–15)
GFR, ESTIMATED: >90 ML/MIN/1.73M2
GLUCOSE SERPL-MCNC: 124 MG/DL (ref 74–99)
GLUCOSE UR STRIP-MCNC: NEGATIVE MG/DL
HBA1C MFR BLD: 5.9 % (ref 4–5.6)
HCT VFR BLD AUTO: 33 % (ref 34–48)
HDLC SERPL-MCNC: 130 MG/DL
HGB BLD-MCNC: 9.5 G/DL (ref 11.5–15.5)
HGB UR QL STRIP.AUTO: ABNORMAL
KETONES UR STRIP-MCNC: NEGATIVE MG/DL
LDLC SERPL CALC-MCNC: 70 MG/DL
LEUKOCYTE ESTERASE UR QL STRIP: ABNORMAL
LYMPHOCYTES NFR BLD: 1.69 K/UL (ref 1.5–4)
LYMPHOCYTES RELATIVE PERCENT: 20 % (ref 20–42)
MCH RBC QN AUTO: 27.1 PG (ref 26–35)
MCHC RBC AUTO-ENTMCNC: 28.8 G/DL (ref 32–34.5)
MCV RBC AUTO: 94.3 FL (ref 80–99.9)
MONOCYTES NFR BLD: 0.51 K/UL (ref 0.1–0.95)
MONOCYTES NFR BLD: 6 % (ref 2–12)
NEUTROPHILS NFR BLD: 73 % (ref 43–80)
NEUTS SEG NFR BLD: 6.02 K/UL (ref 1.8–7.3)
NITRITE UR QL STRIP: POSITIVE
PH UR STRIP: 8.5 [PH] (ref 5–9)
PLATELET # BLD AUTO: 328 K/UL (ref 130–450)
PMV BLD AUTO: 10.3 FL (ref 7–12)
POTASSIUM SERPL-SCNC: 3.9 MMOL/L (ref 3.5–5)
PROT SERPL-MCNC: 6 G/DL (ref 6.4–8.3)
PROT UR STRIP-MCNC: 100 MG/DL
RBC # BLD AUTO: 3.5 M/UL (ref 3.5–5.5)
RBC # BLD: ABNORMAL 10*6/UL
RBC #/AREA URNS HPF: ABNORMAL /HPF
SODIUM SERPL-SCNC: 144 MMOL/L (ref 132–146)
SP GR UR STRIP: 1.01 (ref 1–1.03)
TRIGL SERPL-MCNC: 89 MG/DL
UROBILINOGEN UR STRIP-ACNC: 0.2 EU/DL (ref 0–1)
VIT B12 SERPL-MCNC: 609 PG/ML (ref 211–946)
VLDLC SERPL CALC-MCNC: 18 MG/DL
WBC #/AREA URNS HPF: ABNORMAL /HPF
WBC OTHER # BLD: 8.3 K/UL (ref 4.5–11.5)

## 2024-10-18 ENCOUNTER — HOSPITAL ENCOUNTER (OUTPATIENT)
Dept: INFUSION THERAPY | Age: 69
Discharge: HOME | End: 2024-10-18
Payer: MEDICARE

## 2024-10-18 ENCOUNTER — HOSPITAL ENCOUNTER (OUTPATIENT)
Dept: GENERAL RADIOLOGY | Age: 69
Discharge: HOME | End: 2024-10-20
Payer: MEDICARE

## 2024-10-18 ENCOUNTER — OFFICE VISIT (OUTPATIENT)
Dept: ONCOLOGY | Age: 69
End: 2024-10-18
Payer: MEDICARE

## 2024-10-18 VITALS
HEART RATE: 94 BPM | WEIGHT: 147 LBS | BODY MASS INDEX: 23.07 KG/M2 | TEMPERATURE: 97 F | SYSTOLIC BLOOD PRESSURE: 160 MMHG | HEIGHT: 67 IN | OXYGEN SATURATION: 99 % | DIASTOLIC BLOOD PRESSURE: 70 MMHG

## 2024-10-18 DIAGNOSIS — D64.9 ANEMIA REQUIRING TRANSFUSIONS: ICD-10-CM

## 2024-10-18 DIAGNOSIS — D64.9 ANEMIA REQUIRING TRANSFUSIONS: Primary | ICD-10-CM

## 2024-10-18 DIAGNOSIS — D69.6 THROMBOCYTOPENIA (HCC): ICD-10-CM

## 2024-10-18 LAB
BASOPHILS # BLD: 0.02 K/UL (ref 0–0.2)
BASOPHILS NFR BLD: 0 % (ref 0–2)
EOSINOPHIL # BLD: 0.02 K/UL (ref 0.05–0.5)
EOSINOPHILS RELATIVE PERCENT: 0 % (ref 0–6)
ERYTHROCYTE [DISTWIDTH] IN BLOOD BY AUTOMATED COUNT: 20.5 % (ref 11.5–15)
FERRITIN SERPL-MCNC: 133 NG/ML
HCT VFR BLD AUTO: 34.9 % (ref 34–48)
HGB BLD-MCNC: 9.8 G/DL (ref 11.5–15.5)
IMM GRANULOCYTES # BLD AUTO: 0.03 K/UL (ref 0–0.58)
IMM GRANULOCYTES NFR BLD: 0 % (ref 0–5)
IRON SATN MFR SERPL: 30 % (ref 15–50)
IRON SERPL-MCNC: 77 UG/DL (ref 37–145)
LYMPHOCYTES NFR BLD: 0.67 K/UL (ref 1.5–4)
LYMPHOCYTES RELATIVE PERCENT: 6 % (ref 20–42)
MCH RBC QN AUTO: 26.4 PG (ref 26–35)
MCHC RBC AUTO-ENTMCNC: 28.1 G/DL (ref 32–34.5)
MCV RBC AUTO: 94.1 FL (ref 80–99.9)
MONOCYTES NFR BLD: 0.22 K/UL (ref 0.1–0.95)
MONOCYTES NFR BLD: 2 % (ref 2–12)
NEUTROPHILS NFR BLD: 92 % (ref 43–80)
NEUTS SEG NFR BLD: 10.36 K/UL (ref 1.8–7.3)
PLATELET # BLD AUTO: 145 K/UL (ref 130–450)
PMV BLD AUTO: 9.4 FL (ref 7–12)
RBC # BLD AUTO: 3.71 M/UL (ref 3.5–5.5)
RBC # BLD: ABNORMAL 10*6/UL
TIBC SERPL-MCNC: 259 UG/DL (ref 250–450)
WBC OTHER # BLD: 11.3 K/UL (ref 4.5–11.5)

## 2024-10-18 PROCEDURE — 83550 IRON BINDING TEST: CPT

## 2024-10-18 PROCEDURE — G8427 DOCREV CUR MEDS BY ELIG CLIN: HCPCS | Performed by: INTERNAL MEDICINE

## 2024-10-18 PROCEDURE — G8420 CALC BMI NORM PARAMETERS: HCPCS | Performed by: INTERNAL MEDICINE

## 2024-10-18 PROCEDURE — 1123F ACP DISCUSS/DSCN MKR DOCD: CPT | Performed by: INTERNAL MEDICINE

## 2024-10-18 PROCEDURE — 1090F PRES/ABSN URINE INCON ASSESS: CPT | Performed by: INTERNAL MEDICINE

## 2024-10-18 PROCEDURE — 3017F COLORECTAL CA SCREEN DOC REV: CPT | Performed by: INTERNAL MEDICINE

## 2024-10-18 PROCEDURE — 99205 OFFICE O/P NEW HI 60 MIN: CPT | Performed by: INTERNAL MEDICINE

## 2024-10-18 PROCEDURE — 83540 ASSAY OF IRON: CPT

## 2024-10-18 PROCEDURE — 85025 COMPLETE CBC W/AUTO DIFF WBC: CPT

## 2024-10-18 PROCEDURE — 82728 ASSAY OF FERRITIN: CPT

## 2024-10-18 PROCEDURE — G8484 FLU IMMUNIZE NO ADMIN: HCPCS | Performed by: INTERNAL MEDICINE

## 2024-10-18 PROCEDURE — 77063 BREAST TOMOSYNTHESIS BI: CPT

## 2024-10-18 PROCEDURE — 1036F TOBACCO NON-USER: CPT | Performed by: INTERNAL MEDICINE

## 2024-10-18 PROCEDURE — 1111F DSCHRG MED/CURRENT MED MERGE: CPT | Performed by: INTERNAL MEDICINE

## 2024-10-18 PROCEDURE — G8399 PT W/DXA RESULTS DOCUMENT: HCPCS | Performed by: INTERNAL MEDICINE

## 2024-10-18 PROCEDURE — 36415 COLL VENOUS BLD VENIPUNCTURE: CPT

## 2024-10-18 NOTE — PROGRESS NOTES
[facility] MED ONCOLOGY  [address] [facility] [address]  Dept: [phone]  Loc: [phone]  Attending Consult Note      Reason for Visit:   Acute on chronic anemia.    Referring Physician:   Scott Wray, APRN - CNP     PCP:  Lee Vides MD    History of Present Illness:     Mrs. Keane is a 69-year-old female patient, with a past medical history significant for asthma, COPD, chronic pancreatitis, history of alcohol abuse, in remission, colon diverticulosis, CAD, heart failure with preserved EF, and chronic anemia, who had presented to the ED on 9/9/2024 after being sent from the breast center due to worsening shortness of breath, and trouble ambulating, she had had those symptoms for a couple of weeks. Workup reviewed, hemoglobin was 3.7G/DL, hematocrit 15.3, MCV 77.7, white count 7.6, platelet count 188K, , iron 20, TIBC 435, TSAT 5%, transferrin 358, ferritin 6, the patient had a colonoscopy done on 8/4/2023, revealing pandiverticulosis, EGD from 12/6/2021 had revealed mild gastritis.  The patient received packed RBC transfusion and IV iron as inpatient.  She is doing better overall.    Review of Systems;  CONSTITUTIONAL: No fever, chills.  Improved appetite and energy level.   ENMT: Eyes: No diplopia; Nose: No epistaxis. Mouth: No sore throat.  RESPIRATORY: No hemoptysis, shortness of breath, cough.   CARDIOVASCULAR: No chest pain, palpitations.  GASTROINTESTINAL: No nausea/vomiting, abdominal pain, diarrhea/constipation.  GENITOURINARY: No dysuria, urinary frequency, hematuria.  NEURO: No syncope, presyncope, headache.  Remainder:  ROS NEGATIVE    Past Medical History:      Diagnosis Date    Asthma     Chronic pancreatitis (HCC)     COPD (chronic obstructive pulmonary disease) (HCC)     uses )3 prn daily and nightly / Dr. Blackwell F/U monthly    Depression     Dysphagia 2021    Emphysema lung (HCC)     Del Rio catheter in place

## 2024-10-18 NOTE — PROGRESS NOTES
Ayesha Keane  1955 69 y.o.        PCP: Lee Vides MD    There were no vitals filed for this visit.     Wt Readings from Last 3 Encounters:   24 69.6 kg (153 lb 8 oz)   24 70.5 kg (155 lb 6.8 oz)   04/15/24 66.5 kg (146 lb 9.6 oz)        There is no height or weight on file to calculate BMI.          Chief Complaint: No chief complaint on file.         Cancer Staging   No matching staging information was found for the patient.      LMP: 40's     Age at first Menses: age 8    : 0    Para: 0          Current Outpatient Medications:     polyethylene glycol (GLYCOLAX) 17 g packet, Take 1 packet by mouth daily, Disp: 30 packet, Rfl: 0    metoclopramide (REGLAN) 5 MG tablet, Take 1 tablet by mouth in the morning and at bedtime, Disp: 120 tablet, Rfl: 0    predniSONE (DELTASONE) 10 MG tablet, Take 4 tabs daily x 3 days then 3 tabs daily x 3 days then 2 tabs daily x 3 days then 1 tab daily x 3 days then stop (Patient taking differently: Take 1 tablet by mouth daily), Disp: 30 tablet, Rfl: 0    aspirin 81 MG chewable tablet, Take 1 tablet by mouth daily, Disp: 30 tablet, Rfl: 3    atorvastatin (LIPITOR) 40 MG tablet, Take 1 tablet by mouth nightly (Patient not taking: Reported on 9/10/2024), Disp: 30 tablet, Rfl: 3    lactulose (CHRONULAC) 10 GM/15ML solution, Take 30 mLs by mouth nightly, Disp: 900 each, Rfl: 1    lipase-protease-amylase (CREON) 99688-560087 units CPEP delayed release capsule, Take 36,000 capsules by mouth 3 times daily (with meals), Disp: 180 capsule, Rfl: 3    ammonium lactate (LAC-HYDRIN) 12 % lotion, Apply topically twice daily, Disp: 400 g, Rfl: 2    albuterol (PROVENTIL) (2.5 MG/3ML) 0.083% nebulizer solution, Take 3 mLs by nebulization every 4 hours as needed for Wheezing, Disp: 120 each, Rfl: 3    albuterol sulfate HFA (PROVENTIL;VENTOLIN;PROAIR) 108 (90 Base) MCG/ACT inhaler, Inhale 1 puff into the lungs every 4 hours as needed for Wheezing, Disp: , Rfl:

## 2024-10-21 NOTE — PLAN OF CARE
Problem: Chronic Conditions and Co-morbidities  Goal: Patient's chronic conditions and co-morbidity symptoms are monitored and maintained or improved  Outcome: Progressing  Flowsheets (Taken 10/11/2022 2000)  Care Plan - Patient's Chronic Conditions and Co-Morbidity Symptoms are Monitored and Maintained or Improved:   Monitor and assess patient's chronic conditions and comorbid symptoms for stability, deterioration, or improvement   Update acute care plan with appropriate goals if chronic or comorbid symptoms are exacerbated and prevent overall improvement and discharge   Collaborate with multidisciplinary team to address chronic and comorbid conditions and prevent exacerbation or deterioration     Problem: Discharge Planning  Goal: Discharge to home or other facility with appropriate resources  Outcome: Progressing  Flowsheets (Taken 10/11/2022 2000)  Discharge to home or other facility with appropriate resources: Arrange for needed discharge resources and transportation as appropriate     Problem: Skin/Tissue Integrity  Goal: Absence of new skin breakdown  Description: 1. Monitor for areas of redness and/or skin breakdown  2. Assess vascular access sites hourly  3. Every 4-6 hours minimum:  Change oxygen saturation probe site  4. Every 4-6 hours:  If on nasal continuous positive airway pressure, respiratory therapy assess nares and determine need for appliance change or resting period.   Outcome: Progressing     Problem: Safety - Adult  Goal: Free from fall injury  Outcome: Progressing  Flowsheets (Taken 10/11/2022 2100)  Free From Fall Injury: Instruct family/caregiver on patient safety     Problem: ABCDS Injury Assessment  Goal: Absence of physical injury  Outcome: Progressing  Flowsheets (Taken 10/11/2022 2100)  Absence of Physical Injury: Implement safety measures based on patient assessment Repeat labs due. Please call and remind her

## 2024-10-22 ENCOUNTER — HOSPITAL ENCOUNTER (INPATIENT)
Age: 69
LOS: 3 days | Discharge: HOME | End: 2024-10-25
Attending: EMERGENCY MEDICINE | Admitting: INTERNAL MEDICINE
Payer: MEDICARE

## 2024-10-22 ENCOUNTER — APPOINTMENT (OUTPATIENT)
Dept: CT IMAGING | Age: 69
End: 2024-10-22
Payer: MEDICARE

## 2024-10-22 ENCOUNTER — APPOINTMENT (OUTPATIENT)
Dept: GENERAL RADIOLOGY | Age: 69
End: 2024-10-22
Payer: MEDICARE

## 2024-10-22 DIAGNOSIS — E87.6 HYPOKALEMIA: ICD-10-CM

## 2024-10-22 DIAGNOSIS — J44.1 COPD WITH ACUTE EXACERBATION (HCC): ICD-10-CM

## 2024-10-22 DIAGNOSIS — R07.9 CHEST PAIN, UNSPECIFIED TYPE: ICD-10-CM

## 2024-10-22 DIAGNOSIS — J96.02 ACUTE RESPIRATORY FAILURE WITH HYPERCAPNIA: Primary | ICD-10-CM

## 2024-10-22 PROBLEM — J96.92 RESPIRATORY FAILURE WITH HYPERCAPNIA: Status: ACTIVE | Noted: 2024-10-22

## 2024-10-22 LAB
AADO2: 179.1 MMHG
ABO + RH BLD: NORMAL
ALBUMIN SERPL-MCNC: 3.9 G/DL (ref 3.5–5.2)
ALP SERPL-CCNC: 64 U/L (ref 35–104)
ALT SERPL-CCNC: 11 U/L (ref 0–32)
ANION GAP SERPL CALCULATED.3IONS-SCNC: 7 MMOL/L (ref 7–16)
ARM BAND NUMBER: NORMAL
AST SERPL-CCNC: 14 U/L (ref 0–31)
B.E.: 8.5 MMOL/L (ref -3–3)
B.E.: 9.4 MMOL/L (ref -3–3)
BASOPHILS # BLD: 0 K/UL (ref 0–0.2)
BASOPHILS NFR BLD: 0 % (ref 0–2)
BILIRUB SERPL-MCNC: 0.3 MG/DL (ref 0–1.2)
BLOOD BANK SAMPLE EXPIRATION: NORMAL
BLOOD GROUP ANTIBODIES SERPL: NEGATIVE
BNP SERPL-MCNC: 963 PG/ML (ref 0–125)
BUN SERPL-MCNC: 11 MG/DL (ref 6–23)
CALCIUM SERPL-MCNC: 8.9 MG/DL (ref 8.6–10.2)
CHLORIDE SERPL-SCNC: 95 MMOL/L (ref 98–107)
CO2 SERPL-SCNC: 39 MMOL/L (ref 22–29)
COHB: 0.1 % (ref 0–1.5)
COHB: 1.3 % (ref 0–1.5)
CREAT SERPL-MCNC: 0.6 MG/DL (ref 0.5–1)
CRITICAL: ABNORMAL
CRITICAL: ABNORMAL
DATE ANALYZED: ABNORMAL
DATE ANALYZED: ABNORMAL
DATE OF COLLECTION: ABNORMAL
DATE OF COLLECTION: ABNORMAL
EKG ATRIAL RATE: 159 BPM
EKG Q-T INTERVAL: 294 MS
EKG QRS DURATION: 70 MS
EKG QTC CALCULATION (BAZETT): 430 MS
EKG R AXIS: 73 DEGREES
EKG T AXIS: 90 DEGREES
EKG VENTRICULAR RATE: 129 BPM
EOSINOPHIL # BLD: 0 K/UL (ref 0.05–0.5)
EOSINOPHILS RELATIVE PERCENT: 0 % (ref 0–6)
ERYTHROCYTE [DISTWIDTH] IN BLOOD BY AUTOMATED COUNT: 19.3 % (ref 11.5–15)
FIO2: 85 %
GFR, ESTIMATED: >90 ML/MIN/1.73M2
GLUCOSE SERPL-MCNC: 248 MG/DL (ref 74–99)
HCO3: 37.3 MMOL/L (ref 22–26)
HCO3: 40.6 MMOL/L (ref 22–26)
HCT VFR BLD AUTO: 33.2 % (ref 34–48)
HGB BLD-MCNC: 9.3 G/DL (ref 11.5–15.5)
HHB: 0.2 % (ref 0–5)
HHB: 1.4 % (ref 0–5)
LAB: ABNORMAL
LAB: ABNORMAL
LYMPHOCYTES NFR BLD: 0.1 K/UL (ref 1.5–4)
LYMPHOCYTES RELATIVE PERCENT: 1 % (ref 20–42)
Lab: 420
Lab: 545
MAGNESIUM SERPL-MCNC: 1.7 MG/DL (ref 1.6–2.6)
MCH RBC QN AUTO: 26.7 PG (ref 26–35)
MCHC RBC AUTO-ENTMCNC: 28 G/DL (ref 32–34.5)
MCV RBC AUTO: 95.4 FL (ref 80–99.9)
METHB: 0.4 % (ref 0–1.5)
METHB: 0.5 % (ref 0–1.5)
MODE: ABNORMAL
MODE: ABNORMAL
MONOCYTES NFR BLD: 0.1 K/UL (ref 0.1–0.95)
MONOCYTES NFR BLD: 1 % (ref 2–12)
NEUTROPHILS NFR BLD: 98 % (ref 43–80)
NEUTS SEG NFR BLD: 11.2 K/UL (ref 1.8–7.3)
O2 SATURATION: 98.6 % (ref 92–98.5)
O2 SATURATION: 99.8 % (ref 92–98.5)
O2HB: 96.8 % (ref 94–97)
O2HB: 99.3 % (ref 94–97)
OPERATOR ID: 5134
OPERATOR ID: 5134
PATIENT TEMP: 37 C
PATIENT TEMP: 37 C
PCO2: 105.5 MMHG (ref 35–45)
PCO2: 80.7 MMHG (ref 35–45)
PEEP/CPAP: 10 CMH2O
PEEP/CPAP: 5 CMH2O
PFO2: 3.79 MMHG/%
PH BLOOD GAS: 7.2 (ref 7.35–7.45)
PH BLOOD GAS: 7.28 (ref 7.35–7.45)
PIP: 15 CMH2O
PLATELET # BLD AUTO: 207 K/UL (ref 130–450)
PMV BLD AUTO: 9.7 FL (ref 7–12)
PO2: 137.7 MMHG (ref 75–100)
PO2: 322 MMHG (ref 75–100)
POTASSIUM SERPL-SCNC: 3.3 MMOL/L (ref 3.5–5)
POTASSIUM SERPL-SCNC: 3.61 MMOL/L (ref 3.5–5)
PROT SERPL-MCNC: 5.9 G/DL (ref 6.4–8.3)
RBC # BLD AUTO: 3.48 M/UL (ref 3.5–5.5)
RBC # BLD: ABNORMAL 10*6/UL
RI(T): 0.56
RR MECHANICAL: 14 B/MIN
SODIUM SERPL-SCNC: 141 MMOL/L (ref 132–146)
SOURCE, BLOOD GAS: ABNORMAL
SOURCE, BLOOD GAS: ABNORMAL
THB: 10.2 G/DL (ref 11.5–16.5)
THB: 11.1 G/DL (ref 11.5–16.5)
TIME ANALYZED: 422
TIME ANALYZED: 546
TROPONIN I SERPL HS-MCNC: 36 NG/L (ref 0–9)
TROPONIN I SERPL HS-MCNC: 58 NG/L (ref 0–9)
VT MECHANICAL: 390 ML
WBC OTHER # BLD: 11.4 K/UL (ref 4.5–11.5)

## 2024-10-22 PROCEDURE — 6370000000 HC RX 637 (ALT 250 FOR IP): Performed by: EMERGENCY MEDICINE

## 2024-10-22 PROCEDURE — 71045 X-RAY EXAM CHEST 1 VIEW: CPT

## 2024-10-22 PROCEDURE — 6360000002 HC RX W HCPCS: Performed by: EMERGENCY MEDICINE

## 2024-10-22 PROCEDURE — 82805 BLOOD GASES W/O2 SATURATION: CPT

## 2024-10-22 PROCEDURE — 96374 THER/PROPH/DIAG INJ IV PUSH: CPT

## 2024-10-22 PROCEDURE — 86850 RBC ANTIBODY SCREEN: CPT

## 2024-10-22 PROCEDURE — 84132 ASSAY OF SERUM POTASSIUM: CPT

## 2024-10-22 PROCEDURE — 2060000000 HC ICU INTERMEDIATE R&B

## 2024-10-22 PROCEDURE — 83880 ASSAY OF NATRIURETIC PEPTIDE: CPT

## 2024-10-22 PROCEDURE — 36556 INSERT NON-TUNNEL CV CATH: CPT

## 2024-10-22 PROCEDURE — 06HY33Z INSERTION OF INFUSION DEVICE INTO LOWER VEIN, PERCUTANEOUS APPROACH: ICD-10-PCS | Performed by: INTERNAL MEDICINE

## 2024-10-22 PROCEDURE — 83735 ASSAY OF MAGNESIUM: CPT

## 2024-10-22 PROCEDURE — 6370000000 HC RX 637 (ALT 250 FOR IP): Performed by: NURSE PRACTITIONER

## 2024-10-22 PROCEDURE — 93005 ELECTROCARDIOGRAM TRACING: CPT | Performed by: EMERGENCY MEDICINE

## 2024-10-22 PROCEDURE — 71275 CT ANGIOGRAPHY CHEST: CPT

## 2024-10-22 PROCEDURE — 99285 EMERGENCY DEPT VISIT HI MDM: CPT

## 2024-10-22 PROCEDURE — 97535 SELF CARE MNGMENT TRAINING: CPT

## 2024-10-22 PROCEDURE — 6360000004 HC RX CONTRAST MEDICATION: Performed by: RADIOLOGY

## 2024-10-22 PROCEDURE — 94660 CPAP INITIATION&MGMT: CPT

## 2024-10-22 PROCEDURE — 5A09357 ASSISTANCE WITH RESPIRATORY VENTILATION, LESS THAN 24 CONSECUTIVE HOURS, CONTINUOUS POSITIVE AIRWAY PRESSURE: ICD-10-PCS | Performed by: INTERNAL MEDICINE

## 2024-10-22 PROCEDURE — 6360000002 HC RX W HCPCS: Performed by: NURSE PRACTITIONER

## 2024-10-22 PROCEDURE — 93010 ELECTROCARDIOGRAM REPORT: CPT | Performed by: INTERNAL MEDICINE

## 2024-10-22 PROCEDURE — 86901 BLOOD TYPING SEROLOGIC RH(D): CPT

## 2024-10-22 PROCEDURE — 85025 COMPLETE CBC W/AUTO DIFF WBC: CPT

## 2024-10-22 PROCEDURE — 97530 THERAPEUTIC ACTIVITIES: CPT

## 2024-10-22 PROCEDURE — 80053 COMPREHEN METABOLIC PANEL: CPT

## 2024-10-22 PROCEDURE — 2580000003 HC RX 258: Performed by: RADIOLOGY

## 2024-10-22 PROCEDURE — 94640 AIRWAY INHALATION TREATMENT: CPT

## 2024-10-22 PROCEDURE — 97161 PT EVAL LOW COMPLEX 20 MIN: CPT

## 2024-10-22 PROCEDURE — 86900 BLOOD TYPING SEROLOGIC ABO: CPT

## 2024-10-22 PROCEDURE — 2580000003 HC RX 258: Performed by: EMERGENCY MEDICINE

## 2024-10-22 PROCEDURE — 84484 ASSAY OF TROPONIN QUANT: CPT

## 2024-10-22 PROCEDURE — 97165 OT EVAL LOW COMPLEX 30 MIN: CPT

## 2024-10-22 PROCEDURE — 2580000003 HC RX 258: Performed by: NURSE PRACTITIONER

## 2024-10-22 RX ORDER — POLYETHYLENE GLYCOL 3350 17 G/17G
17 POWDER, FOR SOLUTION ORAL DAILY
Status: DISCONTINUED | OUTPATIENT
Start: 2024-10-22 | End: 2024-10-25 | Stop reason: HOSPADM

## 2024-10-22 RX ORDER — SODIUM CHLORIDE 0.9 % (FLUSH) 0.9 %
5-40 SYRINGE (ML) INJECTION PRN
Status: DISCONTINUED | OUTPATIENT
Start: 2024-10-22 | End: 2024-10-25 | Stop reason: HOSPADM

## 2024-10-22 RX ORDER — ASPIRIN 81 MG/1
81 TABLET, CHEWABLE ORAL DAILY
Status: DISCONTINUED | OUTPATIENT
Start: 2024-10-22 | End: 2024-10-25 | Stop reason: HOSPADM

## 2024-10-22 RX ORDER — BUDESONIDE 0.5 MG/2ML
500 INHALANT ORAL
Status: DISCONTINUED | OUTPATIENT
Start: 2024-10-22 | End: 2024-10-25 | Stop reason: HOSPADM

## 2024-10-22 RX ORDER — SODIUM CHLORIDE 0.9 % (FLUSH) 0.9 %
5-40 SYRINGE (ML) INJECTION EVERY 12 HOURS SCHEDULED
Status: DISCONTINUED | OUTPATIENT
Start: 2024-10-22 | End: 2024-10-25 | Stop reason: HOSPADM

## 2024-10-22 RX ORDER — ERGOCALCIFEROL 1.25 MG/1
50000 CAPSULE, LIQUID FILLED ORAL WEEKLY
Status: DISCONTINUED | OUTPATIENT
Start: 2024-10-26 | End: 2024-10-25 | Stop reason: HOSPADM

## 2024-10-22 RX ORDER — SODIUM CHLORIDE 9 MG/ML
INJECTION, SOLUTION INTRAVENOUS PRN
Status: DISCONTINUED | OUTPATIENT
Start: 2024-10-22 | End: 2024-10-25 | Stop reason: HOSPADM

## 2024-10-22 RX ORDER — FOLIC ACID 1 MG/1
1 TABLET ORAL DAILY
Status: DISCONTINUED | OUTPATIENT
Start: 2024-10-22 | End: 2024-10-25 | Stop reason: HOSPADM

## 2024-10-22 RX ORDER — ACETAMINOPHEN 325 MG/1
650 TABLET ORAL EVERY 6 HOURS PRN
Status: DISCONTINUED | OUTPATIENT
Start: 2024-10-22 | End: 2024-10-25 | Stop reason: HOSPADM

## 2024-10-22 RX ORDER — SODIUM CHLORIDE 0.9 % (FLUSH) 0.9 %
10 SYRINGE (ML) INJECTION
Status: ACTIVE | OUTPATIENT
Start: 2024-10-22 | End: 2024-10-23

## 2024-10-22 RX ORDER — IPRATROPIUM BROMIDE AND ALBUTEROL SULFATE 2.5; .5 MG/3ML; MG/3ML
1 SOLUTION RESPIRATORY (INHALATION)
Status: DISCONTINUED | OUTPATIENT
Start: 2024-10-22 | End: 2024-10-25 | Stop reason: HOSPADM

## 2024-10-22 RX ORDER — POTASSIUM CHLORIDE 7.45 MG/ML
10 INJECTION INTRAVENOUS ONCE
Status: COMPLETED | OUTPATIENT
Start: 2024-10-22 | End: 2024-10-22

## 2024-10-22 RX ORDER — SODIUM CHLORIDE 0.9 % (FLUSH) 0.9 %
10 SYRINGE (ML) INJECTION
Status: COMPLETED | OUTPATIENT
Start: 2024-10-22 | End: 2024-10-22

## 2024-10-22 RX ORDER — ONDANSETRON 4 MG/1
4 TABLET, ORALLY DISINTEGRATING ORAL EVERY 8 HOURS PRN
Status: DISCONTINUED | OUTPATIENT
Start: 2024-10-22 | End: 2024-10-25 | Stop reason: HOSPADM

## 2024-10-22 RX ORDER — IOPAMIDOL 755 MG/ML
60 INJECTION, SOLUTION INTRAVASCULAR
Status: COMPLETED | OUTPATIENT
Start: 2024-10-22 | End: 2024-10-22

## 2024-10-22 RX ORDER — ONDANSETRON 2 MG/ML
4 INJECTION INTRAMUSCULAR; INTRAVENOUS EVERY 6 HOURS PRN
Status: DISCONTINUED | OUTPATIENT
Start: 2024-10-22 | End: 2024-10-25 | Stop reason: HOSPADM

## 2024-10-22 RX ORDER — ACETAMINOPHEN 650 MG/1
650 SUPPOSITORY RECTAL EVERY 6 HOURS PRN
Status: DISCONTINUED | OUTPATIENT
Start: 2024-10-22 | End: 2024-10-25 | Stop reason: HOSPADM

## 2024-10-22 RX ORDER — POTASSIUM CHLORIDE 7.45 MG/ML
10 INJECTION INTRAVENOUS
Status: DISPENSED | OUTPATIENT
Start: 2024-10-22 | End: 2024-10-22

## 2024-10-22 RX ORDER — LACTULOSE 10 G/15ML
20 SOLUTION ORAL NIGHTLY
Status: DISCONTINUED | OUTPATIENT
Start: 2024-10-22 | End: 2024-10-25 | Stop reason: HOSPADM

## 2024-10-22 RX ORDER — ARFORMOTEROL TARTRATE 15 UG/2ML
15 SOLUTION RESPIRATORY (INHALATION)
Status: DISCONTINUED | OUTPATIENT
Start: 2024-10-22 | End: 2024-10-25 | Stop reason: HOSPADM

## 2024-10-22 RX ORDER — METOCLOPRAMIDE 5 MG/1
5 TABLET ORAL 2 TIMES DAILY
Status: DISCONTINUED | OUTPATIENT
Start: 2024-10-22 | End: 2024-10-25 | Stop reason: HOSPADM

## 2024-10-22 RX ORDER — ENOXAPARIN SODIUM 100 MG/ML
40 INJECTION SUBCUTANEOUS DAILY
Status: DISCONTINUED | OUTPATIENT
Start: 2024-10-22 | End: 2024-10-25 | Stop reason: HOSPADM

## 2024-10-22 RX ORDER — IPRATROPIUM BROMIDE AND ALBUTEROL SULFATE 2.5; .5 MG/3ML; MG/3ML
1 SOLUTION RESPIRATORY (INHALATION)
Status: COMPLETED | OUTPATIENT
Start: 2024-10-22 | End: 2024-10-22

## 2024-10-22 RX ORDER — PREDNISONE 20 MG/1
20 TABLET ORAL 2 TIMES DAILY
Status: DISCONTINUED | OUTPATIENT
Start: 2024-10-24 | End: 2024-10-25 | Stop reason: HOSPADM

## 2024-10-22 RX ORDER — M-VIT,TX,IRON,MINS/CALC/FOLIC 27MG-0.4MG
1 TABLET ORAL DAILY
Status: DISCONTINUED | OUTPATIENT
Start: 2024-10-22 | End: 2024-10-25 | Stop reason: HOSPADM

## 2024-10-22 RX ADMIN — SODIUM CHLORIDE, PRESERVATIVE FREE 10 ML: 5 INJECTION INTRAVENOUS at 20:19

## 2024-10-22 RX ADMIN — SERTRALINE 50 MG: 50 TABLET, FILM COATED ORAL at 20:19

## 2024-10-22 RX ADMIN — ASPIRIN 81 MG 81 MG: 81 TABLET ORAL at 15:11

## 2024-10-22 RX ADMIN — POTASSIUM CHLORIDE 10 MEQ: 7.46 INJECTION, SOLUTION INTRAVENOUS at 06:40

## 2024-10-22 RX ADMIN — IPRATROPIUM BROMIDE AND ALBUTEROL SULFATE 1 DOSE: 2.5; .5 SOLUTION RESPIRATORY (INHALATION) at 16:31

## 2024-10-22 RX ADMIN — ARFORMOTEROL TARTRATE 15 MCG: 15 SOLUTION RESPIRATORY (INHALATION) at 20:23

## 2024-10-22 RX ADMIN — METHYLPREDNISOLONE SODIUM SUCCINATE 125 MG: 125 INJECTION INTRAMUSCULAR; INTRAVENOUS at 05:20

## 2024-10-22 RX ADMIN — FOLIC ACID 1 MG: 1 TABLET ORAL at 15:11

## 2024-10-22 RX ADMIN — IPRATROPIUM BROMIDE AND ALBUTEROL SULFATE 1 DOSE: .5; 2.5 SOLUTION RESPIRATORY (INHALATION) at 04:35

## 2024-10-22 RX ADMIN — IPRATROPIUM BROMIDE AND ALBUTEROL SULFATE 1 DOSE: .5; 2.5 SOLUTION RESPIRATORY (INHALATION) at 04:33

## 2024-10-22 RX ADMIN — PANCRELIPASE LIPASE, PANCRELIPASE PROTEASE, PANCRELIPASE AMYLASE 20000 UNITS: 20000; 63000; 84000 CAPSULE, DELAYED RELEASE ORAL at 16:18

## 2024-10-22 RX ADMIN — METOCLOPRAMIDE 5 MG: 5 TABLET ORAL at 20:19

## 2024-10-22 RX ADMIN — IOPAMIDOL 60 ML: 755 INJECTION, SOLUTION INTRAVENOUS at 08:42

## 2024-10-22 RX ADMIN — METHYLPREDNISOLONE SODIUM SUCCINATE 40 MG: 40 INJECTION INTRAMUSCULAR; INTRAVENOUS at 16:18

## 2024-10-22 RX ADMIN — LACTULOSE 20 G: 20 SOLUTION ORAL at 20:19

## 2024-10-22 RX ADMIN — IPRATROPIUM BROMIDE AND ALBUTEROL SULFATE 1 DOSE: 2.5; .5 SOLUTION RESPIRATORY (INHALATION) at 20:23

## 2024-10-22 RX ADMIN — BUDESONIDE 500 MCG: 0.5 SUSPENSION RESPIRATORY (INHALATION) at 20:23

## 2024-10-22 RX ADMIN — POTASSIUM CHLORIDE 10 MEQ: 7.46 INJECTION, SOLUTION INTRAVENOUS at 08:24

## 2024-10-22 RX ADMIN — Medication 1 TABLET: at 15:11

## 2024-10-22 RX ADMIN — POTASSIUM CHLORIDE 10 MEQ: 7.46 INJECTION, SOLUTION INTRAVENOUS at 10:18

## 2024-10-22 RX ADMIN — SODIUM CHLORIDE, PRESERVATIVE FREE 10 ML: 5 INJECTION INTRAVENOUS at 08:43

## 2024-10-22 RX ADMIN — ENOXAPARIN SODIUM 40 MG: 100 INJECTION SUBCUTANEOUS at 15:11

## 2024-10-22 RX ADMIN — POLYETHYLENE GLYCOL 3350 17 G: 17 POWDER, FOR SOLUTION ORAL at 15:06

## 2024-10-22 RX ADMIN — IPRATROPIUM BROMIDE AND ALBUTEROL SULFATE 1 DOSE: .5; 2.5 SOLUTION RESPIRATORY (INHALATION) at 04:34

## 2024-10-22 ASSESSMENT — PAIN SCALES - GENERAL: PAINLEVEL_OUTOF10: 0

## 2024-10-22 NOTE — PROGRESS NOTES
Physical Therapy  Physical Therapy Initial Assessment     Name: Ayesha Keane  : 1955  MRN: [id number]      Date of Service: 10/22/2024    Evaluating PT:  Ellen Marquez, PT, DPT, [id number]    Room #:  8516/8516-A  Diagnosis:  Hypokalemia [E87.6]  COPD with acute exacerbation (HCC) [J44.1]  Acute respiratory failure with hypercapnia [J96.02]  Respiratory failure with hypercapnia [J96.92]  Chest pain, unspecified type [R07.9]  PMHx/PSHx:    Past Medical History:   Diagnosis Date    Asthma     Chronic pancreatitis (HCC)     COPD (chronic obstructive pulmonary disease) (HCC)     uses )3 prn daily and nightly / Dr. Blackwell F/U monthly    Depression     Dysphagia     Emphysema lung (HCC)     Del Rio catheter in place     continuous since , changed monthly at [facility]    Oxygen dependent       Past Surgical History:   Procedure Laterality Date    BREAST SURGERY      COLONOSCOPY      COLONOSCOPY N/A 2019    COLONOSCOPY DIAGNOSTIC performed by Emmett Gleason MD at [facility] ENDOSCOPY    COLONOSCOPY N/A 2023    COLORECTAL CANCER SCREENING, NOT HIGH RISK   (HOLD TIME 0900) performed by Hernandez TORRES MD at [facility] ENDOSCOPY    HYSTERECTOMY (CERVIX STATUS UNKNOWN)      KIDNEY SURGERY Left 2014    ABLATION PERFORMED UNDER CT SCAN    AL CYSTOURETHROSCOPY WITH BIOPSY N/A 2018    CYSTOSCOPY RETROGRADE PYELOGRAM, CLOT EVACATION , POSS.  BLADDER BIOPSY ---DR WRIGHT 2 :30 performed by David Sue DO at [facility] OR    UPPER GASTROINTESTINAL ENDOSCOPY N/A 05/10/2019    EGD BIOPSY performed by Emmett Gleason MD at [facility] ENDOSCOPY    UPPER GASTROINTESTINAL ENDOSCOPY N/A 2019    EGD EUS performed by Cholo Scott DO at [facility] ENDOSCOPY    UPPER GASTROINTESTINAL ENDOSCOPY N/A 2019    EGD performed by Cholo Scott DO at [facility] ENDOSCOPY    UPPER GASTROINTESTINAL ENDOSCOPY N/A 2020    EGD DIAGNOSTIC ONLY performed by Hernandez TORRES MD at [facility] ENDOSCOPY    UPPER GASTROINTESTINAL ENDOSCOPY  Complexity PT evaluation 07634  [] Moderate Complexity PT evaluation 49263  [] High Complexity PT evaluation 60936  [] PT Re-evaluation 78821  [] Gait training 83844 0 minutes  [] Manual therapy 75153 0 minutes  [x] Therapeutic activities 14600 8 minutes  [] Therapeutic exercises 41492 0 minutes  [] Neuromuscular reeducation 34467 0 minutes     Ellen Marquez PT, DPT  [id number]

## 2024-10-22 NOTE — PROGRESS NOTES
10/22/24 0819   NIV Type   NIV Started/Stopped On   Equipment Type V60   Mode AVAPS   Mask Type Full face mask   Mask Size Medium   Assessment   Pulse (!) 106   Respirations 22   SpO2 99 %   Comfort Level Good   Using Accessory Muscles No   Mask Compliance Good   Skin Assessment Clean, dry, & intact   Skin Protection for O2 Device Yes   Orientation Middle   Location Nose   Intervention(s) Skin Barrier   Settings/Measurements   PIP Observed 19 cm H20   CPAP/EPAP 10 cmH2O   IPAP Min 15 cmH2O   IPAP Max 25 cmH2O   Vt (Set, mL) 400 mL   Vt (Measured) 569 mL   Rate Ordered 14   Insp Rise Time (%) 2 %   FiO2  40 %  (found on 50, weaned to 40)   I Time/ I Time % 1 s   Minute Volume (L/min) 8.9 Liters   Mask Leak (lpm) 57 lpm   Patient's Home Machine No   Alarm Settings   Alarms On Y   Low Pressure (cmH2O) 8 cmH2O   High Pressure (cmH2O) 30 cmH2O   Apnea (secs) 20 secs   RR Low (bpm) 12   RR High (bpm) 35 br/min     Date: 10/22/2024    Time: 08:19 AM    Patient Placed On BIPAP/CPAP/ Non-Invasive Ventilation? No  Facial area red/color change? No     If YES are Blister/Lesion present? No    BIPAP/CPAP skin barrier? Yes   Skin barrier type:mepilexlite     Comments: remains on    Deborah Yu RCP RRT-ACCS

## 2024-10-22 NOTE — PROGRESS NOTES
4 Eyes Skin Assessment     NAME:  Ayesha Keane  YOB: 1955  MEDICAL RECORD NUMBER:  [id number]    The patient is being assessed for  Admission    I agree that at least one RN has performed a thorough Head to Toe Skin Assessment on the patient. ALL assessment sites listed below have been assessed.      Areas assessed by both nurses:    Head, Face, Ears, Shoulders, Back, Chest, Arms, Elbows, Hands, Sacrum. Buttock, Coccyx, Ischium, and Legs. Feet and Heels dry,flaky skin to BLE         Does the Patient have a Wound? No noted wound(s)       Harrison Prevention initiated by RN: Yes  Wound Care Orders initiated by RN: No    Pressure Injury (Stage 3,4, Unstageable, DTI, NWPT, and Complex wounds) if present, place Wound referral order by RN under : No    New Ostomies, if present place, Ostomy referral order under : No     Nurse 1 eSignature: Electronically signed by Yancy Gramajo RN on 10/22/24 at 2:30 PM EDT    **SHARE this note so that the co-signing nurse can place an eSignature**    Nurse 2 eSignature: Electronically signed by Jamaica Willingham RN on 10/22/24 at 2:50 PM EDT

## 2024-10-22 NOTE — ED NOTES
Report given to JUN Herman. RN aware that patient needs to go to CT. CT called and is ready for patient. Will need to coordinate with respiratory. Per Dr Bain nursing communications, patient can not go to floor until CT is complete.

## 2024-10-22 NOTE — PROGRESS NOTES
Full H&P to follow  Patient seen and examined in ER  Hypercapnic hypoxemic respiratory failure-pulmonology evaluation  She is mentating well and able to answer all my questions appropriately  Blood pressure on the low side on evaluation but has improved significantly since then  CTA chest with no evidence of PE trace bilateral effusions  White count 11,000 with left shift-no fevers or signs of sepsis  Supplement potassium,  She has noninvasive ventilation in place in no apparent acute distress  She has a right groin triple-lumen in place, which was actually disconnected on my evaluation and     Elsa Zuluaga MD  10/22/2024

## 2024-10-22 NOTE — PROGRESS NOTES
OCCUPATIONAL THERAPY INITIAL EVALUATION    [facility] [address] [place], [place]       Date:10/22/2024                                                  Patient Name: Ayesha Keane  MRN: [id number]  : 1955  Room: [address] [place] [address]    Evaluating OT: Derek Florence OTR/L [id number]    Referring Provider: Radha Rao APRN - CNP      Specific Provider Orders/Date: OT evaluation and treatment 10/22/24 1345    Diagnosis:  Hypokalemia [E87.6]  COPD with acute exacerbation (HCC) [J44.1]  Acute respiratory failure with hypercapnia [J96.02]  Respiratory failure with hypercapnia [J96.92]  Chest pain, unspecified type [R07.9]      Pertinent Medical History:  has a past medical history of Asthma, Chronic pancreatitis (HCC), COPD (chronic obstructive pulmonary disease) (HCC), Depression, Dysphagia, Emphysema lung (HCC), Del Rio catheter in place, and Oxygen dependent.   Past Surgical History:   Procedure Laterality Date    BREAST SURGERY      COLONOSCOPY      COLONOSCOPY N/A 2019    COLONOSCOPY DIAGNOSTIC performed by Emmett Gleason MD at [facility] ENDOSCOPY    COLONOSCOPY N/A 2023    COLORECTAL CANCER SCREENING, NOT HIGH RISK   (HOLD TIME 0900) performed by Hernandez TORRES MD at [facility] ENDOSCOPY    HYSTERECTOMY (CERVIX STATUS UNKNOWN)      KIDNEY SURGERY Left 2014    ABLATION PERFORMED UNDER CT SCAN    IL CYSTOURETHROSCOPY WITH BIOPSY N/A 2018    CYSTOSCOPY RETROGRADE PYELOGRAM, CLOT EVACATION , POSS.  BLADDER BIOPSY ---DR WRIGHT 2 :30 performed by David Sue DO at [facility] OR    UPPER GASTROINTESTINAL ENDOSCOPY N/A 05/10/2019    EGD BIOPSY performed by Emmett Gleason MD at [facility] ENDOSCOPY    UPPER GASTROINTESTINAL ENDOSCOPY N/A 2019    EGD EUS performed by Cholo Scott DO at [facility] ENDOSCOPY    UPPER GASTROINTESTINAL ENDOSCOPY N/A 2019    EGD performed by Cholo Scott DO at [facility] ENDOSCOPY    UPPER GASTROINTESTINAL ENDOSCOPY

## 2024-10-22 NOTE — ED NOTES
Patient changed into new gown. Bed changed. Patient cleaned and dry. Warm blanket and pillow applied. No complaints from patient at this time.

## 2024-10-22 NOTE — ED PROVIDER NOTES
HPI:  10/22/24, Time: 4:14 AM EDT         Ayesha Keane is a 69 y.o. female history of asthma chronic pancreatitis COPD depression dysphagia history of oxygen dependence presenting to the ED for shortness of breath, beginning hours ago.  The complaint has been persistent, moderate in severity, and worsened by nothing.  Per report from EMS patient was actually dyspneic.  Patient oxygen saturation was in the 60 range per report.  Patient was on nasal cannula.  Patient reporting midsternal chest pain does not radiate.  Patient reporting cough.  Patient reporting no abdominal pain or vomiting there is no history of syncopal event.  Patient reporting no leg pain no swelling she reports no fever or chills.  There is no headache.    ROS:   Pertinent positives and negatives are stated within HPI, all other systems reviewed and are negative.  --------------------------------------------- PAST HISTORY ---------------------------------------------  Past Medical History:  has a past medical history of Asthma, Chronic pancreatitis (HCC), COPD (chronic obstructive pulmonary disease) (HCC), Depression, Dysphagia, Emphysema lung (HCC), Del Rio catheter in place, and Oxygen dependent.    Past Surgical History:  has a past surgical history that includes Hysterectomy; Kidney surgery (Left, 06/16/2014); pr cystourethroscopy with biopsy (N/A, 09/14/2018); Colonoscopy; Upper gastrointestinal endoscopy (N/A, 05/10/2019); Upper gastrointestinal endoscopy (N/A, 06/28/2019); Upper gastrointestinal endoscopy (N/A, 06/28/2019); Colonoscopy (N/A, 09/04/2019); Upper gastrointestinal endoscopy (N/A, 05/19/2020); Upper gastrointestinal endoscopy (N/A, 10/25/2021); Upper gastrointestinal endoscopy (N/A, 12/06/2021); Colonoscopy (N/A, 08/04/2023); Upper gastrointestinal endoscopy (N/A, 09/11/2024); and Breast surgery.    Social History:  reports that she quit smoking about 5 years ago. Her smoking use included cigarettes. She has never used  heart failure.  Patient was placed on monitor placed on BiPAP.  Patient was ordered DuoNeb treatments as well as Solu-Medrol.  Patient also ordered IV potassium due to hypokalemia.  Patient lab work and ABG reviewed.  Patient white count was 11.4 hemoglobin is 9.3 platelet count was 207 patient's sodium is 141 potassium is 3.3 patient's glucose is 248 BUN is 11 creatinine 0.6 troponin is 30 6 repeat was ordered proBNP was 963 patient's initial ABG pH was 7.2 pCO2 was 105 pO2 was 137 O2 sat was 98.  Patient was reevaluated.  Patient having no active chest pain on recheck.  Heart rate improved to.  Patient heart rate below 110.  Patient repeat ABG pH was 7.28 pCO2 was 80 pO2 was 322.  O2 sat was 99 patient x-ray showed no infiltrate unchanged cardiomegaly.  Patient was ordered CT of the chest still pending.  Patient was made aware of findings and plan patient will be admitted to intermediate bed I did speak to [facility] internal medicine.  Patient will be admitted to intermediate bed.    Social Determinants affecting Dx or Tx: Patient is a past smoker    Chronic Conditions: History of asthma history of chronic pancreatitis COPD history of depression dysphagia history of being oxygen dependent.    Records Reviewed:     Patient last seen for transfusion 9/9/2024 and dyspnea.  Echo from 9/11/2024 ejection fraction was 60 to 65%  Re-Evaluations:              Patient having no active chest pain on  recheck.  Patient heart rate improved.  Patient pulse ox 100% on BiPAP.  Patient neurologically intact and breathing much easier.  Plan will be to admit.    Consultations:             [facility] internal medicine    Critical Care:     Please note that the withdrawal or failure to initiate urgent interventions for this patient would likely result in a life threatening deterioration or permanent disability.      Accordingly this patient received 30 minutes of critical care time, excluding separately billable procedures.      This

## 2024-10-23 LAB
ANION GAP SERPL CALCULATED.3IONS-SCNC: 6 MMOL/L (ref 7–16)
B PARAP IS1001 DNA NPH QL NAA+NON-PROBE: NOT DETECTED
B PERT DNA SPEC QL NAA+PROBE: NOT DETECTED
B.E.: 6.8 MMOL/L (ref -3–3)
BASOPHILS # BLD: 0 K/UL (ref 0–0.2)
BASOPHILS NFR BLD: 0 % (ref 0–2)
BUN SERPL-MCNC: 18 MG/DL (ref 6–23)
C PNEUM DNA NPH QL NAA+NON-PROBE: NOT DETECTED
CALCIUM SERPL-MCNC: 9.6 MG/DL (ref 8.6–10.2)
CHLORIDE SERPL-SCNC: 96 MMOL/L (ref 98–107)
CO2 SERPL-SCNC: 41 MMOL/L (ref 22–29)
COHB: 0.7 % (ref 0–1.5)
CREAT SERPL-MCNC: 0.5 MG/DL (ref 0.5–1)
CRITICAL: ABNORMAL
DATE ANALYZED: ABNORMAL
DATE OF COLLECTION: ABNORMAL
EOSINOPHIL # BLD: 0 K/UL (ref 0.05–0.5)
EOSINOPHILS RELATIVE PERCENT: 0 % (ref 0–6)
ERYTHROCYTE [DISTWIDTH] IN BLOOD BY AUTOMATED COUNT: 18.8 % (ref 11.5–15)
FLUAV RNA NPH QL NAA+NON-PROBE: NOT DETECTED
FLUBV RNA NPH QL NAA+NON-PROBE: NOT DETECTED
GFR, ESTIMATED: >90 ML/MIN/1.73M2
GLUCOSE SERPL-MCNC: 145 MG/DL (ref 74–99)
HADV DNA NPH QL NAA+NON-PROBE: NOT DETECTED
HCO3: 33.7 MMOL/L (ref 22–26)
HCOV 229E RNA NPH QL NAA+NON-PROBE: NOT DETECTED
HCOV HKU1 RNA NPH QL NAA+NON-PROBE: NOT DETECTED
HCOV NL63 RNA NPH QL NAA+NON-PROBE: NOT DETECTED
HCOV OC43 RNA NPH QL NAA+NON-PROBE: NOT DETECTED
HCT VFR BLD AUTO: 30 % (ref 34–48)
HGB BLD-MCNC: 8.5 G/DL (ref 11.5–15.5)
HHB: 5 % (ref 0–5)
HMPV RNA NPH QL NAA+NON-PROBE: NOT DETECTED
HPIV1 RNA NPH QL NAA+NON-PROBE: NOT DETECTED
HPIV2 RNA NPH QL NAA+NON-PROBE: NOT DETECTED
HPIV3 RNA NPH QL NAA+NON-PROBE: NOT DETECTED
HPIV4 RNA NPH QL NAA+NON-PROBE: NOT DETECTED
IMM GRANULOCYTES # BLD AUTO: <0.03 K/UL (ref 0–0.58)
IMM GRANULOCYTES NFR BLD: 0 % (ref 0–5)
LAB: ABNORMAL
LYMPHOCYTES NFR BLD: 0.29 K/UL (ref 1.5–4)
LYMPHOCYTES RELATIVE PERCENT: 5 % (ref 20–42)
Lab: 841
M PNEUMO DNA NPH QL NAA+NON-PROBE: NOT DETECTED
MCH RBC QN AUTO: 26.6 PG (ref 26–35)
MCHC RBC AUTO-ENTMCNC: 28.3 G/DL (ref 32–34.5)
MCV RBC AUTO: 93.8 FL (ref 80–99.9)
METHB: 0.4 % (ref 0–1.5)
MODE: ABNORMAL
MONOCYTES NFR BLD: 0.31 K/UL (ref 0.1–0.95)
MONOCYTES NFR BLD: 6 % (ref 2–12)
NEUTROPHILS NFR BLD: 89 % (ref 43–80)
NEUTS SEG NFR BLD: 4.95 K/UL (ref 1.8–7.3)
O2 SATURATION: 94.9 % (ref 92–98.5)
O2HB: 93.9 % (ref 94–97)
OPERATOR ID: 8214
PATIENT TEMP: 37 C
PCO2: 61.9 MMHG (ref 35–45)
PH BLOOD GAS: 7.35 (ref 7.35–7.45)
PLATELET # BLD AUTO: 202 K/UL (ref 130–450)
PMV BLD AUTO: 9.9 FL (ref 7–12)
PO2: 79.4 MMHG (ref 75–100)
POTASSIUM SERPL-SCNC: 4.2 MMOL/L (ref 3.5–5)
PROCALCITONIN SERPL-MCNC: 1 NG/ML (ref 0–0.08)
RBC # BLD AUTO: 3.2 M/UL (ref 3.5–5.5)
RBC # BLD: ABNORMAL 10*6/UL
RSV RNA NPH QL NAA+NON-PROBE: NOT DETECTED
RV+EV RNA NPH QL NAA+NON-PROBE: NOT DETECTED
SARS-COV-2 RNA NPH QL NAA+NON-PROBE: NOT DETECTED
SODIUM SERPL-SCNC: 143 MMOL/L (ref 132–146)
SOURCE, BLOOD GAS: ABNORMAL
SPECIMEN DESCRIPTION: NORMAL
THB: 10.1 G/DL (ref 11.5–16.5)
TIME ANALYZED: 844
TROPONIN I SERPL HS-MCNC: 24 NG/L (ref 0–9)
TROPONIN I SERPL HS-MCNC: 25 NG/L (ref 0–9)
TROPONIN I SERPL HS-MCNC: 26 NG/L (ref 0–9)
WBC OTHER # BLD: 5.6 K/UL (ref 4.5–11.5)

## 2024-10-23 PROCEDURE — 6360000002 HC RX W HCPCS: Performed by: NURSE PRACTITIONER

## 2024-10-23 PROCEDURE — 0202U NFCT DS 22 TRGT SARS-COV-2: CPT

## 2024-10-23 PROCEDURE — 1200000000 HC SEMI PRIVATE

## 2024-10-23 PROCEDURE — 36415 COLL VENOUS BLD VENIPUNCTURE: CPT

## 2024-10-23 PROCEDURE — 94640 AIRWAY INHALATION TREATMENT: CPT

## 2024-10-23 PROCEDURE — 82805 BLOOD GASES W/O2 SATURATION: CPT

## 2024-10-23 PROCEDURE — 6370000000 HC RX 637 (ALT 250 FOR IP): Performed by: NURSE PRACTITIONER

## 2024-10-23 PROCEDURE — 36600 WITHDRAWAL OF ARTERIAL BLOOD: CPT

## 2024-10-23 PROCEDURE — 84145 PROCALCITONIN (PCT): CPT

## 2024-10-23 PROCEDURE — 80048 BASIC METABOLIC PNL TOTAL CA: CPT

## 2024-10-23 PROCEDURE — 94660 CPAP INITIATION&MGMT: CPT

## 2024-10-23 PROCEDURE — 2700000000 HC OXYGEN THERAPY PER DAY

## 2024-10-23 PROCEDURE — 84484 ASSAY OF TROPONIN QUANT: CPT

## 2024-10-23 PROCEDURE — 2580000003 HC RX 258: Performed by: NURSE PRACTITIONER

## 2024-10-23 PROCEDURE — 97530 THERAPEUTIC ACTIVITIES: CPT

## 2024-10-23 PROCEDURE — 85025 COMPLETE CBC W/AUTO DIFF WBC: CPT

## 2024-10-23 RX ORDER — NITROGLYCERIN 0.4 MG/1
0.4 TABLET SUBLINGUAL EVERY 5 MIN PRN
Status: DISCONTINUED | OUTPATIENT
Start: 2024-10-23 | End: 2024-10-25 | Stop reason: HOSPADM

## 2024-10-23 RX ADMIN — SERTRALINE 50 MG: 50 TABLET, FILM COATED ORAL at 21:11

## 2024-10-23 RX ADMIN — LACTULOSE 20 G: 20 SOLUTION ORAL at 21:11

## 2024-10-23 RX ADMIN — Medication 1 TABLET: at 07:45

## 2024-10-23 RX ADMIN — BUDESONIDE 500 MCG: 0.5 SUSPENSION RESPIRATORY (INHALATION) at 20:39

## 2024-10-23 RX ADMIN — METHYLPREDNISOLONE SODIUM SUCCINATE 40 MG: 40 INJECTION INTRAMUSCULAR; INTRAVENOUS at 17:53

## 2024-10-23 RX ADMIN — PANCRELIPASE LIPASE, PANCRELIPASE PROTEASE, PANCRELIPASE AMYLASE 20000 UNITS: 20000; 63000; 84000 CAPSULE, DELAYED RELEASE ORAL at 11:28

## 2024-10-23 RX ADMIN — SODIUM CHLORIDE, PRESERVATIVE FREE 10 ML: 5 INJECTION INTRAVENOUS at 21:11

## 2024-10-23 RX ADMIN — ARFORMOTEROL TARTRATE 15 MCG: 15 SOLUTION RESPIRATORY (INHALATION) at 09:54

## 2024-10-23 RX ADMIN — ACETAMINOPHEN 650 MG: 325 TABLET ORAL at 05:17

## 2024-10-23 RX ADMIN — IPRATROPIUM BROMIDE AND ALBUTEROL SULFATE 1 DOSE: 2.5; .5 SOLUTION RESPIRATORY (INHALATION) at 13:19

## 2024-10-23 RX ADMIN — POLYETHYLENE GLYCOL 3350 17 G: 17 POWDER, FOR SOLUTION ORAL at 07:45

## 2024-10-23 RX ADMIN — METOCLOPRAMIDE 5 MG: 5 TABLET ORAL at 07:45

## 2024-10-23 RX ADMIN — IPRATROPIUM BROMIDE AND ALBUTEROL SULFATE 1 DOSE: 2.5; .5 SOLUTION RESPIRATORY (INHALATION) at 09:54

## 2024-10-23 RX ADMIN — ARFORMOTEROL TARTRATE 15 MCG: 15 SOLUTION RESPIRATORY (INHALATION) at 20:39

## 2024-10-23 RX ADMIN — ENOXAPARIN SODIUM 40 MG: 100 INJECTION SUBCUTANEOUS at 07:46

## 2024-10-23 RX ADMIN — BUDESONIDE 500 MCG: 0.5 SUSPENSION RESPIRATORY (INHALATION) at 09:54

## 2024-10-23 RX ADMIN — METOCLOPRAMIDE 5 MG: 5 TABLET ORAL at 21:11

## 2024-10-23 RX ADMIN — ASPIRIN 81 MG 81 MG: 81 TABLET ORAL at 07:45

## 2024-10-23 RX ADMIN — METHYLPREDNISOLONE SODIUM SUCCINATE 40 MG: 40 INJECTION INTRAMUSCULAR; INTRAVENOUS at 05:19

## 2024-10-23 RX ADMIN — FOLIC ACID 1 MG: 1 TABLET ORAL at 07:45

## 2024-10-23 RX ADMIN — SODIUM CHLORIDE, PRESERVATIVE FREE 10 ML: 5 INJECTION INTRAVENOUS at 07:46

## 2024-10-23 RX ADMIN — IPRATROPIUM BROMIDE AND ALBUTEROL SULFATE 1 DOSE: 2.5; .5 SOLUTION RESPIRATORY (INHALATION) at 20:40

## 2024-10-23 RX ADMIN — ACETAMINOPHEN 650 MG: 325 TABLET ORAL at 23:38

## 2024-10-23 RX ADMIN — PANCRELIPASE LIPASE, PANCRELIPASE PROTEASE, PANCRELIPASE AMYLASE 20000 UNITS: 20000; 63000; 84000 CAPSULE, DELAYED RELEASE ORAL at 07:45

## 2024-10-23 ASSESSMENT — PAIN SCALES - GENERAL
PAINLEVEL_OUTOF10: 1
PAINLEVEL_OUTOF10: 3
PAINLEVEL_OUTOF10: 3

## 2024-10-23 ASSESSMENT — PAIN DESCRIPTION - LOCATION
LOCATION: HEAD
LOCATION: BACK

## 2024-10-23 ASSESSMENT — PAIN DESCRIPTION - DESCRIPTORS
DESCRIPTORS: ACHING
DESCRIPTORS: ACHING

## 2024-10-23 NOTE — ACP (ADVANCE CARE PLANNING)
Advance Care Planning   Healthcare Decision Maker:    Primary Decision Maker: Nelly mock - Brother/Sister - [phone]    Secondary Decision Maker: Melania Currie - Niece/Nephew - [phone]    Click here to complete Healthcare Decision Makers including selection of the Healthcare Decision Maker Relationship (ie \"Primary\").               Medical Power of  scanned in to the Chart.       Francesca Evans RN.  P:  [phone]

## 2024-10-23 NOTE — DISCHARGE INSTRUCTIONS
Your [facility] , Claudine Hanson, will be reaching out to you to schedule an in-home follow-up visit to evaluate you for additional hours in the community for help at home.              COPD Exacerbation Plan: Care Instructions  Overview    If you have chronic obstructive pulmonary disease (COPD), your symptoms may get worse over a short time and stay bad. This is called an exacerbation (say \"egg-ADITYA-viktor-BAY-shun\") or flare-up. Your shortness of breath, cough, or mucus may get worse.  Many irritants or triggers can cause a flare-up. Common triggers are respiratory infections such as colds, the flu, and pneumonia. Other triggers include indoor and outdoor air pollution such as smoke, fumes, and soot.  Work with your doctor to make a plan for dealing with a flare-up. You can better manage it if you plan ahead.  Follow-up care is a key part of your treatment and safety. Be sure to make and go to all appointments, and call your doctor if you are having problems. It's also a good idea to know your test results and keep a list of the medicines you take.  How can you care for yourself?  During an exacerbation  Do not panic if you start to have one. Quick treatment may help you prevent serious breathing problems. If you have a COPD exacerbation plan that you developed with your doctor, follow it.  Take your medicines exactly as your doctor tells you.  Use your quick-relief inhaler as directed by your doctor. If your symptoms do not get better after you use your medicine, call your doctor, or seek immediate medical care. Call an ambulance if needed.  Use your inhaler or nebulizer correctly. Ask your doctor, pharmacist, or respiratory therapist how to use each of your inhalers or nebulizers.  If your doctor has given you steroid pills, take them as directed.  Your doctor may have given you a prescription for antibiotics, which you can fill if you need it.  Talk to your doctor if you have any problems with your  the symptoms?  In addition to the symptoms of whatever is causing your hypoxemia, you may:  Get tired quickly.  Be short of breath when you are active.  Feel like your heart is pounding or racing.  Feel weak or dizzy.  Become confused.  How is hypoxemia treated?  Your doctor will do tests to find out how much oxygen is in your blood. Your doctor will look for the cause of your hypoxemia and treat that problem. For example, if you have heart failure, you may need medicines that help your heart pump better.  If your hypoxemia is not severe, your doctor may give you oxygen through a mask or nasal cannula (say \"VIRGINIE-yusufh-andreea\"). A cannula is a thin tube with two openings that fit just inside your nose.  If your hypoxemia is severe, you may have a breathing tube put into your windpipe. The breathing tube is attached to a machine that pushes air into your lungs. This machine is called a ventilator.  If you have a long-term problem with hypoxemia, your doctor may recommend that you use oxygen regularly. Some people need it all the time. Others need it from time to time throughout the day or overnight. Your doctor will tell you how much oxygen you need and how often to use it.  Follow-up care is a key part of your treatment and safety. Be sure to make and go to all appointments, and call your doctor if you are having problems. It's also a good idea to know your test results and keep a list of the medicines you take.  Current as of: August 6, 2023  Content Version: 14.2  © 2024 [employer].   Care instructions adapted under license by [employer]. If you have questions about a medical condition or this instruction, always ask your healthcare professional. [employer] disclaims any warranty or liability for your use of this information.

## 2024-10-23 NOTE — PROGRESS NOTES
Physical Therapy  Physical Therapy Treatment    Name: Ayesha Keane  : 1955  MRN: [id number]      Date of Service: 10/23/2024    Evaluating PT:  Ellen Marquez PT, DPT, [id number]    Room #:  8516/8516-A  Diagnosis:  Hypokalemia [E87.6]  COPD with acute exacerbation (HCC) [J44.1]  Acute respiratory failure with hypercapnia [J96.02]  Respiratory failure with hypercapnia [J96.92]  Chest pain, unspecified type [R07.9]  PMHx/PSHx:   has a past medical history of Asthma, Chronic pancreatitis (HCC), COPD (chronic obstructive pulmonary disease) (HCC), Depression, Dysphagia, Emphysema lung (HCC), Jarquin catheter in place, and Oxygen dependent.   has a past surgical history that includes Hysterectomy; Kidney surgery (Left, 2014); pr cystourethroscopy with biopsy (N/A, 2018); Colonoscopy; Upper gastrointestinal endoscopy (N/A, 05/10/2019); Upper gastrointestinal endoscopy (N/A, 2019); Upper gastrointestinal endoscopy (N/A, 2019); Colonoscopy (N/A, 2019); Upper gastrointestinal endoscopy (N/A, 2020); Upper gastrointestinal endoscopy (N/A, 10/25/2021); Upper gastrointestinal endoscopy (N/A, 2021); Colonoscopy (N/A, 2023); Upper gastrointestinal endoscopy (N/A, 2024); and Breast surgery.     Procedure/Surgery:  central line insertion 10/22  Precautions:  Fall Risk, + alarms, monitor SPO2, O2, jarquin catheter  Equipment Needs:  TBD    SUBJECTIVE:    Pt lives alone in a 2 story home with 3 stairs to enter and 0 rail.  Bed is on 2 floor and bath is on 2 floor with chair lift to access. Laundry is in basement with 8 steps and 1 rail to access. Pt reports scooting down the steps.  Pt ambulated with rollator in community and WW in home PTA. Pt has aides 7 days/week from 9am -1pm. Pt is on 3L/min at baseline.    OBJECTIVE:   Initial Evaluation  Date: 10/22/24 Treatment 10/23/2024 Short Term/ Long Term   Goals   AM-PAC 6 Clicks     Was pt agreeable to

## 2024-10-23 NOTE — PROGRESS NOTES
[facility]                                Progress note    Subjective:    The patient is awake and alert.    Sitting up in a chair  States she is feeling breathing much better    Objective:    BP (!) 141/72   Pulse 93   Temp 97.2 °F (36.2 °C) (Oral)   Resp 18   LMP 10/21/1985   SpO2 96%     In: -   Out: 525   In: -   Out: 525 [Urine:525]    General appearance: NAD, conversant  HEENT: AT/NC, MMM  Neck: FROM, supple  Lungs: Diminished, 3L NC  CV: RRR, no MRGs  Vasc: Radial pulses 2+  Abdomen: Soft, non-tender; no masses or HSM  Extremities: No peripheral edema or digital cyanosis  Skin: no rash, lesions or ulcers  Psych: Alert and oriented to person, place and time  Neuro: Alert and interactive     Recent Labs     10/22/24  0515 10/23/24  0455   WBC 11.4 5.6   HGB 9.3* 8.5*   HCT 33.2* 30.0*    202       Recent Labs     10/22/24  0515 10/22/24  0545 10/23/24  0455     --  143   K 3.3* 3.61 4.2   CL 95*  --  96*   CO2 39*  --  41*   BUN 11  --  18   CREATININE 0.6  --  0.5   CALCIUM 8.9  --  9.6       ASSESSMENT:  Acute on Chronic Hypercapnic Respiratory Failure  COPD Exacerbation; Known Hx COPD/Emphysema  Chest Pain with Troponin 36, 58, 24  Hypokalemia, supplemented  Chronic HFpEF. NT ProBNP 963  Hx NSVT  KRISTA with Cpap compliance   Chronic Pancreatitis  Remote ETOH  Atonic Bladder with chronic indwelling catheter  Depression with prior SI  Hyperglycemia with no Hx DM  Anemia with H&H 9.3/33.2        PLAN:  Pulmonary consult   Monitor VS  Monitor CBC, BMP  Check HA1C  Monitor daily weight, I&O, BNP  Continue steroid     10/23/24  -Check respiratory panel, negative  -Continue IV Solu-Medrol 40 mg twice daily, with quick taper to begin tomorrow  -ABGs with a pCO2 of 61.9 today improvement from admission of 105.5  -Stressed compliance with AVAPS at home  -Satting 100 to 96% on room air, wean as able  -Anticipate discharge home tomorrow if ongoing improvement continues    Code status:

## 2024-10-23 NOTE — H&P
[facility]  History and Physical      CHIEF COMPLAINT:    Chief Complaint   Patient presents with    Shortness of Breath        Patient of Lee Vides MD presents with:  Respiratory failure with hypercapnia    History of Present Illness:   Ms.. Keane is a 69 year old  female with a past medical history of Chronic HFpEF, NSVT, COPD, Emphysema, Chronic Hypoxic Respiratory Failure, KRISTA with Cpap compliance, Chronic Pancreatitis, Remote ETOH, Atonic Bladder with chronic indwelling catheter, Depression with prior SI.   Ms. Keane presented to [facility] ED on 10/22/2024 with complaints of dyspnea. She states that this began several hours prior to presentation and reportedly her SaO2 was in the 60s. She also complains of chest pain to her midsternal area without radiation.Complains of a cough without nausea, vomiting, fever or chills.   Upon arrival to the ED, her VS were 99.3-615-%PAP-161/106. EKG Atrial Fibrillation with abberancies and nonspecific ST-T wave abnormality. WBC 11.4. H&H 9.3/33.2.Na 141. K 3.3. Troponin 36. NT ProBNP 963. ABG Ph 7.203. PCO2 105.5. PO2 137.7.CXR with unchanged mild cardiomegaly. She received Solumedrol 125mg IV, Duoneb, KCl, and was admitted to a telemetry monitored unit.         REVIEW OF SYSTEMS:  Pertinent negatives are above in HPI.  10 point ROS otherwise negative.      Past Medical History:   Diagnosis Date    Asthma     Chronic pancreatitis (HCC)     COPD (chronic obstructive pulmonary disease) (HCC)     uses )3 prn daily and nightly / Dr. Blackwell F/U monthly    Depression     Dysphagia 2021    Emphysema lung (HCC)     Del Rio catheter in place     continuous since 2013, changed monthly at [facility]    Oxygen dependent 2017         Past Surgical History:   Procedure Laterality Date    BREAST SURGERY      COLONOSCOPY      COLONOSCOPY N/A 09/04/2019    COLONOSCOPY DIAGNOSTIC performed by Emmett Gleason MD at [facility] ENDOSCOPY    COLONOSCOPY N/A  +BS  Extremities: FROM without synovitis.  No clubbing or cyanosis of the hands.  Skin: no rash, induration, lesions, or ulcers  Psych: Calm and cooperative.  Normal judgement and insight.  Normal mood and affect.  Neuro: Alert and interactive, face symmetric, speech fluent.    LABS:  All labs reviewed.  Of note:  CBC with Differential:    Lab Results   Component Value Date/Time    WBC 11.4 10/22/2024 05:15 AM    RBC 3.48 10/22/2024 05:15 AM    HGB 9.3 10/22/2024 05:15 AM    HCT 33.2 10/22/2024 05:15 AM     10/22/2024 05:15 AM    MCV 95.4 10/22/2024 05:15 AM    MCH 26.7 10/22/2024 05:15 AM    MCHC 28.0 10/22/2024 05:15 AM    RDW 19.3 10/22/2024 05:15 AM    NRBC 1 09/24/2024 05:10 AM    METASPCT 2 09/20/2024 04:34 AM    METASPCT 2.6 10/11/2022 05:41 AM    LYMPHOPCT 1 10/22/2024 05:15 AM    PROMYELOPCT 0.9 05/18/2020 03:48 AM    MONOPCT 1 10/22/2024 05:15 AM    MYELOPCT 2 09/22/2024 05:12 AM    MYELOPCT 1.7 10/18/2022 05:55 AM    EOSPCT 0 10/22/2024 05:15 AM    BASOPCT 0 10/22/2024 05:15 AM    MONOSABS 0.10 10/22/2024 05:15 AM    LYMPHSABS 0.10 10/22/2024 05:15 AM    EOSABS 0.00 10/22/2024 05:15 AM    BASOSABS 0.00 10/22/2024 05:15 AM     CMP:    Lab Results   Component Value Date/Time     10/22/2024 05:15 AM    K 3.61 10/22/2024 05:45 AM    K 4.4 11/17/2022 06:08 AM    CL 95 10/22/2024 05:15 AM    CO2 39 10/22/2024 05:15 AM    BUN 11 10/22/2024 05:15 AM    CREATININE 0.6 10/22/2024 05:15 AM    GFRAA >60 10/17/2022 05:59 AM    LABGLOM >90 10/22/2024 05:15 AM    LABGLOM >90 04/30/2024 05:31 AM    GLUCOSE 248 10/22/2024 05:15 AM    CALCIUM 8.9 10/22/2024 05:15 AM    BILITOT 0.3 10/22/2024 05:15 AM    ALKPHOS 64 10/22/2024 05:15 AM    AST 14 10/22/2024 05:15 AM    ALT 11 10/22/2024 05:15 AM       Imaging:  10/22/2024 CTA Chest:  1. No evidence of pulmonary embolism.  2. Trace bilateral pleural effusions with bibasilar atelectatic change.  Interseptal thickening with some mild ground-glass opacity to suggest

## 2024-10-23 NOTE — CARE COORDINATION
Readmission Assessment  Number of Days since last admission?: (P) 8-30 days  Previous Disposition: (P) Home Alone (patient has aids)  Who is being Interviewed: (P) Patient  What was the patient's/caregiver's perception as to why they think they needed to return back to the hospital?: (P) Other (Comment) (Breathing worsened)  Did you visit your Primary Care Physician after you left the hospital, before you returned this time?: (P) Yes  Did you see a specialist, such as Cardiac, Pulmonary, Orthopedic Physician, etc. after you left the hospital?: (P) Yes  Who advised the patient to return to the hospital?: (P) Self-referral  Does the patient report anything that got in the way of taking their medications?: (P) No  In our efforts to provide the best possible care to you and others like you, can you think of anything that we could have done to help you after you left the hospital the first time, so that you might not have needed to return so soon?: (P) Arrange for more help when leaving the hospital, Improved written discharge instructions, Discharge instructions that are concise, clear, and non contradictory        Francesca Evans RN

## 2024-10-23 NOTE — CARE COORDINATION
Chart reviewed and case reviewed in IDR.  Patient admitted with COPD exacerbation, acute respiratory failure.  Patient with chronic jarquin catheter, on 3L O2 per NC, respiratory panel ordered.  Met with the patient at the bedside to discuss transition of care planning.  Patient lives independently in a two story home with bed and bath on the second floor with three step entry, rail in the front.  Patient has chair lift to the second floor, Bipap, nebulizer, 3L O2 per NC through [employer]tech, walker, Rollator, and BSC.  Patient asking about a power WC as she no longer drives.  Discussed conversation to have with her PCP.  Patient expressed understanding.  Patient's PCP is Dr Lee Vides and she uses [facility] for her [facility].  Patient has [facility] Services 7 days a week for 4 hours in the morning.  Patient also stated that she also has [facility] for nursing monthly to change her catheter and PT and OT were just started as well after her visit with Dr Vides.  Patient also asked if her waiver hours could be increased so she could have assistance at night getting ready for bed and putting her bipap on.  Call placed to the patient's  Claudine Hanson, [phone], with [facility] and detailed  left with number for return call.  If patient requires transportation home, [facility] provide a ride [phone] can be called.  Will continue to follow for further transition of care planning needs.       Francesca Evans RN.  P:  [phone]      Return call received from the patient's  with [facility].  They will reach out the patient after discharge and schedule a follow-up in home to evaluate her for additional help in the community.      Francesca Evans RN.        Case Management Assessment  Initial Evaluation    Date/Time of Evaluation: 10/23/2024 12:23 PM  Assessment Completed by: Francesca Evans RN    If patient is discharged prior to next notation, then this note serves as note for discharge

## 2024-10-23 NOTE — PROGRESS NOTES
4 Eyes Skin Assessment     NAME:  Ayesha Keane  YOB: 1955  MEDICAL RECORD NUMBER:  [id number]    The patient is being assessed for  Transfer to New Unit    I agree that at least one RN has performed a thorough Head to Toe Skin Assessment on the patient. ALL assessment sites listed below have been assessed.      Areas assessed by both nurses:    Head, Face, Ears, Shoulders, Back, Chest, Arms, Elbows, Hands, Sacrum. Buttock, Coccyx, Ischium, Legs. Feet and Heels, and Under Medical Devices         Does the Patient have a Wound? No noted wound(s)       Harrison Prevention initiated by RN: Yes  Wound Care Orders initiated by RN: No    Pressure Injury (Stage 3,4, Unstageable, DTI, NWPT, and Complex wounds) if present, place Wound referral order by RN under : No    New Ostomies, if present place, Ostomy referral order under : No     Nurse 1 eSignature: Electronically signed by Kendra Junior RN on 10/23/24 at 6:11 PM EDT    **SHARE this note so that the co-signing nurse can place an eSignature**    Nurse 2 eSignature: {Esignature:[id number]}

## 2024-10-24 LAB
ANION GAP SERPL CALCULATED.3IONS-SCNC: 7 MMOL/L (ref 7–16)
BASOPHILS # BLD: 0 K/UL (ref 0–0.2)
BASOPHILS NFR BLD: 0 % (ref 0–2)
BUN SERPL-MCNC: 20 MG/DL (ref 6–23)
CALCIUM SERPL-MCNC: 10.1 MG/DL (ref 8.6–10.2)
CHLORIDE SERPL-SCNC: 97 MMOL/L (ref 98–107)
CO2 SERPL-SCNC: 40 MMOL/L (ref 22–29)
CREAT SERPL-MCNC: 0.6 MG/DL (ref 0.5–1)
EOSINOPHIL # BLD: 0 K/UL (ref 0.05–0.5)
EOSINOPHILS RELATIVE PERCENT: 0 % (ref 0–6)
ERYTHROCYTE [DISTWIDTH] IN BLOOD BY AUTOMATED COUNT: 18.6 % (ref 11.5–15)
GFR, ESTIMATED: >90 ML/MIN/1.73M2
GLUCOSE SERPL-MCNC: 158 MG/DL (ref 74–99)
HCT VFR BLD AUTO: 30.8 % (ref 34–48)
HGB BLD-MCNC: 8.7 G/DL (ref 11.5–15.5)
LYMPHOCYTES NFR BLD: 0.61 K/UL (ref 1.5–4)
LYMPHOCYTES RELATIVE PERCENT: 7 % (ref 20–42)
MCH RBC QN AUTO: 26.9 PG (ref 26–35)
MCHC RBC AUTO-ENTMCNC: 28.2 G/DL (ref 32–34.5)
MCV RBC AUTO: 95.1 FL (ref 80–99.9)
MONOCYTES NFR BLD: 0.53 K/UL (ref 0.1–0.95)
MONOCYTES NFR BLD: 6 % (ref 2–12)
NEUTROPHILS NFR BLD: 87 % (ref 43–80)
NEUTS SEG NFR BLD: 7.56 K/UL (ref 1.8–7.3)
NUCLEATED RED BLOOD CELLS: 1 PER 100 WBC
PLATELET # BLD AUTO: 219 K/UL (ref 130–450)
PMV BLD AUTO: 9.5 FL (ref 7–12)
POTASSIUM SERPL-SCNC: 4.2 MMOL/L (ref 3.5–5)
RBC # BLD AUTO: 3.24 M/UL (ref 3.5–5.5)
RBC # BLD: ABNORMAL 10*6/UL
SODIUM SERPL-SCNC: 144 MMOL/L (ref 132–146)
WBC OTHER # BLD: 8.7 K/UL (ref 4.5–11.5)

## 2024-10-24 PROCEDURE — 2700000000 HC OXYGEN THERAPY PER DAY

## 2024-10-24 PROCEDURE — 1200000000 HC SEMI PRIVATE

## 2024-10-24 PROCEDURE — 6360000002 HC RX W HCPCS: Performed by: NURSE PRACTITIONER

## 2024-10-24 PROCEDURE — 85025 COMPLETE CBC W/AUTO DIFF WBC: CPT

## 2024-10-24 PROCEDURE — 36415 COLL VENOUS BLD VENIPUNCTURE: CPT

## 2024-10-24 PROCEDURE — 2580000003 HC RX 258: Performed by: NURSE PRACTITIONER

## 2024-10-24 PROCEDURE — 80048 BASIC METABOLIC PNL TOTAL CA: CPT

## 2024-10-24 PROCEDURE — 6370000000 HC RX 637 (ALT 250 FOR IP): Performed by: NURSE PRACTITIONER

## 2024-10-24 PROCEDURE — 94660 CPAP INITIATION&MGMT: CPT

## 2024-10-24 PROCEDURE — 94640 AIRWAY INHALATION TREATMENT: CPT

## 2024-10-24 RX ORDER — PREDNISONE 10 MG/1
TABLET ORAL
Qty: 30 TABLET | Refills: 0 | Status: SHIPPED | OUTPATIENT
Start: 2024-10-24

## 2024-10-24 RX ORDER — AZITHROMYCIN 250 MG/1
250 TABLET, FILM COATED ORAL DAILY
Status: DISCONTINUED | OUTPATIENT
Start: 2024-10-24 | End: 2024-10-25 | Stop reason: HOSPADM

## 2024-10-24 RX ADMIN — SODIUM CHLORIDE, PRESERVATIVE FREE 10 ML: 5 INJECTION INTRAVENOUS at 20:55

## 2024-10-24 RX ADMIN — BUDESONIDE 500 MCG: 0.5 SUSPENSION RESPIRATORY (INHALATION) at 21:32

## 2024-10-24 RX ADMIN — METOCLOPRAMIDE 5 MG: 5 TABLET ORAL at 20:53

## 2024-10-24 RX ADMIN — Medication 1 TABLET: at 09:01

## 2024-10-24 RX ADMIN — ACETAMINOPHEN 650 MG: 325 TABLET ORAL at 20:53

## 2024-10-24 RX ADMIN — SERTRALINE 50 MG: 50 TABLET, FILM COATED ORAL at 20:53

## 2024-10-24 RX ADMIN — ARFORMOTEROL TARTRATE 15 MCG: 15 SOLUTION RESPIRATORY (INHALATION) at 21:32

## 2024-10-24 RX ADMIN — FOLIC ACID 1 MG: 1 TABLET ORAL at 09:02

## 2024-10-24 RX ADMIN — IPRATROPIUM BROMIDE AND ALBUTEROL SULFATE 1 DOSE: 2.5; .5 SOLUTION RESPIRATORY (INHALATION) at 17:33

## 2024-10-24 RX ADMIN — AZITHROMYCIN DIHYDRATE 250 MG: 250 TABLET ORAL at 17:30

## 2024-10-24 RX ADMIN — ARFORMOTEROL TARTRATE 15 MCG: 15 SOLUTION RESPIRATORY (INHALATION) at 09:34

## 2024-10-24 RX ADMIN — ENOXAPARIN SODIUM 40 MG: 100 INJECTION SUBCUTANEOUS at 09:01

## 2024-10-24 RX ADMIN — PANCRELIPASE LIPASE, PANCRELIPASE PROTEASE, PANCRELIPASE AMYLASE 20000 UNITS: 20000; 63000; 84000 CAPSULE, DELAYED RELEASE ORAL at 11:33

## 2024-10-24 RX ADMIN — IPRATROPIUM BROMIDE AND ALBUTEROL SULFATE 1 DOSE: 2.5; .5 SOLUTION RESPIRATORY (INHALATION) at 09:35

## 2024-10-24 RX ADMIN — ACETAMINOPHEN 650 MG: 325 TABLET ORAL at 09:04

## 2024-10-24 RX ADMIN — METOCLOPRAMIDE 5 MG: 5 TABLET ORAL at 09:02

## 2024-10-24 RX ADMIN — PANCRELIPASE LIPASE, PANCRELIPASE PROTEASE, PANCRELIPASE AMYLASE 20000 UNITS: 20000; 63000; 84000 CAPSULE, DELAYED RELEASE ORAL at 09:02

## 2024-10-24 RX ADMIN — BUDESONIDE 500 MCG: 0.5 SUSPENSION RESPIRATORY (INHALATION) at 09:35

## 2024-10-24 RX ADMIN — PREDNISONE 20 MG: 20 TABLET ORAL at 17:30

## 2024-10-24 RX ADMIN — IPRATROPIUM BROMIDE AND ALBUTEROL SULFATE 1 DOSE: 2.5; .5 SOLUTION RESPIRATORY (INHALATION) at 21:32

## 2024-10-24 RX ADMIN — ASPIRIN 81 MG 81 MG: 81 TABLET ORAL at 09:02

## 2024-10-24 RX ADMIN — SODIUM CHLORIDE, PRESERVATIVE FREE 10 ML: 5 INJECTION INTRAVENOUS at 09:01

## 2024-10-24 RX ADMIN — METHYLPREDNISOLONE SODIUM SUCCINATE 40 MG: 40 INJECTION INTRAMUSCULAR; INTRAVENOUS at 06:18

## 2024-10-24 ASSESSMENT — PAIN DESCRIPTION - ORIENTATION: ORIENTATION: POSTERIOR

## 2024-10-24 ASSESSMENT — PAIN DESCRIPTION - DESCRIPTORS
DESCRIPTORS: ACHING
DESCRIPTORS: ACHING;TENDER;DISCOMFORT

## 2024-10-24 ASSESSMENT — PAIN SCALES - GENERAL
PAINLEVEL_OUTOF10: 3
PAINLEVEL_OUTOF10: 8
PAINLEVEL_OUTOF10: 5

## 2024-10-24 ASSESSMENT — PAIN DESCRIPTION - LOCATION
LOCATION: HEAD
LOCATION: HEAD

## 2024-10-24 ASSESSMENT — PAIN DESCRIPTION - FREQUENCY: FREQUENCY: INTERMITTENT

## 2024-10-24 ASSESSMENT — PAIN DESCRIPTION - PAIN TYPE: TYPE: ACUTE PAIN

## 2024-10-24 ASSESSMENT — PAIN DESCRIPTION - ONSET: ONSET: GRADUAL

## 2024-10-24 NOTE — PROGRESS NOTES
CLINICAL PHARMACY NOTE: MEDS TO BEDS    Total # of Prescriptions Filled: 1   The following medications were delivered to the patient:  Prednisone 10mg tabs    Additional Documentation:  To patient

## 2024-10-24 NOTE — CARE COORDINATION
reached out to patients PCP office Dr Lee Vides at [phone] to schedule follow up D/C appointment.  spoke to office staff and scheduled an appointment on 11/1 at 10:30 AM in office.      spoke to patient on room phone and provided an update on scheduled appointment.     Information added to D/C summary.

## 2024-10-24 NOTE — CARE COORDINATION
10/24. Discharge plan is home when medically stable. Will need ride set up with [employer] when ready. Hilda Shelley RN  1016-attending is recommending home care. Referral made to [facility]. Hilda Shelley RN

## 2024-10-24 NOTE — PROGRESS NOTES
Date: 10/24/2024    Time: 1:24 AM    Patient Placed On BIPAP/CPAP/ Non-Invasive Ventilation?  Yes    If no must comment.  Facial area red/color change? No           If YES are Blister/Lesion present?No   If yes must notify nursing staff  BIPAP/CPAP skin barrier?  Yes    Skin barrier type:mepilexlite       Comments:        Larisa Roberts RCP

## 2024-10-24 NOTE — PROGRESS NOTES
FLORESITA Rao NP also notified that Ayesha  had difficultly walking to the bathroom with assist,short of breath. She has been wearing CPAP off and on thru the day.

## 2024-10-24 NOTE — PROGRESS NOTES
[facility]                                Progress note    Subjective:    The patient is awake and alert.    Resting in bed     Objective:    /81   Pulse (!) 128   Temp 97 °F (36.1 °C) (Temporal)   Resp 20   LMP 10/21/1985   SpO2 93%     In: 680 [P.O.:680]  Out: 1100   In: 680   Out: 1100 [Urine:1100]    General appearance: NAD, conversant  HEENT: AT/NC, MMM  Neck: FROM, supple  Lungs: Diminished, 3L NC  CV: RRR, no MRGs  Vasc: Radial pulses 2+  Abdomen: Soft, non-tender; no masses or HSM  Extremities: No peripheral edema or digital cyanosis  Skin: no rash, lesions or ulcers  Psych: Alert and oriented to person  Neuro: Alert and interactive     Recent Labs     10/22/24  0515 10/23/24  0455 10/24/24  0508   WBC 11.4 5.6 8.7   HGB 9.3* 8.5* 8.7*   HCT 33.2* 30.0* 30.8*    202 219       Recent Labs     10/22/24  0515 10/22/24  0545 10/23/24  0455 10/24/24  0508     --  143 144   K 3.3* 3.61 4.2 4.2   CL 95*  --  96* 97*   CO2 39*  --  41* 40*   BUN 11  --  18 20   CREATININE 0.6  --  0.5 0.6   CALCIUM 8.9  --  9.6 10.1       ASSESSMENT:  Acute on Chronic Hypercapnic Respiratory Failure  COPD Exacerbation; Known Hx COPD/Emphysema  Chest Pain with Troponin 36, 58, 24  Hypokalemia, supplemented  Chronic HFpEF. NT ProBNP 963  Hx NSVT  KRISTA with Cpap compliance   Chronic Pancreatitis  Remote ETOH  Atonic Bladder with chronic indwelling catheter  Depression with prior SI  Hyperglycemia with no Hx DM  Anemia with H&H 9.3/33.2        PLAN:  Pulmonary consult   Monitor VS  Monitor CBC, BMP  Check HA1C  Monitor daily weight, I&O, BNP  Continue steroid     10/23/24  -Check respiratory panel, negative  -Continue IV Solu-Medrol 40 mg twice daily, with quick taper to begin tomorrow  -ABGs with a pCO2 of 61.9 today improvement from admission of 105.5  -Stressed compliance with AVAPS at home  -Satting 100 to 96% on room air, wean as able  -Anticipate discharge home tomorrow if ongoing

## 2024-10-25 VITALS
SYSTOLIC BLOOD PRESSURE: 154 MMHG | OXYGEN SATURATION: 100 % | RESPIRATION RATE: 18 BRPM | TEMPERATURE: 97 F | HEART RATE: 65 BPM | DIASTOLIC BLOOD PRESSURE: 87 MMHG

## 2024-10-25 LAB
ANION GAP SERPL CALCULATED.3IONS-SCNC: 3 MMOL/L (ref 7–16)
BASOPHILS # BLD: 0 K/UL (ref 0–0.2)
BASOPHILS NFR BLD: 0 % (ref 0–2)
BUN SERPL-MCNC: 18 MG/DL (ref 6–23)
CALCIUM SERPL-MCNC: 10 MG/DL (ref 8.6–10.2)
CHLORIDE SERPL-SCNC: 96 MMOL/L (ref 98–107)
CO2 SERPL-SCNC: 42 MMOL/L (ref 22–29)
CREAT SERPL-MCNC: 0.5 MG/DL (ref 0.5–1)
EOSINOPHIL # BLD: 0 K/UL (ref 0.05–0.5)
EOSINOPHILS RELATIVE PERCENT: 0 % (ref 0–6)
ERYTHROCYTE [DISTWIDTH] IN BLOOD BY AUTOMATED COUNT: 18.3 % (ref 11.5–15)
GFR, ESTIMATED: >90 ML/MIN/1.73M2
GLUCOSE SERPL-MCNC: 150 MG/DL (ref 74–99)
HCT VFR BLD AUTO: 30.2 % (ref 34–48)
HGB BLD-MCNC: 8.6 G/DL (ref 11.5–15.5)
IMM GRANULOCYTES # BLD AUTO: 0.03 K/UL (ref 0–0.58)
IMM GRANULOCYTES NFR BLD: 1 % (ref 0–5)
LYMPHOCYTES NFR BLD: 0.69 K/UL (ref 1.5–4)
LYMPHOCYTES RELATIVE PERCENT: 11 % (ref 20–42)
MCH RBC QN AUTO: 26.6 PG (ref 26–35)
MCHC RBC AUTO-ENTMCNC: 28.5 G/DL (ref 32–34.5)
MCV RBC AUTO: 93.5 FL (ref 80–99.9)
MONOCYTES NFR BLD: 0.43 K/UL (ref 0.1–0.95)
MONOCYTES NFR BLD: 7 % (ref 2–12)
NEUTROPHILS NFR BLD: 82 % (ref 43–80)
NEUTS SEG NFR BLD: 5.06 K/UL (ref 1.8–7.3)
PLATELET # BLD AUTO: 265 K/UL (ref 130–450)
PMV BLD AUTO: 9.7 FL (ref 7–12)
POTASSIUM SERPL-SCNC: 4.3 MMOL/L (ref 3.5–5)
RBC # BLD AUTO: 3.23 M/UL (ref 3.5–5.5)
RBC # BLD: ABNORMAL 10*6/UL
SODIUM SERPL-SCNC: 141 MMOL/L (ref 132–146)
WBC OTHER # BLD: 6.2 K/UL (ref 4.5–11.5)

## 2024-10-25 PROCEDURE — 2580000003 HC RX 258: Performed by: NURSE PRACTITIONER

## 2024-10-25 PROCEDURE — 36415 COLL VENOUS BLD VENIPUNCTURE: CPT

## 2024-10-25 PROCEDURE — 85025 COMPLETE CBC W/AUTO DIFF WBC: CPT

## 2024-10-25 PROCEDURE — 2700000000 HC OXYGEN THERAPY PER DAY

## 2024-10-25 PROCEDURE — 80048 BASIC METABOLIC PNL TOTAL CA: CPT

## 2024-10-25 PROCEDURE — 94660 CPAP INITIATION&MGMT: CPT

## 2024-10-25 PROCEDURE — 6370000000 HC RX 637 (ALT 250 FOR IP): Performed by: NURSE PRACTITIONER

## 2024-10-25 PROCEDURE — 6360000002 HC RX W HCPCS: Performed by: NURSE PRACTITIONER

## 2024-10-25 RX ADMIN — Medication 1 TABLET: at 09:51

## 2024-10-25 RX ADMIN — SODIUM CHLORIDE, PRESERVATIVE FREE 10 ML: 5 INJECTION INTRAVENOUS at 09:54

## 2024-10-25 RX ADMIN — PREDNISONE 20 MG: 20 TABLET ORAL at 09:51

## 2024-10-25 RX ADMIN — PANCRELIPASE LIPASE, PANCRELIPASE PROTEASE, PANCRELIPASE AMYLASE 20000 UNITS: 20000; 63000; 84000 CAPSULE, DELAYED RELEASE ORAL at 09:51

## 2024-10-25 RX ADMIN — ASPIRIN 81 MG 81 MG: 81 TABLET ORAL at 09:51

## 2024-10-25 RX ADMIN — ENOXAPARIN SODIUM 40 MG: 100 INJECTION SUBCUTANEOUS at 09:50

## 2024-10-25 RX ADMIN — METOCLOPRAMIDE 5 MG: 5 TABLET ORAL at 09:50

## 2024-10-25 RX ADMIN — FOLIC ACID 1 MG: 1 TABLET ORAL at 09:50

## 2024-10-25 RX ADMIN — PANCRELIPASE LIPASE, PANCRELIPASE PROTEASE, PANCRELIPASE AMYLASE 20000 UNITS: 20000; 63000; 84000 CAPSULE, DELAYED RELEASE ORAL at 13:25

## 2024-10-25 RX ADMIN — AZITHROMYCIN DIHYDRATE 250 MG: 250 TABLET ORAL at 09:50

## 2024-10-25 ASSESSMENT — PAIN SCALES - WONG BAKER: WONGBAKER_NUMERICALRESPONSE: HURTS A LITTLE BIT

## 2024-10-25 NOTE — PROGRESS NOTES
Date: 10/24/2024    Time: 11:04 PM    Patient Placed On BIPAP/CPAP/ Non-Invasive Ventilation?  Yes    If no must comment.  Facial area red/color change? No           If YES are Blister/Lesion present?No   If yes must notify nursing staff  BIPAP/CPAP skin barrier?  Yes    Skin barrier type:mepilexlite       Comments:        Andre Morris RCP

## 2024-10-25 NOTE — CARE COORDINATION
10/25. Discharge order noted. The pt does not want to go home today. Explained that she is ready for discharge and she can go to rehab if she does not feel ready to go home. She does not wish to go to rehab. She said she will call her Godmother to pick her up. Placed call to [facility], [phone]. The RT is currently seeing patients and will call this pt to see what the problem is with her Bipap. Hilda Shelley RN

## 2024-10-28 NOTE — DISCHARGE SUMMARY
[facility]   Discharge summary   Patient ID:  Ayesha Keane  [id number]  69 y.o.  1955    Admit date: 10/22/2024    Discharge date and time: 10/25/24    Admission Diagnoses:   Patient Active Problem List   Diagnosis    COPD (chronic obstructive pulmonary disease) (HCC)    MDD (major depressive disorder)    Hematuria    Neurogenic bladder    Chronic respiratory failure with hypoxia    Anemia requiring transfusions    Gastric wall thickening    Bladder wall thickening    Difficulty in walking, not elsewhere classified    Moderate persistent asthma with (acute) exacerbation    Muscle weakness (generalized)    Need for assistance with personal care    Neuromuscular dysfunction of bladder, unspecified    Personal history of other malignant neoplasm of kidney    Breast pain    COPD exacerbation (HCC)    New onset of congestive heart failure (HCC)    Prolonged Q-T interval on ECG    Acute exacerbation of chronic obstructive pulmonary disease (COPD) (HCC)    Decompensated chronic obstructive pulmonary disease with exacerbation (HCC)    Chest pain    Pancreatic insufficiency    Acute on chronic respiratory failure with hypercapnia    Acute blood loss anemia    Respiratory failure with hypercapnia       Discharge Diagnoses: COPD    Consults: none    Procedures: none    Hospital Course: The patient is a 69 y.o. female of Lee Vides MD ASSESSMENT:  Acute on Chronic Hypercapnic Respiratory Failure  COPD Exacerbation; Known Hx COPD/Emphysema  Chest Pain with Troponin 36, 58, 24  Hypokalemia, supplemented  Chronic HFpEF. NT ProBNP 963  Hx NSVT  KRISTA with Cpap compliance   Chronic Pancreatitis  Remote ETOH  Atonic Bladder with chronic indwelling catheter  Depression with prior SI  Hyperglycemia with no Hx DM  Anemia with H&H 9.3/33.2        PLAN:  Pulmonary consult   Monitor VS  Monitor CBC, BMP  Check HA1C  Monitor daily weight, I&O, BNP  Continue steroid      10/23/24  -Check respiratory panel,

## 2024-11-05 LAB
BACTERIA URNS QL MICRO: ABNORMAL
BILIRUB UR QL STRIP: NEGATIVE
CLARITY UR: ABNORMAL
COLOR UR: YELLOW
EPI CELLS #/AREA URNS HPF: ABNORMAL /HPF
GLUCOSE UR STRIP-MCNC: NEGATIVE MG/DL
HGB UR QL STRIP.AUTO: NEGATIVE
KETONES UR STRIP-MCNC: NEGATIVE MG/DL
LEUKOCYTE ESTERASE UR QL STRIP: NEGATIVE
NITRITE UR QL STRIP: POSITIVE
PH UR STRIP: 6.5 [PH] (ref 5–9)
PROT UR STRIP-MCNC: 100 MG/DL
RBC #/AREA URNS HPF: ABNORMAL /HPF
SP GR UR STRIP: 1.02 (ref 1–1.03)
UROBILINOGEN UR STRIP-ACNC: 1 EU/DL (ref 0–1)
WBC #/AREA URNS HPF: ABNORMAL /HPF

## 2024-11-06 LAB
MICROORGANISM SPEC CULT: ABNORMAL
SPECIMEN DESCRIPTION: ABNORMAL

## 2024-11-20 ENCOUNTER — APPOINTMENT (OUTPATIENT)
Dept: GENERAL RADIOLOGY | Age: 69
DRG: 191 | End: 2024-11-20
Payer: MEDICARE

## 2024-11-20 ENCOUNTER — APPOINTMENT (OUTPATIENT)
Dept: CT IMAGING | Age: 69
DRG: 191 | End: 2024-11-20
Attending: EMERGENCY MEDICINE
Payer: MEDICARE

## 2024-11-20 ENCOUNTER — HOSPITAL ENCOUNTER (INPATIENT)
Age: 69
LOS: 4 days | Discharge: HOME | DRG: 191 | End: 2024-11-25
Attending: EMERGENCY MEDICINE | Admitting: INTERNAL MEDICINE
Payer: MEDICARE

## 2024-11-20 DIAGNOSIS — G89.29 CHRONIC ABDOMINAL PAIN: ICD-10-CM

## 2024-11-20 DIAGNOSIS — N39.0 URINARY TRACT INFECTION IN FEMALE: Primary | ICD-10-CM

## 2024-11-20 DIAGNOSIS — J44.1 COPD EXACERBATION (HCC): ICD-10-CM

## 2024-11-20 DIAGNOSIS — R10.9 CHRONIC ABDOMINAL PAIN: ICD-10-CM

## 2024-11-20 LAB
ALBUMIN SERPL-MCNC: 3.7 G/DL (ref 3.5–5.2)
ALP SERPL-CCNC: 75 U/L (ref 35–104)
ALT SERPL-CCNC: 7 U/L (ref 0–32)
ANION GAP SERPL CALCULATED.3IONS-SCNC: 13 MMOL/L (ref 7–16)
AST SERPL-CCNC: 11 U/L (ref 0–31)
BACTERIA URNS QL MICRO: ABNORMAL
BASOPHILS # BLD: 0.02 K/UL (ref 0–0.2)
BASOPHILS NFR BLD: 0 % (ref 0–2)
BILIRUB SERPL-MCNC: 0.2 MG/DL (ref 0–1.2)
BILIRUB UR QL STRIP: NEGATIVE
BNP SERPL-MCNC: 179 PG/ML (ref 0–125)
BUN SERPL-MCNC: 11 MG/DL (ref 6–23)
CALCIUM SERPL-MCNC: 10.3 MG/DL (ref 8.6–10.2)
CHLORIDE SERPL-SCNC: 93 MMOL/L (ref 98–107)
CLARITY UR: ABNORMAL
CO2 SERPL-SCNC: 35 MMOL/L (ref 22–29)
COLOR UR: YELLOW
CREAT SERPL-MCNC: 0.5 MG/DL (ref 0.5–1)
CRYSTALS URNS MICRO: ABNORMAL /HPF
EOSINOPHIL # BLD: 0.1 K/UL (ref 0.05–0.5)
EOSINOPHILS RELATIVE PERCENT: 1 % (ref 0–6)
ERYTHROCYTE [DISTWIDTH] IN BLOOD BY AUTOMATED COUNT: 14.6 % (ref 11.5–15)
GFR, ESTIMATED: >90 ML/MIN/1.73M2
GLUCOSE SERPL-MCNC: 140 MG/DL (ref 74–99)
GLUCOSE UR STRIP-MCNC: NEGATIVE MG/DL
HCT VFR BLD AUTO: 31.8 % (ref 34–48)
HGB BLD-MCNC: 9.2 G/DL (ref 11.5–15.5)
HGB UR QL STRIP.AUTO: ABNORMAL
IMM GRANULOCYTES # BLD AUTO: 0.05 K/UL (ref 0–0.58)
IMM GRANULOCYTES NFR BLD: 1 % (ref 0–5)
KETONES UR STRIP-MCNC: NEGATIVE MG/DL
LACTATE BLDV-SCNC: 1.1 MMOL/L (ref 0.5–2.2)
LEUKOCYTE ESTERASE UR QL STRIP: ABNORMAL
LIPASE SERPL-CCNC: 35 U/L (ref 13–60)
LYMPHOCYTES NFR BLD: 0.72 K/UL (ref 1.5–4)
LYMPHOCYTES RELATIVE PERCENT: 10 % (ref 20–42)
MCH RBC QN AUTO: 26.4 PG (ref 26–35)
MCHC RBC AUTO-ENTMCNC: 28.9 G/DL (ref 32–34.5)
MCV RBC AUTO: 91.4 FL (ref 80–99.9)
MONOCYTES NFR BLD: 0.39 K/UL (ref 0.1–0.95)
MONOCYTES NFR BLD: 5 % (ref 2–12)
NEUTROPHILS NFR BLD: 83 % (ref 43–80)
NEUTS SEG NFR BLD: 6.26 K/UL (ref 1.8–7.3)
NITRITE UR QL STRIP: NEGATIVE
PH UR STRIP: 8 [PH] (ref 5–9)
PLATELET # BLD AUTO: 556 K/UL (ref 130–450)
PMV BLD AUTO: 8.7 FL (ref 7–12)
POTASSIUM SERPL-SCNC: 4.1 MMOL/L (ref 3.5–5)
PROT SERPL-MCNC: 7 G/DL (ref 6.4–8.3)
PROT UR STRIP-MCNC: ABNORMAL MG/DL
RBC # BLD AUTO: 3.48 M/UL (ref 3.5–5.5)
RBC # BLD: ABNORMAL 10*6/UL
RBC #/AREA URNS HPF: ABNORMAL /HPF
SODIUM SERPL-SCNC: 141 MMOL/L (ref 132–146)
SP GR UR STRIP: 1.01 (ref 1–1.03)
TROPONIN I SERPL HS-MCNC: 30 NG/L (ref 0–9)
UROBILINOGEN UR STRIP-ACNC: 0.2 EU/DL (ref 0–1)
WBC #/AREA URNS HPF: ABNORMAL /HPF
WBC OTHER # BLD: 7.5 K/UL (ref 4.5–11.5)

## 2024-11-20 PROCEDURE — 83690 ASSAY OF LIPASE: CPT

## 2024-11-20 PROCEDURE — 83605 ASSAY OF LACTIC ACID: CPT

## 2024-11-20 PROCEDURE — 71045 X-RAY EXAM CHEST 1 VIEW: CPT

## 2024-11-20 PROCEDURE — 6360000004 HC RX CONTRAST MEDICATION: Performed by: RADIOLOGY

## 2024-11-20 PROCEDURE — 93005 ELECTROCARDIOGRAM TRACING: CPT | Performed by: EMERGENCY MEDICINE

## 2024-11-20 PROCEDURE — 84484 ASSAY OF TROPONIN QUANT: CPT

## 2024-11-20 PROCEDURE — 71275 CT ANGIOGRAPHY CHEST: CPT

## 2024-11-20 PROCEDURE — 74177 CT ABD & PELVIS W/CONTRAST: CPT

## 2024-11-20 PROCEDURE — 99285 EMERGENCY DEPT VISIT HI MDM: CPT

## 2024-11-20 PROCEDURE — 6370000000 HC RX 637 (ALT 250 FOR IP): Performed by: EMERGENCY MEDICINE

## 2024-11-20 PROCEDURE — 85025 COMPLETE CBC W/AUTO DIFF WBC: CPT

## 2024-11-20 PROCEDURE — 80053 COMPREHEN METABOLIC PANEL: CPT

## 2024-11-20 PROCEDURE — 83880 ASSAY OF NATRIURETIC PEPTIDE: CPT

## 2024-11-20 PROCEDURE — 81001 URINALYSIS AUTO W/SCOPE: CPT

## 2024-11-20 PROCEDURE — 94640 AIRWAY INHALATION TREATMENT: CPT

## 2024-11-20 RX ORDER — IOPAMIDOL 755 MG/ML
75 INJECTION, SOLUTION INTRAVASCULAR
Status: COMPLETED | OUTPATIENT
Start: 2024-11-20 | End: 2024-11-20

## 2024-11-20 RX ORDER — IPRATROPIUM BROMIDE AND ALBUTEROL SULFATE 2.5; .5 MG/3ML; MG/3ML
1 SOLUTION RESPIRATORY (INHALATION) ONCE
Status: COMPLETED | OUTPATIENT
Start: 2024-11-20 | End: 2024-11-20

## 2024-11-20 RX ADMIN — IOPAMIDOL 75 ML: 755 INJECTION, SOLUTION INTRAVENOUS at 23:49

## 2024-11-20 RX ADMIN — IPRATROPIUM BROMIDE AND ALBUTEROL SULFATE 1 DOSE: .5; 3 SOLUTION RESPIRATORY (INHALATION) at 21:15

## 2024-11-21 LAB
B PARAP IS1001 DNA NPH QL NAA+NON-PROBE: NOT DETECTED
B PERT DNA SPEC QL NAA+PROBE: NOT DETECTED
B.E.: 7.5 MMOL/L (ref -3–3)
BASOPHILS # BLD: 0 K/UL (ref 0–0.2)
BASOPHILS NFR BLD: 0 % (ref 0–2)
C PNEUM DNA NPH QL NAA+NON-PROBE: NOT DETECTED
COHB: 0.1 % (ref 0–1.5)
CRITICAL: ABNORMAL
CRP SERPL HS-MCNC: 85 MG/L (ref 0–5)
DATE ANALYZED: ABNORMAL
DATE OF COLLECTION: ABNORMAL
EKG ATRIAL RATE: 91 BPM
EKG P AXIS: 84 DEGREES
EKG P-R INTERVAL: 158 MS
EKG Q-T INTERVAL: 360 MS
EKG QRS DURATION: 80 MS
EKG QTC CALCULATION (BAZETT): 442 MS
EKG R AXIS: 72 DEGREES
EKG T AXIS: 89 DEGREES
EKG VENTRICULAR RATE: 91 BPM
EOSINOPHIL # BLD: 0.09 K/UL (ref 0.05–0.5)
EOSINOPHILS RELATIVE PERCENT: 1 % (ref 0–6)
ERYTHROCYTE [DISTWIDTH] IN BLOOD BY AUTOMATED COUNT: 14.6 % (ref 11.5–15)
ERYTHROCYTE [SEDIMENTATION RATE] IN BLOOD BY WESTERGREN METHOD: 84 MM/HR (ref 0–20)
FLUAV RNA NPH QL NAA+NON-PROBE: NOT DETECTED
FLUBV RNA NPH QL NAA+NON-PROBE: NOT DETECTED
HADV DNA NPH QL NAA+NON-PROBE: NOT DETECTED
HCO3: 34.2 MMOL/L (ref 22–26)
HCOV 229E RNA NPH QL NAA+NON-PROBE: NOT DETECTED
HCOV HKU1 RNA NPH QL NAA+NON-PROBE: NOT DETECTED
HCOV NL63 RNA NPH QL NAA+NON-PROBE: NOT DETECTED
HCOV OC43 RNA NPH QL NAA+NON-PROBE: NOT DETECTED
HCT VFR BLD AUTO: 31.8 % (ref 34–48)
HGB BLD-MCNC: 9.3 G/DL (ref 11.5–15.5)
HHB: 7.2 % (ref 0–5)
HMPV RNA NPH QL NAA+NON-PROBE: NOT DETECTED
HPIV1 RNA NPH QL NAA+NON-PROBE: NOT DETECTED
HPIV2 RNA NPH QL NAA+NON-PROBE: NOT DETECTED
HPIV3 RNA NPH QL NAA+NON-PROBE: NOT DETECTED
HPIV4 RNA NPH QL NAA+NON-PROBE: NOT DETECTED
LAB: ABNORMAL
LYMPHOCYTES NFR BLD: 0.18 K/UL (ref 1.5–4)
LYMPHOCYTES RELATIVE PERCENT: 2 % (ref 20–42)
Lab: 1130
M PNEUMO DNA NPH QL NAA+NON-PROBE: NOT DETECTED
MCH RBC QN AUTO: 26.6 PG (ref 26–35)
MCHC RBC AUTO-ENTMCNC: 29.2 G/DL (ref 32–34.5)
MCV RBC AUTO: 91.1 FL (ref 80–99.9)
METHB: 0.3 % (ref 0–1.5)
MODE: ABNORMAL
MONOCYTES NFR BLD: 0 % (ref 2–12)
MONOCYTES NFR BLD: 0 K/UL (ref 0.1–0.95)
NEUTROPHILS NFR BLD: 97 % (ref 43–80)
NEUTS SEG NFR BLD: 10.22 K/UL (ref 1.8–7.3)
O2 SATURATION: 92.8 % (ref 92–98.5)
O2HB: 92.4 % (ref 94–97)
OPERATOR ID: ABNORMAL
PATIENT TEMP: 37 C
PCO2: 60.3 MMHG (ref 35–45)
PH BLOOD GAS: 7.37 (ref 7.35–7.45)
PLATELET # BLD AUTO: 600 K/UL (ref 130–450)
PMV BLD AUTO: 9 FL (ref 7–12)
PO2: 72.4 MMHG (ref 75–100)
PROCALCITONIN SERPL-MCNC: 0.14 NG/ML (ref 0–0.08)
RBC # BLD AUTO: 3.49 M/UL (ref 3.5–5.5)
RBC # BLD: ABNORMAL 10*6/UL
RSV RNA NPH QL NAA+NON-PROBE: NOT DETECTED
RV+EV RNA NPH QL NAA+NON-PROBE: NOT DETECTED
SARS-COV-2 RNA NPH QL NAA+NON-PROBE: NOT DETECTED
SOURCE, BLOOD GAS: ABNORMAL
SPECIMEN DESCRIPTION: NORMAL
THB: 10.1 G/DL (ref 11.5–16.5)
TIME ANALYZED: 1136
TROPONIN I SERPL HS-MCNC: 19 NG/L (ref 0–9)
TROPONIN I SERPL HS-MCNC: 21 NG/L (ref 0–9)
WBC OTHER # BLD: 10.5 K/UL (ref 4.5–11.5)

## 2024-11-21 PROCEDURE — 2580000003 HC RX 258: Performed by: INTERNAL MEDICINE

## 2024-11-21 PROCEDURE — 6360000002 HC RX W HCPCS: Performed by: EMERGENCY MEDICINE

## 2024-11-21 PROCEDURE — 87899 AGENT NOS ASSAY W/OPTIC: CPT

## 2024-11-21 PROCEDURE — 2140000000 HC CCU INTERMEDIATE R&B

## 2024-11-21 PROCEDURE — 2700000000 HC OXYGEN THERAPY PER DAY

## 2024-11-21 PROCEDURE — 2580000003 HC RX 258: Performed by: EMERGENCY MEDICINE

## 2024-11-21 PROCEDURE — 84145 PROCALCITONIN (PCT): CPT

## 2024-11-21 PROCEDURE — 6360000002 HC RX W HCPCS: Performed by: INTERNAL MEDICINE

## 2024-11-21 PROCEDURE — 2500000003 HC RX 250 WO HCPCS: Performed by: INTERNAL MEDICINE

## 2024-11-21 PROCEDURE — 84484 ASSAY OF TROPONIN QUANT: CPT

## 2024-11-21 PROCEDURE — 82805 BLOOD GASES W/O2 SATURATION: CPT

## 2024-11-21 PROCEDURE — 85025 COMPLETE CBC W/AUTO DIFF WBC: CPT

## 2024-11-21 PROCEDURE — 87081 CULTURE SCREEN ONLY: CPT

## 2024-11-21 PROCEDURE — 6360000002 HC RX W HCPCS: Performed by: PHYSICIAN ASSISTANT

## 2024-11-21 PROCEDURE — 87086 URINE CULTURE/COLONY COUNT: CPT

## 2024-11-21 PROCEDURE — 2580000003 HC RX 258: Performed by: PHYSICIAN ASSISTANT

## 2024-11-21 PROCEDURE — 6370000000 HC RX 637 (ALT 250 FOR IP): Performed by: PHYSICIAN ASSISTANT

## 2024-11-21 PROCEDURE — 0202U NFCT DS 22 TRGT SARS-COV-2: CPT

## 2024-11-21 PROCEDURE — 93010 ELECTROCARDIOGRAM REPORT: CPT | Performed by: INTERNAL MEDICINE

## 2024-11-21 PROCEDURE — 85652 RBC SED RATE AUTOMATED: CPT

## 2024-11-21 PROCEDURE — 94664 DEMO&/EVAL PT USE INHALER: CPT

## 2024-11-21 PROCEDURE — 94640 AIRWAY INHALATION TREATMENT: CPT

## 2024-11-21 PROCEDURE — 86140 C-REACTIVE PROTEIN: CPT

## 2024-11-21 RX ORDER — ACETAMINOPHEN 325 MG/1
650 TABLET ORAL EVERY 6 HOURS PRN
Status: DISCONTINUED | OUTPATIENT
Start: 2024-11-21 | End: 2024-11-25 | Stop reason: HOSPADM

## 2024-11-21 RX ORDER — PANTOPRAZOLE SODIUM 40 MG/1
40 TABLET, DELAYED RELEASE ORAL
Status: DISCONTINUED | OUTPATIENT
Start: 2024-11-21 | End: 2024-11-25 | Stop reason: HOSPADM

## 2024-11-21 RX ORDER — BUDESONIDE 0.5 MG/2ML
500 INHALANT ORAL 2 TIMES DAILY
Status: DISCONTINUED | OUTPATIENT
Start: 2024-11-21 | End: 2024-11-25 | Stop reason: HOSPADM

## 2024-11-21 RX ORDER — ARFORMOTEROL TARTRATE 15 UG/2ML
15 SOLUTION RESPIRATORY (INHALATION) 2 TIMES DAILY
Status: DISCONTINUED | OUTPATIENT
Start: 2024-11-21 | End: 2024-11-25 | Stop reason: HOSPADM

## 2024-11-21 RX ORDER — SODIUM CHLORIDE 0.9 % (FLUSH) 0.9 %
5-40 SYRINGE (ML) INJECTION PRN
Status: DISCONTINUED | OUTPATIENT
Start: 2024-11-21 | End: 2024-11-25 | Stop reason: HOSPADM

## 2024-11-21 RX ORDER — ALENDRONATE SODIUM 70 MG/1
70 TABLET ORAL
COMMUNITY

## 2024-11-21 RX ORDER — SODIUM CHLORIDE 9 MG/ML
INJECTION, SOLUTION INTRAVENOUS PRN
Status: DISCONTINUED | OUTPATIENT
Start: 2024-11-21 | End: 2024-11-25 | Stop reason: HOSPADM

## 2024-11-21 RX ORDER — POLYETHYLENE GLYCOL 3350 17 G/17G
17 POWDER, FOR SOLUTION ORAL DAILY PRN
Status: DISCONTINUED | OUTPATIENT
Start: 2024-11-21 | End: 2024-11-25 | Stop reason: HOSPADM

## 2024-11-21 RX ORDER — ASPIRIN 81 MG/1
81 TABLET, CHEWABLE ORAL DAILY
Status: DISCONTINUED | OUTPATIENT
Start: 2024-11-21 | End: 2024-11-25 | Stop reason: HOSPADM

## 2024-11-21 RX ORDER — ONDANSETRON 2 MG/ML
4 INJECTION INTRAMUSCULAR; INTRAVENOUS EVERY 6 HOURS PRN
Status: DISCONTINUED | OUTPATIENT
Start: 2024-11-21 | End: 2024-11-25 | Stop reason: HOSPADM

## 2024-11-21 RX ORDER — SODIUM CHLORIDE 0.9 % (FLUSH) 0.9 %
5-40 SYRINGE (ML) INJECTION EVERY 12 HOURS SCHEDULED
Status: DISCONTINUED | OUTPATIENT
Start: 2024-11-21 | End: 2024-11-25 | Stop reason: HOSPADM

## 2024-11-21 RX ORDER — ENOXAPARIN SODIUM 100 MG/ML
40 INJECTION SUBCUTANEOUS DAILY
Status: DISCONTINUED | OUTPATIENT
Start: 2024-11-21 | End: 2024-11-25 | Stop reason: HOSPADM

## 2024-11-21 RX ORDER — PROMETHAZINE HYDROCHLORIDE 12.5 MG/1
12.5 TABLET ORAL EVERY 6 HOURS PRN
Status: DISCONTINUED | OUTPATIENT
Start: 2024-11-21 | End: 2024-11-25 | Stop reason: HOSPADM

## 2024-11-21 RX ORDER — PREDNISONE 20 MG/1
40 TABLET ORAL DAILY
Status: DISCONTINUED | OUTPATIENT
Start: 2024-11-22 | End: 2024-11-21

## 2024-11-21 RX ORDER — ACETAMINOPHEN 650 MG/1
650 SUPPOSITORY RECTAL EVERY 6 HOURS PRN
Status: DISCONTINUED | OUTPATIENT
Start: 2024-11-21 | End: 2024-11-25 | Stop reason: HOSPADM

## 2024-11-21 RX ORDER — IPRATROPIUM BROMIDE AND ALBUTEROL SULFATE 2.5; .5 MG/3ML; MG/3ML
1 SOLUTION RESPIRATORY (INHALATION) EVERY 4 HOURS PRN
Status: DISCONTINUED | OUTPATIENT
Start: 2024-11-21 | End: 2024-11-25 | Stop reason: HOSPADM

## 2024-11-21 RX ADMIN — DOXYCYCLINE 100 MG: 100 INJECTION, POWDER, LYOPHILIZED, FOR SOLUTION INTRAVENOUS at 20:21

## 2024-11-21 RX ADMIN — IPRATROPIUM BROMIDE AND ALBUTEROL SULFATE 1 DOSE: 2.5; .5 SOLUTION RESPIRATORY (INHALATION) at 05:39

## 2024-11-21 RX ADMIN — SODIUM CHLORIDE, PRESERVATIVE FREE 10 ML: 5 INJECTION INTRAVENOUS at 09:30

## 2024-11-21 RX ADMIN — WATER 1000 MG: 1 INJECTION INTRAMUSCULAR; INTRAVENOUS; SUBCUTANEOUS at 02:25

## 2024-11-21 RX ADMIN — WATER 40 MG: 1 INJECTION INTRAMUSCULAR; INTRAVENOUS; SUBCUTANEOUS at 18:07

## 2024-11-21 RX ADMIN — PANTOPRAZOLE SODIUM 40 MG: 40 TABLET, DELAYED RELEASE ORAL at 05:39

## 2024-11-21 RX ADMIN — BUDESONIDE 500 MCG: 0.5 SUSPENSION RESPIRATORY (INHALATION) at 09:00

## 2024-11-21 RX ADMIN — WATER 60 MG: 1 INJECTION INTRAMUSCULAR; INTRAVENOUS; SUBCUTANEOUS at 02:25

## 2024-11-21 RX ADMIN — DOXYCYCLINE 100 MG: 100 INJECTION, POWDER, LYOPHILIZED, FOR SOLUTION INTRAVENOUS at 09:44

## 2024-11-21 RX ADMIN — BUDESONIDE 500 MCG: 0.5 SUSPENSION RESPIRATORY (INHALATION) at 20:36

## 2024-11-21 RX ADMIN — WATER 40 MG: 1 INJECTION INTRAMUSCULAR; INTRAVENOUS; SUBCUTANEOUS at 09:29

## 2024-11-21 RX ADMIN — SODIUM CHLORIDE, PRESERVATIVE FREE 10 ML: 5 INJECTION INTRAVENOUS at 20:17

## 2024-11-21 RX ADMIN — ARFORMOTEROL TARTRATE 15 MCG: 15 SOLUTION RESPIRATORY (INHALATION) at 09:00

## 2024-11-21 RX ADMIN — ASPIRIN 81 MG CHEWABLE TABLET 81 MG: 81 TABLET CHEWABLE at 09:29

## 2024-11-21 RX ADMIN — SERTRALINE 50 MG: 50 TABLET, FILM COATED ORAL at 20:17

## 2024-11-21 RX ADMIN — ARFORMOTEROL TARTRATE 15 MCG: 15 SOLUTION RESPIRATORY (INHALATION) at 20:36

## 2024-11-21 RX ADMIN — ENOXAPARIN SODIUM 40 MG: 100 INJECTION SUBCUTANEOUS at 09:29

## 2024-11-21 RX ADMIN — ACETAMINOPHEN 650 MG: 325 TABLET ORAL at 19:08

## 2024-11-21 RX ADMIN — IPRATROPIUM BROMIDE AND ALBUTEROL SULFATE 1 DOSE: 2.5; .5 SOLUTION RESPIRATORY (INHALATION) at 09:00

## 2024-11-21 ASSESSMENT — PAIN - FUNCTIONAL ASSESSMENT: PAIN_FUNCTIONAL_ASSESSMENT: ACTIVITIES ARE NOT PREVENTED

## 2024-11-21 ASSESSMENT — PAIN SCALES - GENERAL
PAINLEVEL_OUTOF10: 0
PAINLEVEL_OUTOF10: 0
PAINLEVEL_OUTOF10: 3
PAINLEVEL_OUTOF10: 0

## 2024-11-21 ASSESSMENT — PAIN DESCRIPTION - LOCATION: LOCATION: VAGINA;ABDOMEN

## 2024-11-21 ASSESSMENT — PAIN SCALES - WONG BAKER
WONGBAKER_NUMERICALRESPONSE: NO HURT

## 2024-11-21 ASSESSMENT — PAIN DESCRIPTION - ORIENTATION: ORIENTATION: LOWER

## 2024-11-21 ASSESSMENT — PAIN DESCRIPTION - DESCRIPTORS: DESCRIPTORS: ACHING;PRESSURE;DISCOMFORT

## 2024-11-21 NOTE — H&P
Hospital Medicine History & Physical      PCP: Lee Vides MD    Date of Admission: 11/20/2024    Date of Service:  NOV 21, 2024    Chief Complaint:  SOB      History Of Present Illness:     69 y.o. female presented with SOB.  SHE HAS KNOWN COPD AND WAS ON HOME O2.  SHE WENT TO THE UROLOGIST, AND APPARENTLY BECAME CONFUSED AND WAS SENT TO THE ER.   SHE DENIES FEVER, CHILLS, NV, BUT SHE WAS SOB     Past Medical History:          Diagnosis Date    Asthma     Chronic pancreatitis (HCC)     COPD (chronic obstructive pulmonary disease) (HCC)     uses )3 prn daily and nightly / Dr. Blackwell F/U monthly    Depression     Dysphagia 2021    Emphysema lung (HCC)     Del Rio catheter in place     continuous since 2013, changed monthly at [facility]    Oxygen dependent 2017       Past Surgical History:          Procedure Laterality Date    BREAST SURGERY      COLONOSCOPY      COLONOSCOPY N/A 09/04/2019    COLONOSCOPY DIAGNOSTIC performed by Emmett Gleason MD at [facility] ENDOSCOPY    COLONOSCOPY N/A 08/04/2023    COLORECTAL CANCER SCREENING, NOT HIGH RISK   (HOLD TIME 0900) performed by Hernandez TORRES MD at [facility] ENDOSCOPY    HYSTERECTOMY (CERVIX STATUS UNKNOWN)      KIDNEY SURGERY Left 06/16/2014    ABLATION PERFORMED UNDER CT SCAN    [place] CYSTOURETHROSCOPY WITH BIOPSY N/A 09/14/2018    CYSTOSCOPY RETROGRADE PYELOGRAM, CLOT EVACATION , POSS.  BLADDER BIOPSY ---DR WRIGHT 2 :30 performed by David Sue DO at [facility] OR    UPPER GASTROINTESTINAL ENDOSCOPY N/A 05/10/2019    EGD BIOPSY performed by Emmett Gleason MD at [facility] ENDOSCOPY    UPPER GASTROINTESTINAL ENDOSCOPY N/A 06/28/2019    EGD EUS performed by Cholo Scott DO at [facility] ENDOSCOPY    UPPER GASTROINTESTINAL ENDOSCOPY N/A 06/28/2019    EGD performed by Cholo Scott DO at [facility] ENDOSCOPY    UPPER GASTROINTESTINAL ENDOSCOPY N/A 05/19/2020    EGD DIAGNOSTIC ONLY

## 2024-11-21 NOTE — ED PROVIDER NOTES
Department of Emergency Medicine   ED  Provider Note  Admit Date/RoomTime: 2024  4:27 PM  ED Room: 6418/6418-B        Written by: Gaby Cardoso DO  Patient Name: Ayesha Keane  Attending Provider: Elsa Zuluaga MD  Admit Date: 2024  4:27 PM  MRN: [id number]    : 1955      History of Present Illness:  24, Time: 1:04 AM EST  Chief Complaint   Patient presents with    Abdominal Pain     Has 9/10 abdominal pain and vaginal pain.           HPI   Patient is a 69 y.o. female with a past medical history of COPD, neurogenic bladder, CHF, pancreatic insufficiency, chronic pancreatitis, presenting to the Emergency Department for abdominal pain, shortness of breath, vaginal pain due to catheter. History obtained by patient.  Patient states that she presented to an outpatient appointment today with gastroenterology.  When she checked and they asked her if she was feeling and she informed that she was having a belly pain as well as shortness of breath.  She states she is chronic shortness of breath but she felt like it was worsening.  She states she has chronic abdominal pain as well which she also thought was worsening.  She states that they checked her heart rate and it was found to be high.  She does wear chronic O2 at baseline.  Because of her symptoms she did not get seen by the physician no subsequently transferred to the ER for further evaluation  Chronic Del Rio catheter due to neurogenic bladder.  Her abdominal pain is diffuse and does not radiate.  She denies any active chest pain and states she is short of breath.  She denies any coughing, fevers or chills        Review of Systems:   A complete review of systems was performed and pertinent positives and negatives are stated within HPI, all other systems reviewed and are negative.        --------------------------------------------- PAST HISTORY ---------------------------------------------  Past Medical History:  has a past

## 2024-11-21 NOTE — CONSULTS
able to discharge home with home health care.  We will attempt to obtain download of her compliance report from her [employer] and home settings for NIV.    She presented to the ED 11/20/2024 was seen yesterday at an outpatient gastroenterology appointment.  During the appointment she was complaining of abdominal pain and shortness of breath.  She had an elevated heart rate.  She was sent to the ED from the doctor office via EMS for further evaluation.    Current lab testing ABG 7.3 7/60/72/34/7.5/92% on 3 L nasal cannula.  WBC 10.5, hemoglobin 9.3, high-sensitivity troponin 19, sodium 141, potassium 4.1, BUN 11, creatinine 0.5.  Urinalysis with large leukocytes, WBCs, bacteria increased sediment.    Radiology review-chest x-ray with no acute process.  CTA chest severe emphysema, chronic pancreatitis, hiatal hernia, diverticulosis.  No acute PE.    She is chronically on oxygen 3 L nasal cannula SpO2 95%.  She does not recall her NIV settings.  She does not use it as prescribed.  Will attempt to obtain compliance report.  She has been placed on Brovana and budesonide, DuoNebs.  IV Solu-Medrol 40 mg every 8 hours.  Respiratory viral panel pending.    Past Medical History:   Diagnosis Date    Asthma     Chronic pancreatitis (HCC)     COPD (chronic obstructive pulmonary disease) (HCC)     uses )3 prn daily and nightly / Dr. Blackwell F/U monthly    Depression     Dysphagia 2021    Emphysema lung (HCC)     Del Rio catheter in place     continuous since 2013, changed monthly at [facility]    Oxygen dependent 2017       Past Surgical History:   Procedure Laterality Date    BREAST SURGERY      COLONOSCOPY      COLONOSCOPY N/A 09/04/2019    COLONOSCOPY DIAGNOSTIC performed by Emmett Gleason MD at [facility] ENDOSCOPY    COLONOSCOPY N/A 08/04/2023    COLORECTAL CANCER SCREENING, NOT HIGH RISK   (HOLD TIME 0900) performed by Hernandez TORRES MD at [facility] ENDOSCOPY    HYSTERECTOMY (CERVIX STATUS UNKNOWN)      KIDNEY SURGERY Left 06/16/2014

## 2024-11-22 LAB
ANION GAP SERPL CALCULATED.3IONS-SCNC: 6 MMOL/L (ref 7–16)
BASOPHILS # BLD: 0 K/UL (ref 0–0.2)
BASOPHILS NFR BLD: 0 % (ref 0–2)
BUN SERPL-MCNC: 14 MG/DL (ref 6–23)
CALCIUM SERPL-MCNC: 10.3 MG/DL (ref 8.6–10.2)
CHLORIDE SERPL-SCNC: 95 MMOL/L (ref 98–107)
CO2 SERPL-SCNC: 37 MMOL/L (ref 22–29)
CREAT SERPL-MCNC: 0.6 MG/DL (ref 0.5–1)
EOSINOPHIL # BLD: 0 K/UL (ref 0.05–0.5)
EOSINOPHILS RELATIVE PERCENT: 0 % (ref 0–6)
ERYTHROCYTE [DISTWIDTH] IN BLOOD BY AUTOMATED COUNT: 14.5 % (ref 11.5–15)
GFR, ESTIMATED: >90 ML/MIN/1.73M2
GLUCOSE BLD-MCNC: 231 MG/DL (ref 74–99)
GLUCOSE SERPL-MCNC: 261 MG/DL (ref 74–99)
HBA1C MFR BLD: 6.5 % (ref 4–5.6)
HCT VFR BLD AUTO: 28.9 % (ref 34–48)
HGB BLD-MCNC: 8.4 G/DL (ref 11.5–15.5)
INR PPP: 1.1
LYMPHOCYTES NFR BLD: 0.25 K/UL (ref 1.5–4)
LYMPHOCYTES RELATIVE PERCENT: 3 % (ref 20–42)
MCH RBC QN AUTO: 26.3 PG (ref 26–35)
MCHC RBC AUTO-ENTMCNC: 29.1 G/DL (ref 32–34.5)
MCV RBC AUTO: 90.6 FL (ref 80–99.9)
MICROORGANISM SPEC CULT: ABNORMAL
MONOCYTES NFR BLD: 0 % (ref 2–12)
MONOCYTES NFR BLD: 0 K/UL (ref 0.1–0.95)
NEUTROPHILS NFR BLD: 97 % (ref 43–80)
NEUTS SEG NFR BLD: 9.45 K/UL (ref 1.8–7.3)
PARTIAL THROMBOPLASTIN TIME: 28.8 SEC (ref 24.5–35.1)
PLATELET # BLD AUTO: 542 K/UL (ref 130–450)
PMV BLD AUTO: 9 FL (ref 7–12)
POTASSIUM SERPL-SCNC: 4.8 MMOL/L (ref 3.5–5)
PROTHROMBIN TIME: 12.4 SEC (ref 9.3–12.4)
RBC # BLD AUTO: 3.19 M/UL (ref 3.5–5.5)
RBC # BLD: ABNORMAL 10*6/UL
S PNEUM AG SPEC QL: NEGATIVE
SERVICE CMNT-IMP: ABNORMAL
SODIUM SERPL-SCNC: 138 MMOL/L (ref 132–146)
SPECIMEN DESCRIPTION: ABNORMAL
SPECIMEN SOURCE: NORMAL
WBC OTHER # BLD: 9.7 K/UL (ref 4.5–11.5)

## 2024-11-22 PROCEDURE — 6360000002 HC RX W HCPCS: Performed by: PHYSICIAN ASSISTANT

## 2024-11-22 PROCEDURE — 2580000003 HC RX 258: Performed by: NURSE PRACTITIONER

## 2024-11-22 PROCEDURE — 6370000000 HC RX 637 (ALT 250 FOR IP): Performed by: PHYSICIAN ASSISTANT

## 2024-11-22 PROCEDURE — 97535 SELF CARE MNGMENT TRAINING: CPT

## 2024-11-22 PROCEDURE — 85025 COMPLETE CBC W/AUTO DIFF WBC: CPT

## 2024-11-22 PROCEDURE — 97165 OT EVAL LOW COMPLEX 30 MIN: CPT

## 2024-11-22 PROCEDURE — 6360000002 HC RX W HCPCS: Performed by: NURSE PRACTITIONER

## 2024-11-22 PROCEDURE — 97530 THERAPEUTIC ACTIVITIES: CPT

## 2024-11-22 PROCEDURE — 2580000003 HC RX 258: Performed by: INTERNAL MEDICINE

## 2024-11-22 PROCEDURE — 94640 AIRWAY INHALATION TREATMENT: CPT

## 2024-11-22 PROCEDURE — 6360000002 HC RX W HCPCS: Performed by: INTERNAL MEDICINE

## 2024-11-22 PROCEDURE — 80048 BASIC METABOLIC PNL TOTAL CA: CPT

## 2024-11-22 PROCEDURE — 85730 THROMBOPLASTIN TIME PARTIAL: CPT

## 2024-11-22 PROCEDURE — 85610 PROTHROMBIN TIME: CPT

## 2024-11-22 PROCEDURE — 82947 ASSAY GLUCOSE BLOOD QUANT: CPT

## 2024-11-22 PROCEDURE — 2580000003 HC RX 258: Performed by: PHYSICIAN ASSISTANT

## 2024-11-22 PROCEDURE — 97161 PT EVAL LOW COMPLEX 20 MIN: CPT

## 2024-11-22 PROCEDURE — 2500000003 HC RX 250 WO HCPCS: Performed by: INTERNAL MEDICINE

## 2024-11-22 PROCEDURE — 36415 COLL VENOUS BLD VENIPUNCTURE: CPT

## 2024-11-22 PROCEDURE — 2700000000 HC OXYGEN THERAPY PER DAY

## 2024-11-22 PROCEDURE — 83036 HEMOGLOBIN GLYCOSYLATED A1C: CPT

## 2024-11-22 PROCEDURE — 2140000000 HC CCU INTERMEDIATE R&B

## 2024-11-22 RX ORDER — PREDNISONE 20 MG/1
40 TABLET ORAL DAILY
Status: DISCONTINUED | OUTPATIENT
Start: 2024-11-23 | End: 2024-11-25 | Stop reason: HOSPADM

## 2024-11-22 RX ADMIN — IPRATROPIUM BROMIDE AND ALBUTEROL SULFATE 1 DOSE: 2.5; .5 SOLUTION RESPIRATORY (INHALATION) at 17:45

## 2024-11-22 RX ADMIN — SODIUM CHLORIDE, PRESERVATIVE FREE 10 ML: 5 INJECTION INTRAVENOUS at 08:44

## 2024-11-22 RX ADMIN — WATER 1000 MG: 1 INJECTION INTRAMUSCULAR; INTRAVENOUS; SUBCUTANEOUS at 01:10

## 2024-11-22 RX ADMIN — SODIUM CHLORIDE, PRESERVATIVE FREE 10 ML: 5 INJECTION INTRAVENOUS at 20:32

## 2024-11-22 RX ADMIN — WATER 40 MG: 1 INJECTION INTRAMUSCULAR; INTRAVENOUS; SUBCUTANEOUS at 08:44

## 2024-11-22 RX ADMIN — ASPIRIN 81 MG CHEWABLE TABLET 81 MG: 81 TABLET CHEWABLE at 08:43

## 2024-11-22 RX ADMIN — DOXYCYCLINE 100 MG: 100 INJECTION, POWDER, LYOPHILIZED, FOR SOLUTION INTRAVENOUS at 20:38

## 2024-11-22 RX ADMIN — DOXYCYCLINE 100 MG: 100 INJECTION, POWDER, LYOPHILIZED, FOR SOLUTION INTRAVENOUS at 09:00

## 2024-11-22 RX ADMIN — WATER 40 MG: 1 INJECTION INTRAMUSCULAR; INTRAVENOUS; SUBCUTANEOUS at 01:11

## 2024-11-22 RX ADMIN — ARFORMOTEROL TARTRATE 15 MCG: 15 SOLUTION RESPIRATORY (INHALATION) at 08:28

## 2024-11-22 RX ADMIN — WATER 40 MG: 1 INJECTION INTRAMUSCULAR; INTRAVENOUS; SUBCUTANEOUS at 16:56

## 2024-11-22 RX ADMIN — BUDESONIDE 500 MCG: 0.5 SUSPENSION RESPIRATORY (INHALATION) at 21:01

## 2024-11-22 RX ADMIN — ARFORMOTEROL TARTRATE 15 MCG: 15 SOLUTION RESPIRATORY (INHALATION) at 21:01

## 2024-11-22 RX ADMIN — ACETAMINOPHEN 650 MG: 325 TABLET ORAL at 16:55

## 2024-11-22 RX ADMIN — SERTRALINE 50 MG: 50 TABLET, FILM COATED ORAL at 20:32

## 2024-11-22 RX ADMIN — ACETAMINOPHEN 650 MG: 325 TABLET ORAL at 08:40

## 2024-11-22 RX ADMIN — SODIUM CHLORIDE, PRESERVATIVE FREE 10 ML: 5 INJECTION INTRAVENOUS at 01:11

## 2024-11-22 RX ADMIN — BUDESONIDE 500 MCG: 0.5 SUSPENSION RESPIRATORY (INHALATION) at 08:28

## 2024-11-22 RX ADMIN — PANTOPRAZOLE SODIUM 40 MG: 40 TABLET, DELAYED RELEASE ORAL at 05:35

## 2024-11-22 RX ADMIN — ENOXAPARIN SODIUM 40 MG: 100 INJECTION SUBCUTANEOUS at 08:44

## 2024-11-22 ASSESSMENT — PAIN SCALES - GENERAL
PAINLEVEL_OUTOF10: 8
PAINLEVEL_OUTOF10: 9

## 2024-11-22 ASSESSMENT — PAIN DESCRIPTION - ORIENTATION
ORIENTATION: MID
ORIENTATION: MID

## 2024-11-22 ASSESSMENT — PAIN DESCRIPTION - DESCRIPTORS
DESCRIPTORS: ACHING;DISCOMFORT
DESCRIPTORS: ACHING;DISCOMFORT

## 2024-11-22 ASSESSMENT — PAIN DESCRIPTION - LOCATION
LOCATION: HEAD
LOCATION: PELVIS

## 2024-11-22 NOTE — PLAN OF CARE
Problem: Chronic Conditions and Co-morbidities  Goal: Patient's chronic conditions and co-morbidity symptoms are monitored and maintained or improved  11/21/2024 2115 by Stephany Reece, RN  Outcome: Progressing     Problem: Discharge Planning  Goal: Discharge to home or other facility with appropriate resources  11/21/2024 2115 by Stephany Reece, RN  Outcome: Progressing     Problem: Safety - Adult  Goal: Free from fall injury  11/21/2024 2115 by Stephany Reece, RN  Outcome: Progressing

## 2024-11-22 NOTE — CARE COORDINATION
11/22:  Transition of care: Pt presented to the ER for SOB from home.  Pt is on 3L/nc at 100%, Iv Solu-medrol, Iv Doxycycline til 11/26, Iv Rocephin til 11/27 & SQ Lovenox.  Pulmonary consulted.  PT 16/24 OT 19/24.  Pt has a chronic jarquin.  Cm spoke with pt bedside to discuss CM role & dc planning.  Pt's PCP is Lee Vides & uses [facility].  Pt lives alone in a house with 3 steps to enter.  The bed/bathroom are on the 2nd floor with a chair lift.  PTA pt was independent & has an Aid via [employer] 7 days a week 9a - 1pm.  Pt has 02-3L/NC at continuous via [employer].  Pt has a Cpap & nebulizer.  Pt is not sure is she is active with [facility].  CM spoke with her CM/[employer] [facility] who believes she was active with [facility] for her jarquin care.  Pt is open to [facility] on dc & order placed.  Pt's dc plan is home & she believes her god mother can transport if not will use transportation via her insurance.  [facility] [phone].  Pt has hx of [facility].  Pt has a CM -Claudine via [facility] at [phone].  Will need 02 testing.  Pt has a tank in room to transport.  Sw/CM will continue to follow.  Electronically signed by Livia Villanueva RN on 11/22/2024 at 2:27 PM    Case Management Assessment  Initial Evaluation    Date/Time of Evaluation: 11/22/2024 2:31 PM  Assessment Completed by: Livia Villanueva RN    If patient is discharged prior to next notation, then this note serves as note for discharge by case management.    Patient Name: Ayesha Keane                   YOB: 1955  Diagnosis: Chronic abdominal pain [R10.9, G89.29]  COPD exacerbation (HCC) [J44.1]  Urinary tract infection in female [N39.0]                   Date / Time: 11/20/2024  4:27 PM    Patient Admission Status: Inpatient   Readmission Risk (Low < 19, Mod (19-27), High > 27): Readmission Risk Score: 26    Current PCP: Lee Vides MD  PCP verified by CM? Yes    Chart Reviewed: Yes      History Provided by: Patient  Patient Orientation:

## 2024-11-23 LAB
ANION GAP SERPL CALCULATED.3IONS-SCNC: 9 MMOL/L (ref 7–16)
BASOPHILS # BLD: 0.01 K/UL (ref 0–0.2)
BASOPHILS NFR BLD: 0 % (ref 0–2)
BUN SERPL-MCNC: 16 MG/DL (ref 6–23)
CALCIUM SERPL-MCNC: 10.1 MG/DL (ref 8.6–10.2)
CHLORIDE SERPL-SCNC: 94 MMOL/L (ref 98–107)
CO2 SERPL-SCNC: 37 MMOL/L (ref 22–29)
CREAT SERPL-MCNC: 0.5 MG/DL (ref 0.5–1)
EOSINOPHIL # BLD: 0 K/UL (ref 0.05–0.5)
EOSINOPHILS RELATIVE PERCENT: 0 % (ref 0–6)
ERYTHROCYTE [DISTWIDTH] IN BLOOD BY AUTOMATED COUNT: 14.3 % (ref 11.5–15)
GFR, ESTIMATED: >90 ML/MIN/1.73M2
GLUCOSE SERPL-MCNC: 212 MG/DL (ref 74–99)
HCT VFR BLD AUTO: 27.4 % (ref 34–48)
HGB BLD-MCNC: 8.1 G/DL (ref 11.5–15.5)
IMM GRANULOCYTES # BLD AUTO: 0.17 K/UL (ref 0–0.58)
IMM GRANULOCYTES NFR BLD: 1 % (ref 0–5)
LYMPHOCYTES NFR BLD: 0.76 K/UL (ref 1.5–4)
LYMPHOCYTES RELATIVE PERCENT: 6 % (ref 20–42)
MCH RBC QN AUTO: 26.6 PG (ref 26–35)
MCHC RBC AUTO-ENTMCNC: 29.6 G/DL (ref 32–34.5)
MCV RBC AUTO: 90.1 FL (ref 80–99.9)
MICROORGANISM SPEC CULT: NORMAL
MONOCYTES NFR BLD: 0.51 K/UL (ref 0.1–0.95)
MONOCYTES NFR BLD: 4 % (ref 2–12)
NEUTROPHILS NFR BLD: 88 % (ref 43–80)
NEUTS SEG NFR BLD: 10.93 K/UL (ref 1.8–7.3)
PLATELET # BLD AUTO: 508 K/UL (ref 130–450)
PMV BLD AUTO: 8.8 FL (ref 7–12)
POTASSIUM SERPL-SCNC: 4.4 MMOL/L (ref 3.5–5)
RBC # BLD AUTO: 3.04 M/UL (ref 3.5–5.5)
SODIUM SERPL-SCNC: 140 MMOL/L (ref 132–146)
SPECIMEN DESCRIPTION: NORMAL
TROPONIN I SERPL HS-MCNC: 15 NG/L (ref 0–9)
WBC OTHER # BLD: 12.4 K/UL (ref 4.5–11.5)

## 2024-11-23 PROCEDURE — 84484 ASSAY OF TROPONIN QUANT: CPT

## 2024-11-23 PROCEDURE — 6370000000 HC RX 637 (ALT 250 FOR IP): Performed by: NURSE PRACTITIONER

## 2024-11-23 PROCEDURE — 36415 COLL VENOUS BLD VENIPUNCTURE: CPT

## 2024-11-23 PROCEDURE — 2580000003 HC RX 258: Performed by: INTERNAL MEDICINE

## 2024-11-23 PROCEDURE — 6360000002 HC RX W HCPCS: Performed by: PHYSICIAN ASSISTANT

## 2024-11-23 PROCEDURE — 2580000003 HC RX 258: Performed by: PHYSICIAN ASSISTANT

## 2024-11-23 PROCEDURE — 85025 COMPLETE CBC W/AUTO DIFF WBC: CPT

## 2024-11-23 PROCEDURE — 2700000000 HC OXYGEN THERAPY PER DAY

## 2024-11-23 PROCEDURE — 6370000000 HC RX 637 (ALT 250 FOR IP): Performed by: FAMILY MEDICINE

## 2024-11-23 PROCEDURE — 2140000000 HC CCU INTERMEDIATE R&B

## 2024-11-23 PROCEDURE — 80048 BASIC METABOLIC PNL TOTAL CA: CPT

## 2024-11-23 PROCEDURE — 6370000000 HC RX 637 (ALT 250 FOR IP): Performed by: PHYSICIAN ASSISTANT

## 2024-11-23 PROCEDURE — 94640 AIRWAY INHALATION TREATMENT: CPT

## 2024-11-23 PROCEDURE — 2500000003 HC RX 250 WO HCPCS: Performed by: INTERNAL MEDICINE

## 2024-11-23 RX ORDER — CALCIUM CARBONATE 500 MG/1
500 TABLET, CHEWABLE ORAL 3 TIMES DAILY PRN
Status: DISCONTINUED | OUTPATIENT
Start: 2024-11-23 | End: 2024-11-25 | Stop reason: HOSPADM

## 2024-11-23 RX ADMIN — CALCIUM CARBONATE 500 MG: 500 TABLET, CHEWABLE ORAL at 17:11

## 2024-11-23 RX ADMIN — IPRATROPIUM BROMIDE AND ALBUTEROL SULFATE 1 DOSE: 2.5; .5 SOLUTION RESPIRATORY (INHALATION) at 08:34

## 2024-11-23 RX ADMIN — SODIUM CHLORIDE, PRESERVATIVE FREE 10 ML: 5 INJECTION INTRAVENOUS at 08:28

## 2024-11-23 RX ADMIN — SODIUM CHLORIDE, PRESERVATIVE FREE 10 ML: 5 INJECTION INTRAVENOUS at 21:43

## 2024-11-23 RX ADMIN — ENOXAPARIN SODIUM 40 MG: 100 INJECTION SUBCUTANEOUS at 08:27

## 2024-11-23 RX ADMIN — SERTRALINE 50 MG: 50 TABLET, FILM COATED ORAL at 21:39

## 2024-11-23 RX ADMIN — WATER 1000 MG: 1 INJECTION INTRAMUSCULAR; INTRAVENOUS; SUBCUTANEOUS at 02:55

## 2024-11-23 RX ADMIN — BUDESONIDE 500 MCG: 0.5 SUSPENSION RESPIRATORY (INHALATION) at 08:34

## 2024-11-23 RX ADMIN — BUDESONIDE 500 MCG: 0.5 SUSPENSION RESPIRATORY (INHALATION) at 20:01

## 2024-11-23 RX ADMIN — DOXYCYCLINE 100 MG: 100 INJECTION, POWDER, LYOPHILIZED, FOR SOLUTION INTRAVENOUS at 21:42

## 2024-11-23 RX ADMIN — PANTOPRAZOLE SODIUM 40 MG: 40 TABLET, DELAYED RELEASE ORAL at 05:24

## 2024-11-23 RX ADMIN — DOXYCYCLINE 100 MG: 100 INJECTION, POWDER, LYOPHILIZED, FOR SOLUTION INTRAVENOUS at 09:16

## 2024-11-23 RX ADMIN — PREDNISONE 40 MG: 20 TABLET ORAL at 08:27

## 2024-11-23 RX ADMIN — ARFORMOTEROL TARTRATE 15 MCG: 15 SOLUTION RESPIRATORY (INHALATION) at 08:34

## 2024-11-23 RX ADMIN — ASPIRIN 81 MG CHEWABLE TABLET 81 MG: 81 TABLET CHEWABLE at 08:27

## 2024-11-23 RX ADMIN — ARFORMOTEROL TARTRATE 15 MCG: 15 SOLUTION RESPIRATORY (INHALATION) at 20:01

## 2024-11-23 ASSESSMENT — PAIN SCALES - GENERAL: PAINLEVEL_OUTOF10: 0

## 2024-11-23 ASSESSMENT — PAIN SCALES - WONG BAKER: WONGBAKER_NUMERICALRESPONSE: NO HURT

## 2024-11-23 NOTE — PLAN OF CARE
Problem: Chronic Conditions and Co-morbidities  Goal: Patient's chronic conditions and co-morbidity symptoms are monitored and maintained or improved  11/22/2024 2002 by Aga Reagan RN  Outcome: Progressing  11/22/2024 1247 by Marline BLEVINS RN  Outcome: Progressing     Problem: Discharge Planning  Goal: Discharge to home or other facility with appropriate resources  11/22/2024 2002 by Aga Reagan RN  Outcome: Progressing  11/22/2024 1247 by Marline BLEVINS RN  Outcome: Progressing     Problem: Safety - Adult  Goal: Free from fall injury  11/22/2024 2002 by Aga Reagan RN  Outcome: Progressing  11/22/2024 1247 by Marline BLEVINS RN  Outcome: Progressing

## 2024-11-24 LAB
ANION GAP SERPL CALCULATED.3IONS-SCNC: 3 MMOL/L (ref 7–16)
BUN SERPL-MCNC: 13 MG/DL (ref 6–23)
CALCIUM SERPL-MCNC: 9.8 MG/DL (ref 8.6–10.2)
CHLORIDE SERPL-SCNC: 96 MMOL/L (ref 98–107)
CO2 SERPL-SCNC: 40 MMOL/L (ref 22–29)
CREAT SERPL-MCNC: 0.5 MG/DL (ref 0.5–1)
GFR, ESTIMATED: >90 ML/MIN/1.73M2
GLUCOSE SERPL-MCNC: 154 MG/DL (ref 74–99)
POTASSIUM SERPL-SCNC: 4.2 MMOL/L (ref 3.5–5)
SODIUM SERPL-SCNC: 139 MMOL/L (ref 132–146)

## 2024-11-24 PROCEDURE — 2500000003 HC RX 250 WO HCPCS: Performed by: INTERNAL MEDICINE

## 2024-11-24 PROCEDURE — 6370000000 HC RX 637 (ALT 250 FOR IP): Performed by: FAMILY MEDICINE

## 2024-11-24 PROCEDURE — 94640 AIRWAY INHALATION TREATMENT: CPT

## 2024-11-24 PROCEDURE — 2580000003 HC RX 258: Performed by: PHYSICIAN ASSISTANT

## 2024-11-24 PROCEDURE — 36415 COLL VENOUS BLD VENIPUNCTURE: CPT

## 2024-11-24 PROCEDURE — 6360000002 HC RX W HCPCS: Performed by: PHYSICIAN ASSISTANT

## 2024-11-24 PROCEDURE — 6370000000 HC RX 637 (ALT 250 FOR IP): Performed by: NURSE PRACTITIONER

## 2024-11-24 PROCEDURE — 2580000003 HC RX 258: Performed by: INTERNAL MEDICINE

## 2024-11-24 PROCEDURE — 2140000000 HC CCU INTERMEDIATE R&B

## 2024-11-24 PROCEDURE — 6370000000 HC RX 637 (ALT 250 FOR IP): Performed by: PHYSICIAN ASSISTANT

## 2024-11-24 PROCEDURE — 2700000000 HC OXYGEN THERAPY PER DAY

## 2024-11-24 PROCEDURE — 80048 BASIC METABOLIC PNL TOTAL CA: CPT

## 2024-11-24 RX ADMIN — WATER 1000 MG: 1 INJECTION INTRAMUSCULAR; INTRAVENOUS; SUBCUTANEOUS at 01:47

## 2024-11-24 RX ADMIN — SODIUM CHLORIDE, PRESERVATIVE FREE 10 ML: 5 INJECTION INTRAVENOUS at 09:07

## 2024-11-24 RX ADMIN — PREDNISONE 40 MG: 20 TABLET ORAL at 09:03

## 2024-11-24 RX ADMIN — DOXYCYCLINE 100 MG: 100 INJECTION, POWDER, LYOPHILIZED, FOR SOLUTION INTRAVENOUS at 21:36

## 2024-11-24 RX ADMIN — BUDESONIDE 500 MCG: 0.5 SUSPENSION RESPIRATORY (INHALATION) at 17:10

## 2024-11-24 RX ADMIN — DOXYCYCLINE 100 MG: 100 INJECTION, POWDER, LYOPHILIZED, FOR SOLUTION INTRAVENOUS at 10:43

## 2024-11-24 RX ADMIN — BUDESONIDE 500 MCG: 0.5 SUSPENSION RESPIRATORY (INHALATION) at 08:22

## 2024-11-24 RX ADMIN — SERTRALINE 50 MG: 50 TABLET, FILM COATED ORAL at 20:20

## 2024-11-24 RX ADMIN — ENOXAPARIN SODIUM 40 MG: 100 INJECTION SUBCUTANEOUS at 09:03

## 2024-11-24 RX ADMIN — SODIUM CHLORIDE, PRESERVATIVE FREE 10 ML: 5 INJECTION INTRAVENOUS at 20:20

## 2024-11-24 RX ADMIN — PANTOPRAZOLE SODIUM 40 MG: 40 TABLET, DELAYED RELEASE ORAL at 05:38

## 2024-11-24 RX ADMIN — ASPIRIN 81 MG CHEWABLE TABLET 81 MG: 81 TABLET CHEWABLE at 09:03

## 2024-11-24 RX ADMIN — ARFORMOTEROL TARTRATE 15 MCG: 15 SOLUTION RESPIRATORY (INHALATION) at 08:22

## 2024-11-24 RX ADMIN — ARFORMOTEROL TARTRATE 15 MCG: 15 SOLUTION RESPIRATORY (INHALATION) at 17:11

## 2024-11-24 RX ADMIN — CALCIUM CARBONATE 500 MG: 500 TABLET, CHEWABLE ORAL at 09:06

## 2024-11-24 ASSESSMENT — PAIN SCALES - GENERAL: PAINLEVEL_OUTOF10: 0

## 2024-11-24 ASSESSMENT — PAIN SCALES - WONG BAKER: WONGBAKER_NUMERICALRESPONSE: NO HURT

## 2024-11-24 NOTE — PLAN OF CARE
Problem: Chronic Conditions and Co-morbidities  Goal: Patient's chronic conditions and co-morbidity symptoms are monitored and maintained or improved  11/23/2024 2219 by Aga Reagan RN  Outcome: Progressing  11/23/2024 1320 by Shannon García RN  Outcome: Progressing     Problem: Discharge Planning  Goal: Discharge to home or other facility with appropriate resources  11/23/2024 2219 by Aga Reagan RN  Outcome: Progressing  11/23/2024 1320 by Shannon García RN  Outcome: Progressing     Problem: Safety - Adult  Goal: Free from fall injury  11/23/2024 2219 by Aga Reagan RN  Outcome: Progressing  11/23/2024 1320 by Shannon García RN  Outcome: Progressing

## 2024-11-24 NOTE — PLAN OF CARE
Problem: Chronic Conditions and Co-morbidities  Goal: Patient's chronic conditions and co-morbidity symptoms are monitored and maintained or improved  11/24/2024 0955 by Shannon García RN  Outcome: Progressing  11/23/2024 2219 by Aga Reagan RN  Outcome: Progressing     Problem: Discharge Planning  Goal: Discharge to home or other facility with appropriate resources  11/24/2024 0955 by Shannon García RN  Outcome: Progressing  11/23/2024 2219 by Aga Reagan RN  Outcome: Progressing     Problem: Safety - Adult  Goal: Free from fall injury  11/24/2024 0955 by Shannon García RN  Outcome: Progressing  11/23/2024 2219 by Aga Reagan RN  Outcome: Progressing

## 2024-11-25 VITALS
TEMPERATURE: 98.6 F | HEIGHT: 67 IN | RESPIRATION RATE: 23 BRPM | DIASTOLIC BLOOD PRESSURE: 63 MMHG | SYSTOLIC BLOOD PRESSURE: 117 MMHG | OXYGEN SATURATION: 97 % | BODY MASS INDEX: 23.13 KG/M2 | HEART RATE: 97 BPM | WEIGHT: 147.4 LBS

## 2024-11-25 LAB
ANION GAP SERPL CALCULATED.3IONS-SCNC: 5 MMOL/L (ref 7–16)
BUN SERPL-MCNC: 16 MG/DL (ref 6–23)
CALCIUM SERPL-MCNC: 9.3 MG/DL (ref 8.6–10.2)
CHLORIDE SERPL-SCNC: 94 MMOL/L (ref 98–107)
CO2 SERPL-SCNC: 39 MMOL/L (ref 22–29)
CREAT SERPL-MCNC: 0.5 MG/DL (ref 0.5–1)
GFR, ESTIMATED: >90 ML/MIN/1.73M2
GLUCOSE SERPL-MCNC: 189 MG/DL (ref 74–99)
POTASSIUM SERPL-SCNC: 3.8 MMOL/L (ref 3.5–5)
SODIUM SERPL-SCNC: 138 MMOL/L (ref 132–146)
TROPONIN I SERPL HS-MCNC: 22 NG/L (ref 0–9)

## 2024-11-25 PROCEDURE — 2500000003 HC RX 250 WO HCPCS: Performed by: INTERNAL MEDICINE

## 2024-11-25 PROCEDURE — 94640 AIRWAY INHALATION TREATMENT: CPT

## 2024-11-25 PROCEDURE — 80048 BASIC METABOLIC PNL TOTAL CA: CPT

## 2024-11-25 PROCEDURE — 6370000000 HC RX 637 (ALT 250 FOR IP): Performed by: PHYSICIAN ASSISTANT

## 2024-11-25 PROCEDURE — 6370000000 HC RX 637 (ALT 250 FOR IP): Performed by: NURSE PRACTITIONER

## 2024-11-25 PROCEDURE — 6360000002 HC RX W HCPCS: Performed by: PHYSICIAN ASSISTANT

## 2024-11-25 PROCEDURE — 84484 ASSAY OF TROPONIN QUANT: CPT

## 2024-11-25 PROCEDURE — 6370000000 HC RX 637 (ALT 250 FOR IP): Performed by: FAMILY MEDICINE

## 2024-11-25 PROCEDURE — 2700000000 HC OXYGEN THERAPY PER DAY

## 2024-11-25 PROCEDURE — 36415 COLL VENOUS BLD VENIPUNCTURE: CPT

## 2024-11-25 PROCEDURE — 93005 ELECTROCARDIOGRAM TRACING: CPT | Performed by: NURSE PRACTITIONER

## 2024-11-25 PROCEDURE — 2580000003 HC RX 258: Performed by: PHYSICIAN ASSISTANT

## 2024-11-25 PROCEDURE — 2580000003 HC RX 258: Performed by: INTERNAL MEDICINE

## 2024-11-25 RX ORDER — FAMOTIDINE 20 MG/1
20 TABLET, FILM COATED ORAL 2 TIMES DAILY
Status: DISCONTINUED | OUTPATIENT
Start: 2024-11-25 | End: 2024-11-25

## 2024-11-25 RX ORDER — BISACODYL 10 MG
10 SUPPOSITORY, RECTAL RECTAL ONCE
Status: DISCONTINUED | OUTPATIENT
Start: 2024-11-25 | End: 2024-11-25 | Stop reason: HOSPADM

## 2024-11-25 RX ORDER — PREDNISONE 20 MG/1
40 TABLET ORAL DAILY
Qty: 4 TABLET | Refills: 0 | Status: SHIPPED | OUTPATIENT
Start: 2024-11-26 | End: 2024-11-28

## 2024-11-25 RX ORDER — CALCIUM CARBONATE 500 MG/1
500 TABLET, CHEWABLE ORAL 3 TIMES DAILY PRN
Qty: 30 TABLET | Refills: 1 | Status: SHIPPED | OUTPATIENT
Start: 2024-11-25 | End: 2024-12-25

## 2024-11-25 RX ADMIN — WATER 1000 MG: 1 INJECTION INTRAMUSCULAR; INTRAVENOUS; SUBCUTANEOUS at 02:05

## 2024-11-25 RX ADMIN — SODIUM CHLORIDE, PRESERVATIVE FREE 10 ML: 5 INJECTION INTRAVENOUS at 08:12

## 2024-11-25 RX ADMIN — ASPIRIN 81 MG CHEWABLE TABLET 81 MG: 81 TABLET CHEWABLE at 08:12

## 2024-11-25 RX ADMIN — DOXYCYCLINE 100 MG: 100 INJECTION, POWDER, LYOPHILIZED, FOR SOLUTION INTRAVENOUS at 08:22

## 2024-11-25 RX ADMIN — LIDOCAINE HYDROCHLORIDE: 20 SOLUTION ORAL at 08:10

## 2024-11-25 RX ADMIN — ARFORMOTEROL TARTRATE 15 MCG: 15 SOLUTION RESPIRATORY (INHALATION) at 08:36

## 2024-11-25 RX ADMIN — BUDESONIDE 500 MCG: 0.5 SUSPENSION RESPIRATORY (INHALATION) at 08:36

## 2024-11-25 RX ADMIN — PANTOPRAZOLE SODIUM 40 MG: 40 TABLET, DELAYED RELEASE ORAL at 05:12

## 2024-11-25 RX ADMIN — ACETAMINOPHEN 650 MG: 325 TABLET ORAL at 04:26

## 2024-11-25 RX ADMIN — PREDNISONE 40 MG: 20 TABLET ORAL at 08:12

## 2024-11-25 RX ADMIN — ENOXAPARIN SODIUM 40 MG: 100 INJECTION SUBCUTANEOUS at 08:12

## 2024-11-25 ASSESSMENT — PAIN DESCRIPTION - DESCRIPTORS: DESCRIPTORS: STABBING

## 2024-11-25 ASSESSMENT — PAIN - FUNCTIONAL ASSESSMENT: PAIN_FUNCTIONAL_ASSESSMENT: ACTIVITIES ARE NOT PREVENTED

## 2024-11-25 ASSESSMENT — PAIN DESCRIPTION - ORIENTATION: ORIENTATION: LEFT;RIGHT

## 2024-11-25 ASSESSMENT — PAIN DESCRIPTION - LOCATION: LOCATION: ABDOMEN;CHEST

## 2024-11-25 ASSESSMENT — PAIN SCALES - GENERAL: PAINLEVEL_OUTOF10: 9

## 2024-11-25 NOTE — PLAN OF CARE
Problem: Chronic Conditions and Co-morbidities  Goal: Patient's chronic conditions and co-morbidity symptoms are monitored and maintained or improved  11/24/2024 2158 by Khris Pyle RN  Outcome: Progressing  11/24/2024 2158 by Khris Pyle RN  Outcome: Progressing  11/24/2024 0955 by Shannon García RN  Outcome: Progressing     Problem: Discharge Planning  Goal: Discharge to home or other facility with appropriate resources  11/24/2024 2158 by Khris Pyle RN  Outcome: Progressing  11/24/2024 2158 by Khris Pyle RN  Outcome: Progressing  Flowsheets (Taken 11/24/2024 2152)  Discharge to home or other facility with appropriate resources:   Arrange for needed discharge resources and transportation as appropriate   Identify barriers to discharge with patient and caregiver   Identify discharge learning needs (meds, wound care, etc)   Arrange for interpreters to assist at discharge as needed   Refer to discharge planning if patient needs post-hospital services based on physician order or complex needs related to functional status, cognitive ability or social support system  11/24/2024 0955 by Shannon García RN  Outcome: Progressing     Problem: Safety - Adult  Goal: Free from fall injury  11/24/2024 2158 by Khris Pyle RN  Outcome: Progressing  11/24/2024 2158 by Khris Pyle RN  Outcome: Progressing  11/24/2024 0955 by Shannon García RN  Outcome: Progressing     Problem: Skin/Tissue Integrity  Goal: Absence of new skin breakdown  Description: 1.  Monitor for areas of redness and/or skin breakdown  2.  Assess vascular access sites hourly  3.  Every 4-6 hours minimum:  Change oxygen saturation probe site  4.  Every 4-6 hours:  If on nasal continuous positive airway pressure, respiratory therapy assess nares and determine need for appliance change or resting period.  Outcome: Progressing     Problem: Pain  Goal: Verbalizes/displays adequate comfort level or baseline comfort level  Outcome:

## 2024-11-25 NOTE — CARE COORDINATION
11/25:  Update CM Note:  CM set up transportation with [facility] at [phone] & they advise that they couldn't arrange the trip.  CM call [facility] at [phone]  for transportation today at [phone] # [id number].  Electronically signed by Livia Villanueva RN on 11/25/2024 at 1:18 PM

## 2024-11-25 NOTE — CARE COORDINATION
11/25:  Update CM Note:  Pt presented to the ER for SOB from home.  Pt is on 3L/nc at 98%, Iv Solu-medrol, Iv Doxycycline til 11/26, Iv Rocephin til 11/27 & SQ Lovenox. PT 16/24 OT 19/24.  Pt has a chronic jarquin.  Pt's dc plan is home & [facility]  Pt has a tank to return home.  ROSALINA spoke with Juan Antonio/Mario who will advise her NIV setting/compliance & when last check on machine.  Pt would like C with [facility] - ROSALINA sent referral - pending a return call.  Per Kimmy she is a KAI & order placed.  Will need 02 testing.  Janice/ROSALINA will continue to follow.  Electronically signed by Livia Villanueva RN on 11/25/2024 at 11:10 AM      Per Mario will fax settings for NIV.  Pt is compliant per their last check on 10/31.  They will have someone call to set up an appointment to check her machine.  Pt has frequent outages in her area & they believe that may be the issue.  Electronically signed by Livia Villanueva RN on 11/25/2024 at 11:31 AM    CM placed her NIV setting from [facility] in her chart.  Electronically signed by Livia Villaneuva RN on 11/25/2024 at 11:51 AM

## 2024-11-25 NOTE — PLAN OF CARE
Problem: Chronic Conditions and Co-morbidities  Goal: Patient's chronic conditions and co-morbidity symptoms are monitored and maintained or improved  11/25/2024 0918 by Claudine Montiel RN  Outcome: Progressing  11/24/2024 2158 by Khris Pyle RN  Outcome: Progressing  11/24/2024 2158 by Khris Pyle RN  Outcome: Progressing     Problem: Discharge Planning  Goal: Discharge to home or other facility with appropriate resources  11/25/2024 0918 by Claudine Montiel RN  Outcome: Progressing  11/24/2024 2158 by Khris Pyle RN  Outcome: Progressing  11/24/2024 2158 by Khris Pyle RN  Outcome: Progressing  Flowsheets (Taken 11/24/2024 2152)  Discharge to home or other facility with appropriate resources:   Arrange for needed discharge resources and transportation as appropriate   Identify barriers to discharge with patient and caregiver   Identify discharge learning needs (meds, wound care, etc)   Arrange for interpreters to assist at discharge as needed   Refer to discharge planning if patient needs post-hospital services based on physician order or complex needs related to functional status, cognitive ability or social support system     Problem: Safety - Adult  Goal: Free from fall injury  11/25/2024 0918 by Claudine Montiel RN  Outcome: Progressing  11/24/2024 2158 by Khris Pyle RN  Outcome: Progressing  11/24/2024 2158 by Khris Pyle RN  Outcome: Progressing     Problem: Skin/Tissue Integrity  Goal: Absence of new skin breakdown  Description: 1.  Monitor for areas of redness and/or skin breakdown  2.  Assess vascular access sites hourly  3.  Every 4-6 hours minimum:  Change oxygen saturation probe site  4.  Every 4-6 hours:  If on nasal continuous positive airway pressure, respiratory therapy assess nares and determine need for appliance change or resting period.  11/25/2024 0918 by Claudine Montiel RN  Outcome: Progressing  11/24/2024 2158 by Khris Pyle RN  Outcome: Progressing     Problem:

## 2024-11-25 NOTE — CARE COORDINATION
reached out to patients PCP office Dr Lee Vides at [phone] to schedule follow up D/C appointment.  spoke to office staff and scheduled an appointment on 12/4 at 11:20 AM in office.     Information added to D/C summary.

## 2024-11-25 NOTE — DISCHARGE SUMMARY
[facility]   Discharge summary   Patient ID:  Ayesha Keane  [id number]  69 y.o.  1955    Admit date: 11/20/2024    Discharge date and time: 11/25/2024    Admission Diagnoses:   Patient Active Problem List   Diagnosis    COPD (chronic obstructive pulmonary disease) (HCC)    MDD (major depressive disorder)    Hematuria    Neurogenic bladder    Chronic respiratory failure with hypoxia    Anemia requiring transfusions    Gastric wall thickening    Bladder wall thickening    Difficulty in walking, not elsewhere classified    Moderate persistent asthma with (acute) exacerbation    Muscle weakness (generalized)    Need for assistance with personal care    Neuromuscular dysfunction of bladder, unspecified    Personal history of other malignant neoplasm of kidney    Breast pain    COPD exacerbation (HCC)    New onset of congestive heart failure (HCC)    Prolonged Q-T interval on ECG    Acute exacerbation of chronic obstructive pulmonary disease (COPD) (HCC)    UTI (urinary tract infection)    Decompensated chronic obstructive pulmonary disease with exacerbation (HCC)    Chest pain    Pancreatic insufficiency    Acute on chronic respiratory failure with hypercapnia    Acute blood loss anemia    Respiratory failure with hypercapnia       Discharge Diagnoses: ***    Consults: {consultation:[id number]}    Procedures: ***    Hospital Course: The patient is a 69 y.o. female of Lee Vides MD with significant past medical history of *** who presents with ***    Recent Labs     11/23/24  0555   WBC 12.4*   HGB 8.1*   HCT 27.4*   *       Recent Labs     11/23/24  0555 11/24/24  0608 11/25/24  0601    139 138   K 4.4 4.2 3.8   CL 94* 96* 94*   CO2 37* 40* 39*   BUN 16 13 16   CREATININE 0.5 0.5 0.5   CALCIUM 10.1 9.8 9.3       CT ABDOMEN PELVIS W IV CONTRAST Additional Contrast? None    Result Date: 11/21/2024  EXAMINATION: CT OF THE ABDOMEN AND PELVIS WITH CONTRAST 11/20/2024 11:42 pm TECHNIQUE: CT

## 2024-11-25 NOTE — PROGRESS NOTES
[facility]                                Progress note    Subjective:    The patient is awake and alert.  Lying in bed currently on supplemental oxygen of 2 L.  No acute events overnight.    Denies chest pain, angina, SOB     Objective:    /69   Pulse 94   Temp 98.6 °F (37 °C) (Oral)   Resp 16   Ht 1.7 m (5' 6.93\")   Wt 65.6 kg (144 lb 9.6 oz)   LMP 10/21/1985   SpO2 99%   BMI 22.70 kg/m²     In: 720 [P.O.:720]  Out: 1475   In: 720   Out: 1475 [Urine:1475]    General appearance: NAD, conversant, pleasant  HEENT: AT/NC, MMM  Neck: FROM, supple  Lungs: Diminished in the bases  CV: RRR, no MRGs  Vasc: Radial pulses 2+  Abdomen: Soft, non-tender; no masses or HSM  Extremities: No peripheral edema or digital cyanosis  Skin: no rash, lesions or ulcers  Psych: Alert and oriented to person, place and time  Neuro: Alert and interactive     Recent Labs     11/21/24  0521 11/22/24  0602 11/23/24  0555   WBC 10.5 9.7 12.4*   HGB 9.3* 8.4* 8.1*   HCT 31.8* 28.9* 27.4*   * 542* 508*       Recent Labs     11/20/24  1815 11/22/24  0602 11/23/24  0555    138 140   K 4.1 4.8 4.4   CL 93* 95* 94*   CO2 35* 37* 37*   BUN 11 14 16   CREATININE 0.5 0.6 0.5   CALCIUM 10.3* 10.3* 10.1       Assessment:    Principal Problem:    COPD exacerbation (HCC)  Active Problems:    UTI (urinary tract infection)  Resolved Problems:    * No resolved hospital problems. *      Plan:  69 year old female admitted to telemetry unit with UTI and COPD exacerbation    11/22/2024  IV Rocephin/Doxy x 5 days  Continue with prednisone taper  Continue scheduled bronchodilators per pulmonary  Vital signs stable  Labs stable  Respiratory panel negative  [facility] - 16/24 patient states she is going home with family   No family at bedside.   Continue jarquin catheter    11/23/2024  Vital signs stable  Patient continues on 2 L O2  Slight increase in leukocytosis-12.4  Continue IV antibiotics-will transition to p.o. on 
    [facility]                                Progress note    Subjective:    The patient is awake and alert.  Lying in bed currently on supplemental oxygen of 2 L.  No acute events overnight.    Denies chest pain, angina, SOB     Objective:    /62   Pulse 84   Temp 97.4 °F (36.3 °C) (Oral)   Resp 16   Ht 1.7 m (5' 6.93\")   Wt 65.7 kg (144 lb 13.5 oz)   LMP 10/21/1985   SpO2 98%   BMI 22.73 kg/m²     In: -   Out: 1645   In: -   Out: 1645 [Urine:1645]    General appearance: NAD, conversant, pleasant  HEENT: AT/NC, MMM  Neck: FROM, supple  Lungs: Diminished in the bases  CV: RRR, no MRGs  Vasc: Radial pulses 2+  Abdomen: Soft, non-tender; no masses or HSM  Extremities: No peripheral edema or digital cyanosis  Skin: no rash, lesions or ulcers  Psych: Alert and oriented to person, place and time  Neuro: Alert and interactive     Recent Labs     11/22/24  0602 11/23/24  0555   WBC 9.7 12.4*   HGB 8.4* 8.1*   HCT 28.9* 27.4*   * 508*       Recent Labs     11/22/24  0602 11/23/24  0555 11/24/24  0608    140 139   K 4.8 4.4 4.2   CL 95* 94* 96*   CO2 37* 37* 40*   BUN 14 16 13   CREATININE 0.6 0.5 0.5   CALCIUM 10.3* 10.1 9.8       Assessment:    Principal Problem:    COPD exacerbation ([facility])  Active Problems:    UTI (urinary tract infection)  Resolved Problems:    * No resolved hospital problems. *      Plan:  69 year old female admitted to telemetry unit with UTI and COPD exacerbation    11/22/2024  IV Rocephin/Doxy x 5 days  Continue with prednisone taper  Continue scheduled bronchodilators per pulmonary  Vital signs stable  Labs stable  Respiratory panel negative  [facility] - 16/24 patient states she is going home with family   No family at bedside.   Continue jarquin catheter    11/23/2024  Vital signs stable  Patient continues on 2 L O2  Slight increase in leukocytosis-12.4  Continue IV antibiotics-will transition to p.o. on discharge  Continue to taper steroids  Continue with 
    [facility]                                Progress note    Subjective:    The patient is awake and alert.  Sitting up in chair at bedside.  States she is feeling better however continues on supplemental O2 at 3 liters  No acute events overnight.    Denies chest pain, angina, SOB     Objective:    BP (!) 144/73   Pulse 74   Temp 97.9 °F (36.6 °C) (Oral)   Resp 18   Ht 1.7 m (5' 6.93\")   Wt 66.7 kg (147 lb)   LMP 10/21/1985   SpO2 97%   BMI 23.07 kg/m²     In: 360 [P.O.:360]  Out: 1025   In: 360   Out: 1025 [Urine:1025]    General appearance: NAD, conversant, pleasant  HEENT: AT/NC, MMM  Neck: FROM, supple  Lungs: Diminished in the bases  CV: RRR, no MRGs  Vasc: Radial pulses 2+  Abdomen: Soft, non-tender; no masses or HSM  Extremities: No peripheral edema or digital cyanosis  Skin: no rash, lesions or ulcers  Psych: Alert and oriented to person, place and time  Neuro: Alert and interactive     Recent Labs     11/20/24 1815 11/21/24  0521 11/22/24  0602   WBC 7.5 10.5 9.7   HGB 9.2* 9.3* 8.4*   HCT 31.8* 31.8* 28.9*   * 600* 542*       Recent Labs     11/20/24  1815 11/22/24  0602    138   K 4.1 4.8   CL 93* 95*   CO2 35* 37*   BUN 11 14   CREATININE 0.5 0.6   CALCIUM 10.3* 10.3*       Assessment:    Principal Problem:    COPD exacerbation (HCC)  Active Problems:    UTI (urinary tract infection)  Resolved Problems:    * No resolved hospital problems. *      Plan:  69 year old female admitted to telemetry unit with UTI and COPD exacerbation    11/22/2024  IV Rocephin/Doxy x 5 days  Continue with prednisone taper  Continue scheduled bronchodilators per pulmonary  Vital signs stable  Labs stable  Respiratory panel negative  [facility] - 16/24 patient states she is going home with family   No family at bedside.   Continue jarquin catheter    Code status: Full  Consultants:  Pulmonary    DVT Prophylaxis   PT/OT  Discharge planning         Alexus Knott, APRN - CNP,  5:59 
4 Eyes Skin Assessment     NAME:  Ayesha Keane  YOB: 1955  MEDICAL RECORD NUMBER:  [id number]    The patient is being assessed for  Admission    I agree that at least one RN has performed a thorough Head to Toe Skin Assessment on the patient. ALL assessment sites listed below have been assessed.      Areas assessed by both nurses:    Head, Face, Ears, Shoulders, Back, Chest, Arms, Elbows, Hands, Sacrum. Buttock, Coccyx, Ischium, Legs. Feet and Heels, and Under Medical Devices         Does the Patient have a Wound? No noted wound(s)       Harrison Prevention initiated by RN: No  Wound Care Orders initiated by RN: No    Pressure Injury (Stage 3,4, Unstageable, DTI, NWPT, and Complex wounds) if present, place Wound referral order by RN under : No    New Ostomies, if present place, Ostomy referral order under : No     Nurse 1 eSignature: Electronically signed by Marline MEYER RN on 11/21/24 at 6:43 PM EST    **SHARE this note so that the co-signing nurse can place an eSignature**    Nurse 2 eSignature: Electronically signed by Deidre Collins RN on 11/21/24 at 6:44 PM EST  
ABGs drawn from left radial and placed on spinner. Respiratory notified, patient tolerated well.   
Breakfast tray ordered   
CLINICAL PHARMACY NOTE: MEDS TO BEDS    Total # of Prescriptions Filled: 2   The following medications were delivered to the patient:  Calcium carbonate antacid chewable 500 mg  Prednisone 20 mg    Additional Documentation:   JUN Kruger picked up in the pharmacy  
Educated patient on how to use incentive spirometer. Pt demonstrated and verbalized understanding.   
LSE OX ON ROOM AIR SITTING__88__%   PULSE OX ON ___3_LITERS SITTING RECOVERY __94__%   PULSE OX ON ROOM AIR AMBULATING ____82%   PULSE OX ON _3___LITERS AMBULATING RECOVERY __94_%       
Med rec and inpatient admit database completed. Pt has chronic jarquin (leg bag) for neurogenic bladder. Pt states jarquin was last changed 11/18/24. She has nursing and aide care that comes to her home.    PT has her home oxygen tank and her portable oxygen tank at bedside.   
Nurse to nurse given to ruslan  
Occupational Therapy  OCCUPATIONAL THERAPY INITIAL EVALUATION    [facility]  [address], [place]      Date:2024                                                Patient Name: Ayesha Keane  MRN: [id number]  : 1955  Room: [address] [place] [address]    Evaluating OT: Rex Henriquez OTR/L #[id number]     Referring Provider: Trev Tiwari PA   Specific Provider Orders/Date: OT eval and treat 24    Diagnosis: Chronic abdominal pain [R10.9, G89.29]  COPD exacerbation (HCC) [J44.1]  Urinary tract infection in female [N39.0]   Pt admitted to hospital with AMS, cough and SOB     Pertinent Medical History:  has a past medical history of Asthma, Chronic pancreatitis (HCC), COPD (chronic obstructive pulmonary disease) (HCC), Depression, Dysphagia, Emphysema lung (HCC), Jarquin catheter in place, and Oxygen dependent.       Precautions:  Fall Risk, O2, continuous pulse ox, chronic jarquin    Assessment of current deficits    [x] Functional mobility  [x]ADLs  [x] Strength               []Cognition    [x] Functional transfers   [x] IADLs         [x] Safety Awareness   [x]Endurance    [] Fine Coordination              [x] Balance      [] Vision/perception   []Sensation     []Gross Motor Coordination  [] ROM  [] Delirium                   [] Motor Control     OT PLAN OF CARE   OT POC based on physician orders, patient diagnosis and results of clinical assessment    Frequency/Duration 1-3 days/wk for 2 weeks PRN   Specific OT Treatment Interventions to include:   * Instruction/training on adapted ADL techniques and AE recommendations to increase functional independence within precautions       * Training on energy conservation strategies, correct breathing pattern and techniques to improve independence/tolerance for self-care routine  * Functional transfer/mobility training/DME recommendations for increased independence, safety, and fall prevention  * Patient/Family 
Patient came in with chronic jarquin for neurogenic bladder. Jarquin catheter changed at this time.  Marline MEYER RN   
Patient was complaining of abdominal and chest pain. MEHDI Piper notified. New order received. EKG done and transmitted to chart and it showed Sinus tachycardia with PAC and non specific T wave abnormality.  
Physical Therapy  Initial Assessment       Name: Ayesha Keane  : 1955  MRN: [id number]      Date of Service: 2024    Evaluating PT:  Alex Yusuf PT, DPT  [id number]    Room #:  6418/6418-B  Diagnosis:  Chronic abdominal pain [R10.9, G89.29]  COPD exacerbation (HCC) [J44.1]  Urinary tract infection in female [N39.0]  PMHx/PSHx:   has a past medical history of Asthma, Chronic pancreatitis (HCC), COPD (chronic obstructive pulmonary disease) (HCC), Depression, Dysphagia, Emphysema lung (HCC), Del Rio catheter in place, and Oxygen dependent.  Procedure/Surgery:    Precautions:  O2, Falls, Del Rio  Equipment Needs:  TBD    SUBJECTIVE:    Pt lives alone has HHA daily in a 2 story home with 3 stairs to enter and no rail. Stair lift to 2nd floor floor.  Pt ambulated with WW independently PTA.  Equipment Owned:   WW    OBJECTIVE:   Initial Evaluation  Date: 24 Treatment Short Term/ Long Term   Goals   AM-PAC 6 Clicks      Was pt agreeable to Eval/treatment? yes     Does pt have pain? No c/o pain     Bed Mobility  Rolling: Renetta  Supine to sit: Renetta  Sit to supine: NT  Scooting: Renetta  Rolling: Independent    Supine to sit: Independent    Sit to supine: Independent    Scooting: Independent     Transfers Sit to stand: Renetta  Stand to sit: Renetta  Stand pivot: Renetta  Sit to stand: supervision  Stand to sit: supervision  Stand pivot: supervision   Ambulation    45' x 2 feet with Renetta WW     3 L O2 spo2 to 87%    Recovered with seated rest.  300 feet with supervision AAD   Stair negotiation: ascended and descended  NT  4 steps with single rail supervision   ROM BUE:  Defer to OT  BLE:  WFL     Strength BUE:  Defer to OT  BLE:  4/5  Improve 1 MMT   Balance Sitting EOB:  SBA  Dynamic Standing:  Renetta  Sitting EOB:  Independent    Dynamic Standing:  supervision     Pt is A & O x 4  Sensation:  WNL  Edema:  WNL    Vitals:     spo2 97% 3 L O2   spo2 87% 3 L O2    Therapeutic Exercises:  Functional 
RN notified Nelly-emergency contact of new room. Nelly verbalized ok.   
[employer] has not been configured with any valid records.       Micro:  No results for input(s): \"CULTRESP\" in the last 72 hours.  No results for input(s): \"LABGRAM\" in the last 72 hours.  No results for input(s): \"LEGUR\" in the last 72 hours.  No results for input(s): \"STREPNEUMAGU\" in the last 72 hours.  No results for input(s): \"LP1UAG\" in the last 72 hours.     Assessment:    Chronic respiratory failure with hypoxia and hypercapnia  Advanced end-stage COPD/severe asthma overlap-mild exacerbation  Centrilobular emphysema  Prior nicotine dependence quit 15 years ago  Chronic anemia  Iron deficiency anemia  Chronic home O2 dependence 3 L nasal cannula.  Noncompliant with noninvasive ventilator  Chronic calcified pancreatitis on Creon at home  History of heavy EtOH abuse, reformed  Chronic elevation of troponin  A-fib  Multiple hospital admissions  Recurrent UTI secondary to chronic indwelling Del Rio catheter, neurogenic bladder  Heart failure with preserved EF      Plan:   Oxygen 3 L nasal cannula  Admission ABGs with hypercapnia  AVAPS nocturnal for respiratory support volume 450+8 backup rate 16 use nocturnal) daytime with naps-did not use  Chest imaging reviewed  Scheduled bronchodilators-Brovana, Pulmicort, DuoNebs.  Home regimen-Yupelri, budesonide, Brovana  Rocephin and Doxy for COPD exacerbation.  5 total days.  Prednisone taper written  DVT, GI prophylaxis  PT/OT.  May benefit from placement if agreeable.  Repeat hospital admissions  Appreciate assist from case management team to obtain compliance report for NIV device and current home settings.  Yearly CT chest lung cancer screening  Can discharge from a pulmonary perspective when okay with all others  Follows as outpatient with Dr. Rex Blackwell, EO/[facility] pulmonary       This plan of care was reviewed in collaboration with Dr. Tovar    Electronically signed by Scott Wray, APRN - CNP on 11/22/2024 at 12:01 PM    This is confirmation that I have

## 2024-11-25 NOTE — ADT AUTH CERT
PA recs keep IP by Eileen Graves, RN   Last updated by Eileen Graves, RN on 2024 1449     Review Status Created By   In Primary Eileen Graves RN       Review Type   --      Criteria Review      2024 1748         Physician Ad   SLR Keep in Inpatient status  Moriah Warren                          SLR Keep in Inpatient status     Name: Ayesha Keane  : 1955  CSN: [id number]  INSURANCE: [employer] Medicare     Date of admission: 24  Date of discharge:     Current status: Inpatient     RECOMMENDATION:  KEEP CURRENT STATUS. No status change indicated.     RATIONALE: already crossed medically necessary 2 MNs     Acute on Chronic respiratory failure with hypoxia and hypercapnia, on Neb tx's  IV solu-medrol, IV rocephin     This chart was reviewed at 5:47 PM EST on 2024     Moriah Warren MD  Lead Physician Advisor  [facility]                 24  by Deborah Vargas RN   Last updated by Deborah Vargas RN on 2024 1616     Review Status Created By   In Primary Deborah Vargas RN       Review Type   --      Criteria Review   DATE: 24  TYPE OF BED: Intermediate,tele     Patient has or is expected to exceed 2 midnights of medically necessary care.         RELEVANT BASELINES: (lab values, vitals, o2 amount/delivery, etc.)  chronically on oxygen 3 L nasal cannula         PERTINENT UPDATES:  Neb tx's  IV solu-medrol  IV rocephin     VITALS:  98.8, 89, 18, 122/58 and  97%NC        ABNL/PERTINENT LABS/RADIOLOGY/DIAGNOSTIC STUDIES:  Chloride: 95 (L)  CARBON DIOXIDE: 37 (H)  Anion Gap: 6 (L)  Glucose: 261 (H)  Calcium: 10.3 (H)  RBC: 3.19 (L)  Hemoglobin Quant: 8.4 (L)  Hematocrit: 28.9 (L)  MCHC: 29.1 (L)  Platelet Count: 542 (H)  Neutrophils %: 97 (H)  Lymphocyte %: 3 (L)  Monocytes %: 0 (L)  Neutrophils Absolute: 9.45 (H)  Lymphocytes Absolute: 0.25 (L)  Monocytes Absolute: 0.00 (L)  Eosinophils Absolute: 0.00 (L)  RBC Morphology: 2+

## 2024-11-27 LAB
EKG ATRIAL RATE: 127 BPM
EKG ATRIAL RATE: 94 BPM
EKG P AXIS: 75 DEGREES
EKG P AXIS: 85 DEGREES
EKG P-R INTERVAL: 136 MS
EKG P-R INTERVAL: 176 MS
EKG Q-T INTERVAL: 342 MS
EKG Q-T INTERVAL: 366 MS
EKG QRS DURATION: 76 MS
EKG QRS DURATION: 78 MS
EKG QTC CALCULATION (BAZETT): 457 MS
EKG QTC CALCULATION (BAZETT): 465 MS
EKG R AXIS: 69 DEGREES
EKG R AXIS: 73 DEGREES
EKG T AXIS: 114 DEGREES
EKG T AXIS: 87 DEGREES
EKG VENTRICULAR RATE: 111 BPM
EKG VENTRICULAR RATE: 94 BPM

## 2024-12-03 DIAGNOSIS — D64.9 ANEMIA REQUIRING TRANSFUSIONS: Primary | ICD-10-CM

## 2024-12-12 DIAGNOSIS — D64.9 ANEMIA REQUIRING TRANSFUSIONS: Primary | ICD-10-CM

## 2024-12-21 PROBLEM — N39.0 UTI (URINARY TRACT INFECTION): Status: RESOLVED | Noted: 2022-11-15 | Resolved: 2024-12-21

## 2025-01-07 ENCOUNTER — HOSPITAL ENCOUNTER (OUTPATIENT)
Dept: INFUSION THERAPY | Age: 70
End: 2025-01-07

## 2025-01-13 ENCOUNTER — HOSPITAL ENCOUNTER (INPATIENT)
Age: 70
LOS: 7 days | Discharge: HOME | DRG: 189 | End: 2025-01-20
Attending: EMERGENCY MEDICINE | Admitting: INTERNAL MEDICINE
Payer: MEDICARE

## 2025-01-13 ENCOUNTER — APPOINTMENT (OUTPATIENT)
Dept: GENERAL RADIOLOGY | Age: 70
DRG: 189 | End: 2025-01-13
Payer: MEDICARE

## 2025-01-13 DIAGNOSIS — I48.91 ATRIAL FIBRILLATION WITH RAPID VENTRICULAR RESPONSE (HCC): ICD-10-CM

## 2025-01-13 DIAGNOSIS — R07.9 CHEST PAIN, UNSPECIFIED TYPE: Primary | ICD-10-CM

## 2025-01-13 LAB
ALBUMIN SERPL-MCNC: 3.9 G/DL (ref 3.5–5.2)
ALP SERPL-CCNC: 72 U/L (ref 35–104)
ALT SERPL-CCNC: 6 U/L (ref 0–32)
ANION GAP SERPL CALCULATED.3IONS-SCNC: 6 MMOL/L (ref 7–16)
AST SERPL-CCNC: 12 U/L (ref 0–31)
B PARAP IS1001 DNA NPH QL NAA+NON-PROBE: NOT DETECTED
B PERT DNA SPEC QL NAA+PROBE: NOT DETECTED
BASOPHILS # BLD: 0 K/UL (ref 0–0.2)
BASOPHILS NFR BLD: 0 % (ref 0–2)
BILIRUB SERPL-MCNC: 0.2 MG/DL (ref 0–1.2)
BNP SERPL-MCNC: 559 PG/ML (ref 0–125)
BUN SERPL-MCNC: 17 MG/DL (ref 6–23)
C PNEUM DNA NPH QL NAA+NON-PROBE: NOT DETECTED
CALCIUM SERPL-MCNC: 10 MG/DL (ref 8.6–10.2)
CHLORIDE SERPL-SCNC: 99 MMOL/L (ref 98–107)
CO2 SERPL-SCNC: 41 MMOL/L (ref 22–29)
CREAT SERPL-MCNC: 0.5 MG/DL (ref 0.5–1)
EOSINOPHIL # BLD: 0 K/UL (ref 0.05–0.5)
EOSINOPHILS RELATIVE PERCENT: 0 % (ref 0–6)
ERYTHROCYTE [DISTWIDTH] IN BLOOD BY AUTOMATED COUNT: 15.9 % (ref 11.5–15)
FLUAV RNA NPH QL NAA+NON-PROBE: NOT DETECTED
FLUBV RNA NPH QL NAA+NON-PROBE: NOT DETECTED
GFR, ESTIMATED: >90 ML/MIN/1.73M2
GLUCOSE SERPL-MCNC: 182 MG/DL (ref 74–99)
HADV DNA NPH QL NAA+NON-PROBE: NOT DETECTED
HCOV 229E RNA NPH QL NAA+NON-PROBE: NOT DETECTED
HCOV HKU1 RNA NPH QL NAA+NON-PROBE: NOT DETECTED
HCOV NL63 RNA NPH QL NAA+NON-PROBE: NOT DETECTED
HCOV OC43 RNA NPH QL NAA+NON-PROBE: NOT DETECTED
HCT VFR BLD AUTO: 30.7 % (ref 34–48)
HGB BLD-MCNC: 8.4 G/DL (ref 11.5–15.5)
HMPV RNA NPH QL NAA+NON-PROBE: NOT DETECTED
HPIV1 RNA NPH QL NAA+NON-PROBE: NOT DETECTED
HPIV2 RNA NPH QL NAA+NON-PROBE: NOT DETECTED
HPIV3 RNA NPH QL NAA+NON-PROBE: NOT DETECTED
HPIV4 RNA NPH QL NAA+NON-PROBE: NOT DETECTED
LYMPHOCYTES NFR BLD: 0.22 K/UL (ref 1.5–4)
LYMPHOCYTES RELATIVE PERCENT: 4 % (ref 20–42)
M PNEUMO DNA NPH QL NAA+NON-PROBE: NOT DETECTED
MCH RBC QN AUTO: 24.6 PG (ref 26–35)
MCHC RBC AUTO-ENTMCNC: 27.4 G/DL (ref 32–34.5)
MCV RBC AUTO: 89.8 FL (ref 80–99.9)
MONOCYTES NFR BLD: 0 % (ref 2–12)
MONOCYTES NFR BLD: 0 K/UL (ref 0.1–0.95)
NEUTROPHILS NFR BLD: 97 % (ref 43–80)
NEUTS SEG NFR BLD: 6.18 K/UL (ref 1.8–7.3)
PLATELET # BLD AUTO: 288 K/UL (ref 130–450)
PMV BLD AUTO: 9.6 FL (ref 7–12)
POTASSIUM SERPL-SCNC: 4.6 MMOL/L (ref 3.5–5)
PROT SERPL-MCNC: 6.3 G/DL (ref 6.4–8.3)
RBC # BLD AUTO: 3.42 M/UL (ref 3.5–5.5)
RBC # BLD: ABNORMAL 10*6/UL
RSV RNA NPH QL NAA+NON-PROBE: NOT DETECTED
RV+EV RNA NPH QL NAA+NON-PROBE: NOT DETECTED
SARS-COV-2 RNA NPH QL NAA+NON-PROBE: NOT DETECTED
SODIUM SERPL-SCNC: 146 MMOL/L (ref 132–146)
SPECIMEN DESCRIPTION: NORMAL
TROPONIN I SERPL HS-MCNC: 17 NG/L (ref 0–9)
TROPONIN I SERPL HS-MCNC: 20 NG/L (ref 0–9)
WBC OTHER # BLD: 6.4 K/UL (ref 4.5–11.5)

## 2025-01-13 PROCEDURE — 2140000000 HC CCU INTERMEDIATE R&B

## 2025-01-13 PROCEDURE — 99285 EMERGENCY DEPT VISIT HI MDM: CPT

## 2025-01-13 PROCEDURE — 84484 ASSAY OF TROPONIN QUANT: CPT

## 2025-01-13 PROCEDURE — 71045 X-RAY EXAM CHEST 1 VIEW: CPT

## 2025-01-13 PROCEDURE — 6360000002 HC RX W HCPCS: Performed by: FAMILY MEDICINE

## 2025-01-13 PROCEDURE — 6360000002 HC RX W HCPCS: Performed by: EMERGENCY MEDICINE

## 2025-01-13 PROCEDURE — 0202U NFCT DS 22 TRGT SARS-COV-2: CPT

## 2025-01-13 PROCEDURE — 6370000000 HC RX 637 (ALT 250 FOR IP): Performed by: EMERGENCY MEDICINE

## 2025-01-13 PROCEDURE — 96374 THER/PROPH/DIAG INJ IV PUSH: CPT

## 2025-01-13 PROCEDURE — 2500000003 HC RX 250 WO HCPCS: Performed by: EMERGENCY MEDICINE

## 2025-01-13 PROCEDURE — 93005 ELECTROCARDIOGRAM TRACING: CPT | Performed by: EMERGENCY MEDICINE

## 2025-01-13 PROCEDURE — 94640 AIRWAY INHALATION TREATMENT: CPT

## 2025-01-13 PROCEDURE — 6370000000 HC RX 637 (ALT 250 FOR IP): Performed by: FAMILY MEDICINE

## 2025-01-13 PROCEDURE — 85025 COMPLETE CBC W/AUTO DIFF WBC: CPT

## 2025-01-13 PROCEDURE — 80053 COMPREHEN METABOLIC PANEL: CPT

## 2025-01-13 PROCEDURE — 83880 ASSAY OF NATRIURETIC PEPTIDE: CPT

## 2025-01-13 PROCEDURE — 2700000000 HC OXYGEN THERAPY PER DAY

## 2025-01-13 PROCEDURE — 2500000003 HC RX 250 WO HCPCS: Performed by: FAMILY MEDICINE

## 2025-01-13 RX ORDER — ACETAMINOPHEN 325 MG/1
650 TABLET ORAL EVERY 6 HOURS PRN
Status: DISCONTINUED | OUTPATIENT
Start: 2025-01-13 | End: 2025-01-20 | Stop reason: HOSPADM

## 2025-01-13 RX ORDER — POTASSIUM CHLORIDE 1500 MG/1
40 TABLET, EXTENDED RELEASE ORAL PRN
Status: DISCONTINUED | OUTPATIENT
Start: 2025-01-13 | End: 2025-01-20 | Stop reason: HOSPADM

## 2025-01-13 RX ORDER — ONDANSETRON 2 MG/ML
4 INJECTION INTRAMUSCULAR; INTRAVENOUS EVERY 6 HOURS PRN
Status: DISCONTINUED | OUTPATIENT
Start: 2025-01-13 | End: 2025-01-20 | Stop reason: HOSPADM

## 2025-01-13 RX ORDER — POLYETHYLENE GLYCOL 3350 17 G/17G
17 POWDER, FOR SOLUTION ORAL DAILY PRN
Status: DISCONTINUED | OUTPATIENT
Start: 2025-01-13 | End: 2025-01-20 | Stop reason: HOSPADM

## 2025-01-13 RX ORDER — POTASSIUM CHLORIDE 7.45 MG/ML
10 INJECTION INTRAVENOUS PRN
Status: DISCONTINUED | OUTPATIENT
Start: 2025-01-13 | End: 2025-01-20 | Stop reason: HOSPADM

## 2025-01-13 RX ORDER — LACTULOSE 10 G/15ML
20 SOLUTION ORAL NIGHTLY
Status: DISCONTINUED | OUTPATIENT
Start: 2025-01-13 | End: 2025-01-20 | Stop reason: HOSPADM

## 2025-01-13 RX ORDER — ALENDRONATE SODIUM 70 MG/1
70 TABLET ORAL
Status: DISCONTINUED | OUTPATIENT
Start: 2025-01-13 | End: 2025-01-13 | Stop reason: CLARIF

## 2025-01-13 RX ORDER — METOCLOPRAMIDE 5 MG/1
5 TABLET ORAL 2 TIMES DAILY
Status: DISCONTINUED | OUTPATIENT
Start: 2025-01-13 | End: 2025-01-20 | Stop reason: HOSPADM

## 2025-01-13 RX ORDER — ONDANSETRON 4 MG/1
4 TABLET, ORALLY DISINTEGRATING ORAL EVERY 8 HOURS PRN
Status: DISCONTINUED | OUTPATIENT
Start: 2025-01-13 | End: 2025-01-20 | Stop reason: HOSPADM

## 2025-01-13 RX ORDER — MAGNESIUM SULFATE IN WATER 40 MG/ML
2000 INJECTION, SOLUTION INTRAVENOUS PRN
Status: DISCONTINUED | OUTPATIENT
Start: 2025-01-13 | End: 2025-01-20 | Stop reason: HOSPADM

## 2025-01-13 RX ORDER — SODIUM CHLORIDE 9 MG/ML
INJECTION, SOLUTION INTRAVENOUS PRN
Status: DISCONTINUED | OUTPATIENT
Start: 2025-01-13 | End: 2025-01-20 | Stop reason: HOSPADM

## 2025-01-13 RX ORDER — ACETAMINOPHEN 650 MG/1
650 SUPPOSITORY RECTAL EVERY 6 HOURS PRN
Status: DISCONTINUED | OUTPATIENT
Start: 2025-01-13 | End: 2025-01-20 | Stop reason: HOSPADM

## 2025-01-13 RX ORDER — ARFORMOTEROL TARTRATE 15 UG/2ML
15 SOLUTION RESPIRATORY (INHALATION) 2 TIMES DAILY
Status: DISCONTINUED | OUTPATIENT
Start: 2025-01-13 | End: 2025-01-20 | Stop reason: HOSPADM

## 2025-01-13 RX ORDER — ASPIRIN 81 MG/1
81 TABLET, CHEWABLE ORAL DAILY
Status: DISCONTINUED | OUTPATIENT
Start: 2025-01-13 | End: 2025-01-20 | Stop reason: HOSPADM

## 2025-01-13 RX ORDER — SODIUM CHLORIDE 0.9 % (FLUSH) 0.9 %
5-40 SYRINGE (ML) INJECTION EVERY 12 HOURS SCHEDULED
Status: DISCONTINUED | OUTPATIENT
Start: 2025-01-13 | End: 2025-01-20 | Stop reason: HOSPADM

## 2025-01-13 RX ORDER — ALBUTEROL SULFATE 0.83 MG/ML
2.5 SOLUTION RESPIRATORY (INHALATION) EVERY 4 HOURS PRN
Status: DISCONTINUED | OUTPATIENT
Start: 2025-01-13 | End: 2025-01-20 | Stop reason: HOSPADM

## 2025-01-13 RX ORDER — IPRATROPIUM BROMIDE AND ALBUTEROL SULFATE 2.5; .5 MG/3ML; MG/3ML
1 SOLUTION RESPIRATORY (INHALATION)
Status: COMPLETED | OUTPATIENT
Start: 2025-01-13 | End: 2025-01-13

## 2025-01-13 RX ORDER — SODIUM CHLORIDE 0.9 % (FLUSH) 0.9 %
10 SYRINGE (ML) INJECTION PRN
Status: DISCONTINUED | OUTPATIENT
Start: 2025-01-13 | End: 2025-01-20 | Stop reason: HOSPADM

## 2025-01-13 RX ADMIN — ALBUTEROL SULFATE 2.5 MG: 2.5 SOLUTION RESPIRATORY (INHALATION) at 22:07

## 2025-01-13 RX ADMIN — IPRATROPIUM BROMIDE AND ALBUTEROL SULFATE 1 DOSE: 2.5; .5 SOLUTION RESPIRATORY (INHALATION) at 12:31

## 2025-01-13 RX ADMIN — IPRATROPIUM BROMIDE AND ALBUTEROL SULFATE 1 DOSE: 2.5; .5 SOLUTION RESPIRATORY (INHALATION) at 12:29

## 2025-01-13 RX ADMIN — SERTRALINE HYDROCHLORIDE 50 MG: 50 TABLET ORAL at 21:49

## 2025-01-13 RX ADMIN — IPRATROPIUM BROMIDE AND ALBUTEROL SULFATE 1 DOSE: 2.5; .5 SOLUTION RESPIRATORY (INHALATION) at 12:30

## 2025-01-13 RX ADMIN — METOCLOPRAMIDE HYDROCHLORIDE 5 MG: 5 TABLET ORAL at 21:49

## 2025-01-13 RX ADMIN — PANCRELIPASE LIPASE, PANCRELIPASE PROTEASE, PANCRELIPASE AMYLASE 5000 UNITS: 5000; 17000; 24000 CAPSULE, DELAYED RELEASE ORAL at 22:33

## 2025-01-13 RX ADMIN — WATER 125 MG: 1 INJECTION INTRAMUSCULAR; INTRAVENOUS; SUBCUTANEOUS at 12:21

## 2025-01-13 RX ADMIN — ARFORMOTEROL TARTRATE 15 MCG: 15 SOLUTION RESPIRATORY (INHALATION) at 22:05

## 2025-01-13 RX ADMIN — SODIUM CHLORIDE, PRESERVATIVE FREE 10 ML: 5 INJECTION INTRAVENOUS at 21:50

## 2025-01-13 NOTE — ED PROVIDER NOTES
Department of Emergency Medicine   ED  Provider Note  Admit Date/RoomTime: 1/13/2025 11:52 AM  ED Room: 23/23          History of Present Illness:  1/13/25, Time: 12:50 PM EST  Chief Complaint   Patient presents with    Chest Pain    Shortness of Breath     Patient c/o CP / SOB , hx of COPD . -130 per EMS                 Ayesha Keane is a 70 y.o. female presenting to the ED for chest pain.  Patient states no chest pain or shortness of breath for the past couple of days.  Nothing makes it better or worse, does wear oxygen chronically.  She does have history of COPD.  She used to smoke, she is no longer an active smoker.  No fevers or chills.  No cough or sputum.  No paresthesias.  No back pain.  No weakness numbness in the arms or legs.  No other symptoms or complaints.    Review of Systems:   Pertinent positives and negatives are stated within HPI, all other systems reviewed and are negative.        --------------------------------------------- PAST HISTORY ---------------------------------------------  Past Medical History:  has a past medical history of Asthma, Chronic pancreatitis (HCC), COPD (chronic obstructive pulmonary disease) (HCC), Depression, Dysphagia, Emphysema lung (HCC), Del Rio catheter in place, and Oxygen dependent.    Past Surgical History:  has a past surgical history that includes Hysterectomy; Kidney surgery (Left, 06/16/2014); pr cystourethroscopy with biopsy (N/A, 09/14/2018); Colonoscopy; Upper gastrointestinal endoscopy (N/A, 05/10/2019); Upper gastrointestinal endoscopy (N/A, 06/28/2019); Upper gastrointestinal endoscopy (N/A, 06/28/2019); Colonoscopy (N/A, 09/04/2019); Upper gastrointestinal endoscopy (N/A, 05/19/2020); Upper gastrointestinal endoscopy (N/A, 10/25/2021); Upper gastrointestinal endoscopy (N/A, 12/06/2021); Colonoscopy (N/A, 08/04/2023); Upper gastrointestinal endoscopy (N/A, 09/11/2024); and Breast surgery.    Social History:  reports that she quit smoking about  (SOLU-MEDROL) 125 mg in sterile water 2 mL injection (125 mg IntraVENous Given 1/13/25 1221)           The cardiac monitor revealed a fib with a heart rate in the 100s as interpreted by me. The cardiac monitor was ordered secondary to the patient's SOB and to monitor the patient for dysrhythmia.   CPT 50408         Medical Decision Making:     Patient presents with shortness of breath.  Concern for COPD, CHF, anemia, or other pathologies.  She diminished breath sounds diffusely.  She was given DuoNeb x 3 along with Solu-Medrol    EKG interpreted by me shows A-fib, rate 107, no STEMI, no ischemic change    Chart reviewed, patient was admitted for UTI in 11/20/2024    Labs interpreted by me shows a troponin of 17, repeat 20, , CMP unremarkable, her story panel is negative, otherwise labs as above    Chest x-ray inter myself shows no obvious pneumothorax    Reevaluation, patient's rest, on nasal cannula, she does have an irregular rhythm, difficult to discern if it is MAT versus A-fib, however, given her chest pain, her diminished breath signs, patient be admitted for further evaluation    Counseling:   The emergency provider has spoken with the patient and discussed today’s results, in addition to providing specific details for the plan of care and counseling regarding the diagnosis and prognosis.  Questions are answered at this time and they are agreeable with the plan.       --------------------------------- IMPRESSION AND DISPOSITION ---------------------------------    IMPRESSION  1. Chest pain, unspecified type        DISPOSITION  Disposition: Admit to telemetry  Patient condition is stable        NOTE: This report was transcribed using voice recognition software. Every effort was made to ensure accuracy; however, inadvertent computerized transcription errors may be present        Zoran Rand MD  01/13/25 3768

## 2025-01-14 ENCOUNTER — HOSPITAL ENCOUNTER (OUTPATIENT)
Dept: INFUSION THERAPY | Age: 70
End: 2025-01-14

## 2025-01-14 PROBLEM — I48.91 ATRIAL FIBRILLATION WITH RAPID VENTRICULAR RESPONSE (HCC): Status: ACTIVE | Noted: 2025-01-14

## 2025-01-14 PROBLEM — I48.0 PAF (PAROXYSMAL ATRIAL FIBRILLATION) (HCC): Status: ACTIVE | Noted: 2025-01-13

## 2025-01-14 LAB
AADO2: 151.4 MMHG
AADO2: 179.2 MMHG
AADO2: 197.9 MMHG
ALBUMIN SERPL-MCNC: 3.9 G/DL (ref 3.5–5.2)
ALP SERPL-CCNC: 64 U/L (ref 35–104)
ALT SERPL-CCNC: 6 U/L (ref 0–32)
ANION GAP SERPL CALCULATED.3IONS-SCNC: 4 MMOL/L (ref 7–16)
AST SERPL-CCNC: 7 U/L (ref 0–31)
B.E.: 4.3 MMOL/L (ref -3–3)
B.E.: 5 MMOL/L (ref -3–3)
B.E.: 6.5 MMOL/L (ref -3–3)
B.E.: 8.7 MMOL/L (ref -3–3)
BILIRUB SERPL-MCNC: <0.2 MG/DL (ref 0–1.2)
BUN SERPL-MCNC: 18 MG/DL (ref 6–23)
CALCIUM SERPL-MCNC: 10.1 MG/DL (ref 8.6–10.2)
CHLORIDE SERPL-SCNC: 98 MMOL/L (ref 98–107)
CO2 SERPL-SCNC: 40 MMOL/L (ref 22–29)
COHB: 0 % (ref 0–1.5)
COHB: 0.1 % (ref 0–1.5)
COHB: 0.3 % (ref 0–1.5)
COHB: 0.6 % (ref 0–1.5)
CREAT SERPL-MCNC: 0.5 MG/DL (ref 0.5–1)
CRITICAL: ABNORMAL
CRP SERPL HS-MCNC: 4 MG/L (ref 0–5)
DATE ANALYZED: ABNORMAL
DATE OF COLLECTION: ABNORMAL
ERYTHROCYTE [SEDIMENTATION RATE] IN BLOOD BY WESTERGREN METHOD: 25 MM/HR (ref 0–20)
FIO2: 50 %
FIO2: 50 %
FIO2: 70 %
GFR, ESTIMATED: >90 ML/MIN/1.73M2
GLUCOSE BLD-MCNC: 264 MG/DL (ref 74–99)
GLUCOSE SERPL-MCNC: 197 MG/DL (ref 74–99)
HCO3: 34.4 MMOL/L (ref 22–26)
HCO3: 35 MMOL/L (ref 22–26)
HCO3: 35.8 MMOL/L (ref 22–26)
HCO3: 37 MMOL/L (ref 22–26)
HHB: 0.5 % (ref 0–5)
HHB: 3.7 % (ref 0–5)
HHB: 4.4 % (ref 0–5)
HHB: 8.7 % (ref 0–5)
INR PPP: 1.1
LAB: ABNORMAL
Lab: 1427
Lab: 1630
Lab: 1740
Lab: 2233
METHB: 0.5 % (ref 0–1.5)
METHB: 0.5 % (ref 0–1.5)
METHB: 0.6 % (ref 0–1.5)
METHB: 0.6 % (ref 0–1.5)
MODE: ABNORMAL
O2 SATURATION: 91.2 % (ref 92–98.5)
O2 SATURATION: 95.6 % (ref 92–98.5)
O2 SATURATION: 96.3 % (ref 92–98.5)
O2 SATURATION: 99.5 % (ref 92–98.5)
O2HB: 90.4 % (ref 94–97)
O2HB: 94.5 % (ref 94–97)
O2HB: 95.7 % (ref 94–97)
O2HB: 98.9 % (ref 94–97)
OPERATOR ID: 405
OPERATOR ID: ABNORMAL
PATIENT TEMP: 37 C
PCO2: 100.7 MMHG (ref 35–45)
PCO2: 67 MMHG (ref 35–45)
PCO2: 91.9 MMHG (ref 35–45)
PCO2: 92.4 MMHG (ref 35–45)
PEEP/CPAP: 5 CMH2O
PFO2: 1.66 MMHG/%
PFO2: 2.03 MMHG/%
PFO2: 3.02 MMHG/%
PH BLOOD GAS: 7.18 (ref 7.35–7.45)
PH BLOOD GAS: 7.19 (ref 7.35–7.45)
PH BLOOD GAS: 7.2 (ref 7.35–7.45)
PH BLOOD GAS: 7.35 (ref 7.35–7.45)
PO2: 101.7 MMHG (ref 75–100)
PO2: 211.7 MMHG (ref 75–100)
PO2: 78.6 MMHG (ref 75–100)
PO2: 83.2 MMHG (ref 75–100)
POTASSIUM SERPL-SCNC: 4.7 MMOL/L (ref 3.5–5)
PROCALCITONIN SERPL-MCNC: 0.03 NG/ML (ref 0–0.08)
PROT SERPL-MCNC: 6.2 G/DL (ref 6.4–8.3)
PROTHROMBIN TIME: 11.8 SEC (ref 9.3–12.4)
RI(T): 0.85
RI(T): 1.49
RI(T): 2.38
RR MECHANICAL: 20 B/MIN
RR MECHANICAL: 20 B/MIN
RR MECHANICAL: 24 B/MIN
SODIUM SERPL-SCNC: 142 MMOL/L (ref 132–146)
SOURCE, BLOOD GAS: ABNORMAL
T4 FREE SERPL-MCNC: 1.1 NG/DL (ref 0.9–1.7)
THB: 8.9 G/DL (ref 11.5–16.5)
THB: 9.7 G/DL (ref 11.5–16.5)
THB: 9.8 G/DL (ref 11.5–16.5)
THB: 9.8 G/DL (ref 11.5–16.5)
TIME ANALYZED: 1433
TIME ANALYZED: 1632
TIME ANALYZED: 1742
TIME ANALYZED: 2236
TROPONIN I SERPL HS-MCNC: 27 NG/L (ref 0–9)
TSH SERPL DL<=0.05 MIU/L-ACNC: 0.14 UIU/ML (ref 0.27–4.2)
VT MECHANICAL: 400 ML
VT MECHANICAL: 400 ML
VT MECHANICAL: 450 ML

## 2025-01-14 PROCEDURE — 84443 ASSAY THYROID STIM HORMONE: CPT

## 2025-01-14 PROCEDURE — 6370000000 HC RX 637 (ALT 250 FOR IP): Performed by: INTERNAL MEDICINE

## 2025-01-14 PROCEDURE — 94640 AIRWAY INHALATION TREATMENT: CPT

## 2025-01-14 PROCEDURE — 80053 COMPREHEN METABOLIC PANEL: CPT

## 2025-01-14 PROCEDURE — 6360000002 HC RX W HCPCS: Performed by: NURSE PRACTITIONER

## 2025-01-14 PROCEDURE — 86140 C-REACTIVE PROTEIN: CPT

## 2025-01-14 PROCEDURE — 84439 ASSAY OF FREE THYROXINE: CPT

## 2025-01-14 PROCEDURE — 82962 GLUCOSE BLOOD TEST: CPT

## 2025-01-14 PROCEDURE — 84484 ASSAY OF TROPONIN QUANT: CPT

## 2025-01-14 PROCEDURE — 85610 PROTHROMBIN TIME: CPT

## 2025-01-14 PROCEDURE — 6360000002 HC RX W HCPCS: Performed by: INTERNAL MEDICINE

## 2025-01-14 PROCEDURE — 84145 PROCALCITONIN (PCT): CPT

## 2025-01-14 PROCEDURE — APPSS180 APP SPLIT SHARED TIME > 60 MINUTES: Performed by: NURSE PRACTITIONER

## 2025-01-14 PROCEDURE — 87899 AGENT NOS ASSAY W/OPTIC: CPT

## 2025-01-14 PROCEDURE — 36415 COLL VENOUS BLD VENIPUNCTURE: CPT

## 2025-01-14 PROCEDURE — 6370000000 HC RX 637 (ALT 250 FOR IP): Performed by: NURSE PRACTITIONER

## 2025-01-14 PROCEDURE — 97530 THERAPEUTIC ACTIVITIES: CPT

## 2025-01-14 PROCEDURE — 87449 NOS EACH ORGANISM AG IA: CPT

## 2025-01-14 PROCEDURE — 2500000003 HC RX 250 WO HCPCS: Performed by: FAMILY MEDICINE

## 2025-01-14 PROCEDURE — 99223 1ST HOSP IP/OBS HIGH 75: CPT | Performed by: INTERNAL MEDICINE

## 2025-01-14 PROCEDURE — 93005 ELECTROCARDIOGRAM TRACING: CPT | Performed by: FAMILY MEDICINE

## 2025-01-14 PROCEDURE — 2140000000 HC CCU INTERMEDIATE R&B

## 2025-01-14 PROCEDURE — 97161 PT EVAL LOW COMPLEX 20 MIN: CPT

## 2025-01-14 PROCEDURE — 82805 BLOOD GASES W/O2 SATURATION: CPT

## 2025-01-14 PROCEDURE — 2500000003 HC RX 250 WO HCPCS: Performed by: NURSE PRACTITIONER

## 2025-01-14 PROCEDURE — 6370000000 HC RX 637 (ALT 250 FOR IP): Performed by: FAMILY MEDICINE

## 2025-01-14 PROCEDURE — 36600 WITHDRAWAL OF ARTERIAL BLOOD: CPT

## 2025-01-14 PROCEDURE — 6360000002 HC RX W HCPCS: Performed by: FAMILY MEDICINE

## 2025-01-14 PROCEDURE — 2580000003 HC RX 258: Performed by: NURSE PRACTITIONER

## 2025-01-14 PROCEDURE — 85652 RBC SED RATE AUTOMATED: CPT

## 2025-01-14 RX ORDER — IPRATROPIUM BROMIDE AND ALBUTEROL SULFATE 2.5; .5 MG/3ML; MG/3ML
1 SOLUTION RESPIRATORY (INHALATION)
Status: DISCONTINUED | OUTPATIENT
Start: 2025-01-14 | End: 2025-01-20 | Stop reason: HOSPADM

## 2025-01-14 RX ORDER — ENOXAPARIN SODIUM 100 MG/ML
1 INJECTION SUBCUTANEOUS 2 TIMES DAILY
Status: DISCONTINUED | OUTPATIENT
Start: 2025-01-14 | End: 2025-01-20 | Stop reason: HOSPADM

## 2025-01-14 RX ORDER — BUDESONIDE 0.5 MG/2ML
500 INHALANT ORAL
Status: DISCONTINUED | OUTPATIENT
Start: 2025-01-14 | End: 2025-01-20 | Stop reason: HOSPADM

## 2025-01-14 RX ORDER — FUROSEMIDE 10 MG/ML
20 INJECTION INTRAMUSCULAR; INTRAVENOUS DAILY
Status: DISCONTINUED | OUTPATIENT
Start: 2025-01-14 | End: 2025-01-17

## 2025-01-14 RX ORDER — METOPROLOL SUCCINATE 25 MG/1
25 TABLET, EXTENDED RELEASE ORAL 2 TIMES DAILY
Status: DISCONTINUED | OUTPATIENT
Start: 2025-01-14 | End: 2025-01-20 | Stop reason: HOSPADM

## 2025-01-14 RX ORDER — METOPROLOL SUCCINATE 25 MG/1
25 TABLET, EXTENDED RELEASE ORAL DAILY
Status: DISCONTINUED | OUTPATIENT
Start: 2025-01-14 | End: 2025-01-14

## 2025-01-14 RX ORDER — DOXYCYCLINE 100 MG/1
100 CAPSULE ORAL EVERY 12 HOURS SCHEDULED
Status: DISPENSED | OUTPATIENT
Start: 2025-01-14 | End: 2025-01-19

## 2025-01-14 RX ADMIN — METOCLOPRAMIDE HYDROCHLORIDE 5 MG: 5 TABLET ORAL at 08:11

## 2025-01-14 RX ADMIN — BUDESONIDE 500 MCG: 0.5 INHALANT RESPIRATORY (INHALATION) at 20:47

## 2025-01-14 RX ADMIN — ALBUTEROL SULFATE 2.5 MG: 2.5 SOLUTION RESPIRATORY (INHALATION) at 09:25

## 2025-01-14 RX ADMIN — ALBUTEROL SULFATE 2.5 MG: 2.5 SOLUTION RESPIRATORY (INHALATION) at 13:08

## 2025-01-14 RX ADMIN — PANCRELIPASE LIPASE, PANCRELIPASE PROTEASE, PANCRELIPASE AMYLASE 5000 UNITS: 5000; 17000; 24000 CAPSULE, DELAYED RELEASE ORAL at 10:50

## 2025-01-14 RX ADMIN — ARFORMOTEROL TARTRATE 15 MCG: 15 SOLUTION RESPIRATORY (INHALATION) at 20:46

## 2025-01-14 RX ADMIN — METOPROLOL SUCCINATE 25 MG: 25 TABLET, EXTENDED RELEASE ORAL at 10:50

## 2025-01-14 RX ADMIN — WATER 40 MG: 1 INJECTION INTRAMUSCULAR; INTRAVENOUS; SUBCUTANEOUS at 16:53

## 2025-01-14 RX ADMIN — SODIUM CHLORIDE, PRESERVATIVE FREE 10 ML: 5 INJECTION INTRAVENOUS at 22:25

## 2025-01-14 RX ADMIN — APIXABAN 2.5 MG: 2.5 TABLET, FILM COATED ORAL at 10:50

## 2025-01-14 RX ADMIN — PANCRELIPASE LIPASE, PANCRELIPASE PROTEASE, PANCRELIPASE AMYLASE 5000 UNITS: 5000; 17000; 24000 CAPSULE, DELAYED RELEASE ORAL at 08:10

## 2025-01-14 RX ADMIN — IPRATROPIUM BROMIDE AND ALBUTEROL SULFATE 1 DOSE: 2.5; .5 SOLUTION RESPIRATORY (INHALATION) at 20:47

## 2025-01-14 RX ADMIN — ALBUTEROL SULFATE 2.5 MG: 2.5 SOLUTION RESPIRATORY (INHALATION) at 17:35

## 2025-01-14 RX ADMIN — FUROSEMIDE 20 MG: 10 INJECTION, SOLUTION INTRAMUSCULAR; INTRAVENOUS at 14:05

## 2025-01-14 RX ADMIN — ARFORMOTEROL TARTRATE 15 MCG: 15 SOLUTION RESPIRATORY (INHALATION) at 09:25

## 2025-01-14 RX ADMIN — SODIUM CHLORIDE, PRESERVATIVE FREE 10 ML: 5 INJECTION INTRAVENOUS at 08:11

## 2025-01-14 RX ADMIN — SODIUM CHLORIDE 5 MG/HR: 900 INJECTION, SOLUTION INTRAVENOUS at 14:03

## 2025-01-14 RX ADMIN — ENOXAPARIN SODIUM 60 MG: 100 INJECTION SUBCUTANEOUS at 22:24

## 2025-01-14 RX ADMIN — ASPIRIN 81 MG CHEWABLE TABLET 81 MG: 81 TABLET CHEWABLE at 08:10

## 2025-01-14 NOTE — PROGRESS NOTES
RN notified Dr. Ring of situation RN also stated repeat ABG after RRT to dr. Ring via perfect serve.     RN was told if patient becomes less alert we will need to call RRT again. Hold off on ICU transfer for now.    Azul Muniz RN

## 2025-01-14 NOTE — PROGRESS NOTES
Occupational Therapy    Date:2025  Patient Name: Ayesha Keane  MRN: [id number]  : 1955  Room: [address] [place] [address]-A     OT orders received and chart reviewed. OT eval on hold at this time 2/2 elevated HR w/ activity this am - discussed w/ RN.  OT will follow and re-attempt eval as appropriate at a later time/date.    Aimee Cuellar OTR/L; [id number]

## 2025-01-14 NOTE — PROGRESS NOTES
01/14/25 1452   NIV Type   NIV Started/Stopped On   Equipment Type v60   Mode Bilevel   Mask Type Full face mask   Mask Size Small   Assessment   SpO2 100 %   Level of Consciousness 0   Comfort Level Good   Using Accessory Muscles Yes   Mask Compliance Good   Skin Assessment Clean, dry, & intact   Skin Protection for O2 Device Yes   Orientation Middle   Location Nose   Intervention(s) Skin Barrier   Settings/Measurements   IPAP 12 cmH20   CPAP/EPAP 6 cmH2O   Vt (Measured) 331 mL   Rate Ordered 20   FiO2  100 %   I Time/ I Time % 0.9 s   Minute Volume (L/min) 7.5 Liters   Mask Leak (lpm) 35 lpm   Patient's Home Machine No   Alarm Settings   Alarms On Y   Low Pressure (cmH2O) 8 cmH2O   High Pressure (cmH2O) 35 cmH2O   RR Low (bpm) 8   RR High (bpm) 35 br/min     Date: 1/14/2025    Time: 2:54 PM    Patient Placed On BIPAP/CPAP/ Non-Invasive Ventilation?  Yes    If no must comment.  Facial area red/color change? No           If YES are Blister/Lesion present?No   If yes must notify nursing staff  BIPAP/CPAP skin barrier?  Yes    Skin barrier type:mepilexlite       Comments:        Gisselle Ocasio RCP

## 2025-01-14 NOTE — H&P
[facility]  History and Physical      CHIEF COMPLAINT:    Chief Complaint   Patient presents with    Chest Pain    Shortness of Breath     Patient c/o CP / SOB , hx of COPD . -130 per EMS         Patient of Lee Vides MD presents with:  Atrial fibrillation with rapid ventricular response (HCC)    History of Present Illness:   Patient is a 70-year-old female with past medical history of chronic pancreatitis, COPD, depression, CHF, emphysema, O2 dependence, NSVT who presents emergency room for chest pain and shortness of breath.  Chest x-ray on arrival revealed no acute findings.  Labs on arrival revealed elevated bicarb of 41, proBNP of 559, troponin 20-17, anemia of 8.4/30.7.  An EKG was obtained revealing atrial fibrillation with RVR.  Patient is admitted to intermediate telemetry and for further workup treatment further evaluation with cardiology.  Ms. Hodge is well-known to me from multiple previous admissions.  On my evaluation this a.m., she was completely alert awake oriented x 4 at her baseline, able to answer all questions appropriately.  Some evidence of dyspnea on speaking however no other complaints.  She denies any chest pain, subjective shortness of breath, fevers chills nausea vomiting at facility.  Heart rate is adequately controlled on my evaluation though remains in A-fib    REVIEW OF SYSTEMS:  Pertinent negatives are above in HPI.  10 point ROS otherwise negative.      Past Medical History:   Diagnosis Date    Asthma     Chronic pancreatitis (HCC)     COPD (chronic obstructive pulmonary disease) (HCC)     uses )3 prn daily and nightly / Dr. Blackwell F/U monthly    Depression     Dysphagia 2021    Emphysema lung (HCC)     Del Rio catheter in place     continuous since 2013, changed monthly at [facility]    Oxygen dependent 2017         Past Surgical History:   Procedure Laterality Date    BREAST SURGERY      COLONOSCOPY      COLONOSCOPY N/A 09/04/2019    COLONOSCOPY DIAGNOSTIC

## 2025-01-14 NOTE — PROGRESS NOTES
RN notified Dr. Zuluaga of situation and of dr. Ring response via messaging sytem.    Azul Muniz RN

## 2025-01-14 NOTE — PROGRESS NOTES
[facility] Quality Flow/Interdisciplinary Rounds Progress Note        Quality Flow Rounds held on January 14, 2025    Disciplines Attending:  Bedside Nurse, , , and Nursing Unit Leadership    Ayesha Keane was admitted on 1/13/2025 11:52 AM    Anticipated Discharge Date:       Disposition:    Harrison Score:  Harrison Scale Score: 19    BS RISK OF UNPLANNED READMISSION 2.0             25 Total Score        Discussed patient goal for the day, patient clinical progression, and barriers to discharge.  The following Goal(s) of the Day/Commitment(s) have been identified:  Diagnostics - Report Results and Labs - Report Results      Marielena Kwan RN  January 14, 2025

## 2025-01-14 NOTE — SIGNIFICANT EVENT
Rapid Response Team Note  Date of event: 1/14/2025   Time of event: 1637  Ayesha Keane 70 y.o. year old female   YOB: 1955   Admit date:  1/13/2025   Location: [address] [place] [address]-A   Witnessed? : [x]Yes  [] No  Monitored? : [x]Yes  [] No  Code status: [x] Full  [] DNR-CCA  []DNR-CC  ______________________________________________________________________  Reason for RRT:    [] RR < 8     [] RR > 28   [] SpO2 <90%   [] HR < 40 bpm   [] HR > 130 bpm  [] SBP < 90 mmHg    [] LOC    [] Seizures    [] Significant Bleeding Event   [x] Other: AMS    Subjective:   RRT was called regarding the above event. Patient came in altered retaining CO2. Was put on avaps. Repeat ABG was done within 3 minutes of changing the AVAPS setting. Patient alert and oriented will give another 30 minute trial on AVAPS.  Poor air entry bilaterally getting breathing treatment and steroids.     Objective:   Vital signs:   Vitals:    01/14/25 1557 01/14/25 1632 01/14/25 1640 01/14/25 1650   BP: (!) 158/72 129/75 138/61 129/75   Pulse: (!) 106 88 85 88   Resp:   21    Temp:       TempSrc:       SpO2:   100%    Weight:       Height:         Initial Condition:  Conscious   [x] Yes  [] No     Breathing [x] Yes  [] No     Pulse  [x] Yes  [] No    Airway:   [] Open/ Clear     Intervention: [x] None  [] Pooled secretions     [] Suctioned  [] Stridor      [] Intubation    Lungs:   [x] Symmetrical chest rise/ CTABL Intervention: [] None  [] Use of accessory muscles    [x] NIV (CPAP/BiPAP)  [] Cyanosis      [] Nasal Oxygen/Mask  [] Wheezing       [x] ABG             [] CXR  [] Other:     Circulation:   Rhythm:  [] Sinus [] Other:   Intervention: [] None            [] IV Access  [] Peripheral              [] Central            [] EKG            [] Cardioversion            [] Defibrillation     Capillary Refill:  [] > 2 seconds [] < 2 seconds    Neurologic:   [] NIHSS      [] Pupillary Response:     Response to pain:   [] Yes   [] No  Follow commands:  ??  ?  Disposition:  [x] No transfer   [] Transfer to monitor floor  [] Transfer to: [] MICU [] NICU [] CCU [] SICU    Patient’s family updated:  [] Yes  [] No   Discussed with:  [x] Critical Care Intensivist: Dr. Bryan      [x] Attending: Elsa Zuluaga MD, left voicemail      [] Primary Care Provider: Lee Vides MD      [] Other: ?    Cleve Lyons MD MD PGY-3  1/14/2025 5:00 PM  Attending Physician:Elsa Zuluaga MD

## 2025-01-14 NOTE — CONSULTS
INPATIENT CARDIOLOGY CONSULT     Reason for Consult: New onset of A-fib with RVR    Cardiologist: Dr. Hernández     Requesting Physician:  Dr. Zuluaga     Date of Consultation: 1/14/2025    HISTORY OF PRESENT ILLNESS:   Ayesha Keane is a 70 year old female who is previously known to Dr. Spain in 2013.  Patient was last seen in hospital consultation with Dr. Marsh on 9/10/2024 for elevated troponin with delta change --> ECHO showed LVEF of 60-65% no wall motion abnormality.  Patient had a Lexiscan MPS 4/13/2024 and was not repeated at that time.  Cardiology signed off.  See PMH below.     Recent encounters:  Admission 9/9/2024 patient presented with acute blood loss anemia.  Requiring multiple transfusions the positive Hemoccult.  Patient was given IV infusions.  General surgery was consulted for abdominal pain.  Patient was started on Reglan.  Admission 10/22/2024-10/25/2024 admitted with acute hypoxic respiratory failure.  Admission 11/22/2024 patient was admitted for COPD and UTI.  Respiratory panel was negative.  Patient was discharged on antibiotics and was continued on home oxygen 2 L nasal cannula.    Patient reported that she lives alone and uses a walker on occasion around her house.  She denies any recent falls.  She stated that her brother lives nearby and helps her frequently.  Patient reported that over the past 3 to 4 days she has felt \"winded\" with minimal exertion.  She stated that she has physical therapy coming to her house several times per week and while she was working with physical therapy on 1/13/2025 she was so short of breath that she cannot continue the session.  She does report having on and off midsternal chest \"tightness/fullness\" 10/10, nonradiating, associated with shortness of breath.  Patient stated that this chest discomfort has lasted several days and has been constant.  She stated at times it worsens if she is extremely short of breath.  She does report wearing chronic home

## 2025-01-14 NOTE — CARE COORDINATION
Transition of Care:  Chest Pain/Shortness of Breath, New A-fib.  On 6L O2.  Chronic Del Rio.    Met with the pt in room.  Pt reports that she lives alone in a 2 story home with bedroom on 2nd floor.  There are 3 steps to enter, 14 inside, & a stair lift.  DME: walker, rollator, neb, CPAP, O2 3L baseline portable/concentrator [facility].  She still drives but often gets transportation through [facility].  An Aide thru [facility] comes 7/week for 4 hrs for ADLs, meal prep, cleaning, and shopping.  She would like [facility] notified of admission.  Pt states that she is active with [facility]; left a VM with Kimmy from [facility] to verify services.   PT Am-PAC score is 16/24 from today; she walked 20 feet x2 without AD with Daryl.  OT eval pending due to elevated HR w/activity this am.  Reviewed AM-PAC score with the pt.  Patient declines [facility]; she prefers to discharge Home with [facility].  Waiting on a return call from [facility].  Preferred pharmacy is [facility], & PCP is dr. Lee Vides.  CM to follow.               13:23  Confirmed with [facility] that pt is active with [facility] for SN & PT.  Will need resumption of care order.  Left a VM for RIC Chow from [facility] #[phone].      Case Management Assessment  Initial Evaluation    Date/Time of Evaluation: 1/14/2025 1:00 PM  Assessment Completed by: Violetta Max    If patient is discharged prior to next notation, then this note serves as note for discharge by case management.    Patient Name: Ayesha Keane                   YOB: 1955  Diagnosis: New onset a-fib (HCC) [I48.91]  Chest pain, unspecified type [R07.9]                   Date / Time: 1/13/2025 11:52 AM    Patient Admission Status: Inpatient   Readmission Risk (Low < 19, Mod (19-27), High > 27): Readmission Risk Score: 26.3    Current PCP: Lee Vides MD  PCP verified by CM? Yes    Chart Reviewed: Yes      History Provided by: Patient  Patient Orientation: Alert and Oriented    Patient

## 2025-01-14 NOTE — FLOWSHEET NOTE
Patient arrived to the unit with the following belongings:     01/14/25 0112   Belongings   Dental Appliances None   Vision - Corrective Lenses Eyeglasses;At home   Hearing Aid None   Clothing Shirt;Pants;Footwear;Sweater   Jewelry None;At home   Body Piercings Removed No   Electronic Devices Cell Phone;Other (Comment);  (portable oxygen)   Weapons (Notify Protective Services/Security) None   Other Valuables Money;Purse  ($80, Credit Cards)   Home Medications None   Valuables Given To Patient   Provide Name(s) of Who Valuable(s) Were Given To Patient

## 2025-01-14 NOTE — PROGRESS NOTES
Physical Therapy  Initial Assessment     Name: Ayesha Keane  : 1955  MRN: [id number]      Date of Service: 2025    Evaluating PT: Cr Boo, PT, DPT [id number]      Room #:  6517/6517-A  Diagnosis:  New onset a-fib (HCC) [I48.91]  Chest pain, unspecified type [R07.9]  PMHx/PSHx:   has a past medical history of Asthma, Chronic pancreatitis (HCC), COPD (chronic obstructive pulmonary disease) (HCC), Depression, Dysphagia, Emphysema lung (HCC), Del Rio catheter in place, and Oxygen dependent.  Procedure/Surgery:  NA  Precautions:  Fall risk, Monitor HR and O2 sat, O2, Alarm  Equipment Needs:  TBD    SUBJECTIVE:    Pt lives alone in a 2 story house with 3 stair(s) and 1 rail(s) to enter. Stair lift to second floor bed and bath. Pt ambulated with WW PRN prior to admission. Pt is on 3 L O2/min at baseline.    OBJECTIVE:   Initial Evaluation  Date: 25 Treatment Date: Short Term/ Long Term   Goals   AM-PAC 6 Clicks      Was pt agreeable to Eval/treatment? Yes     Does pt have pain? No complaints of pain     Bed Mobility  Rolling: NT  Supine to sit: SBA  Sit to supine: Daryl  Scooting: SBA  Rolling: Independent   Supine to sit: Independent   Sit to supine: Independent   Scooting: Independent    Transfers Sit to stand: Daryl  Stand to sit: Daryl  Stand pivot: Daryl without AD  Sit to stand: Supervision  Stand to sit: Supervision  Stand pivot: Supervision with WW   Ambulation   20 feet x2 without AD with Daryl  >200 feet with WW with Supervision   Stair negotiation: ascended and descended NT  3 step(s) with 1 rail(s) with SBA   ROM BUE: Refer to OT note  BLE: WFL     Strength BUE: Refer to OT note  BLE: WFL     Balance Sitting EOB: SBA  Dynamic Standing: Daryl without AD  Sitting EOB: Independent   Dynamic Standing: Supervision with WW     Pt is A & O x: 4 to person, place, month/year, and situation.   Sensation: Denies numbness and tingling of extremities.   Edema: Unremarkable    Patient education  Pt  educated on PT role in acute care setting.    Patient response to education:   Pt verbalized understanding Pt demonstrated skill Pt requires further education in this area   Yes NA No     ASSESSMENT:    Conditions Requiring Skilled Therapeutic Intervention:    [x]Decreased strength     []Decreased ROM  [x]Decreased functional mobility  [x]Decreased balance   [x]Decreased endurance   []Decreased posture  []Decreased sensation  []Decreased coordination   []Decreased vision  [x]Decreased safety awareness   []Increased pain       Comments:    Pt was in bed upon room entry; agreeable to PT evaluation. Pt was [facility] out of bed requesting to use bathroom. Resting O2 sat was 115-123 bpm in Afib. Pt completed all mobility noted above. Pt ambulated short distance to bathroom without AD. Gait was quick and unsteady. Pt sat on commode for several minutes then ambulated back to bed. Breathing was labored. O2 sat dropped to 83% on 4 L O2/min and HR reached 198 bpm. Symptoms and vitals recovered with pursed lip breathing and rest. O2 sat recovered to 93% and HR to 120-130 bpm. RN was notified of vitals and symptoms. Pt was assisted back to bed and positioned comfortably. Pt was left in bed with all needs met at conclusion of session.    Treatment:  Patient practiced and was instructed in the following treatment:    Therapeutic activities:  Bed mobility: Cues for technique during bed mobility transfers.  Transfers: Cues for hand placement during sit <> stand transfers. Pt completed multiple transfers from different surface heights (EOB, commode).  Ambulation: Cues for line safety when ambulating to/from commode.  Vitals and symptoms were closely monitored throughout session.    Pt's/family goals:  1. To return home.    Prognosis is Good for reaching above PT goals.    Patient and or family understand(s) diagnosis, prognosis, and plan of care.  Yes    PHYSICAL THERAPY PLAN OF CARE:    PT POC is established based on physician order

## 2025-01-14 NOTE — CONSULTS
Shayan Carmona M.D.,[facility]  Hardy Talamantes D.O., SAFIA., [facility]  Louis Rodriguez M.D.  Saadia Tovar M.D.   Tarik Ring D.O.  Jag Everett M.D.       Patient:  Ayesha Keane 70 y.o. female MRN: [id number]           PULMONARY CONSULTATION    Reason for Consultation: Acute respiratory failure with hypoxia and hypercapnia  Referring Physician: Dr. Elsa Zuluaga MD    Communication with the referring physician will be sent via the electronic medical record.    Chief Complaint: AMS    CODE STATUS: Full    SUBJECTIVE:  HPI:  Ayesha Keane is a 70 y.o. AA female who we are asked to evaluate for acute respiratory failure with hypoxia and hypercapnia.    We are covering for [facility] pulmonary group Dr. Rex Blackwell, last seen in office July 2024.  She is known to our service from multiple hospital admissions over the past year for COPD exacerbation, our service covering at [facility].  She was last seen 11/21/2024 through 11/25/2024.    She has a past medical history significant for asthma, chronic oxygen dependence, hypoxic hypercapnic respiratory failure, pancreatitis, emphysema, end stage COPD, CAD, heart failure with preserved EF, history of respiratory failure requiring intubation mechanical ventilation April 2024, prior EtOH abuse in remission, chronic indwelling Del Rio catheter with recurrent UTIs, chronic anemia, depression, protein calorie malnutrition.    Her home pulmonary regimen includes Yupelri, Brovana, and Budesonide. She chronically wears oxygen at 3 liters NC. She has a non invasive ventilator but admits she does not use it.  Her last hospital admission she was seen for near syncopal episode at the [facility], found to be profoundly anemic requiring PRBC transfusion.  Underwent EGD to further evaluate anemia.  She required several PRBC transfusions.    She initially presented to the ED on 1/13/2025 with chest pain and shortness of breath.  Consult placed to  N/A 05/10/2019    EGD BIOPSY performed by Emmett Gleason MD at [facility] ENDOSCOPY    UPPER GASTROINTESTINAL ENDOSCOPY N/A 2019    EGD EUS performed by Cholo Scott DO at [facility] ENDOSCOPY    UPPER GASTROINTESTINAL ENDOSCOPY N/A 2019    EGD performed by Cholo Scott DO at [facility] ENDOSCOPY    UPPER GASTROINTESTINAL ENDOSCOPY N/A 2020    EGD DIAGNOSTIC ONLY performed by Hernandez TORRES MD at [facility] ENDOSCOPY    UPPER GASTROINTESTINAL ENDOSCOPY N/A 10/25/2021    EGD BIOPSY performed by Hernandez TORRES MD at [facility] ENDOSCOPY    UPPER GASTROINTESTINAL ENDOSCOPY N/A 2021    EGD BIOPSY performed by Hernandez TORRES MD at [facility] ENDOSCOPY    UPPER GASTROINTESTINAL ENDOSCOPY N/A 2024    ESOPHAGOGASTRODUODENOSCOPY performed by Hernandez Loredo MD at [facility] ENDOSCOPY       Family History   Problem Relation Age of Onset    Cancer Father 80        prostate cancer    Diabetes Brother     Kidney Disease Brother        Social History:   Social History     Socioeconomic History    Marital status:      Spouse name: Not on file    Number of children: Not on file    Years of education: Not on file    Highest education level: Not on file   Occupational History    Not on file   Tobacco Use    Smoking status: Former     Current packs/day: 0.00     Types: Cigarettes     Quit date: 10/21/2019     Years since quittin.2    Smokeless tobacco: Never   Vaping Use    Vaping status: Never Used   Substance and Sexual Activity    Alcohol use: No     Comment: used to drink heavily, stopped [id number]    Drug use: No    Sexual activity: Not on file   Other Topics Concern    Not on file   Social History Narrative    Not on file     Social Determinants of Health     Financial Resource Strain: Not on file   Food Insecurity: No Food Insecurity (2025)    Hunger Vital Sign     Worried About Running Out of Food in the Last Year: Never true     Ran Out of Food in the Last Year: Never true   Transportation Needs: No

## 2025-01-14 NOTE — PROGRESS NOTES
01/14/25 1427   Oxygen Therapy/Pulse Ox   Blood Gas  Performed? Yes   $ABG $Arterial Puncture   Sung's Test #1 Pos   Site #1 Right Radial   Site Prepped #1 Yes   Number of Attempts #1 2   Pressure Held #1 Yes   Complications #1 None   Post-procedure #1 Standard   Specimen Status #1 To lab   How Tolerated? Tolerated well

## 2025-01-14 NOTE — ACP (ADVANCE CARE PLANNING)
Advance Care Planning   The patient has the following advanced directives on file:  Advance Directives       Power of  Living Will ACP-Advance Directive ACP-Power of     Not on File Filed on 09/26/24 Filed Not on File            The patient has appointed the following active healthcare agents:    Primary Decision Maker: Nelly mock - Brother/Sister - [phone]    Secondary Decision Maker: Melania Currie - Niece/Nephew - [phone]    The Patient has the following current code status:    Code Status: Full Code

## 2025-01-14 NOTE — CARE COORDINATION
Patient's PCP is Dr Lee Vides. Per office policy, patient is required to call in once D/C and make their own appointment.     Reminder added to patients D/C.

## 2025-01-14 NOTE — PROGRESS NOTES
4 Eyes Skin Assessment     NAME:  Ayesha Keane  YOB: 1955  MEDICAL RECORD NUMBER:  [id number]    The patient is being assessed for  Admission    I agree that at least one RN has performed a thorough Head to Toe Skin Assessment on the patient. ALL assessment sites listed below have been assessed.      Areas assessed by both nurses:    Head, Face, Ears, Shoulders, Back, Chest, Arms, Elbows, Hands, Sacrum. Buttock, Coccyx, Ischium, Legs. Feet and Heels, and Under Medical Devices         Does the Patient have a Wound? No noted wound(s)       Harrison Prevention initiated by RN: No  Wound Care Orders initiated by RN: No    Pressure Injury (Stage 3,4, Unstageable, DTI, NWPT, and Complex wounds) if present, place Wound referral order by RN under : No    New Ostomies, if present place, Ostomy referral order under : No     Nurse 1 eSignature: Electronically signed by Pratima Johnson RN on 1/14/25 at 3:51 AM EST    **SHARE this note so that the co-signing nurse can place an eSignature**    Nurse 2 eSignature: Electronically signed by Britta Faustin RN on 1/14/25 at 4:04 AM EST

## 2025-01-15 ENCOUNTER — APPOINTMENT (OUTPATIENT)
Age: 70
DRG: 189 | End: 2025-01-15
Payer: MEDICARE

## 2025-01-15 LAB
AADO2: 98.7 MMHG
ALBUMIN SERPL-MCNC: 3.8 G/DL (ref 3.5–5.2)
ALP SERPL-CCNC: 55 U/L (ref 35–104)
ALT SERPL-CCNC: 9 U/L (ref 0–32)
ANION GAP SERPL CALCULATED.3IONS-SCNC: 6 MMOL/L (ref 7–16)
AST SERPL-CCNC: 17 U/L (ref 0–31)
B.E.: 8.9 MMOL/L (ref -3–3)
BILIRUB SERPL-MCNC: <0.2 MG/DL (ref 0–1.2)
BUN SERPL-MCNC: 21 MG/DL (ref 6–23)
CALCIUM SERPL-MCNC: 9.7 MG/DL (ref 8.6–10.2)
CHLORIDE SERPL-SCNC: 98 MMOL/L (ref 98–107)
CO2 SERPL-SCNC: 38 MMOL/L (ref 22–29)
COHB: 0.7 % (ref 0–1.5)
CREAT SERPL-MCNC: 0.6 MG/DL (ref 0.5–1)
CRITICAL: ABNORMAL
DATE ANALYZED: ABNORMAL
DATE OF COLLECTION: ABNORMAL
FIO2: 40 %
GFR, ESTIMATED: >90 ML/MIN/1.73M2
GLUCOSE SERPL-MCNC: 210 MG/DL (ref 74–99)
HCO3: 34.7 MMOL/L (ref 22–26)
HHB: 1.3 % (ref 0–5)
L PNEUMO1 AG UR QL IA.RAPID: NEGATIVE
LAB: ABNORMAL
Lab: 853
METHB: 0.4 % (ref 0–1.5)
MODE: ABNORMAL
O2 SATURATION: 98.7 % (ref 92–98.5)
O2HB: 97.6 % (ref 94–97)
OPERATOR ID: 405
PATIENT TEMP: 37 C
PCO2: 56 MMHG (ref 35–45)
PEEP/CPAP: 5 CMH2O
PFO2: 3.05 MMHG/%
PH BLOOD GAS: 7.41 (ref 7.35–7.45)
PO2: 122.1 MMHG (ref 75–100)
POTASSIUM SERPL-SCNC: 4.8 MMOL/L (ref 3.5–5)
PROT SERPL-MCNC: 5.7 G/DL (ref 6.4–8.3)
RI(T): 0.81
RR MECHANICAL: 24 B/MIN
S PNEUM AG SPEC QL: NEGATIVE
SODIUM SERPL-SCNC: 142 MMOL/L (ref 132–146)
SOURCE, BLOOD GAS: ABNORMAL
SPECIMEN SOURCE: NORMAL
THB: 8.4 G/DL (ref 11.5–16.5)
TIME ANALYZED: 902
VT MECHANICAL: 450 ML

## 2025-01-15 PROCEDURE — 93306 TTE W/DOPPLER COMPLETE: CPT

## 2025-01-15 PROCEDURE — 2500000003 HC RX 250 WO HCPCS: Performed by: NURSE PRACTITIONER

## 2025-01-15 PROCEDURE — 6370000000 HC RX 637 (ALT 250 FOR IP): Performed by: NURSE PRACTITIONER

## 2025-01-15 PROCEDURE — 36415 COLL VENOUS BLD VENIPUNCTURE: CPT

## 2025-01-15 PROCEDURE — 2700000000 HC OXYGEN THERAPY PER DAY

## 2025-01-15 PROCEDURE — 6360000002 HC RX W HCPCS: Performed by: NURSE PRACTITIONER

## 2025-01-15 PROCEDURE — 6360000002 HC RX W HCPCS: Performed by: INTERNAL MEDICINE

## 2025-01-15 PROCEDURE — 94640 AIRWAY INHALATION TREATMENT: CPT

## 2025-01-15 PROCEDURE — 82805 BLOOD GASES W/O2 SATURATION: CPT

## 2025-01-15 PROCEDURE — 2500000003 HC RX 250 WO HCPCS: Performed by: FAMILY MEDICINE

## 2025-01-15 PROCEDURE — 2580000003 HC RX 258: Performed by: NURSE PRACTITIONER

## 2025-01-15 PROCEDURE — 2140000000 HC CCU INTERMEDIATE R&B

## 2025-01-15 PROCEDURE — 6370000000 HC RX 637 (ALT 250 FOR IP): Performed by: FAMILY MEDICINE

## 2025-01-15 PROCEDURE — 80053 COMPREHEN METABOLIC PANEL: CPT

## 2025-01-15 PROCEDURE — 36600 WITHDRAWAL OF ARTERIAL BLOOD: CPT

## 2025-01-15 PROCEDURE — 99222 1ST HOSP IP/OBS MODERATE 55: CPT

## 2025-01-15 PROCEDURE — 94660 CPAP INITIATION&MGMT: CPT

## 2025-01-15 PROCEDURE — 5A09457 ASSISTANCE WITH RESPIRATORY VENTILATION, 24-96 CONSECUTIVE HOURS, CONTINUOUS POSITIVE AIRWAY PRESSURE: ICD-10-PCS | Performed by: INTERNAL MEDICINE

## 2025-01-15 PROCEDURE — 99233 SBSQ HOSP IP/OBS HIGH 50: CPT | Performed by: INTERNAL MEDICINE

## 2025-01-15 RX ADMIN — ENOXAPARIN SODIUM 60 MG: 100 INJECTION SUBCUTANEOUS at 21:35

## 2025-01-15 RX ADMIN — ENOXAPARIN SODIUM 60 MG: 100 INJECTION SUBCUTANEOUS at 07:54

## 2025-01-15 RX ADMIN — IPRATROPIUM BROMIDE AND ALBUTEROL SULFATE 1 DOSE: 2.5; .5 SOLUTION RESPIRATORY (INHALATION) at 16:51

## 2025-01-15 RX ADMIN — DOXYCYCLINE HYCLATE 100 MG: 100 CAPSULE ORAL at 21:35

## 2025-01-15 RX ADMIN — BUDESONIDE 500 MCG: 0.5 INHALANT RESPIRATORY (INHALATION) at 10:05

## 2025-01-15 RX ADMIN — WATER 40 MG: 1 INJECTION INTRAMUSCULAR; INTRAVENOUS; SUBCUTANEOUS at 21:35

## 2025-01-15 RX ADMIN — WATER 40 MG: 1 INJECTION INTRAMUSCULAR; INTRAVENOUS; SUBCUTANEOUS at 07:53

## 2025-01-15 RX ADMIN — SODIUM CHLORIDE, PRESERVATIVE FREE 10 ML: 5 INJECTION INTRAVENOUS at 07:59

## 2025-01-15 RX ADMIN — METOPROLOL SUCCINATE 25 MG: 25 TABLET, EXTENDED RELEASE ORAL at 21:35

## 2025-01-15 RX ADMIN — IPRATROPIUM BROMIDE AND ALBUTEROL SULFATE 1 DOSE: 2.5; .5 SOLUTION RESPIRATORY (INHALATION) at 12:32

## 2025-01-15 RX ADMIN — SERTRALINE HYDROCHLORIDE 50 MG: 50 TABLET ORAL at 21:35

## 2025-01-15 RX ADMIN — ARFORMOTEROL TARTRATE 15 MCG: 15 SOLUTION RESPIRATORY (INHALATION) at 10:05

## 2025-01-15 RX ADMIN — PANCRELIPASE LIPASE, PANCRELIPASE PROTEASE, PANCRELIPASE AMYLASE 5000 UNITS: 5000; 17000; 24000 CAPSULE, DELAYED RELEASE ORAL at 16:34

## 2025-01-15 RX ADMIN — IPRATROPIUM BROMIDE AND ALBUTEROL SULFATE 1 DOSE: 2.5; .5 SOLUTION RESPIRATORY (INHALATION) at 10:05

## 2025-01-15 RX ADMIN — BUDESONIDE 500 MCG: 0.5 INHALANT RESPIRATORY (INHALATION) at 22:45

## 2025-01-15 RX ADMIN — METOCLOPRAMIDE HYDROCHLORIDE 5 MG: 5 TABLET ORAL at 21:35

## 2025-01-15 RX ADMIN — LACTULOSE 20 G: 20 SOLUTION ORAL at 21:34

## 2025-01-15 RX ADMIN — ARFORMOTEROL TARTRATE 15 MCG: 15 SOLUTION RESPIRATORY (INHALATION) at 22:44

## 2025-01-15 RX ADMIN — SODIUM CHLORIDE, PRESERVATIVE FREE 10 ML: 5 INJECTION INTRAVENOUS at 21:34

## 2025-01-15 RX ADMIN — FUROSEMIDE 20 MG: 10 INJECTION, SOLUTION INTRAMUSCULAR; INTRAVENOUS at 07:53

## 2025-01-15 RX ADMIN — WATER 40 MG: 1 INJECTION INTRAMUSCULAR; INTRAVENOUS; SUBCUTANEOUS at 00:08

## 2025-01-15 RX ADMIN — IPRATROPIUM BROMIDE AND ALBUTEROL SULFATE 1 DOSE: 2.5; .5 SOLUTION RESPIRATORY (INHALATION) at 22:44

## 2025-01-15 RX ADMIN — SODIUM CHLORIDE 2.5 MG/HR: 900 INJECTION, SOLUTION INTRAVENOUS at 13:42

## 2025-01-15 NOTE — PROGRESS NOTES
Shayan Carmona M.D.,[facility]  Hardy Talamantes D.O., F.SUKUMAR.BETSY.OAnneI., [facility]  Jimbo Rodriguez M.D.  Saadia Tovar M.D.   Tarik Ring D.O.  Jag Everett M.D.         Daily Pulmonary Progress Note    Patient:  Ayesha Keane 70 y.o. female MRN: [id number]            Synopsis     We are following patient for acute respiratory failure with hypoxia and hypercapnia    \"CC\" AMS    Code status: Full      Subjective      Patient was seen and examined.  RRT yesterday for worsening respiratory distress and hypercapnia.  Patient continued to improve with use of NIV in the form of AVAPS.  No transferred needed to ICU.  Oxygen 3 L nasal cannula when off NIV.  Mentation at baseline.  ABGs much improved with use of AVAPS.  Review of compliance report from [facility] from 7/17/2024 through 8/17/2024 Veena NIV patient only using 16 out of 32 days (50%) for average of 6 hours and 23 minutes.  Home settings prescribed target volume 4 to 10 mL/kg max pressure 10-35, min pressure 4-20 EPAP max 10-20, EPAP min 4-20 inspiratory time 0.7-2.0.  Achieving approximate target tidal volume of 460 mL.  Backup rate 8-16.  Home oxygen 3 L nasal cannula bleed in.  Remains on diltiazem for rate control weaned to 2.5 mg/h.      Review of Systems:  Constitutional: Denies fever, weight loss, night sweats, and fatigue  Skin: Denies pigmentation, dark lesions, and rashes   HEENT: Denies hearing loss, tinnitus, ear drainage, epistaxis, sore throat, and hoarseness.  Cardiovascular: Denies palpitations, chest pain, and chest pressure.  Respiratory: Denies cough, dyspnea at rest, hemoptysis, apnea, and choking.  Gastrointestinal: Denies nausea, vomiting, poor appetite, diarrhea, heartburn or reflux  Genitourinary: Denies dysuria, frequency, urgency or hematuria  Musculoskeletal: Denies myalgias, muscle weakness, and bone pain  Neurological: Denies dizziness, vertigo, headache, and focal weakness  Psychological: Denies anxiety and depression  Endocrine:

## 2025-01-15 NOTE — PROGRESS NOTES
[facility] Quality Flow/Interdisciplinary Rounds Progress Note        Quality Flow Rounds held on January 15, 2025    Disciplines Attending:  Bedside Nurse, , , and Nursing Unit Leadership    Ayesha Keane was admitted on 1/13/2025 11:52 AM    Anticipated Discharge Date:       Disposition:    Harrison Score:  Harrison Scale Score: 20    [facility] RISK OF UNPLANNED READMISSION 2.0             25.9 Total Score        Discussed patient goal for the day, patient clinical progression, and barriers to discharge.  The following Goal(s) of the Day/Commitment(s) have been identified:  Diagnostics - Report Results and Labs - Report Results      Marielena Kwan RN  January 15, 2025

## 2025-01-15 NOTE — PROGRESS NOTES
01/15/25 0825   Oxygen Therapy/Pulse Ox   Blood Gas  Performed? Yes   $ABG $Arterial Puncture   Sung's Test #1 Pos   Site #1 Right Radial   Site Prepped #1 Yes   Number of Attempts #1 2   Pressure Held #1 Yes   Complications #1 None   Post-procedure #1 Standard   Specimen Status #1 Could not obtain   How Tolerated? Tolerated well

## 2025-01-15 NOTE — PROGRESS NOTES
01/15/25 1240   NIV Type   NIV Started/Stopped On   Equipment Type v60   Mode AVAPS   Mask Type Full face mask   Mask Size Small   Assessment   SpO2 100 %   Level of Consciousness 0   Comfort Level Good   Using Accessory Muscles No   Mask Compliance Good   Skin Assessment Clean, dry, & intact   Skin Protection for O2 Device Yes   Orientation Middle   Location Nose   Intervention(s) Skin Barrier   Settings/Measurements   CPAP/EPAP 5 cmH2O   IPAP Min 20 cmH2O   IPAP Max 30 cmH2O   Vt (Set, mL) 450 mL   Vt (Measured) 627 mL   Rate Ordered 24   FiO2  40 %   I Time/ I Time % 0.75 s   Minute Volume (L/min) 15.1 Liters   Mask Leak (lpm) 53 lpm   Patient's Home Machine No   Alarm Settings   Alarms On Y   Low Pressure (cmH2O) 8 cmH2O   High Pressure (cmH2O) 41 cmH2O   RR Low (bpm) 19   RR High (bpm) 35 br/min

## 2025-01-15 NOTE — CONSULTS
Palliative Care Department  [phone]  Palliative Care Initial Consult  Provider Treva Poole, APRN - CNP      PATIENT: Ayesha Keane  : 1955  MRN: [id number]  ADMISSION DATE: 2025 11:52 AM  Referring Provider: Elsa Zuluaga MD     Palliative Medicine was consulted on hospital day 2 for assistance with Goals of care, Code Status Discussion    HPI:     Clinical Summary:Ayesha Keane is a 70 y.o. y/o female with a history of  chronic pancreatitis, end stage COPD, depression, HFpEF, O2 dependence, NSVT  who presented to [facility] on [facility]25 with chest pain and shortness of breath.  She was found to be in A-fib RVR.  Chest x-ray showed advanced emphysematous changes but no acute findings.  She was hypercapnic and placed on the BiPAP.  Patient does report noncompliance with NIV device at home.  She remains admitted to telemetry for further medical management.  RRT for AMS and placed on AVAPS for CO2 retention.     ASSESSMENT/PLAN:     Pertinent Hospital Diagnoses     Acute on chronic hypercapnic and hypoxic respiratory failure  End-stage COPD  A-fib RVR    Palliative Care Encounter / Counseling Regarding Goals of Care  Please see detailed goals of care discussion as below  At this time, Ayesha Keane, Does have capacity for medical decision-making.  Capacity is time limited and situation/question specific  During encounter Nelly Howell was surrogate medical decision-maker  Outcome of goals of care meeting:  Continue full code  Continue aggressive medical management  Patient and family agreeable to intubation if needed  Code status Full Code  Advanced Directives: POA or [facility] will [facility]  Surrogate/Legal NOK:  Nelly Howell (Sister/POA) [phone]   Melania Currie (Niece/POA) - [phone]     Spiritual assessment: no spiritual distress identified  Bereavement and grief: to be determined  Referrals to: none today    Thank you for the opportunity to participate in the care of

## 2025-01-15 NOTE — PLAN OF CARE
Problem: Chronic Conditions and Co-morbidities  Goal: Patient's chronic conditions and co-morbidity symptoms are monitored and maintained or improved  1/15/2025 0126 by Pratima Johnson, RN  Outcome: Progressing     Problem: Discharge Planning  Goal: Discharge to home or other facility with appropriate resources  1/15/2025 0126 by Pratima Johnson, RN  Outcome: Progressing     Problem: Safety - Adult  Goal: Free from fall injury  1/15/2025 0126 by Pratima Johnson, RN  Outcome: Progressing

## 2025-01-15 NOTE — PROGRESS NOTES
[facility]   Progress note      Subjective:    Awake and alert mentating well  No acute events overnight after yesterday's RRT  Remains on noninvasive ventilation however  Teary-eyed    Objective:    /60   Pulse 95   Temp 97.5 °F (36.4 °C) (Temporal)   Resp 18   Ht 1.702 m (5' 7\")   Wt 60.1 kg (132 lb 9.6 oz)   LMP 10/21/1985   SpO2 100%   BMI 20.77 kg/m²     In: 660 [P.O.:660]  Out: 1350   In: 660   Out: 1350 [Urine:1350]    General appearance: NAD, conversant, resting comfortably AVAPS in place  HEENT: AT/NC, MMM  Neck: FROM, supple  Lungs: Distant breath sounds bilateral lung fields  CV: RRR, no MRGs  Vasc: Radial pulses 2+  Abdomen: Soft, non-tender; no masses or HSM  Extremities: No peripheral edema or digital cyanosis  Skin: no rash, lesions or ulcers  Psych: Alert and oriented to person, place and time  Neuro: Alert and interactive     Recent Labs     01/13/25  1215   WBC 6.4   HGB 8.4*   HCT 30.7*          Recent Labs     01/13/25  1215 01/14/25  0445 01/15/25  0457    142 142   K 4.6 4.7 4.8   CL 99 98 98   CO2 41* 40* 38*   BUN 17 18 21   CREATININE 0.5 0.5 0.6   CALCIUM 10.0 10.1 9.7       Assessment:    Principal Problem:    Atrial fibrillation with rapid ventricular response (HCC)  Resolved Problems:    * No resolved hospital problems. *      Plan:    Patient is a 70-year-old female admitted to [facility] for  Atrial fibrillation with RVR  -Rate adequately controlled on my evaluation  -Check TSH and T4  -Cardiology following  -Eliquis 2.5 mg twice daily  -Continue metoprolol XL 25 mg daily for rate control  -Await further input from cardiology     Acute hypercarbic hypoxemic respiratory failure  On evaluation this morning-saturating appropriately, no acute issues, no dyspnea  Notified this afternoon by RRT physician that RRT was called for acute decompensation with hypercarbia ABG with acute hypercarbic respiratory failure pH 7.1

## 2025-01-15 NOTE — CARE COORDINATION
Transition of Care Update:  New A-fib.  RRT called last night for AMS placed on AVAPS.  CO2 38.  Cardizem gtt at 2.5 mg/hr.  IV Solumedrol 40 mg Q 8 hrs started.  Labs noted & chart reviewed.  PT/OT will work with pt when appropriate.  Pt prefers to discharge Home with [facility].  She is active with [facility] for SN & PT.  Will need resumption of care order. Waiting on return call from RIC Chow from [facility] #[phone] to confirm pt reports of having an Aide thru [employer] 7 days/wk for 4 hrs.  CM/SW to follow.         09:19  Message left for Dr. Zuluaga for palliative care order.    09:35  Message left for Juan Antonio at [facility] requesting NIV compliance.    10:31   Claudine LARIOS from [facility] (#[phone]) called to confirm that pt has an Alert Button, Meal Delivery, & Aide 28 hrs/week thru [employer].  Claudine requests notification of discharge to resume services.    11:13  Juan Antonio from [facility] confirmed that the patient is currently using 3L O2 at Home & NIV compliance sent.  P[facility]U  is taking the NIV forms to medical records to be downloaded on [employer].

## 2025-01-16 LAB
ALBUMIN SERPL-MCNC: 3.7 G/DL (ref 3.5–5.2)
ALP SERPL-CCNC: 57 U/L (ref 35–104)
ALT SERPL-CCNC: 15 U/L (ref 0–32)
ANION GAP SERPL CALCULATED.3IONS-SCNC: 8 MMOL/L (ref 7–16)
AST SERPL-CCNC: 15 U/L (ref 0–31)
BILIRUB SERPL-MCNC: 0.2 MG/DL (ref 0–1.2)
BUN SERPL-MCNC: 26 MG/DL (ref 6–23)
CALCIUM SERPL-MCNC: 10.4 MG/DL (ref 8.6–10.2)
CHLORIDE SERPL-SCNC: 94 MMOL/L (ref 98–107)
CO2 SERPL-SCNC: 36 MMOL/L (ref 22–29)
CREAT SERPL-MCNC: 0.6 MG/DL (ref 0.5–1)
ECHO AO ASC DIAM: 2.8 CM
ECHO AO ASCENDING AORTA INDEX: 1.65 CM/M2
ECHO AV AREA PEAK VELOCITY: 2.1 CM2
ECHO AV AREA VTI: 2 CM2
ECHO AV AREA/BSA PEAK VELOCITY: 1.2 CM2/M2
ECHO AV AREA/BSA VTI: 1.2 CM2/M2
ECHO AV CUSP MM: 1.8 CM
ECHO AV MEAN GRADIENT: 3 MMHG
ECHO AV MEAN VELOCITY: 0.8 M/S
ECHO AV PEAK GRADIENT: 6 MMHG
ECHO AV PEAK VELOCITY: 1.3 M/S
ECHO AV VELOCITY RATIO: 0.69
ECHO AV VTI: 28.7 CM
ECHO BSA: 1.69 M2
ECHO EST RA PRESSURE: 8 MMHG
ECHO LA VOL A-L A2C: 84 ML (ref 22–52)
ECHO LA VOL A-L A4C: 84 ML (ref 22–52)
ECHO LA VOL MOD A2C: 81 ML (ref 22–52)
ECHO LA VOL MOD A4C: 82 ML (ref 22–52)
ECHO LA VOLUME AREA LENGTH: 88 ML
ECHO LA VOLUME INDEX A-L A2C: 49 ML/M2 (ref 16–34)
ECHO LA VOLUME INDEX A-L A4C: 49 ML/M2 (ref 16–34)
ECHO LA VOLUME INDEX AREA LENGTH: 52 ML/M2 (ref 16–34)
ECHO LA VOLUME INDEX MOD A2C: 48 ML/M2 (ref 16–34)
ECHO LA VOLUME INDEX MOD A4C: 48 ML/M2 (ref 16–34)
ECHO LV EJECTION FRACTION A2C: 56 %
ECHO LV EJECTION FRACTION A4C: 61 %
ECHO LV EJECTION FRACTION BIPLANE: 60 % (ref 55–100)
ECHO LV FRACTIONAL SHORTENING: 33 % (ref 28–44)
ECHO LV INTERNAL DIMENSION DIASTOLE INDEX: 2.47 CM/M2
ECHO LV INTERNAL DIMENSION DIASTOLIC: 4.2 CM (ref 3.9–5.3)
ECHO LV INTERNAL DIMENSION SYSTOLIC INDEX: 1.65 CM/M2
ECHO LV INTERNAL DIMENSION SYSTOLIC: 2.8 CM
ECHO LV ISOVOLUMETRIC RELAXATION TIME (IVRT): 96.9 MS
ECHO LVOT AREA: 3.1 CM2
ECHO LVOT AV VTI INDEX: 0.65
ECHO LVOT DIAM: 2 CM
ECHO LVOT MEAN GRADIENT: 2 MMHG
ECHO LVOT PEAK GRADIENT: 3 MMHG
ECHO LVOT PEAK VELOCITY: 0.9 M/S
ECHO LVOT STROKE VOLUME INDEX: 34.4 ML/M2
ECHO LVOT SV: 58.4 ML
ECHO LVOT VTI: 18.6 CM
ECHO MV A VELOCITY: 0.79 M/S
ECHO MV AREA PHT: 2.7 CM2
ECHO MV AREA VTI: 1.9 CM2
ECHO MV E DECELERATION TIME (DT): 161.6 MS
ECHO MV E VELOCITY: 1.18 M/S
ECHO MV E/A RATIO: 1.49
ECHO MV EROA PISA: 0.2 CM2
ECHO MV LVOT VTI INDEX: 1.68
ECHO MV MAX VELOCITY: 1.3 M/S
ECHO MV MEAN GRADIENT: 2 MMHG
ECHO MV MEAN VELOCITY: 0.6 M/S
ECHO MV PEAK GRADIENT: 7 MMHG
ECHO MV PRESSURE HALF TIME (PHT): 81.1 MS
ECHO MV REGURGITANT ALIASING (NYQUIST) VELOCITY: 43 CM/S
ECHO MV REGURGITANT RADIUS PISA: 0.61 CM
ECHO MV REGURGITANT VELOCITY PISA: 4.5 M/S
ECHO MV REGURGITANT VOLUME PISA: 36.4 ML
ECHO MV REGURGITANT VTIA: 163 CM
ECHO MV VTI: 31.3 CM
ECHO PV MAX VELOCITY: 0.8 M/S
ECHO PV MEAN GRADIENT: 1 MMHG
ECHO PV MEAN VELOCITY: 0.6 M/S
ECHO PV PEAK GRADIENT: 2 MMHG
ECHO PV VTI: 20.1 CM
ECHO PVEIN A DURATION: 138.4 MS
ECHO PVEIN A VELOCITY: 0.4 M/S
ECHO PVEIN PEAK D VELOCITY: 0.4 M/S
ECHO PVEIN PEAK S VELOCITY: 0.5 M/S
ECHO PVEIN S/D RATIO: 1.3
ECHO RIGHT VENTRICULAR SYSTOLIC PRESSURE (RVSP): 29 MMHG
ECHO TV REGURGITANT MAX VELOCITY: 2.29 M/S
ECHO TV REGURGITANT PEAK GRADIENT: 21 MMHG
EKG ATRIAL RATE: 100 BPM
EKG ATRIAL RATE: 141 BPM
EKG Q-T INTERVAL: 334 MS
EKG Q-T INTERVAL: 358 MS
EKG QRS DURATION: 64 MS
EKG QRS DURATION: 72 MS
EKG QTC CALCULATION (BAZETT): 474 MS
EKG QTC CALCULATION (BAZETT): 495 MS
EKG R AXIS: 66 DEGREES
EKG R AXIS: 69 DEGREES
EKG T AXIS: 76 DEGREES
EKG T AXIS: 93 DEGREES
EKG VENTRICULAR RATE: 115 BPM
EKG VENTRICULAR RATE: 121 BPM
ERYTHROCYTE [DISTWIDTH] IN BLOOD BY AUTOMATED COUNT: 15.8 % (ref 11.5–15)
GFR, ESTIMATED: >90 ML/MIN/1.73M2
GLUCOSE SERPL-MCNC: 231 MG/DL (ref 74–99)
HCT VFR BLD AUTO: 27.8 % (ref 34–48)
HGB BLD-MCNC: 7.9 G/DL (ref 11.5–15.5)
MCH RBC QN AUTO: 24 PG (ref 26–35)
MCHC RBC AUTO-ENTMCNC: 28.4 G/DL (ref 32–34.5)
MCV RBC AUTO: 84.5 FL (ref 80–99.9)
PLATELET # BLD AUTO: 251 K/UL (ref 130–450)
PMV BLD AUTO: 10.5 FL (ref 7–12)
POTASSIUM SERPL-SCNC: 4.4 MMOL/L (ref 3.5–5)
PROT SERPL-MCNC: 5.9 G/DL (ref 6.4–8.3)
RBC # BLD AUTO: 3.29 M/UL (ref 3.5–5.5)
SODIUM SERPL-SCNC: 138 MMOL/L (ref 132–146)
WBC OTHER # BLD: 7 K/UL (ref 4.5–11.5)

## 2025-01-16 PROCEDURE — 6370000000 HC RX 637 (ALT 250 FOR IP): Performed by: NURSE PRACTITIONER

## 2025-01-16 PROCEDURE — 2700000000 HC OXYGEN THERAPY PER DAY

## 2025-01-16 PROCEDURE — 2500000003 HC RX 250 WO HCPCS: Performed by: FAMILY MEDICINE

## 2025-01-16 PROCEDURE — 36415 COLL VENOUS BLD VENIPUNCTURE: CPT

## 2025-01-16 PROCEDURE — 6360000002 HC RX W HCPCS: Performed by: INTERNAL MEDICINE

## 2025-01-16 PROCEDURE — 2500000003 HC RX 250 WO HCPCS: Performed by: NURSE PRACTITIONER

## 2025-01-16 PROCEDURE — 93306 TTE W/DOPPLER COMPLETE: CPT | Performed by: INTERNAL MEDICINE

## 2025-01-16 PROCEDURE — 85027 COMPLETE CBC AUTOMATED: CPT

## 2025-01-16 PROCEDURE — 93010 ELECTROCARDIOGRAM REPORT: CPT | Performed by: INTERNAL MEDICINE

## 2025-01-16 PROCEDURE — 97535 SELF CARE MNGMENT TRAINING: CPT

## 2025-01-16 PROCEDURE — 6360000002 HC RX W HCPCS: Performed by: NURSE PRACTITIONER

## 2025-01-16 PROCEDURE — 97530 THERAPEUTIC ACTIVITIES: CPT

## 2025-01-16 PROCEDURE — 94640 AIRWAY INHALATION TREATMENT: CPT

## 2025-01-16 PROCEDURE — 6370000000 HC RX 637 (ALT 250 FOR IP): Performed by: FAMILY MEDICINE

## 2025-01-16 PROCEDURE — 94660 CPAP INITIATION&MGMT: CPT

## 2025-01-16 PROCEDURE — 99233 SBSQ HOSP IP/OBS HIGH 50: CPT | Performed by: INTERNAL MEDICINE

## 2025-01-16 PROCEDURE — 2140000000 HC CCU INTERMEDIATE R&B

## 2025-01-16 PROCEDURE — 80053 COMPREHEN METABOLIC PANEL: CPT

## 2025-01-16 PROCEDURE — 97165 OT EVAL LOW COMPLEX 30 MIN: CPT

## 2025-01-16 RX ORDER — PREDNISONE 20 MG/1
40 TABLET ORAL DAILY
Status: DISCONTINUED | OUTPATIENT
Start: 2025-01-17 | End: 2025-01-20 | Stop reason: HOSPADM

## 2025-01-16 RX ADMIN — ARFORMOTEROL TARTRATE 15 MCG: 15 SOLUTION RESPIRATORY (INHALATION) at 09:48

## 2025-01-16 RX ADMIN — FUROSEMIDE 20 MG: 10 INJECTION, SOLUTION INTRAMUSCULAR; INTRAVENOUS at 08:13

## 2025-01-16 RX ADMIN — METOPROLOL SUCCINATE 25 MG: 25 TABLET, EXTENDED RELEASE ORAL at 21:51

## 2025-01-16 RX ADMIN — PANCRELIPASE LIPASE, PANCRELIPASE PROTEASE, PANCRELIPASE AMYLASE 5000 UNITS: 5000; 17000; 24000 CAPSULE, DELAYED RELEASE ORAL at 12:30

## 2025-01-16 RX ADMIN — SODIUM CHLORIDE, PRESERVATIVE FREE 10 ML: 5 INJECTION INTRAVENOUS at 08:18

## 2025-01-16 RX ADMIN — WATER 40 MG: 1 INJECTION INTRAMUSCULAR; INTRAVENOUS; SUBCUTANEOUS at 08:13

## 2025-01-16 RX ADMIN — IPRATROPIUM BROMIDE AND ALBUTEROL SULFATE 1 DOSE: 2.5; .5 SOLUTION RESPIRATORY (INHALATION) at 12:05

## 2025-01-16 RX ADMIN — METOCLOPRAMIDE HYDROCHLORIDE 5 MG: 5 TABLET ORAL at 08:13

## 2025-01-16 RX ADMIN — METOPROLOL SUCCINATE 25 MG: 25 TABLET, EXTENDED RELEASE ORAL at 08:13

## 2025-01-16 RX ADMIN — ENOXAPARIN SODIUM 60 MG: 100 INJECTION SUBCUTANEOUS at 08:15

## 2025-01-16 RX ADMIN — BUDESONIDE 500 MCG: 0.5 INHALANT RESPIRATORY (INHALATION) at 19:56

## 2025-01-16 RX ADMIN — ASPIRIN 81 MG CHEWABLE TABLET 81 MG: 81 TABLET CHEWABLE at 08:13

## 2025-01-16 RX ADMIN — WATER 40 MG: 1 INJECTION INTRAMUSCULAR; INTRAVENOUS; SUBCUTANEOUS at 21:48

## 2025-01-16 RX ADMIN — DOXYCYCLINE HYCLATE 100 MG: 100 CAPSULE ORAL at 08:13

## 2025-01-16 RX ADMIN — IPRATROPIUM BROMIDE AND ALBUTEROL SULFATE 1 DOSE: 2.5; .5 SOLUTION RESPIRATORY (INHALATION) at 09:48

## 2025-01-16 RX ADMIN — DOXYCYCLINE HYCLATE 100 MG: 100 CAPSULE ORAL at 21:48

## 2025-01-16 RX ADMIN — SERTRALINE HYDROCHLORIDE 50 MG: 50 TABLET ORAL at 21:51

## 2025-01-16 RX ADMIN — PANCRELIPASE LIPASE, PANCRELIPASE PROTEASE, PANCRELIPASE AMYLASE 5000 UNITS: 5000; 17000; 24000 CAPSULE, DELAYED RELEASE ORAL at 08:13

## 2025-01-16 RX ADMIN — IPRATROPIUM BROMIDE AND ALBUTEROL SULFATE 1 DOSE: 2.5; .5 SOLUTION RESPIRATORY (INHALATION) at 19:56

## 2025-01-16 RX ADMIN — METOCLOPRAMIDE HYDROCHLORIDE 5 MG: 5 TABLET ORAL at 21:48

## 2025-01-16 RX ADMIN — IPRATROPIUM BROMIDE AND ALBUTEROL SULFATE 1 DOSE: 2.5; .5 SOLUTION RESPIRATORY (INHALATION) at 16:01

## 2025-01-16 RX ADMIN — LACTULOSE 20 G: 20 SOLUTION ORAL at 21:48

## 2025-01-16 RX ADMIN — ARFORMOTEROL TARTRATE 15 MCG: 15 SOLUTION RESPIRATORY (INHALATION) at 19:55

## 2025-01-16 RX ADMIN — ENOXAPARIN SODIUM 60 MG: 100 INJECTION SUBCUTANEOUS at 21:47

## 2025-01-16 RX ADMIN — PANCRELIPASE LIPASE, PANCRELIPASE PROTEASE, PANCRELIPASE AMYLASE 5000 UNITS: 5000; 17000; 24000 CAPSULE, DELAYED RELEASE ORAL at 16:43

## 2025-01-16 RX ADMIN — BUDESONIDE 500 MCG: 0.5 INHALANT RESPIRATORY (INHALATION) at 09:48

## 2025-01-16 RX ADMIN — SODIUM CHLORIDE, PRESERVATIVE FREE 10 ML: 5 INJECTION INTRAVENOUS at 21:48

## 2025-01-16 ASSESSMENT — PAIN SCALES - GENERAL
PAINLEVEL_OUTOF10: 0

## 2025-01-16 NOTE — PROGRESS NOTES
Physical Therapy  Treatment    Name: Ayesha Keane  : 1955  MRN: [id number]      Date of Service: 2025    Evaluating PT: Cr Boo, PT, DPT [id number]      Room #:  6517/6517-A  Diagnosis:  New onset a-fib (HCC) [I48.91]  Chest pain, unspecified type [R07.9]  PMHx/PSHx:   has a past medical history of Asthma, Chronic pancreatitis (HCC), COPD (chronic obstructive pulmonary disease) (HCC), Depression, Dysphagia, Emphysema lung (HCC), Del Rio catheter in place, and Oxygen dependent.  Procedure/Surgery:  NA  Precautions:  Fall risk, Monitor HR and O2 sat, O2, Alarm  Equipment Needs:  TBD    SUBJECTIVE:    Pt lives alone in a 2 story house with 3 stair(s) and 1 rail(s) to enter. Stair lift to second floor bed and bath. Pt ambulated with WW PRN prior to admission. Pt is on 3 L O2/min at baseline.    OBJECTIVE:   Initial Evaluation  Date: 25 Treatment Date: 24 Short Term/ Long Term   Goals   AM-PAC 6 Clicks     Was pt agreeable to Eval/treatment? Yes Yes    Does pt have pain? No complaints of pain No complaints of pain    Bed Mobility  Rolling: NT  Supine to sit: SBA  Sit to supine: Daryl  Scooting: SBA Rolling: NT  Supine to sit: SBA  Sit to supine: NT  Scooting: SBA Rolling: Independent   Supine to sit: Independent   Sit to supine: Independent   Scooting: Independent    Transfers Sit to stand: Daryl  Stand to sit: Daryl  Stand pivot: Daryl without AD Sit to stand: SBA  Stand to sit: SBA  Stand pivot: Daryl without AD Sit to stand: Supervision  Stand to sit: Supervision  Stand pivot: Supervision with WW   Ambulation   20 feet x2 without AD with Daryl 20, 20, 40 feet without AD with Daryl >200 feet with WW with Supervision   Stair negotiation: ascended and descended NT NT 3 step(s) with 1 rail(s) with SBA   ROM BUE: Refer to OT note  BLE: WFL NT    Strength BUE: Refer to OT note  BLE: WFL NT    Balance Sitting EOB: SBA  Dynamic Standing: Daryl without AD Sitting EOB: SBA  Dynamic Standing: Daryl  Patient

## 2025-01-16 NOTE — PROGRESS NOTES
Shayan Carmona M.D.,[facility]  Hardy Talamantes D.O., F.A.C.O.I., [facility]  Jimbo Rodriguez M.D.  Saadia Tovar M.D.   Tarik Ring D.O.  Jag Everett M.D.         Daily Pulmonary Progress Note    Patient:  Ayesha Keane 70 y.o. female MRN: [id number]            Synopsis     We are following patient for acute respiratory failure with hypoxia and hypercapnia    \"CC\" AMS    Code status: Full      Subjective      Patient was seen and examined.  More awake sitting up in the chair.  Using AVAPS at bedtime for respiratory support.  Oxygen 3 L daytime.  Will adjust steroid to oral prednisone.  Heart rate improved off IV diltiazem infusion.  Patient unsure of going to rehab she wants to talk to her sister regarding discharge plan.  Wishes to remain full CODE STATUS at this time.  Instructed on using NIV more at home, compliance report showing only 50% use.      Review of Systems:  Constitutional: Denies fever, weight loss, night sweats, and fatigue  Skin: Denies pigmentation, dark lesions, and rashes   HEENT: Denies hearing loss, tinnitus, ear drainage, epistaxis, sore throat, and hoarseness.  Cardiovascular: Denies palpitations, chest pain, and chest pressure.  Respiratory: Denies cough, dyspnea at rest, hemoptysis, apnea, and choking.  Gastrointestinal: Denies nausea, vomiting, poor appetite, diarrhea, heartburn or reflux  Genitourinary: Denies dysuria, frequency, urgency or hematuria  Musculoskeletal: Denies myalgias, muscle weakness, and bone pain  Neurological: Denies dizziness, vertigo, headache, and focal weakness  Psychological: Denies anxiety and depression  Endocrine: Denies heat intolerance and cold intolerance  Hematopoietic/Lymphatic: Denies bleeding problems and blood transfusions    24-hour events:  Improved mentation    Objective   OBJECTIVE:   BP (!) 108/52   Pulse 86   Temp 97 °F (36.1 °C) (Temporal)   Resp 18   Ht 1.702 m (5' 7\")   Wt 60.1 kg (132 lb 9.6 oz)   LMP 10/21/1985   SpO2 99%

## 2025-01-16 NOTE — PROGRESS NOTES
Occupational Therapy  OCCUPATIONAL THERAPY INITIAL EVALUATION    [facility]  [address], [place]      Date:2025                                                Patient Name: Ayesha Keane  MRN: [id number]  : 1955  Room: [address] [place] [address]-A    Evaluating OT: Rex Henriquez OTR/L #[id number]     Referring Provider:     Alexus Knott APRN - CNP     Specific Provider Orders/Date: OT eval and treat 25    Diagnosis: New onset a-fib (HCC) [I48.91]  Chest pain, unspecified type [R07.9]   Pt admitted to hospital with SOB, chest pain; AF. Pt with RRT for AMS    Pertinent Medical History:  has a past medical history of Asthma, Chronic pancreatitis (HCC), COPD (chronic obstructive pulmonary disease) (HCC), Depression, Dysphagia, Emphysema lung (HCC), Del Rio catheter in place, and Oxygen dependent.       Precautions:  Fall Risk, O2, continuous pulse ox    Assessment of current deficits    [x] Functional mobility  [x]ADLs  [x] Strength               []Cognition    [x] Functional transfers   [x] IADLs         [x] Safety Awareness   [x]Endurance    [] Fine Coordination              [x] Balance      [] Vision/perception   []Sensation     []Gross Motor Coordination  [] ROM  [] Delirium                   [] Motor Control     OT PLAN OF CARE   OT POC based on physician orders, patient diagnosis and results of clinical assessment    Frequency/Duration 1-5 days/wk for 2 weeks PRN   Specific OT Treatment Interventions to include:   * Instruction/training on adapted ADL techniques and AE recommendations to increase functional independence within precautions       * Training on energy conservation strategies, correct breathing pattern and techniques to improve independence/tolerance for self-care routine  * Functional transfer/mobility training/DME recommendations for increased independence, safety, and fall prevention  * Patient/Family education to increase

## 2025-01-16 NOTE — PROGRESS NOTES
INPATIENT CARDIOLOGY CONSULT     Reason for Consult: New onset of A-fib with RVR    Cardiologist: Dr. Hernández     Requesting Physician:  Dr. Zuluaga     Date of Consultation: 1/16/2025    HISTORY OF PRESENT ILLNESS:   Ayesha Keane is a 70 year old female who is previously known to Dr. Spain in 2013.  Patient was last seen in hospital consultation with Dr. Marsh on 9/10/2024 for elevated troponin with delta change --> ECHO showed LVEF of 60-65% no wall motion abnormality.  Patient had a Lexiscan MPS 4/13/2024 and was not repeated at that time.  Cardiology signed off.  See PMH below.     Interim:  No significant event overnight  Patient preferred to be on BiPAP while taking naps    PAST MEDICAL HISTORY:    Hx of NSVT in 2013   Chronically elevated hs-cTnT:    High-sensitivity troponin 58 (10/22/2024)  High-sensitivity troponin 30 (11/28/2024)  Chronic HFpEF:  TTE: [facility] 7/25/2016 LVEF 68 +/- 5%, severe septal left ventricular hypertrophy, normal RV function, right ventricular  systolic pressure is not reported due to an insufficient tricuspid regurgitation signal.  Lexiscan MPS: 3/29/2022: LVEF 75%, no evidence for inducible ischemia inferoapical perfusion abnormality likely artifact versus physiological thinning, less likely focal ischemia.   TTE: [facility] 3/24/2022: Technically difficult exam due to body habitus. Exam indication: Chest Pain. The left ventricle is normal in size. There is mild posterior left ventricular hypertrophy. Left ventricular systolic function is normal. EF = 66 ± 5% (2D biplane) Grade I left ventricular diastolic dysfunction. No significant regional wall motion abnormality. The right ventricle is normal in size. Right ventricular systolic function is normal. There are no significant valvular abnormalities.  Exam was compared with the prior  echocardiographic exam performed on  07/25/2016. There is no significant change.   TTE: 4/30/2024:  Left Ventricle: Normal left ventricular systolic  nebulization every 4 hours as needed for Wheezing 11/23/22  Yes Jef Hamilton DO   albuterol sulfate HFA (PROVENTIL;VENTOLIN;PROAIR) 108 (90 Base) MCG/ACT inhaler Inhale 1 puff into the lungs every 4 hours as needed for Wheezing   Yes Liz Willams MD   arformoterol tartrate (BROVANA) 15 MCG/2ML NEBU Take 1 ampule by nebulization 2 times daily   Yes Liz Willams MD   budesonide (PULMICORT) 0.5 MG/2ML nebulizer suspension Take 2 mLs by nebulization 2 times daily   Yes Liz Willams MD   omeprazole (PRILOSEC) 40 MG delayed release capsule Take 1 capsule by mouth daily   Yes Liz Willams MD   sertraline (ZOLOFT) 50 MG tablet Take 1 tablet by mouth nightly 10/19/22  Yes Radha Rao APRN - CNP   OXYGEN Inhale 3 L into the lungs continuous   Yes Liz Willams MD   vitamin D (ERGOCALCIFEROL) 81517 UNITS CAPS capsule Take 1 capsule by mouth once a week Given Saturday   Yes Liz Willams MD   Multiple Vitamins-Iron (DAILY-FEDERICO/IRON) TABS Take 1 tablet by mouth daily    Yes Liz Willams MD   folic acid (FOLVITE) 1 MG tablet Take 1 tablet by mouth daily Takes on Sunday only   Yes Liz Willams MD   ammonium lactate (LAC-HYDRIN) 12 % lotion Apply topically twice daily  Patient not taking: Reported on 1/13/2025 5/8/23   Saran Augustin DPM       CURRENT MEDICATIONS:      Current Facility-Administered Medications:     [START ON 1/17/2025] predniSONE (DELTASONE) tablet 40 mg, 40 mg, Oral, Daily, Scott Wray APRN - CNP    sulfur hexafluoride microspheres (LUMASON) 60.7-25 MG injection 2 mL, 2 mL, IntraVENous, ONCE PRN, Itzel Magdaleno APRN - CNP    methylPREDNISolone sodium succ (SOLU-MEDROL) 40 mg in sterile water 1 mL injection, 40 mg, IntraVENous, Q12H, Scott Wray APRN - CNP, 40 mg at 01/16/25 0813    furosemide (LASIX) injection 20 mg, 20 mg, IntraVENous, Daily, Itzel Magdaleno APRN - CNP, 20 mg at 01/16/25 0813

## 2025-01-16 NOTE — PROGRESS NOTES
INPATIENT CARDIOLOGY CONSULT     Reason for Consult: New onset of A-fib with RVR    Cardiologist: Dr. Hernández     Requesting Physician:  Dr. Zuluaga     Date of Consultation: 1/15/2025    HISTORY OF PRESENT ILLNESS:   Ayesha Keane is a 70 year old female who is previously known to Dr. Spain in 2013.  Patient was last seen in hospital consultation with Dr. Marsh on 9/10/2024 for elevated troponin with delta change --> ECHO showed LVEF of 60-65% no wall motion abnormality.  Patient had a Lexiscan MPS 4/13/2024 and was not repeated at that time.  Cardiology signed off.  See PMH below.     Interim:  No significant event overnight      PAST MEDICAL HISTORY:    Hx of NSVT in 2013   Chronically elevated hs-cTnT:    High-sensitivity troponin 58 (10/22/2024)  High-sensitivity troponin 30 (11/28/2024)  Chronic HFpEF:  TTE: [facility] 7/25/2016 LVEF 68 +/- 5%, severe septal left ventricular hypertrophy, normal RV function, right ventricular  systolic pressure is not reported due to an insufficient tricuspid regurgitation signal.  Lexiscan MPS: 3/29/2022: LVEF 75%, no evidence for inducible ischemia inferoapical perfusion abnormality likely artifact versus physiological thinning, less likely focal ischemia.   TTE: [facility] 3/24/2022: Technically difficult exam due to body habitus. Exam indication: Chest Pain. The left ventricle is normal in size. There is mild posterior left ventricular hypertrophy. Left ventricular systolic function is normal. EF = 66 ± 5% (2D biplane) Grade I left ventricular diastolic dysfunction. No significant regional wall motion abnormality. The right ventricle is normal in size. Right ventricular systolic function is normal. There are no significant valvular abnormalities.  Exam was compared with the prior  echocardiographic exam performed on  07/25/2016. There is no significant change.   TTE: 4/30/2024:  Left Ventricle: Normal left ventricular systolic function with a visually estimated EF of 65 - 70%. Left

## 2025-01-16 NOTE — PROGRESS NOTES
[facility] Quality Flow/Interdisciplinary Rounds Progress Note        Quality Flow Rounds held on January 16, 2025    Disciplines Attending:  Bedside Nurse, , , and Nursing Unit Leadership    Ayesha Keane was admitted on 1/13/2025 11:52 AM    Anticipated Discharge Date:       Disposition:    Harrison Score:  Harrison Scale Score: 20    [facility] RISK OF UNPLANNED READMISSION 2.0             25.8 Total Score        Discussed patient goal for the day, patient clinical progression, and barriers to discharge.  The following Goal(s) of the Day/Commitment(s) have been identified:  Diagnostics - Report Results and Labs - Report Results      Marielena Kwan RN  January 16, 2025

## 2025-01-16 NOTE — PROGRESS NOTES
[facility]   Progress note      Subjective:  Working with therapy  States she is feeling better  Objective:    BP (!) 108/52   Pulse 86   Temp 97 °F (36.1 °C) (Temporal)   Resp 18   Ht 1.702 m (5' 7\")   Wt 60.1 kg (132 lb 9.6 oz)   LMP 10/21/1985   SpO2 99%   BMI 20.77 kg/m²     In: 1140 [P.O.:1140]  Out: 2125   In: 1140   Out: 2125 [Urine:2125]    General appearance: NAD, conversant, resting comfortably AVAPS in place  HEENT: AT/NC, MMM  Neck: FROM, supple  Lungs: Distant breath sounds bilateral lung fields  CV: RRR, no MRGs  Vasc: Radial pulses 2+  Abdomen: Soft, non-tender; no masses or HSM  Extremities: No peripheral edema or digital cyanosis  Skin: no rash, lesions or ulcers  Psych: Alert and oriented to person, place and time  Neuro: Alert and interactive     Recent Labs     01/16/25  0524   WBC 7.0   HGB 7.9*   HCT 27.8*          Recent Labs     01/14/25  0445 01/15/25  0457 01/16/25  0524    142 138   K 4.7 4.8 4.4   CL 98 98 94*   CO2 40* 38* 36*   BUN 18 21 26*   CREATININE 0.5 0.6 0.6   CALCIUM 10.1 9.7 10.4*       Assessment:    Principal Problem:    Atrial fibrillation with rapid ventricular response (HCC)  Resolved Problems:    * No resolved hospital problems. *      Plan:    Patient is a 70-year-old female admitted to [facility] for  Atrial fibrillation with RVR  -Rate adequately controlled on my evaluation  -Check TSH and T4  -Cardiology following  -Eliquis 2.5 mg twice daily  -Continue metoprolol XL 25 mg daily for rate control  -Await further input from cardiology     Acute hypercarbic hypoxemic respiratory failure  On evaluation this morning-saturating appropriately, no acute issues, no dyspnea  Notified this afternoon by RRT physician that RRT was called for acute decompensation with hypercarbia ABG with acute hypercarbic respiratory failure pH 7.1 89/92/78/34/91.2%  Noninvasive ventilation for now  Low threshold to transfer to ICU if no improvement  noted in gases  Will likely require intubation  CODE STATUS remains full    1/15/2025  Hypercarbic respiratory failure  Resting comfortably no acute distress, remains on noninvasive ventilation  Blood gases today are markedly improved, she is mentating well  Did not require transfer to ICU or intubation  Continue nebs/bronchodilators along with IV steroids as ordered  Appreciate pulmonology input  Agree with palliative care evaluation for goals of care-currently remains full code    1/16/25  -Continue doxycycline  -Continue scheduled breathing treatments  -Remains on IV Lasix 20 mg daily  -Solu-Medrol 40 mg twice daily per pulmonology  -Currently requiring 3 L nasal cannula satting 99%, wean as able  -Plan is for discharge home once medically stable as patient has refused rehab however is active with home health care and will resume care on discharge    Code Status: Full code  Consultants: Pulmonology  DVT Prophylaxis   PT/OT  Discharge planning            ANGELA Coleman - CNP  2:26 PM  1/16/2025

## 2025-01-16 NOTE — CARE COORDINATION
Transition of Care Update:  New A-fib.  Cardizem gtt discontinued.  Started on Prednisone 40 mg PO daily.   PT's AM-PAC score today is 17/24.  Met with pt in room.  On 3L O2 now, her baseline O2 at Home thru [employer].  BiPAP at HS.  Pt continues to refuse MARK.  Prefers discharge to Home with [facility].  Pt is active with [facility].  Confirmed with [facility] that pt gets an Aide 28 hrs/week & meal delivery.  [facility] will KAI orders & RIC Chow at [facility] #[phone] notified at discharge.  Pt will need transportation arranged at discharge.  ROSALINA/RIC to follow.

## 2025-01-17 LAB
ALBUMIN SERPL-MCNC: 3.9 G/DL (ref 3.5–5.2)
ALP SERPL-CCNC: 68 U/L (ref 35–104)
ALT SERPL-CCNC: 15 U/L (ref 0–32)
ANION GAP SERPL CALCULATED.3IONS-SCNC: 6 MMOL/L (ref 7–16)
AST SERPL-CCNC: 8 U/L (ref 0–31)
BILIRUB SERPL-MCNC: <0.2 MG/DL (ref 0–1.2)
BUN SERPL-MCNC: 29 MG/DL (ref 6–23)
CALCIUM SERPL-MCNC: 10.6 MG/DL (ref 8.6–10.2)
CHLORIDE SERPL-SCNC: 91 MMOL/L (ref 98–107)
CO2 SERPL-SCNC: 40 MMOL/L (ref 22–29)
CREAT SERPL-MCNC: 0.6 MG/DL (ref 0.5–1)
ERYTHROCYTE [DISTWIDTH] IN BLOOD BY AUTOMATED COUNT: 15.4 % (ref 11.5–15)
GFR, ESTIMATED: >90 ML/MIN/1.73M2
GLUCOSE SERPL-MCNC: 245 MG/DL (ref 74–99)
HCT VFR BLD AUTO: 28.1 % (ref 34–48)
HGB BLD-MCNC: 8.2 G/DL (ref 11.5–15.5)
MCH RBC QN AUTO: 24 PG (ref 26–35)
MCHC RBC AUTO-ENTMCNC: 29.2 G/DL (ref 32–34.5)
MCV RBC AUTO: 82.2 FL (ref 80–99.9)
PLATELET # BLD AUTO: 279 K/UL (ref 130–450)
PMV BLD AUTO: 11.1 FL (ref 7–12)
POTASSIUM SERPL-SCNC: 4.3 MMOL/L (ref 3.5–5)
PROT SERPL-MCNC: 6 G/DL (ref 6.4–8.3)
RBC # BLD AUTO: 3.42 M/UL (ref 3.5–5.5)
SODIUM SERPL-SCNC: 137 MMOL/L (ref 132–146)
WBC OTHER # BLD: 6 K/UL (ref 4.5–11.5)

## 2025-01-17 PROCEDURE — 36415 COLL VENOUS BLD VENIPUNCTURE: CPT

## 2025-01-17 PROCEDURE — 80053 COMPREHEN METABOLIC PANEL: CPT

## 2025-01-17 PROCEDURE — 6360000002 HC RX W HCPCS: Performed by: INTERNAL MEDICINE

## 2025-01-17 PROCEDURE — 85027 COMPLETE CBC AUTOMATED: CPT

## 2025-01-17 PROCEDURE — 94660 CPAP INITIATION&MGMT: CPT

## 2025-01-17 PROCEDURE — 6370000000 HC RX 637 (ALT 250 FOR IP): Performed by: NURSE PRACTITIONER

## 2025-01-17 PROCEDURE — 94640 AIRWAY INHALATION TREATMENT: CPT

## 2025-01-17 PROCEDURE — 6360000002 HC RX W HCPCS: Performed by: NURSE PRACTITIONER

## 2025-01-17 PROCEDURE — 2140000000 HC CCU INTERMEDIATE R&B

## 2025-01-17 PROCEDURE — 2500000003 HC RX 250 WO HCPCS: Performed by: FAMILY MEDICINE

## 2025-01-17 PROCEDURE — 99233 SBSQ HOSP IP/OBS HIGH 50: CPT | Performed by: INTERNAL MEDICINE

## 2025-01-17 PROCEDURE — 6370000000 HC RX 637 (ALT 250 FOR IP): Performed by: FAMILY MEDICINE

## 2025-01-17 PROCEDURE — 2700000000 HC OXYGEN THERAPY PER DAY

## 2025-01-17 RX ORDER — FUROSEMIDE 20 MG/1
20 TABLET ORAL DAILY
Status: DISCONTINUED | OUTPATIENT
Start: 2025-01-18 | End: 2025-01-20 | Stop reason: HOSPADM

## 2025-01-17 RX ADMIN — IPRATROPIUM BROMIDE AND ALBUTEROL SULFATE 1 DOSE: 2.5; .5 SOLUTION RESPIRATORY (INHALATION) at 20:50

## 2025-01-17 RX ADMIN — IPRATROPIUM BROMIDE AND ALBUTEROL SULFATE 1 DOSE: 2.5; .5 SOLUTION RESPIRATORY (INHALATION) at 13:11

## 2025-01-17 RX ADMIN — ARFORMOTEROL TARTRATE 15 MCG: 15 SOLUTION RESPIRATORY (INHALATION) at 20:50

## 2025-01-17 RX ADMIN — LACTULOSE 20 G: 20 SOLUTION ORAL at 21:07

## 2025-01-17 RX ADMIN — ASPIRIN 81 MG CHEWABLE TABLET 81 MG: 81 TABLET CHEWABLE at 08:29

## 2025-01-17 RX ADMIN — FUROSEMIDE 20 MG: 10 INJECTION, SOLUTION INTRAMUSCULAR; INTRAVENOUS at 08:29

## 2025-01-17 RX ADMIN — METOCLOPRAMIDE HYDROCHLORIDE 5 MG: 5 TABLET ORAL at 08:29

## 2025-01-17 RX ADMIN — SERTRALINE HYDROCHLORIDE 50 MG: 50 TABLET ORAL at 21:07

## 2025-01-17 RX ADMIN — PANCRELIPASE LIPASE, PANCRELIPASE PROTEASE, PANCRELIPASE AMYLASE 5000 UNITS: 5000; 17000; 24000 CAPSULE, DELAYED RELEASE ORAL at 12:02

## 2025-01-17 RX ADMIN — ACETAMINOPHEN 650 MG: 325 TABLET ORAL at 19:01

## 2025-01-17 RX ADMIN — SODIUM CHLORIDE, PRESERVATIVE FREE 10 ML: 5 INJECTION INTRAVENOUS at 21:08

## 2025-01-17 RX ADMIN — METOCLOPRAMIDE HYDROCHLORIDE 5 MG: 5 TABLET ORAL at 21:07

## 2025-01-17 RX ADMIN — IPRATROPIUM BROMIDE AND ALBUTEROL SULFATE 1 DOSE: 2.5; .5 SOLUTION RESPIRATORY (INHALATION) at 09:46

## 2025-01-17 RX ADMIN — METOPROLOL SUCCINATE 25 MG: 25 TABLET, EXTENDED RELEASE ORAL at 08:29

## 2025-01-17 RX ADMIN — PREDNISONE 40 MG: 20 TABLET ORAL at 08:29

## 2025-01-17 RX ADMIN — METOPROLOL SUCCINATE 25 MG: 25 TABLET, EXTENDED RELEASE ORAL at 21:07

## 2025-01-17 RX ADMIN — ARFORMOTEROL TARTRATE 15 MCG: 15 SOLUTION RESPIRATORY (INHALATION) at 09:46

## 2025-01-17 RX ADMIN — DOXYCYCLINE HYCLATE 100 MG: 100 CAPSULE ORAL at 08:29

## 2025-01-17 RX ADMIN — SODIUM CHLORIDE, PRESERVATIVE FREE 10 ML: 5 INJECTION INTRAVENOUS at 08:30

## 2025-01-17 RX ADMIN — IPRATROPIUM BROMIDE AND ALBUTEROL SULFATE 1 DOSE: 2.5; .5 SOLUTION RESPIRATORY (INHALATION) at 15:47

## 2025-01-17 RX ADMIN — PANCRELIPASE LIPASE, PANCRELIPASE PROTEASE, PANCRELIPASE AMYLASE 5000 UNITS: 5000; 17000; 24000 CAPSULE, DELAYED RELEASE ORAL at 17:07

## 2025-01-17 RX ADMIN — BUDESONIDE 500 MCG: 0.5 INHALANT RESPIRATORY (INHALATION) at 20:50

## 2025-01-17 RX ADMIN — PANCRELIPASE LIPASE, PANCRELIPASE PROTEASE, PANCRELIPASE AMYLASE 5000 UNITS: 5000; 17000; 24000 CAPSULE, DELAYED RELEASE ORAL at 08:29

## 2025-01-17 RX ADMIN — ENOXAPARIN SODIUM 60 MG: 100 INJECTION SUBCUTANEOUS at 21:07

## 2025-01-17 RX ADMIN — BUDESONIDE 500 MCG: 0.5 INHALANT RESPIRATORY (INHALATION) at 09:46

## 2025-01-17 RX ADMIN — ENOXAPARIN SODIUM 60 MG: 100 INJECTION SUBCUTANEOUS at 08:29

## 2025-01-17 RX ADMIN — DOXYCYCLINE HYCLATE 100 MG: 100 CAPSULE ORAL at 21:08

## 2025-01-17 NOTE — PROGRESS NOTES
[facility] Quality Flow/Interdisciplinary Rounds Progress Note        Quality Flow Rounds held on January 17, 2025    Disciplines Attending:  Bedside Nurse, , , and Nursing Unit Leadership    Ayesha Keane was admitted on 1/13/2025 11:52 AM    Anticipated Discharge Date:       Disposition:    Harrison Score:  Harrison Scale Score: 20    [facility] RISK OF UNPLANNED READMISSION 2.0             27.8 Total Score        Discussed patient goal for the day, patient clinical progression, and barriers to discharge.  The following Goal(s) of the Day/Commitment(s) have been identified:  Diagnostics - Report Results and Education/Learning - Alternate Learner      Ijeoma Shepherd RN  January 17, 2025

## 2025-01-17 NOTE — CARE COORDINATION
Transition of care update: Chart reviewed. O2 at 3l/nc. Met with pt in room. Plan remains to return home when medically ready. Pt is active with [facility] and resumption of [facility]hc order was placed on chart. Pt continues to refuse rehab. Pt has aide services 28 hr/week through [facility]. Claudine LARIOS with [facility] would like to be notified when pt discharges ph#[phone]. Pt has home o2 at 3l/nc from [facility]. Pt has her portable tank in room. If any issue with pt's portable tank then will need to contact [facility] to deliver another o2 tank to pt's room. Ph#[phone]. Pt said she will contact family for transportation home. Cm/sw will follow.

## 2025-01-17 NOTE — PROGRESS NOTES
Shayan Carmona M.D.,[facility]  Hardy Talamantes D.O., [email or web address]., [facility]  Jimbo Rodriguez M.D.  Saadia Tovar M.D.   Tarik Ring D.O.  Jag Everett M.D.         Daily Pulmonary Progress Note    Patient:  Ayesha Keane 70 y.o. female MRN: [id number]            Synopsis     We are following patient for acute respiratory failure with hypoxia and hypercapnia    \"CC\" AMS    Code status: Full      Subjective      Patient was seen and examined.  Lying in bed, pleasant.  Staff indicate she has plans for discharge home with [place].  Using AVAPS at bedtime for respiratory support. Instructed on using NIV more at home, compliance report showing only 50% use. Oxygen 3 L daytime with SaO2 of 96% .        Review of Systems:  Constitutional: Denies fever, weight loss, night sweats, and fatigue  Skin: Denies pigmentation, dark lesions, and rashes   HEENT: Denies hearing loss, tinnitus, ear drainage, epistaxis, sore throat, and hoarseness.  Cardiovascular: Denies palpitations, chest pain, and chest pressure.  Respiratory: Denies cough, dyspnea at rest, hemoptysis, apnea, and choking.  Gastrointestinal: Denies nausea, vomiting, poor appetite, diarrhea, heartburn or reflux  Genitourinary: Denies dysuria, frequency, urgency or hematuria  Musculoskeletal: Denies myalgias, muscle weakness, and bone pain  Neurological: Denies dizziness, vertigo, headache, and focal weakness  Psychological: Denies anxiety and depression  Endocrine: Denies heat intolerance and cold intolerance  Hematopoietic/Lymphatic: Denies bleeding problems and blood transfusions    24-hour events:  As above    Objective   OBJECTIVE:   BP (!) 101/54   Pulse 74   Temp 97 °F (36.1 °C)   Resp (!) 31   Ht 1.702 m (5' 7\")   Wt 60.1 kg (132 lb 9.6 oz)   LMP 10/21/1985   SpO2 96%   BMI 20.77 kg/m²   SpO2 Readings from Last 1 Encounters:   01/17/25 96%        I/O:    Intake/Output Summary (Last 24 hours) at 1/17/2025 3339  Last data filed at 1/17/2025  11.8 01/14/2025 04:45 AM    INR 1.1 01/14/2025 04:45 AM     No results for input(s): \"PROBNP\" in the last 72 hours.    No results for input(s): \"TROPONINI\" in the last 72 hours.  No results for input(s): \"PROCAL\" in the last 72 hours.    This SmartLink has not been configured with any valid records.       Micro:  No results for input(s): \"CULTRESP\" in the last 72 hours.  No results for input(s): \"LABGRAM\" in the last 72 hours.  No results for input(s): \"LEGUR\" in the last 72 hours.  No results for input(s): \"STREPNEUMAGU\" in the last 72 hours.  No results for input(s): \"LP1UAG\" in the last 72 hours.     Assessment:    Acute on chronic respiratory failure with hypoxia and hypercapnia  Gold stage IV COPD with/severe asthma overlap and acute exacerbation  Chest pain  Severe Centrilobular emphysema  Prior nicotine dependence quit 15 years ago  Iron deficiency anemia  Chronic home O2 dependence 3 L nasal cannula.  Not compliant with noninvasive home ventilator  Chronic calcified pancreatitis on Creon at home  History of heavy EtOH abuse  Chronically elevated troponin  A-fib RVR  Valvular heart disease-moderate to severe MR  Multiple hospital admissions due to COPD exacerbation, hypercapnia, noncompliance with NIV device  History of UTI, chronic indwelling [place] catheter due to neurogenic bladder  Heart failure with preserved EF   Protein calorie malnutrition BMI 20.77  History of intubation and mechanical ventilation April 2024 September 2020 for episode of severe anemia, hemoglobin 3.7 requiring multiple PRBC transfusions      Plan:   Oxygen therapy wean to keep greater than 92%, baseline 3 L at home  NIV settings AVAPS 450+5 backup rate 24 FiO2 40% adjust to at bedtime and as needed daytime with naps.  Compliance report through [employer] July through August 2024 showing evidence of use 16 out of 32 days for an average of 6 hours and 23 minutes - 50% use Target tidal volume 460, EPAP 5-15, PS 5, O2 3 L bleed in  ABG with

## 2025-01-17 NOTE — PROGRESS NOTES
[facility]   Progress note      Subjective:  Working with therapy  States she is feeling better    Objective:    BP (!) 101/54   Pulse 74   Temp 97 °F (36.1 °C)   Resp (!) 31   Ht 1.702 m (5' 7\")   Wt 60.1 kg (132 lb 9.6 oz)   LMP 10/21/1985   SpO2 98%   BMI 20.77 kg/m²     In: 1200 [P.O.:1200]  Out: 2200   In: 1200   Out: 2200 [Urine:2200]    General appearance: NAD, conversant, resting comfortably AVAPS in place  HEENT: AT/NC, MMM  Neck: FROM, supple  Lungs: Distant breath sounds bilateral lung fields  CV: RRR, no MRGs  Vasc: Radial pulses 2+  Abdomen: Soft, non-tender; no masses or HSM  Extremities: No peripheral edema or digital cyanosis  Skin: no rash, lesions or ulcers  Psych: Alert and oriented to person, place and time  Neuro: Alert and interactive     Recent Labs     01/16/25  0524 01/17/25  0540   WBC 7.0 6.0   HGB 7.9* 8.2*   HCT 27.8* 28.1*    279       Recent Labs     01/15/25  0457 01/16/25  0524 01/17/25  0540    138 137   K 4.8 4.4 4.3   CL 98 94* 91*   CO2 38* 36* 40*   BUN 21 26* 29*   CREATININE 0.6 0.6 0.6   CALCIUM 9.7 10.4* 10.6*       Assessment:    Principal Problem:    Atrial fibrillation with rapid ventricular response (HCC)  Resolved Problems:    * No resolved hospital problems. *      Plan:    Patient is a 70-year-old female admitted to [facility] for  Atrial fibrillation with RVR  -Rate adequately controlled on my evaluation  -Check TSH and T4  -Cardiology following  -Eliquis 2.5 mg twice daily  -Continue metoprolol XL 25 mg daily for rate control  -Await further input from cardiology     Acute hypercarbic hypoxemic respiratory failure  On evaluation this morning-saturating appropriately, no acute issues, no dyspnea  Notified this afternoon by RRT physician that RRT was called for acute decompensation with hypercarbia ABG with acute hypercarbic respiratory failure pH 7.1 89/92/78/34/91.2%  Noninvasive ventilation for now  Low threshold to

## 2025-01-17 NOTE — PLAN OF CARE
Problem: Chronic Conditions and Co-morbidities  Goal: Patient's chronic conditions and co-morbidity symptoms are monitored and maintained or improved  1/16/2025 2144 by Chey Rand, RN  Outcome: Progressing     Problem: Discharge Planning  Goal: Discharge to home or other facility with appropriate resources  1/16/2025 2144 by Chey Rand, RN  Outcome: Progressing

## 2025-01-18 LAB
ALBUMIN SERPL-MCNC: 3.4 G/DL (ref 3.5–5.2)
ALP SERPL-CCNC: 52 U/L (ref 35–104)
ALT SERPL-CCNC: 12 U/L (ref 0–32)
ANION GAP SERPL CALCULATED.3IONS-SCNC: 10 MMOL/L (ref 7–16)
AST SERPL-CCNC: 9 U/L (ref 0–31)
BILIRUB SERPL-MCNC: <0.2 MG/DL (ref 0–1.2)
BUN SERPL-MCNC: 24 MG/DL (ref 6–23)
CALCIUM SERPL-MCNC: 9.7 MG/DL (ref 8.6–10.2)
CHLORIDE SERPL-SCNC: 93 MMOL/L (ref 98–107)
CO2 SERPL-SCNC: 38 MMOL/L (ref 22–29)
CREAT SERPL-MCNC: 0.6 MG/DL (ref 0.5–1)
ERYTHROCYTE [DISTWIDTH] IN BLOOD BY AUTOMATED COUNT: 15.2 % (ref 11.5–15)
GFR, ESTIMATED: >90 ML/MIN/1.73M2
GLUCOSE SERPL-MCNC: 134 MG/DL (ref 74–99)
HCT VFR BLD AUTO: 27 % (ref 34–48)
HGB BLD-MCNC: 8 G/DL (ref 11.5–15.5)
MCH RBC QN AUTO: 24.4 PG (ref 26–35)
MCHC RBC AUTO-ENTMCNC: 29.6 G/DL (ref 32–34.5)
MCV RBC AUTO: 82.3 FL (ref 80–99.9)
PLATELET # BLD AUTO: 275 K/UL (ref 130–450)
PMV BLD AUTO: 10.9 FL (ref 7–12)
POTASSIUM SERPL-SCNC: 3.7 MMOL/L (ref 3.5–5)
PROT SERPL-MCNC: 5.4 G/DL (ref 6.4–8.3)
RBC # BLD AUTO: 3.28 M/UL (ref 3.5–5.5)
SODIUM SERPL-SCNC: 141 MMOL/L (ref 132–146)
WBC OTHER # BLD: 4.9 K/UL (ref 4.5–11.5)

## 2025-01-18 PROCEDURE — 2140000000 HC CCU INTERMEDIATE R&B

## 2025-01-18 PROCEDURE — 2500000003 HC RX 250 WO HCPCS: Performed by: FAMILY MEDICINE

## 2025-01-18 PROCEDURE — 6370000000 HC RX 637 (ALT 250 FOR IP): Performed by: NURSE PRACTITIONER

## 2025-01-18 PROCEDURE — 85027 COMPLETE CBC AUTOMATED: CPT

## 2025-01-18 PROCEDURE — 36415 COLL VENOUS BLD VENIPUNCTURE: CPT

## 2025-01-18 PROCEDURE — 6360000002 HC RX W HCPCS: Performed by: NURSE PRACTITIONER

## 2025-01-18 PROCEDURE — 6370000000 HC RX 637 (ALT 250 FOR IP): Performed by: INTERNAL MEDICINE

## 2025-01-18 PROCEDURE — 2700000000 HC OXYGEN THERAPY PER DAY

## 2025-01-18 PROCEDURE — 94660 CPAP INITIATION&MGMT: CPT

## 2025-01-18 PROCEDURE — 80053 COMPREHEN METABOLIC PANEL: CPT

## 2025-01-18 PROCEDURE — 6370000000 HC RX 637 (ALT 250 FOR IP): Performed by: FAMILY MEDICINE

## 2025-01-18 PROCEDURE — 94640 AIRWAY INHALATION TREATMENT: CPT

## 2025-01-18 PROCEDURE — 6360000002 HC RX W HCPCS: Performed by: INTERNAL MEDICINE

## 2025-01-18 RX ORDER — FUROSEMIDE 20 MG/1
20 TABLET ORAL DAILY
Qty: 30 TABLET | Refills: 0 | Status: SHIPPED | OUTPATIENT
Start: 2025-01-18

## 2025-01-18 RX ORDER — DOXYCYCLINE 100 MG/1
100 CAPSULE ORAL EVERY 12 HOURS SCHEDULED
Qty: 5 CAPSULE | Refills: 0 | Status: SHIPPED | OUTPATIENT
Start: 2025-01-18 | End: 2025-01-21

## 2025-01-18 RX ORDER — PREDNISONE 20 MG/1
40 TABLET ORAL DAILY
Qty: 10 TABLET | Refills: 0 | Status: SHIPPED | OUTPATIENT
Start: 2025-01-19 | End: 2025-01-24

## 2025-01-18 RX ORDER — METOPROLOL SUCCINATE 25 MG/1
25 TABLET, EXTENDED RELEASE ORAL 2 TIMES DAILY
Qty: 60 TABLET | Refills: 0 | Status: SHIPPED | OUTPATIENT
Start: 2025-01-18

## 2025-01-18 RX ADMIN — METOCLOPRAMIDE HYDROCHLORIDE 5 MG: 5 TABLET ORAL at 20:06

## 2025-01-18 RX ADMIN — ENOXAPARIN SODIUM 60 MG: 100 INJECTION SUBCUTANEOUS at 08:49

## 2025-01-18 RX ADMIN — ASPIRIN 81 MG CHEWABLE TABLET 81 MG: 81 TABLET CHEWABLE at 08:50

## 2025-01-18 RX ADMIN — METOPROLOL SUCCINATE 25 MG: 25 TABLET, EXTENDED RELEASE ORAL at 20:06

## 2025-01-18 RX ADMIN — IPRATROPIUM BROMIDE AND ALBUTEROL SULFATE 1 DOSE: 2.5; .5 SOLUTION RESPIRATORY (INHALATION) at 16:25

## 2025-01-18 RX ADMIN — IPRATROPIUM BROMIDE AND ALBUTEROL SULFATE 1 DOSE: 2.5; .5 SOLUTION RESPIRATORY (INHALATION) at 20:09

## 2025-01-18 RX ADMIN — IPRATROPIUM BROMIDE AND ALBUTEROL SULFATE 1 DOSE: 2.5; .5 SOLUTION RESPIRATORY (INHALATION) at 09:28

## 2025-01-18 RX ADMIN — BUDESONIDE 500 MCG: 0.5 INHALANT RESPIRATORY (INHALATION) at 20:09

## 2025-01-18 RX ADMIN — ARFORMOTEROL TARTRATE 15 MCG: 15 SOLUTION RESPIRATORY (INHALATION) at 20:09

## 2025-01-18 RX ADMIN — METOPROLOL SUCCINATE 25 MG: 25 TABLET, EXTENDED RELEASE ORAL at 08:50

## 2025-01-18 RX ADMIN — IPRATROPIUM BROMIDE AND ALBUTEROL SULFATE 1 DOSE: 2.5; .5 SOLUTION RESPIRATORY (INHALATION) at 12:47

## 2025-01-18 RX ADMIN — SERTRALINE HYDROCHLORIDE 50 MG: 50 TABLET ORAL at 20:06

## 2025-01-18 RX ADMIN — DOXYCYCLINE HYCLATE 100 MG: 100 CAPSULE ORAL at 20:06

## 2025-01-18 RX ADMIN — SODIUM CHLORIDE, PRESERVATIVE FREE 10 ML: 5 INJECTION INTRAVENOUS at 20:07

## 2025-01-18 RX ADMIN — BUDESONIDE 500 MCG: 0.5 INHALANT RESPIRATORY (INHALATION) at 09:28

## 2025-01-18 RX ADMIN — PREDNISONE 40 MG: 20 TABLET ORAL at 08:50

## 2025-01-18 RX ADMIN — ENOXAPARIN SODIUM 60 MG: 100 INJECTION SUBCUTANEOUS at 20:07

## 2025-01-18 RX ADMIN — METOCLOPRAMIDE HYDROCHLORIDE 5 MG: 5 TABLET ORAL at 08:50

## 2025-01-18 RX ADMIN — PANCRELIPASE LIPASE, PANCRELIPASE PROTEASE, PANCRELIPASE AMYLASE 5000 UNITS: 5000; 17000; 24000 CAPSULE, DELAYED RELEASE ORAL at 16:49

## 2025-01-18 RX ADMIN — DOXYCYCLINE HYCLATE 100 MG: 100 CAPSULE ORAL at 08:50

## 2025-01-18 RX ADMIN — PANCRELIPASE LIPASE, PANCRELIPASE PROTEASE, PANCRELIPASE AMYLASE 5000 UNITS: 5000; 17000; 24000 CAPSULE, DELAYED RELEASE ORAL at 08:50

## 2025-01-18 RX ADMIN — SODIUM CHLORIDE, PRESERVATIVE FREE 10 ML: 5 INJECTION INTRAVENOUS at 08:50

## 2025-01-18 RX ADMIN — PANCRELIPASE LIPASE, PANCRELIPASE PROTEASE, PANCRELIPASE AMYLASE 5000 UNITS: 5000; 17000; 24000 CAPSULE, DELAYED RELEASE ORAL at 11:55

## 2025-01-18 RX ADMIN — FUROSEMIDE 20 MG: 20 TABLET ORAL at 08:49

## 2025-01-18 RX ADMIN — ARFORMOTEROL TARTRATE 15 MCG: 15 SOLUTION RESPIRATORY (INHALATION) at 09:28

## 2025-01-18 NOTE — PLAN OF CARE
Problem: Chronic Conditions and Co-morbidities  Goal: Patient's chronic conditions and co-morbidity symptoms are monitored and maintained or improved  1/17/2025 2111 by Chey Rand, RN  Outcome: Progressing     Problem: Discharge Planning  Goal: Discharge to home or other facility with appropriate resources  1/17/2025 2111 by Chey Rand, RN  Outcome: Progressing

## 2025-01-18 NOTE — CARE COORDINATION
[facility] Quality Flow/Interdisciplinary Rounds Progress Note        Quality Flow Rounds held on January 18, 2025    Disciplines Attending:  Bedside Nurse and Nursing Unit Leadership    Ayesha Keane was admitted on 1/13/2025 11:52 AM    Anticipated Discharge Date:       Disposition:    Harrison Score:  Harrison Scale Score: 20    [facility] RISK OF UNPLANNED READMISSION 2.0             29.2 Total Score        Discussed patient goal for the day, patient clinical progression, and barriers to discharge.  The following Goal(s) of the Day/Commitment(s) have been identified:  Diagnostics - Report Results      Francesca Fam RN  January 18, 2025

## 2025-01-18 NOTE — CARE COORDINATION
1/18:  update CM Note:  CM called the floor to verify if they needed anything for dc.  Per RN pt will dc tomorrow.  Per floor rn she has a tank in her room.  CM wll need to call [facility] to advise dc  Family to transport.  Electronically signed by Livia Villanueva RN on 1/18/2025 at 4:13 PM

## 2025-01-18 NOTE — PROGRESS NOTES
Date: 1/17/2025    Time: 10:48 PM    Patient Placed On BIPAP/CPAP/ Non-Invasive Ventilation?  Yes    If no must comment.  Facial area red/color change? No           If YES are Blister/Lesion present?No   If yes must notify nursing staff  BIPAP/CPAP skin barrier?  Yes    Skin barrier type:mepilexlite       Comments:        James Jessica RCP

## 2025-01-18 NOTE — PROGRESS NOTES
INPATIENT CARDIOLOGY CONSULT     Reason for Consult: New onset of A-fib with RVR    Cardiologist: Dr. Hernández     Requesting Physician:  Dr. Zuluaga     Date of Consultation: 1/17/2025    HISTORY OF PRESENT ILLNESS:   Ayesha Keane is a 70 year old female who is previously known to Dr. Spain in 2013.  Patient was last seen in hospital consultation with Dr. Marsh on 9/10/2024 for elevated troponin with delta change --> ECHO showed LVEF of 60-65% no wall motion abnormality.  Patient had a Lexiscan MPS 4/13/2024 and was not repeated at that time.  Cardiology signed off.  See PMH below.     Interim:  No significant event overnight  Echo revealed normal systolic function with EF of 60 to 65% with moderate mitral regurgitation    PAST MEDICAL HISTORY:    Hx of NSVT in 2013   Chronically elevated hs-cTnT:    High-sensitivity troponin 58 (10/22/2024)  High-sensitivity troponin 30 (11/28/2024)  Chronic HFpEF:  TTE: F 7/25/2016 LVEF 68 +/- 5%, severe septal left ventricular hypertrophy, normal RV function, right ventricular  systolic pressure is not reported due to an insufficient tricuspid regurgitation signal.  Lexiscan MPS: 3/29/2022: LVEF 75%, no evidence for inducible ischemia inferoapical perfusion abnormality likely artifact versus physiological thinning, less likely focal ischemia.   TTE: F 3/24/2022: Technically difficult exam due to body habitus. Exam indication: Chest Pain. The left ventricle is normal in size. There is mild posterior left ventricular hypertrophy. Left ventricular systolic function is normal. EF = 66 ± 5% (2D biplane) Grade I left ventricular diastolic dysfunction. No significant regional wall motion abnormality. The right ventricle is normal in size. Right ventricular systolic function is normal. There are no significant valvular abnormalities.  Exam was compared with the prior  echocardiographic exam performed on  07/25/2016. There is no significant change.   TTE: 4/30/2024:  Left  is adequate.         EKG January 13, 2025 revealed A-fib with  bpm and nonspecific ST-T wave changes  Assessment  New onset A fib with RVR  Acute on chronic hypoxic respiratory failure    Suspected acute heart failure preserved EF  Moderate mitral regurgitation  SOB  COWAN  Chest tightness   Elevated BNP  Mild trop leak  Abd GERD  Hx of NSVT in 2013   Chronically elevated hs-cTnT:    High-sensitivity troponin 58 (10/22/2024)  High-sensitivity troponin 30 (11/28/2024)  Chronic HFpEF:  Moderate to severe mitral regurgitation    COPD  Chronic hypoxic respiratory failure, on 3L NC.   KRISTA, compliant with CPAP  Former tobacco use, quit in 2010   Dysphagia   Depression with history of suicidal ideation.  Chronic pancreatitis, Creon  Chronic jarquin to atonic bladder   Anemia of chronic disease, (baseline Hgb 7-8)   Workup with EGD 10/5/2021, 12/6/2021 showing chronic gastritis  Recent EGD negative with Dr. Loredo, remote colonoscopy (old tattoo spot at 25cm?) 8/2024.        Plan:   We will proceed with rate control only strategy since patient has upcoming endoscopic procedure with GI for anemia evaluation  Heart rate and blood pressure appear stable  Patient's chest pain presentation is multifactorial especially given a component of acute on chronic COPD exacerbation  Currently chest pain is resolved  Please continue to treat underlying cause  Have switched from IV Lasix to p.o. Lasix  Monitor electrolytes and keep potassium at 4 magnesium at 2  Daily weight, strict ins and out monitoring, and low-salt diet  Monitor closely on telemetry  Echo shows EF of 60 to 65% with moderate mitral  Patient has anemia and apparently awaiting GI evaluation  Monitor H&H closely  Patient will need an ischemic evaluation once anemia evaluation is complete  Follow-up with cardiology in the outpatient once anemia evaluation is completed to discuss ischemic evaluation  Continue Lovenox and switch to 5 mg p.o. Eliquis twice a day prior to

## 2025-01-18 NOTE — PROGRESS NOTES
[facility]   Progress note      Subjective:  Resting comfortably in bed  No acute events overnight    Objective:    BP (!) 92/53   Pulse 53   Temp 97.6 °F (36.4 °C) (Temporal)   Resp 20   Ht 1.702 m (5' 7\")   Wt 60.1 kg (132 lb 9.6 oz)   LMP 10/21/1985   SpO2 100%   BMI 20.77 kg/m²     In: 360 [P.O.:360]  Out: 1650   In: 360   Out: 1650 [Urine:1650]    General appearance: NAD, resting comfortably AVAPS in place  HEENT: AT/NC, MMM  Neck: FROM, supple  Lungs: Distant breath sounds bilateral lung fields  CV: RRR, no MRGs  Vasc: Radial pulses 2+  Abdomen: Soft, non-tender; no masses or HSM  Extremities: No peripheral edema or digital cyanosis  Skin: no rash, lesions or ulcers       Recent Labs     01/16/25  0524 01/17/25  0540 01/18/25  0613   WBC 7.0 6.0 4.9   HGB 7.9* 8.2* 8.0*   HCT 27.8* 28.1* 27.0*    279 275       Recent Labs     01/16/25  0524 01/17/25  0540 01/18/25  0613    137 141   K 4.4 4.3 3.7   CL 94* 91* 93*   CO2 36* 40* 38*   BUN 26* 29* 24*   CREATININE 0.6 0.6 0.6   CALCIUM 10.4* 10.6* 9.7       Assessment:    Principal Problem:    Atrial fibrillation with rapid ventricular response (HCC)  Resolved Problems:    * No resolved hospital problems. *      Plan:    Patient is a 70-year-old female admitted to [facility] for  Atrial fibrillation with RVR  -Rate adequately controlled on my evaluation  -Check TSH and T4  -Cardiology following  -Eliquis 2.5 mg twice daily  -Continue metoprolol XL 25 mg daily for rate control  -Await further input from cardiology     Acute hypercarbic hypoxemic respiratory failure  On evaluation this morning-saturating appropriately, no acute issues, no dyspnea  Notified this afternoon by RRT physician that RRT was called for acute decompensation with hypercarbia ABG with acute hypercarbic respiratory failure pH 7.1 89/92/78/34/91.2%  Noninvasive ventilation for now  Low threshold to transfer to ICU if no improvement noted in gases  Will

## 2025-01-19 LAB
ALBUMIN SERPL-MCNC: 4.1 G/DL (ref 3.5–5.2)
ALP SERPL-CCNC: 68 U/L (ref 35–104)
ALT SERPL-CCNC: 15 U/L (ref 0–32)
ANION GAP SERPL CALCULATED.3IONS-SCNC: 9 MMOL/L (ref 7–16)
AST SERPL-CCNC: 10 U/L (ref 0–31)
BILIRUB SERPL-MCNC: <0.2 MG/DL (ref 0–1.2)
BUN SERPL-MCNC: 22 MG/DL (ref 6–23)
CALCIUM SERPL-MCNC: 10.3 MG/DL (ref 8.6–10.2)
CHLORIDE SERPL-SCNC: 92 MMOL/L (ref 98–107)
CO2 SERPL-SCNC: 41 MMOL/L (ref 22–29)
CREAT SERPL-MCNC: 0.6 MG/DL (ref 0.5–1)
GFR, ESTIMATED: >90 ML/MIN/1.73M2
GLUCOSE SERPL-MCNC: 150 MG/DL (ref 74–99)
POTASSIUM SERPL-SCNC: 3.7 MMOL/L (ref 3.5–5)
PROT SERPL-MCNC: 6.3 G/DL (ref 6.4–8.3)
SODIUM SERPL-SCNC: 142 MMOL/L (ref 132–146)

## 2025-01-19 PROCEDURE — 2700000000 HC OXYGEN THERAPY PER DAY

## 2025-01-19 PROCEDURE — 6360000002 HC RX W HCPCS: Performed by: INTERNAL MEDICINE

## 2025-01-19 PROCEDURE — 2500000003 HC RX 250 WO HCPCS: Performed by: FAMILY MEDICINE

## 2025-01-19 PROCEDURE — 6370000000 HC RX 637 (ALT 250 FOR IP): Performed by: NURSE PRACTITIONER

## 2025-01-19 PROCEDURE — 80053 COMPREHEN METABOLIC PANEL: CPT

## 2025-01-19 PROCEDURE — 36415 COLL VENOUS BLD VENIPUNCTURE: CPT

## 2025-01-19 PROCEDURE — 6360000002 HC RX W HCPCS: Performed by: NURSE PRACTITIONER

## 2025-01-19 PROCEDURE — 94640 AIRWAY INHALATION TREATMENT: CPT

## 2025-01-19 PROCEDURE — 6370000000 HC RX 637 (ALT 250 FOR IP): Performed by: INTERNAL MEDICINE

## 2025-01-19 PROCEDURE — 94660 CPAP INITIATION&MGMT: CPT

## 2025-01-19 PROCEDURE — 6370000000 HC RX 637 (ALT 250 FOR IP): Performed by: FAMILY MEDICINE

## 2025-01-19 PROCEDURE — 2140000000 HC CCU INTERMEDIATE R&B

## 2025-01-19 RX ADMIN — IPRATROPIUM BROMIDE AND ALBUTEROL SULFATE 1 DOSE: 2.5; .5 SOLUTION RESPIRATORY (INHALATION) at 16:26

## 2025-01-19 RX ADMIN — SERTRALINE HYDROCHLORIDE 50 MG: 50 TABLET ORAL at 20:00

## 2025-01-19 RX ADMIN — BUDESONIDE 500 MCG: 0.5 INHALANT RESPIRATORY (INHALATION) at 21:03

## 2025-01-19 RX ADMIN — PANCRELIPASE LIPASE, PANCRELIPASE PROTEASE, PANCRELIPASE AMYLASE 5000 UNITS: 5000; 17000; 24000 CAPSULE, DELAYED RELEASE ORAL at 08:32

## 2025-01-19 RX ADMIN — PANCRELIPASE LIPASE, PANCRELIPASE PROTEASE, PANCRELIPASE AMYLASE 5000 UNITS: 5000; 17000; 24000 CAPSULE, DELAYED RELEASE ORAL at 16:38

## 2025-01-19 RX ADMIN — BUDESONIDE 500 MCG: 0.5 INHALANT RESPIRATORY (INHALATION) at 09:52

## 2025-01-19 RX ADMIN — ACETAMINOPHEN 650 MG: 325 TABLET ORAL at 16:38

## 2025-01-19 RX ADMIN — ARFORMOTEROL TARTRATE 15 MCG: 15 SOLUTION RESPIRATORY (INHALATION) at 21:03

## 2025-01-19 RX ADMIN — FUROSEMIDE 20 MG: 20 TABLET ORAL at 08:32

## 2025-01-19 RX ADMIN — SODIUM CHLORIDE, PRESERVATIVE FREE 10 ML: 5 INJECTION INTRAVENOUS at 08:33

## 2025-01-19 RX ADMIN — PANCRELIPASE LIPASE, PANCRELIPASE PROTEASE, PANCRELIPASE AMYLASE 5000 UNITS: 5000; 17000; 24000 CAPSULE, DELAYED RELEASE ORAL at 12:38

## 2025-01-19 RX ADMIN — METOCLOPRAMIDE HYDROCHLORIDE 5 MG: 5 TABLET ORAL at 08:32

## 2025-01-19 RX ADMIN — ENOXAPARIN SODIUM 60 MG: 100 INJECTION SUBCUTANEOUS at 20:00

## 2025-01-19 RX ADMIN — METOCLOPRAMIDE HYDROCHLORIDE 5 MG: 5 TABLET ORAL at 20:00

## 2025-01-19 RX ADMIN — DOXYCYCLINE HYCLATE 100 MG: 100 CAPSULE ORAL at 08:32

## 2025-01-19 RX ADMIN — IPRATROPIUM BROMIDE AND ALBUTEROL SULFATE 1 DOSE: 2.5; .5 SOLUTION RESPIRATORY (INHALATION) at 13:20

## 2025-01-19 RX ADMIN — METOPROLOL SUCCINATE 25 MG: 25 TABLET, EXTENDED RELEASE ORAL at 20:00

## 2025-01-19 RX ADMIN — ENOXAPARIN SODIUM 60 MG: 100 INJECTION SUBCUTANEOUS at 08:32

## 2025-01-19 RX ADMIN — METOPROLOL SUCCINATE 25 MG: 25 TABLET, EXTENDED RELEASE ORAL at 08:33

## 2025-01-19 RX ADMIN — IPRATROPIUM BROMIDE AND ALBUTEROL SULFATE 1 DOSE: 2.5; .5 SOLUTION RESPIRATORY (INHALATION) at 21:03

## 2025-01-19 RX ADMIN — PREDNISONE 40 MG: 20 TABLET ORAL at 08:33

## 2025-01-19 RX ADMIN — ASPIRIN 81 MG CHEWABLE TABLET 81 MG: 81 TABLET CHEWABLE at 08:32

## 2025-01-19 RX ADMIN — IPRATROPIUM BROMIDE AND ALBUTEROL SULFATE 1 DOSE: 2.5; .5 SOLUTION RESPIRATORY (INHALATION) at 09:52

## 2025-01-19 RX ADMIN — ARFORMOTEROL TARTRATE 15 MCG: 15 SOLUTION RESPIRATORY (INHALATION) at 09:52

## 2025-01-19 NOTE — PLAN OF CARE
Problem: Chronic Conditions and Co-morbidities  Goal: Patient's chronic conditions and co-morbidity symptoms are monitored and maintained or improved  1/19/2025 1221 by Audra Caban RN  Outcome: Progressing     Problem: Discharge Planning  Goal: Discharge to home or other facility with appropriate resources  1/19/2025 1221 by Audra Caban RN  Outcome: Progressing     Problem: Safety - Adult  Goal: Free from fall injury  1/19/2025 1221 by Audra Caban RN  Outcome: Progressing     Problem: Pain  Goal: Verbalizes/displays adequate comfort level or baseline comfort level  1/19/2025 1221 by Audra Caban RN  Outcome: Progressing

## 2025-01-19 NOTE — CARE COORDINATION
Discharge order noted. Pt discharging home, refused MARK with previous CM. Active with [facility], will resume services at discharge. Message left for Kimmy. Awaiting Pulm as CO2 was elevated this AM. She does have a NIV at home. Portable O2 at bedside for transport home (TF)        1545--Pt requesting transport. Transport arranged with [facility]. Ref# [id number].  time 7:20 pm. Nurse updated (TF)          VEDA FriasN,RN  Case Management  [phone]

## 2025-01-19 NOTE — DISCHARGE SUMMARY
[facility]   Discharge summary   Patient ID:  Ayesha Keane  [id number]  70 y.o.  1955    Admit date: 1/13/2025    Discharge date and time: 1/19/2025    Admission Diagnoses:   Patient Active Problem List   Diagnosis    COPD (chronic obstructive pulmonary disease) (HCC)    MDD (major depressive disorder)    Hematuria    Neurogenic bladder    Chronic respiratory failure with hypoxia    Anemia requiring transfusions    Gastric wall thickening    Bladder wall thickening    Difficulty in walking, not elsewhere classified    Moderate persistent asthma with (acute) exacerbation    Muscle weakness (generalized)    Need for assistance with personal care    Neuromuscular dysfunction of bladder, unspecified    Personal history of other malignant neoplasm of kidney    Breast pain    COPD exacerbation (HCC)    New onset of congestive heart failure (HCC)    Prolonged Q-T interval on ECG    Acute exacerbation of chronic obstructive pulmonary disease (COPD) (HCC)    Decompensated chronic obstructive pulmonary disease with exacerbation ([facility])    Chest pain    Pancreatic insufficiency    Acute on chronic respiratory failure with hypercapnia    Acute blood loss anemia    Respiratory failure with hypercapnia    PAF (paroxysmal atrial fibrillation) ([facility])    Atrial fibrillation with rapid ventricular response ([facility])       Discharge Diagnoses: Atrial fibrillation with RVR, COPD exacerbation    Consults: cardiology and pulmonary/intensive care    Procedures: none    Hospital Course: The patient is a 70 y.o. female of Lee Vides MD    Patient is a 70-year-old female admitted to [facility] for  Atrial fibrillation with RVR  -Rate adequately controlled on my evaluation  -Check TSH and T4  -Cardiology following  -Eliquis 2.5 mg twice daily  -Continue metoprolol XL 25 mg daily for rate control  -Await further input from cardiology     Acute hypercarbic hypoxemic respiratory failure  On evaluation this    alendronate 70 MG tablet  Commonly known as: FOSAMAX     arformoterol tartrate 15 MCG/2ML Nebu  Commonly known as: BROVANA     aspirin 81 MG chewable tablet  Take 1 tablet by mouth daily     budesonide 0.5 MG/2ML nebulizer suspension  Commonly known as: PULMICORT     Daily-Fritz/Iron Tabs     folic acid 1 MG tablet  Commonly known as: FOLVITE     lactulose 10 GM/15ML solution  Commonly known as: CHRONULAC  Take 30 mLs by mouth nightly     lipase-protease-amylase 97151-84396 units delayed release capsule  Commonly known as: CREON     metoclopramide 5 MG tablet  Commonly known as: REGLAN  Take 1 tablet by mouth in the morning and at bedtime     omeprazole 40 MG delayed release capsule  Commonly known as: PRILOSEC     OXYGEN     sertraline 50 MG tablet  Commonly known as: ZOLOFT  Take 1 tablet by mouth nightly     vitamin D 1.25 MG (68529 UT) Caps capsule  Commonly known as: ERGOCALCIFEROL           * This list has 2 medication(s) that are the same as other medications prescribed for you. Read the directions carefully, and ask your doctor or other care provider to review them with you.                STOP taking these medications      ammonium lactate 12 % lotion  Commonly known as: LAC-HYDRIN               Where to Get Your Medications        These medications were sent to [facility] - [address] - P [phone] - F [phone]  [address] [place] [address]      Phone: [phone]   doxycycline hyclate 100 MG capsule  furosemide 20 MG tablet  metoprolol succinate 25 MG extended release tablet  predniSONE 20 MG tablet       Activity: activity as tolerated  Diet: cardiac diet    Pt has been advised to:    Follow-up with Lee Vides MD in 1 week.  Follow-up with consultants as recommended by them      Signed:  ANGELA Coleman CNP  1/19/2025  6:25 AM    Above note edited to reflect my thoughts     I personally provided care for the patient.

## 2025-01-19 NOTE — PLAN OF CARE
Problem: Chronic Conditions and Co-morbidities  Goal: Patient's chronic conditions and co-morbidity symptoms are monitored and maintained or improved  1/19/2025 0021 by Pam Steel, RN  Outcome: Progressing     Problem: Discharge Planning  Goal: Discharge to home or other facility with appropriate resources  1/19/2025 0021 by Pam Steel, RN  Outcome: Progressing     Problem: Safety - Adult  Goal: Free from fall injury  1/19/2025 0021 by Pam Steel, RN  Outcome: Progressing     Problem: Pain  Goal: Verbalizes/displays adequate comfort level or baseline comfort level  1/19/2025 0021 by Pam Steel, RN  Outcome: Progressing

## 2025-01-19 NOTE — CARE COORDINATION
[facility] Quality Flow/Interdisciplinary Rounds Progress Note        Quality Flow Rounds held on January 19, 2025    Disciplines Attending:  Bedside Nurse and Nursing Unit Leadership    Ayesha Keane was admitted on 1/13/2025 11:52 AM    Anticipated Discharge Date:       Disposition:    Harrison Score:  Harrison Scale Score: 20    [facility] RISK OF UNPLANNED READMISSION 2.0             28.9 Total Score        Discussed patient goal for the day, patient clinical progression, and barriers to discharge.  The following Goal(s) of the Day/Commitment(s) have been identified:  Diagnostics - Report Results      Francesca Fam RN  January 19, 2025

## 2025-01-20 VITALS
DIASTOLIC BLOOD PRESSURE: 52 MMHG | HEIGHT: 67 IN | OXYGEN SATURATION: 96 % | RESPIRATION RATE: 20 BRPM | WEIGHT: 132.6 LBS | SYSTOLIC BLOOD PRESSURE: 104 MMHG | BODY MASS INDEX: 20.81 KG/M2 | HEART RATE: 85 BPM | TEMPERATURE: 96.7 F

## 2025-01-20 LAB
ALBUMIN SERPL-MCNC: 3.5 G/DL (ref 3.5–5.2)
ALP SERPL-CCNC: 66 U/L (ref 35–104)
ALT SERPL-CCNC: 11 U/L (ref 0–32)
ANION GAP SERPL CALCULATED.3IONS-SCNC: 8 MMOL/L (ref 7–16)
AST SERPL-CCNC: 5 U/L (ref 0–31)
BILIRUB SERPL-MCNC: <0.2 MG/DL (ref 0–1.2)
BUN SERPL-MCNC: 15 MG/DL (ref 6–23)
CALCIUM SERPL-MCNC: 9.9 MG/DL (ref 8.6–10.2)
CHLORIDE SERPL-SCNC: 93 MMOL/L (ref 98–107)
CO2 SERPL-SCNC: 39 MMOL/L (ref 22–29)
CREAT SERPL-MCNC: 0.5 MG/DL (ref 0.5–1)
GFR, ESTIMATED: >90 ML/MIN/1.73M2
GLUCOSE SERPL-MCNC: 146 MG/DL (ref 74–99)
POTASSIUM SERPL-SCNC: 3.2 MMOL/L (ref 3.5–5)
PROT SERPL-MCNC: 5.7 G/DL (ref 6.4–8.3)
SODIUM SERPL-SCNC: 140 MMOL/L (ref 132–146)

## 2025-01-20 PROCEDURE — 94660 CPAP INITIATION&MGMT: CPT

## 2025-01-20 PROCEDURE — 36415 COLL VENOUS BLD VENIPUNCTURE: CPT

## 2025-01-20 PROCEDURE — 2700000000 HC OXYGEN THERAPY PER DAY

## 2025-01-20 PROCEDURE — 6360000002 HC RX W HCPCS: Performed by: NURSE PRACTITIONER

## 2025-01-20 PROCEDURE — 6360000002 HC RX W HCPCS: Performed by: INTERNAL MEDICINE

## 2025-01-20 PROCEDURE — 6370000000 HC RX 637 (ALT 250 FOR IP): Performed by: NURSE PRACTITIONER

## 2025-01-20 PROCEDURE — 80053 COMPREHEN METABOLIC PANEL: CPT

## 2025-01-20 PROCEDURE — 6370000000 HC RX 637 (ALT 250 FOR IP): Performed by: INTERNAL MEDICINE

## 2025-01-20 PROCEDURE — 94640 AIRWAY INHALATION TREATMENT: CPT

## 2025-01-20 PROCEDURE — 6370000000 HC RX 637 (ALT 250 FOR IP): Performed by: FAMILY MEDICINE

## 2025-01-20 RX ADMIN — ENOXAPARIN SODIUM 60 MG: 100 INJECTION SUBCUTANEOUS at 09:42

## 2025-01-20 RX ADMIN — ARFORMOTEROL TARTRATE 15 MCG: 15 SOLUTION RESPIRATORY (INHALATION) at 08:16

## 2025-01-20 RX ADMIN — FUROSEMIDE 20 MG: 20 TABLET ORAL at 09:42

## 2025-01-20 RX ADMIN — BUDESONIDE 500 MCG: 0.5 INHALANT RESPIRATORY (INHALATION) at 08:16

## 2025-01-20 RX ADMIN — ASPIRIN 81 MG CHEWABLE TABLET 81 MG: 81 TABLET CHEWABLE at 09:41

## 2025-01-20 RX ADMIN — PREDNISONE 40 MG: 20 TABLET ORAL at 09:42

## 2025-01-20 RX ADMIN — IPRATROPIUM BROMIDE AND ALBUTEROL SULFATE 1 DOSE: 2.5; .5 SOLUTION RESPIRATORY (INHALATION) at 08:16

## 2025-01-20 RX ADMIN — PANCRELIPASE LIPASE, PANCRELIPASE PROTEASE, PANCRELIPASE AMYLASE 5000 UNITS: 5000; 17000; 24000 CAPSULE, DELAYED RELEASE ORAL at 09:41

## 2025-01-20 RX ADMIN — METOCLOPRAMIDE HYDROCHLORIDE 5 MG: 5 TABLET ORAL at 09:42

## 2025-01-20 NOTE — PROGRESS NOTES
Spoke to [facility] and they are able to deliver patients ordered medications today

## 2025-01-20 NOTE — PATIENT CARE CONFERENCE
[facility] Quality Flow/Interdisciplinary Rounds Progress Note        Quality Flow Rounds held on January 20, 2025    Disciplines Attending:  Bedside Nurse, , , and Nursing Unit Leadership    Ayesha Keane was admitted on 1/13/2025 11:52 AM    Anticipated Discharge Date:       Disposition:    Harrison Score:  Harrison Scale Score: 20    [facility] RISK OF UNPLANNED READMISSION 2.0             28.9 Total Score        Discussed patient goal for the day, patient clinical progression, and barriers to discharge.  The following Goal(s) of the Day/Commitment(s) have been identified:  discharge      Vickie Garcia RN  January 20, 2025

## 2025-01-20 NOTE — PROGRESS NOTES
Shayan Carmona M.D.,[facility]  Hardy Talamantes D.O., F.PRANAYOROMI., [facility]  Jimbo Rodriguez M.D.  Saadia Tovar M.D.   Tarik Ring D.O.  Jag Everett M.D.         Daily Pulmonary Progress Note    Patient:  Ayesha Keane 70 y.o. female MRN: [id number]            Synopsis     We are following patient for acute respiratory failure with hypoxia and hypercapnia    \"CC\" AMS    Code status: Full      Subjective      Patient was seen and examined.  Lying in bed, pleasant.  Plan to discharge today.  Follow-up with [facility] group.    Review of Systems:  Constitutional: Denies fever, weight loss, night sweats, and fatigue  Skin: Denies pigmentation, dark lesions, and rashes   HEENT: Denies hearing loss, tinnitus, ear drainage, epistaxis, sore throat, and hoarseness.  Cardiovascular: Denies palpitations, chest pain, and chest pressure.  Respiratory: Denies cough, dyspnea at rest, hemoptysis, apnea, and choking.  Gastrointestinal: Denies nausea, vomiting, poor appetite, diarrhea, heartburn or reflux  Genitourinary: Denies dysuria, frequency, urgency or hematuria  Musculoskeletal: Denies myalgias, muscle weakness, and bone pain  Neurological: Denies dizziness, vertigo, headache, and focal weakness  Psychological: Denies anxiety and depression  Endocrine: Denies heat intolerance and cold intolerance  Hematopoietic/Lymphatic: Denies bleeding problems and blood transfusions    24-hour events:  As above    Objective   OBJECTIVE:   BP (!) 104/52   Pulse 85   Temp (!) 96.7 °F (35.9 °C) (Temporal)   Resp 20   Ht 1.702 m (5' 7\")   Wt 60.1 kg (132 lb 9.6 oz)   LMP 10/21/1985   SpO2 96%   BMI 20.77 kg/m²   SpO2 Readings from Last 1 Encounters:   01/20/25 96%        I/O:    Intake/Output Summary (Last 24 hours) at 1/20/2025 1100  Last data filed at 1/20/2025 0922  Gross per 24 hour   Intake 720 ml   Output 725 ml   Net -5 ml     Vent Information  Ventilator ID: [id number]       IPAP: 12 cmH20  CPAP/EPAP: 8 cmH2O     CURRENT MEDS  emphysema.  No focal consolidation.  No pleural  effusion.  No pneumothorax.     Upper Abdomen: Prominent pancreatic calcifications.  Partially visualized  left simple renal cyst not requiring follow-up.  Hiatal hernia.  Colonic  diverticula.     Soft Tissues/Bones: No acute osseous abnormality.  Chronic appearing  bilateral rib fractures.     IMPRESSION:  1. No evidence of pulmonary embolism or acute pulmonary abnormality.  2. Severe emphysema.  3. Chronic pancreatitis.  4. Hiatal hernia.  5. Colonic diverticulosis.     Echo: 9/11/24    Left Ventricle: Normal left ventricular systolic function with a visually estimated EF of 60 - 65%. Left ventricle size is normal. Normal wall thickness. Ventricular mass is normal. Findings consistent with concentric remodeling. Normal wall motion. Indeterminate diastolic function.    Right Ventricle: Right ventricle size is normal. Normal systolic function.    Aortic Valve: Not assessed. Mild sclerosis of the aortic valve cusps.    Mitral Valve: Moderate to severe regurgitation with a posterior directed jet.    Tricuspid Valve: Unable to assess RVSP due to inadequate or insignificant tricuspid regurgitation.    Pericardium: No pericardial effusion.    Image quality is fair.    ECHO 1/15/25  Left Ventricle: The EF by visual approximation is 60 - 65%. Elevated left ventricular filling pressure.    Right Ventricle: Right ventricle size is normal. Normal systolic function.    Aortic Valve: Not well visualized. No stenosis. AV area by continuity VTI is 2.0 cm2. AV area by peak velocity is 2.1 cm2.    Mitral Valve: Moderate regurgitation with an eccentrically directed jet and may underestimate severity. MV EROA PISA is 0.2 cm2. MR Regurg Volume PISA is 36.40 mL. No stenosis noted. MV mean gradient is 2 mmHg. MV area by PHT is 2.7 cm2. MV area by continuity equation is 1.9 cm2.    Tricuspid Valve: Mild regurgitation. The estimated RVSP is 29 mmHg.    Pulmonic Valve: Trace

## 2025-01-20 NOTE — PROGRESS NOTES
[facility]   Progress note      Subjective:  Resting comfortably in bed  No acute events overnight    Objective:    BP (!) 96/49   Pulse 74   Temp (!) 96.7 °F (35.9 °C) (Temporal)   Resp 18   Ht 1.702 m (5' 7\")   Wt 60.1 kg (132 lb 9.6 oz)   LMP 10/21/1985   SpO2 100%   BMI 20.77 kg/m²     In: 960 [P.O.:960]  Out: 1025   In: 960   Out: 1025 [Urine:1025]    General appearance: NAD, resting comfortably AVAPS in place  HEENT: AT/NC, MMM  Neck: FROM, supple  Lungs: Distant breath sounds bilateral lung fields  CV: RRR, no MRGs  Vasc: Radial pulses 2+  Abdomen: Soft, non-tender; no masses or HSM  Extremities: No peripheral edema or digital cyanosis  Skin: no rash, lesions or ulcers       Recent Labs     01/18/25  0613   WBC 4.9   HGB 8.0*   HCT 27.0*          Recent Labs     01/18/25  0613 01/19/25  0444 01/20/25  0655    142 140   K 3.7 3.7 3.2*   CL 93* 92* 93*   CO2 38* 41* 39*   BUN 24* 22 15   CREATININE 0.6 0.6 0.5   CALCIUM 9.7 10.3* 9.9       Assessment:    Principal Problem:    Atrial fibrillation with rapid ventricular response (HCC)  Resolved Problems:    * No resolved hospital problems. *      Plan:    Patient is a 70-year-old female admitted to [facility] for  Atrial fibrillation with RVR  -Rate adequately controlled on my evaluation  -Check TSH and T4  -Cardiology following  -Eliquis 2.5 mg twice daily  -Continue metoprolol XL 25 mg daily for rate control  -Await further input from cardiology     Acute hypercarbic hypoxemic respiratory failure  On evaluation this morning-saturating appropriately, no acute issues, no dyspnea  Notified this afternoon by RRT physician that RRT was called for acute decompensation with hypercarbia ABG with acute hypercarbic respiratory failure pH 7.1 89/92/78/34/91.2%  Noninvasive ventilation for now  Low threshold to transfer to ICU if no improvement noted in gases  Will likely require intubation  CODE STATUS remains

## 2025-01-20 NOTE — CARE COORDINATION
CM Update: Met with patient at bedside to discuss transition of care plan. Discharge plan is Home. Patient's brother is at her house waiting for her. Left voicemail for Dr. Zuluaga asking if patient is going to discharge on Eliquis. Patient will need assistance with transport. CM/RIC to follow. Jag Morfin [phone]

## 2025-01-20 NOTE — PROGRESS NOTES
Call placed to on call case management regarding need to re-setup transport. Taxi voucher received from Sera Carnes RN.Deborah Chaudhari RN

## 2025-01-20 NOTE — PROGRESS NOTES
Attempted to call pt home phone continued to ring. Called pr sister nelly to relay the message that pt new medication Eliquis will be delivered. Pt is instructed to take eliquis morning and night, missing doses increases the risk of blood clots. Per Nelly she will tell Ayesha and any questions she will call her doctor    Marielena Kwan RN
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
## 2025-01-21 NOTE — PROGRESS NOTES
[facility]   Progress note      Subjective:  Resting comfortably in bed  No acute events overnight    Objective:    BP (!) 104/52   Pulse 85   Temp (!) 96.7 °F (35.9 °C) (Temporal)   Resp 20   Ht 1.702 m (5' 7\")   Wt 60.1 kg (132 lb 9.6 oz)   LMP 10/21/1985   SpO2 96%   BMI 20.77 kg/m²     In: 240 [P.O.:240]  Out: -   In: 240   Out: -     General appearance: NAD, resting comfortably AVAPS in place  HEENT: AT/NC, MMM  Neck: FROM, supple  Lungs: Distant breath sounds bilateral lung fields  CV: RRR, no MRGs  Vasc: Radial pulses 2+  Abdomen: Soft, non-tender; no masses or HSM  Extremities: No peripheral edema or digital cyanosis  Skin: no rash, lesions or ulcers       No results for input(s): \"WBC\", \"HGB\", \"HCT\", \"PLT\" in the last 72 hours.      Recent Labs     01/19/25  0444 01/20/25  0655    140   K 3.7 3.2*   CL 92* 93*   CO2 41* 39*   BUN 22 15   CREATININE 0.6 0.5   CALCIUM 10.3* 9.9       Assessment:    Principal Problem:    Atrial fibrillation with rapid ventricular response (HCC)  Resolved Problems:    * No resolved hospital problems. *      Plan:    Patient is a 70-year-old female admitted to [facility] for  Atrial fibrillation with RVR  -Rate adequately controlled on my evaluation  -Check TSH and T4  -Cardiology following  -Eliquis 2.5 mg twice daily  -Continue metoprolol XL 25 mg daily for rate control  -Await further input from cardiology     Acute hypercarbic hypoxemic respiratory failure  On evaluation this morning-saturating appropriately, no acute issues, no dyspnea  Notified this afternoon by RRT physician that RRT was called for acute decompensation with hypercarbia ABG with acute hypercarbic respiratory failure pH 7.1 89/92/78/34/91.2%  Noninvasive ventilation for now  Low threshold to transfer to ICU if no improvement noted in gases  Will likely require intubation  CODE STATUS remains full    1/15/2025  Hypercarbic respiratory failure  Resting comfortably no acute

## 2025-01-23 LAB
EKG ATRIAL RATE: 170 BPM
EKG Q-T INTERVAL: 292 MS
EKG QRS DURATION: 70 MS
EKG QTC CALCULATION (BAZETT): 487 MS
EKG R AXIS: 67 DEGREES
EKG T AXIS: 164 DEGREES
EKG VENTRICULAR RATE: 167 BPM

## 2025-01-31 NOTE — PROGRESS NOTES
Physician Progress Note      PATIENT:               MICHI BENNETT  CSN #:                  [id number]  :                       1955  ADMIT DATE:       2025 11:52 AM  DISCH DATE:        2025 1:06 PM  RESPONDING  PROVIDER #:        Elsa Zuluaga MD          QUERY TEXT:    Patient admitted with chest pain & SOB.  If possible, please document in the   progress notes and discharge summary if you are evaluating and/or treating any   of the following:    The medical record reflects the following:  Risk Factors: Hx CHF, COPD, NSVT  Clinical Indicators: Troponins ,  EKG: ST&T wave abn, cnsider   lateral ischemia, significant changes have occurred.  Treatment: Cardiology consult, Diltiazem, Telemetry  Options provided:  -- Type 2 MI  -- Demand Ischemia only, no MI due to Afib  -- Other - I will add my own diagnosis  -- Disagree - Not applicable / Not valid  -- Disagree - Clinically unable to determine / Unknown  -- Refer to Clinical Documentation Reviewer    PROVIDER RESPONSE TEXT:    This patient has a Type 2 MI.    Query created by: Elaina Gayle on 2025 6:49 PM      Electronically signed by:  Elsa Zuluaga MD 2025 7:22 AM

## 2025-02-11 LAB
ALBUMIN SERPL-MCNC: 4.3 G/DL (ref 3.5–5.2)
ALP SERPL-CCNC: 64 U/L (ref 35–104)
ALT SERPL-CCNC: 6 U/L (ref 0–32)
ANION GAP SERPL CALCULATED.3IONS-SCNC: 18 MMOL/L (ref 7–16)
AST SERPL-CCNC: 16 U/L (ref 0–31)
BASOPHILS # BLD: 0.02 K/UL (ref 0–0.2)
BASOPHILS NFR BLD: 1 % (ref 0–2)
BILIRUB SERPL-MCNC: 0.3 MG/DL (ref 0–1.2)
BUN SERPL-MCNC: 14 MG/DL (ref 6–23)
CALCIUM SERPL-MCNC: 9.9 MG/DL (ref 8.6–10.2)
CHLORIDE SERPL-SCNC: 94 MMOL/L (ref 98–107)
CHOLEST SERPL-MCNC: 245 MG/DL
CO2 SERPL-SCNC: 33 MMOL/L (ref 22–29)
CREAT SERPL-MCNC: 0.6 MG/DL (ref 0.5–1)
EOSINOPHIL # BLD: 0.04 K/UL (ref 0.05–0.5)
EOSINOPHILS RELATIVE PERCENT: 1 % (ref 0–6)
ERYTHROCYTE [DISTWIDTH] IN BLOOD BY AUTOMATED COUNT: 16.4 % (ref 11.5–15)
GFR, ESTIMATED: >90 ML/MIN/1.73M2
GLUCOSE SERPL-MCNC: 149 MG/DL (ref 74–99)
HCT VFR BLD AUTO: 30.5 % (ref 34–48)
HDLC SERPL-MCNC: 100 MG/DL
HGB BLD-MCNC: 8.5 G/DL (ref 11.5–15.5)
IMM GRANULOCYTES # BLD AUTO: <0.03 K/UL (ref 0–0.58)
IMM GRANULOCYTES NFR BLD: 0 % (ref 0–5)
LDLC SERPL CALC-MCNC: 119 MG/DL
LYMPHOCYTES NFR BLD: 1.11 K/UL (ref 1.5–4)
LYMPHOCYTES RELATIVE PERCENT: 28 % (ref 20–42)
MCH RBC QN AUTO: 23.8 PG (ref 26–35)
MCHC RBC AUTO-ENTMCNC: 27.9 G/DL (ref 32–34.5)
MCV RBC AUTO: 85.4 FL (ref 80–99.9)
MONOCYTES NFR BLD: 0.37 K/UL (ref 0.1–0.95)
MONOCYTES NFR BLD: 9 % (ref 2–12)
NEUTROPHILS NFR BLD: 62 % (ref 43–80)
NEUTS SEG NFR BLD: 2.48 K/UL (ref 1.8–7.3)
PLATELET # BLD AUTO: 357 K/UL (ref 130–450)
PMV BLD AUTO: 10.5 FL (ref 7–12)
POTASSIUM SERPL-SCNC: 3.6 MMOL/L (ref 3.5–5)
PROT SERPL-MCNC: 6.7 G/DL (ref 6.4–8.3)
RBC # BLD AUTO: 3.57 M/UL (ref 3.5–5.5)
RBC # BLD: ABNORMAL 10*6/UL
SODIUM SERPL-SCNC: 145 MMOL/L (ref 132–146)
TRIGL SERPL-MCNC: 130 MG/DL
VLDLC SERPL CALC-MCNC: 26 MG/DL
WBC OTHER # BLD: 4 K/UL (ref 4.5–11.5)

## 2025-04-29 ENCOUNTER — HOSPITAL ENCOUNTER (OUTPATIENT)
Age: 70
Discharge: HOME | End: 2025-04-29

## 2025-05-02 ENCOUNTER — HOSPITAL ENCOUNTER (OUTPATIENT)
Age: 70
Discharge: HOME | End: 2025-05-02

## 2025-05-02 LAB
EKG ATRIAL RATE: 77 BPM
EKG P AXIS: -24 DEGREES
EKG P-R INTERVAL: 168 MS
EKG Q-T INTERVAL: 444 MS
EKG QRS DURATION: 74 MS
EKG QTC CALCULATION (BAZETT): 502 MS
EKG R AXIS: -8 DEGREES
EKG T AXIS: -18 DEGREES
EKG VENTRICULAR RATE: 77 BPM

## 2025-07-07 ENCOUNTER — TELEPHONE (OUTPATIENT)
Age: 70
End: 2025-07-07

## 2025-07-07 DIAGNOSIS — K86.1 PANCREATIC DUCT HYPERTENSION WITH CHRONIC PANCREATITIS (HCC): Primary | ICD-10-CM

## 2025-08-06 ENCOUNTER — HOSPITAL ENCOUNTER (OUTPATIENT)
Dept: CT IMAGING | Age: 70
Discharge: HOME | End: 2025-08-08
Attending: TRANSPLANT SURGERY
Payer: MEDICARE

## 2025-08-06 ENCOUNTER — HOSPITAL ENCOUNTER (OUTPATIENT)
Dept: LAB | Age: 70
Discharge: HOME | End: 2025-08-06
Attending: TRANSPLANT SURGERY
Payer: MEDICARE

## 2025-08-06 DIAGNOSIS — K86.1 PANCREATIC DUCT HYPERTENSION WITH CHRONIC PANCREATITIS (HCC): ICD-10-CM

## 2025-08-06 DIAGNOSIS — K86.1 CHRONIC CALCIFIC PANCREATITIS (HCC): ICD-10-CM

## 2025-08-06 LAB
BUN SERPL-MCNC: 8 MG/DL (ref 8–23)
CREAT SERPL-MCNC: 0.6 MG/DL (ref 0.5–1)
GFR, ESTIMATED: >90 ML/MIN/1.73M2

## 2025-08-06 PROCEDURE — 6360000004 HC RX CONTRAST MEDICATION: Performed by: RADIOLOGY

## 2025-08-06 PROCEDURE — 2500000003 HC RX 250 WO HCPCS: Performed by: RADIOLOGY

## 2025-08-06 PROCEDURE — 74160 CT ABDOMEN W/CONTRAST: CPT

## 2025-08-06 PROCEDURE — 36415 COLL VENOUS BLD VENIPUNCTURE: CPT

## 2025-08-06 PROCEDURE — 84520 ASSAY OF UREA NITROGEN: CPT

## 2025-08-06 PROCEDURE — 82565 ASSAY OF CREATININE: CPT

## 2025-08-06 RX ORDER — SODIUM CHLORIDE 0.9 % (FLUSH) 0.9 %
10 SYRINGE (ML) INJECTION PRN
Status: DISCONTINUED | OUTPATIENT
Start: 2025-08-06 | End: 2025-08-09 | Stop reason: HOSPADM

## 2025-08-06 RX ORDER — IOPAMIDOL 755 MG/ML
75 INJECTION, SOLUTION INTRAVASCULAR
Status: COMPLETED | OUTPATIENT
Start: 2025-08-06 | End: 2025-08-06

## 2025-08-06 RX ADMIN — Medication 10 ML: at 15:17

## 2025-08-06 RX ADMIN — IOPAMIDOL 75 ML: 755 INJECTION, SOLUTION INTRAVENOUS at 15:17

## 2025-08-15 ENCOUNTER — OFFICE VISIT (OUTPATIENT)
Age: 70
End: 2025-08-15

## 2025-08-15 VITALS
RESPIRATION RATE: 15 BRPM | OXYGEN SATURATION: 97 % | HEART RATE: 84 BPM | TEMPERATURE: 97.5 F | BODY MASS INDEX: 19.9 KG/M2 | WEIGHT: 126.8 LBS | DIASTOLIC BLOOD PRESSURE: 62 MMHG | SYSTOLIC BLOOD PRESSURE: 112 MMHG | HEIGHT: 67 IN

## 2025-08-15 DIAGNOSIS — K86.89 PANCREATIC INSUFFICIENCY: ICD-10-CM

## 2025-08-15 DIAGNOSIS — Z09 FOLLOW-UP EXAM: ICD-10-CM

## 2025-08-15 DIAGNOSIS — K86.1 CHRONIC CALCIFIC PANCREATITIS (HCC): Primary | ICD-10-CM

## 2025-08-15 RX ORDER — LACTOSE-REDUCED FOOD
1 POWDER (GRAM) ORAL
Qty: 180 BOX | Refills: 3 | Status: SHIPPED | OUTPATIENT
Start: 2025-08-15 | End: 2025-08-15

## 2025-08-15 RX ORDER — PANCRELIPASE 36000; 180000; 114000 [USP'U]/1; [USP'U]/1; [USP'U]/1
2 CAPSULE, DELAYED RELEASE PELLETS ORAL
Qty: 300 CAPSULE | Refills: 3 | Status: SHIPPED | OUTPATIENT
Start: 2025-08-15

## 2025-08-15 RX ORDER — LACTOSE-REDUCED FOOD
1 POWDER (GRAM) ORAL
Qty: 180 BOX | Refills: 3 | Status: SHIPPED | OUTPATIENT
Start: 2025-08-15

## (undated) DEVICE — BITEBLOCK 54FR W/ DENT RIM BLOX

## (undated) DEVICE — Z DUP USE 2139333 GUIDEWIRE UROLOGICAL STR STD 0.035 IN X150 CM REG ZIPWIRE LF

## (undated) DEVICE — BLOCK BITE 60FR RUBBER ADLT DENTAL

## (undated) DEVICE — GRADUATE TRIANG MEASURE 1000ML BLK PRNT

## (undated) DEVICE — DEFENDO AIR WATER SUCTION AND BIOPSY VALVE KIT FOR  OLYMPUS: Brand: DEFENDO AIR/WATER/SUCTION AND BIOPSY VALVE

## (undated) DEVICE — GLOVE SURG SZ 75 STD WHT LTX SYN POLYMER BEAD REINF ANTI RL

## (undated) DEVICE — GOWN,SIRUS,FABRNF,XL,20/CS: Brand: MEDLINE

## (undated) DEVICE — REAGENT TEST UREASE RAPD CLOTEST F/

## (undated) DEVICE — FORCEPS BX OVL CUP FEN DISPOSABLE CAP L 160CM RAD JAW 4

## (undated) DEVICE — SPONGE GZ W4XL4IN RAYON POLY FILL CVR W/ NONWOVEN FAB

## (undated) DEVICE — CATHETER URET 5FR L70CM OPN END SGL LUMN INJ HUB FLEXIMA

## (undated) DEVICE — READY WET SKIN SCRUB TRAY-LF: Brand: MEDLINE INDUSTRIES, INC.

## (undated) DEVICE — GAUZE,SPONGE,4"X4",8PLY,STRL,LF,10/TRAY: Brand: MEDLINE

## (undated) DEVICE — Z INACTIVE USE 2635503 SOLUTION IRRIG 3000ML ST H2O USP UROMATIC PLAS CONT

## (undated) DEVICE — CATHETER URETH 22FR BLLN 30CC L16IN SIL 3 W F DISP

## (undated) DEVICE — BAG DRNGE COMB PK

## (undated) DEVICE — CYSTO PACK: Brand: MEDLINE INDUSTRIES, INC.

## (undated) DEVICE — CANNULA NSL ORAL AD FOR CAPNOFLEX CO2 O2 AIRLFE

## (undated) DEVICE — BAG DRAINAGE CONTAINER 15 LT FLUID COLLCTN

## (undated) DEVICE — GAUZE,SPONGE,POST-OP,4X3,STRL,LF: Brand: MEDLINE

## (undated) DEVICE — CAMERA STRYKER 1488 HD GEN

## (undated) DEVICE — SPONGE GZ W4XL4IN RAYON POLY CVR W/NONWOVEN FAB STRL 2/PK

## (undated) DEVICE — GARMENT COMPR STD FOR 17IN CALF UNIF THER FLOTRN

## (undated) DEVICE — VALVE SUCTION AIR H2O SET ORCA POD + DISP